# Patient Record
Sex: MALE | Race: WHITE | Employment: FULL TIME | URBAN - METROPOLITAN AREA
[De-identification: names, ages, dates, MRNs, and addresses within clinical notes are randomized per-mention and may not be internally consistent; named-entity substitution may affect disease eponyms.]

---

## 2017-01-24 ENCOUNTER — PRE VISIT (OUTPATIENT)
Dept: CARDIOLOGY | Facility: CLINIC | Age: 57
End: 2017-01-24

## 2017-01-24 DIAGNOSIS — E78.5 HYPERLIPIDEMIA, UNSPECIFIED HYPERLIPIDEMIA TYPE: Primary | ICD-10-CM

## 2017-01-26 ENCOUNTER — OFFICE VISIT (OUTPATIENT)
Dept: CARDIOLOGY | Facility: CLINIC | Age: 57
End: 2017-01-26
Attending: INTERNAL MEDICINE
Payer: COMMERCIAL

## 2017-01-26 VITALS
HEART RATE: 72 BPM | OXYGEN SATURATION: 95 % | BODY MASS INDEX: 22.59 KG/M2 | HEIGHT: 72 IN | DIASTOLIC BLOOD PRESSURE: 65 MMHG | SYSTOLIC BLOOD PRESSURE: 106 MMHG | WEIGHT: 166.8 LBS

## 2017-01-26 DIAGNOSIS — E78.5 HYPERLIPIDEMIA, UNSPECIFIED HYPERLIPIDEMIA TYPE: ICD-10-CM

## 2017-01-26 DIAGNOSIS — Z82.49 FAMILY HISTORY OF PREMATURE CORONARY ARTERY DISEASE: Primary | ICD-10-CM

## 2017-01-26 DIAGNOSIS — R93.1 AGATSTON CAC SCORE 100-199: ICD-10-CM

## 2017-01-26 LAB
ALBUMIN SERPL-MCNC: 4.2 G/DL (ref 3.4–5)
ALP SERPL-CCNC: 64 U/L (ref 40–150)
ALT SERPL W P-5'-P-CCNC: 29 U/L (ref 0–70)
ANION GAP SERPL CALCULATED.3IONS-SCNC: 7 MMOL/L (ref 3–14)
AST SERPL W P-5'-P-CCNC: 29 U/L (ref 0–45)
BILIRUB SERPL-MCNC: 1 MG/DL (ref 0.2–1.3)
BUN SERPL-MCNC: 16 MG/DL (ref 7–30)
CALCIUM SERPL-MCNC: 8.7 MG/DL (ref 8.5–10.1)
CHLORIDE SERPL-SCNC: 104 MMOL/L (ref 94–109)
CHOLEST SERPL-MCNC: 182 MG/DL
CO2 SERPL-SCNC: 28 MMOL/L (ref 20–32)
CREAT SERPL-MCNC: 1.02 MG/DL (ref 0.66–1.25)
GFR SERPL CREATININE-BSD FRML MDRD: 76 ML/MIN/1.7M2
GLUCOSE SERPL-MCNC: 83 MG/DL (ref 70–99)
HDLC SERPL-MCNC: 48 MG/DL
LDLC SERPL CALC-MCNC: 112 MG/DL
NONHDLC SERPL-MCNC: 134 MG/DL
POTASSIUM SERPL-SCNC: 4.2 MMOL/L (ref 3.4–5.3)
PROT SERPL-MCNC: 7.1 G/DL (ref 6.8–8.8)
SODIUM SERPL-SCNC: 139 MMOL/L (ref 133–144)
TRIGL SERPL-MCNC: 106 MG/DL

## 2017-01-26 PROCEDURE — 80061 LIPID PANEL: CPT | Performed by: INTERNAL MEDICINE

## 2017-01-26 PROCEDURE — 99212 OFFICE O/P EST SF 10 MIN: CPT | Mod: ZF

## 2017-01-26 PROCEDURE — 99203 OFFICE O/P NEW LOW 30 MIN: CPT | Mod: GC | Performed by: INTERNAL MEDICINE

## 2017-01-26 PROCEDURE — 36415 COLL VENOUS BLD VENIPUNCTURE: CPT | Performed by: INTERNAL MEDICINE

## 2017-01-26 PROCEDURE — 80053 COMPREHEN METABOLIC PANEL: CPT | Performed by: INTERNAL MEDICINE

## 2017-01-26 RX ORDER — OMEGA-3-ACID ETHYL ESTERS 1 G/1
2 CAPSULE, LIQUID FILLED ORAL 2 TIMES DAILY
Status: ON HOLD | COMMUNITY
End: 2018-03-20

## 2017-01-26 ASSESSMENT — PAIN SCALES - GENERAL: PAINLEVEL: NO PAIN (0)

## 2017-01-26 NOTE — PATIENT INSTRUCTIONS
You were seen today in the Cardiovascular Clinic at the [facility].      Cardiology Providers you saw during your visit:  Dr. Jane    Recommendations:  Schedule CAC score    Follow-up:  With phone call      Thank you for your visit today!       Please call if you have any questions or concerns.  Cardiology Care Coordinator  Rosalie Samayoa RN    For scheduling needs [phone] option 1 and the option 1 again.  Nursing questions: [phone] option 1 then chose option 3 for the triage nurse.  For emergencies call [phone].

## 2017-01-26 NOTE — MR AVS SNAPSHOT
After Visit Summary   1/26/2017    Derek Enriquez    MRN: [id number]           Patient Information     Date Of Birth          1960        Visit Information        Provider Department      1/26/2017 1:00 PM Anoop Jane MD [facility] Heart [facility]        Today's Diagnoses     Family history of premature coronary artery disease    -  1       Care Instructions    You were seen today in the Cardiovascular Clinic at the [facility].      Cardiology Providers you saw during your visit:  Dr. Jane    Recommendations:  Schedule CAC score    Follow-up:  With phone call      Thank you for your visit today!       Please call if you have any questions or concerns.  Cardiology Care Coordinator  Rosalie Samayoa RN    For scheduling needs [phone] option 1 and the option 1 again.  Nursing questions: [phone] option 1 then chose option 3 for the triage nurse.  For emergencies call [phone].                  Follow-ups after your visit        Follow-up notes from your care team     Return if symptoms worsen or fail to improve, for MARK jane.      Your next 10 appointments already scheduled     Jan 30, 2017 12:00 PM   [facility] [place] [place] [facility])    [address] [place] [id number] [phone]           It is best to avoid caffeine on the day of your test.  Be sure to tell your doctor:   If there s any chance you are pregnant.   If you have any special needs.  Please wear loose clothing, such as a sweat suit or jogging clothes. Avoid snaps, zippers and other metal. We may ask you to undress and put on a hospital gown.  If you have any questions, please call the Imaging Department where you will have your exam.              Future tests that were ordered for you today     Open Future Orders        Priority Expected Expires Ordered    CT Coronary Calcium Scan Routine 1/27/2017 1/26/2018 1/26/2017        "     Who to contact     If you have questions or need follow up information about today's clinic visit or your schedule please contact [facility] directly at [phone].  Normal or non-critical lab and imaging results will be communicated to you by MyChart, letter or phone within 4 business days after the clinic has received the results. If you do not hear from us within 7 days, please contact the clinic through [employer] or phone. If you have a critical or abnormal lab result, we will notify you by phone as soon as possible.  Submit refill requests through [employer] or call your pharmacy and they will forward the refill request to us. Please allow 3 business days for your refill to be completed.          Additional Information About Your Visit        [employer] Information     [employer] gives you secure access to your electronic health record. If you see a primary care provider, you can also send messages to your care team and make appointments. If you have questions, please call your primary care clinic.  If you do not have a primary care provider, please call [phone] and they will assist you.        Care EveryWhere ID     This is your Care EveryWhere ID. This could be used by other organizations to access your [facility] medical records  [id number]        Your Vitals Were     Pulse Height BMI (Body Mass Index) Pulse Oximetry          72 1.816 m (5' 11.5\") 22.94 kg/m2 95%         Blood Pressure from Last 3 Encounters:   01/26/17 106/65    Weight from Last 3 Encounters:   01/26/17 75.66 kg (166 lb 12.8 oz)               Primary Care Provider Office Phone # Fax #    Eugene Burrell [phone]       [facility] [address]  [address]  [facility] [address]        Thank you!     Thank you for choosing [facility]  for your care. Our goal is always to provide you with excellent care. Hearing back from our patients is one way we can continue to improve our services. Please " take a few minutes to complete the written survey that you may receive in the mail after your visit with us. Thank you!             Your Updated Medication List - Protect others around you: Learn how to safely use, store and throw away your medicines at www.disposemymeds.org.          This list is accurate as of: 1/26/17  2:56 PM.  Always use your most recent med list.                   Brand Name Dispense Instructions for use    MULTIVITAMIN MEN PO          omega-3 acid ethyl esters 1 G capsule    Lovaza     Take 2 g by mouth 2 times daily       vitamin B complex with vitamin C Tabs tablet      Take 1 tablet by mouth daily

## 2017-01-26 NOTE — Clinical Note
1/26/2017      RE: Derek Enriquez  [address] [facility] [address]       Dear Colleague,    Thank you for the opportunity to participate in the care of your patient, Derek Enriquez, at the [facility] HEART [facility]. Please see a copy of my visit note below.    HPI:   Dear referring physicians and associated providers,    We had the pleasure of seeing Pt Derek Enriquez in Cardiology today on 1/26/2017. As you know, Pt Derek Enriquez is a 56 year old male with a history of hyperlipidemia. He was previously seen in Family Medicine clinic through [facility] where he was found to have elevated lipid levels.     [facility]      He was self-referred here because his brother had an MI at age 42 (unclear circumstances, possibly received a stent), and he is concerned about his own cardiac risk. Since his visit at [facility] in 10/18/2016, he went through a divorce and due to his elevated lipid levels, underwent significant lifestyle modifications. He has eliminated sugar from his diet as well as heavily fried foods and has engaged in regular cardiovascular exercise.     He specifically denies any chest pain with exertion or at rest, abnormal or worsening SOB, abd distention, early satiety, or N/V/D. No presyncope, syncope, dizziness or lightheadedness. He denies any symptoms of orthopnea/PND, or abnormal lower extremity swelling.     After his divorce he had problems with potency, however he reports undergoing Urologic evaluation and has had intact nocturnal erections and so this was attributed to life stressors. He is now in a new relationship and has not had problems with erections.     PAST MEDICAL HISTORY:  No past medical history on file.    CURRENT MEDICATIONS:  No current outpatient prescriptions on file.       PAST SURGICAL HISTORY:  No past surgical history on file.    ALLERGIES   Allergies not on file    FAMILY HISTORY:  No family history on  "file.    SOCIAL HISTORY:  Social History     Social History     Marital Status: Single     Spouse Name: N/A     Number of Children: N/A     Years of Education: N/A     Social History Main Topics     Smoking status: Not on file     Smokeless tobacco: Not on file     Alcohol Use: Not on file     Drug Use: Not on file     Sexual Activity: Not on file     Other Topics Concern     Not on file     Social History Narrative     No narrative on file       ROS:   Constitutional: No fever, chills, or sweats. No weight gain/loss   ENT: No visual disturbance, ear ache, epistaxis, sore throat  Allergies/Immunologic: Negative.   Respiratory: No cough, hemoptysia  Cardiovascular: As per HPI  GI: No nausea, vomiting, hematemesis, melena, or hematochezia  : No urinary frequency, dysuria, or hematuria  Integument: Negative  Psychiatric: Negative  Neuro: Negative  Endocrinology: Negative   Musculoskeletal: Negative    EXAM:  /65 mmHg  Pulse 72  Ht 1.816 m (5' 11.5\")  Wt 75.66 kg (166 lb 12.8 oz)  BMI 22.94 kg/m2  SpO2 95%    In general, the patient is a pleasant male in no apparent distress.    HEENT: NC/AT.  PERRLA.  EOMI.  Sclerae white, not injected.  Nares clear.  Pharynx without erythema or exudate.  Dentition intact.    Neck: No adenopathy.  No thyromegaly. No jugular venous distension.   Heart: RRR. Normal S1, S2 splits physiologically. No murmur, rub, click, or gallop.  Lungs: CTA.  No ronchi, wheezes, rales.  No dullness to percussion.   Abdomen: Soft, nontender, nondistended. No organomegaly.  No bruits.   Extremities: No clubbing, cyanosis, or edema.    Neurologic: Alert and oriented to person/place/time, normal speech, gait and affect  Skin: No petechiae, purpura or rash.    Labs:  LIPID RESULTS:  Lab Results   Component Value Date    CHOL 182 01/26/2017    HDL 48 01/26/2017    * 01/26/2017    TRIG 106 01/26/2017    NHDL 134* 01/26/2017       LIVER ENZYME RESULTS:  Lab Results   Component Value Date    " AST 29 01/26/2017    ALT 29 01/26/2017       CBC RESULTS:  No results found for: WBC, RBC, HGB, HCT, MCV, MCH, MCHC, RDW, PLT    BMP RESULTS:  Lab Results   Component Value Date     01/26/2017    POTASSIUM 4.2 01/26/2017    CHLORIDE 104 01/26/2017    CO2 28 01/26/2017    ANIONGAP 7 01/26/2017    GLC 83 01/26/2017    BUN 16 01/26/2017    CR 1.02 01/26/2017    GFRESTIMATED 76 01/26/2017    GFRESTBLACK >90   GFR Calc   01/26/2017    PAT 8.7 01/26/2017     Assessment and Plan:     Pt Derek Enriquez is a 56 year old male with a history of hyperlipidemia and a family hx of early CAD (brother with MI at age 42) who presents to [facility] care. Overall he is in stable health, and since initiating lifestyle changes, his TGs have decreased from 241 (10/2016) to 106 (1/26/2017), total cholesterol 210 to 182, HDL 37 to 48,  to 112. Due to his brother having had an MI at age 42, he is concerned about his cardiac risk. ASCVD 10-year score 3.4%. After an in-depth discussion of the risks and benefits of various risk stratification strategies, he opted to proceed with coronary artery calcium scoring understanding that this would be an out-of-pocket cost. We are in agreement and will move forward with this plan.     - continue lovaza 2g BID   - continue vitamin B complex   - Coronary artery calcium score assessment    Pt seen and examined with Dr. Jane.     Thomas Quarles MD  PGY-4 Cardiology  Pager: [phone]  January 26, 2017    CC  Patient Care Team:  Eugene Burrell MD as PCP - General (Family Practice)  Anoop Jane MD as MD (Cardiology)  Vonda Samayoa, RN as Nurse Coordinator (Cardiology)  SELF, REFERRED    Answers for HPI/ROS submitted by the patient on 1/23/2017   General Symptoms: No  Skin Symptoms: No  HENT Symptoms: No  EYE SYMPTOMS: No  HEART SYMPTOMS: No  LUNG SYMPTOMS: No  INTESTINAL SYMPTOMS: No  URINARY SYMPTOMS: No  REPRODUCTIVE SYMPTOMS: No  SKELETAL SYMPTOMS: No  BLOOD  SYMPTOMS: No  NERVOUS SYSTEM SYMPTOMS: No  MENTAL HEALTH SYMPTOMS: No    Anoop Jane MD

## 2017-01-26 NOTE — NURSING NOTE
Chief Complaint   Patient presents with     Follow Up For     Initial consult for hyperlipidemia

## 2017-01-26 NOTE — PROGRESS NOTES
HPI:   Dear referring physicians and associated providers,    We had the pleasure of seeing Pt Derek Enriquez in Cardiology today on 1/26/2017. As you know, Pt Derek Enriquez is a 56 year old male with a history of hyperlipidemia. He was previously seen in Family Medicine clinic through [facility] where he was found to have elevated lipid levels.     [facility]      He was self-referred here because his brother had an MI at age 42 (unclear circumstances, possibly received a stent), and he is concerned about his own cardiac risk. Since his visit at [facility] in 10/18/2016, he went through a divorce and due to his elevated lipid levels, underwent significant lifestyle modifications. He has eliminated sugar from his diet as well as heavily fried foods and has engaged in regular cardiovascular exercise.     He specifically denies any chest pain with exertion or at rest, abnormal or worsening SOB, abd distention, early satiety, or N/V/D. No presyncope, syncope, dizziness or lightheadedness. He denies any symptoms of orthopnea/PND, or abnormal lower extremity swelling.         PAST MEDICAL HISTORY:  Shoulder surgery  Erectile dysfunction    CURRENT MEDICATIONS:  Current Outpatient Prescriptions   Medication Sig Dispense Refill     omega-3 acid ethyl esters (LOVAZA) 1 G capsule Take 2 g by mouth 2 times daily       vitamin B complex with vitamin C (VITAMIN  B COMPLEX) TABS tablet Take 1 tablet by mouth daily       Multiple Vitamins-Minerals (MULTIVITAMIN MEN PO)          PAST SURGICAL HISTORY:  Shoulder and finger surgery    ALLERGIES   Allergies not on file    FAMILY HISTORY:  Family Hx of premature CVD    SOCIAL HISTORY:  Social History     Social History     Marital Status: Single     Spouse Name: N/A     Number of Children: N/A     Years of Education: N/A     Social History Main Topics     Smoking status: Not on file     Smokeless tobacco: Not on file     Alcohol Use: Not on file     Drug Use: Not on  "file     Sexual Activity: Not on file     Other Topics Concern     Not on file     Social History Narrative     No narrative on file       ROS:   Constitutional: No fever, chills, or sweats. No weight gain/loss   ENT: No visual disturbance, ear ache, epistaxis, sore throat  Allergies/Immunologic: Negative.   Respiratory: No cough, hemoptysia  Cardiovascular: As per HPI  GI: No nausea, vomiting, hematemesis, melena, or hematochezia  : No urinary frequency, dysuria, or hematuria  Integument: Negative  Psychiatric: Negative  Neuro: Negative  Endocrinology: Negative   Musculoskeletal: Negative    EXAM:  /65 mmHg  Pulse 72  Ht 1.816 m (5' 11.5\")  Wt 75.66 kg (166 lb 12.8 oz)  BMI 22.94 kg/m2  SpO2 95%    In general, the patient is a pleasant male in no apparent distress.    HEENT: NC/AT.  PERRLA.  EOMI.  Sclerae white, not injected.  Nares clear.  Pharynx without erythema or exudate.  Dentition intact.    Neck: No adenopathy.  No thyromegaly. No jugular venous distension.   Heart: RRR. Normal S1, S2 splits physiologically. No murmur, rub, click, or gallop.  Lungs: CTA.  No ronchi, wheezes, rales.  No dullness to percussion.   Abdomen: Soft, nontender, nondistended. No organomegaly.  No bruits.   Extremities: No clubbing, cyanosis, or edema.    Neurologic: Alert and oriented to person/place/time, normal speech, gait and affect  Skin: No petechiae, purpura or rash.    Labs:      Results for LINDA MARTÍNEZ (MRN [id number]) as of 2/4/2017 16:44   Ref. Range 1/26/2017 12:44   Sodium Latest Ref Range: 133-144 mmol/L 139   Potassium Latest Ref Range: 3.4-5.3 mmol/L 4.2   Chloride Latest Ref Range:  mmol/L 104   Carbon Dioxide Latest Ref Range: 20-32 mmol/L 28   Urea Nitrogen Latest Ref Range: 7-30 mg/dL 16   Creatinine Latest Ref Range: 0.66-1.25 mg/dL 1.02   GFR Estimate Latest Ref Range: >60 mL/min/1.7m2 76   GFR Estimate If Black Latest Ref Range: >60 mL/min/1.7m2 >90...   Calcium Latest Ref Range: 8.5-10.1 " mg/dL 8.7   Anion Gap Latest Ref Range: 3-14 mmol/L 7   Albumin Latest Ref Range: 3.4-5.0 g/dL 4.2   Protein Total Latest Ref Range: 6.8-8.8 g/dL 7.1   Bilirubin Total Latest Ref Range: 0.2-1.3 mg/dL 1.0   Alkaline Phosphatase Latest Ref Range:  U/L 64   ALT Latest Ref Range: 0-70 U/L 29   AST Latest Ref Range: 0-45 U/L 29   Cholesterol Latest Ref Range: <200 mg/dL 182   HDL Cholesterol Latest Ref Range: >39 mg/dL 48   LDL Cholesterol Calculated Latest Ref Range: <100 mg/dL 112 (H)   Non HDL Cholesterol Latest Ref Range: <130 mg/dL 134 (H)   Triglycerides Latest Ref Range: <150 mg/dL 106   Glucose Latest Ref Range: 70-99 mg/dL 83     Procedures:    Procedure: CT CALCIUM SCREENING       Examination Date: 1/30/2017 12:36 PM        Clinical Information: Family history of ischemic heart disease and  other diseases of the circulatory system       Ordering Provider: Anoop Jane     PROCEDURE: High-resolution, ECG synchronized multi-slice computed  tomography was performed with a Siemens Dual Source Flash scanner  without incident. Coronary calcification was analyzed using Zattoo  calcium scoring software. Scan protocol was optimized to minimize  radiation exposure. The total radiation exposure was calculated to be  19 DLP and 0.26 mSv.     FINDINGS:     Overall quality of the study: Good.       CORONARY ARTERY CALCIUM SCORES:       Left main coronary artery: 170.58  Left anterior descending coronary artery: 19.78  Circumflex coronary artery: 0    Right coronary artery: 0     TOTAL CALCIUM SCORE: 190.36     The total Agatston calcium score is 190.36 placing the patient in the  96th percentile when compared to age and gender matched control group.     The ascending aorta is borderline dilated measuring 3.40 x 3.51 cm at  the level of the right pulmonary artery.     Please review Radiology report for incidental noncardiac findings that  will follow separately    Radiology Consult to Cardiology, 1/30/2017 3:31  PM     History: Family history of ischemic heart disease and other diseases  of the circulatory system     Comparison: None available     Technique: Cardiac CT was performed by cardiology. Interpretation of  the noncardiac portions of the study was requested.  Field of view  extending from approximately the bessy to the upper abdomen.     Findings:  No lymphadenopathy demonstrated. Heart size is normal. No pericardial  effusion. Calcification of the proximal left coronary artery. Thoracic  aorta and main pulmonary artery are normal in size. Visualized  portions of the esophagus are normal. Bibasilar subsegmental  atelectasis. 7 x 4 mm nodule along the right major fissure (series 5  image 35) . The visualized lungs are otherwise clear. Imaged portions  of the central airways are clear.       Limited evaluation of the upper abdomen.     No suspicious osseous abnormality.                                                                         Impression:  1. 7x 4 mm nodule along the right major fissure. Follow up in 12  months per Fleischner guidelines suggested.  2. Please see cardiology dictation for detailed findings related to  the heart and mediastinum.     [Consider Follow Up: Lung nodule]    Assessment and Plan:     Alan Enriquez is a 56 year old male with a history of hyperlipidemia and a family hx of early CAD (brother with MI at age 42) who presents to [facility] care. Overall he is in stable health, and since initiating lifestyle changes, his TGs have decreased from 241 (10/2016) to 106 (1/26/2017), total cholesterol 210 to 182, HDL 37 to 48,  to 112. Due to his brother having had an MI at age 42, he is concerned about his cardiac risk. ASCVD 10-year score 3.4%. After an in-depth discussion of the risks and benefits of various risk stratification strategies, he opted to proceed with coronary artery calcium scoring understanding that this would be an out-of-pocket cost. We are in agreement and will  move forward with this plan.      - continue lovaza 2g BID   - continue vitamin B complex   - Coronary artery calcium score assessment - see results CAC score 96th percentile      Thomas Quarles MD  PGY-4 Cardiology  Pager: [phone]  January 26, 2017      I interviewed and examined the patient with the CV fellow.  I agree with the assessment and plan as documented.    We discussed the results with patient:  Because of his CAC score  96th percentile and mainly calcification in the left main - we proposed a coronary CT angio to sort out if there is a stenosis of left main less or greater of 50 %.  For the follow-up of lung nodule - follow-up CT lung within 1 year.    We have also discussed with patient to start aspirin 81 mg/d and atorvastatin 10 mg and increase the dose of atorvastin till LDL-chol < 70 mg/dl.         Anoop Jane MD, PhD  Professor of Medicine  Division of Cardiology    CC  Patient Care Team:  Eugene Burrell MD as PCP - General (Family Practice)  Anoop Jane MD as MD (Cardiology)  Vonda Samayoa RN as Nurse Coordinator (Cardiology)  SELF, REFERRED    Answers for HPI/ROS submitted by the patient on 1/23/2017   General Symptoms: No  Skin Symptoms: No  HENT Symptoms: No  EYE SYMPTOMS: No  HEART SYMPTOMS: No  LUNG SYMPTOMS: No  INTESTINAL SYMPTOMS: No  URINARY SYMPTOMS: No  REPRODUCTIVE SYMPTOMS: No  SKELETAL SYMPTOMS: No  BLOOD SYMPTOMS: No  NERVOUS SYSTEM SYMPTOMS: No  MENTAL HEALTH SYMPTOMS: No

## 2017-01-30 ENCOUNTER — HOSPITAL ENCOUNTER (OUTPATIENT)
Dept: CT IMAGING | Facility: CLINIC | Age: 57
Discharge: HOME | End: 2017-01-30
Attending: INTERNAL MEDICINE | Admitting: INTERNAL MEDICINE
Payer: COMMERCIAL

## 2017-01-30 DIAGNOSIS — Z82.49 FAMILY HISTORY OF PREMATURE CORONARY ARTERY DISEASE: ICD-10-CM

## 2017-01-30 LAB — RADIOLOGIST FLAGS: NORMAL

## 2017-01-30 PROCEDURE — 75571 CT HRT W/O DYE W/CA TEST: CPT | Mod: 26 | Performed by: INTERNAL MEDICINE

## 2017-01-30 PROCEDURE — 75571 CT HRT W/O DYE W/CA TEST: CPT

## 2017-01-31 ENCOUNTER — TELEPHONE (OUTPATIENT)
Dept: CARDIOLOGY | Facility: CLINIC | Age: 57
End: 2017-01-31

## 2017-01-31 NOTE — TELEPHONE ENCOUNTER
"[facility] called to notify that pt had an incidental finding of \"lung nodule\" on his Radiologist Consult scan performed yesterday. Results are in EPIC. They wanted to make sure that Dr. Jane is aware and follows-up with patient about the finding. Will route to Rosalie Samayoa RN and Dr. Buck MD.  "

## 2017-02-01 ENCOUNTER — CARE COORDINATION (OUTPATIENT)
Dept: CARDIOLOGY | Facility: CLINIC | Age: 57
End: 2017-02-01

## 2017-02-01 NOTE — PROGRESS NOTES
Date: 2/1/2017    Time of Call: 3:33 PM     Diagnosis:  High CAC score, coronary artery calcification     [ TORB ] Ordering provider: Dr. Jane   Order: Start Aspirin 81 mg by mouth once daily.  Obtain CT coronary angiogram.  Start Atorvastatin 20 mg by mouth once daily.      Order received by: Vonda Samayoa, RN, BSN     Follow-up/additional notes: Discussed coronary CT angiogram results with patient.  He understands that he should have repeat chest CT in one year to reassess pulmonary nodule.  Discussed concerning findings of coronary CT angiogram.  He is resistant to further therapy or testing.  He prefers to discuss further with Dr. Jane.

## 2017-02-02 NOTE — PROGRESS NOTES
Type of call: Other     Notes: patient returning call from earlier today, please call back  Patient call back number: [phone]    YANG GraceA

## 2017-02-06 NOTE — PROGRESS NOTES
Patient prefers to contact his insurance company prior to scheduling CT coronary angiogram.  He is not interested in starting any medications at this time; he prefers to re-evaluate based on test results.

## 2017-02-14 NOTE — PROGRESS NOTES
Returned call to patient and provided CPT code for test.  Discussed indications for test, pros and cons of further testing, and potential treatment options for potential findings.  All questions answered.  He is awaiting a return call from [employer].

## 2017-02-17 ENCOUNTER — TELEPHONE (OUTPATIENT)
Dept: CARDIOLOGY | Facility: CLINIC | Age: 57
End: 2017-02-17

## 2017-02-17 NOTE — TELEPHONE ENCOUNTER
Pt called triage asking to speak to Rosalie about his CTA scheduled for 2/27. Pt is concerned that he is going to get billed for 2 tests, both CT WITH contrast and CT WITHOUT contrast. Pt states he already had a CT without contrast and only needs the CT with contrast, not both. Offered to send pt to billing to discuss, but pt stated he wanted to speak to Rosalie, as he was speaking to her about it earlier this week, and so he can ensure that he is only going to have the CT with contrast ordered. Will route to Rosalie Samayoa RN.

## 2017-02-22 ENCOUNTER — CARE COORDINATION (OUTPATIENT)
Dept: CARDIOLOGY | Facility: CLINIC | Age: 57
End: 2017-02-22

## 2017-02-22 DIAGNOSIS — Z82.49 FAMILY HISTORY OF PREMATURE CAD: Primary | ICD-10-CM

## 2017-02-22 DIAGNOSIS — Z53.9 ERRONEOUS ENCOUNTER--DISREGARD: ICD-10-CM

## 2017-02-22 DIAGNOSIS — R93.1 AGATSTON CAC SCORE 100-199: ICD-10-CM

## 2017-02-23 ENCOUNTER — TELEPHONE (OUTPATIENT)
Dept: CARDIOLOGY | Facility: CLINIC | Age: 57
End: 2017-02-23

## 2017-02-23 NOTE — TELEPHONE ENCOUNTER
----- Message from Jodi Skaggs RN sent at 2/21/2017  2:34 PM CST -----  Regarding: FW: CT angiogram question - UPDATE  Hi,  I spoke with Jacklyn Flaca our  again and she said he shouldn't be charged for 2 CT angiograms done in the same setting/day. It will be either or in terms of with or without contrast NOT both.   Just FYI for when you talk to him.    Jodi  ----- Message -----     From: Jodi Skaggs RN     Sent: 2/21/2017   1:04 PM       To: Vonda Samayoa, RN, Jessy Martínez RN  Subject: CT angiogram question                            Lev Wiggins,  This patient called in again with the same concern as last week re: CT angiogram. He doesn't want to get charged for both CT without contrast and CT with contrast. I told him that this test has contrast and that I don't know why he'd be charged for a CT without contrast. He said he spoke with a CT tech who said they do both and gave him the CPT codes for both with and without. (I think this is the problem - he now thinks he'll be billed for both). I gave him the CPT code on the order and checked the the  as well. He still doesn't feel like his question was answered.     I told him you are back tomorrow and can help him then.     Thanks,  Jodi

## 2017-02-23 NOTE — TELEPHONE ENCOUNTER
Left patient voice message confirming below information.  Encouraged him to call if further questions.

## 2017-03-20 ENCOUNTER — HOSPITAL ENCOUNTER (OUTPATIENT)
Dept: CT IMAGING | Facility: CLINIC | Age: 57
Discharge: HOME | End: 2017-03-20
Attending: INTERNAL MEDICINE | Admitting: INTERNAL MEDICINE
Payer: COMMERCIAL

## 2017-03-20 VITALS — RESPIRATION RATE: 16 BRPM | DIASTOLIC BLOOD PRESSURE: 60 MMHG | SYSTOLIC BLOOD PRESSURE: 94 MMHG

## 2017-03-20 DIAGNOSIS — Z82.49 FAMILY HISTORY OF PREMATURE CAD: ICD-10-CM

## 2017-03-20 DIAGNOSIS — R93.1 AGATSTON CAC SCORE 100-199: ICD-10-CM

## 2017-03-20 PROCEDURE — 25500064 ZZH RX 255 OP 636: Performed by: INTERNAL MEDICINE

## 2017-03-20 PROCEDURE — 75574 CT ANGIO HRT W/3D IMAGE: CPT

## 2017-03-20 PROCEDURE — 75574 CT ANGIO HRT W/3D IMAGE: CPT | Mod: 26 | Performed by: INTERNAL MEDICINE

## 2017-03-20 PROCEDURE — 25000132 ZZH RX MED GY IP 250 OP 250 PS 637: Performed by: INTERNAL MEDICINE

## 2017-03-20 RX ORDER — IOPAMIDOL 755 MG/ML
120 INJECTION, SOLUTION INTRAVASCULAR ONCE
Status: COMPLETED | OUTPATIENT
Start: 2017-03-20 | End: 2017-03-20

## 2017-03-20 RX ORDER — ACYCLOVIR 200 MG/1
0-1 CAPSULE ORAL
Status: DISCONTINUED | OUTPATIENT
Start: 2017-03-20 | End: 2017-03-21 | Stop reason: HOSPADM

## 2017-03-20 RX ORDER — METOPROLOL TARTRATE 50 MG
50-100 TABLET ORAL
Status: COMPLETED | OUTPATIENT
Start: 2017-03-20 | End: 2017-03-20

## 2017-03-20 RX ORDER — NITROGLYCERIN 0.4 MG/1
0.4 TABLET SUBLINGUAL
Status: DISCONTINUED | OUTPATIENT
Start: 2017-03-20 | End: 2017-03-21 | Stop reason: HOSPADM

## 2017-03-20 RX ADMIN — METOPROLOL TARTRATE 100 MG: 100 TABLET, FILM COATED ORAL at 08:45

## 2017-03-20 RX ADMIN — IOPAMIDOL 110 ML: 755 INJECTION, SOLUTION INTRAVENOUS at 09:48

## 2017-03-20 RX ADMIN — NITROGLYCERIN 0.4 MG: 0.4 TABLET SUBLINGUAL at 09:48

## 2017-03-20 NOTE — PROGRESS NOTES
Patient is educated on the procedure for this test including the medications that will be used and their possible side effects.  Patient is appropriately NPO.  100 mg of oral metoprolol is given per protocol.  Patient tolerated the IV contrast dye denying any symptoms of allergic reaction.  VSS.  Patient is monitored x 15 mn post CT scan and then is escorted on foot back to the [facility] waiting area.  Patient agrees to drink 8 glasses of water over the remainder of the day.

## 2017-03-22 ENCOUNTER — CARE COORDINATION (OUTPATIENT)
Dept: CARDIOLOGY | Facility: CLINIC | Age: 57
End: 2017-03-22

## 2017-03-22 NOTE — PROGRESS NOTES
Called patient to discuss coronary CT angiogram results.  He is not convinced coronary angiogram is needed at this time.  He would like to discuss further with Dr. Jane.  He is concerned about receiving a life-long stent at such a young age.  He would like to review other diagnostic and treatment options. He has reviewed with his brother who is a cardiologist.  He understands that I will review with Dr. Jane and return his call. Patient is out of town 3/23-3/24, so would like call early next week unless call is made yet tonight.

## 2017-04-17 ENCOUNTER — CARE COORDINATION (OUTPATIENT)
Dept: CARDIOLOGY | Facility: CLINIC | Age: 57
End: 2017-04-17

## 2017-04-17 NOTE — PROGRESS NOTES
Patient continues to decline coronary angiogram.  He is asymptomatic at this time and does not feel that further testing is urgent.  Discussed importance of aspirin and statin therapy given his documented coronary artery disease.  He is not interested in therapy and feels that he can manage his CAD with lifestyle modifications.  He reports improved diet and physical activity.  He will call to schedule an appointment with Dr. Jane if he develops new or changed symptoms.

## 2017-04-24 ENCOUNTER — TELEPHONE (OUTPATIENT)
Dept: CARDIOLOGY | Facility: CLINIC | Age: 57
End: 2017-04-24

## 2017-04-24 NOTE — TELEPHONE ENCOUNTER
Returned call to patient and left voice message informing him that I am unaware of any charges associated with telephone calls.  Reiterated that I do not have any knowledge of charges and billing and such questions will need to be asked of the billing department.

## 2017-04-24 NOTE — TELEPHONE ENCOUNTER
"----- Message from Linda Bauer RN sent at 4/17/2017  4:53 PM CDT -----  Regarding: billing question  Please call patient, as he is upset because of a billing issue.    He was charged another $238 for a phone call updating the pt regarding CT angio. Pt was not aware of this....    Billing department stated that this is related to \"codes\" and his questions were not answered.    Please call and assist him (if you can...) [phone]    Thanks much,    Linda Bauer, RN, BSN  Care Coordinator  Pulmonary Hypertension  [facility]  (P) [phone]         "

## 2017-05-02 ENCOUNTER — CARE COORDINATION (OUTPATIENT)
Dept: CARDIOLOGY | Facility: CLINIC | Age: 57
End: 2017-05-02

## 2017-05-02 NOTE — PROGRESS NOTES
Returned call to patient.  Reviewed the difference, again, between coronary angiogram and coronary CT angiogram.  Patient is unsure if he should proceed with test or if he should continue with his nontraditional regimen that includes dietary and exercise modifications, in addition to vitamin c and l-lysine/l-proline. Encouraged him to schedule an appointment multiple times with Dr. Jane. He does not feel that is needed. He would like to know if it is possible to perform repeat test to assess if his nontraditional regimen influence his coronary calcium and blockages. Informed him that this process is not reversible; the goal is to prevent further blockages.  Unable to assist patient further.  He understands that I will route call details to Dr. Jane.

## 2017-05-02 NOTE — PROGRESS NOTES
Note: Discussed statin and aspirin therapy during the telephone call.  Patient continues to believe in unconventional plan of care, despite multiple attempts to educate patient.

## 2017-05-02 NOTE — PROGRESS NOTES
Patient calls in wanting to speak with Dr. Jane's nurse about why coronary angiogram is the next step considering his results on the CTA and overall clinical picture. He is curious how and why Dr. Jane made this decision.     Routing to Dr. Jane & [facility] for follow up.

## 2017-11-14 ENCOUNTER — TRANSFERRED RECORDS (OUTPATIENT)
Dept: HEALTH INFORMATION MANAGEMENT | Facility: CLINIC | Age: 57
End: 2017-11-14

## 2017-12-18 ENCOUNTER — CARE COORDINATION (OUTPATIENT)
Dept: CARDIOLOGY | Facility: CLINIC | Age: 57
End: 2017-12-18

## 2017-12-18 NOTE — PROGRESS NOTES
"Patient called call center and requested follow up appointment with Dr. Jane. He would like to be seen sooner than the first available appointment in June.  He is requesting lab orders, CAC score order and CT coronary angiogram order now.  He would then like to be seen by Dr. Jane after results are available to discuss his progress, or lack there of, with his approach of diet and exercise over the last one year.  I encouraged him to go ahead and schedule labs, but that further testing can not be ordered until further discussion with Dr. Jane. Patient denies any concerning symptoms; he does not have any SOB or chest pain.  \"I feel great actually!\"  He feels strongly that the tests completed last year need to be repeated.  He understands that I will talk with Dr. Jane and return his call within 1 week.  He is agreeable to this plan.   "

## 2017-12-22 NOTE — TELEPHONE ENCOUNTER
Dr. Jane spoke with patient regarding his request for repeat labs, CAC score and CT coronary angiogram.  He informed patient that labs have been ordered, but further imaging is not ordered. Patient chose not to treat his CAD with standard therapy. He reports that he is taking K2, vitamin C and a vigorous exercise program.  He would like repeat imaging to show evidenced that his non-standard therapies has decreased his cholesterol and coronary artery disease.  Dr. Jane informed patient that imaging orders will not be placed; coronary angiogram was previously recommended, along with ASA and statin.  Patient encouraged to seek his desired treatment plan from other physicians of his choosing.  He is agreeable and appreciative of this plan.

## 2018-01-10 ENCOUNTER — TELEPHONE (OUTPATIENT)
Dept: PODIATRY | Facility: CLINIC | Age: 58
End: 2018-01-10

## 2018-01-10 ENCOUNTER — OFFICE VISIT (OUTPATIENT)
Dept: PODIATRY | Facility: CLINIC | Age: 58
End: 2018-01-10
Payer: COMMERCIAL

## 2018-01-10 VITALS
HEIGHT: 72 IN | SYSTOLIC BLOOD PRESSURE: 123 MMHG | WEIGHT: 175.8 LBS | DIASTOLIC BLOOD PRESSURE: 68 MMHG | HEART RATE: 70 BPM | BODY MASS INDEX: 23.81 KG/M2

## 2018-01-10 DIAGNOSIS — M20.5X2 HALLUX LIMITUS OF LEFT FOOT: Primary | ICD-10-CM

## 2018-01-10 PROCEDURE — 99203 OFFICE O/P NEW LOW 30 MIN: CPT | Performed by: PODIATRIST

## 2018-01-10 NOTE — TELEPHONE ENCOUNTER
Reason for Call:  Other call back    Detailed comments: Patient called [facility] and spoke with Milvia regarding his records he was told that someone from Dr. Stewart's office needs to call [phone] for them to send the records. Please call patient and advise Thank you .    Phone Number Patient can be reached at: Work number on file:  [phone] (work)    Best Time: any    Can we leave a detailed message on this number? YES    Call taken on 1/10/2018 at 9:20 AM by Kandis Gerardo

## 2018-01-10 NOTE — PROGRESS NOTES
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  1.5 year duration.  No injury.  1-7/10 pain, worse in tight shoes.  Wondering about surgical options.  Seen at [facility] prior.  We called to have them electronically transfer his x-rays but they have not arrived.      Review of Systems:  Patient denies fever, chills, rash, wound, stiffness, limping, numbness, weakness, heart burn, blood in stool, chest pain with activity, calf pain when walking, shortness of breath with activity, chronic cough, easy bleeding/bruising, swelling of ankles, excessive thirst, fatigue, depression, anxiety.       PAST MEDICAL HISTORY: No pertinent past medical history.     PAST SURGICAL HISTORY: No past surgical history on file.     MEDICATIONS:   Current Outpatient Prescriptions:      omega-3 acid ethyl esters (LOVAZA) 1 G capsule, Take 2 g by mouth 2 times daily, Disp: , Rfl:      vitamin B complex with vitamin C (VITAMIN  B COMPLEX) TABS tablet, Take 1 tablet by mouth daily, Disp: , Rfl:      Multiple Vitamins-Minerals (MULTIVITAMIN MEN PO), , Disp: , Rfl:      ALLERGIES:  No Known Allergies     SOCIAL HISTORY:   Social History     Social History     Marital status: Single     Spouse name: N/A     Number of children: N/A     Years of education: N/A     Occupational History     Not on file.     Social History Main Topics     Smoking status: Former Smoker     Smokeless tobacco: Never Used      Comment: Quit at 31     Alcohol use Not on file     Drug use: Not on file     Sexual activity: Not on file     Other Topics Concern     Not on file     Social History Narrative     No narrative on file        FAMILY HISTORY: No pertinent family history.     EXAM:Vitals: /68 (BP Location: Left arm, Patient Position: Chair, Cuff Size: Adult Regular)  Pulse 70  Ht 5' 11.5\" (1.816 m)  Wt 175 lb 12.8 oz (79.7 kg)  BMI 24.18 kg/m2  BMI= Body mass index is 24.18 " kg/(m^2).    General appearance: Patient is alert and fully cooperative with history & exam.  No sign of distress is noted during the visit.     Psychiatric: Affect is pleasant & appropriate.  Patient appears motivated to improve health.     Respiratory: Breathing is regular & unlabored while sitting.     HEENT: Hearing is intact to spoken word.  Speech is clear.  No gross evidence of visual impairment that would impact ambulation.     Dermatologic: Skin is intact to L foot without significant lesions, rash or abrasion.  No paronychia or evidence of soft tissue infection is noted.     Vascular: DP & PT pulses are intact & regular on the left. No significant edema or varicosities noted.  CFT and skin temperature are normal.     Neurologic: Lower extremity sensation is intact to light touch.  No evidence of weakness or contracture in the lower extremities.  No evidence of neuropathy.     Musculoskeletal: Left 1st MPJ with prominent palpable osteophytes, limitation of motion with some pain noted.  Patient is ambulatory without assistive device or brace.  No gross ankle deformity noted.  No foot or ankle joint effusion is noted.    XRs not available.  Pt will call to have them sent on disc and I will review.     ASSESSMENT: L hallux limitus     PLAN:  Reviewed patient's chart in epic.  Discussed condition and treatment options including pros and cons.    Surgical and non-surgical treatment options for hallux limitus were discussed.  This includes my philosophy about different procedures including cheilectomy, osteotomy and fusion.  I explained how fusion is for late stage treatment of advanced arthritis.  There is no certainty that the non-fusion procedures will improve joint pain that is caused by arthritis.  I explained that hallux limitus is oftentimes surgically treated in a staged manner.  This means that additional surgery may be necessary over time.  Non-operative treatments like stiff soles, orthotics, injection  and NSAIDs were discussed.  Shoes that provide extra room for the enlarged joint should be helpful.  I would anticipate somewhat short term benefit from joint injection.    I suspect pt will need a fusion if surgery is to be undertaken.  Will reserve final judgement until I see x-rays.    Will call pt once I receive images.      Abhijeet Stewart, JEN, FACFAS

## 2018-01-10 NOTE — LETTER
1/10/2018         RE: Derek Enriquez  [address]  [place] [address]        Dear Colleague,    Thank you for referring your patient, Derek Enriquez, to the [facility]. Please see a copy of my visit note below.    BMI is normal.  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  1.5 year duration.  No injury.  1-7/10 pain, worse in tight shoes.  Wondering about surgical options.  Seen at [facility] prior.  We called to have them electronically transfer his x-rays but they have not arrived.      Review of Systems:  Patient denies fever, chills, rash, wound, stiffness, limping, numbness, weakness, heart burn, blood in stool, chest pain with activity, calf pain when walking, shortness of breath with activity, chronic cough, easy bleeding/bruising, swelling of ankles, excessive thirst, fatigue, depression, anxiety.       PAST MEDICAL HISTORY: No pertinent past medical history.     PAST SURGICAL HISTORY: No past surgical history on file.     MEDICATIONS:   Current Outpatient Prescriptions:      omega-3 acid ethyl esters (LOVAZA) 1 G capsule, Take 2 g by mouth 2 times daily, Disp: , Rfl:      vitamin B complex with vitamin C (VITAMIN  B COMPLEX) TABS tablet, Take 1 tablet by mouth daily, Disp: , Rfl:      Multiple Vitamins-Minerals (MULTIVITAMIN MEN PO), , Disp: , Rfl:      ALLERGIES:  No Known Allergies     SOCIAL HISTORY:   Social History     Social History     Marital status: Single     Spouse name: N/A     Number of children: N/A     Years of education: N/A     Occupational History     Not on file.     Social History Main Topics     Smoking status: Former Smoker     Smokeless tobacco: Never Used      Comment: Quit at 31     Alcohol use Not on file     Drug use: Not on file     Sexual activity: Not on file     Other Topics Concern     Not on file     Social History Narrative     No  "narrative on file        FAMILY HISTORY: No pertinent family history.     EXAM:Vitals: /68 (BP Location: Left arm, Patient Position: Chair, Cuff Size: Adult Regular)  Pulse 70  Ht 5' 11.5\" (1.816 m)  Wt 175 lb 12.8 oz (79.7 kg)  BMI 24.18 kg/m2  BMI= Body mass index is 24.18 kg/(m^2).    General appearance: Patient is alert and fully cooperative with history & exam.  No sign of distress is noted during the visit.     Psychiatric: Affect is pleasant & appropriate.  Patient appears motivated to improve health.     Respiratory: Breathing is regular & unlabored while sitting.     HEENT: Hearing is intact to spoken word.  Speech is clear.  No gross evidence of visual impairment that would impact ambulation.     Dermatologic: Skin is intact to L foot without significant lesions, rash or abrasion.  No paronychia or evidence of soft tissue infection is noted.     Vascular: DP & PT pulses are intact & regular on the left. No significant edema or varicosities noted.  CFT and skin temperature are normal.     Neurologic: Lower extremity sensation is intact to light touch.  No evidence of weakness or contracture in the lower extremities.  No evidence of neuropathy.     Musculoskeletal: Left 1st MPJ with prominent palpable osteophytes, limitation of motion with some pain noted.  Patient is ambulatory without assistive device or brace.  No gross ankle deformity noted.  No foot or ankle joint effusion is noted.    XRs not available.  Pt will call to have them sent on disc and I will review.     ASSESSMENT: L hallux limitus     PLAN:  Reviewed patient's chart in epic.  Discussed condition and treatment options including pros and cons.    Surgical and non-surgical treatment options for hallux limitus were discussed.  This includes my philosophy about different procedures including cheilectomy, osteotomy and fusion.  I explained how fusion is for late stage treatment of advanced arthritis.  There is no certainty that the " non-fusion procedures will improve joint pain that is caused by arthritis.  I explained that hallux limitus is oftentimes surgically treated in a staged manner.  This means that additional surgery may be necessary over time.  Non-operative treatments like stiff soles, orthotics, injection and NSAIDs were discussed.  Shoes that provide extra room for the enlarged joint should be helpful.  I would anticipate somewhat short term benefit from joint injection.    I suspect pt will need a fusion if surgery is to be undertaken.  Will reserve final judgement until I see x-rays.    Will call pt once I receive images.      Abhijeet Stewart DPM, FACFAS      Again, thank you for allowing me to participate in the care of your patient.        Sincerely,        Abhijeet Stewart DPM

## 2018-01-10 NOTE — NURSING NOTE
"Chief Complaint   Patient presents with     Toe Pain     L foot hallux rigidus        Initial /68 (BP Location: Left arm, Patient Position: Chair, Cuff Size: Adult Regular)  Pulse 70  Ht 5' 11.5\" (1.816 m)  Wt 175 lb 12.8 oz (79.7 kg)  BMI 24.18 kg/m2 Estimated body mass index is 24.18 kg/(m^2) as calculated from the following:    Height as of this encounter: 5' 11.5\" (1.816 m).    Weight as of this encounter: 175 lb 12.8 oz (79.7 kg).  Medication Reconciliation: ty Ardon MA January 10, 2018 7:20 AM  "

## 2018-01-10 NOTE — TELEPHONE ENCOUNTER
Spoke with someone at [facility]. They stated they will be faxing the images..  RUBEN Ardon MA January 10, 2018 1:08 PM

## 2018-01-10 NOTE — PATIENT INSTRUCTIONS
"DEGENERATIVE ARTHRITIS OF THE BIG TOE JOINT (hallux limitus/hallux rigidus)   Arthritis of the joint at the base of the big toe (metatarsophalangeal joint) has several causes. Usually it results from repetitive trauma to the joint, secondary to abnormal foot mechanics. Often it is hereditary. However, a one-time traumatic event can lead to arthritis. The condition can worsen with time. The cartilage wears out, joint surfaces are no longer smooth, bone rubs on bone, inflammation occurs with pain, and eventually bone spurs and loose fragments might develop.   The joint is often painful with activity, worse with flimsy shoes or walking barefoot, and it slowly progresses over time. A person might notice the toe \"locking up\" with walking. There often is an obvious, and irritating, bony bump on top of the foot. Shoes might be uncomfortable. In some people the pain is so bothersome that recreational activities sometimes even normal daily activities are difficult to perform.   The pain from this arthritis is likely a combination of joint jamming, cartilage loss and inflammation, and irritation from shoes rubbing on the bump. Sometimes other parts of the foot, leg, or back hurt from altering one's walk to compensate for the painful joint.     Ways to help a person live with the discomfort include wearing a good, supportive shoe with a rigid, rocker-type bottom. An example is a hiking boot. A rigid sole minimizes bending of the joint, and therefore, joint motion and pain. Shoes with a high toe box allow for less rubbing on the bump. Avoiding barefoot walking, sandals, flip-flops and slippers usually helps.     Sometimes an insert or orthotic provides symptom relief. This might make shoe fit more difficult. Pads over the bump and occasionally injections into the joint provide relief.     Surgery for this condition is aimed towards alleviating pain. It does not cure the arthritis nor does it guarantee better joint motion. " Depending on the condition of the metatarsophalangeal joint, there are several surgical options:    1.  Cutting off the bony bump(s) and cleaning the joint    2.  Loosening the joint up by making cuts in the first metatarsal bone or the big toe bone and removing a small section of bone.    3.  Repositioning bone to minimize jamming of the joint.    4.  In severe cases, the joint is fused. By fusing the joint, it will never bend again. This resolves the pain, because it's the movement of a worn out joint that causes pain. Oftentimes the operation involves a combination of these procedures and requires the use of screws, pins, and/or a small surgical plate.     Healing after surgery requires about six weeks of protection. This allows the bone to heal. Maximum recovery takes about one year. The scar tissue and joint structures require this amount of time to finish the healing process. Expect stiffness, swelling and numbness during that time frame.   Surgery for arthritis of the metatarsophalangeal joint does involve side effects. Some side effects are predictable and others are less common but do occur. A scar will be visible and could be irritated by shoes. The shoe may rub on the screw or internal pin requiring surgical removal of these fixation devices. The screw and pin would likely be left in place for a full year. The first toe may remain stiff after surgery. The amount of stiffness is variable. Most people never regain normal motion of the first toe. This is due to scar tissue inherent to any surgery, in addition to the cumulative effects of arthritis. Sometimes the big toe drifts to one side or the other. Joint fusion is one option to correct an unstable, drifting toe.   All surgical procedures involve risk of infection, numbness, pain, delayed bone healing, osteotomy (bone cut) dislocation, blood clots, continued foot pain, etc. Arthritic joint surgery is quite complex and should not be taken lightly.    Any  skin incision can lead to infection. Deep infection might involve the bone and thus repeat surgery and six weeks of IV antibiotics. Scar tissue can cause nerve pain or numbness. This is generally temporary but can be permanent. We do not have treatments that cure nerve problems. Second toe pain could be related to altered mechanics and pressure transferred to the second toe. Delayed bone healing would lengthen the healing time. Some bones simply do not heal. This requires repeat surgery, electronic bone stimulation and/or extended protection. Smokers have an approximate 20% chance of poor bone healing. This is double that of a non-smoker. The bone cut may displace. This may need to be repaired with a second operation. Displacement can cause joint malalignment. Immobility after surgery can cause a blood clot in the legs and lungs. This could result in death.   Foot pain is complex. Most feet hurt for more than one reason. Operating on the arthritic   big toe joint will not necessarily create a pain free foot. Appropriate shoes, healthy body weight, avoidance of bare foot walking and moderation of activity will always be necessary to enjoy foot comfort. Arthritis is incurable even with surgery.     Surgery for this type of arthritis is nevertheless quite successful. Most surgical patients are pleased with their foot following surgery. Many of the issues described above can be controlled by taking proper care of your foot during the healing process.   Cosmetic bump surgery is discouraged for the reasons listed above. A bump and joint that is comfortable when wearing appropriate shoes should simply be treated with appropriate shoes.   Your surgeon would be happy to fully describe any of the above issues. You should pursue a full understanding of the operation, recovery process and any potential problems that could develop.

## 2018-01-10 NOTE — MR AVS SNAPSHOT
"              After Visit Summary   1/10/2018    Derek Enriquez    MRN: [id number]           Patient Information     Date Of Birth          1960        Visit Information        Provider Department      1/10/2018 7:30 AM Abhijeet Stewart DPM [facility]        Today's Diagnoses     Hallux limitus of left foot    -  1      Care Instructions    DEGENERATIVE ARTHRITIS OF THE BIG TOE JOINT (hallux limitus/hallux rigidus)   Arthritis of the joint at the base of the big toe (metatarsophalangeal joint) has several causes. Usually it results from repetitive trauma to the joint, secondary to abnormal foot mechanics. Often it is hereditary. However, a one-time traumatic event can lead to arthritis. The condition can worsen with time. The cartilage wears out, joint surfaces are no longer smooth, bone rubs on bone, inflammation occurs with pain, and eventually bone spurs and loose fragments might develop.   The joint is often painful with activity, worse with flimsy shoes or walking barefoot, and it slowly progresses over time. A person might notice the toe \"locking up\" with walking. There often is an obvious, and irritating, bony bump on top of the foot. Shoes might be uncomfortable. In some people the pain is so bothersome that recreational activities sometimes even normal daily activities are difficult to perform.   The pain from this arthritis is likely a combination of joint jamming, cartilage loss and inflammation, and irritation from shoes rubbing on the bump. Sometimes other parts of the foot, leg, or back hurt from altering one's walk to compensate for the painful joint.     Ways to help a person live with the discomfort include wearing a good, supportive shoe with a rigid, rocker-type bottom. An example is a hiking boot. A rigid sole minimizes bending of the joint, and therefore, joint motion and pain. Shoes with a high toe box allow for less rubbing on the bump. Avoiding barefoot walking, " sandals, flip-flops and slippers usually helps.     Sometimes an insert or orthotic provides symptom relief. This might make shoe fit more difficult. Pads over the bump and occasionally injections into the joint provide relief.     Surgery for this condition is aimed towards alleviating pain. It does not cure the arthritis nor does it guarantee better joint motion. Depending on the condition of the metatarsophalangeal joint, there are several surgical options:    1.  Cutting off the bony bump(s) and cleaning the joint    2.  Loosening the joint up by making cuts in the first metatarsal bone or the big toe bone and removing a small section of bone.    3.  Repositioning bone to minimize jamming of the joint.    4.  In severe cases, the joint is fused. By fusing the joint, it will never bend again. This resolves the pain, because it's the movement of a worn out joint that causes pain. Oftentimes the operation involves a combination of these procedures and requires the use of screws, pins, and/or a small surgical plate.     Healing after surgery requires about six weeks of protection. This allows the bone to heal. Maximum recovery takes about one year. The scar tissue and joint structures require this amount of time to finish the healing process. Expect stiffness, swelling and numbness during that time frame.   Surgery for arthritis of the metatarsophalangeal joint does involve side effects. Some side effects are predictable and others are less common but do occur. A scar will be visible and could be irritated by shoes. The shoe may rub on the screw or internal pin requiring surgical removal of these fixation devices. The screw and pin would likely be left in place for a full year. The first toe may remain stiff after surgery. The amount of stiffness is variable. Most people never regain normal motion of the first toe. This is due to scar tissue inherent to any surgery, in addition to the cumulative effects of  arthritis. Sometimes the big toe drifts to one side or the other. Joint fusion is one option to correct an unstable, drifting toe.   All surgical procedures involve risk of infection, numbness, pain, delayed bone healing, osteotomy (bone cut) dislocation, blood clots, continued foot pain, etc. Arthritic joint surgery is quite complex and should not be taken lightly.    Any skin incision can lead to infection. Deep infection might involve the bone and thus repeat surgery and six weeks of IV antibiotics. Scar tissue can cause nerve pain or numbness. This is generally temporary but can be permanent. We do not have treatments that cure nerve problems. Second toe pain could be related to altered mechanics and pressure transferred to the second toe. Delayed bone healing would lengthen the healing time. Some bones simply do not heal. This requires repeat surgery, electronic bone stimulation and/or extended protection. Smokers have an approximate 20% chance of poor bone healing. This is double that of a non-smoker. The bone cut may displace. This may need to be repaired with a second operation. Displacement can cause joint malalignment. Immobility after surgery can cause a blood clot in the legs and lungs. This could result in death.   Foot pain is complex. Most feet hurt for more than one reason. Operating on the arthritic   big toe joint will not necessarily create a pain free foot. Appropriate shoes, healthy body weight, avoidance of bare foot walking and moderation of activity will always be necessary to enjoy foot comfort. Arthritis is incurable even with surgery.     Surgery for this type of arthritis is nevertheless quite successful. Most surgical patients are pleased with their foot following surgery. Many of the issues described above can be controlled by taking proper care of your foot during the healing process.   Cosmetic bump surgery is discouraged for the reasons listed above. A bump and joint that is  "comfortable when wearing appropriate shoes should simply be treated with appropriate shoes.   Your surgeon would be happy to fully describe any of the above issues. You should pursue a full understanding of the operation, recovery process and any potential problems that could develop.                 Follow-ups after your visit        Who to contact     If you have questions or need follow up information about today's clinic visit or your schedule please contact [facility] directly at [phone].  Normal or non-critical lab and imaging results will be communicated to you by [employer], letter or phone within 4 business days after the clinic has received the results. If you do not hear from us within 7 days, please contact the clinic through [employer] or phone. If you have a critical or abnormal lab result, we will notify you by phone as soon as possible.  Submit refill requests through [employer] or call your pharmacy and they will forward the refill request to us. Please allow 3 business days for your refill to be completed.          Additional Information About Your Visit        [employer] Information     [employer] gives you secure access to your electronic health record. If you see a primary care provider, you can also send messages to your care team and make appointments. If you have questions, please call your primary care clinic.  If you do not have a primary care provider, please call [phone] and they will assist you.        Care EveryWhere ID     This is your Care EveryWhere ID. This could be used by other organizations to access your [facility] medical records  [id number]        Your Vitals Were     Pulse Height BMI (Body Mass Index)             70 5' 11.5\" (1.816 m) 24.18 kg/m2          Blood Pressure from Last 3 Encounters:   01/10/18 123/68   03/20/17 94/60   01/26/17 106/65    Weight from Last 3 Encounters:   01/10/18 175 lb 12.8 oz (79.7 kg)   01/26/17 166 lb 12.8 oz (75.7 kg)            "   Today, you had the following     No orders found for display       Primary Care Provider Office Phone # Fax #    Eugene Burrell [phone]       [facility] [address] [facility] [address]        Equal Access to Services     TONEYBENY KATIE : Hadii aad ku hadasho Soomaali, waaxda luqadaha, qaybta kaalmada adeegyada, waxay idiin hayaan adeeg khroger lasarinafarida hamlin. So [facility] [phone].    ATENCIÓN: Si habla español, tiene a humphrey disposición servicios gratuitos de asistencia lingüística. Llame al [phone].    We comply with applicable federal civil rights laws and [place] laws. We do not discriminate on the basis of race, color, national origin, age, disability, sex, sexual orientation, or gender identity.            Thank you!     Thank you for choosing [facility]  for your care. Our goal is always to provide you with excellent care. Hearing back from our patients is one way we can continue to improve our services. Please take a few minutes to complete the written survey that you may receive in the mail after your visit with us. Thank you!             Your Updated Medication List - Protect others around you: Learn how to safely use, store and throw away your medicines at www.disposemymeds.org.          This list is accurate as of: 1/10/18  7:39 AM.  Always use your most recent med list.                   Brand Name Dispense Instructions for use Diagnosis    MULTIVITAMIN MEN PO           omega-3 acid ethyl esters 1 G capsule    Lovaza     Take 2 g by mouth 2 times daily        vitamin B complex with vitamin C Tabs tablet      Take 1 tablet by mouth daily

## 2018-02-16 ENCOUNTER — CARE COORDINATION (OUTPATIENT)
Dept: CARDIOLOGY | Facility: CLINIC | Age: 58
End: 2018-02-16

## 2018-02-16 ENCOUNTER — MYC MEDICAL ADVICE (OUTPATIENT)
Dept: PODIATRY | Facility: CLINIC | Age: 58
End: 2018-02-16

## 2018-02-16 NOTE — PROGRESS NOTES
Patient calling in stating he had 2nd opinion at [facility] including a CTA 2 weeks ago. Ultimately they recommended coronary angiogram (this is tentatively scheduled at [facility] on 3/9/18), which is in line with with Dr. Jane recommended as next steps. Patient has asked [facility] to send records and CTA images to us by Monday 2/19/18. Patient wants to go forward with coronary angiogram here at the  and is wondering if he needs an appt to discuss this with Dr. Jane or if he will just order the procedure.  Told patient Dr. Jane/RNCC will call him when Dr. Jane has chance to review records.     Routing to Dr. Jane/SHEBACC for follow up.

## 2018-02-19 NOTE — TELEPHONE ENCOUNTER
Called [facility] again and was told that they were sent through pacs this morning. She said she will send it again but does not know how long it takes for us to receive.  RUBEN Ardon MA February 19, 2018 3:49 PM

## 2018-02-19 NOTE — TELEPHONE ENCOUNTER
Images received in PACS.  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  I stated I would recommend 1st MPJ fusion if he were to have surgery, as we discussed in clinic.  Pt is interested in an implant option.  I explained I don't perform implant surgery for this problem.  Will give him info for a local foot surgeon who does, Dr. Danilo Carias with [facility].  Discussed his level of arthritis may prevent this from being a viable option, and even if an implant is used, he will likely need a fusion in the future.  Pt understands.  F/u with me as needed.  All questions answered.  I apologized for the delay in getting back to him.

## 2018-02-19 NOTE — TELEPHONE ENCOUNTER
Spoke with patient and informed him that we have requested the imaging multiple times from [facility] with no result. Patient said that he spoke with someone at [facility] a month ago and they stated that we did receive the records, I apologized to that patient and informed him that I will keep trying today.  RUBEN Ardon MA February 19, 2018 10:20 AM

## 2018-02-19 NOTE — TELEPHONE ENCOUNTER
[facility] medical records is not open for today yet.  RUBEN Ardon MA February 19, 2018 8:01 AM

## 2018-02-19 NOTE — TELEPHONE ENCOUNTER
Spoke with Medical records at [facility] and was advised to fax a request from them. Request faxed.    RUBEN Ardon MA February 19, 2018 8:26 AM

## 2018-02-21 ENCOUNTER — PRE VISIT (OUTPATIENT)
Dept: CARDIOLOGY | Facility: CLINIC | Age: 58
End: 2018-02-21

## 2018-03-01 ENCOUNTER — OFFICE VISIT (OUTPATIENT)
Dept: CARDIOLOGY | Facility: CLINIC | Age: 58
End: 2018-03-01
Attending: INTERNAL MEDICINE
Payer: COMMERCIAL

## 2018-03-01 VITALS
DIASTOLIC BLOOD PRESSURE: 76 MMHG | OXYGEN SATURATION: 99 % | WEIGHT: 173.2 LBS | SYSTOLIC BLOOD PRESSURE: 117 MMHG | HEIGHT: 72 IN | HEART RATE: 75 BPM | BODY MASS INDEX: 23.46 KG/M2

## 2018-03-01 DIAGNOSIS — I20.0 UNSTABLE ANGINA (H): Primary | ICD-10-CM

## 2018-03-01 PROCEDURE — G0463 HOSPITAL OUTPT CLINIC VISIT: HCPCS

## 2018-03-01 PROCEDURE — 93010 ELECTROCARDIOGRAM REPORT: CPT | Mod: ZP | Performed by: INTERNAL MEDICINE

## 2018-03-01 PROCEDURE — 99214 OFFICE O/P EST MOD 30 MIN: CPT | Mod: ZP | Performed by: INTERNAL MEDICINE

## 2018-03-01 PROCEDURE — 93005 ELECTROCARDIOGRAM TRACING: CPT | Mod: ZF

## 2018-03-01 RX ORDER — ASPIRIN 81 MG/1
81 TABLET ORAL DAILY
Status: CANCELLED | OUTPATIENT
Start: 2018-03-01

## 2018-03-01 RX ORDER — ATORVASTATIN CALCIUM 20 MG/1
20 TABLET, FILM COATED ORAL DAILY
Qty: 90 TABLET | Refills: 3 | Status: ON HOLD | OUTPATIENT
Start: 2018-03-01 | End: 2018-03-09

## 2018-03-01 RX ORDER — SODIUM CHLORIDE 9 MG/ML
INJECTION, SOLUTION INTRAVENOUS CONTINUOUS
Status: CANCELLED | OUTPATIENT
Start: 2018-03-01

## 2018-03-01 ASSESSMENT — PAIN SCALES - GENERAL: PAINLEVEL: NO PAIN (0)

## 2018-03-01 NOTE — PATIENT INSTRUCTIONS
Patient Instructions:  It was a pleasure to see you in the cardiology clinic today.      If you have any questions, you can reach my nurse, Kenna Ocasio, at [phone].  Press Option #1 for the [facility], and then press Option #3 for nursing.    We are encouraging the use of MyChart to communicate with your HealthCare Provider    Note the new medications: Lipitor 20 mg by mouth every evening   Stop the following medications: None    Follow the American Heart Association Diet and Lifestyle recommendations:  Limit saturated fat, trans fat, sodium, red meat, sweets and sugar-sweetened beverages. If you choose to eat red meat, compare labels and select the leanest cuts available.  Aim for at least 150 minutes of moderate physical activity or 75 minutes of vigorous physical activity - or an equal combination of both - each week.    The results from today include:none    Studies ordered: Coronary Angiogram    Please follow up with Dr. Anoop Jane after the Angiogram    Sincerely,    Anoop Jane MD     If you have an urgent need after hours (8:00 am to 4:30 pm) please call [phone] and ask for the cardiology fellow on call.            Coronary Angiography     The catheter can be placed into the groin, arm, or wrist.   Angiography is a special type of X-ray that lets your doctor view your coronary arteries to see if the blood vessels to your heart are narrowed or blocked.   Before the procedure    Tell your doctor what medicines you take and any allergies you may have.    Tell your doctor if you've had a reaction to contrast dye or have had any kidney problems.    Don t eat or drink anything for at least 6 to 8 hours before the procedure. You will likely be told not to have anything after midnight, the night before the procedure.    A nurse will place an IV catheter in your vein to give fluids, and medicine to relieve pain and help you feel less anxious.    He or she will clean your  skin and, if necessary, shave the area where the catheter will be inserted.  During the procedure    Your doctor will place a long, thin tube called a catheter inside an artery in your groin or arm and guide it into your heart.    He or she will inject a contrast dye through the catheter into your blood vessels or heart chambers.    X-rays are taken to show images of the inside of your heart and coronary arteries.  After the procedure      Your doctor or nurse will tell you how long to lie down and keep the insertion site still.    If the insertion site was in your groin, you may need to lie down with your leg still for several hours. If bleeding occurs, a nurse will apply pressure to the area to control it.    A nurse will check your blood pressure and the insertion site frequently to make sure you remain stable after the procedure.    You may be asked to drink fluid to help flush the contrast liquid out of your system.    Have someone drive you home from the hospital.    If your doctor uses angioplasty to treat a blocked artery, you will stay the night in the hospital.    It s normal to find a small bruise or lump at the insertion site. The lump may be the collagen plug or stitch that you feel, or a small bruise. These common side effects should disappear within a few weeks.  When to call your healthcare provider  Contact your healthcare provider right away if you have any of these:    Chest pain    Pain, swelling, redness, bleeding, or drainage at the insertion site    Severe pain, coldness, or a bluish color in the leg or arm that held the catheter    Fever over 100.4 F (38 C)   Date Last Reviewed: 6/1/2016 2000-2017 [employer]. [address] [place] [address], [place] [address]. All rights reserved. This information is not intended as a substitute for professional medical care. Always follow your healthcare professional's instructions.

## 2018-03-01 NOTE — LETTER
3/1/2018      RE: Derek Enriquez  [address]  [facility] [address]       Dear Colleague,    Thank you for the opportunity to participate in the care of your patient, Derek Enriquez, at the [facility]. Please see a copy of my visit note below.    03/01/18    HPI:   Mr Derek Enriquez is a  57-year-old gentleman with a significant family history for early coronary artery disease who presents for follow-up evaluation for positive coronary CT.  He wanted to make sure that this was the case and eventually had a second coronary CT at [facility] that confirmed our findings of LAD calcification.      He remains quite active.  He goes to the gym and runs as well as swims.  He denies any chest pain during that time but does say that over time he has noted that he has some vague chest discomfort that occasionally goes to the arms and other parts of the upper torso.  He denies that this is pain or typical pressure.  He does not have any typical anginal features although does have these vague chest discomforts of unclear etiology.   He seems to minimize his chest and shoulder discomfort.     He has not noticed that they necessarily correspond to exercise or physical activity.    He is reluctant to to take statin therapy and he takes every day high doses vit C.    PAST MEDICAL HISTORY:  Hyperlipidemia  Coronary Artery Disease    CURRENT MEDICATIONS:  Current Outpatient Prescriptions   Medication Sig Dispense Refill     omega-3 acid ethyl esters (LOVAZA) 1 G capsule Take 2 g by mouth 2 times daily       Multiple Vitamins-Minerals (MULTIVITAMIN MEN PO) Take 1 tablet by mouth daily        vitamin B complex with vitamin C (VITAMIN  B COMPLEX) TABS tablet Take 1 tablet by mouth daily         PAST SURGICAL HISTORY:  No past surgical history on file.    ALLERGIES   No Known Allergies    FAMILY HISTORY:  Positive family history for early coronary disease in his brother    SOCIAL  "HISTORY:  Social History     Social History     Marital status: Single     Spouse name: N/A     Number of children: N/A     Years of education: N/A     Social History Main Topics     Smoking status: Former Smoker     Smokeless tobacco: Never Used      Comment: Quit at 31     Alcohol use Not on file     Drug use: Not on file     Sexual activity: Not on file     Other Topics Concern     Not on file     Social History Narrative     No narrative on file       ROS:   Constitutional: No fever, chills, or sweats. No weight gain/loss   ENT: No visual disturbance, ear ache, epistaxis, sore throat  Allergies/Immunologic: Negative.   Respiratory: No cough, hemoptysia  Cardiovascular: As per HPI  GI: No nausea, vomiting, hematemesis, melena, or hematochezia  : No urinary frequency, dysuria, or hematuria  Integument: Negative  Psychiatric: Negative  Neuro: Negative  Endocrinology: Negative   Musculoskeletal: Negative    EXAM:  /76 (BP Location: Left arm, Patient Position: Chair, Cuff Size: Adult Large)  Pulse 75  Ht 1.816 m (5' 11.5\")  Wt 78.6 kg (173 lb 3.2 oz)  SpO2 99%  BMI 23.82 kg/m2  In general, the patient is a pleasant male in no apparent distress.    HEENT: NC/AT.  PERRLA.  EOMI.  Sclerae white, not injected.  Nares clear.  Pharynx without erythema or exudate.  Dentition intact.    Neck: No adenopathy.  No thyromegaly. Carotids +4/4 bilaterally without bruits.  No jugular venous distension.   Heart: RRR. Normal S1, S2 splits physiologically. No murmur, rub, click, or gallop. The PMI is in the 5th ICS in the midclavicular line. There is no heave.    Lungs: CTA.  No ronchi, wheezes, rales.  No dullness to percussion.   Abdomen: Soft, nontender, nondistended. No organomegaly.  No bruits.   Extremities: No clubbing, cyanosis, or edema.  The pulses are +4/4 at the radial, brachial, femoral, popliteal, DP, and PT sites bilaterally.  No bruits are noted.  Neurologic: Alert and oriented to person/place/time, normal " speech, gait and affect  Skin: No petechiae, purpura or rash.    Labs:  LIPID RESULTS:  Lab Results   Component Value Date    CHOL 182 01/26/2017    HDL 48 01/26/2017     (H) 01/26/2017    TRIG 106 01/26/2017    NHDL 134 (H) 01/26/2017       LIVER ENZYME RESULTS:  Lab Results   Component Value Date    AST 29 01/26/2017    ALT 29 01/26/2017   Labs 01-17-18([facility])  Tot chol 241 (H)   Trig 156 (H)   HDL-C 47   5.13 (H)   194 (H)   LDL-C163 (H)             BMP RESULTS:  Lab Results   Component Value Date     01/26/2017    POTASSIUM 4.2 01/26/2017    CHLORIDE 104 01/26/2017    CO2 28 01/26/2017    ANIONGAP 7 01/26/2017    GLC 83 01/26/2017    BUN 16 01/26/2017    CR 1.02 01/26/2017    GFRESTIMATED 76 01/26/2017    GFRESTBLACK >90   GFR Calc   01/26/2017    PAT 8.7 01/26/2017          Procedures:    CT coronary angiogram 03-20-17    1. Total Agatston score 213, placing the patient in the 89th  percentile when compared to age and gender matched control group.     2. Severe proximal left anterior descending artery stenosis, greater  than 70%, due to partly calcified plaque.     3.  Please review Radiology report for incidental noncardiac findings  that  will follow separately.     Findings discussed with Dr. Jane today     FINDINGS:     CORONARY CALCIUM SCORE: The total Agatston calcium score is 213,  mainly in the left main and left anterior descending artery.  CORONARY CT ANGIOGRAPHY     DOMINANCE: Right dominant system.      LEFT MAIN: The left main arises normally from the left coronary cusp  and is mild to moderately calcified with mild, 24-49% stenosis  distally.     LEFT ANTERIOR DESCENDING: The proximal left anterior descending artery  has severe, greater than 70% stenosis due to partly calcified plaque.  This stenosis is and chest before the origin of the first diagonal  branch. The first diagonal branch is small caliber and widely patent.  The second diagonal branch is moderate  caliber without stenosis. The  mid and distal LAD has no significant stenosis.      CIRCUMFLEX: The circumflex and its major branches are widely patent  without any detectable stenosis . A small ramus branch is widely  patent.     RIGHT CORONARY ARTERY: The right coronary artery and its major  branches are widely patent without any detectable stenosis.     ADDITIONAL FINDINGS:      The proximal ascending aorta is normal in size.      Normal pulmonary venous anatomy with all four pulmonary veins draining  into the left atrium.       There is no left ventricular mass or thrombus.      Normal pericardial thickness. There is no pericardial effusion      Coronary CT angio ([facility] - Jan 2018)  PLAQUE TYPE:  Hard: Calcium Score = 192.    SCAN QUALITY: Good.    FINDINGS:    CORONARY ANATOMY:  Right Dominant.     LEFT MAIN: No stenoses.         LEFT ANTERIOR DESCENDING:  There is a subtotal occlusion of the proximal   LAD with spotty calcification and remodeling.      FIRST DIAGONAL:  No stenoses.     SECOND DIAGONAL:  No stenoses.     CIRCUMFLEX:  Nondominant.      FIRST OBTUSE MARGINAL:  No stenoses.     RIGHT CORONARY ARTERY:  Dominant.  No stenoses.     POSTERIOR DESCENDING ARTERY:  No stenoses.     POSTEROLATERAL BRANCH(ES):  No stenoses.     LEFT VENTRICLE:  There is no resting perfusion defect.    AORTA:  Proximal ascending aorta, mid-through distal descending thoracic   aorta is normal.    PERICARDIUM:  Normal thickness and without an effusion    Assessment and Plan:     This is a 57 year old male with known coronary artery disease based on coronary CT and early family history who presents for evaluation/recommendations,.      In setting of known coronary artery disease based upon 2 positive coronary CT with LAD lesion (proximal).  Has vague symptoms that could be anginal equivalents (patient       We discussed results - emphasized a heart healthy lifestyle and diet:    - Lipitor 20 mg daily will be started, had long  discussion regarding the benefits of statin medications in setting of known coronary artery disease  - ASA 81 mg daily  - Coronary Angiogram to be scheduled  - Continue healthy lifestyle/dietary modifications  - ECG now - shows sinus rhythm     We discussed also with patient that high vit C doses has never shown any evidence to reduce CV morbidity and mortlaity      - If has intervention, consider addition of beta-blockade    Follow up in 6 months, earlier as needed by the patient.    I interviewed and examined the patient with the CV fellow.  I agree with the assessment and plan as documented.      Anoop Jane MD, PhD  Professor of Medicine  Division of Cardiology      CC  Patient Care Team:  Eugene Burrell MD as PCP - General (Family Practice)  Anoop Jane MD as MD (Cardiology)  Vonda Samayoa, RN as Nurse Coordinator (Cardiology)  SELF, REFERRED

## 2018-03-01 NOTE — NURSING NOTE
Chief Complaint   Patient presents with     Follow Up For     1 year follow up for coronary artery disease; aniogram needed soon      Vitals were taken and medications were reconciled.  KATHY Tsai  3:04 PM

## 2018-03-01 NOTE — PROGRESS NOTES
03/01/18    HPI:   Mr Derek Enriquez is a  57-year-old gentleman with a significant family history for early coronary artery disease who presents for follow-up evaluation for positive coronary CT.  He wanted to make sure that this was the case and eventually had a second coronary CT at [facility] that confirmed our findings of LAD calcification.      He remains quite active.  He goes to the gym and runs as well as swims.  He denies any chest pain during that time but does say that over time he has noted that he has some vague chest discomfort that occasionally goes to the arms and other parts of the upper torso.  He denies that this is pain or typical pressure.  He does not have any typical anginal features although does have these vague chest discomforts of unclear etiology.   He seems to minimize his chest and shoulder discomfort.     He has not noticed that they necessarily correspond to exercise or physical activity.    He is reluctant to to take statin therapy and he takes every day high doses vit C.    PAST MEDICAL HISTORY:  Hyperlipidemia  Coronary Artery Disease    CURRENT MEDICATIONS:  Current Outpatient Prescriptions   Medication Sig Dispense Refill     omega-3 acid ethyl esters (LOVAZA) 1 G capsule Take 2 g by mouth 2 times daily       Multiple Vitamins-Minerals (MULTIVITAMIN MEN PO) Take 1 tablet by mouth daily        vitamin B complex with vitamin C (VITAMIN  B COMPLEX) TABS tablet Take 1 tablet by mouth daily         PAST SURGICAL HISTORY:  No past surgical history on file.    ALLERGIES   No Known Allergies    FAMILY HISTORY:  Positive family history for early coronary disease in his brother    SOCIAL HISTORY:  Social History     Social History     Marital status: Single     Spouse name: N/A     Number of children: N/A     Years of education: N/A     Social History Main Topics     Smoking status: Former Smoker     Smokeless tobacco: Never Used      Comment: Quit at 31     Alcohol use Not on file     Drug  "use: Not on file     Sexual activity: Not on file     Other Topics Concern     Not on file     Social History Narrative     No narrative on file       ROS:   Constitutional: No fever, chills, or sweats. No weight gain/loss   ENT: No visual disturbance, ear ache, epistaxis, sore throat  Allergies/Immunologic: Negative.   Respiratory: No cough, hemoptysia  Cardiovascular: As per HPI  GI: No nausea, vomiting, hematemesis, melena, or hematochezia  : No urinary frequency, dysuria, or hematuria  Integument: Negative  Psychiatric: Negative  Neuro: Negative  Endocrinology: Negative   Musculoskeletal: Negative    EXAM:  /76 (BP Location: Left arm, Patient Position: Chair, Cuff Size: Adult Large)  Pulse 75  Ht 1.816 m (5' 11.5\")  Wt 78.6 kg (173 lb 3.2 oz)  SpO2 99%  BMI 23.82 kg/m2  In general, the patient is a pleasant male in no apparent distress.    HEENT: NC/AT.  PERRLA.  EOMI.  Sclerae white, not injected.  Nares clear.  Pharynx without erythema or exudate.  Dentition intact.    Neck: No adenopathy.  No thyromegaly. Carotids +4/4 bilaterally without bruits.  No jugular venous distension.   Heart: RRR. Normal S1, S2 splits physiologically. No murmur, rub, click, or gallop. The PMI is in the 5th ICS in the midclavicular line. There is no heave.    Lungs: CTA.  No ronchi, wheezes, rales.  No dullness to percussion.   Abdomen: Soft, nontender, nondistended. No organomegaly.  No bruits.   Extremities: No clubbing, cyanosis, or edema.  The pulses are +4/4 at the radial, brachial, femoral, popliteal, DP, and PT sites bilaterally.  No bruits are noted.  Neurologic: Alert and oriented to person/place/time, normal speech, gait and affect  Skin: No petechiae, purpura or rash.    Labs:  LIPID RESULTS:  Lab Results   Component Value Date    CHOL 182 01/26/2017    HDL 48 01/26/2017     (H) 01/26/2017    TRIG 106 01/26/2017    NHDL 134 (H) 01/26/2017       LIVER ENZYME RESULTS:  Lab Results   Component Value Date "    AST 29 01/26/2017    ALT 29 01/26/2017   Labs 01-17-18([facility])  Tot chol 241 (H)   Trig 156 (H)   HDL-C 47   5.13 (H)   194 (H)   LDL-C163 (H)             BMP RESULTS:  Lab Results   Component Value Date     01/26/2017    POTASSIUM 4.2 01/26/2017    CHLORIDE 104 01/26/2017    CO2 28 01/26/2017    ANIONGAP 7 01/26/2017    GLC 83 01/26/2017    BUN 16 01/26/2017    CR 1.02 01/26/2017    GFRESTIMATED 76 01/26/2017    GFRESTBLACK >90   GFR Calc   01/26/2017    PAT 8.7 01/26/2017          Procedures:    CT coronary angiogram 03-20-17    1. Total Agatston score 213, placing the patient in the 89th  percentile when compared to age and gender matched control group.     2. Severe proximal left anterior descending artery stenosis, greater  than 70%, due to partly calcified plaque.     3.  Please review Radiology report for incidental noncardiac findings  that  will follow separately.     Findings discussed with Dr. Jane today     FINDINGS:     CORONARY CALCIUM SCORE: The total Agatston calcium score is 213,  mainly in the left main and left anterior descending artery.  CORONARY CT ANGIOGRAPHY     DOMINANCE: Right dominant system.      LEFT MAIN: The left main arises normally from the left coronary cusp  and is mild to moderately calcified with mild, 24-49% stenosis  distally.     LEFT ANTERIOR DESCENDING: The proximal left anterior descending artery  has severe, greater than 70% stenosis due to partly calcified plaque.  This stenosis is and chest before the origin of the first diagonal  branch. The first diagonal branch is small caliber and widely patent.  The second diagonal branch is moderate caliber without stenosis. The  mid and distal LAD has no significant stenosis.      CIRCUMFLEX: The circumflex and its major branches are widely patent  without any detectable stenosis . A small ramus branch is widely  patent.     RIGHT CORONARY ARTERY: The right coronary artery and its major  branches are  widely patent without any detectable stenosis.     ADDITIONAL FINDINGS:      The proximal ascending aorta is normal in size.      Normal pulmonary venous anatomy with all four pulmonary veins draining  into the left atrium.       There is no left ventricular mass or thrombus.      Normal pericardial thickness. There is no pericardial effusion      Coronary CT angio ([facility] - Jan 2018)  PLAQUE TYPE:  Hard: Calcium Score = 192.    SCAN QUALITY: Good.    FINDINGS:    CORONARY ANATOMY:  Right Dominant.     LEFT MAIN: No stenoses.         LEFT ANTERIOR DESCENDING:  There is a subtotal occlusion of the proximal   LAD with spotty calcification and remodeling.      FIRST DIAGONAL:  No stenoses.     SECOND DIAGONAL:  No stenoses.     CIRCUMFLEX:  Nondominant.      FIRST OBTUSE MARGINAL:  No stenoses.     RIGHT CORONARY ARTERY:  Dominant.  No stenoses.     POSTERIOR DESCENDING ARTERY:  No stenoses.     POSTEROLATERAL BRANCH(ES):  No stenoses.     LEFT VENTRICLE:  There is no resting perfusion defect.    AORTA:  Proximal ascending aorta, mid-through distal descending thoracic   aorta is normal.    PERICARDIUM:  Normal thickness and without an effusion    Assessment and Plan:     This is a 57 year old male with known coronary artery disease based on coronary CT and early family history who presents for evaluation/recommendations,.      In setting of known coronary artery disease based upon 2 positive coronary CT with LAD lesion (proximal).  Has vague symptoms that could be anginal equivalents (patient       We discussed results - emphasized a heart healthy lifestyle and diet:    - Lipitor 20 mg daily will be started, had long discussion regarding the benefits of statin medications in setting of known coronary artery disease  - ASA 81 mg daily  - Coronary Angiogram to be scheduled  - Continue healthy lifestyle/dietary modifications  - ECG now - shows sinus rhythm     We discussed also with patient that high vit C doses has never  shown any evidence to reduce CV morbidity and mortlaity      - If has intervention, consider addition of beta-blockade    Follow up in 6 months, earlier as needed by the patient.    I interviewed and examined the patient with the CV fellow.  I agree with the assessment and plan as documented.      Anoop Jane MD, PhD  Professor of Medicine  Division of Cardiology      CC  Patient Care Team:  Eugene Burrell MD as PCP - General (Family Practice)  Anoop Jane MD as MD (Cardiology)  Vonda Samayoa RN as Nurse Coordinator (Cardiology)  SELF, REFERRED

## 2018-03-01 NOTE — MR AVS SNAPSHOT
After Visit Summary   3/1/2018    Derek Enriquez    MRN: [id number]           Patient Information     Date Of Birth          1960        Visit Information        Provider Department      3/1/2018 3:00 PM Anoop Jane MD [facility]        Today's Diagnoses     Unstable angina (H)    -  1      Care Instructions    Patient Instructions:  It was a pleasure to see you in the cardiology clinic today.      If you have any questions, you can reach my nurse, Kenna Ocasio, at [phone].  Press Option #1 for the [facility], and then press Option #3 for nursing.    We are encouraging the use of aloomat to communicate with your HealthCare Provider    Note the new medications: Lipitor 20 mg by mouth every evening   Stop the following medications: None    Follow the American Heart Association Diet and Lifestyle recommendations:  Limit saturated fat, trans fat, sodium, red meat, sweets and sugar-sweetened beverages. If you choose to eat red meat, compare labels and select the leanest cuts available.  Aim for at least 150 minutes of moderate physical activity or 75 minutes of vigorous physical activity - or an equal combination of both - each week.    The results from today include:none    Studies ordered: Coronary Angiogram    Please follow up with Dr. Anoop Jane after the Angiogram    Sincerely,    Anoop Jane MD     If you have an urgent need after hours (8:00 am to 4:30 pm) please call [phone] and ask for the cardiology fellow on call.            Coronary Angiography     The catheter can be placed into the groin, arm, or wrist.   Angiography is a special type of X-ray that lets your doctor view your coronary arteries to see if the blood vessels to your heart are narrowed or blocked.   Before the procedure    Tell your doctor what medicines you take and any allergies you may have.    Tell your doctor if you've had a reaction to contrast dye or have had any  kidney problems.    Don t eat or drink anything for at least 6 to 8 hours before the procedure. You will likely be told not to have anything after midnight, the night before the procedure.    A nurse will place an IV catheter in your vein to give fluids, and medicine to relieve pain and help you feel less anxious.    He or she will clean your skin and, if necessary, shave the area where the catheter will be inserted.  During the procedure    Your doctor will place a long, thin tube called a catheter inside an artery in your groin or arm and guide it into your heart.    He or she will inject a contrast dye through the catheter into your blood vessels or heart chambers.    X-rays are taken to show images of the inside of your heart and coronary arteries.  After the procedure      Your doctor or nurse will tell you how long to lie down and keep the insertion site still.    If the insertion site was in your groin, you may need to lie down with your leg still for several hours. If bleeding occurs, a nurse will apply pressure to the area to control it.    A nurse will check your blood pressure and the insertion site frequently to make sure you remain stable after the procedure.    You may be asked to drink fluid to help flush the contrast liquid out of your system.    Have someone drive you home from the hospital.    If your doctor uses angioplasty to treat a blocked artery, you will stay the night in the hospital.    It s normal to find a small bruise or lump at the insertion site. The lump may be the collagen plug or stitch that you feel, or a small bruise. These common side effects should disappear within a few weeks.  When to call your healthcare provider  Contact your healthcare provider right away if you have any of these:    Chest pain    Pain, swelling, redness, bleeding, or drainage at the insertion site    Severe pain, coldness, or a bluish color in the leg or arm that held the catheter    Fever over 100.4 F  "(38 C)   Date Last Reviewed: 6/1/2016 2000-2017 [employer]. [address] [place] [address], [place] [address]. All rights reserved. This information is not intended as a substitute for professional medical care. Always follow your healthcare professional's instructions.                Follow-ups after your visit        Who to contact     If you have questions or need follow up information about today's clinic visit or your schedule please contact [facility] directly at [phone].  Normal or non-critical lab and imaging results will be communicated to you by Professionali[employer]ruhart, letter or phone within 4 business days after the clinic has received the results. If you do not hear from us within 7 days, please contact the clinic through [employer]over[employer] or phone. If you have a critical or abnormal lab result, we will notify you by phone as soon as possible.  Submit refill requests through [employer] or call your pharmacy and they will forward the refill request to us. Please allow 3 business days for your refill to be completed.          Additional Information About Your Visit        [email or web address] Information     [employer] gives you secure access to your electronic health record. If you see a primary care provider, you can also send messages to your care team and make appointments. If you have questions, please call your primary care clinic.  If you do not have a primary care provider, please call [phone] and they will assist you.        Care EveryWhere ID     This is your Care EveryWhere ID. This could be used by other organizations to access your [facility] medical records  [id number]        Your Vitals Were     Pulse Height Pulse Oximetry BMI (Body Mass Index)          75 1.816 m (5' 11.5\") 99% 23.82 kg/m2         Blood Pressure from Last 3 Encounters:   03/01/18 117/76   01/10/18 123/68   03/20/17 94/60    Weight from Last 3 Encounters:   03/01/18 78.6 kg (173 lb 3.2 oz)   01/10/18 79.7 kg (175 lb 12.8 oz)   01/26/17 " 75.7 kg (166 lb 12.8 oz)              We Performed the Following     EKG 12-lead, tracing only (Same Day)        Primary Care Provider Office Phone # Fax #    Eugene Burrell [phone]       [facility] [address]  [facility] [address]        Equal Access to Services     [facility]: Hadii aad ku hadasho Soomaali, waaxda luqadaha, qaybta kaalmada adeegyada, waxay idiin hayaan adeeg kharash laDakotahaan . So [facility] [phone].    ATENCIÓN: Si habla español, tiene a humphrey disposición servicios gratuitos de asistencia lingüística. [facility] [phone].    We comply with applicable federal civil rights laws and [place] laws. We do not discriminate on the basis of race, color, national origin, age, disability, sex, sexual orientation, or gender identity.            Thank you!     Thank you for choosing [facility]  for your care. Our goal is always to provide you with excellent care. Hearing back from our patients is one way we can continue to improve our services. Please take a few minutes to complete the written survey that you may receive in the mail after your visit with us. Thank you!             Your Updated Medication List - Protect others around you: Learn how to safely use, store and throw away your medicines at www.disposemymeds.org.          This list is accurate as of 3/1/18  4:49 PM.  Always use your most recent med list.                   Brand Name Dispense Instructions for use Diagnosis    MULTIVITAMIN MEN PO      Take 1 tablet by mouth daily        omega-3 acid ethyl esters 1 G capsule    Lovaza     Take 2 g by mouth 2 times daily        vitamin B complex with vitamin C Tabs tablet      Take 1 tablet by mouth daily

## 2018-03-02 ENCOUNTER — CARE COORDINATION (OUTPATIENT)
Dept: CARDIOLOGY | Facility: CLINIC | Age: 58
End: 2018-03-02

## 2018-03-02 ENCOUNTER — HOSPITAL ENCOUNTER (OUTPATIENT)
Facility: CLINIC | Age: 58
End: 2018-03-02
Admitting: INTERNAL MEDICINE
Payer: COMMERCIAL

## 2018-03-02 LAB — INTERPRETATION ECG - MUSE: NORMAL

## 2018-03-02 NOTE — PROGRESS NOTES
You have been scheduled for a coronary angiogram on Monday, March 19  [facility], [place]  [address] [place] [address]  Please arrive at the [facility] Waiting Room at 9:30 am.  The cardiology interventionalist performing the procedure is Dr. Vance.    Please do not eat or drink anything after midnight. You should take all your morning medications as prescribed with sips of water.     When you arrive you will be escorted back to the pre procedure area called 2A. Here they will insert an IV, draw labs, and obtain a short medical history. Please bring an updated list of your current medications.    A physician will come and talk with you about the procedure and obtain consent.    A nurse from the Cardiac Catheterization Lab will then escort you to the procedure area. You will be receiving sedation during the procedure so you will need someone to drive you to and from the hospital.    After the procedure you will recover for a short period (2 - 6 hours) on 6B. You will be discharged with instructions for post procedure care.  However, depending on what the angiogram shows you may have to have stents placed and this might require an overnight stay. We ask that you bring a small bag of necessities for your comfort if you would need to stay overnight. DO NOT BRING ANY VALUABLES!

## 2018-03-08 ENCOUNTER — APPOINTMENT (OUTPATIENT)
Dept: GENERAL RADIOLOGY | Facility: CLINIC | Age: 58
End: 2018-03-08
Attending: EMERGENCY MEDICINE
Payer: COMMERCIAL

## 2018-03-08 ENCOUNTER — HOSPITAL ENCOUNTER (INPATIENT)
Facility: CLINIC | Age: 58
LOS: 1 days | Discharge: ANOTHER HOSPITAL | End: 2018-03-09
Attending: EMERGENCY MEDICINE | Admitting: INTERNAL MEDICINE
Payer: COMMERCIAL

## 2018-03-08 ENCOUNTER — APPOINTMENT (OUTPATIENT)
Dept: CT IMAGING | Facility: CLINIC | Age: 58
End: 2018-03-08
Attending: EMERGENCY MEDICINE
Payer: COMMERCIAL

## 2018-03-08 ENCOUNTER — APPOINTMENT (OUTPATIENT)
Dept: CARDIOLOGY | Facility: CLINIC | Age: 58
End: 2018-03-08
Attending: EMERGENCY MEDICINE
Payer: COMMERCIAL

## 2018-03-08 DIAGNOSIS — R09.2 RESPIRATORY ARREST (H): ICD-10-CM

## 2018-03-08 DIAGNOSIS — I46.9 CARDIAC ARREST (H): ICD-10-CM

## 2018-03-08 DIAGNOSIS — I25.84 CORONARY ARTERY DISEASE DUE TO CALCIFIED CORONARY LESION: ICD-10-CM

## 2018-03-08 DIAGNOSIS — I25.10 CORONARY ARTERY DISEASE DUE TO CALCIFIED CORONARY LESION: ICD-10-CM

## 2018-03-08 DIAGNOSIS — I20.0 UNSTABLE ANGINA (H): ICD-10-CM

## 2018-03-08 LAB
ANION GAP SERPL CALCULATED.3IONS-SCNC: 14 MMOL/L (ref 3–14)
ANION GAP SERPL CALCULATED.3IONS-SCNC: 9 MMOL/L (ref 3–14)
APTT PPP: >240 SEC (ref 22–37)
BASOPHILS # BLD AUTO: 0.1 10E9/L (ref 0–0.2)
BASOPHILS NFR BLD AUTO: 0.5 %
BUN SERPL-MCNC: 22 MG/DL (ref 7–30)
BUN SERPL-MCNC: 23 MG/DL (ref 7–30)
CALCIUM SERPL-MCNC: 8.2 MG/DL (ref 8.5–10.1)
CALCIUM SERPL-MCNC: 8.3 MG/DL (ref 8.5–10.1)
CHLORIDE SERPL-SCNC: 106 MMOL/L (ref 94–109)
CHLORIDE SERPL-SCNC: 107 MMOL/L (ref 94–109)
CO2 SERPL-SCNC: 19 MMOL/L (ref 20–32)
CO2 SERPL-SCNC: 22 MMOL/L (ref 20–32)
CREAT SERPL-MCNC: 0.97 MG/DL (ref 0.66–1.25)
CREAT SERPL-MCNC: 1.03 MG/DL (ref 0.66–1.25)
DIFFERENTIAL METHOD BLD: NORMAL
EOSINOPHIL # BLD AUTO: 0.1 10E9/L (ref 0–0.7)
EOSINOPHIL NFR BLD AUTO: 1.2 %
ERYTHROCYTE [DISTWIDTH] IN BLOOD BY AUTOMATED COUNT: 13.1 % (ref 10–15)
ERYTHROCYTE [DISTWIDTH] IN BLOOD BY AUTOMATED COUNT: 13.1 % (ref 10–15)
GFR SERPL CREATININE-BSD FRML MDRD: 75 ML/MIN/1.7M2
GFR SERPL CREATININE-BSD FRML MDRD: 80 ML/MIN/1.7M2
GLUCOSE BLDC GLUCOMTR-MCNC: 94 MG/DL (ref 70–99)
GLUCOSE SERPL-MCNC: 127 MG/DL (ref 70–99)
GLUCOSE SERPL-MCNC: 93 MG/DL (ref 70–99)
HCT VFR BLD AUTO: 43.9 % (ref 40–53)
HCT VFR BLD AUTO: 47.9 % (ref 40–53)
HGB BLD-MCNC: 15.1 G/DL (ref 13.3–17.7)
HGB BLD-MCNC: 16.8 G/DL (ref 13.3–17.7)
IMM GRANULOCYTES # BLD: 0.1 10E9/L (ref 0–0.4)
IMM GRANULOCYTES NFR BLD: 1.3 %
INR PPP: 1.12 (ref 0.86–1.14)
INR PPP: 3.66 (ref 0.86–1.14)
KCT BLD-ACNC: 206 SEC (ref 75–150)
LYMPHOCYTES # BLD AUTO: 3 10E9/L (ref 0.8–5.3)
LYMPHOCYTES NFR BLD AUTO: 31.5 %
MCH RBC QN AUTO: 30.8 PG (ref 26.5–33)
MCH RBC QN AUTO: 31.1 PG (ref 26.5–33)
MCHC RBC AUTO-ENTMCNC: 34.4 G/DL (ref 31.5–36.5)
MCHC RBC AUTO-ENTMCNC: 35.1 G/DL (ref 31.5–36.5)
MCV RBC AUTO: 89 FL (ref 78–100)
MCV RBC AUTO: 90 FL (ref 78–100)
MONOCYTES # BLD AUTO: 1.1 10E9/L (ref 0–1.3)
MONOCYTES NFR BLD AUTO: 11.3 %
NEUTROPHILS # BLD AUTO: 5.2 10E9/L (ref 1.6–8.3)
NEUTROPHILS NFR BLD AUTO: 54.2 %
NRBC # BLD AUTO: 0 10*3/UL
NRBC BLD AUTO-RTO: 0 /100
PLATELET # BLD AUTO: 195 10E9/L (ref 150–450)
PLATELET # BLD AUTO: 261 10E9/L (ref 150–450)
POTASSIUM SERPL-SCNC: 3.8 MMOL/L (ref 3.4–5.3)
POTASSIUM SERPL-SCNC: 3.9 MMOL/L (ref 3.4–5.3)
RBC # BLD AUTO: 4.9 10E12/L (ref 4.4–5.9)
RBC # BLD AUTO: 5.4 10E12/L (ref 4.4–5.9)
SODIUM SERPL-SCNC: 137 MMOL/L (ref 133–144)
SODIUM SERPL-SCNC: 140 MMOL/L (ref 133–144)
TROPONIN I SERPL-MCNC: 0.04 UG/L (ref 0–0.04)
TROPONIN I SERPL-MCNC: 0.36 UG/L (ref 0–0.04)
WBC # BLD AUTO: 13.1 10E9/L (ref 4–11)
WBC # BLD AUTO: 9.5 10E9/L (ref 4–11)

## 2018-03-08 PROCEDURE — 80048 BASIC METABOLIC PNL TOTAL CA: CPT | Performed by: EMERGENCY MEDICINE

## 2018-03-08 PROCEDURE — 5A1935Z RESPIRATORY VENTILATION, LESS THAN 24 CONSECUTIVE HOURS: ICD-10-PCS | Performed by: EMERGENCY MEDICINE

## 2018-03-08 PROCEDURE — 25000128 H RX IP 250 OP 636

## 2018-03-08 PROCEDURE — B2111ZZ FLUOROSCOPY OF MULTIPLE CORONARY ARTERIES USING LOW OSMOLAR CONTRAST: ICD-10-PCS | Performed by: INTERNAL MEDICINE

## 2018-03-08 PROCEDURE — 93458 L HRT ARTERY/VENTRICLE ANGIO: CPT | Mod: 26 | Performed by: INTERNAL MEDICINE

## 2018-03-08 PROCEDURE — 96366 THER/PROPH/DIAG IV INF ADDON: CPT

## 2018-03-08 PROCEDURE — 93005 ELECTROCARDIOGRAM TRACING: CPT

## 2018-03-08 PROCEDURE — 00000146 ZZHCL STATISTIC GLUCOSE BY METER IP

## 2018-03-08 PROCEDURE — 96365 THER/PROPH/DIAG IV INF INIT: CPT

## 2018-03-08 PROCEDURE — 20000003 ZZH R&B ICU

## 2018-03-08 PROCEDURE — 40000008 ZZH STATISTIC AIRWAY CARE

## 2018-03-08 PROCEDURE — 27210795 ZZH PAD DEFIB QUICK CR4

## 2018-03-08 PROCEDURE — 25000132 ZZH RX MED GY IP 250 OP 250 PS 637: Performed by: EMERGENCY MEDICINE

## 2018-03-08 PROCEDURE — 99291 CRITICAL CARE FIRST HOUR: CPT | Mod: 25 | Performed by: INTERNAL MEDICINE

## 2018-03-08 PROCEDURE — C1757 CATH, THROMBECTOMY/EMBOLECT: HCPCS

## 2018-03-08 PROCEDURE — 70450 CT HEAD/BRAIN W/O DYE: CPT

## 2018-03-08 PROCEDURE — C1769 GUIDE WIRE: HCPCS

## 2018-03-08 PROCEDURE — 25000128 H RX IP 250 OP 636: Performed by: INTERNAL MEDICINE

## 2018-03-08 PROCEDURE — 25000132 ZZH RX MED GY IP 250 OP 250 PS 637: Performed by: INTERNAL MEDICINE

## 2018-03-08 PROCEDURE — 27210848 ZZH CATH IABP PUMP CR18

## 2018-03-08 PROCEDURE — C1874 STENT, COATED/COV W/DEL SYS: HCPCS

## 2018-03-08 PROCEDURE — 36415 COLL VENOUS BLD VENIPUNCTURE: CPT | Performed by: INTERNAL MEDICINE

## 2018-03-08 PROCEDURE — 99291 CRITICAL CARE FIRST HOUR: CPT | Performed by: INTERNAL MEDICINE

## 2018-03-08 PROCEDURE — 85730 THROMBOPLASTIN TIME PARTIAL: CPT | Performed by: INTERNAL MEDICINE

## 2018-03-08 PROCEDURE — 96375 TX/PRO/DX INJ NEW DRUG ADDON: CPT

## 2018-03-08 PROCEDURE — 85610 PROTHROMBIN TIME: CPT | Performed by: EMERGENCY MEDICINE

## 2018-03-08 PROCEDURE — 25000128 H RX IP 250 OP 636: Performed by: EMERGENCY MEDICINE

## 2018-03-08 PROCEDURE — 33967 INSERT I-AORT PERCUT DEVICE: CPT | Mod: GC | Performed by: INTERNAL MEDICINE

## 2018-03-08 PROCEDURE — 40000275 ZZH STATISTIC RCP TIME EA 10 MIN

## 2018-03-08 PROCEDURE — 27210946 ZZH KIT HC TOTES DISP CR8

## 2018-03-08 PROCEDURE — 99291 CRITICAL CARE FIRST HOUR: CPT | Mod: 25

## 2018-03-08 PROCEDURE — 93458 L HRT ARTERY/VENTRICLE ANGIO: CPT | Mod: XU

## 2018-03-08 PROCEDURE — 5A02210 ASSISTANCE WITH CARDIAC OUTPUT USING BALLOON PUMP, CONTINUOUS: ICD-10-PCS | Performed by: INTERNAL MEDICINE

## 2018-03-08 PROCEDURE — 27211089 ZZH KIT ACIST INJECTOR CR3

## 2018-03-08 PROCEDURE — C1887 CATHETER, GUIDING: HCPCS

## 2018-03-08 PROCEDURE — 72125 CT NECK SPINE W/O DYE: CPT

## 2018-03-08 PROCEDURE — C1725 CATH, TRANSLUMIN NON-LASER: HCPCS

## 2018-03-08 PROCEDURE — 93010 ELECTROCARDIOGRAM REPORT: CPT | Performed by: INTERNAL MEDICINE

## 2018-03-08 PROCEDURE — 84484 ASSAY OF TROPONIN QUANT: CPT | Performed by: INTERNAL MEDICINE

## 2018-03-08 PROCEDURE — C9600 PERC DRUG-EL COR STENT SING: HCPCS | Mod: LD

## 2018-03-08 PROCEDURE — 99292 CRITICAL CARE ADDL 30 MIN: CPT

## 2018-03-08 PROCEDURE — 27210759 ZZH DEVICE INFLATION CR6

## 2018-03-08 PROCEDURE — 80048 BASIC METABOLIC PNL TOTAL CA: CPT | Performed by: INTERNAL MEDICINE

## 2018-03-08 PROCEDURE — 33967 INSERT I-AORT PERCUT DEVICE: CPT

## 2018-03-08 PROCEDURE — 85610 PROTHROMBIN TIME: CPT | Performed by: INTERNAL MEDICINE

## 2018-03-08 PROCEDURE — 4A023N7 MEASUREMENT OF CARDIAC SAMPLING AND PRESSURE, LEFT HEART, PERCUTANEOUS APPROACH: ICD-10-PCS | Performed by: INTERNAL MEDICINE

## 2018-03-08 PROCEDURE — 92941 PRQ TRLML REVSC TOT OCCL AMI: CPT | Mod: LD | Performed by: INTERNAL MEDICINE

## 2018-03-08 PROCEDURE — 84484 ASSAY OF TROPONIN QUANT: CPT | Performed by: EMERGENCY MEDICINE

## 2018-03-08 PROCEDURE — 3E043XZ INTRODUCTION OF VASOPRESSOR INTO CENTRAL VEIN, PERCUTANEOUS APPROACH: ICD-10-PCS | Performed by: INTERNAL MEDICINE

## 2018-03-08 PROCEDURE — 71045 X-RAY EXAM CHEST 1 VIEW: CPT

## 2018-03-08 PROCEDURE — 027034Z DILATION OF CORONARY ARTERY, ONE ARTERY WITH DRUG-ELUTING INTRALUMINAL DEVICE, PERCUTANEOUS APPROACH: ICD-10-PCS | Performed by: INTERNAL MEDICINE

## 2018-03-08 PROCEDURE — 27210787 ZZH MANIFOLD CR2

## 2018-03-08 PROCEDURE — 85347 COAGULATION TIME ACTIVATED: CPT

## 2018-03-08 PROCEDURE — 25000125 ZZHC RX 250: Performed by: INTERNAL MEDICINE

## 2018-03-08 PROCEDURE — 85025 COMPLETE CBC W/AUTO DIFF WBC: CPT | Performed by: EMERGENCY MEDICINE

## 2018-03-08 PROCEDURE — 85027 COMPLETE CBC AUTOMATED: CPT | Performed by: INTERNAL MEDICINE

## 2018-03-08 PROCEDURE — 27210910 ZZH NEEDLE CR6

## 2018-03-08 PROCEDURE — 27210998 ZZH ACCESS HEART CATH CR6

## 2018-03-08 RX ORDER — PROPOFOL 10 MG/ML
10-20 INJECTION, EMULSION INTRAVENOUS EVERY 30 MIN PRN
Status: DISCONTINUED | OUTPATIENT
Start: 2018-03-08 | End: 2018-03-09

## 2018-03-08 RX ORDER — SODIUM CHLORIDE 9 MG/ML
INJECTION, SOLUTION INTRAVENOUS CONTINUOUS
Status: DISCONTINUED | OUTPATIENT
Start: 2018-03-08 | End: 2018-03-09 | Stop reason: HOSPADM

## 2018-03-08 RX ORDER — FENTANYL CITRATE 50 UG/ML
25-50 INJECTION, SOLUTION INTRAMUSCULAR; INTRAVENOUS
Status: DISCONTINUED | OUTPATIENT
Start: 2018-03-08 | End: 2018-03-08 | Stop reason: HOSPADM

## 2018-03-08 RX ORDER — CLOPIDOGREL BISULFATE 75 MG/1
75 TABLET ORAL DAILY
Status: DISCONTINUED | OUTPATIENT
Start: 2018-03-09 | End: 2018-03-09 | Stop reason: HOSPADM

## 2018-03-08 RX ORDER — NITROGLYCERIN 20 MG/100ML
.07-2 INJECTION INTRAVENOUS CONTINUOUS PRN
Status: DISCONTINUED | OUTPATIENT
Start: 2018-03-08 | End: 2018-03-08 | Stop reason: HOSPADM

## 2018-03-08 RX ORDER — ACETAMINOPHEN 325 MG/1
325-650 TABLET ORAL EVERY 4 HOURS PRN
Status: DISCONTINUED | OUTPATIENT
Start: 2018-03-08 | End: 2018-03-09 | Stop reason: HOSPADM

## 2018-03-08 RX ORDER — NALOXONE HYDROCHLORIDE 0.4 MG/ML
.2-.4 INJECTION, SOLUTION INTRAMUSCULAR; INTRAVENOUS; SUBCUTANEOUS
Status: ACTIVE | OUTPATIENT
Start: 2018-03-08 | End: 2018-03-09

## 2018-03-08 RX ORDER — HEPARIN SODIUM 1000 [USP'U]/ML
1000-10000 INJECTION, SOLUTION INTRAVENOUS; SUBCUTANEOUS EVERY 5 MIN PRN
Status: DISCONTINUED | OUTPATIENT
Start: 2018-03-08 | End: 2018-03-08 | Stop reason: HOSPADM

## 2018-03-08 RX ORDER — ATROPINE SULFATE 0.1 MG/ML
.5-1 INJECTION INTRAVENOUS
Status: DISCONTINUED | OUTPATIENT
Start: 2018-03-08 | End: 2018-03-08 | Stop reason: HOSPADM

## 2018-03-08 RX ORDER — ASPIRIN 81 MG/1
81-324 TABLET, CHEWABLE ORAL
Status: COMPLETED | OUTPATIENT
Start: 2018-03-08 | End: 2018-03-08

## 2018-03-08 RX ORDER — NALOXONE HYDROCHLORIDE 0.4 MG/ML
.1-.4 INJECTION, SOLUTION INTRAMUSCULAR; INTRAVENOUS; SUBCUTANEOUS
Status: DISCONTINUED | OUTPATIENT
Start: 2018-03-08 | End: 2018-03-09 | Stop reason: HOSPADM

## 2018-03-08 RX ORDER — HYDROCODONE BITARTRATE AND ACETAMINOPHEN 5; 325 MG/1; MG/1
1-2 TABLET ORAL EVERY 4 HOURS PRN
Status: DISCONTINUED | OUTPATIENT
Start: 2018-03-08 | End: 2018-03-09 | Stop reason: HOSPADM

## 2018-03-08 RX ORDER — OMEGA-3-ACID ETHYL ESTERS 1 G/1
2 CAPSULE, LIQUID FILLED ORAL 2 TIMES DAILY
Status: DISCONTINUED | OUTPATIENT
Start: 2018-03-08 | End: 2018-03-09 | Stop reason: HOSPADM

## 2018-03-08 RX ORDER — CHLORHEXIDINE GLUCONATE ORAL RINSE 1.2 MG/ML
15 SOLUTION DENTAL EVERY 12 HOURS
Status: DISCONTINUED | OUTPATIENT
Start: 2018-03-08 | End: 2018-03-09 | Stop reason: HOSPADM

## 2018-03-08 RX ORDER — NITROGLYCERIN 0.4 MG/1
0.4 TABLET SUBLINGUAL EVERY 5 MIN PRN
Status: DISCONTINUED | OUTPATIENT
Start: 2018-03-08 | End: 2018-03-09 | Stop reason: HOSPADM

## 2018-03-08 RX ORDER — NICARDIPINE HYDROCHLORIDE 2.5 MG/ML
100 INJECTION INTRAVENOUS
Status: DISCONTINUED | OUTPATIENT
Start: 2018-03-08 | End: 2018-03-08 | Stop reason: HOSPADM

## 2018-03-08 RX ORDER — NITROGLYCERIN 0.4 MG/1
0.4 TABLET SUBLINGUAL EVERY 5 MIN PRN
Status: DISCONTINUED | OUTPATIENT
Start: 2018-03-08 | End: 2018-03-08 | Stop reason: HOSPADM

## 2018-03-08 RX ORDER — PROPOFOL 10 MG/ML
INJECTION, EMULSION INTRAVENOUS
Status: DISCONTINUED
Start: 2018-03-08 | End: 2018-03-08 | Stop reason: HOSPADM

## 2018-03-08 RX ORDER — SODIUM CHLORIDE 9 MG/ML
INJECTION, SOLUTION INTRAVENOUS CONTINUOUS
Status: DISCONTINUED | OUTPATIENT
Start: 2018-03-08 | End: 2018-03-09

## 2018-03-08 RX ORDER — CLOPIDOGREL BISULFATE 75 MG/1
300-600 TABLET ORAL
Status: COMPLETED | OUTPATIENT
Start: 2018-03-08 | End: 2018-03-08

## 2018-03-08 RX ORDER — SODIUM NITROPRUSSIDE 25 MG/ML
100-200 INJECTION INTRAVENOUS
Status: DISCONTINUED | OUTPATIENT
Start: 2018-03-08 | End: 2018-03-08 | Stop reason: HOSPADM

## 2018-03-08 RX ORDER — ASPIRIN 81 MG/1
81 TABLET ORAL DAILY
Status: DISCONTINUED | OUTPATIENT
Start: 2018-03-09 | End: 2018-03-08

## 2018-03-08 RX ORDER — ATROPINE SULFATE 0.1 MG/ML
0.5 INJECTION INTRAVENOUS EVERY 5 MIN PRN
Status: ACTIVE | OUTPATIENT
Start: 2018-03-08 | End: 2018-03-09

## 2018-03-08 RX ORDER — ASPIRIN 81 MG/1
81 TABLET ORAL DAILY
Status: DISCONTINUED | OUTPATIENT
Start: 2018-03-09 | End: 2018-03-09 | Stop reason: HOSPADM

## 2018-03-08 RX ORDER — POTASSIUM CHLORIDE 7.45 MG/ML
10 INJECTION INTRAVENOUS
Status: DISCONTINUED | OUTPATIENT
Start: 2018-03-08 | End: 2018-03-08 | Stop reason: HOSPADM

## 2018-03-08 RX ORDER — ASPIRIN 300 MG/1
300 SUPPOSITORY RECTAL ONCE
Status: COMPLETED | OUTPATIENT
Start: 2018-03-08 | End: 2018-03-08

## 2018-03-08 RX ORDER — DIPHENHYDRAMINE HYDROCHLORIDE 50 MG/ML
25-50 INJECTION INTRAMUSCULAR; INTRAVENOUS
Status: DISCONTINUED | OUTPATIENT
Start: 2018-03-08 | End: 2018-03-08 | Stop reason: HOSPADM

## 2018-03-08 RX ORDER — PROTAMINE SULFATE 10 MG/ML
1-5 INJECTION, SOLUTION INTRAVENOUS
Status: DISCONTINUED | OUTPATIENT
Start: 2018-03-08 | End: 2018-03-08 | Stop reason: HOSPADM

## 2018-03-08 RX ORDER — ENALAPRILAT 1.25 MG/ML
1.25-2.5 INJECTION INTRAVENOUS
Status: DISCONTINUED | OUTPATIENT
Start: 2018-03-08 | End: 2018-03-08 | Stop reason: HOSPADM

## 2018-03-08 RX ORDER — NITROGLYCERIN 5 MG/ML
100-200 VIAL (ML) INTRAVENOUS
Status: DISCONTINUED | OUTPATIENT
Start: 2018-03-08 | End: 2018-03-08 | Stop reason: HOSPADM

## 2018-03-08 RX ORDER — METHYLPREDNISOLONE SODIUM SUCCINATE 125 MG/2ML
125 INJECTION, POWDER, LYOPHILIZED, FOR SOLUTION INTRAMUSCULAR; INTRAVENOUS
Status: DISCONTINUED | OUTPATIENT
Start: 2018-03-08 | End: 2018-03-08 | Stop reason: HOSPADM

## 2018-03-08 RX ORDER — CLOPIDOGREL BISULFATE 75 MG/1
75 TABLET ORAL
Status: DISCONTINUED | OUTPATIENT
Start: 2018-03-08 | End: 2018-03-08 | Stop reason: HOSPADM

## 2018-03-08 RX ORDER — ONDANSETRON 2 MG/ML
4 INJECTION INTRAMUSCULAR; INTRAVENOUS EVERY 4 HOURS PRN
Status: DISCONTINUED | OUTPATIENT
Start: 2018-03-08 | End: 2018-03-08 | Stop reason: HOSPADM

## 2018-03-08 RX ORDER — FENTANYL CITRATE 50 UG/ML
100 INJECTION, SOLUTION INTRAMUSCULAR; INTRAVENOUS ONCE
Status: COMPLETED | OUTPATIENT
Start: 2018-03-08 | End: 2018-03-08

## 2018-03-08 RX ORDER — HYDRALAZINE HYDROCHLORIDE 20 MG/ML
10-20 INJECTION INTRAMUSCULAR; INTRAVENOUS
Status: DISCONTINUED | OUTPATIENT
Start: 2018-03-08 | End: 2018-03-08 | Stop reason: HOSPADM

## 2018-03-08 RX ORDER — DOBUTAMINE HYDROCHLORIDE 200 MG/100ML
2-20 INJECTION INTRAVENOUS CONTINUOUS PRN
Status: DISCONTINUED | OUTPATIENT
Start: 2018-03-08 | End: 2018-03-08 | Stop reason: HOSPADM

## 2018-03-08 RX ORDER — PROCHLORPERAZINE 25 MG
25 SUPPOSITORY, RECTAL RECTAL EVERY 12 HOURS PRN
Status: DISCONTINUED | OUTPATIENT
Start: 2018-03-08 | End: 2018-03-09 | Stop reason: HOSPADM

## 2018-03-08 RX ORDER — NIFEDIPINE 10 MG/1
10 CAPSULE ORAL
Status: DISCONTINUED | OUTPATIENT
Start: 2018-03-08 | End: 2018-03-08 | Stop reason: HOSPADM

## 2018-03-08 RX ORDER — ASPIRIN 325 MG
325 TABLET ORAL
Status: DISCONTINUED | OUTPATIENT
Start: 2018-03-08 | End: 2018-03-08 | Stop reason: HOSPADM

## 2018-03-08 RX ORDER — DEXTROSE MONOHYDRATE 25 G/50ML
12.5-5 INJECTION, SOLUTION INTRAVENOUS EVERY 30 MIN PRN
Status: DISCONTINUED | OUTPATIENT
Start: 2018-03-08 | End: 2018-03-08 | Stop reason: HOSPADM

## 2018-03-08 RX ORDER — NALOXONE HYDROCHLORIDE 0.4 MG/ML
.1-.4 INJECTION, SOLUTION INTRAMUSCULAR; INTRAVENOUS; SUBCUTANEOUS
Status: DISCONTINUED | OUTPATIENT
Start: 2018-03-08 | End: 2018-03-08

## 2018-03-08 RX ORDER — EPINEPHRINE 1 MG/ML
0.3 INJECTION, SOLUTION, CONCENTRATE INTRAVENOUS
Status: DISCONTINUED | OUTPATIENT
Start: 2018-03-08 | End: 2018-03-08 | Stop reason: HOSPADM

## 2018-03-08 RX ORDER — ADENOSINE 3 MG/ML
12-12000 INJECTION, SOLUTION INTRAVENOUS
Status: DISCONTINUED | OUTPATIENT
Start: 2018-03-08 | End: 2018-03-08 | Stop reason: HOSPADM

## 2018-03-08 RX ORDER — NALOXONE HYDROCHLORIDE 0.4 MG/ML
0.4 INJECTION, SOLUTION INTRAMUSCULAR; INTRAVENOUS; SUBCUTANEOUS EVERY 5 MIN PRN
Status: DISCONTINUED | OUTPATIENT
Start: 2018-03-08 | End: 2018-03-08 | Stop reason: HOSPADM

## 2018-03-08 RX ORDER — PROCHLORPERAZINE MALEATE 5 MG
10 TABLET ORAL EVERY 6 HOURS PRN
Status: DISCONTINUED | OUTPATIENT
Start: 2018-03-08 | End: 2018-03-09 | Stop reason: HOSPADM

## 2018-03-08 RX ORDER — METOPROLOL TARTRATE 1 MG/ML
5 INJECTION, SOLUTION INTRAVENOUS EVERY 5 MIN PRN
Status: DISCONTINUED | OUTPATIENT
Start: 2018-03-08 | End: 2018-03-08 | Stop reason: HOSPADM

## 2018-03-08 RX ORDER — FENTANYL CITRATE 50 UG/ML
25-50 INJECTION, SOLUTION INTRAMUSCULAR; INTRAVENOUS
Status: ACTIVE | OUTPATIENT
Start: 2018-03-08 | End: 2018-03-09

## 2018-03-08 RX ORDER — PROMETHAZINE HYDROCHLORIDE 25 MG/ML
6.25-25 INJECTION, SOLUTION INTRAMUSCULAR; INTRAVENOUS EVERY 4 HOURS PRN
Status: DISCONTINUED | OUTPATIENT
Start: 2018-03-08 | End: 2018-03-08 | Stop reason: HOSPADM

## 2018-03-08 RX ORDER — VERAPAMIL HYDROCHLORIDE 2.5 MG/ML
1-2.5 INJECTION, SOLUTION INTRAVENOUS
Status: DISCONTINUED | OUTPATIENT
Start: 2018-03-08 | End: 2018-03-08 | Stop reason: HOSPADM

## 2018-03-08 RX ORDER — PROTAMINE SULFATE 10 MG/ML
25-100 INJECTION, SOLUTION INTRAVENOUS EVERY 5 MIN PRN
Status: DISCONTINUED | OUTPATIENT
Start: 2018-03-08 | End: 2018-03-08 | Stop reason: HOSPADM

## 2018-03-08 RX ORDER — PROPOFOL 10 MG/ML
5-75 INJECTION, EMULSION INTRAVENOUS CONTINUOUS
Status: DISCONTINUED | OUTPATIENT
Start: 2018-03-08 | End: 2018-03-08

## 2018-03-08 RX ORDER — LIDOCAINE 40 MG/G
CREAM TOPICAL
Status: DISCONTINUED | OUTPATIENT
Start: 2018-03-08 | End: 2018-03-09 | Stop reason: HOSPADM

## 2018-03-08 RX ORDER — HYDROMORPHONE HYDROCHLORIDE 1 MG/ML
.3-.5 INJECTION, SOLUTION INTRAMUSCULAR; INTRAVENOUS; SUBCUTANEOUS
Status: DISCONTINUED | OUTPATIENT
Start: 2018-03-08 | End: 2018-03-09 | Stop reason: HOSPADM

## 2018-03-08 RX ORDER — POTASSIUM CHLORIDE 29.8 MG/ML
20 INJECTION INTRAVENOUS
Status: DISCONTINUED | OUTPATIENT
Start: 2018-03-08 | End: 2018-03-08 | Stop reason: HOSPADM

## 2018-03-08 RX ORDER — PROPOFOL 10 MG/ML
5-75 INJECTION, EMULSION INTRAVENOUS CONTINUOUS
Status: DISCONTINUED | OUTPATIENT
Start: 2018-03-08 | End: 2018-03-09

## 2018-03-08 RX ORDER — MORPHINE SULFATE 2 MG/ML
1-2 INJECTION, SOLUTION INTRAMUSCULAR; INTRAVENOUS EVERY 5 MIN PRN
Status: DISCONTINUED | OUTPATIENT
Start: 2018-03-08 | End: 2018-03-08 | Stop reason: HOSPADM

## 2018-03-08 RX ORDER — NOREPINEPHRINE BITARTRATE/D5W 16MG/250ML
0.03-0.4 PLASTIC BAG, INJECTION (ML) INTRAVENOUS CONTINUOUS
Status: DISCONTINUED | OUTPATIENT
Start: 2018-03-08 | End: 2018-03-09

## 2018-03-08 RX ORDER — LIDOCAINE HYDROCHLORIDE 10 MG/ML
30 INJECTION, SOLUTION EPIDURAL; INFILTRATION; INTRACAUDAL; PERINEURAL
Status: DISCONTINUED | OUTPATIENT
Start: 2018-03-08 | End: 2018-03-08 | Stop reason: HOSPADM

## 2018-03-08 RX ORDER — FUROSEMIDE 10 MG/ML
20-100 INJECTION INTRAMUSCULAR; INTRAVENOUS
Status: DISCONTINUED | OUTPATIENT
Start: 2018-03-08 | End: 2018-03-08 | Stop reason: HOSPADM

## 2018-03-08 RX ORDER — SODIUM CHLORIDE 9 MG/ML
INJECTION, SOLUTION INTRAVENOUS CONTINUOUS
Status: DISCONTINUED | OUTPATIENT
Start: 2018-03-08 | End: 2018-03-08

## 2018-03-08 RX ORDER — PRASUGREL 10 MG/1
10-60 TABLET, FILM COATED ORAL
Status: DISCONTINUED | OUTPATIENT
Start: 2018-03-08 | End: 2018-03-08 | Stop reason: HOSPADM

## 2018-03-08 RX ORDER — IOPAMIDOL 755 MG/ML
110 INJECTION, SOLUTION INTRAVASCULAR ONCE
Status: COMPLETED | OUTPATIENT
Start: 2018-03-08 | End: 2018-03-08

## 2018-03-08 RX ORDER — LORAZEPAM 2 MG/ML
.5-2 INJECTION INTRAMUSCULAR EVERY 4 HOURS PRN
Status: DISCONTINUED | OUTPATIENT
Start: 2018-03-08 | End: 2018-03-08 | Stop reason: HOSPADM

## 2018-03-08 RX ORDER — FLUMAZENIL 0.1 MG/ML
0.2 INJECTION, SOLUTION INTRAVENOUS
Status: ACTIVE | OUTPATIENT
Start: 2018-03-08 | End: 2018-03-09

## 2018-03-08 RX ORDER — NITROGLYCERIN 5 MG/ML
100-500 VIAL (ML) INTRAVENOUS
Status: DISCONTINUED | OUTPATIENT
Start: 2018-03-08 | End: 2018-03-08 | Stop reason: HOSPADM

## 2018-03-08 RX ORDER — ATORVASTATIN CALCIUM 80 MG/1
80 TABLET, FILM COATED ORAL DAILY
Status: DISCONTINUED | OUTPATIENT
Start: 2018-03-09 | End: 2018-03-09 | Stop reason: HOSPADM

## 2018-03-08 RX ORDER — BUPIVACAINE HYDROCHLORIDE 2.5 MG/ML
1-10 INJECTION, SOLUTION EPIDURAL; INFILTRATION; INTRACAUDAL
Status: DISCONTINUED | OUTPATIENT
Start: 2018-03-08 | End: 2018-03-08 | Stop reason: HOSPADM

## 2018-03-08 RX ORDER — FLUMAZENIL 0.1 MG/ML
0.2 INJECTION, SOLUTION INTRAVENOUS
Status: DISCONTINUED | OUTPATIENT
Start: 2018-03-08 | End: 2018-03-08 | Stop reason: HOSPADM

## 2018-03-08 RX ORDER — DOPAMINE HYDROCHLORIDE 160 MG/100ML
2-20 INJECTION, SOLUTION INTRAVENOUS CONTINUOUS PRN
Status: DISCONTINUED | OUTPATIENT
Start: 2018-03-08 | End: 2018-03-08 | Stop reason: HOSPADM

## 2018-03-08 RX ORDER — LIDOCAINE HYDROCHLORIDE 10 MG/ML
1-10 INJECTION, SOLUTION EPIDURAL; INFILTRATION; INTRACAUDAL; PERINEURAL
Status: DISCONTINUED | OUTPATIENT
Start: 2018-03-08 | End: 2018-03-08 | Stop reason: HOSPADM

## 2018-03-08 RX ORDER — PHENYLEPHRINE HCL IN 0.9% NACL 1 MG/10 ML
20-100 SYRINGE (ML) INTRAVENOUS
Status: DISCONTINUED | OUTPATIENT
Start: 2018-03-08 | End: 2018-03-08 | Stop reason: HOSPADM

## 2018-03-08 RX ADMIN — IOPAMIDOL 110 ML: 755 INJECTION, SOLUTION INTRAVASCULAR at 20:45

## 2018-03-08 RX ADMIN — NITROGLYCERIN 200 MCG: 5 INJECTION, SOLUTION INTRAVENOUS at 20:22

## 2018-03-08 RX ADMIN — FENTANYL CITRATE 100 MCG: 50 INJECTION, SOLUTION INTRAMUSCULAR; INTRAVENOUS at 19:27

## 2018-03-08 RX ADMIN — CHLORHEXIDINE GLUCONATE 15 ML: 1.2 RINSE ORAL at 23:37

## 2018-03-08 RX ADMIN — Medication 52.5 MG: at 20:03

## 2018-03-08 RX ADMIN — HEPARIN SODIUM 5000 UNITS: 1000 INJECTION, SOLUTION INTRAVENOUS; SUBCUTANEOUS at 19:13

## 2018-03-08 RX ADMIN — HYDROMORPHONE HYDROCHLORIDE 0.5 MG: 1 INJECTION, SOLUTION INTRAMUSCULAR; INTRAVENOUS; SUBCUTANEOUS at 23:19

## 2018-03-08 RX ADMIN — BIVALIRUDIN 1.75 MG/KG/HR: 250 INJECTION, POWDER, LYOPHILIZED, FOR SOLUTION INTRAVENOUS at 20:04

## 2018-03-08 RX ADMIN — SODIUM CHLORIDE: 9 INJECTION, SOLUTION INTRAVENOUS at 23:43

## 2018-03-08 RX ADMIN — ASPIRIN 300 MG: 300 SUPPOSITORY RECTAL at 19:16

## 2018-03-08 RX ADMIN — OMEGA-3-ACID ETHYL ESTERS 2 G: 1 CAPSULE, LIQUID FILLED ORAL at 22:51

## 2018-03-08 RX ADMIN — PROPOFOL 75 MCG/KG/MIN: 10 INJECTION, EMULSION INTRAVENOUS at 23:01

## 2018-03-08 RX ADMIN — HEPARIN SODIUM 12 UNITS/KG/HR: 10000 INJECTION, SOLUTION INTRAVENOUS at 19:12

## 2018-03-08 RX ADMIN — SODIUM CHLORIDE: 9 INJECTION, SOLUTION INTRAVENOUS at 21:51

## 2018-03-08 RX ADMIN — HEPARIN SODIUM 950 UNITS/HR: 10000 INJECTION, SOLUTION INTRAVENOUS at 22:35

## 2018-03-08 RX ADMIN — PROPOFOL 75 MCG/KG/MIN: 10 INJECTION, EMULSION INTRAVENOUS at 18:28

## 2018-03-08 RX ADMIN — NOREPINEPHRINE BITARTRATE 0.03 MCG/KG/MIN: 1 INJECTION INTRAVENOUS at 22:40

## 2018-03-08 RX ADMIN — FAMOTIDINE 20 MG: 10 INJECTION, SOLUTION INTRAVENOUS at 23:38

## 2018-03-08 RX ADMIN — CLOPIDOGREL BISULFATE 600 MG: 75 TABLET ORAL at 20:41

## 2018-03-08 RX ADMIN — ASPIRIN 81 MG 81 MG: 81 TABLET ORAL at 20:41

## 2018-03-08 NOTE — IP AVS SNAPSHOT
` ` Patient Information     Patient Name Sex     Derek Enriquez ([id number]) Male 1960       Room Bed    I361 I361-01      Patient Demographics     Address Phone E-mail Address    [address]  [facility] [address] [phone] (Home)  [phone] (Work)  [phone] (Mobile) *Preferred* [email or web address]      Patient Ethnicity & Race     Ethnic Group Patient Race     White      Emergency Contact(s)     Name Relation Home Work Mobile    NAKUL INIGUEZ Significant other   [phone]    Negro Markser   [phone]    Hany Enriquez Son   [phone]    Juan Magallon   [phone]      Documents on File        Status Date Received Description       Documents for the Patient    Insurance Card  09     Consent Form  09     Privacy Notice - [place] Received 10/28/14     Insurance Card  09     Affiliate Privacy placeholder   phase3    Consent for Services - Hospital/Clinic Received () 10/28/14     Consent for EHR Access Received 10/28/14     External Medication Information Consent       Patient ID       [facility] Specified Other       Privacy Notice - [place]  10/29/14 ACKNOWLEDGMENT OF RECEIPT OF NOTICE OF PRIVACY PRACTICE    Consent for EHR Access  10/29/14 CONSENT FOR USE OF [facility] WITH NON-[place] [facility] PROVIDERS    Consent for Services - Hospital/Clinic  () 10/29/14 CONSENT FOR SERVICE    Consent for Services - [facility]       Consent for Services/Privacy Notice - Hospital/Clinic       Consent for Services/Privacy Notice - Hospital/Clinic Received () 17     [facility] Specified Other   CT Calcium Screening    Business/Insurance/Care Coordination/Health Form - Patient  17 [employer]    HIM BETO Authorization - File Only  17 [facility]    Immunization Record  17 HEALTH IMMUNIZATION RECORD    Business/Insurance/Care Coordination/Health Form - Patient  17 [employer]    Business/Insurance/Care  Coordination/Health Form - Patient  05/05/17 [employer]ICELINE    Business/Insurance/Care Coordination/Health Form - Patient  05/26/17 [employer]INE    Business/Insurance/Care Coordination/Health Form - Patient  06/27/17 CON[employer] PRICELINE    HIM BETO Authorization  12/22/17     Care Everywhere Prospective Auth Received 01/09/18     External Medication Information Consent Patient Refused 01/10/18     Consent for Services/Privacy Notice - Hospital/Clinic Received 03/01/18     Consent to Communicate  03/02/18 AUTHORIZATION TO DISCUSS PROTECTED  HEALTH INFORMATION 3/1/18    Privacy Notice - [facility]  (Deleted)         Documents for the Encounter    EMS/Ambulance Record  03/08/18 [facility]    CMS IM for Patient Signature       Cardiac Cath - HIM Scan   cath report      Admission Information     Attending Provider Admitting Provider Admission Type Admission Date/Time    Yoshi Lyons MD Browne, William Theodore, MD Emergency 03/08/18  1754    Discharge Date Hospital Service Auth/Cert Status Service Area     Emergency Medicine [facility]    Unit Room/Bed Admission Status        INTENSIVE CARE I361/I361-01 Admission (Confirmed)       Admission     Complaint    Cardiac arrest (H), Cardiac arrest (H)      Hospital Account     Name Acct ID Class Status Primary Coverage    Derek Enriquez [id number] Inpatient Open BCBS [place]            Guarantor Account (for Hospital Account #[id number])     Name Relation to Pt Service Area Active? Acct Type    Derek Enriquez  FCS Yes Personal/Family    Address Phone          [address] [facility] [place] [address] [phone](H)  [phone](O)              Coverage Information (for Hospital Account #[id number])     F/O Payor/Plan Precert #    [place]     Subscriber Subscriber #    Derek Enriquez [id number]    Address Phone    [address] [address]  [place] [address] [phone]

## 2018-03-08 NOTE — IP AVS SNAPSHOT
` [facility] INTENSIVE CARE UNIT: [phone]                                              INTERAGENCY TRANSFER FORM - NURSING   3/8/2018                    Hospital Admission Date: 3/8/2018  LINDA MARTÍNEZ   : 1960  Sex: Male        Attending Provider: Yoshi Lyons MD     Allergies:  No Known Allergies    Infection:  None   Service:  EMERGENCY ME    Ht:  1.829 m (6')   Wt:  77.8 kg (171 lb 8.3 oz)   Admission Wt:  70 kg (154 lb 5.2 oz)    BMI:  23.26 kg/m 2   BSA:  1.99 m 2            Patient PCP Information     Provider PCP Type    Eugene Burrell MD General      Current Code Status     Date Active Code Status Order ID Comments User Context       3/8/2018 11:02 PM Full Code [id number]  Yoshi Lyons MD Inpatient       Code Status History     Date Active Date Inactive Code Status Order ID Comments User Context    This patient has a current code status but no historical code status.      Advance Directives        Scanned docmt in ACP Activity?           No scanned doc        Hospital Problems as of 3/9/2018              Priority Class Noted POA    Cardiac arrest (H) Medium  3/8/2018 Yes    Coronary artery disease involving native coronary artery Medium  3/9/2018 Yes    Stented coronary artery Medium  3/9/2018 Yes      Non-Hospital Problems as of 3/9/2018     None      Immunizations     None         END      ASSESSMENT     Discharge Profile Flowsheet     EXPECTED DISCHARGE     Inspection under devices  Full 18 180    Expected Discharge Date  18 1515   Skin WDL  WDL;ex 18 1804    GASTROINTESTINAL (ADULT,PEDIATRIC,OB)     Full except areas not inspected   Scapula, left;Scapula, right;Spine;Buttock, left;Buttock, right;Sacrum;Coccyx 18 1757    GI WDL  ex;nausea and vomiting 18 1757   Skin Integrity  drain/device(s) 18 1804    Passing flatus  yes 18 1040   SAFETY      COMMUNICATION ASSESSMENT     Safety WDL  WDL  "03/09/18 1804    Patient's communication style  spoken language (English or Bilingual) 03/08/18 1758   All Alarms  alarm(s) activated and audible 03/09/18 1804    SKIN     Safety Equipment  manual resuscitator/mask/valve in room 03/09/18 1804    Inspection of bony prominences  Full except (identify areas not inspected) 03/09/18 1757                      Assessment WDL (Within Defined Limits) Definitions           Safety WDL     Effective: 09/28/15    Row Information: <b>WDL Definition:</b> Bed in low position, wheels locked; call light in reach; upper side rails up x 2; ID band on<br> <font color=\"gray\"><i>Item=AS safety wdl>>List=AS safety wdl>>Version=F14</i></font>      Skin WDL     Effective: 09/28/15    Row Information: <b>WDL Definition:</b> Warm; dry; intact; elastic; without discoloration; pressure points without redness<br> <font color=\"gray\"><i>Item=AS skin wdl>>List=AS skin wdl>>Version=F14</i></font>      Vitals     Vital Signs Flowsheet     VITAL SIGNS     Side Effects Monitoring: Respiratory Depth  N 03/09/18 1912    Temp  99.5  F (37.5  C) 03/09/18 1659   Side Effects Monitoring: Sedation Level  S 03/09/18 1912    Temp src  Bladder 03/09/18 1703   HEIGHT AND WEIGHT      Resp  11 03/09/18 1659   Height  1.829 m (6') 03/08/18 1807    Pulse  100 03/08/18 1807   Weight  77.8 kg (171 lb 8.3 oz) 03/09/18 0638    Heart Rate  75 03/09/18 1910   BSA (Calculated - sq m)  1.89 03/08/18 1807    BP  153/76 03/09/18 1910   BMI (Calculated)  20.97 03/08/18 1807    BP Location  Right arm 03/08/18 2132   POSITIONING      OXYGEN THERAPY     Body Position  supine 03/09/18 1726    SpO2  98 % 03/09/18 1910   Head of Bed (HOB)  HOB flat 03/09/18 1726    O2 Device  Nasal cannula 03/09/18 1706   ECG      FiO2 (%)  40 % 03/09/18 1322   ECG Rhythm  Normal sinus rhythm 03/09/18 1726    Oxygen Delivery  2 LPM 03/09/18 1706   Ectopy  None 03/09/18 1726    PAIN/COMFORT     Lead Monitored  MCL 1;Lead II 03/09/18 1303    Patient " Currently in Pain  denies 03/09/18 1912   RESPIRATORY MONITORING      Preferred Pain Scale  CPOT (Critical-Care Pain Observation Tool) 03/09/18 0547   Respiratory Monitoring (EtCO2)  41 mmHg 03/08/18 1919    Patient's Stated Pain Goal  Unable to Assess 03/09/18 0108   EKG MONITORING      0-10 Pain Scale  8 03/09/18 1856   Cardiac Regularity  Regular 03/08/18 1810    Pain Location  Back 03/09/18 1910   Cardiac Rhythm  NSR 03/08/18 1810    Pain Orientation  Right;Left;Lower 03/09/18 1910   KIERSTEN COMA SCALE      Pain Descriptors  Aching;Discomfort 03/09/18 1910   Best Eye Response  4-->(E4) spontaneous 03/09/18 1726    Pain Intervention(s)  Medication (See eMAR) 03/09/18 1910   Best Motor Response  6-->(M6) obeys commands 03/09/18 1726    Response to Interventions  Relief 03/09/18 1910   Best Verbal Response  5-->(V5) oriented 03/09/18 1726    Additional Documentation  CPOT (Group);Cerda Agitation-Sedation Scale (RASS) (Group) 03/09/18 0059   Kiersten Coma Scale Score  15 03/09/18 1726    CRITICAL-CARE PAIN OBSERVATION TOOL (CPOT)     DAILY CARE      Facial Expression  0 03/09/18 0920   Activity Management  bedrest 03/09/18 1726    Body Movements  0 03/09/18 0920   Activity Assistance Provided  assistance, 2 people 03/09/18 1726    Compliance w/ventilator (intubated patients)  0 03/09/18 0920   POINT OF CARE TESTING      Vocalization (extubated patients)  Intubated 03/09/18 0920   Puncture Site  fingertip 03/09/18 1421    Muscle Tension  0 03/09/18 0920   Bedside Glucose (mg/dl )   102 mg/dl 03/09/18 1421    Total  0 03/09/18 0920   COMMENTS      ANALGESIA SIDE EFFECTS MONITORING     Comments  MRI 03/09/18 1726    Side Effects Monitoring: Respiratory Quality  R 03/09/18 1912                 Patient Lines/Drains/Airways Status    Active LINES/DRAINS/AIRWAYS     Name: Placement date: Placement time: Site: Days: Last dressing change:    NG/OG Tube Nasogastric 16 fr Left nostril 03/08/18   1814   Left nostril   1      Urethral Catheter Temperature probe 03/08/18 1814   Temperature probe   1     Peripheral IV 03/08/18 Left 03/08/18   1813      1     Peripheral IV 03/08/18 Right Lower forearm 03/08/18   1813   Lower forearm   1     Peripheral IV 03/08/18 Left Hand 03/08/18   1800   Hand   1             Patient Lines/Drains/Airways Status    Active PICC/CVC     None            Intake/Output Detail Report     Date Intake     Output   Net    Shift I.V. NG/GT IV Piggyback Total Urine Emesis/NG output Total       Day 03/08/18 0700 - 03/08/18 1459 -- -- -- -- -- -- -- 0    Ling 03/08/18 1500 - 03/08/18 2259 42.47 -- -- 42.47 375 -- 375 -332.53    Noc 03/08/18 2300 - 03/09/18 0659 1009.89 -- -- 1009.89 280 -- 280 729.89    Day 03/09/18 0700 - 03/09/18 1459 710.8 100 -- 810.8 320 -- 320 490.8    Ling 03/09/18 1500 - 03/09/18 2259 84 -- -- 84 295 -- 295 -211      Case Management/Discharge Planning     Case Management/Discharge Planning Flowsheet     EXPECTED DISCHARGE     QUESTION TO PATIENT:  Has a member of your family or a partner(now or in the past) intimidated, hurt, manipulated, or controlled you in any way?  patient declined to answer or is unable to answer 03/08/18 1813    Expected Discharge Date  03/13/18 03/09/18 1515   QUESTION TO PATIENT: Do you feel safe going back to the place where you are living?  patient declined to answer or is unable to answer 03/08/18 1813    ABUSE RISK SCREEN     OBSERVATION: Is there reason to believe there has been maltreatment of a vulnerable adult (ie. Physical/Sexual/Emotional abuse, self neglect, lack of adequate food, shelter, medical care, or financial exploitation)?  no 03/08/18 1813

## 2018-03-08 NOTE — ED PROVIDER NOTES
"  History     Chief Complaint:  Syncope     HPI:   The history is provided by the EMS personnel. The history is limited by the condition of the patient.      Derek Enriquez is a 57 year old male with a history of LAD lesions, Arteriosclerotic heart disease, and hyperlipidemia who presents via EMS to the emergency department for evaluation following a syncopal episode. Of note, the patient was scheduled for a coronary angiogram for possible stenting based on evaluation with Dr. Castrejon, as the patient has known \" maker\" as resulted in a CT angiogram. Per EMS, the patient was walking on a treadmill at [employer] when witnesses saw him collapse and roll off the treadmill. First responders performed CPR for about 2 minutes before the AED was activated. One shock was performed and brought him back into sinus rhythm. En route, the patient fell back into ventricular fibrillation and was shocked again, which occurred around 1727. He was given 20 Etomidate, 150 Succinyl choline, and 2 doses of 50 Ketamine.The patient was clenched in the mouth and was intubated before arrival. He has remained unresponsive throughout his entire care from lifetime to the emergency department.     CARDIAC RISK FACTORS:  Sex:    Male   Tobacco:   Positive (former)  Hypertension:   Negative   Hyperlipidemia:  Positive   Diabetes:   Negative   Family History:  Negative     Allergies:  No known drug allergies     Medications:    Lipitor  Asprin 81 mg   Lovaza   Vitamin B with C   Multi vitamin and minerals     Past Medical History:    Arteriosclerotic heart disease  LAD Lesion   Hyperlipidemia    Past Surgical History:    Right second finger repair  Shoulder surgery   [place] teeth extraction     Family History:    History reviewed. No pertinent family history.      Social History:  Presents via EMS    Tobacco use: Former smoker   Alcohol use: 2-4 drinks a week   PCP: Eugene Burrell    Marital Status:  Single     Review of Systems "   Unable to perform ROS: Acuity of condition     Physical Exam     Patient Vitals for the past 24 hrs:   BP Pulse Heart Rate Resp SpO2 Height Weight   03/08/18 1915 101/70 - - - - - -   03/08/18 1914 - - - - 98 % - -   03/08/18 1901 - - - - 100 % - -   03/08/18 1900 102/67 - 80 17 - - -   03/08/18 1845 98/68 - 84 - 98 % - -   03/08/18 1835 99/71 - 86 20 97 % - -   03/08/18 1830 107/73 - 87 22 97 % - -   03/08/18 1825 118/80 - 91 23 98 % - -   03/08/18 1818 118/65 - - - 98 % - -   03/08/18 1813 123/85 - - - - - -   03/08/18 1806 (!) 169/115 100 - 16 99 % 1.829 m (6') 70 kg (154 lb 5.2 oz)   03/08/18 1756 (!) 142/96 - - - - - -        Physical Exam  GENERAL: intubated with C collar   HEAD: atraumatic, no hematoma or bruising  EYES: pupils reactive, eyes closed  ENT:  mucus membranes moist, ET tube in place  NECK:  trachea midline, normal range of motion  RESPIRATORY: no tachypnea, breath sounds clear to auscultation, being bagged and making spontaneous respirations  CVS: normal S1/S2, no murmurs, intact distal pulses  ABDOMEN: soft, nontender, nondistention  MUSCULOSKELETAL: no deformities  SKIN: warm and dry, no acute rashes or ulceration  NEURO: sedated, moving all extremities, seems agitated with the ET tube, biting at times on the tube, quite strong and moving around the gurney requiring several people to assist in keeping him still  PSYCH:  Unobtainable         Emergency Department Course     EKG: Normal sinus rhythm. No ST elevation. No MI, per  Frank Bey MD.     Imaging:  Radiographic findings were communicated with the family who voiced understanding of the findings.     CT Head without contrast:  IMPRESSION: No evidence of acute intracranial hemorrhage, mass, or herniation.    PETER ROME MD    CT Cervical spine without contrast:  IMPRESSION:   1. No evidence of acute fracture or subluxation in the cervical spine.  2. Degenerative changes in the cervical spine as described above.  3. Marked  groundglass opacities in the visualized left lung apex.    PETER ROME MD    XR Chest, portable, 1 view:  IMPRESSION: ET tube tip projects over the mid trachea. Pacer pads project over the left chest. No definite new focal airspace opacity. No pleural effusion or pneumothorax visualized. Cardiac silhouette  within normal limits.    Indeterminate amorphous density at the distal right clavicle. This could possibly represent previous clavicle fracture although other bony lesions are not excluded. Consider further evaluation with clavicle x-ray.     PETER ROME MD    Imaging independently reviewed and agree with radiologist interpretation.      Laboratory:  CBC: WNL (WBC 9.5, HGB 16.8, )    BMP: CO2 19 (low), Glucose 127 (high), Calcium 8.3 (low), ow WNL (Creatinine 1.03)   1750: Troponin I: 0.038    INR: 1.12    Interventions:  1851: Propofol 55 mcg/kg/min x 70 kg mg IV    1912: Heparin loading dose 5000 units IV Bolus  1913: Heparin infusion 25,000 units at 12 units/hr IV Infusion   1916: Aspirin suppository 300 mg Rectal     Emergency Department Course:  1738: Awaiting patient arrival in Stabilization room 1.  1741: EMS and patient arrived to [facility] and was transferred to medical bed.  1742: The patient was placed on continuous cardiac monitoring and pulse oximetry.   1743: Blood drawn.   1744: XR personnel arrived to stabilization room 1.   1744: I reviewed the patient's medical records.   1745: Patient is vomiting and suction is being performed.   1746: EKG obtained.   1748: Bedside chest XR obtained.   1750: Administration of Propofol bolus begun.  1754: Blood pressure at 153/110.  1804: I spoke with the cardiology interventionalist.   1805: Patient transferred to imaging.   1814: I spoke with the patient's family  1900: The patient's brother arrived and I spoke with him.   1913: I spoke with Dr. Olsen regarding this patient.   1925: I rechecked the patient. C-collar was removed.     Findings and plan  explained to the family who consents to admission.     1928: Discussed the patient with Dr. Mendoza, who will admit the patient to a medical bed for surgery.     Impression & Plan      Medical Decision Making:  Derek Enriquez is a 57 year old male who presents with a resuscitated cardiac arrest. He does have a history of known CAD as evidence on two CT coronary angiograms that I can see in care everywhere and I see a cardiology note. He is supposed to have an angiogram that was ordered on the 1st of the month. Patient has been intubated in the field and requiring sedation here in the ER. Initially I spoke with cardiology about sending him to the Cath lab but patient does not have ST changes. There was a thought of enrolling him in the access trial. Family has arrived with multiple family members and they're quite adamant that they want him to go to the cath lab given his known CAD that hasn't been addressed yet as an outpatient. They do not want to enroll him in the study with chance of him not going to the cath lab. I spoke again with the cardiologist and patient will be sent to the cath lab. Anesthesiology was contacted as well as he is requiring titration with his propofol and sedating medications as he is fighting the vent. Unlikely has had any anoxic injury as he is quite active on the drugs that we are currently giving him and had immediate CPR and AED applied at [facility].  I also notified the ICU attending.  Patient had a fall, unclear of trauma, so head CT and neck were obtained and are normal.  Patient was given rectal aspirin, heparin bolus and drip, titrated his propofol, ice to the groin, IV fluids, and adjustments to his ET tube, several phone conversations with cardiology as well as lengthy discussions with family.    Critical care time: 55 minutes.       Diagnosis:    ICD-10-CM    1. Cardiac arrest (H) I46.9    2. Respiratory arrest (H) R09.2    3. Coronary artery disease due to calcified  coronary lesion I25.10     I25.84        Disposition:  Admitted to cath lab.     Scribe Disclosure:   I, Clif Noland, am serving as a scribe at 5:38 PM on 3/8/2018 to document services personally performed by Frank Bey MD based on my observations and the provider's statements to me.       Clif Noland  3/8/2018    EMERGENCY DEPARTMENT       Frank Bey MD  03/08/18 2081

## 2018-03-08 NOTE — IP AVS SNAPSHOT
[facility] INTENSIVE CARE UNIT: [phone]                                              INTERAGENCY TRANSFER FORM - PHYSICIAN ORDERS   3/8/2018                    Hospital Admission Date: 3/8/2018  LINDA MARTÍNEZ   : 1960  Sex: Male        Attending Provider: Yoshi Lyons MD     Allergies:  No Known Allergies    Infection:  None   Service:  EMERGENCY ME    Ht:  1.829 m (6')   Wt:  77.8 kg (171 lb 8.3 oz)   Admission Wt:  70 kg (154 lb 5.2 oz)    BMI:  23.26 kg/m 2   BSA:  1.99 m 2            Patient PCP Information     Provider PCP Type    Eugene Burrell MD General      ED Clinical Impression     Diagnosis Description Comment Added By Time Added    Cardiac arrest (H) [I46.9] Cardiac arrest (H) [I46.9]  Frank Bey MD 3/8/2018  6:56 PM    Respiratory arrest (H) [R09.2] Respiratory arrest (H) [R09.2]  Frank Bey MD 3/8/2018  6:56 PM    Coronary artery disease due to calcified coronary lesion [I25.10, I25.84] Coronary artery disease due to calcified coronary lesion [I25.10, I25.84]  Frank Bey MD 3/8/2018  7:19 PM      Hospital Problems as of 3/9/2018              Priority Class Noted POA    Cardiac arrest (H) Medium  3/8/2018 Yes    Coronary artery disease involving native coronary artery Medium  3/9/2018 Yes    Stented coronary artery Medium  3/9/2018 Yes      Non-Hospital Problems as of 3/9/2018     None      Code Status History     Date Active Date Inactive Code Status Order ID Comments User Context    This patient has a current code status but no historical code status.         Medication Review      UNREVIEWED medications. Ask your doctor about these medications        Dose / Directions Comments    aspirin 81 MG tablet   Used for:  Unstable angina (H)        Dose:  81 mg   Take 1 tablet (81 mg) by mouth daily   Quantity:  90 tablet   Refills:  3        MULTIVITAMIN MEN PO        Dose:  1 tablet   Take 1 tablet by mouth daily   Refills:  0        omega-3 acid  ethyl esters 1 G capsule   Commonly known as:  Lovaza        Dose:  2 g   Take 2 g by mouth 2 times daily   Refills:  0        vitamin B complex with vitamin C Tabs tablet        Dose:  1 tablet   Take 1 tablet by mouth daily   Refills:  0                Referrals     CARDIAC REHAB REFERRAL       Patient may choose their preference of the site for Cardiac Rehab.             Your next 10 appointments already scheduled     Mar 14, 2018 10:00 AM CDT   Cardiac Evaluation with  Cardiac Rehab 3   [facility] ([facility])    [address], [address]  [facility] [address]   [phone]            Mar 19, 2018  9:30 AM CDT   LAB with [facility] GOLD WAITING   [facility]    [address] [address]              Please do not eat 10-12 hours before your appointment if you are coming in fasting for labs on lipids, cholesterol, or glucose (sugar). This does not apply to pregnant women. Water, hot tea and black coffee (with nothing added) are okay. Do not drink other fluids, diet soda or chew gum.            Mar 19, 2018 10:00 AM CDT   Procedure 1.5 hr with U2A ROOM 8   Unit [address] [facility] ([facility])    [address] [facility] [address]               Mar 19, 2018 11:30 AM CDT   Cath 90 Minute with [id number]   [facility]    [address] [address] [phone]

## 2018-03-08 NOTE — IP AVS SNAPSHOT
` `     [facility] INTENSIVE CARE UNIT: [phone]            Medication Administration Report for Derek Enriquez as of 03/09/18 1912   Legend:    Given Hold Not Given Due Canceled Entry Other Actions    Time Time (Time) Time  Time-Action       Inactive    Active    Linked        Medications 03/03/18 03/04/18 03/05/18 03/06/18 03/07/18 03/08/18 03/09/18      Dose: 300 mL  Freq: ONCE PRN Route: IV  PRN Reason: other  PRN Comment: for symptomatic hypotension with femoral sheath removal  Start: 03/08/18 2105   End: 03/09/18 0904   Admin Instructions: Post-procedurally for CAR percutaneous coronary intervention (PCI)           0904-Med Discontinued       acetaminophen (TYLENOL) tablet 325-650 mg  Dose: 325-650 mg  Freq: EVERY 4 HOURS PRN Route: PO  PRN Reasons: mild pain,headaches  Start: 03/08/18 2105   Admin Instructions: Maximum acetaminophen dose from all sources = 75 mg/kg/day not to exceed 4 grams/day.           0838 (650 mg)-Given           aspirin EC EC tablet 81 mg  Dose: 81 mg  Freq: DAILY Route: PO  Start: 03/09/18 0900   Admin Instructions: Starting today.  May omit if dose given/taken pre-procedure today.  Post -procedurally for CAR percutaneous coronary intervention (PCI).           0830 (81 mg)-Given           atorvastatin (LIPITOR) tablet 80 mg  Dose: 80 mg  Freq: DAILY Route: PO  Start: 03/09/18 0900   Admin Instructions: Post -procedurally for CAR percutaneous coronary intervention (PCI).           0830 (80 mg)-Given             Dose: 0.5 mg  Freq: EVERY 5 MIN PRN Route: IV  PRN Reason: other  PRN Comment: symptomatic bradycardia associated with FEMORAL sheath pulls  Start: 03/08/18 2105   End: 03/09/18 0904   Admin Instructions: May repeat x1.  Post-procedurally for CAR percutaneous coronary intervention (PCI)           0904-Med Discontinued       chlorhexidine (PERIDEX) 0.12 % solution 15 mL  Dose: 15 mL  Freq: EVERY 12 HOURS Route: MT  Start: 03/08/18 2315   Admin Instructions: Do not  swallow.  Discontinue when extubated.          2337 (15 mL)-Given        0831 (15 mL)-Given       [ ] 2000           clopidogrel (PLAVIX) tablet 75 mg  Dose: 75 mg  Freq: DAILY Route: PO  Start: 03/09/18 0900   Admin Instructions: Post -procedurally for CAR percutaneous coronary intervention (PCI).           0830 (75 mg)-Given             Dose: 25-50 mcg  Freq: EVERY 15 MIN PRN Route: IV  PRN Reason: other  PRN Comment: severe pain related to pulling the FEMORAL sheath out ONLY  Start: 03/08/18 2105   End: 03/09/18 0904   Admin Instructions: May repeat x1 after 15 minutes. For severe pain related to pulling the FEMORAL sheath out ONLY. Post -procedurally for CAR percutaneous coronary intervention (PCI).  For ordered doses up to 100 mcg give IV Push undiluted over a minimum of 3-5 minutes.           0904-Med Discontinued         Dose: 0.2 mg  Freq: EVERY 1 MIN PRN Route: IV  PRN Reason: benzodiazepine reversal  Start: 03/08/18 2105   End: 03/09/18 0904   Admin Instructions: May repeat x 3.  Notify provider [Notify Interventional Fellow/Cardiologist ([facility]) or Cardiologist (,)]  Irritant. For ordered doses up to 1 mg, give IV Push undiluted. Administer each 0.2mg over 15 seconds.           0904-Med Discontinued       HOLD: Metformin and metformin containing medications on day of procedure and 48 hours after IV contrast given  Freq: HOLD Route: XX  Start: 03/08/18 2105   Admin Instructions: Hold metfomin (GLUCOPHAGE, GLUMETZA, FORTAMET, RIOMET) and metformin containing medications: alogliptin/metformin (KAZANO), glipizide/metformin (METAGLIP), glyburide/metformin (GLUCOVANCE), rosiglitazone/metformin (AVANDAMET), dapagliflozin/metformin (XIGDUO XR), sitagliptin/metformin (JANUMET, JANUMET XR), linagliptin/metformin (JENTADUETO), repaglinide/metformin (PRANDIMET), saxagliption/metformin (KOMBIGLYZE XR), canagliflozin/metformin (INVOKAMET), pioglitazone/metformin (ACTOPLUS MET, ACTOPLUS MET XR).  If patient was on one  "of these medications pre-procedure, continue to HOLD for 48 hours post-procedure if patient received IV contrast.    Order specific questions:  Medication(s) to hold: Metfomin (GLUCOPHAGE, GLUMETZA, FORTAMET, RIOMET) and metformin containing medications: (KAZANO, METAGLIP, GLUCOVANCE, AVANDAMET, XIGDUO XR, JANUMET, JANUMET XR, JENTADUETO, PRANDIMET, KOMBIGLYZE XR, INVOKAMET, ACTOPLUS MET, ACTOPLUS MET XR)  Parameter for hold (doses,days,conditions) : Other (see admin instructions)  Hours or days to hold med before/after procedure/surgery OTHER (for 48 hours post procedure)  Surgery or Procedure Date 3/8/2018                HYDROcodone-acetaminophen (NORCO) 5-325 MG per tablet 1-2 tablet  Dose: 1-2 tablet  Freq: EVERY 4 HOURS PRN Route: PO  PRN Reason: moderate to severe pain  Start: 03/08/18 2105   Admin Instructions: Maximum acetaminophen dose from all sources= 75 mg/kg/day not to exceed 4 grams               HYDROmorphone (PF) (DILAUDID) injection 0.3-0.5 mg  Dose: 0.3-0.5 mg  Freq: EVERY 2 HOURS PRN Route: IV  PRN Reason: severe pain  Start: 03/08/18 2256   Admin Instructions: Hold while on PCA.  Start at the lowest dose.  May adjust dose by 0.1 mg every 2 hours as needed for pain control or improvement in physical function.  Hold dose for analgesic side effects.  Notify provider to assess for uncontrolled pain or analgesic side effects.  For ordered doses up to 4 mg give IV Push undiluted. Administer each 2mg over 2-5 minutes.          2319 (0.5 mg)-Given [C]        0148 (0.5 mg)-Given       0528 (0.5 mg)-Given       1238 (0.5 mg)-Given       1529 (0.5 mg)-Given       1835 (0.5 mg)-Given           lidocaine (LMX4) cream  Freq: EVERY 1 HOUR PRN Route: Top  PRN Reason: pain  PRN Comment: with VAD insertion or accessing implanted port.  Start: 03/08/18 2105   Admin Instructions: Do NOT give if patient has a history of allergy to any local anesthetic or any \"blanquita\" product.   Apply 30 minutes prior to VAD insertion " "or port access. MAX Dose: 2.5 g (  of 5 g tube)               lidocaine 1 % 1 mL  Dose: 1 mL  Freq: EVERY 1 HOUR PRN Route: OTHER  PRN Comment: mild pain with VAD insertion or accessing implanted port  Start: 03/08/18 2105   Admin Instructions: Do NOT give if patient has a history of allergy to any local anesthetic or any \"blanquita\" product. MAX dose 1 mL subcutaneous OR intradermal in divided doses.                 Dose: 5 mL  Freq: ONCE PRN Route: SC  PRN Comment: for sheath removal  Start: 03/08/18 2105   End: 03/09/18 0904   Admin Instructions: Use at the site of sheath insertion prior to sheath removal. Post - procedurally for CAR percutaneous coronary intervention (PCI).           0904-Med Discontinued         Dose: 1-2 mg  Freq: EVERY 5 MIN PRN Route: IV  PRN Reason: other  PRN Comment: anxiety associated with pulling the FEMORAL sheath out ONLY  Start: 03/08/18 2105   End: 03/09/18 0904   Admin Instructions: May repeat x1 after 5 minutes, if needed.   Post -procedurally for CAR percutaneous coronary intervention (PCI).  For ordered doses up to 2.5 mg give IV Push slowly titrated over a minimum of 2 minutes. Dilute each 1mg in 4mL of NS.           0904-Med Discontinued       naloxone (NARCAN) injection 0.1-0.4 mg  Dose: 0.1-0.4 mg  Freq: EVERY 2 MIN PRN Route: IV  PRN Reason: opioid reversal  Start: 03/08/18 2254   Admin Instructions: For respiratory rate LESS than or EQUAL to 8.  Partial reversal dose:  0.1 mg titrated q 2 minutes for Analgesia Side Effects Monitoring Sedation Level of 3 (frequently drowsy, arousable, drifts to sleep during conversation).Full reversal dose:  0.4 mg bolus for Analgesia Side Effects Monitoring Sedation Level of 4 (somnolent, minimal or no response to stimulation).  For ordered doses up to 2mg give IVP. Give each 0.4mg over 15 seconds in emergency situations. For non-emergent situations further dilute in 9mL of NS to facilitate titration of response.               naloxone " (NARCAN) injection 0.1-0.4 mg  Dose: 0.1-0.4 mg  Freq: EVERY 2 MIN PRN Route: IV  PRN Reason: opioid reversal  Start: 03/08/18 2105   Admin Instructions: For respiratory rate LESS than or EQUAL to 8.  Partial reversal dose:  0.1 mg titrated q 2 minutes for Analgesia Side Effects Monitoring Sedation Level of 3 (frequently drowsy, arousable, drifts to sleep during conversation).Full reversal dose:  0.4 mg bolus for Analgesia Side Effects Monitoring Sedation Level of 4 (somnolent, minimal or no response to stimulation).  For ordered doses up to 2mg give IVP. Give each 0.4mg over 15 seconds in emergency situations. For non-emergent situations further dilute in 9mL of NS to facilitate titration of response.                 Dose: 0.2-0.4 mg  Freq: EVERY 2 MIN PRN Route: IV  PRN Reasons: opioid reversal,other  PRN Comment: respiratory depression  Start: 03/08/18 2105   End: 03/09/18 0904   Admin Instructions: Notify provider [Notify Interventional Fellow/Cardiologist ([facility]) or Cardiologist (,)].  For respiratory rate less than or equal to 8.  Partial reversal dose:  0.2 mg titrated q 2 minutes for Sedation Level of 3 (frequently drowsy, arousable, drifts to sleep during conversation).Full reversal dose:  0.4 mg bolus for Sedation Level of 4 (somnolent, minimal or no response to stimulation).  For ordered doses up to 2mg give IVP. Give each 0.4mg over 15 seconds in emergency situations. For non-emergent situations further dilute in 9mL of NS to facilitate titration of response.           0904-Med Discontinued       nitroGLYcerin (NITROSTAT) sublingual tablet 0.4 mg  Dose: 0.4 mg  Freq: EVERY 5 MIN PRN Route: SL  PRN Reason: chest pain  Start: 03/08/18 2105   Admin Instructions: Maximum 3 doses in 15 minutes. Notify provider if no relief after 3 doses.  Do NOT give nitroGLYCERIN SL IF patient has received sildenafil (VIAGRA/REVATIO) within the last 8 hours, avanafil (STENDRA) within the last 8 hours, vardenafil  (LEVITRA/STAXYN) within the last 18 hours, or tadalafil (CIALIS/ADCIRCA) within the last 36 hours.  Inform provider if patient has taken one of these medications.  If patient is still having acute angina requiring treatment, an alternative treatment option may be used such as: IV beta-blocker (2.5 mg - 5 mg) metoprolol (LOPRESSOR) if ordered by a provider.               omega-3 acid ethyl esters (Lovaza) capsule 2 g  Dose: 2 g  Freq: 2 TIMES DAILY Route: PO  Start: 03/08/18 2200         2251 (2 g)-Given        (0832)-Not Given       [ ] 2100           ondansetron (ZOFRAN) injection 4 mg  Dose: 4 mg  Freq: EVERY 6 HOURS PRN Route: IV  PRN Reasons: nausea,vomiting  Start: 03/09/18 1205   Admin Instructions: Irritant. For ordered doses up to 4 mg, give IV Push undiluted over 2-5 minutes.           1230 (4 mg)-Given           Patient is already receiving anticoagulation with heparin, enoxaparin (LOVENOX), warfarin (COUMADIN)  or other anticoagulant medication  Freq: CONTINUOUS PRN Route: XX  Start: 03/08/18 2256              Percutaneous Coronary Intervention orders placed (this is information for BPA alerting)  Freq: DOES NOT GO TO MAR Route: XX  Start: 03/08/18 2105              prochlorperazine (COMPAZINE) injection 10 mg  Dose: 10 mg  Freq: EVERY 6 HOURS PRN Route: IV  PRN Reasons: nausea,vomiting  Start: 03/08/18 2256   Admin Instructions: This is Step 2 of nausea and vomiting management. Give if nausea not resolved 15 minutes after giving ondansetron (ZOFRAN).  If nausea not resolved in 15 minutes, go to Step 3 metoclopramide (REGLAN), if ordered.  For ordered doses up to 10 mg, give IV Push undiluted. Each 5mg over 1 minute.              Or  prochlorperazine (COMPAZINE) tablet 10 mg  Dose: 10 mg  Freq: EVERY 6 HOURS PRN Route: PO  PRN Reason: vomiting  Start: 03/08/18 2256   Admin Instructions: This is Step 2 of nausea and vomiting management. Give if nausea not resolved 15 minutes after giving ondansetron  (ZOFRAN).  If nausea not resolved in 15 minutes, go to Step 3 metoclopramide (REGLAN), if ordered.              Or  prochlorperazine (COMPAZINE) Suppository 25 mg  Dose: 25 mg  Freq: EVERY 12 HOURS PRN Route: RE  PRN Reasons: nausea,vomiting  Start: 03/08/18 2256   Admin Instructions: This is Step 2 of nausea and vomiting management. Give if nausea not resolved 15 minutes after giving ondansetron (ZOFRAN).  If nausea not resolved in 15 minutes, go to Step 3 metoclopramide (REGLAN), if ordered.               Reason ACE/ARB/ARNI order not selected  Freq: DOES NOT GO TO MAR Route: OTHER  PRN Reason: other  Start: 03/08/18 2105   Admin Instructions: Post -procedurally for CAR percutaneous coronary intervention (PCI).        Order specific questions:  Reason not prescribed: symptomatic hypotension                Reason beta blocker not prescribed  Freq: DOES NOT GO TO MAR Route: XX  PRN Reason: other  Start: 03/08/18 2105   Admin Instructions: Post -procedurally for CAR percutaneous coronary intervention (PCI).            Order specific questions:  Reason not prescribed: hypotension (SBP<90)                sodium chloride (PF) 0.9% PF flush 10 mL  Dose: 10 mL  Freq: EVERY 8 HOURS Route: IK  Start: 03/09/18 0945          1210 (10 mL)-Given       1600 (10 mL)-Given           sodium chloride (PF) 0.9% PF flush 3 mL  Dose: 3 mL  Freq: EVERY 8 HOURS Route: IK  Start: 03/08/18 2115   Admin Instructions: And Q1H PRN, to lock peripheral IV dormant line.          (2226)-Not Given        (0549)-Not Given       (1457)-Not Given       [ ] 2115           sodium chloride (PF) 0.9% PF flush 3 mL  Dose: 3 mL  Freq: EVERY 1 HOUR PRN Route: IK  PRN Reason: line flush  Start: 03/08/18 2105   Admin Instructions: for peripheral IV flush post IV meds               sodium chloride 0.9% infusion  Rate: 50 mL/hr   Freq: CONTINUOUS Route: IV  Last Dose: Stopped (03/09/18 1600)  Start: 03/08/18 2330   Admin Instructions: Rafael          2343 (  )-New Bag        1424 ( )-Rate/Dose Change       1600-Stopped           vitamin B complex with vitamin C (STRESS TAB) tablet 1 tablet  Dose: 1 tablet  Freq: DAILY Route: PO  Start: 03/09/18 0900          0830 (1 tablet)-Given          Completed Medications  Medications 03/03/18 03/04/18 03/05/18 03/06/18 03/07/18 03/08/18 03/09/18         Dose:  mg  Freq: ONCE PRN Route: PO  PRN Reason: other  PRN Comment: when verbally ordered by prescriber during the procedure.  Start: 03/08/18 1938   End: 03/08/18 2041         2041 (81 mg)-Given              Dose: 300 mg  Freq: ONCE Route: RE  Start: 03/08/18 1856   End: 03/08/18 1916 1916 (300 mg)-Given              Dose: 0.75 mg/kg  Weight Dosing Info: 70 kg  Freq: ONCE PRN Route: IV  PRN Comment: when verbally ordered by prescriber during the procedure.  Start: 03/08/18 1938   End: 03/08/18 2003   Admin Instructions: Nurse to administer dose from existing infusion. If no infusion bag or syringe for this order is available, contact pharmacist to re-enter medication order.          2003 (52.5 mg)-Given              Dose: 300-600 mg  Freq: ONCE PRN Route: PO  PRN Comment: when verbally ordered by prescriber during the procedure.  Start: 03/08/18 1938   End: 03/08/18 2041   Admin Instructions: Loading dose.          2041 (600 mg)-Given              Dose: 100 mcg  Freq: ONCE Route: IV  Start: 03/08/18 1925   End: 03/08/18 1927   Admin Instructions: For ordered doses up to 100 mcg give IV Push undiluted over a minimum of 3-5 minutes.          1927 (100 mcg)-Given              Dose: 8 mL  Freq: ONCE Route: IV  Start: 03/09/18 1815   End: 03/09/18 1825   Admin Instructions: Supplied by, and administered by [facility].           1825 (8 mL)-Given             Dose: 70 Units/kg  Weight Dosing Info: 70 kg  Freq: ONCE Route: IV  Start: 03/08/18 1856   End: 03/08/18 1913   Admin Instructions: Max dose 10,000 units          1913 (5,000 Units)-Given              Dose: 12  Units/kg/hr  Weight Dosing Info: 70 kg  Freq: ONCE Route: IV  Last Dose: Stopped (03/08/18 1938)  Start: 03/08/18 1857   End: 03/08/18 1938         1912 (12 Units/kg/hr)-New Bag       1938-ED Infusing on Admission/transfer              Dose: 0.5 mg  Freq: ONCE Route: IV  Start: 03/09/18 1645   End: 03/09/18 1640   Admin Instructions: For ordered doses up to 4 mg give IV Push undiluted. Administer each 2mg over 2-5 minutes.           1640 (0.5 mg)-Given [C]             Dose: 110 mL  Freq: ONCE Route: IA  Start: 03/08/18 2045   End: 03/08/18 2045         2045 (110 mL)-Given              Dose: 75 mL  Freq: ONCE Route: IV  Start: 03/09/18 1615   End: 03/09/18 1610          1610 (75 mL)-Given             Dose: 70 mL  Freq: ONCE Route: IV  Start: 03/09/18 1615   End: 03/09/18 1611   Admin Instructions: This entry is for use by Radiology to intermittently used as a flush in patients receiving a CT scan.           1611 (70 mL)-Given             Dose: 10 mL  Freq: ONCE Route: IV  Start: 03/09/18 1815   End: 03/09/18 1825          1825 (10 mL)-Given             Dose: 2 mL  Freq: ONCE Route: IV  Start: 03/09/18 0945   End: 03/09/18 0945   Admin Instructions: NDC  [id number]  5 mL solution of Lumason           0945 (2 mL)-Given          Discontinued Medications  Medications 03/03/18 03/04/18 03/05/18 03/06/18 03/07/18 03/08/18 03/09/18         Dose: 500-1,000 mL  Freq: ONCE PRN Route: IV  PRN Comment: when verbally ordered by prescriber during procedure  Start: 03/08/18 1938   End: 03/08/18 2103   Admin Instructions: IV fluid bolus with pressure bag          2103-Med Discontinued          Dose: 20 mcg  Freq: ONCE Route: INTRACORONAR  Start: 03/08/18 1939   End: 03/08/18 2103   Admin Instructions: For RIGHT Intracoronary Artery, when verbally ordered by the prescriber during the procedure.    For 20 mcg, use 1 mL of solution  Dose will be administered  as follows:  Give  20 mcg  (1 mL) ; wait 5 minutes;  Then Give 50 mcg (2.5  mL)   For a MAX of 70 mcg dose.   Dose may be adjusted/limited by patient response at the request of the prescriber.                 2103-Med Discontinued       Followed by    Dose: 50 mcg  Freq: ONCE Route: INTRACORONAR  Start: 03/08/18 1940   End: 03/08/18 2103   Admin Instructions: For RIGHT Intracoronary Artery, when verbally ordered by the prescriber during the procedure.    For 50 mcg, use 2.5 mL of solution.  Dose will be administered  as follows:  Give  20 mcg  (1 mL) ; wait 5 minutes;  Then Give 50 mcg (2.5 mL)   For a MAX of 70 mcg dose.   Dose may be adjusted/limited by patient response at the request of the prescriber.                 2103-Med Discontinued          Dose: 20 mcg  Freq: ONCE Route: INTRACORONAR  Start: 03/08/18 1939   End: 03/08/18 2103   Admin Instructions: For LEFT Intracoronary Artery, when verbally ordered by the prescriber during the procedure.  For 20 mcg, use 1 mL of solution  Dose will be administered as follows:  Give 20 mcg (1 mL) ; wait 5 minutes;  Then Give 50 mcg (2.5 mL) ; wait 5 minutes;  Then Give 50 mcg (2.5 mL) of solution  For a MAX of 120 mcg dose.   Dose may be adjusted/limited by patient response at the request of the prescriber.                 2103-Med Discontinued       Followed by    Dose: 50 mcg  Freq: ONCE Route: INTRACORONAR  Start: 03/08/18 1940   End: 03/08/18 2103   Admin Instructions: For LEFT Intracoronary Artery, when verbally ordered by the prescriber during the procedure.  For 50 mcg, use 2.5 mL of solution  Dose will be administered as follows:  Give 20 mcg (1 mL) ; wait 5 minutes;  Then Give 50 mcg (2.5 mL) ; wait 5 minutes;  Then Give 50 mcg (2.5 mL) of solution  For a MAX of 120 mcg dose.   Dose may be adjusted/limited by patient response at the request of the prescriber.                 2103-Med Discontinued       Followed by    Dose: 50 mcg  Freq: ONCE Route: INTRACORONAR  Start: 03/08/18 1940   End: 03/08/18 2103   Admin Instructions: For LEFT  Intracoronary Artery, when verbally ordered by the prescriber during the procedure.  For 50 mcg, use 2.5 mL of solution  Dose will be administered as follows:  Give 20 mcg (1 mL) ; wait 5 minutes;  Then Give 50 mcg (2.5 mL) ; wait 5 minutes;  Then Give 50 mcg (2.5 mL) of solution  For a MAX of 120 mcg dose.   Dose may be adjusted/limited by patient response at the request of the prescriber.                 2103-Med Discontinued          Dose: 12-12,000 mcg  Freq: EVERY 1 MIN PRN Route: INTRACORONAR  PRN Comment: when ordered by prescriber during procedure.  Start: 03/08/18 1938   End: 03/08/18 2103   Admin Instructions: Nursing to dilute as instructed by provider.  Prescriber will infuse each dose.  May repeat Q1 min PRN at the discretion of the provider.          2103-Med Discontinued          Rate: 588 mL/hr Dose: 140 mcg/kg/min  Weight Dosing Info: 70 kg  Freq: CONTINUOUS PRN Route: IV  PRN Comment:  when verbally ordered by the prescriber during  procedure  Start: 03/08/18 1938   End: 03/08/18 2103   Admin Instructions: Infuse for 3-5 minutes, as directed.          2103-Med Discontinued          Dose: 150-300 mg  Freq: EVERY 10 MIN PRN Route: IV  PRN Comment: when verbally ordered by prescriber during the procedure.   Start: 03/08/18 1938   End: 03/08/18 2103   Admin Instructions: For pulseless VT/VF, give undiluted, rapid IVP.    For stable atrial arrhythmias, dilute in D5W and give over 10 min.  May repeat x 1.          2103-Med Discontinued          Dose: 81 mg  Freq: DAILY Route: PO  Start: 03/09/18 0900   End: 03/08/18 2114   Admin Instructions: Once the day prior to the procedure and once the morning of the cath lab procedure.     Exceptions:   IF Patient is allergic to aspirin OR  IF Patient already received Aspirin 325 mg today          2114-Med Discontinued          Dose: 325 mg  Freq: ONCE PRN Route: PO  PRN Reason: other  PRN Comment: when verbally ordered by prescriber during the procedure.  Start:  03/08/18 1938   End: 03/08/18 2103         2103-Med Discontinued          Dose: 0.5-1 mg  Freq: EVERY 1 MIN PRN Route: IV  PRN Reason: other  PRN Comment: when verbally ordered by prescriber during the procedure.  Start: 03/08/18 1938   End: 03/08/18 2103         2103-Med Discontinued          Rate: 24.5 mL/hr Dose: 1.75 mg/kg/hr  Weight Dosing Info: 70 kg  Freq: CONTINUOUS PRN Route: IV  PRN Comment: when verbally ordered by prescriber during procedure.  Last Dose: Stopped (03/08/18 2122)  Start: 03/08/18 1938   End: 03/08/18 2103 2004 (1.75 mg/kg/hr)-New Bag       2103-Med Discontinued  2122-Stopped [C]              Dose: 1-10 mL  Freq: ONCE PRN Route: SC  PRN Comment: For local anesthesia for the groin puncture as verbally ordered by prescriber during the procedure  Start: 03/08/18 1938   End: 03/08/18 2103   Admin Instructions: The provider administers the medication. Dose may be  into smaller doses for administration.          2103-Med Discontinued          Dose: 75 mg  Freq: ONCE PRN Route: PO  PRN Comment: when verbally ordered by prescriber during the procedure.  Start: 03/08/18 1938   End: 03/08/18 2103         2103-Med Discontinued          Dose: 12.5-50 mL  Freq: EVERY 30 MIN PRN Route: IV  PRN Reason: other  PRN Comment: when verbally ordered by prescriber during the procedure.  Start: 03/08/18 1938   End: 03/08/18 2103   Admin Instructions: Vesicant. For ordered doses up to 25 mg, give IV Push undiluted. Give each 5g over 1 minute.          2103-Med Discontinued          Dose: 25-50 mg  Freq: ONCE PRN Route: IV  PRN Reason: other  PRN Comment: when verbally ordered by prescriber during the procedure.  Start: 03/08/18 1938   End: 03/08/18 2103   Admin Instructions: Can be given as 25mg x 2 doses, or, as 50mg x 1 dose.   Give no faster than 25mg/min.  For ordered doses up to 50 mg, give IV Push undiluted. Give each 25mg over a minimum of 1 minute. Extend in non-emergency          2103-Med  Discontinued          Rate: 4.2-42 mL/hr Dose: 2-20 mcg/kg/min  Weight Dosing Info: 70 kg  Freq: CONTINUOUS PRN Route: IV  PRN Comment: when verbally ordered by prescriber during the procedure.  Start: 03/08/18 1938   End: 03/08/18 2103   Admin Instructions: Start at 2 to 5 mcg/kg/min and titrate by 1 to 2 mcg/kg/min, every 5 minutes, up to a maximum of 20 mcg/kg/min.  Vesicant.          2103-Med Discontinued          Rate: 5.3-52.5 mL/hr Dose: 2-20 mcg/kg/min  Weight Dosing Info: 70 kg  Freq: CONTINUOUS PRN Route: IV  PRN Comment: when verbally ordered by prescriber during the procedure.  Start: 03/08/18 1938   End: 03/08/18 2103   Admin Instructions: Start at 2 to 5 mcg/kg/min and titrate by 1 to 2 mcg/kg/min, every 5 minutes, up to a maximum of 20 mcg/kg/min.  Vesicant.          2103-Med Discontinued          Dose: 1.25-2.5 mg  Freq: ONCE PRN Route: IV  PRN Reason: other  PRN Comment: when verbally ordered by the prescriber during the procedure.   Start: 03/08/18 1938   End: 03/08/18 2103   Admin Instructions: For ordered doses up to 1.25 mg, give IV Push undiluted over 5 minutes.          2103-Med Discontinued          Dose: 0.3 mg  Freq: EVERY 5 MIN PRN Route: IV  PRN Reason: other  PRN Comment: when verbally ordered by prescriber during the procedure  Start: 03/08/18 1938   End: 03/08/18 2103   Admin Instructions: Max of 3 doses  0.1 mg/mL = 1:10,000 concentration          2103-Med Discontinued          Dose: 0.3 mg  Freq: EVERY 3 MIN PRN Route: SC  PRN Reason: anaphylaxis  PRN Comment: ALLERGIC REACTION, when verbally ordered by prescriber during the procedure  Start: 03/08/18 1938   End: 03/08/18 2103   Admin Instructions: May also give by IM route.  NOT for IV use.  Max of 3 doses  Not for direct undiluted intravenous injection. (1 mg/mL = 1:1,000 concentration). Protect from light.          2103-Med Discontinued          Dose: 1 mg  Freq: EVERY 5 MIN PRN Route: IV  PRN Reason: other  PRN Comment: when  verbally ordered by prescriber during the procedure.  Start: 03/08/18 1938   End: 03/08/18 2103   Admin Instructions: Max of 3 doses  0.1 mg/mL = 1:10,000 concentration          2103-Med Discontinued          Dose: 20 mg  Freq: EVERY 12 HOURS Route: IV  Start: 03/08/18 2315   End: 03/09/18 1232   Admin Instructions: Pharmacy to adjust dose to renal function.  For ordered doses up to 20 mg, give IV Push diluted with 5-10ml NS over a minimum of 2 minutes.          2338 (20 mg)-Given        1229 (20 mg)-Given       1232-Med Discontinued         Dose: 25-50 mcg  Freq: EVERY 2 MIN PRN Route: IV  PRN Reasons: moderate to severe pain,other  PRN Comment: when verbally ordered by prescriber during the procedure.  Start: 03/08/18 1938   End: 03/08/18 2103   Admin Instructions: Doses can be exceeded under direct oversight of the patient by physician.  For ordered doses up to 100 mcg give IV Push undiluted over a minimum of 3-5 minutes.          2103-Med Discontinued          Dose: 0.2 mg  Freq: EVERY 1 MIN PRN Route: IV  PRN Reason: other  PRN Comment: sedation reversal, when verbally ordered by prescriber during the procedure.  Start: 03/08/18 1938   End: 03/08/18 2103   Admin Instructions: Max cumulative dose of 1 mg.  Irritant. For ordered doses up to 1 mg, give IV Push undiluted. Administer each 0.2mg over 15 seconds.          2103-Med Discontinued          Dose:  mg  Freq: ONCE PRN Route: IV  PRN Comment: when verbally ordered by prescriber during the procedure.  Start: 03/08/18 1938   End: 03/08/18 2103   Admin Instructions: For ordered doses up to 40 mg, give IV Push undiluted over 1-2 minutes.          2103-Med Discontinued          Dose: 1,000-10,000 Units  Freq: EVERY 5 MIN PRN Route: IV  PRN Reason: other  PRN Comment: bolus dose  Start: 03/08/18 1938   End: 03/08/18 2103   Admin Instructions: when verbally ordered by prescriber during procedure.   Up to a max of 25,000 units.  Physician may request an  additional bolus.          2103-Med Discontinued          Rate: 9.5 mL/hr Dose: 950 Units/hr  Freq: CONTINUOUS Route: IV  Last Dose: Stopped (03/09/18 1100)  Start: 03/08/18 2220   End: 03/09/18 1405   Admin Instructions: Heparin 10a(Xa) =   0.23    03/09/2018            Goal level of: 0.15-0.35 IU/mL  Heparin instructions: Continue rate of 950 units/hr  Recheck next heparin 10a(Xa): in 6 hours (2nd check)  Heparin dosed per [facility] Protocol. Monitor platelets every three days while on anticoagulation therapy.          2235 (950 Units/hr)-New Bag        0604 (950 Units/hr)-Rate/Dose Verify       1100-Stopped       1405-Med Discontinued         Dose: 10-20 mg  Freq: EVERY 15 MIN PRN Route: IV  PRN Reason: other  PRN Comment:  to manage blood pressure, when verbally ordered by prescriber during the procedure.  Start: 03/08/18 1938   End: 03/08/18 2103   Admin Instructions: Can be given as 10mg x 2 doses, or, as 20mg x 1 dose.  Give to keep Systolic Blood Pressure less than 160 mmHg.  For ordered doses up to 40 mg, give IV Push undiluted over 1 minute.          2103-Med Discontinued          Dose: 1-10 mL  Freq: ONCE PRN Route: SC  PRN Comment: For local anesthesia for groin puncture as verbally ordered by prescriber during the procedure.  Start: 03/08/18 1938   End: 03/08/18 2103   Admin Instructions: The provider administers the medication. Dose may be  into smaller doses for administration.              2103-Med Discontinued          Dose: 30 mL  Freq: ONCE PRN Route: SC  PRN Comment: local anesthetic  Start: 03/08/18 1938   End: 03/08/18 2103   Admin Instructions: Dose may be  into smaller doses dependent upon the procedure when ordered by prescriber during the procedure          2103-Med Discontinued          Dose: 0.5-2 mg  Freq: EVERY 4 HOURS PRN Route: IV  PRN Reason: other  PRN Comment: when verbally ordered by prescriber during the procedure.  Start: 03/08/18 1938   End: 03/08/18 2103    Admin Instructions: For IV PUSH: Dilute with equal volume of NS. For ordered doses up to 4 mg give IV Push. Administer each 2mg over 1-5 minutes.          2103-Med Discontinued          Dose: 125 mg  Freq: ONCE PRN Route: IV  PRN Comment: when verbally ordered by prescriber during the procedure  Start: 03/08/18 1938   End: 03/08/18 2103   Admin Instructions: Give Doses 125mg and less IV Push over 2-3 minutes - reconstitute with 2 mL of bacteriostatic water if no diluent is provided. Doses greater than 125 mg need to be in at least 50 mL IVPB.          2103-Med Discontinued          Dose: 5 mg  Freq: EVERY 5 MIN PRN Route: IV  PRN Reason: other  PRN Comment: when verbally ordered by prescriber during the procedure.  Start: 03/08/18 1938   End: 03/08/18 2103   Admin Instructions: Max of 3 doses  For ordered doses up to 15 mg, give IV Push undiluted over 5-10 minutes.          2103-Med Discontinued          Dose: 0.5-2 mg  Freq: EVERY 2 MIN PRN Route: IV  PRN Reason: other  PRN Comment: when verbally ordered by prescriber during the procedure.  Start: 03/08/18 1938   End: 03/08/18 2103   Admin Instructions: Doses can be exceeded under direct oversight of the patient by physician.  For ordered doses up to 2.5 mg give IV Push slowly titrated over a minimum of 2 minutes. Dilute each 1mg in 4mL of NS.          2103-Med Discontinued          Dose: 1-2 mg  Freq: EVERY 5 MIN PRN Route: IV  PRN Reason: other  PRN Comment: moderate pain/sedation, when verbally ordered by provider during procedure  Start: 03/08/18 1938   End: 03/08/18 2103   Admin Instructions: For ordered doses up to 15 mg give IV Push undiluted over 4-5 minutes.          2103-Med Discontinued          Dose: 0.1-0.4 mg  Freq: EVERY 2 MIN PRN Route: IV  PRN Reason: opioid reversal  Start: 03/08/18 2257   End: 03/08/18 2305   Admin Instructions: For respiratory rate LESS than or EQUAL to 8.  Partial reversal dose:  0.1 mg titrated q 2 minutes for Analgesia Side  Effects Monitoring Sedation Level of 3 (frequently drowsy, arousable, drifts to sleep during conversation).Full reversal dose:  0.4 mg bolus for Analgesia Side Effects Monitoring Sedation Level of 4 (somnolent, minimal or no response to stimulation).  For ordered doses up to 2mg give IVP. Give each 0.4mg over 15 seconds in emergency situations. For non-emergent situations further dilute in 9mL of NS to facilitate titration of response.          2305-Med Discontinued          Dose: 0.4 mg  Freq: EVERY 5 MIN PRN Route: IV  PRN Reason: other  PRN Comment: when verbally ordered by prescriber during the procedure.  Start: 03/08/18 1938   End: 03/08/18 2103   Admin Instructions: For respiratory rate LESS than or EQUAL to 8.  Partial reversal dose:  0.1 mg titrated q 2 minutes for Analgesia Side Effects Monitoring Sedation Level of 3 (frequently drowsy, arousable, drifts to sleep during conversation).Full reversal dose:  0.4 mg bolus for Analgesia Side Effects Monitoring Sedation Level of 4 (somnolent, minimal or no response to stimulation).  For ordered doses up to 2mg give IVP. Give each 0.4mg over 15 seconds in emergency situations. For non-emergent situations further dilute in 9mL of NS to facilitate titration of response.          2103-Med Discontinued          Dose: 100 mcg  Freq: EVERY 1 MIN PRN Route: INTRACORONAR  PRN Comment: when verbally ordered during the procedure   Start: 03/08/18 1938   End: 03/08/18 2103   Admin Instructions: To a max of 1 mg  Protect from light.          2103-Med Discontinued          Dose: 10 mg  Freq: ONCE PRN Route: PO  PRN Reason: other  PRN Comment: when verbally ordered by the prescriber during the procedure  Start: 03/08/18 1938   End: 03/08/18 2103   Admin Instructions: Puncture and swallow capsule and give in Cath Lab ONLY.          2103-Med Discontinued          Dose: 0.4 mg  Freq: EVERY 5 MIN PRN Route: SL  PRN Reason: chest pain  PRN Comment: when verbally ordered by  prescriber during the procedure.  Start: 03/08/18 1938   End: 03/08/18 2103   Admin Instructions: Max of 3 doses in 15 minutes.          2103-Med Discontinued          Rate: 1.5-42 mL/hr Dose: 0.07-2 mcg/kg/min  Weight Dosing Info: 70 kg  Freq: CONTINUOUS PRN Route: IV  PRN Comment: when verbally ordered by prescriber during the procedure.  Start: 03/08/18 1938   End: 03/08/18 2103   Admin Instructions: Start at 0.07 to 0.15 mcg/kg/min. Titrate up by 0.1 to 0.2 mcg/kg/min every 5 minutes PRN as directed by the prescriber to a max of 2 mcg/kg/min  Vesicant.          2103-Med Discontinued          Dose: 100-200 mcg  Freq: EVERY 1 MIN PRN Route: INTRACORONAR  PRN Comment: when verbally ordered by prescriber during procedure  Start: 03/08/18 1938   End: 03/08/18 2103   Admin Instructions: Prescriber will infuse each dose          2022 (200 mcg)-Given by Other       2103-Med Discontinued          Dose: 100-500 mcg  Freq: ONCE PRN Route: IA  PRN Comment: for radial artery sheath protocol, when verbally ordered by prescriber during the procedure  Start: 03/08/18 1938   End: 03/08/18 2103   Admin Instructions: Prescriber will administer the dose.          2103-Med Discontinued          Rate: 2.6-105 mL/hr Dose: 0.25-10 mcg/kg/min  Weight Dosing Info: 70 kg  Freq: CONTINUOUS PRN Route: IV  PRN Reason: other  PRN Comment: when verbally ordered by prescriber during procedure  Start: 03/08/18 1938   End: 03/08/18 2103   Admin Instructions: For pulmonary hypertension or hypertensive crisis.  Titrate up by 0.25 to 0.5 mcg/kg/min every 3 minutes PRN as directed by prescriber  Conc: 0.4 mg/mL. Protect from light. Irritant.          2103-Med Discontinued          Dose: 100-200 mcg  Freq: EVERY 2 MIN PRN Route: INTRACORONAR  PRN Comment: when ordered by prescriber during procedure.    Start: 03/08/18 1938   End: 03/08/18 2103   Admin Instructions: Provider will infuse each dose.  Protect from light.          2103-Med Discontinued           Rate: 2.2-29 mL/hr Dose: 0.03-0.4 mcg/kg/min  Weight Dosing Info: 77.2 kg  Freq: CONTINUOUS Route: IV  Last Dose: Stopped (03/09/18 1000)  Start: 03/08/18 2215   End: 03/09/18 1405   Admin Instructions: For range orders: start at lowest dose ordered. Titrate by 0.03 to 0.1 mcg/kg/min every 5 minutes to keep MAP greater than 65 mmHg.  Notify prescriber if higher doses are required to achieve blood pressure goals.  Protect from light. Vesicant.          2240 (0.03 mcg/kg/min)-New Bag       2307 (0.02 mcg/kg/min)-Rate/Dose Change        0800 (0.02 mcg/kg/min)-Rate/Dose Verify       1000-Stopped       1405-Med Discontinued         Dose: 4 mg  Freq: EVERY 4 HOURS PRN Route: IV  PRN Reason: nausea  PRN Comment: when verbally ordered by prescriber during procedure  Start: 03/08/18 1938   End: 03/08/18 2103   Admin Instructions: Irritant. For ordered doses up to 4 mg, give IV Push undiluted over 2-5 minutes.          2103-Med Discontinued          Rate: 10.5-126 mL/hr Dose: 0.5-6 mcg/kg/min  Weight Dosing Info: 70 kg  Freq: CONTINUOUS Route: IV  Start: 03/08/18 1940   End: 03/08/18 2103   Admin Instructions: Start at lowest dose ordered.  Titrate by 0.1 to 0.3 mcg/kg/min every 5 minutes PRN as directed by prescriber with a max of 6 mcg/kg/min titrated to blood pressure  Vesicant.                 2103-Med Discontinued          Dose:  mcg  Freq: EVERY 3 MIN PRN Route: INTRACORONAR  PRN Comment: when verbally ordered by prescriber during procedure   Start: 03/08/18 1938   End: 03/08/18 2103   Admin Instructions: Do not give if HR is less than 50 bpm - can cause profound bradycardia if given.  Vesicant.          2103-Med Discontinued          Dose: 10 mEq  Freq: ONCE PRN Route: IV  PRN Reason: potassium supplementation  PRN Comment: when verbally ordered by prescriber during procedure   Start: 03/08/18 1938   End: 03/08/18 2103   Admin Instructions: for K+ less than 3.5          2103-Med Discontinued          Dose:  20 mEq  Freq: ONCE PRN Route: IV  PRN Reason: potassium supplementation  PRN Comment: when verbally ordered by prescriber during procedure   Start: 03/08/18 1938   End: 03/08/18 2103   Admin Instructions: - for K+ less than 3.5  CENTRAL LINE infusions only.          2103-Med Discontinued          Dose: 10-60 mg  Freq: ONCE PRN Route: PO  PRN Comment: when verbally ordered by prescriber during procedure   Start: 03/08/18 1938   End: 03/08/18 2103   Admin Instructions: May be crushed - must be given immediately after crushing.   Administration via an enteral tube that bypasses the acidic environment of the stomach may result in reduced bioavailability of prasugrel.             2103-Med Discontinued          Dose: 6.25-25 mg  Freq: EVERY 4 HOURS PRN Route: IV  PRN Reason: other  PRN Comment: when verbally ordered by prescriber during procedure   Start: 03/08/18 1938   End: 03/08/18 2103   Admin Instructions: Maximum rate is 25 mg/minute.  WARNING FOR IV Administration: Intra-arterial injection causes tissue necrosis. **STOP administration if pain or discomfort.  Vesicant. Dilute 25 mg with 10 mL of normal saline in a syringe. Resulting concentration = 2.5 mg/ mL. Administer over 3-5 minutes. High risk of tissue necrosis with extravasation.          2103-Med Discontinued          Start: 03/08/18 1747   End: 03/08/18 1754   Admin Instructions: Dirk Singh : cabinet override                 1754-Med Discontinued          Rate: 2.3-34.7 mL/hr Dose: 5-75 mcg/kg/min  Weight Dosing Info: 77.2 kg  Freq: CONTINUOUS Route: IV  Last Dose: 10 mcg/kg/min (03/09/18 1100)  Start: 03/08/18 2315   End: 03/09/18 1405   Admin Instructions: For sedation of mechanically ventilated patients. For range orders: start at lowest dose ordered. Titrate by 5-10 mcg/kg/min every 5 minutes to achieve the defined goal sedation score. If ordered, consider using bolus injection doses of propofol for acute agitation before titrating infusion.    Order  specific questions:  Population for use? Adult Sedation  Goal [place]da Agitation Sedation Scale (RASS) Score Goal Range -1 Drowsy to -2 Light Sedation           2301 (75 mcg/kg/min)-New Bag        0125 (60 mcg/kg/min)-Rate/Dose Change       0157 (60 mcg/kg/min)-New Bag       0528 (60 mcg/kg/min)-New Bag       0700 (60 mcg/kg/min)-Rate/Dose Verify       0839 (60 mcg/kg/min)-New Bag       1100 (10 mcg/kg/min)-Rate/Dose Change       1405-Med Discontinued      And    Dose: 10-20 mg  Freq: EVERY 30 MIN PRN Route: IV  PRN Comment: Agitation while Intubated  Start: 03/08/18 2258   End: 03/09/18 1405   Admin Instructions: Patient must be intubated  Must be ordered in conjunction with a propofol infusion.           1405-Med Discontinued         Rate: 2.1-31.5 mL/hr Dose: 5-75 mcg/kg/min  Weight Dosing Info: 70 kg  Freq: CONTINUOUS Route: IV  Last Dose: 60 mcg/kg/min (03/08/18 2110)  Start: 03/08/18 1826   End: 03/08/18 2305   Admin Instructions: For sedation of mechanically ventilated patients.  For range orders: start at lowest dose ordered. Titrate by 5-10 mcg/kg/min every 5 minutes to achieve the defined goal sedation score. If ordered, consider using bolus doses of propofol for acute agitation before titrating infusion.    Order specific questions:  Population for use? Adult Sedation  Goal [place]da Agitation Sedation Scale (RASS) Score Goal Range -3 Moderate Sedation to -4 Deep Sedation           1828 (75 mcg/kg/min)-New Bag       1851 (55 mcg/kg/min)-Rate/Dose Change       1927 (65 mcg/kg/min)-Rate/Dose Change       1934 (75 mcg/kg/min)-Rate/Dose Change       1938-ED Infusing on Admission/transfer       2110 (60 mcg/kg/min)-Rate/Dose Verify       (2301)-Not Given       2305-Med Discontinued          Dose: 1-5 mg  Freq: ONCE PRN Route: IV  PRN Comment:  when verbally ordered by prescriber during procedure.  Start: 03/08/18 1938   End: 03/08/18 2103   Admin Instructions: TEST DOSE  Give slow IV push          2103-Med  Discontinued          Dose:  mg  Freq: EVERY 5 MIN PRN Route: IV  PRN Comment:  when verbally ordered by prescriber during procedure.  Start: 03/08/18 1938   End: 03/08/18 2103   Admin Instructions: Can be started 5 minutes after test dose.  Give slow IV push          2103-Med Discontinued          Dose: 50 mEq  Freq: EVERY 5 MIN PRN Route: IV  PRN Comment: up to a max of 200 mEq, when verbally ordered by prescriber during the procedure    Start: 03/08/18 1938   End: 03/08/18 2103   Admin Instructions: Vesicant.          2103-Med Discontinued          Rate: 150 mL/hr   Freq: CONTINUOUS Route: IV  Start: 03/08/18 2115   End: 03/08/18 2115   Admin Instructions: 150 mL/hr IV for 2 hours prior to cardiac cath lab procedure, then decrease to 75 mL/hr to run throughout the procedure. Start at 0700 for inpatients.    IF PATIENT IS RECEIVING RENAL DIALYSIS, RN to reduce rate of  0.9 % sodium chloride IV solution to 10 mL /hour (TKO) IV infusion to prevent fluid overload.          (2115)-Not Given [C]       2115-Med Discontinued          Rate: 75 mL/hr   Freq: CONTINUOUS Route: IV  Start: 03/08/18 2115   End: 03/09/18 1406   Admin Instructions: IF PATIENT IS RECEIVING RENAL DIALYSIS, RN to reduce rate of  0.9 % sodium chloride IV solution to  10 mL /hour (TKO) to prevent fluid overload.          2151 ( )-New Bag        1406-Med Discontinued         Dose:  mg  Freq: ONCE PRN Route: PO  PRN Comment: when verbally ordered by prescriber during the procedure  Start: 03/08/18 1938   End: 03/08/18 2103 2103-Med Discontinued          Dose: 40 Units  Freq: ONCE PRN Route: IV  PRN Reason: other  PRN Comment: when verbally ordered by prescriber during procedure   Start: 03/08/18 1938   End: 03/08/18 2103   Admin Instructions: Vesicant.          2103-Med Discontinued          Dose: 1-2.5 mg  Freq: ONCE PRN Route: IA  PRN Comment: when verbally ordered by prescriber during procedure   Start: 03/08/18 1938   End:  03/08/18 2103   Admin Instructions: For radial or brachial access.  Protect from light.          2103-Med Discontinued     Medications 03/03/18 03/04/18 03/05/18 03/06/18 03/07/18 03/08/18 03/09/18

## 2018-03-09 ENCOUNTER — ANESTHESIA (OUTPATIENT)
Dept: SURGERY | Facility: CLINIC | Age: 58
End: 2018-03-09
Payer: COMMERCIAL

## 2018-03-09 ENCOUNTER — APPOINTMENT (OUTPATIENT)
Dept: MRI IMAGING | Facility: CLINIC | Age: 58
End: 2018-03-09
Attending: NURSE PRACTITIONER
Payer: COMMERCIAL

## 2018-03-09 ENCOUNTER — APPOINTMENT (OUTPATIENT)
Dept: CT IMAGING | Facility: CLINIC | Age: 58
End: 2018-03-09
Attending: INTERNAL MEDICINE
Payer: COMMERCIAL

## 2018-03-09 ENCOUNTER — ANESTHESIA EVENT (OUTPATIENT)
Dept: SURGERY | Facility: CLINIC | Age: 58
End: 2018-03-09
Payer: COMMERCIAL

## 2018-03-09 ENCOUNTER — APPOINTMENT (OUTPATIENT)
Dept: CARDIOLOGY | Facility: CLINIC | Age: 58
End: 2018-03-09
Attending: INTERNAL MEDICINE
Payer: COMMERCIAL

## 2018-03-09 ENCOUNTER — APPOINTMENT (OUTPATIENT)
Dept: GENERAL RADIOLOGY | Facility: CLINIC | Age: 58
End: 2018-03-09
Attending: NEUROLOGICAL SURGERY
Payer: COMMERCIAL

## 2018-03-09 ENCOUNTER — APPOINTMENT (OUTPATIENT)
Dept: GENERAL RADIOLOGY | Facility: CLINIC | Age: 58
End: 2018-03-09
Attending: INTERNAL MEDICINE
Payer: COMMERCIAL

## 2018-03-09 ENCOUNTER — HOSPITAL ENCOUNTER (INPATIENT)
Facility: CLINIC | Age: 58
LOS: 5 days | Discharge: NURSING, REHAB OR LONG-TERM CARE | End: 2018-03-14
Attending: NEUROLOGICAL SURGERY | Admitting: NEUROLOGICAL SURGERY
Payer: COMMERCIAL

## 2018-03-09 VITALS
RESPIRATION RATE: 11 BRPM | TEMPERATURE: 99.5 F | DIASTOLIC BLOOD PRESSURE: 76 MMHG | BODY MASS INDEX: 23.23 KG/M2 | OXYGEN SATURATION: 98 % | SYSTOLIC BLOOD PRESSURE: 153 MMHG | HEIGHT: 72 IN | HEART RATE: 100 BPM | WEIGHT: 171.52 LBS

## 2018-03-09 DIAGNOSIS — G95.19 SPINAL CORD HEMORRHAGE (H): Primary | ICD-10-CM

## 2018-03-09 DIAGNOSIS — Z98.890 S/P LAMINECTOMY: Primary | ICD-10-CM

## 2018-03-09 PROBLEM — Z95.5 STENTED CORONARY ARTERY: Status: ACTIVE | Noted: 2018-03-09

## 2018-03-09 PROBLEM — G95.20 SPINAL CORD COMPRESSION (H): Status: ACTIVE | Noted: 2018-03-09

## 2018-03-09 LAB
ABO + RH BLD: NORMAL
ABO + RH BLD: NORMAL
ALBUMIN SERPL-MCNC: 3.5 G/DL (ref 3.4–5)
ALBUMIN UR-MCNC: 30 MG/DL
ALP SERPL-CCNC: 64 U/L (ref 40–150)
ALT SERPL W P-5'-P-CCNC: 60 U/L (ref 0–70)
ANION GAP SERPL CALCULATED.3IONS-SCNC: 6 MMOL/L (ref 3–14)
ANION GAP SERPL CALCULATED.3IONS-SCNC: 9 MMOL/L (ref 3–14)
APPEARANCE UR: CLEAR
APTT PPP: 35 SEC (ref 22–37)
AST SERPL W P-5'-P-CCNC: 81 U/L (ref 0–45)
BACTERIA #/AREA URNS HPF: ABNORMAL /HPF
BASE DEFICIT BLDA-SCNC: 2 MMOL/L
BASE DEFICIT BLDA-SCNC: 2.3 MMOL/L
BASOPHILS # BLD AUTO: 0 10E9/L (ref 0–0.2)
BASOPHILS NFR BLD AUTO: 0.1 %
BILIRUB SERPL-MCNC: 0.7 MG/DL (ref 0.2–1.3)
BILIRUB UR QL STRIP: NEGATIVE
BLD GP AB SCN SERPL QL: NORMAL
BLD PROD TYP BPU: NORMAL
BLD UNIT ID BPU: 0
BLD UNIT ID BPU: 0
BLOOD BANK CMNT PATIENT-IMP: NORMAL
BLOOD BANK CMNT PATIENT-IMP: NORMAL
BLOOD PRODUCT CODE: NORMAL
BLOOD PRODUCT CODE: NORMAL
BPU ID: NORMAL
BPU ID: NORMAL
BUN SERPL-MCNC: 16 MG/DL (ref 7–30)
BUN SERPL-MCNC: 21 MG/DL (ref 7–30)
CA-I BLD-MCNC: 4.5 MG/DL (ref 4.4–5.2)
CA-I BLD-MCNC: 4.6 MG/DL (ref 4.4–5.2)
CALCIUM SERPL-MCNC: 8 MG/DL (ref 8.5–10.1)
CALCIUM SERPL-MCNC: 8.1 MG/DL (ref 8.5–10.1)
CHLORIDE SERPL-SCNC: 107 MMOL/L (ref 94–109)
CHLORIDE SERPL-SCNC: 109 MMOL/L (ref 94–109)
CO2 SERPL-SCNC: 23 MMOL/L (ref 20–32)
CO2 SERPL-SCNC: 23 MMOL/L (ref 20–32)
COLOR UR AUTO: ABNORMAL
CREAT SERPL-MCNC: 0.76 MG/DL (ref 0.66–1.25)
CREAT SERPL-MCNC: 0.83 MG/DL (ref 0.66–1.25)
DIFFERENTIAL METHOD BLD: ABNORMAL
EOSINOPHIL # BLD AUTO: 0 10E9/L (ref 0–0.7)
EOSINOPHIL NFR BLD AUTO: 0 %
ERYTHROCYTE [DISTWIDTH] IN BLOOD BY AUTOMATED COUNT: 13.4 % (ref 10–15)
ERYTHROCYTE [DISTWIDTH] IN BLOOD BY AUTOMATED COUNT: 13.5 % (ref 10–15)
FIBRINOGEN PPP-MCNC: 407 MG/DL (ref 200–420)
GFR SERPL CREATININE-BSD FRML MDRD: >90 ML/MIN/1.7M2
GFR SERPL CREATININE-BSD FRML MDRD: >90 ML/MIN/1.7M2
GLUCOSE BLD-MCNC: 132 MG/DL (ref 70–99)
GLUCOSE BLDC GLUCOMTR-MCNC: 102 MG/DL (ref 70–99)
GLUCOSE BLDC GLUCOMTR-MCNC: 103 MG/DL (ref 70–99)
GLUCOSE BLDC GLUCOMTR-MCNC: 128 MG/DL (ref 70–99)
GLUCOSE SERPL-MCNC: 118 MG/DL (ref 70–99)
GLUCOSE SERPL-MCNC: 136 MG/DL (ref 70–99)
GLUCOSE UR STRIP-MCNC: NEGATIVE MG/DL
HCO3 BLD-SCNC: 22 MMOL/L (ref 21–28)
HCO3 BLD-SCNC: 23 MMOL/L (ref 21–28)
HCT VFR BLD AUTO: 40.4 % (ref 40–53)
HCT VFR BLD AUTO: 42.2 % (ref 40–53)
HGB BLD-MCNC: 13.4 G/DL (ref 13.3–17.7)
HGB BLD-MCNC: 13.6 G/DL (ref 13.3–17.7)
HGB BLD-MCNC: 14.5 G/DL (ref 13.3–17.7)
HGB UR QL STRIP: NEGATIVE
IMM GRANULOCYTES # BLD: 0 10E9/L (ref 0–0.4)
IMM GRANULOCYTES NFR BLD: 0.2 %
INR PPP: 1.2 (ref 0.86–1.14)
KETONES UR STRIP-MCNC: 80 MG/DL
LEUKOCYTE ESTERASE UR QL STRIP: ABNORMAL
LMWH PPP CHRO-ACNC: 0.23 IU/ML
LMWH PPP CHRO-ACNC: <0.1 IU/ML
LYMPHOCYTES # BLD AUTO: 0.4 10E9/L (ref 0.8–5.3)
LYMPHOCYTES NFR BLD AUTO: 4.2 %
MAGNESIUM SERPL-MCNC: 2.1 MG/DL (ref 1.6–2.3)
MCH RBC QN AUTO: 30.5 PG (ref 26.5–33)
MCH RBC QN AUTO: 30.7 PG (ref 26.5–33)
MCHC RBC AUTO-ENTMCNC: 33.2 G/DL (ref 31.5–36.5)
MCHC RBC AUTO-ENTMCNC: 34.4 G/DL (ref 31.5–36.5)
MCV RBC AUTO: 89 FL (ref 78–100)
MCV RBC AUTO: 92 FL (ref 78–100)
MONOCYTES # BLD AUTO: 1 10E9/L (ref 0–1.3)
MONOCYTES NFR BLD AUTO: 9.9 %
MRSA DNA SPEC QL NAA+PROBE: NEGATIVE
MUCOUS THREADS #/AREA URNS LPF: PRESENT /LPF
NEUTROPHILS # BLD AUTO: 8.5 10E9/L (ref 1.6–8.3)
NEUTROPHILS NFR BLD AUTO: 85.6 %
NITRATE UR QL: NEGATIVE
NRBC # BLD AUTO: 0 10*3/UL
NRBC BLD AUTO-RTO: 0 /100
NUM BPU REQUESTED: 2
NUM BPU REQUESTED: 3
O2/TOTAL GAS SETTING VFR VENT: 100 %
O2/TOTAL GAS SETTING VFR VENT: 50 %
OXYHGB MFR BLD: 99 % (ref 92–100)
PCO2 BLD: 35 MM HG (ref 35–45)
PCO2 BLD: 39 MM HG (ref 35–45)
PH BLD: 7.37 PH (ref 7.35–7.45)
PH BLD: 7.41 PH (ref 7.35–7.45)
PH UR STRIP: 6 PH (ref 5–7)
PHOSPHATE SERPL-MCNC: 3 MG/DL (ref 2.5–4.5)
PLATELET # BLD AUTO: 180 10E9/L (ref 150–450)
PLATELET # BLD AUTO: 198 10E9/L (ref 150–450)
PO2 BLD: 133 MM HG (ref 80–105)
PO2 BLD: 482 MM HG (ref 80–105)
POTASSIUM BLD-SCNC: 3.7 MMOL/L (ref 3.4–5.3)
POTASSIUM SERPL-SCNC: 3.8 MMOL/L (ref 3.4–5.3)
POTASSIUM SERPL-SCNC: 4.2 MMOL/L (ref 3.4–5.3)
PROT SERPL-MCNC: 6.1 G/DL (ref 6.8–8.8)
RADIOLOGIST FLAGS: ABNORMAL
RADIOLOGIST FLAGS: ABNORMAL
RBC # BLD AUTO: 4.4 10E12/L (ref 4.4–5.9)
RBC # BLD AUTO: 4.72 10E12/L (ref 4.4–5.9)
RBC #/AREA URNS AUTO: 6 /HPF (ref 0–2)
SODIUM BLD-SCNC: 136 MMOL/L (ref 133–144)
SODIUM SERPL-SCNC: 138 MMOL/L (ref 133–144)
SODIUM SERPL-SCNC: 139 MMOL/L (ref 133–144)
SOURCE: ABNORMAL
SP GR UR STRIP: 1.04 (ref 1–1.03)
SPECIMEN EXP DATE BLD: NORMAL
SPECIMEN SOURCE: NORMAL
TRANSFUSION STATUS PATIENT QL: NORMAL
TROPONIN I SERPL-MCNC: 0.38 UG/L (ref 0–0.04)
TROPONIN I SERPL-MCNC: 0.54 UG/L (ref 0–0.04)
UROBILINOGEN UR STRIP-MCNC: NORMAL MG/DL (ref 0–2)
WBC # BLD AUTO: 10 10E9/L (ref 4–11)
WBC # BLD AUTO: 12.2 10E9/L (ref 4–11)
WBC #/AREA URNS AUTO: 3 /HPF (ref 0–5)

## 2018-03-09 PROCEDURE — 20000004 ZZH R&B ICU UMMC

## 2018-03-09 PROCEDURE — 25000125 ZZHC RX 250: Performed by: NEUROLOGICAL SURGERY

## 2018-03-09 PROCEDURE — 00CU0ZZ EXTIRPATION OF MATTER FROM SPINAL CANAL, OPEN APPROACH: ICD-10-PCS | Performed by: NEUROLOGICAL SURGERY

## 2018-03-09 PROCEDURE — P9037 PLATE PHERES LEUKOREDU IRRAD: HCPCS | Performed by: STUDENT IN AN ORGANIZED HEALTH CARE EDUCATION/TRAINING PROGRAM

## 2018-03-09 PROCEDURE — 40000264 ECHO COMPLETE WITH LUMASON

## 2018-03-09 PROCEDURE — 72148 MRI LUMBAR SPINE W/O DYE: CPT

## 2018-03-09 PROCEDURE — 86900 BLOOD TYPING SEROLOGIC ABO: CPT | Performed by: NEUROLOGICAL SURGERY

## 2018-03-09 PROCEDURE — 71045 X-RAY EXAM CHEST 1 VIEW: CPT

## 2018-03-09 PROCEDURE — 00NX0ZZ RELEASE THORACIC SPINAL CORD, OPEN APPROACH: ICD-10-PCS | Performed by: NEUROLOGICAL SURGERY

## 2018-03-09 PROCEDURE — 71000016 ZZH RECOVERY PHASE 1 LEVEL 3 FIRST HR: Performed by: NEUROLOGICAL SURGERY

## 2018-03-09 PROCEDURE — 94003 VENT MGMT INPAT SUBQ DAY: CPT

## 2018-03-09 PROCEDURE — 25500064 ZZH RX 255 OP 636: Performed by: INTERNAL MEDICINE

## 2018-03-09 PROCEDURE — 81001 URINALYSIS AUTO W/SCOPE: CPT | Performed by: NURSE PRACTITIONER

## 2018-03-09 PROCEDURE — 83735 ASSAY OF MAGNESIUM: CPT | Performed by: INTERNAL MEDICINE

## 2018-03-09 PROCEDURE — 25000128 H RX IP 250 OP 636: Performed by: INTERNAL MEDICINE

## 2018-03-09 PROCEDURE — 99291 CRITICAL CARE FIRST HOUR: CPT | Performed by: INTERNAL MEDICINE

## 2018-03-09 PROCEDURE — 85730 THROMBOPLASTIN TIME PARTIAL: CPT | Performed by: NEUROLOGICAL SURGERY

## 2018-03-09 PROCEDURE — 80053 COMPREHEN METABOLIC PANEL: CPT | Performed by: INTERNAL MEDICINE

## 2018-03-09 PROCEDURE — 25000125 ZZHC RX 250: Performed by: INTERNAL MEDICINE

## 2018-03-09 PROCEDURE — 74177 CT ABD & PELVIS W/CONTRAST: CPT

## 2018-03-09 PROCEDURE — 25000128 H RX IP 250 OP 636: Performed by: STUDENT IN AN ORGANIZED HEALTH CARE EDUCATION/TRAINING PROGRAM

## 2018-03-09 PROCEDURE — 85027 COMPLETE CBC AUTOMATED: CPT | Performed by: INTERNAL MEDICINE

## 2018-03-09 PROCEDURE — 40000008 ZZH STATISTIC AIRWAY CARE

## 2018-03-09 PROCEDURE — 72157 MRI CHEST SPINE W/O & W/DYE: CPT

## 2018-03-09 PROCEDURE — 99291 CRITICAL CARE FIRST HOUR: CPT | Mod: 25 | Performed by: INTERNAL MEDICINE

## 2018-03-09 PROCEDURE — 87640 STAPH A DNA AMP PROBE: CPT | Performed by: NEUROLOGICAL SURGERY

## 2018-03-09 PROCEDURE — 84484 ASSAY OF TROPONIN QUANT: CPT | Performed by: INTERNAL MEDICINE

## 2018-03-09 PROCEDURE — 27210995 ZZH RX 272: Performed by: NEUROLOGICAL SURGERY

## 2018-03-09 PROCEDURE — 82803 BLOOD GASES ANY COMBINATION: CPT | Performed by: NEUROLOGICAL SURGERY

## 2018-03-09 PROCEDURE — B419YZZ FLUOROSCOPY OF LUMBAR ARTERIES USING OTHER CONTRAST: ICD-10-PCS | Performed by: RADIOLOGY

## 2018-03-09 PROCEDURE — 36000066 ZZH SURGERY LEVEL 4 W FLUORO 1ST 30 MIN - UMMC: Performed by: NEUROLOGICAL SURGERY

## 2018-03-09 PROCEDURE — 84100 ASSAY OF PHOSPHORUS: CPT | Performed by: INTERNAL MEDICINE

## 2018-03-09 PROCEDURE — 86901 BLOOD TYPING SEROLOGIC RH(D): CPT | Performed by: NEUROLOGICAL SURGERY

## 2018-03-09 PROCEDURE — 84132 ASSAY OF SERUM POTASSIUM: CPT | Performed by: NEUROLOGICAL SURGERY

## 2018-03-09 PROCEDURE — 25000132 ZZH RX MED GY IP 250 OP 250 PS 637: Performed by: INTERNAL MEDICINE

## 2018-03-09 PROCEDURE — 86923 COMPATIBILITY TEST ELECTRIC: CPT | Performed by: NEUROLOGICAL SURGERY

## 2018-03-09 PROCEDURE — P9059 PLASMA, FRZ BETWEEN 8-24HOUR: HCPCS | Performed by: NEUROLOGICAL SURGERY

## 2018-03-09 PROCEDURE — 82330 ASSAY OF CALCIUM: CPT | Performed by: INTERNAL MEDICINE

## 2018-03-09 PROCEDURE — 25000128 H RX IP 250 OP 636: Performed by: PSYCHIATRY & NEUROLOGY

## 2018-03-09 PROCEDURE — 36000064 ZZH SURGERY LEVEL 4 EA 15 ADDTL MIN - UMMC: Performed by: NEUROLOGICAL SURGERY

## 2018-03-09 PROCEDURE — 82330 ASSAY OF CALCIUM: CPT | Performed by: NEUROLOGICAL SURGERY

## 2018-03-09 PROCEDURE — 84295 ASSAY OF SERUM SODIUM: CPT | Performed by: NEUROLOGICAL SURGERY

## 2018-03-09 PROCEDURE — 85384 FIBRINOGEN ACTIVITY: CPT | Performed by: NEUROLOGICAL SURGERY

## 2018-03-09 PROCEDURE — 37000009 ZZH ANESTHESIA TECHNICAL FEE, EACH ADDTL 15 MIN: Performed by: NEUROLOGICAL SURGERY

## 2018-03-09 PROCEDURE — 87641 MR-STAPH DNA AMP PROBE: CPT | Performed by: NEUROLOGICAL SURGERY

## 2018-03-09 PROCEDURE — 86900 BLOOD TYPING SEROLOGIC ABO: CPT | Performed by: STUDENT IN AN ORGANIZED HEALTH CARE EDUCATION/TRAINING PROGRAM

## 2018-03-09 PROCEDURE — 25000128 H RX IP 250 OP 636

## 2018-03-09 PROCEDURE — 37000008 ZZH ANESTHESIA TECHNICAL FEE, 1ST 30 MIN: Performed by: NEUROLOGICAL SURGERY

## 2018-03-09 PROCEDURE — 25000566 ZZH SEVOFLURANE, EA 15 MIN: Performed by: NEUROLOGICAL SURGERY

## 2018-03-09 PROCEDURE — A9585 GADOBUTROL INJECTION: HCPCS | Performed by: INTERNAL MEDICINE

## 2018-03-09 PROCEDURE — 85610 PROTHROMBIN TIME: CPT | Performed by: NEUROLOGICAL SURGERY

## 2018-03-09 PROCEDURE — 85025 COMPLETE CBC W/AUTO DIFF WBC: CPT | Performed by: NEUROLOGICAL SURGERY

## 2018-03-09 PROCEDURE — 36415 COLL VENOUS BLD VENIPUNCTURE: CPT | Performed by: INTERNAL MEDICINE

## 2018-03-09 PROCEDURE — 82947 ASSAY GLUCOSE BLOOD QUANT: CPT | Performed by: NEUROLOGICAL SURGERY

## 2018-03-09 PROCEDURE — 25000128 H RX IP 250 OP 636: Performed by: NEUROLOGICAL SURGERY

## 2018-03-09 PROCEDURE — 40000275 ZZH STATISTIC RCP TIME EA 10 MIN

## 2018-03-09 PROCEDURE — P9016 RBC LEUKOCYTES REDUCED: HCPCS | Performed by: NEUROLOGICAL SURGERY

## 2018-03-09 PROCEDURE — 80048 BASIC METABOLIC PNL TOTAL CA: CPT | Performed by: NEUROLOGICAL SURGERY

## 2018-03-09 PROCEDURE — 86850 RBC ANTIBODY SCREEN: CPT | Performed by: NEUROLOGICAL SURGERY

## 2018-03-09 PROCEDURE — 36600 WITHDRAWAL OF ARTERIAL BLOOD: CPT

## 2018-03-09 PROCEDURE — B31MYZZ FLUOROSCOPY OF SPINAL ARTERIES USING OTHER CONTRAST: ICD-10-PCS | Performed by: RADIOLOGY

## 2018-03-09 PROCEDURE — 85520 HEPARIN ASSAY: CPT | Performed by: INTERNAL MEDICINE

## 2018-03-09 PROCEDURE — 88307 TISSUE EXAM BY PATHOLOGIST: CPT | Performed by: NEUROLOGICAL SURGERY

## 2018-03-09 PROCEDURE — 82805 BLOOD GASES W/O2 SATURATION: CPT | Performed by: INTERNAL MEDICINE

## 2018-03-09 PROCEDURE — 25000125 ZZHC RX 250: Performed by: STUDENT IN AN ORGANIZED HEALTH CARE EDUCATION/TRAINING PROGRAM

## 2018-03-09 PROCEDURE — 40000277 XR SURGERY CARM FLUORO LESS THAN 5 MIN W STILLS

## 2018-03-09 PROCEDURE — 85396 CLOTTING ASSAY WHOLE BLOOD: CPT | Performed by: NEUROLOGICAL SURGERY

## 2018-03-09 PROCEDURE — 00000146 ZZHCL STATISTIC GLUCOSE BY METER IP

## 2018-03-09 PROCEDURE — 27210794 ZZH OR GENERAL SUPPLY STERILE: Performed by: NEUROLOGICAL SURGERY

## 2018-03-09 PROCEDURE — 86850 RBC ANTIBODY SCREEN: CPT | Performed by: STUDENT IN AN ORGANIZED HEALTH CARE EDUCATION/TRAINING PROGRAM

## 2018-03-09 PROCEDURE — 40000344 ZZHCL STATISTIC THAWING COMPONENT: Performed by: NEUROLOGICAL SURGERY

## 2018-03-09 PROCEDURE — 86901 BLOOD TYPING SEROLOGIC RH(D): CPT | Performed by: STUDENT IN AN ORGANIZED HEALTH CARE EDUCATION/TRAINING PROGRAM

## 2018-03-09 PROCEDURE — 93306 TTE W/DOPPLER COMPLETE: CPT | Mod: 26 | Performed by: INTERNAL MEDICINE

## 2018-03-09 RX ORDER — ONDANSETRON 2 MG/ML
4 INJECTION INTRAMUSCULAR; INTRAVENOUS EVERY 6 HOURS PRN
Status: DISCONTINUED | OUTPATIENT
Start: 2018-03-09 | End: 2018-03-09 | Stop reason: HOSPADM

## 2018-03-09 RX ORDER — SODIUM CHLORIDE, SODIUM LACTATE, POTASSIUM CHLORIDE, CALCIUM CHLORIDE 600; 310; 30; 20 MG/100ML; MG/100ML; MG/100ML; MG/100ML
INJECTION, SOLUTION INTRAVENOUS CONTINUOUS PRN
Status: DISCONTINUED | OUTPATIENT
Start: 2018-03-09 | End: 2018-03-10

## 2018-03-09 RX ORDER — DEXAMETHASONE SODIUM PHOSPHATE 4 MG/ML
INJECTION, SOLUTION INTRA-ARTICULAR; INTRALESIONAL; INTRAMUSCULAR; INTRAVENOUS; SOFT TISSUE PRN
Status: DISCONTINUED | OUTPATIENT
Start: 2018-03-09 | End: 2018-03-10

## 2018-03-09 RX ORDER — POTASSIUM CHLORIDE 750 MG/1
20-40 TABLET, EXTENDED RELEASE ORAL
Status: DISCONTINUED | OUTPATIENT
Start: 2018-03-09 | End: 2018-03-14 | Stop reason: HOSPADM

## 2018-03-09 RX ORDER — CEFAZOLIN SODIUM 2 G/100ML
2 INJECTION, SOLUTION INTRAVENOUS
Status: CANCELLED | OUTPATIENT
Start: 2018-03-09

## 2018-03-09 RX ORDER — POTASSIUM CHLORIDE 7.45 MG/ML
10 INJECTION INTRAVENOUS
Status: DISCONTINUED | OUTPATIENT
Start: 2018-03-09 | End: 2018-03-14 | Stop reason: HOSPADM

## 2018-03-09 RX ORDER — MAGNESIUM SULFATE HEPTAHYDRATE 40 MG/ML
4 INJECTION, SOLUTION INTRAVENOUS EVERY 4 HOURS PRN
Status: DISCONTINUED | OUTPATIENT
Start: 2018-03-09 | End: 2018-03-14 | Stop reason: HOSPADM

## 2018-03-09 RX ORDER — POTASSIUM CHLORIDE 29.8 MG/ML
20 INJECTION INTRAVENOUS
Status: DISCONTINUED | OUTPATIENT
Start: 2018-03-09 | End: 2018-03-14 | Stop reason: HOSPADM

## 2018-03-09 RX ORDER — CEFAZOLIN SODIUM 1 G/3ML
INJECTION, POWDER, FOR SOLUTION INTRAMUSCULAR; INTRAVENOUS PRN
Status: DISCONTINUED | OUTPATIENT
Start: 2018-03-09 | End: 2018-03-10

## 2018-03-09 RX ORDER — POTASSIUM CL/LIDO/0.9 % NACL 10MEQ/0.1L
10 INTRAVENOUS SOLUTION, PIGGYBACK (ML) INTRAVENOUS
Status: DISCONTINUED | OUTPATIENT
Start: 2018-03-09 | End: 2018-03-14 | Stop reason: HOSPADM

## 2018-03-09 RX ORDER — LIDOCAINE HYDROCHLORIDE 10 MG/ML
10 INJECTION, SOLUTION EPIDURAL; INFILTRATION; INTRACAUDAL; PERINEURAL ONCE
Status: COMPLETED | OUTPATIENT
Start: 2018-03-09 | End: 2018-03-09

## 2018-03-09 RX ORDER — CEFAZOLIN SODIUM 1 G
1 VIAL (EA) INJECTION SEE ADMIN INSTRUCTIONS
Status: CANCELLED | OUTPATIENT
Start: 2018-03-09

## 2018-03-09 RX ORDER — LIDOCAINE HYDROCHLORIDE 20 MG/ML
INJECTION, SOLUTION INFILTRATION; PERINEURAL PRN
Status: DISCONTINUED | OUTPATIENT
Start: 2018-03-09 | End: 2018-03-10

## 2018-03-09 RX ORDER — PROPOFOL 10 MG/ML
INJECTION, EMULSION INTRAVENOUS PRN
Status: DISCONTINUED | OUTPATIENT
Start: 2018-03-09 | End: 2018-03-10

## 2018-03-09 RX ORDER — IOPAMIDOL 755 MG/ML
75 INJECTION, SOLUTION INTRAVASCULAR ONCE
Status: COMPLETED | OUTPATIENT
Start: 2018-03-09 | End: 2018-03-09

## 2018-03-09 RX ORDER — POTASSIUM CHLORIDE 1.5 G/1.58G
20-40 POWDER, FOR SOLUTION ORAL
Status: DISCONTINUED | OUTPATIENT
Start: 2018-03-09 | End: 2018-03-14 | Stop reason: HOSPADM

## 2018-03-09 RX ORDER — HYDROMORPHONE HYDROCHLORIDE 1 MG/ML
0.5 INJECTION, SOLUTION INTRAMUSCULAR; INTRAVENOUS; SUBCUTANEOUS ONCE
Status: COMPLETED | OUTPATIENT
Start: 2018-03-09 | End: 2018-03-09

## 2018-03-09 RX ORDER — NITROGLYCERIN 10 MG/100ML
INJECTION INTRAVENOUS PRN
Status: DISCONTINUED | OUTPATIENT
Start: 2018-03-09 | End: 2018-03-10

## 2018-03-09 RX ORDER — GADOBUTROL 604.72 MG/ML
8 INJECTION INTRAVENOUS ONCE
Status: COMPLETED | OUTPATIENT
Start: 2018-03-09 | End: 2018-03-09

## 2018-03-09 RX ORDER — ONDANSETRON 2 MG/ML
INJECTION INTRAMUSCULAR; INTRAVENOUS
Status: COMPLETED
Start: 2018-03-09 | End: 2018-03-09

## 2018-03-09 RX ORDER — FENTANYL CITRATE 50 UG/ML
INJECTION, SOLUTION INTRAMUSCULAR; INTRAVENOUS PRN
Status: DISCONTINUED | OUTPATIENT
Start: 2018-03-09 | End: 2018-03-10

## 2018-03-09 RX ADMIN — NITROGLYCERIN 50 MCG: 10 INJECTION INTRAVENOUS at 22:24

## 2018-03-09 RX ADMIN — ONDANSETRON 4 MG: 2 INJECTION INTRAMUSCULAR; INTRAVENOUS at 12:30

## 2018-03-09 RX ADMIN — ACETAMINOPHEN 650 MG: 325 TABLET, FILM COATED ORAL at 08:38

## 2018-03-09 RX ADMIN — SODIUM CHLORIDE, POTASSIUM CHLORIDE, SODIUM LACTATE AND CALCIUM CHLORIDE: 600; 310; 30; 20 INJECTION, SOLUTION INTRAVENOUS at 20:40

## 2018-03-09 RX ADMIN — B-COMPLEX W/ C & FOLIC ACID TAB 1 TABLET: TAB at 08:30

## 2018-03-09 RX ADMIN — PROPOFOL 100 MG: 10 INJECTION, EMULSION INTRAVENOUS at 22:29

## 2018-03-09 RX ADMIN — FENTANYL CITRATE 100 MCG: 50 INJECTION, SOLUTION INTRAMUSCULAR; INTRAVENOUS at 21:32

## 2018-03-09 RX ADMIN — HYDROMORPHONE HYDROCHLORIDE 0.5 MG: 1 INJECTION, SOLUTION INTRAMUSCULAR; INTRAVENOUS; SUBCUTANEOUS at 23:52

## 2018-03-09 RX ADMIN — IOPAMIDOL 75 ML: 755 INJECTION, SOLUTION INTRAVENOUS at 16:10

## 2018-03-09 RX ADMIN — PHENYLEPHRINE HYDROCHLORIDE 100 MCG: 10 INJECTION, SOLUTION INTRAMUSCULAR; INTRAVENOUS; SUBCUTANEOUS at 21:03

## 2018-03-09 RX ADMIN — PROPOFOL 60 MCG/KG/MIN: 10 INJECTION, EMULSION INTRAVENOUS at 05:28

## 2018-03-09 RX ADMIN — HYDROMORPHONE HYDROCHLORIDE 0.5 MG: 1 INJECTION, SOLUTION INTRAMUSCULAR; INTRAVENOUS; SUBCUTANEOUS at 15:29

## 2018-03-09 RX ADMIN — HYDROMORPHONE HYDROCHLORIDE 0.5 MG: 1 INJECTION, SOLUTION INTRAMUSCULAR; INTRAVENOUS; SUBCUTANEOUS at 18:35

## 2018-03-09 RX ADMIN — PROCHLORPERAZINE EDISYLATE 10 MG: 5 INJECTION INTRAMUSCULAR; INTRAVENOUS at 15:10

## 2018-03-09 RX ADMIN — ROCURONIUM BROMIDE 50 MG: 10 INJECTION INTRAVENOUS at 21:27

## 2018-03-09 RX ADMIN — CEFAZOLIN 2 G: 1 INJECTION, POWDER, FOR SOLUTION INTRAMUSCULAR; INTRAVENOUS at 21:27

## 2018-03-09 RX ADMIN — ATORVASTATIN CALCIUM 80 MG: 80 TABLET, FILM COATED ORAL at 08:30

## 2018-03-09 RX ADMIN — GADOBUTROL 8 ML: 604.72 INJECTION INTRAVENOUS at 18:25

## 2018-03-09 RX ADMIN — PROPOFOL 60 MCG/KG/MIN: 10 INJECTION, EMULSION INTRAVENOUS at 08:39

## 2018-03-09 RX ADMIN — SODIUM CHLORIDE, PRESERVATIVE FREE 70 ML: 5 INJECTION INTRAVENOUS at 16:11

## 2018-03-09 RX ADMIN — DEXAMETHASONE SODIUM PHOSPHATE 5 MG: 4 INJECTION, SOLUTION INTRA-ARTICULAR; INTRALESIONAL; INTRAMUSCULAR; INTRAVENOUS; SOFT TISSUE at 21:27

## 2018-03-09 RX ADMIN — CHLORHEXIDINE GLUCONATE 15 ML: 1.2 RINSE ORAL at 08:31

## 2018-03-09 RX ADMIN — ROCURONIUM BROMIDE 50 MG: 10 INJECTION INTRAVENOUS at 20:58

## 2018-03-09 RX ADMIN — HYDROMORPHONE HYDROCHLORIDE 0.5 MG: 1 INJECTION, SOLUTION INTRAMUSCULAR; INTRAVENOUS; SUBCUTANEOUS at 12:38

## 2018-03-09 RX ADMIN — HYDROMORPHONE HYDROCHLORIDE 0.5 MG: 1 INJECTION, SOLUTION INTRAMUSCULAR; INTRAVENOUS; SUBCUTANEOUS at 05:28

## 2018-03-09 RX ADMIN — NITROGLYCERIN 50 MCG: 10 INJECTION INTRAVENOUS at 22:22

## 2018-03-09 RX ADMIN — PROPOFOL 180 MG: 10 INJECTION, EMULSION INTRAVENOUS at 20:58

## 2018-03-09 RX ADMIN — ROCURONIUM BROMIDE 20 MG: 10 INJECTION INTRAVENOUS at 22:51

## 2018-03-09 RX ADMIN — PROPOFOL 60 MCG/KG/MIN: 10 INJECTION, EMULSION INTRAVENOUS at 01:57

## 2018-03-09 RX ADMIN — FENTANYL CITRATE 50 MCG: 50 INJECTION, SOLUTION INTRAMUSCULAR; INTRAVENOUS at 21:46

## 2018-03-09 RX ADMIN — FAMOTIDINE 20 MG: 10 INJECTION, SOLUTION INTRAVENOUS at 12:29

## 2018-03-09 RX ADMIN — LIDOCAINE HYDROCHLORIDE 100 MG: 20 INJECTION, SOLUTION INFILTRATION; PERINEURAL at 20:58

## 2018-03-09 RX ADMIN — MIDAZOLAM 2 MG: 1 INJECTION INTRAMUSCULAR; INTRAVENOUS at 20:58

## 2018-03-09 RX ADMIN — CLOPIDOGREL 75 MG: 75 TABLET, FILM COATED ORAL at 08:30

## 2018-03-09 RX ADMIN — ASPIRIN 81 MG: 81 TABLET, COATED ORAL at 08:30

## 2018-03-09 RX ADMIN — HYDROMORPHONE HYDROCHLORIDE 0.5 MG: 1 INJECTION, SOLUTION INTRAMUSCULAR; INTRAVENOUS; SUBCUTANEOUS at 16:40

## 2018-03-09 RX ADMIN — ROCURONIUM BROMIDE 20 MG: 10 INJECTION INTRAVENOUS at 23:32

## 2018-03-09 RX ADMIN — HYDROMORPHONE HYDROCHLORIDE 0.5 MG: 1 INJECTION, SOLUTION INTRAMUSCULAR; INTRAVENOUS; SUBCUTANEOUS at 01:48

## 2018-03-09 RX ADMIN — LIDOCAINE HYDROCHLORIDE 10 ML: 10 INJECTION, SOLUTION EPIDURAL; INFILTRATION; INTRACAUDAL; PERINEURAL at 15:30

## 2018-03-09 RX ADMIN — SULFUR HEXAFLUORIDE 2 ML: KIT at 09:45

## 2018-03-09 RX ADMIN — NITROGLYCERIN 100 MCG: 10 INJECTION INTRAVENOUS at 22:27

## 2018-03-09 RX ADMIN — FENTANYL CITRATE 100 MCG: 50 INJECTION, SOLUTION INTRAMUSCULAR; INTRAVENOUS at 20:58

## 2018-03-09 ASSESSMENT — PAIN DESCRIPTION - DESCRIPTORS
DESCRIPTORS: ACHING;DISCOMFORT
DESCRIPTORS: ACHING;DISCOMFORT;NAGGING
DESCRIPTORS: SHARP;DISCOMFORT

## 2018-03-09 NOTE — PROVIDER NOTIFICATION
"Pt reports to writer that he \"cannot feel his legs\" also with sudden onset nausea and sharp pain to bilateral lower back. During bedside report, pt reported no numbness or tingling but had been forgetful with pain medications this afternoon per day shift RN. Upon assessment, pt was unable to move LE and could not feel deep pain stimulation. Nor did he have a withdraw to pain. Pedal pulses intact and warm extremities. ICU MD and NP were immediately notified for a bedside assessment. IABP was pulled at 1550 and femstop applied and pt emergently taken to CT scanner. VSS. Mentation remains intact. Continue to monitor closely.   "

## 2018-03-09 NOTE — PROGRESS NOTES
Critical Care Progress Note      03/09/2018    Name: Derek Enriquez MRN#: [id number]   Age: 57 year old YOB: 1960     [facility]  [facility]    [facility] DAY #             Problem List:   Active Problems:    Cardiac arrest (H)  cardiogenic shock         Summary/Hospital Course:   57M h/o CAD, HLD, prior tobacco use had a syncopal episode on treadmill evening of 3/8.  Found to be pulseless; bystander cpr and aed shocks were administered with return of pulses.  He then had another arrest (v fib) enroute which again resolved with shock.  On arrival he was taken to the cath lab and found to have 95% occlusion of prox LAD to which AMLI was placed.  Balloon pump was also placed.  He remained stable overnight, and demonstrated good mental status when sedation weaned.      Assessment and plan :     Derek Enriquez IS a 57 year old male admitted on 3/8/2018 for cardiac arrest.   I have personally reviewed the daily labs, imaging studies, cultures and discussed the case with referring physician and consulting physicians.   My assessment and plan by system for this patient is as follows:    Neurology/Psychiatry:   1. S/p cardiac arrest:  Good mental status following arrest.  Therapeutic hypothermia deferred.  2. Analgesia: prn dilaudid  3. Sedation:  Propofol.  Prn midazolam.    Cardiovascular:   1.  Cardiac arrest:  Likely due to ischemia/unstable angina vs nstemi.  Now stable.  2.  Unstable angina vs NSTEMI:  Now s/p pci.  Continue ASA, plavix, atorva, heparin drip.  Echo when balloon pump out.  3.  Cardiogenic shock:  On low dose levophed as well as balloon pump.  Attempt balloon pump removal today.  Wean levo as tolerated.    Pulmonary/Ventilator Management:   1. Loss of airway protective reflexes:  In setting of encephalopathy following cardiac arrest.  Seems largely resolved now.  Will attempt to extubate today.    GI and Nutrition :   1. NPO for now.  Diet vs tf pending intubation status.  2. Pud prophy:   pepcid      Renal/Fluids/Electrolytes:   1. Adequate UOP and creatinine ok.  Monitor.    Endocrine:   1. Euglycemic.  Prn dextrose vs insulin if hypo or hyperglycemic.    Hematology/Oncology:   1. Leukocytosis:  Likely reactive. Monitor     ICU Prophylaxis:   1. DVT: Hep drip  2. VAP: HOB 30 degrees, chlorhexidine rinse  3. Stress Ulcer: H2 blocker  4. Restraints: Nonviolent soft two point restraints required and necessary for patient safety and continued cares and good effect as patient continues to pull at necessary lines, tubes despite education and distraction. Will readdress daily.   5. Wound care - per unit routine   6. Feeding - see above  7. Family Update:  Fiance at bedside  8. Disposition - critically ill        Key goals for next 24 hours:   1.  Wean vent as tolerated  2.  Wean pressors as tolerated  3.  Remove balloon pump if stable               Interim History:     Stable overnight.  Awakens and follows.  Hemodynamic and vent support as above.         Key Medications:       sodium chloride (PF)  10 mL Intracatheter Q8H     omega-3 acid ethyl esters  2 g Oral BID     vitamin B complex with vitamin C  1 tablet Oral Daily     sodium chloride (PF)  3 mL Intracatheter Q8H     aspirin EC  81 mg Oral Daily     clopidogrel  75 mg Oral Daily     atorvastatin  80 mg Oral Daily     chlorhexidine  15 mL Mouth/Throat Q12H     famotidine  20 mg Intravenous Q12H       Percutaneous Coronary Intervention orders placed (this is information for BPA alerting)       Reason beta blocker order not selected       Reason ACE/ARB/ARNI order not selected       HEParin 950 Units/hr (03/09/18 0604)     norepinephrine 0.02 mcg/kg/min (03/09/18 0800)     - MEDICATION INSTRUCTIONS -       propofol (DIPRIVAN) infusion 60 mcg/kg/min (03/09/18 0839)     sodium chloride 10 mL/hr at 03/08/18 8685               Physical Examination:   Temp:  [96.8  F (36  C)-100.2  F (37.9  C)] 99.3  F (37.4  C)  Pulse:  [100] 100  Heart Rate:  [52-91]  59  Resp:  [12-23] 18  BP: ()/() 110/56  FiO2 (%):  [40 %-50 %] 40 %  SpO2:  [97 %-100 %] 100 %      Intake/Output Summary (Last 24 hours) at 03/09/18 1143  Last data filed at 03/09/18 1100   Gross per 24 hour   Intake          1333.16 ml   Output              755 ml   Net           578.16 ml         Wt Readings from Last 4 Encounters:   03/09/18 77.8 kg (171 lb 8.3 oz)   03/01/18 78.6 kg (173 lb 3.2 oz)   01/10/18 79.7 kg (175 lb 12.8 oz)   01/26/17 75.7 kg (166 lb 12.8 oz)     BP - Mean:  [] 72  Ventilation Mode: CMV/AC  (Continuous Mandatory Ventilation/ Assist Control)  FiO2 (%): 40 %  Rate Set (breaths/minute): 14 breaths/min  Tidal Volume Set (mL): 450 mL  PEEP (cm H2O): 5 cmH2O  Oxygen Concentration (%): 40 %  Peak Inspiratory Pressure (cm H2O) (Drager Shellie): 35  Resp: 18    Recent Labs  Lab 03/09/18  0550   PH 7.37   PCO2 39   PO2 133*   HCO3 23   O2PER 50       GEN: no acute distress, sedated   HEENT: head ncat, sclera anicteric, OP patent, trachea midline   PULM: unlabored synchronous with vent, clear anteriorly  CV/COR: RRR S1S2 no gallop,  No rub, no murmur  ABD: soft nontender, hypoactive bowel sounds, no mass  EXT:  No Edema,   Warm x4  NEURO: sedated but follows/mouths words over tube.  SKIN: no obvious rash  LINES: clean, dry intact         Data:   All data and imaging reviewed     ROUTINE ICU LABS (Last four results)  CMP  Recent Labs  Lab 03/09/18  0435 03/08/18  2125 03/08/18  1750    137 140   POTASSIUM 4.2 3.9 3.8   CHLORIDE 109 106 107   CO2 23 22 19*   ANIONGAP 6 9 14   * 93 127*   BUN 21 23 22   CR 0.83 0.97 1.03   GFRESTIMATED >90 80 75   GFRESTBLACK >90 >90 >90   PAT 8.0* 8.2* 8.3*   MAG 2.1  --   --    PHOS 3.0  --   --    PROTTOTAL 6.1*  --   --    ALBUMIN 3.5  --   --    BILITOTAL 0.7  --   --    ALKPHOS 64  --   --    AST 81*  --   --    ALT 60  --   --      CBC  Recent Labs  Lab 03/09/18  0435 03/08/18  2125 03/08/18  1750   WBC 12.2* 13.1* 9.5   RBC  4.72 4.90 5.40   HGB 14.5 15.1 16.8   HCT 42.2 43.9 47.9   MCV 89 90 89   MCH 30.7 30.8 31.1   MCHC 34.4 34.4 35.1   RDW 13.4 13.1 13.1    195 261     INR  Recent Labs  Lab 03/08/18  2125 03/08/18  1750   INR 3.66* 1.12     Arterial Blood Gas  Recent Labs  Lab 03/09/18  0550   PH 7.37   PCO2 39   PO2 133*   HCO3 23   O2PER 50       All cultures:  No results for input(s): CULT in the last 168 hours.  Recent Results (from the past 24 hour(s))   XR Chest Port 1 View    Narrative    CHEST PORTABLE ONE VIEW    3/8/2018 5:56 PM     HISTORY: Chest pain.    COMPARISON: None.      Impression    IMPRESSION: ET tube tip projects over the mid trachea. Pacer pads  project over the left chest. No definite new focal airspace opacity.  No pleural effusion or pneumothorax visualized. Cardiac silhouette  within normal limits.    Indeterminate amorphous density at the distal right clavicle. This  could possibly represent previous clavicle fracture although other  bony lesions are not excluded. Consider further evaluation with  clavicle x-ray.     PETER ROME MD   CT Head w/o Contrast    Narrative    CT SCAN OF THE HEAD WITHOUT CONTRAST   3/8/2018 6:22 PM     HISTORY: Cardiac arrest, fall, evaluate intracranial issue.    TECHNIQUE: Axial images of the head and coronal reformations without  IV contrast material. Radiation dose for this scan was reduced using  automated exposure control, adjustment of the mA and/or kV according  to patient size, or iterative reconstruction technique.    COMPARISON: None.    FINDINGS: There is no evidence of intracranial hemorrhage, mass, acute  infarct or anomaly. The ventricles are normal in size, shape and  configuration. The brain parenchyma and subarachnoid spaces are  normal.     Enteric tube is in the left nasal cavity. Scattered mucosal thickening  throughout the paranasal sinuses. There is deformity of the right  lamina papyracea likely due to previous trauma.      Impression     IMPRESSION: No evidence of acute intracranial hemorrhage, mass, or  herniation.    PETER ROME MD   CT Cervical Spine w/o Contrast    Narrative    CT CERVICAL SPINE WITHOUT CONTRAST   3/8/2018  6:28 PM     HISTORY: Trauma.     TECHNIQUE: Axial images of the cervical spine were obtained without  intravenous contrast. Multiplanar reformations were performed.  Radiation dose for this scan was reduced using automated exposure  control, adjustment of the mA and/or kV according to patient size, or  iterative reconstruction technique.    COMPARISON: None.    FINDINGS: No evidence of fracture. No prevertebral soft tissue  swelling. Alignment is normal. Vertebral body height is maintained. No  destructive osseous lesions. Marked loss of intervertebral disc space  with degenerative endplate changes at C5-C6 and C6-C7.     There is a posterior disc bulge asymmetric on the left at the C3-C4  level that results in moderate spinal canal narrowing. There is also  moderate left neural foraminal narrowing at this level due to uncinate  spurring and facet hypertrophy. No other significant spinal canal  narrowing. Additional levels of neural foraminal narrowing at C4-C5,  C5-C6, and C6-C7 due to uncinate spurring and facet or atrophy.    Visualized paraspinous tissues: ET tube and enteric tube partially  visualized. Marked groundglass airspace opacities in the visualized  left upper lung.      Impression    IMPRESSION:   1. No evidence of acute fracture or subluxation in the cervical spine.  2. Degenerative changes in the cervical spine as described above.  3. Marked groundglass opacities in the visualized left lung apex.      PETER ROME MD   XR Chest Port 1 View    Narrative    XR CHEST PORT 1 VW  3/9/2018 5:13 AM     HISTORY:  Endotracheal tube positioning;     COMPARISON: Film dated 3/8/2018    FINDINGS:  ET tube and NG tube are in place. ET tube tip is in good  position. It lies about 5 cm above the bessy. Aortic balloon  pump is  in place. The tip is at the level of the arch of the aorta. The lungs  remain clear.      Impression    IMPRESSION:  1. ET tube is in good position.  2. Aortic balloon pump catheter is in place.  3. Lungs remain clear.    MALDONADO BOWERS MD     Labs (personally reviewed/interpreted):  Lytes ok, creat normal 0.83, trop mildly elevate 0.54, abg ok 7.37/39/133/23, cbc ok except as noted in a/p, ua wnl    ekg (personally reviewed/interpreted):   Sinus 71, nl axis, nl int, no acute ischemic changes    cxr (personally reviewed/interpreted):  Lungs clear, ett and feeding tube in acceptable position.    Ct head (personally reviewed/interpreted):  No injury  CT neck (personally reviewed/interpreted):  No injury    Billing: This patient is critically ill: Yes. Total critical care time today 40 min.

## 2018-03-09 NOTE — ED NOTES
Pt given bolus of propofol 70mg prior to CT @1800, 60mg bolus while at CT @1815 and 50mg bolus upon return for agitation and biting ETT @ 1825

## 2018-03-09 NOTE — PROGRESS NOTES
[facility] Cardiology Progress Note            Interval History:   Witnessed cardiac arrest. Shocked back to normal rhythm in 2 minutes. VF arrest again in rig and shocked back to normal rhythm. Intubated . Severe proximal LAD lesion. Stented with MALI (3.5 mm). On ventilator but already has purposeful movements. Confused and on propofol. Hemodynamically stable. Now placed on 1:2 on IABP and maintaining BP. EF is normal with no wall motion abnormalities. No lesions in circumflex or RCA.              Medications:       sodium chloride (PF)  10 mL Intracatheter Q8H     omega-3 acid ethyl esters  2 g Oral BID     vitamin B complex with vitamin C  1 tablet Oral Daily     sodium chloride (PF)  3 mL Intracatheter Q8H     aspirin EC  81 mg Oral Daily     clopidogrel  75 mg Oral Daily     atorvastatin  80 mg Oral Daily     chlorhexidine  15 mL Mouth/Throat Q12H     famotidine  20 mg Intravenous Q12H                 Physical Exam:   Blood pressure 110/56, pulse 100, temperature 99.3  F (37.4  C), resp. rate 18, height 1.829 m (6'), weight 77.8 kg (171 lb 8.3 oz), SpO2 100 %.  Rhythm:  Sinus   Constitutional:   unarousable and sedated on ventilator     Neck:   no jugular venous distension and no carotid bruits     Lungs:   clear to auscultation and on ventilator. Orally intubated     Cardiovascular:   regular rate and rhythm, normal S1 and S2, S4 present, no murmur noted and no edema            Data:          Lab Results   Component Value Date     03/09/2018     03/08/2018     03/08/2018    Lab Results   Component Value Date    CHLORIDE 109 03/09/2018    CHLORIDE 106 03/08/2018    CHLORIDE 107 03/08/2018    Lab Results   Component Value Date    BUN 21 03/09/2018    BUN 23 03/08/2018    BUN 22 03/08/2018      Lab Results   Component Value Date    POTASSIUM 4.2 03/09/2018    POTASSIUM 3.9 03/08/2018    POTASSIUM 3.8 03/08/2018    Lab Results   Component Value Date    CO2 23 03/09/2018    CO2 22  03/08/2018    CO2 19 03/08/2018    Lab Results   Component Value Date    CR 0.83 03/09/2018    CR 0.97 03/08/2018    CR 1.03 03/08/2018        Lab Results   Component Value Date    HGB 14.5 03/09/2018    HGB 15.1 03/08/2018    HGB 16.8 03/08/2018     Lab Results   Component Value Date     03/09/2018     03/08/2018     03/08/2018     Lab Results   Component Value Date    TROPI 0.540 (HH) 03/09/2018    TROPI 0.360 (HH) 03/08/2018    TROPI 0.038 03/08/2018     Lab Results   Component Value Date    WBC 12.2 (H) 03/09/2018    WBC 13.1 (H) 03/08/2018    WBC 9.5 03/08/2018          EKG and Imaging results:    I have reviewed recent EKGs and imaging studies.         Assessment and Plan:   Assessment:   Problem List    Cardiac arrest (H)    Coronary artery disease involving native coronary artery    Stented coronary artery    * No resolved hospital problems. *       Plan:   1. IABP to go to 1:3 and if maintains BP stop heparin at 11 am and pull IABP at around 3 pm.  2. As BP allows will add in beta blockers and ACEI. Continue with aspirin and plavix and statin Rx.       Attestation:  Amount of time spent providing critical care service today: 45 minutes.  Care coordination / counseling time: 30 minutes  Total time: 45 minutes         Aren Goodson MD, MD 3/9/2018  10:21 AM

## 2018-03-09 NOTE — CONSULTS
BRIEF NUTRITION NOTE:    Received routine Nutrition Consult - Heart Healthy Diet education s/p angiogram --> stent procedure.  Pt currently intubated on pressor.  Will check diet education needs prior to discharge, when appropriate.    Irish Morales RD, LD, CNSC

## 2018-03-09 NOTE — PROGRESS NOTES
[facility] ICU RESPIRATORY NOTE  Date of Admission: 3/8/2018  Date of Intubation (most recent): 3/8/2018  Reason for Mechanical Ventilation: Cardiac arrest 2/2 airway protection   Number of Days on Mechanical Ventilation: 2  Met Criteria for Pressure Support Trial: No  Length of Pressure Support Trial: No  Reason for No Pressure Support Trial: Per MD   Significant Events Today: None     Ventilation Mode: CMV/AC  (Continuous Mandatory Ventilation/ Assist Control)  FiO2 (%): 50 %  Rate Set (breaths/minute): 14 breaths/min  Tidal Volume Set (mL): 450 mL  PEEP (cm H2O): 5 cmH2O  Oxygen Concentration (%): 50 %  Peak Inspiratory Pressure (cm H2O) (Drager Shellie): 35  Resp: 16    ABG Results:  No lab results found in last 7 days.    ETT appearance on chest x-ray: ETT in good position     Plan:  Patient remains on full vent support and will continue to monitor.    Marcelino Anaya RT  3/9/2018

## 2018-03-09 NOTE — IP AVS SNAPSHOT
` ` Patient Information     Patient Name Sex     Derek Enriquez ([id number]) Male 1960       Room Bed    62 6215-01      Patient Demographics     Address Phone E-mail Address    [address]  [facility] [address] [phone] (Home)  [phone] (Work)  [phone] (Mobile) *Preferred* [email or web address]      Patient Ethnicity & Race     Ethnic Group Patient Race     White      Emergency Contact(s)     Name Relation Home Work Mobile    NAKUL INIGUEZ Significant other   [phone]    Negro Markser   [phone]    Hany Enriquez Son   [phone]    Juan Magallon   [phone]      Documents on File        Status Date Received Description       Documents for the Patient    Insurance Card  09     Consent Form  09     Privacy Notice - [facility] Received 10/28/14     Insurance Card  09     Affiliate Privacy placeholder   phase3    Consent for Services - Hospital/Clinic Received () 10/28/14     Consent for EHR Access Received 10/28/14     External Medication Information Consent       Patient ID       [facility] Specified Other       Privacy Notice - [facility]  10/29/14 ACKNOWLEDGMENT OF RECEIPT OF NOTICE OF PRIVACY PRACTICE    Consent for EHR Access  10/29/14 CONSENT FOR USE OF [facility] WITH NON[facility] PROVIDERS    Consent for Services - Hospital/Clinic  () 10/29/14 CONSENT FOR SERVICE    Consent for Services - [facility] [[email or web address]]       Consent for Services/Privacy Notice - Hospital/Clinic       Consent for Services/Privacy Notice - Hospital/Clinic Received () 17     [facility] Specified Other   CT Calcium Screening    Business/Insurance/Care Coordination/Health Form - Patient  17 [employer]    HIM BETO Authorization - File Only  17 [facility] [employer]    Immunization Record  17 HEALTH IMMUNIZATION RECORD    Business/Insurance/Care Coordination/Health Form - Patient  17 [employer]ICELINE    Business/Insurance/Care  Coordination/Health Form - Patient  05/05/17 [employer] PRICELINE    Business/Insurance/Care Coordination/Health Form - Patient  05/26/17 [employer]IC[employer]INE    Business/Insurance/Care Coordination/Health Form - Patient  06/27/17 CON[employer]ICELINE    HIM BETO Authorization  12/22/17     Care Everywhere Prospective Auth Received 01/09/18     External Medication Information Consent Patient Refused 01/10/18     Consent for Services/Privacy Notice - Hospital/Clinic Received 03/01/18     Consent to Communicate  03/02/18 AUTHORIZATION TO DISCUSS PROTECTED  HEALTH INFORMATION 3/1/18    Privacy Notice - [facility]  (Deleted)         Documents for the Encounter    CMS IM for Patient Signature       Study Attachment for Report   External Report      Admission Information     Attending Provider Admitting Provider Admission Type Admission Date/Time    Abhijeet Joe MD Hunt, Matthew Allan, MD Urgent 03/09/18 2008    Discharge Date Hospital Service Auth/Cert Status Service Area     Neurosurgery [facility]    Unit Room/Bed Admission Status       UU U6A 6215/6215-01 Admission (Confirmed)       Admission     Complaint    Spinal Cord hematoma, Spinal cord compression (H), Spinal cord compression (H), Spinal cord compression (H), spinal arteriovenous malformation      Hospital Account     Name Acct ID Class Status Primary Coverage    Derek Enriquez [id number] Inpatient Open BCBS [place]            Guarantor Account (for Hospital Account #[id number])     Name Relation to Pt Service Area Active? Acct Type    Derek nEriquez  FCS Yes Personal/Family    Address Phone          [address] [place] [address] [phone](H)  [phone](O)              Coverage Information (for Hospital Account #[id number])     F/O Payor/Plan Precert #    BCBS[place]     Subscriber Subscriber #    Derek Enriquez [id number]    Address Phone    [address] [address]  [place] [address] [phone]

## 2018-03-09 NOTE — PROGRESS NOTES
Right ankle, Right wrist and Left wrist soft restraints initiated on patient on 3/8/2018 at 10:00 PM    Clinical Justification: Pulling lines, pulling tubes, and pulling equipment  Less Restrictive Alternative: 1:1 patient care, Repositioning, Re-evaluate equipment, Disguise equipment, Pain management, Alarm, Reorientation  Attending Physician Notified: MD ordered restraint,     Order received: Yes     Family Notification: Spouse/significant other   Criteria explained to Patient and significant other  Patient's Response: No evidence of learning  Restraint care Plan initiated: Yes    Bismark Lind

## 2018-03-09 NOTE — CONSULTS
Vascular Surgery Consultation Note     Patient:  Derek Enriquez   Date of birth 1960, Medical record number [id number]  Date of Visit:  03/09/2018  Date of Admission: 3/8/2018  Consult Requester:Yoshi Lyons*            Assessment and Recommendations:   ASSESSMENT:  57-year old man presented with sudden onset of paraplegia and loss of sensation below the umbilicus. CTA of the aorta did not reveal an aortic dissection as a cause of spinal ischemia. No obvious vascular issue can be identified.    RECOMMENDATION:  The patient is going to get an MRI of the spine to rule out epidural hematoma has a possible cause of his symptoms.  Vascular surgery will be available if new issues arise.    ________________________________________________________________    Consult Question: Paraplegia, rule out aortic dissection as a cause of spinal ischemia  Admission Diagnosis: Cardiac arrest (H) [I46.9]  Respiratory arrest (H) [R09.2]  Coronary artery disease due to calcified coronary lesion [I25.10, I25.84]         History of Present Illness:   Mr. Derek Enriquez is a 57-year old man with history of coronary artery disease and hyperlipidemia, who presented with acute coronary syndrome after failing on treadmill at [employer]. The patient got CPR and was transferred to [facility]. He got a LAD drug eluting stent placed in cath lab and had an intra-aortic balloon pump placed via the right groin. This after, the patient complains of sudden onset of severe back pain and complete paraplegia with loss of sensation below the umbilicus. The intra-aortic balloon pump was removed. CTA of the chest, abdomen and pelvis did not reveal aortic dissection or any obvious reason for spinal ischemia.            Review of Systems:   As per HPI           Past Medical History:   No past medical history on file.         Past Surgical History:   No past surgical history on file.         Family History:   No family history on file.          Social History:     Social History   Substance Use Topics     Smoking status: Former Smoker     Smokeless tobacco: Never Used      Comment: Quit at 31     Alcohol use Not on file     History   Sexual Activity     Sexual activity: Not on file            Current Medications (antimicrobials listed in bold):       sodium chloride (PF)  10 mL Intracatheter Q8H     omega-3 acid ethyl esters  2 g Oral BID     vitamin B complex with vitamin C  1 tablet Oral Daily     sodium chloride (PF)  3 mL Intracatheter Q8H     aspirin EC  81 mg Oral Daily     clopidogrel  75 mg Oral Daily     atorvastatin  80 mg Oral Daily     chlorhexidine  15 mL Mouth/Throat Q12H            Allergies:   No Known Allergies         Physical Exam:   Vitals were reviewed  Patient Vitals for the past 24 hrs:   BP Temp Temp src Pulse Heart Rate Resp SpO2 Height Weight   03/09/18 1445 - - - - 67 10 100 % - -   03/09/18 1430 126/69 - - - 64 10 100 % - -   03/09/18 1415 - - - - 66 18 99 % - -   03/09/18 1400 122/73 99.7  F (37.6  C) Bladder - 75 28 99 % - -   03/09/18 1345 - 99.5  F (37.5  C) - - 64 11 100 % - -   03/09/18 1330 120/59 99.3  F (37.4  C) - - 60 10 99 % - -   03/09/18 1315 - 99.5  F (37.5  C) - - 72 10 98 % - -   03/09/18 1300 127/63 99.5  F (37.5  C) - - 67 12 98 % - -   03/09/18 1245 - 99.1  F (37.3  C) - - 70 12 100 % - -   03/09/18 1230 125/66 99.3  F (37.4  C) - - 71 10 99 % - -   03/09/18 1215 - 99.1  F (37.3  C) - - 71 11 99 % - -   03/09/18 1200 134/71 99.1  F (37.3  C) Bladder - 73 14 99 % - -   03/09/18 1145 - 99  F (37.2  C) - - 71 8 99 % - -   03/09/18 1140 - - - - - - 99 % - -   03/09/18 1130 122/69 99.1  F (37.3  C) - - 64 11 100 % - -   03/09/18 1115 - 99.1  F (37.3  C) - - 60 12 100 % - -   03/09/18 1101 - - - - - - 100 % - -   03/09/18 1100 111/62 99.1  F (37.3  C) - - 57 12 100 % - -   03/09/18 1045 - 99.1  F (37.3  C) - - 57 12 100 % - -   03/09/18 1030 118/74 99  F (37.2  C) - - 57 28 100 % - -   03/09/18 1015 104/61 99.1  F  (37.3  C) - - 57 17 100 % - -   03/09/18 1000 111/66 99.1  F (37.3  C) Bladder - 55 17 100 % - -   03/09/18 0945 106/59 99.3  F (37.4  C) - - 57 17 100 % - -   03/09/18 0930 110/56 99.3  F (37.4  C) - - 59 18 100 % - -   03/09/18 0915 106/58 99.5  F (37.5  C) - - 61 15 100 % - -   03/09/18 0900 110/62 99.7  F (37.6  C) - - 63 17 100 % - -   03/09/18 0845 - 99.7  F (37.6  C) - - 60 15 100 % - -   03/09/18 0830 106/54 99.7  F (37.6  C) - - 58 15 100 % - -   03/09/18 0815 - 99.9  F (37.7  C) - - 58 15 100 % - -   03/09/18 0800 105/59 99.9  F (37.7  C) Bladder - 56 15 100 % - -   03/09/18 0745 - 99.9  F (37.7  C) - - 60 15 100 % - -   03/09/18 0730 - 99.9  F (37.7  C) - - 60 15 100 % - -   03/09/18 0721 - - - - - - 98 % - -   03/09/18 0715 - 100  F (37.8  C) - - 56 13 98 % - -   03/09/18 0700 107/59 100  F (37.8  C) - - 59 12 98 % - -   03/09/18 0645 - - - - - - - - 171 lb 8.3 oz (77.8 kg)   03/09/18 0615 - - - - - 16 - - -   03/09/18 0600 100/54 100.2  F (37.9  C) - - 58 16 99 % - -   03/09/18 0545 - - - - - 16 - - -   03/09/18 0530 - - - - - 15 - - -   03/09/18 0500 106/61 100  F (37.8  C) - - 59 15 100 % - -   03/09/18 0445 - - - - - 17 - - -   03/09/18 0430 - - - - - 16 - - -   03/09/18 0415 - - - - - 16 - - -   03/09/18 0400 108/59 - - - 59 12 100 % - -   03/09/18 0330 - - - - - 15 - - -   03/09/18 0315 - - - - - 17 100 % - -   03/09/18 0300 101/56 99.7  F (37.6  C) - - 56 19 100 % - -   03/09/18 0245 - - - - - 18 - - -   03/09/18 0230 - - - - - 18 - - -   03/09/18 0215 - - - - - 16 - - -   03/09/18 0200 96/50 99.1  F (37.3  C) - - - 18 100 % - -   03/09/18 0115 - - - - - 18 - - -   03/09/18 0100 100/56 98.4  F (36.9  C) - - - 18 - - -   03/09/18 0045 - - - - 53 - 99 % - -   03/09/18 0030 - - - - 55 18 100 % - -   03/09/18 0015 - - - - 58 14 99 % - -   03/09/18 0000 102/55 97.5  F (36.4  C) - - 58 15 - - -   03/08/18 2346 - - - - - - 100 % - -   03/08/18 2345 107/59 - - - 55 17 99 % - -   03/08/18 2330 114/71 - - -  58 18 99 % - -   03/08/18 2315 114/65 - - - 52 19 100 % - -   03/08/18 2300 136/77 - Bladder - 52 17 99 % - -   03/08/18 2245 100/64 - - - 56 17 99 % - -   03/08/18 2230 101/53 - - - - 17 - - -   03/08/18 2215 90/59 - - - 60 14 98 % - -   03/08/18 2200 96/56 97  F (36.1  C) Bladder - 62 14 99 % - -   03/08/18 2145 99/57 - - - - 14 - - -   03/08/18 2130 99/53 96.8  F (36  C) - - 67 14 100 % - -   03/08/18 2126 - - - - - - 100 % - -   03/08/18 2115 - - - - - 14 - - 170 lb 3.1 oz (77.2 kg)   03/08/18 2020 - - - - - - 99 % - -   03/08/18 1915 101/70 - - - - - - - -   03/08/18 1914 - - - - - - 98 % - -   03/08/18 1901 - - - - - - 100 % - -   03/08/18 1900 102/67 - - - 80 17 - - -   03/08/18 1845 98/68 - - - 84 - 98 % - -   03/08/18 1835 99/71 - - - 86 20 97 % - -   03/08/18 1830 107/73 - - - 87 22 97 % - -   03/08/18 1825 118/80 - - - 91 23 98 % - -   03/08/18 1818 118/65 - - - - - 98 % - -   03/08/18 1813 123/85 - - - - - - - -   03/08/18 1806 (!) 169/115 - - 100 - 16 99 % 6' (1.829 m) 154 lb 5.2 oz (70 kg)   03/08/18 1756 (!) 142/96 - - - - - - - -     Ranges for his vital signs:  Temp:  [96.8  F (36  C)-100.2  F (37.9  C)] 99.7  F (37.6  C)  Pulse:  [100] 100  Heart Rate:  [52-91] 67  Resp:  [8-28] 10  BP: ()/() 126/69  FiO2 (%):  [40 %-50 %] 40 %  SpO2:  [97 %-100 %] 100 %    Physical Examination:  GENERAL:  Awake, alert and answers questions appropriately  HEENT:  Head is normocephalic, atraumatic   RESPIRATORY: not in distress  CARDIOVASCULAR:  Regular rate and rhythm  ABDOMEN:  Soft and non-tender, no sensation below the umbilicus  EXT: Both feet are warm and perfused.  PULSES: Strong palpable femoral, popliteal, DP and PT pulses  NEURO: Complete loss of sensation below the umbilicus. Full sensation above the umbilicus. Complete paraplegia in the lower extremities.         Laboratory Data:     Hematology Studies    Recent Labs   Lab Test  03/09/18   0435  03/08/18   2125  03/08/18   1750   WBC  12.2*   13.1*  9.5   ANEU   --    --   5.2   AEOS   --    --   0.1   HGB  14.5  15.1  16.8   MCV  89  90  89   PLT  198  195  261       Metabolic Studies   Recent Labs   Lab Test  03/09/18   0435  03/08/18   2125  03/08/18   1750  01/26/17   1244   NA  138  137  140  139   POTASSIUM  4.2  3.9  3.8  4.2   CHLORIDE  109  106  107  104   CO2  23  22  19*  28   BUN  21  23  22  16   CR  0.83  0.97  1.03  1.02   GFRESTIMATED  >90  80  75  76         Gabby (Hany) MD [place]  Vascular Surgery Fellow  [phone] (p)

## 2018-03-09 NOTE — PLAN OF CARE
Problem: Cardiac Output Decreased (Adult)  Goal: Identify Related Risk Factors and Signs and Symptoms  Related risk factors and signs and symptoms are identified upon initiation of Human Response Clinical Practice Guideline (CPG).   Outcome: Improving  Pt alert and oriented x3, some intermittent confusion to events surrounding cardiac arrest. Pt extubated at 1140 and tolerated well.  Pt with loose cough, thick blood tinged secretions.  Pt with clear lungs sounds, o2 sats % on 2 liters nasal cannula  Pt balloon pump switched to 1:2 at 1000 and 1:3 at 1030 and tolerating well.  BPs 100s-110s/50s-60 unassisted currently.  Pt with strong palpable pulses radial and bilat lower extremities.  Pt with 1+ edema to bilat hands.   Pt norepi stopped at 1000 and heparin off at 1100.  Pt to have balloon pump dc'd around 1500.  Pt resting in between cares.  Pt urine output adequate, slowing down a bit to 30-40 ml/hour.  Pt c/o nausea after extubation and pt with emesis x1.  Pt NG tube irrigated and set up to low intermittent suction.  Dark brown NG output.

## 2018-03-09 NOTE — H&P
[facility]    History and Physical  [facility]     Date of Admission:  3/8/2018    Assessment & Plan   Derek Enriquez is a 57 year old male who presents with witnessed out of hospital cardiac arrest. He had known LAD lesion on CT angio and was scheduled for a cardiac catheterization on March 19. While exercising on a treadmill at [employer], he had a cardiac arrest. Was resuscitated within two minutes with an AED. Was intubated in field by EMS crew, and had another brief v-fib episode in the ambulance. In ED did not have STEMI. Was taken to cath lab and a high grade LAD lesion was confirmed, and stented. Came to ICU on balloon pump as he had hypotension during the procedure. Is showing purposeful movements and was mouthing words around ET tube.     Neurology:  S/p cardiac arrest: moving purposefully and mouthing words around ET tube  -no need for TTM or hypothermia    Cardiovascular:  S/p cardiac arrest: witnessed v-fib. Normal ECG now after procedure  -management per cardiology. Balloon pump overnight, plavix and heparin ordered    Pulmonary:        No issues: intubated overnight  -extubate in morning as able    Renal  No issues: follow BUN/creatinine.   -avoid nephrotoxins, maintain good urine output following arrest and contrast administration    ID:         No issues  -    GI  No issues  -    Nutrition  NPO except meds  -    Endocrine:  No issues: glucose control per protocol  -    Heme/Onc:  No issues  -    DVT Prophylaxis: Pneumatic Compression Devices. Heparin ordered  GI Prophylaxis: H2 Blocker    Restraints: Restraints for medical healing needed: YES    Family update by me today: Yes     Yoshi Lyons    Time Spent on this Encounter   Billing:  I spent 45 minutes bedside and on the inpatient unit today managing the critical care of Derek Enriquez in relation to the issues listed in this note.    Code Status      Full Code    Primary Care Physician   Eugene  Jacquelin Burrell    Chief Complaint   Witnessed out of hospital ventricular fibrillation cardiac arrest    History is obtained from the electronic health record and emergency department physician    History of Present Illness   Derek Enriquez is a 57 year old male who presents with out of hospital cardiac arrest.    Past Medical History    I have reviewed this patient's medical history and updated it with pertinent information if needed.   Hyperlipidemia    Past Surgical History   I have reviewed this patient's surgical history and updated it with pertinent information if needed.  No past surgical history on file.    Prior to Admission Medications   Prior to Admission Medications   Prescriptions Last Dose Informant Patient Reported? Taking?   Multiple Vitamins-Minerals (MULTIVITAMIN MEN PO)   Yes No   Sig: Take 1 tablet by mouth daily    aspirin 81 MG tablet   No No   Sig: Take 1 tablet (81 mg) by mouth daily   atorvastatin (LIPITOR) 20 MG tablet   No No   Sig: Take 1 tablet (20 mg) by mouth daily   omega-3 acid ethyl esters (LOVAZA) 1 G capsule   Yes No   Sig: Take 2 g by mouth 2 times daily   vitamin B complex with vitamin C (VITAMIN  B COMPLEX) TABS tablet   Yes No   Sig: Take 1 tablet by mouth daily      Facility-Administered Medications: None     Allergies   No Known Allergies    Social History   I have reviewed this patient's social history and updated it with pertinent information if needed. Derek Enriquez  reports that he has quit smoking. He has never used smokeless tobacco.    Family History   I have reviewed this patient's family history and updated it with pertinent information if needed.   No family history on file.    Review of Systems   Review of systems not obtained due to patient factors - critical condition, mental status, intubation and sedation    Physical Exam       No Data Recorded BP: 98/68 Pulse: 100 Heart Rate: 84 Resp: 20 SpO2: 98 % O2 Device: Mechanical Ventilator    Vital Signs with  Ranges  Pulse:  [100] 100  Heart Rate:  [84-91] 84  Resp:  [16-23] 20  BP: ()/() 98/68  FiO2 (%):  [40 %] 40 %  SpO2:  [97 %-99 %] 98 %  154 lbs 5.15 oz    Constitutional: Sedated on ventilator  Eyes: PERRL  ENT: trachea midline, mucosa moist  Respiratory: clear to ausculation  Cardiovascular: RRR   GI: soft, nondistended  Skin: no mottling  Musculoskeletal: warm feet and hands with good pulses  Neurologic: moving purposefully, needs restraints    Data   Results for orders placed or performed during the hospital encounter of 03/08/18 (from the past 24 hour(s))   CBC with platelets differential   Result Value Ref Range    WBC 9.5 4.0 - 11.0 10e9/L    RBC Count 5.40 4.4 - 5.9 10e12/L    Hemoglobin 16.8 13.3 - 17.7 g/dL    Hematocrit 47.9 40.0 - 53.0 %    MCV 89 78 - 100 fl    MCH 31.1 26.5 - 33.0 pg    MCHC 35.1 31.5 - 36.5 g/dL    RDW 13.1 10.0 - 15.0 %    Platelet Count 261 150 - 450 10e9/L    Diff Method Automated Method     % Neutrophils 54.2 %    % Lymphocytes 31.5 %    % Monocytes 11.3 %    % Eosinophils 1.2 %    % Basophils 0.5 %    % Immature Granulocytes 1.3 %    Nucleated RBCs 0 0 /100    Absolute Neutrophil 5.2 1.6 - 8.3 10e9/L    Absolute Lymphocytes 3.0 0.8 - 5.3 10e9/L    Absolute Monocytes 1.1 0.0 - 1.3 10e9/L    Absolute Eosinophils 0.1 0.0 - 0.7 10e9/L    Absolute Basophils 0.1 0.0 - 0.2 10e9/L    Abs Immature Granulocytes 0.1 0 - 0.4 10e9/L    Absolute Nucleated RBC 0.0    INR   Result Value Ref Range    INR 1.12 0.86 - 1.14   Basic metabolic panel   Result Value Ref Range    Sodium 140 133 - 144 mmol/L    Potassium 3.8 3.4 - 5.3 mmol/L    Chloride 107 94 - 109 mmol/L    Carbon Dioxide 19 (L) 20 - 32 mmol/L    Anion Gap 14 3 - 14 mmol/L    Glucose 127 (H) 70 - 99 mg/dL    Urea Nitrogen 22 7 - 30 mg/dL    Creatinine 1.03 0.66 - 1.25 mg/dL    GFR Estimate 75 >60 mL/min/1.7m2    GFR Estimate If Black >90 >60 mL/min/1.7m2    Calcium 8.3 (L) 8.5 - 10.1 mg/dL   Troponin I   Result Value Ref  Range    Troponin I ES 0.038 0.000 - 0.045 ug/L   XR Chest Port 1 View    Narrative    CHEST PORTABLE ONE VIEW    3/8/2018 5:56 PM     HISTORY: Chest pain.    COMPARISON: None.      Impression    IMPRESSION: ET tube tip projects over the mid trachea. Pacer pads  project over the left chest. No definite new focal airspace opacity.  No pleural effusion or pneumothorax visualized. Cardiac silhouette  within normal limits.    Indeterminate amorphous density at the distal right clavicle. This  could possibly represent previous clavicle fracture although other  bony lesions are not excluded. Consider further evaluation with  clavicle x-ray.     PETER ROME MD   CT Head w/o Contrast    Narrative    CT SCAN OF THE HEAD WITHOUT CONTRAST   3/8/2018 6:22 PM     HISTORY: Cardiac arrest, fall, evaluate intracranial issue.    TECHNIQUE: Axial images of the head and coronal reformations without  IV contrast material. Radiation dose for this scan was reduced using  automated exposure control, adjustment of the mA and/or kV according  to patient size, or iterative reconstruction technique.    COMPARISON: None.    FINDINGS: There is no evidence of intracranial hemorrhage, mass, acute  infarct or anomaly. The ventricles are normal in size, shape and  configuration. The brain parenchyma and subarachnoid spaces are  normal.     Enteric tube is in the left nasal cavity. Scattered mucosal thickening  throughout the paranasal sinuses. There is deformity of the right  lamina papyracea likely due to previous trauma.      Impression    IMPRESSION: No evidence of acute intracranial hemorrhage, mass, or  herniation.    PETER ROME MD   CT Cervical Spine w/o Contrast    Narrative    CT CERVICAL SPINE WITHOUT CONTRAST   3/8/2018  6:28 PM     HISTORY: Trauma.     TECHNIQUE: Axial images of the cervical spine were obtained without  intravenous contrast. Multiplanar reformations were performed.  Radiation dose for this scan was reduced using  automated exposure  control, adjustment of the mA and/or kV according to patient size, or  iterative reconstruction technique.    COMPARISON: None.    FINDINGS: No evidence of fracture. No prevertebral soft tissue  swelling. Alignment is normal. Vertebral body height is maintained. No  destructive osseous lesions. Marked loss of intervertebral disc space  with degenerative endplate changes at C5-C6 and C6-C7.     There is a posterior disc bulge asymmetric on the left at the C3-C4  level that results in moderate spinal canal narrowing. There is also  moderate left neural foraminal narrowing at this level due to uncinate  spurring and facet hypertrophy. No other significant spinal canal  narrowing. Additional levels of neural foraminal narrowing at C4-C5,  C5-C6, and C6-C7 due to uncinate spurring and facet or atrophy.    Visualized paraspinous tissues: ET tube and enteric tube partially  visualized. Marked groundglass airspace opacities in the visualized  left upper lung.      Impression    IMPRESSION:   1. No evidence of acute fracture or subluxation in the cervical spine.  2. Degenerative changes in the cervical spine as described above.  3. Marked groundglass opacities in the visualized left lung apex.      PETER ROME MD

## 2018-03-09 NOTE — PROGRESS NOTES
Patient extubated to 40% aerosol mask without any complications. Suctioned pre and post extubation. No stridor, sat 99%. Will continue to follow.     3/9/2018  Glory Nascimento, RT

## 2018-03-09 NOTE — PHARMACY-ADMISSION MEDICATION HISTORY
Admission medication history interview status for the 3/8/2018  admission is complete. See EPIC admission navigator for prior to admission medications     Medication history source reliability:Good    Actions taken by pharmacist (provider contacted, etc):None     Additional medication history information not noted on PTA med list :    -removed lipitor     Medication reconciliation/reorder completed by provider prior to medication history? No    Time spent in this activity: 15 mins    Prior to Admission medications    Medication Sig Last Dose Taking? Auth Provider   aspirin 81 MG tablet Take 1 tablet (81 mg) by mouth daily  Yes Anoop Jane MD   omega-3 acid ethyl esters (LOVAZA) 1 G capsule Take 2 g by mouth 2 times daily  Yes Reported, Patient   vitamin B complex with vitamin C (VITAMIN  B COMPLEX) TABS tablet Take 1 tablet by mouth daily  Yes Reported, Patient   Multiple Vitamins-Minerals (MULTIVITAMIN MEN PO) Take 1 tablet by mouth daily   Yes Reported, Patient

## 2018-03-09 NOTE — LETTER
Transition Communication Hand-off for Care Transitions to Next Level of Care Provider    Name: Derek Enriquez  : 1960  MRN #: [id number]  Primary Care Provider: Eugene Burrell     Primary Clinic: [facility] [address] [address]     Reason for Hospitalization:  Spinal cord compression (H) [G95.20]  Admit Date/Time: 3/9/2018  8:08 PM  Discharge Date: 3/14/18  Payor Source: Payor: BCBS / Plan: [place] / Product Type: Indemnity /              Reason for Communication Hand-off Referral:     Discharge Plan:    Social Work Services Discharge Note     Patient Name:  Derek Enriquez     Anticipated Discharge Date:  3/14/18     Discharge Disposition:   [facility] ([phone])     Following MD:  Facility  Assignment     Pre-Admission Screening (PAS) online form has been completed.  The Level of Care (LOC) is:  Determined  Confirmation Code is:  Not required as pt is being admitted to ARU.        Additional Services/Equipment Arranged:  Confirmed readiness for discharge with Milvia Stevenson NP Neuro Surgery.  [facility]  Lupe confirms receipt of insurance authorization.   Arranged for [facility] ([facility] [phone]) to provide w/c transport at 12 noon.      Patient / Family response to discharge plan:  Pt voices understanding of the discharge plan and agreement with the discharge plan.  SW offered to contact family to updated regarding the discharge plan.   Pt declined.    Pt states that he will independently inform his significant other of the discharge plan.     Persons notified of above discharge plan:  Pt, 6A nursing and Milvia Stevenson NP Neurosurgery.     Staff Discharge Instructions:  Please fax discharge orders and signed hard scripts for any controlled substances.  Please print a packet and send with patient.      CTS Handoff completed:  NO     Medicare Notice of Rights provided to the patient/family:  NO, per  Admissions Facesheet, pt is not on Medicare.       DIANE Powell  Social Work, 6A  Phone:  [phone]  Pager:  [phone]  3/13/2018

## 2018-03-09 NOTE — CONSULTS
Cardiology Consultation  Derek Enriquez    Age: 57 year old    YOB: 1960  MRN# [id number]    Primary care provider: Eugene Burrell  Cardiology Consultation: Dr. Deb Mendoza MD  Date of Admission:  3/8/2018    CHIEF COMPLAINT:  Code arrest    HISTORY OF PRESENT ILLNESS  Derek Enriquez is a 57 year old male with a history of hyperlipidemia and recent diagnosed of CAD based on Coronary CT angiogram who presents via EMS to the emergency department as code arrest.   Patient was scheduled for a coronary angiogram for possible stenting based on CT angiogram. Per EMS, the patient was walking on a treadmill at [employer] when witnesses saw him collapse and roll off the treadmill. First responders performed CPR for about 2 minutes before the AED was activated. One shock was performed and brought him back into sinus rhythm. En route, the patient fell back into ventricular fibrillation and was shocked again, which occurred around 1727. He was intubated before arrival to ER. He was having some purposeful movements. He was transferred on emergency basis to cardiac catheterisation lab.  As per family, he was trying herbal medications and exercise for his coronary artery disease and was on aspirin and statin.    ======================================================  ALLERGIES   No Known Allergies    MEDICATIONS  Prescriptions Prior to Admission   Medication Sig Dispense Refill Last Dose     atorvastatin (LIPITOR) 20 MG tablet Take 1 tablet (20 mg) by mouth daily 90 tablet 3      aspirin 81 MG tablet Take 1 tablet (81 mg) by mouth daily 90 tablet 3      omega-3 acid ethyl esters (LOVAZA) 1 G capsule Take 2 g by mouth 2 times daily   Taking     vitamin B complex with vitamin C (VITAMIN  B COMPLEX) TABS tablet Take 1 tablet by mouth daily   Unknown     Multiple Vitamins-Minerals (MULTIVITAMIN MEN PO) Take 1 tablet by mouth daily    Taking        PAST MEDICAL HISTORY  Dyslipidemia   CAD    PAST SURGICAL  HISTORY  No significant surgical history    SOCIAL HISTORY  Social History     Social History     Marital status: Single     Spouse name: N/A     Number of children: N/A     Years of education: N/A     Occupational History     Not on file.     Social History Main Topics     Smoking status: Former Smoker     Smokeless tobacco: Never Used      Comment: Quit at 31     Alcohol use Not on file     Drug use: Not on file     Sexual activity: Not on file     Other Topics Concern     Not on file     Social History Narrative     No narrative on file        FAMILY HISTORY  No known family history of CAD  ===================================================  REVIEW OF SYSTEMS  Was not performed as patient was intubated and sedated  ===================================================  PHYSICAL EXAM  /70  Pulse 100  Resp 17  Ht 1.829 m (6')  Wt 77.2 kg (170 lb 3.1 oz)  SpO2 100%  BMI 23.08 kg/m2  170 lbs 3.12 oz  Body mass index is 23.08 kg/(m^2).  No intake or output data in the 24 hours ending 03/08/18 2129  General: Intubated and sedated  HEENT: ET Tube in place  Neck: supple, no LAD  Chest: bilateral breath sounds  Heart: RRR, no m/r/g  Vascular: femoral and DP/PT pulses are 1-2+ on palpation  Abdomen: soft, NT/ND  Extremities: warm and well-perfused w/o cyanosis, clubbing or edema  Neuro: A&Ox3, non-focal  =====================================================  LABORATORY DATA: reviewed  CMP  Recent Labs  Lab 03/08/18  1750      POTASSIUM 3.8   CHLORIDE 107   CO2 19*   ANIONGAP 14   *   BUN 22   CR 1.03   GFRESTIMATED 75   GFRESTBLACK >90   PAT 8.3*     CBC  Recent Labs  Lab 03/08/18  1750   WBC 9.5   RBC 5.40   HGB 16.8   HCT 47.9   MCV 89   MCH 31.1   MCHC 35.1   RDW 13.1        INR  Recent Labs  Lab 03/08/18  1750   INR 1.12      CTA: 3/2017     1. Total Agatston score 213, placing the patient in the 89th percentile when compared to age and gender matched control group.     2. Severe proximal  left anterior descending artery stenosis, greater than 70%, due to partly calcified plaque.     3.  Please review Radiology report for incidental noncardiac findings that will follow separately.    Angiogram and PCI:  95% occlusion of proximal LAD with clot  -Successful deployment of a drug eluting stent    3.0 x 20 mm Promus Premier drug eluting stent across the proximal LAD   ==================================================  ASSESSMENT AND PLAN  57 year old male with a history of hyperlipidemia and recent diagnosed of CAD based on Coronary CT angiogram who presents via EMS to the emergency department as code arrest  -s/p PCI to LAD with 3.0 x 20 mm Promus MALI stenting  -successful placement of intraaortic balloon pump for LV support  - recommend aspirin and plavix  -to get echo in am  -restart heparin without bolus post angiomax infusion and keep balloon pump at 1:1 ratio  -we did not do hypothermia protocol as he was waking up and moving his extremities during the procedure  -ICU for vent management  -to increase lipitor to 80 mg po daily    Patient seen and discussed with Dr. Mendoza.    Richie Ramirez MD  Cardiology Fellow    March 8, 2018

## 2018-03-09 NOTE — PLAN OF CARE
Problem: Patient Care Overview  Goal: Plan of Care/Patient Progress Review  OT/CR: Orders received. Per discussion with RN, pt not appropriate for session at this time.

## 2018-03-09 NOTE — PROVIDER NOTIFICATION
Discussed latest INR/PTT/Troponin levels post procedure.  No new orders at this timeper Dr. Lyons.

## 2018-03-09 NOTE — PROGRESS NOTES
Called stat to ICU. Sudden loss of power to both legs with intense back pain. Patient complaining that he cannot feel or move his legs. Vascular surgery called as well as neurology. No evidence of hematoma or external bleeding around the femoral arteriotomy site. No abdominal discomfort. Heparin was held at around 11 am and cath lab called and derick informed that balloon pump would be ready to be pulled at 3 pm. Dr Esteban came up and pulled the IABP at approximately 3:30 PM. No clot on the IABP. CT of thorax and abdomen performed. No evidence of dissection or even plaque in the aorta. Aorta normal size. Getting an MRI to look at soft tissue pathology (hematoma etc). Vascular surgery looked at spine. No obvious fractures or displacement.

## 2018-03-10 ENCOUNTER — APPOINTMENT (OUTPATIENT)
Dept: PHYSICAL THERAPY | Facility: CLINIC | Age: 58
End: 2018-03-10
Attending: NEUROLOGICAL SURGERY
Payer: COMMERCIAL

## 2018-03-10 LAB
ABO + RH BLD: NORMAL
ABO + RH BLD: NORMAL
ANGLE RATE OF CLOT GROWTH: 75.4 DEG (ref 59–74)
ANION GAP SERPL CALCULATED.3IONS-SCNC: 8 MMOL/L (ref 3–14)
BLD PROD TYP BPU: NORMAL
BLD UNIT ID BPU: 0
BLOOD PRODUCT CODE: NORMAL
BPU ID: NORMAL
BUN SERPL-MCNC: 18 MG/DL (ref 7–30)
CA-I BLD-MCNC: 4.6 MG/DL (ref 4.4–5.2)
CALCIUM SERPL-MCNC: 7.6 MG/DL (ref 8.5–10.1)
CHLORIDE SERPL-SCNC: 108 MMOL/L (ref 94–109)
CI HYPERCOAGULATION INDEX: 4.3 RATIO (ref 0–3)
CLOT LYSIS 30M P MA LENFR BLD TEG: 0 % (ref 0–8)
CLOT STRENGTH BLD TEG: 18.7 KD/SC (ref 5.3–13.2)
CO2 SERPL-SCNC: 24 MMOL/L (ref 20–32)
CREAT SERPL-MCNC: 0.78 MG/DL (ref 0.66–1.25)
ERYTHROCYTE [DISTWIDTH] IN BLOOD BY AUTOMATED COUNT: 13.4 % (ref 10–15)
GFR SERPL CREATININE-BSD FRML MDRD: >90 ML/MIN/1.7M2
GLUCOSE BLDC GLUCOMTR-MCNC: 141 MG/DL (ref 70–99)
GLUCOSE SERPL-MCNC: 150 MG/DL (ref 70–99)
HCT VFR BLD AUTO: 33 % (ref 40–53)
HGB BLD-MCNC: 11 G/DL (ref 13.3–17.7)
INTERPRETATION ECG - MUSE: NORMAL
K TIME TO SPEC CLOT STRENGTH: 1 MIN (ref 1–3)
LY60 LYSIS AT 60 MINUTES: 1.2 % (ref 0–15)
MA MAXIMUM CLOT STRENGTH: 78.9 MM (ref 55–74)
MCH RBC QN AUTO: 30.3 PG (ref 26.5–33)
MCHC RBC AUTO-ENTMCNC: 33.3 G/DL (ref 31.5–36.5)
MCV RBC AUTO: 91 FL (ref 78–100)
PLATELET # BLD AUTO: 221 10E9/L (ref 150–450)
POTASSIUM SERPL-SCNC: 3.8 MMOL/L (ref 3.4–5.3)
R TIME UNTIL CLOT FORMS: 4.4 MIN (ref 4–9)
RBC # BLD AUTO: 3.63 10E12/L (ref 4.4–5.9)
SODIUM SERPL-SCNC: 140 MMOL/L (ref 133–144)
SPECIMEN EXP DATE BLD: NORMAL
TRANSFUSION STATUS PATIENT QL: NORMAL
WBC # BLD AUTO: 9.8 10E9/L (ref 4–11)

## 2018-03-10 PROCEDURE — 40000193 ZZH STATISTIC PT WARD VISIT

## 2018-03-10 PROCEDURE — 97530 THERAPEUTIC ACTIVITIES: CPT | Mod: GP

## 2018-03-10 PROCEDURE — 80048 BASIC METABOLIC PNL TOTAL CA: CPT | Performed by: NEUROLOGICAL SURGERY

## 2018-03-10 PROCEDURE — 25000128 H RX IP 250 OP 636: Performed by: STUDENT IN AN ORGANIZED HEALTH CARE EDUCATION/TRAINING PROGRAM

## 2018-03-10 PROCEDURE — C9399 UNCLASSIFIED DRUGS OR BIOLOG: HCPCS | Performed by: STUDENT IN AN ORGANIZED HEALTH CARE EDUCATION/TRAINING PROGRAM

## 2018-03-10 PROCEDURE — 97162 PT EVAL MOD COMPLEX 30 MIN: CPT | Mod: GP

## 2018-03-10 PROCEDURE — 82330 ASSAY OF CALCIUM: CPT | Performed by: STUDENT IN AN ORGANIZED HEALTH CARE EDUCATION/TRAINING PROGRAM

## 2018-03-10 PROCEDURE — 00000146 ZZHCL STATISTIC GLUCOSE BY METER IP

## 2018-03-10 PROCEDURE — 25000125 ZZHC RX 250: Performed by: STUDENT IN AN ORGANIZED HEALTH CARE EDUCATION/TRAINING PROGRAM

## 2018-03-10 PROCEDURE — 25000132 ZZH RX MED GY IP 250 OP 250 PS 637: Performed by: STUDENT IN AN ORGANIZED HEALTH CARE EDUCATION/TRAINING PROGRAM

## 2018-03-10 PROCEDURE — 85027 COMPLETE CBC AUTOMATED: CPT | Performed by: NEUROLOGICAL SURGERY

## 2018-03-10 PROCEDURE — 99221 1ST HOSP IP/OBS SF/LOW 40: CPT | Mod: GC | Performed by: INTERNAL MEDICINE

## 2018-03-10 PROCEDURE — 20000004 ZZH R&B ICU UMMC

## 2018-03-10 RX ORDER — NALOXONE HYDROCHLORIDE 0.4 MG/ML
.1-.4 INJECTION, SOLUTION INTRAMUSCULAR; INTRAVENOUS; SUBCUTANEOUS
Status: DISCONTINUED | OUTPATIENT
Start: 2018-03-10 | End: 2018-03-14 | Stop reason: HOSPADM

## 2018-03-10 RX ORDER — ESMOLOL HYDROCHLORIDE 10 MG/ML
INJECTION INTRAVENOUS PRN
Status: DISCONTINUED | OUTPATIENT
Start: 2018-03-10 | End: 2018-03-10

## 2018-03-10 RX ORDER — SODIUM CHLORIDE, SODIUM LACTATE, POTASSIUM CHLORIDE, CALCIUM CHLORIDE 600; 310; 30; 20 MG/100ML; MG/100ML; MG/100ML; MG/100ML
INJECTION, SOLUTION INTRAVENOUS CONTINUOUS
Status: DISCONTINUED | OUTPATIENT
Start: 2018-03-10 | End: 2018-03-10 | Stop reason: HOSPADM

## 2018-03-10 RX ORDER — SODIUM CHLORIDE 9 MG/ML
INJECTION, SOLUTION INTRAVENOUS CONTINUOUS
Status: DISCONTINUED | OUTPATIENT
Start: 2018-03-10 | End: 2018-03-11

## 2018-03-10 RX ORDER — BISACODYL 10 MG
10 SUPPOSITORY, RECTAL RECTAL DAILY
Status: DISCONTINUED | OUTPATIENT
Start: 2018-03-10 | End: 2018-03-14 | Stop reason: HOSPADM

## 2018-03-10 RX ORDER — CYCLOBENZAPRINE HCL 10 MG
10 TABLET ORAL 3 TIMES DAILY
Status: DISCONTINUED | OUTPATIENT
Start: 2018-03-10 | End: 2018-03-14 | Stop reason: HOSPADM

## 2018-03-10 RX ORDER — HYDROXYZINE HYDROCHLORIDE 25 MG/1
25 TABLET, FILM COATED ORAL EVERY 6 HOURS PRN
Status: DISCONTINUED | OUTPATIENT
Start: 2018-03-10 | End: 2018-03-14 | Stop reason: HOSPADM

## 2018-03-10 RX ORDER — ONDANSETRON 2 MG/ML
INJECTION INTRAMUSCULAR; INTRAVENOUS PRN
Status: DISCONTINUED | OUTPATIENT
Start: 2018-03-10 | End: 2018-03-10

## 2018-03-10 RX ORDER — LABETALOL HYDROCHLORIDE 5 MG/ML
10 INJECTION, SOLUTION INTRAVENOUS
Status: DISCONTINUED | OUTPATIENT
Start: 2018-03-10 | End: 2018-03-10 | Stop reason: HOSPADM

## 2018-03-10 RX ORDER — LABETALOL HYDROCHLORIDE 5 MG/ML
INJECTION, SOLUTION INTRAVENOUS PRN
Status: DISCONTINUED | OUTPATIENT
Start: 2018-03-10 | End: 2018-03-10

## 2018-03-10 RX ORDER — ONDANSETRON 4 MG/1
4-8 TABLET, ORALLY DISINTEGRATING ORAL EVERY 6 HOURS PRN
Status: DISCONTINUED | OUTPATIENT
Start: 2018-03-10 | End: 2018-03-14 | Stop reason: HOSPADM

## 2018-03-10 RX ORDER — ACETAMINOPHEN 325 MG/1
975 TABLET ORAL EVERY 8 HOURS
Status: DISPENSED | OUTPATIENT
Start: 2018-03-10 | End: 2018-03-13

## 2018-03-10 RX ORDER — CALCIUM CARBONATE 500 MG/1
1000 TABLET, CHEWABLE ORAL 4 TIMES DAILY PRN
Status: DISCONTINUED | OUTPATIENT
Start: 2018-03-10 | End: 2018-03-14 | Stop reason: HOSPADM

## 2018-03-10 RX ORDER — AMOXICILLIN 250 MG
1 CAPSULE ORAL 2 TIMES DAILY
Status: DISCONTINUED | OUTPATIENT
Start: 2018-03-10 | End: 2018-03-14 | Stop reason: HOSPADM

## 2018-03-10 RX ORDER — LIDOCAINE 4 G/G
3 PATCH TOPICAL
Status: DISCONTINUED | OUTPATIENT
Start: 2018-03-10 | End: 2018-03-14 | Stop reason: HOSPADM

## 2018-03-10 RX ORDER — ACETAMINOPHEN 325 MG/1
650 TABLET ORAL EVERY 4 HOURS PRN
Status: DISCONTINUED | OUTPATIENT
Start: 2018-03-13 | End: 2018-03-14 | Stop reason: HOSPADM

## 2018-03-10 RX ORDER — OXYCODONE HYDROCHLORIDE 5 MG/1
5-10 TABLET ORAL
Status: DISCONTINUED | OUTPATIENT
Start: 2018-03-10 | End: 2018-03-14 | Stop reason: HOSPADM

## 2018-03-10 RX ORDER — AMOXICILLIN 250 MG
2 CAPSULE ORAL 2 TIMES DAILY
Status: DISCONTINUED | OUTPATIENT
Start: 2018-03-10 | End: 2018-03-14 | Stop reason: HOSPADM

## 2018-03-10 RX ORDER — SODIUM CHLORIDE 9 MG/ML
INJECTION, SOLUTION INTRAVENOUS CONTINUOUS
Status: DISCONTINUED | OUTPATIENT
Start: 2018-03-10 | End: 2018-03-10

## 2018-03-10 RX ORDER — ONDANSETRON 2 MG/ML
4 INJECTION INTRAMUSCULAR; INTRAVENOUS EVERY 30 MIN PRN
Status: DISCONTINUED | OUTPATIENT
Start: 2018-03-10 | End: 2018-03-10 | Stop reason: HOSPADM

## 2018-03-10 RX ORDER — ONDANSETRON 4 MG/1
4 TABLET, ORALLY DISINTEGRATING ORAL EVERY 30 MIN PRN
Status: DISCONTINUED | OUTPATIENT
Start: 2018-03-10 | End: 2018-03-10 | Stop reason: HOSPADM

## 2018-03-10 RX ORDER — ONDANSETRON 2 MG/ML
4-8 INJECTION INTRAMUSCULAR; INTRAVENOUS EVERY 6 HOURS PRN
Status: DISCONTINUED | OUTPATIENT
Start: 2018-03-10 | End: 2018-03-14 | Stop reason: HOSPADM

## 2018-03-10 RX ORDER — NALOXONE HYDROCHLORIDE 0.4 MG/ML
.1-.4 INJECTION, SOLUTION INTRAMUSCULAR; INTRAVENOUS; SUBCUTANEOUS
Status: DISCONTINUED | OUTPATIENT
Start: 2018-03-10 | End: 2018-03-10

## 2018-03-10 RX ORDER — LIDOCAINE 40 MG/G
CREAM TOPICAL
Status: DISCONTINUED | OUTPATIENT
Start: 2018-03-10 | End: 2018-03-14 | Stop reason: HOSPADM

## 2018-03-10 RX ORDER — FENTANYL CITRATE 50 UG/ML
25-50 INJECTION, SOLUTION INTRAMUSCULAR; INTRAVENOUS
Status: DISCONTINUED | OUTPATIENT
Start: 2018-03-10 | End: 2018-03-10 | Stop reason: HOSPADM

## 2018-03-10 RX ADMIN — CEFAZOLIN 1 G: 1 INJECTION, POWDER, FOR SOLUTION INTRAMUSCULAR; INTRAVENOUS at 00:18

## 2018-03-10 RX ADMIN — HYDROMORPHONE HYDROCHLORIDE 0.5 MG: 1 INJECTION, SOLUTION INTRAMUSCULAR; INTRAVENOUS; SUBCUTANEOUS at 06:05

## 2018-03-10 RX ADMIN — ESMOLOL HYDROCHLORIDE 10 MG: 10 INJECTION, SOLUTION INTRAVENOUS at 01:14

## 2018-03-10 RX ADMIN — LABETALOL HYDROCHLORIDE 10 MG: 5 INJECTION INTRAVENOUS at 01:10

## 2018-03-10 RX ADMIN — FENTANYL CITRATE 25 MCG: 50 INJECTION, SOLUTION INTRAMUSCULAR; INTRAVENOUS at 01:44

## 2018-03-10 RX ADMIN — NITROGLYCERIN 100 MCG: 10 INJECTION INTRAVENOUS at 00:12

## 2018-03-10 RX ADMIN — ESMOLOL HYDROCHLORIDE 10 MG: 10 INJECTION, SOLUTION INTRAVENOUS at 01:05

## 2018-03-10 RX ADMIN — LABETALOL HYDROCHLORIDE 10 MG: 5 INJECTION INTRAVENOUS at 01:14

## 2018-03-10 RX ADMIN — ACETAMINOPHEN 975 MG: 325 TABLET, FILM COATED ORAL at 04:14

## 2018-03-10 RX ADMIN — NITROGLYCERIN 100 MCG: 10 INJECTION INTRAVENOUS at 00:04

## 2018-03-10 RX ADMIN — SODIUM CHLORIDE: 9 INJECTION, SOLUTION INTRAVENOUS at 01:48

## 2018-03-10 RX ADMIN — OXYCODONE HYDROCHLORIDE 10 MG: 5 TABLET ORAL at 18:05

## 2018-03-10 RX ADMIN — HYDROMORPHONE HYDROCHLORIDE 0.5 MG: 1 INJECTION, SOLUTION INTRAMUSCULAR; INTRAVENOUS; SUBCUTANEOUS at 02:51

## 2018-03-10 RX ADMIN — HYDROMORPHONE HYDROCHLORIDE 0.5 MG: 1 INJECTION, SOLUTION INTRAMUSCULAR; INTRAVENOUS; SUBCUTANEOUS at 14:43

## 2018-03-10 RX ADMIN — ONDANSETRON 4 MG: 2 INJECTION INTRAMUSCULAR; INTRAVENOUS at 00:00

## 2018-03-10 RX ADMIN — CYCLOBENZAPRINE HYDROCHLORIDE 10 MG: 10 TABLET, FILM COATED ORAL at 20:15

## 2018-03-10 RX ADMIN — NITROGLYCERIN 100 MCG: 10 INJECTION INTRAVENOUS at 01:05

## 2018-03-10 RX ADMIN — LABETALOL HYDROCHLORIDE 10 MG: 5 INJECTION INTRAVENOUS at 00:17

## 2018-03-10 RX ADMIN — SENNOSIDES AND DOCUSATE SODIUM 2 TABLET: 8.6; 5 TABLET ORAL at 20:15

## 2018-03-10 RX ADMIN — CYCLOBENZAPRINE HYDROCHLORIDE 10 MG: 10 TABLET, FILM COATED ORAL at 07:44

## 2018-03-10 RX ADMIN — SODIUM CHLORIDE: 9 INJECTION, SOLUTION INTRAVENOUS at 21:27

## 2018-03-10 RX ADMIN — OXYCODONE HYDROCHLORIDE 10 MG: 5 TABLET ORAL at 04:14

## 2018-03-10 RX ADMIN — ESMOLOL HYDROCHLORIDE 10 MG: 10 INJECTION, SOLUTION INTRAVENOUS at 01:01

## 2018-03-10 RX ADMIN — HYDROMORPHONE HYDROCHLORIDE 0.5 MG: 1 INJECTION, SOLUTION INTRAMUSCULAR; INTRAVENOUS; SUBCUTANEOUS at 11:32

## 2018-03-10 RX ADMIN — CYCLOBENZAPRINE HYDROCHLORIDE 10 MG: 10 TABLET, FILM COATED ORAL at 13:49

## 2018-03-10 RX ADMIN — ACETAMINOPHEN 975 MG: 325 TABLET, FILM COATED ORAL at 20:14

## 2018-03-10 RX ADMIN — SODIUM CHLORIDE, POTASSIUM CHLORIDE, SODIUM LACTATE AND CALCIUM CHLORIDE: 600; 310; 30; 20 INJECTION, SOLUTION INTRAVENOUS at 00:07

## 2018-03-10 RX ADMIN — OXYCODONE HYDROCHLORIDE 5 MG: 5 TABLET ORAL at 13:49

## 2018-03-10 RX ADMIN — OXYCODONE HYDROCHLORIDE 10 MG: 5 TABLET ORAL at 23:43

## 2018-03-10 RX ADMIN — LIDOCAINE 3 PATCH: 560 PATCH PERCUTANEOUS; TOPICAL; TRANSDERMAL at 07:42

## 2018-03-10 RX ADMIN — SENNOSIDES AND DOCUSATE SODIUM 2 TABLET: 8.6; 5 TABLET ORAL at 07:44

## 2018-03-10 RX ADMIN — HYDROMORPHONE HYDROCHLORIDE 0.5 MG: 1 INJECTION, SOLUTION INTRAMUSCULAR; INTRAVENOUS; SUBCUTANEOUS at 18:24

## 2018-03-10 RX ADMIN — ESMOLOL HYDROCHLORIDE 10 MG: 10 INJECTION, SOLUTION INTRAVENOUS at 01:09

## 2018-03-10 RX ADMIN — BISACODYL 10 MG: 10 SUPPOSITORY RECTAL at 07:43

## 2018-03-10 RX ADMIN — LABETALOL HYDROCHLORIDE 10 MG: 5 INJECTION INTRAVENOUS at 01:01

## 2018-03-10 RX ADMIN — SUGAMMADEX 150 MG: 100 INJECTION, SOLUTION INTRAVENOUS at 00:53

## 2018-03-10 RX ADMIN — HYDROMORPHONE HYDROCHLORIDE 0.5 MG: 1 INJECTION, SOLUTION INTRAMUSCULAR; INTRAVENOUS; SUBCUTANEOUS at 16:00

## 2018-03-10 ASSESSMENT — PAIN DESCRIPTION - DESCRIPTORS
DESCRIPTORS: ACHING
DESCRIPTORS: BURNING

## 2018-03-10 NOTE — ANESTHESIA POSTPROCEDURE EVALUATION
Patient: Derek Enriquez    Procedure(s):  Thoracic 9-12 Laminectomy Evacuation of Subdural Hematoma, C-Arm, Prone, Gel Rolls. - Wound Class: I-Clean    Diagnosis:SPINAL SUBDERAL HEMATOMA  Diagnosis Additional Information: No value filed.    Anesthesia Type:  General, ETT    Note:  Anesthesia Post Evaluation    Patient location during evaluation: PACU  Patient participation: Able to fully participate in evaluation  Level of consciousness: awake  Pain management: adequate  Airway patency: patent  Cardiovascular status: acceptable  Respiratory status: acceptable  Hydration status: acceptable  PONV: none     Anesthetic complications: None          Last vitals:  Vitals:    03/10/18 0308 03/10/18 0400 03/10/18 0500   BP:      Resp:  10    Temp:  36.8  C (98.2  F)    SpO2: 100% 100% 100%         Electronically Signed By: Taras Azul MD  March 10, 2018  6:41 AM

## 2018-03-10 NOTE — PROGRESS NOTES
Discussed case with Dr Christiansen and neurologist. T11 hematoma found on MRI. Most likely cause is bleed from an AVM in that area according to the neurologist. . Being transferred to [facility] for neurosurgery to decompress the spinal cord. Very unusual and unfortunate case. Hopefully decompression will result in resolution of symptoms.

## 2018-03-10 NOTE — PROGRESS NOTES
Neuroscience Intensive Care Progress Note  03/10/2018    Summary: Derek Enriquez (pronouced Kel) is a 57 year old year old male with pmhx significant for cervicalgia (resolved) admitted on 3/9/2018 with development of T10-12 spinal SDH s/p emergent surgical evacuation 2/2 cardiac arrest 2/2 ACS s/p PCI and dual antiplatelet tx.    Problem List:  1. Spinal cord compression  2. Cardiac arrest  3. Normocytic normochromic anemia    24 hour events:  -NAEO  -Still no sensation or movement in legs.  -No chest pain, abdominal pain, no concerns    24 Hour Vital Signs Summary:  Temperatures:  Current - Temp: 98.2  F (36.8  C); Max - Temp  Av.3  F (37.4  C)  Min: 98.2  F (36.8  C)  Max: 99.9  F (37.7  C)  Respiration range: Resp  Av.3  Min: 8  Max: 28  Pulse range: No Data Recorded  Blood pressure range: Systolic (24hrs), Av , Min:104 , Max:153; Diastolic (24hrs), Av, Min:54, Max:79    Pulse oximetry range: SpO2  Av.3 %  Min: 95 %  Max: 100 %    Ventilator Settings  N/A      Intake/Output Summary (Last 24 hours) at 03/10/18 0721  Last data filed at 03/10/18 0600   Gross per 24 hour   Intake             2213 ml   Output              835 ml   Net             1378 ml       Arterial Line BP: ()/(52-78) 125/60  MAP:  [75 mmHg-104 mmHg] 80 mmHg  BP - Mean:  [72-98] 79    Current Medications:    sodium chloride (PF)  3 mL Intracatheter Q8H     acetaminophen  975 mg Oral Q8H     cyclobenzaprine  10 mg Oral TID     menthol   Transdermal Q8H     lidocaine  3 patch Transdermal Q24H     lidocaine   Transdermal Q24h     lidocaine   Transdermal Q8H     senna-docusate  1 tablet Oral BID    Or     senna-docusate  2 tablet Oral BID     bisacodyl  10 mg Rectal Daily       PRN Medications:  lidocaine (buffered or not buffered), lidocaine 4%, sodium chloride (PF), naloxone, calcium carbonate, sore throat lozenge, HYDROmorphone, [START ON 3/13/2018] acetaminophen, oxyCODONE IR, menthol **AND** menthol **AND** [START ON  3/11/2018] menthol, hydrOXYzine, ondansetron **OR** ondansetron, potassium chloride, potassium chloride, potassium chloride, potassium chloride with lidocaine, potassium chloride, magnesium sulfate, potassium phosphate (KPHOS) in D5W IV, potassium phosphate (KPHOS) in D5W IV, potassium phosphate (KPHOS) in D5W IV, potassium phosphate (KPHOS) in D5W IV    Infusions:    sodium chloride 100 mL/hr at 03/10/18 0148       No Known Allergies    Physical Examination:  /70  Temp 98.2  F (36.8  C) (Oral)  Resp 10  SpO2 100%  Neurologic:  Mental Status: Eyes closed, open to voice, fully alert, attentive. Speech clear and fluent.   Cranial Nerves: Visual fields intact. PERRL, brisk, 2-3mm. EOMI with normal smooth pursuit. Facial movements symmetric. Facial sensation symmetric. Hearing intact to conversation. Palate elevation symmetric, uvula midline. No dysarthria. Shoulder shrug strong bilaterally. Tongue protrusion midline.  Motor: No abnormal movements. Normal tone b/l UE; hypotonic b/l LE. Strength 0/5 b/l LE; strength 5/5 b/l UE  Reflexes: 0/4 patellar, 0/4 left achilles, 0/4 right achilles. No clonus at ankles. Babinski negative. 2+ biceps, brachioradialis b/l  Sensory: Absent to light touch, pain, and vibration to T9-10 b/l. Intact to light touch above T9-10.  Coordination: Deferred  Station/Gait: Deferred due to LE weakness    General: NAD, lying on bed, affect: sad, laconic/non-communicative  HEENT: NC/AT. Mucous membranes moist. NG tube in place, brown, bilious liquid.  Cardiac: RRR. No murmur/rubs/gallops appreciated.  Respiratory: NLB on RA. CTAB. No w/r/c appreciated.  Abdominal: soft, non-tender, nondistended  Ext: No edema.     Labs/Studies:  Recent Labs   Lab Test  03/10/18   0601  03/09/18   2100  03/09/18   0435   NA  140  139  136  138   POTASSIUM  3.8  3.8  3.7  4.2   CHLORIDE  108  107  109   CO2  24  23  23   ANIONGAP  8  9  6   GLC  150*  136*  132*  118*   BUN  18  16  21   CR  0.78  0.76   0.83   PAT  7.6*  8.1*  8.0*   WBC  9.8  10.0  12.2*   RBC  3.63*  4.40  4.72   HGB  11.0*  13.6  13.4  14.5   PLT  221  180  198       Recent Labs   Lab Test  03/09/18   2100  03/08/18   2125  03/08/18   1750   INR  1.20*  3.66*  1.12   PTT  35  >240*   --          Recent Labs  Lab 03/09/18  2100 03/09/18  0550   PH 7.41 7.37   PCO2 35 39   PO2 482* 133*   HCO3 22 23   O2PER 100 50       Imaging:  MRI 3/9 AM lumbar and thoracic, shows subdural hematoma T10-T12 isointense on T1, hyperintense on T2 suggesting hyperacute bleed (<24 hours).    Assessment/Plan  Derek Enriquez is a 57 year old year old male with pmhx significant for cervicalgia (resolved) admitted on 3/9/2018 with development of T10-12 spinal SDH s/p emergent surgical evacuation and preceding cardiac arrest 2/2 ACS s/p PCI and dual antiplatelet tx.    Neuro:  # Subdural Hematoma with Spinal Compression s/p Laminectomy and Evacuation:  Etiology is unknown, can not determine from history if 2/2 fall (e.g. traumatic), or occurred following resuscitation or PCI. Per literature, spontaneous SDH of the spine is very rare. Possibly due to AVM, however this is less likely and will defer spinal angiogram for now. No evidence of autonomic dysreflexia at this time.  -q1h neuro exam  -MAP >70  -Nicardipine PRN for BP control  -Continue to hold antiplatelets     Resp:  -Encourage incentive spirometry 10x qh    CV:  # ACS s/p LAD PCI:   Patient had known CAD in LAD and was scheduled for CABG, but developed ACS and cardiac arrest. No evidence of cortical function loss. Patient was started on dual antiplatelet therapy 2/2 stent placement. This has been held.   -MAP >70  -Cardiology consulted, appreciate recs    Renal:  No active issues    Endo:   No active issues. Glucose checks.     Heme:  # Normocytic, Normochromic Anemia:   Mildly decreased Hgb.   -CBC tomorrow    # Elevated INR, resolved:  One entry of INR at 3.66 from 3/8. Trend was 1.12->3.66->1.20. We believe the  middle value is inaccurate or falsely reported as his INR went up and then back down without any apparent interventions (e.g no warfarin given, no vitamin K/PCC given to correct).     GI/:   Patient is without rectal tone per neurosurg note. Ng tube in place with normal bilious output. Patient has no abdominal pain or distention.  -Bowel regimen  -Sandhu  -ADAT    ID:   No active issues. Afebrile, no leukocytosis.     FEN: D/c IVF  PPX:    DVT: SCDs    GI: As above  LDA: BUE peripheral IV, A-line, NG tube, Sandhu in place, wound dressing in place until 3/15.  Code Status: Full code, prior  Dispo: NSG primary    Patient discussed with attending physician Dr. Brooks.    Derian Gupta  Neurology PGY-2

## 2018-03-10 NOTE — PROGRESS NOTES
03/10/18 1159   Quick Adds   Type of Visit Initial PT Evaluation   Living Environment   Lives With alone   Living Arrangements house   Home Accessibility bed and bath on same level;tub/shower is not walk in   Number of Stairs to Enter Home 1   Transportation Available family or friend will provide;car   Living Environment Comment Pt can stay with his girlfriend if needed.  Her shower is upstairs, bedroom on main level.   Self-Care   Usual Activity Tolerance excellent   Current Activity Tolerance fair   Regular Exercise yes   Activity/Exercise Type swimming  (running)   Exercise Amount/Frequency 3-5 times/wk   Equipment Currently Used at Home none   Activity/Exercise/Self-Care Comment Pt works full time as an .   Functional Level Prior   Ambulation 0-->independent   Transferring 0-->independent   Toileting 0-->independent   Bathing 0-->independent   Dressing 0-->independent   Eating 0-->independent   Communication 0-->understands/communicates without difficulty   Swallowing 0-->swallows foods/liquids without difficulty   Cognition 0 - no cognition issues reported   Fall history within last six months no   General Information   Onset of Illness/Injury or Date of Surgery - Date 03/09/18   Referring Physician Qamar Maya MD   Patient/Family Goals Statement none stated   Pertinent History of Current Problem (include personal factors and/or comorbidities that impact the POC) 56 yo male s/p T9-T12 laminectomy for subdural hematoma evacuation.  Presented to OSH 3/8 after witnessed cardiac arrest on a treadmill while working out.  He was resuscitated and brought to the OSH where stenting was performed for a high grade LAD lesion.  He was transferred to the ICU post-procedure with a aortic balloon pump in place due to hypotension during the procedure.  He was extubated mid-day of 3/9.  At approximately 1500, he noted that he could not feel his legs.    Precautions/Limitations spinal precautions    Cognitive Status Examination   Orientation person;place;time   Level of Consciousness alert   Follows Commands and Answers Questions 100% of the time  (acutely follows less after IV pain meds given)   Personal Safety and Judgment impaired   Pain Assessment   Patient Currently in Pain Yes, see Vital Sign flowsheet   Integumentary/Edema   Integumentary/Edema Comments wound vac in place thoracic spine   Posture    Posture Not impaired   Range of Motion (ROM)   ROM Comment WFL all extremities, mild limitation into end-range knee extension 2/2 hamstring tightness R>L   Strength   Strength Comments 0/5 distal to T12, difficult to assess lower thoracic levels.   5/5 BUE   Bed Mobility   Bed Mobility Comments Mod A roll   Transfer Skills   Transfer Comments not assessd 2/2 bedrest order   Gait   Gait Comments dependent   Balance   Balance Comments not assessd 2/2 bedrest order   Sensory Examination   Sensory Perception Comments not intact to light touch or deep pressure distal to T9.  per neurosurgery note, rectal tone is absent   Coordination   Coordination Comments intact BUE   Muscle Tone   Muscle Tone Comments flaccidity BLE   General Therapy Interventions   Planned Therapy Interventions balance training;bed mobility training;gait training;neuromuscular re-education;ROM;strengthening;stretching;transfer training;home program guidelines;progressive activity/exercise   Clinical Impression   Criteria for Skilled Therapeutic Intervention yes, treatment indicated   PT Diagnosis impaired functional mobility   Influenced by the following impairments weakness, pain, impaired sensation, impaired balance   Functional limitations due to impairments decreased (I) bed mobility, transfers, gait, ADL   Clinical Presentation Evolving/Changing   Clinical Presentation Rationale Surgery team wants pt on bedrest to maintain hemodynamic stability and promote cord perfusion.     Clinical Decision Making (Complexity) Moderate complexity  "  Therapy Frequency` 3 times/week  (daily once activity orders upgraded)   Predicted Duration of Therapy Intervention (days/wks) 3/24/2018   Anticipated Discharge Disposition Acute Rehabilitation Facility   Risk & Benefits of therapy have been explained Yes   Patient, Family & other staff in agreement with plan of care Yes   [facility] AM-PAC  \"6 Clicks\" V.2 Basic Mobility Inpatient Short Form   1. Turning from your back to your side while in a flat bed without using bedrails? 2 - A Lot   2. Moving from lying on your back to sitting on the side of a flat bed without using bedrails? 2 - A Lot   3. Moving to and from a bed to a chair (including a wheelchair)? 1 - Total   4. Standing up from a chair using your arms (e.g., wheelchair, or bedside chair)? 1 - Total   5. To walk in hospital room? 1 - Total   6. Climbing 3-5 steps with a railing? 1 - Total   Basic Mobility Raw Score (Score out of 24.Lower scores equate to lower levels of function) 8   Total Evaluation Time   Total Evaluation Time (Minutes) 5     "

## 2018-03-10 NOTE — PROGRESS NOTES
Pt arrived via EMS from [facility] to [facility] 4A at 2000. MD notified of pt arrival. Transferred pt to room monitors, neuro exam completed. Pt taken to OR at 2036.

## 2018-03-10 NOTE — PROGRESS NOTES
Brief follow up intensivist note    I was called urgently to bedside about 3:15pm this afternoon. Patient suddenly had no sensation from about his belly button down and could not move legs. I emergently called cardiology, vascular surgery, and neuro critical care. The IABP was immediately removed. He was sent down for a stat CTA of chest, abdomen, and pelvis. On review of images, there were no obvious vascular injuries noted. His bony spine appeared normal.     He was send for an emergent MRI. I spoke with Dr. Malagon after review of MRI. Patient appeared to have an epidural hematoma about T11; contrast images were pending. Per Dr. Malagon, he felt that the issues were quite unusual, and he was in process of facilitating transfer to the [facility] for further speciality evaluation.     rain landa  March 9, 2018

## 2018-03-10 NOTE — PROGRESS NOTES
Name: Derek Enriquez  Age/Sex: 57M  Rm: [id number]  MRN:  [id number]  Staff: Heath  Date of Admission: 3/9  Primary Service: NSG  Consulting Services:  Cardiology      HPI: Mr. Correa is a 58 yo male with history of CAD who presented to OSH 3/8 after witnessed cardiac arrest on a treadmill while working out.  He was resuscitated and brought to the OSH where stenting was performed for a high grade LAD lesion.  He was transferred to the ICU post-procedure with a aortic balloon pump in place due to hypotension during the procedure.  He was extubated mid-day of 3/9.  At approximately 1500, he noted that he could not feel his legs.  Per report, upon assessment, he had no motor function and no sensation in bilateral lower extremities.  He underwent emergent removal of the IABP and was taken for imaging which demonstrated a subdural hematoma with compression of the thoracic spinal cord.  He was transferred to the [facility] for further cares.  Of note, he was on dual antiplatelet therapy with ASA and Plavix post cardiac stenting.        Procedures:   3/9: T9-T12 laminectomy for subdural hematoma evacuation    Daily events:   3/8: Cardiac angio with stenting of LAD  3/9: transferred from OSH for emergent evacuation of subdural hematoma with spinal cord compression      Imaging:  3/9: MRI of T-spine: subdural hematoma, possible AVM at ~T10-12    Examination:  A0x3, CN II-XII intact, strength 5/5 in BUE, 0/5 in lower extremities, T9 sensory level, no rectal tone    Assessment/Plan of care: 58 yo male with subdural hematoma and thoracic spinal cord compression, possible secondary to ruptured spinal AVM following PCI for LAD stenosis.  Doing well post-operatively.      NEURO:   - Post-Operative Pain Control  - Neuro Examination Q 1 HR  - Prevena dressing in place until 3/15    CV:  - Map > 70  -Cardiology consultation given recent MI with stent placement     PULM:   - Incentive Spirometry Q 1 HR     GI/FEN:  - Advance Diet As Tolerated  - GI  Prophylaxis Not Indicated  - Bowel Regimen with Senna-Docusate and Dulcolax suppository   - Electrolyte Replacement Protocol    RENAL/:   - D/C Sandhu POD #1  - Continue 0.9 NaCl 100 mL/hour    HEME/ID:  - EBL: 300 mL  - DVT Prophylaxis: SCDs to BLE      ENDO:   - No Issues      DISPO:  Anticipate D/C to ARU  3/13  Barriers: evaluation by therapies, ambulating, BM/flatus, voiding without a Sandhu, tolerating PO diet, pain controlled on PO medications  Activity: Ambulate with Assist; Up in Chair TID  PT: Evaluation Pending  OT: Evaluation Pending  PMR: Evaluation Pending     Qamar Maya MD, PhD  Neurosurgery PGY-2    Please contact neurosurgery resident on call with questions.    Dial * * [phone], enter [phone] when prompted.

## 2018-03-10 NOTE — OP NOTE
Procedure Date: 03/09/2018      DATE OF OPERATION:  03/09/2018      PREOPERATIVE DIAGNOSIS:  Subdural hematoma in thoracic spine.   Recent MI status post cardiac stent and antiplatelet therapy.      POSTOPERATIVE DIAGNOSIS:  Subdural hematoma in thoracic spine.   Recent MI status post cardiac stent and antiplatelet therapy.     PROCEDURES PERFORMED:     1.  Emergent T9 to T12 laminectomy and evacuation of subdural hematoma.   2.  Intraoperative use of microscope, intraoperative C-arm, intraoperative use of ultrasound.      STAFF SURGEON:  Abhijeet Joe MD      RESIDENT SURGEON:  Qamar Maya MD, PhD; Rubén Landaverde MD      ESTIMATED BLOOD LOSS:  300 mL.      ANESTHESIA:  General.      INDICATIONS FOR PROCEDURE:  Mr. Enriquez is a 57-year-old gentleman who presented to [facility] with MI. He underwent cardiac stenting. After the procedure, he noted to have lower extremity deficits. His spine MRI showed subdural lesion in his thoracic spine, with spinal cord compression.  He was then transferred to the [facility] for further evaluation and management. Given these findings, he and his family consented for above-mentioned procedure after risks, benefits and alternative treatments were explained to them.      DESCRIPTION OF PROCEDURE:  The patient was brought into the operating room by our Anesthesia colleagues.  He underwent induction of general anesthesia.  He was positioned prone on the operating table.  All the pressure points were appropriately padded and well supported.  C-arm was used to localize from T9 to T12 and then the surgical site was then prepped and draped in standard sterile fashion. Appropriate timeout was conducted.  A #10 blade was used for skin incision.  Monopolar cautery was used to expose T9 all the way to T12.  Another x-ray was taken to confirm that we were at the correct locations. Using an AM-8 drill, we drilled down the lamina on both sides. We  then used [place] to complete the laminectomy from T9 to T12.  At this point, we brought the microscope sterilely for the remaining part of procedure. Dura was noted to be full, tense and firm with underlying purplish-bluish color.  Using a #15 blade, we opened the dura and using a nerve hook, we extended the dural incision from T9 all the way to T12. Immediately, blood clots herniated out. We saw dark blood clots tangled and adhered to the cord and nerve roots.  We then started to remove the clots in a piecemeal fashion.  Clots were sent out for pathology study. We also noticed the tangled vessels that were actively bleeding, and the bleeding was controlled with coagulation. . This was sent for pathology as well. We also irrigated the clots out. After removing the hematoma, we noticed the spinal cord and roots in the spinal canal. We saw some CSF came out as well.  However, we noticed the spinal cord was somewhat bulging. At this point, we brought the ultrasound sterilely.  We checked the spinal cord and looked for any hematoma ventrally, on the other side of the spinal cord, to check to see if there was any residual hematoma compressing the cord ventrally.  No definite hematoma was seen on ultrasound.  Once adequate clot had been removed, we closed the dura with 6-0 Prolene in simple running fashion. Evicel was applied. Watertight dural closure was achieved.  Further hemostasis was achieved using bipolar cautery. The muscle layer was brought together using 0 Vicryl stitches. The fascia was closed with 0 Vicryl stitch and the subcutaneous layer was brought together using 2-0 Vicryl stitch. Skin was closed using 3-0 nylon in simple running fashion.  The incision was then cleaned with a ChloraPrep dry dressing applied.  The patient was successfully extubated in the OR and taken to PACU in stable condition.  No immediate complications.  Counts were correct x2 at the end of procedure.    Dr. Joe was scrubbed in for  the critical portion of the procedure and was immediately available for the remaining.       ALICE GILLILAND MD       As dictated by RAMON PARKER MD     I, Dr. Alice Gilliland, was present for the key portions of the procedure and immediately available for the entire operation.             D: 03/10/2018   T: 03/10/2018   MT:       Name:     LINDA MARTÍNEZ   MRN:      [id number]        Account:        [id number]   :      1960           Procedure Date: 2018      Document: [id number]

## 2018-03-10 NOTE — PLAN OF CARE
Problem: Patient Care Overview  Goal: Plan of Care/Patient Progress Review  Outcome: No Change  Pt to transfer to [facility] for emergent surgery. Report given to RN at receiving hospital. VSS. Neuro status unchanged. Very concerned family at bedside and updated frequently by this writer and NCC MD. All belongings collected by family.

## 2018-03-10 NOTE — PROGRESS NOTES
Social Work Note:    D: SW contacted as pt is transferring to the [facility].    I/A: SW spoke to [facility] unit staff who stated that they are setting up transportation for pt and needed to know what paperwork to send with pt. SW stated that they should send a copy of the facesheet. SW indicated that the [facility] has access to [facility] and would be able to see chart notes.    P: No further SW needs indicated.    On-Call SW: Doe Cerda, RADHA, LICSW

## 2018-03-10 NOTE — PLAN OF CARE
Problem: Patient Care Overview  Goal: Plan of Care/Patient Progress Review  Outcome: No Change  D/I: Pt on unit 4A Surgical/Neuro ICU s/p laminectomy with subdural hematoma evacuation  Neuro- alert and oriented x4, pupils equal and reactive, conjugate gaze. Follows commands, 5/5 strength in BUE. No movement or sensation in BLE  CV- SR, SBP stable. Map goal>75. Afebrile.   Pulm- LS clear, dim at bases, 3L O2 via NC.   GI- hypoactive BS, NGT to LIS with small amount brown drainage. Small amount of water taken with pain meds.   - toussaint in place draining sufficient quantity of jovanni urine  Gtts- NS  Skin- mid back incision with incisional vac in place. Mild swelling at base of incision, MD Maya assessed. R groin site remains unchanged, no signs of hematoma.   Pain- moderate c/o pain, managed well with pain meds.  See flow sheets for further interventions and assessments.  A: Stable  P: Continue to monitor pt closely. Notify MD of significant changes.

## 2018-03-10 NOTE — PLAN OF CARE
Problem: Patient Care Overview  Goal: Plan of Care/Patient Progress Review  OT: Pt discharged to another hospital for emergent surgery before initial eval could be completed.   Will DC from IP OT.

## 2018-03-10 NOTE — PROGRESS NOTES
Returned from CT scanner and neuro s/s unchanged. No feeling to LE and unable to move them. Warm and pulses remain strong. Neuro Critical Care MD at bedside for assessment. Pt reports no feeling from belly button down. No withdraw to to bilat LE to painful stimuli. Fem stop removed from right groin IABP pull so pt can go to MRI. Pressure dressing applied. Pt reports return of pain to bilateral low back, Dr. Malagon ordered IV dilaudid for MRI scan. This writer and flying squad RN with patient to monitor closely.

## 2018-03-10 NOTE — ANESTHESIA CARE TRANSFER NOTE
Patient: Derek Enriquez    Procedure(s):  Thoracic 9-12 Laminectomy Evacuation of Subdural Hematoma, C-Arm, Prone, Gel Rolls. - Wound Class: I-Clean    Diagnosis: SPINAL SUBDERAL HEMATOMA  Diagnosis Additional Information: No value filed.    Anesthesia Type:   General, ETT     Note:  Airway :Face Mask  Patient transferred to:PACU  Comments: Extubated in the operating room uneventfully. Transferred to pacu in stable condition. Patient complaining of back pain for which we will treat with analgesics. Report given to MARISSA Gonzalez MD  [facility]  [id number] Report: Identifed the Patient, Identified the Reponsible Provider, Reviewed the pertinent medical history, Discussed the surgical course, Reviewed Intra-OP anesthesia mangement and issues during anesthesia, Set expectations for post-procedure period and Allowed opportunity for questions and acknowledgement of understanding      Vitals: (Last set prior to Anesthesia Care Transfer)    CRNA VITALS  3/10/2018 0035 - 3/10/2018 0125      3/10/2018             Pulse: 72    ART BP: 104/59    ART Mean: 76    Ht Rate: 79    SpO2: 100 %    Resp Rate (observed): (!)  7                Electronically Signed By: Chris Gonzalez MD  March 10, 2018  1:25 AM

## 2018-03-10 NOTE — H&P
Admitted:     03/09/2018      HISTORY OF PRESENT ILLNESS:  Mr. Enriquez is a 57-year-old male with history of CAD who presented to outside hospital on 03/08/2018 after a witnessed cardiac arrest while on a treadmill working out.  He was resuscitated and brought to the outside hospital where stenting was performed for high-grade LAD lesion.  He was transferred to the ICU post-procedure with aortic balloon pump in place due to hypotension exhibited during the procedure itself.  He was extubated the following day (03/09/2018).  At approximately 1500, he noted to the nurse that he could not feel his legs.  Per report, upon assessment he had no motor function and no sensation in bilateral lower extremities.  He underwent emergent removal of the intraaortic balloon pump and was taken for imaging which demonstrated subdural hematoma with compression of the thoracic spinal cord.  He was then transferred to the [facility] for further care.  Of note, he was on dual antiplatelet therapy with aspirin and Plavix post-cardiac stenting.  Upon arrival at the [facility], he was transfused with platelets and taken emergently to the operating room for evacuation of hematoma.      PAST MEDICAL HISTORY:   1.  Hyperlipidemia.   2.  Hypercholesterolemia.      PAST SURGICAL HISTORY:     1.  [place] teeth extraction.   2.  Right second finger repair.     3.  Shoulder surgery.      OUTPATIENT MEDICATIONS:   1.  Multivitamin.   2.  Vitamin B12.   3.  Cialis.   4.  Aspirin 81 mg daily.      SOCIAL HISTORY:  Former smoker, quit at age 31.  Denies significant alcohol or drug use. He works as an .  He lives in [place].      PHYSICAL EXAMINATION:   GENERAL:  This is a middle-aged male lying in the hospital bed in no acute distress.   MENTAL STATUS:  Alert and oriented x 3, is a somewhat poor historian for recent events.   STRENGTH: 5/5 in the upper extremities, completely out in the lower  extremities with flaccid paralysis.   SENSATION:  Intact to approximately the level of the umbilicus and is absent below this level.   REFLEXES: 2/4 in the upper extremities and absent in the lower extremities.  No clonus in the bilateral ankles.     RECTAL TONE: Absent.   GENITOURINARY: Sandhu is in place and compression dressing over the right groin.      LABORATORY DATA:  BMP was within normal limits.  CBC demonstrated a hemoglobin of 13.4, platelet count 180,000.  INR 1.2, PTT 35, fibrinogen 407.      IMAGING STUDIES:  MRI of the thoracic spine demonstrated an intradural hematoma with spinal cord compression of approximately T10 to T12 level.  There is suspicion for AVM at this level.      ASSESSMENT:  Mr. Martínez is a 57-year-old male who developed an acute subdural hematoma with thoracic spinal cord compression after percutaneous coronary intervention for an MI.  It is possible there is underlying AVM at this level of the spinal cord which led to this hemorrhage.      PLAN:    1.  Taken emergently to the operating room for hematoma evacuation.   2.  Hold aspirin and Plavix.    3.  Cardiology consult to discuss next steps in terms of anticoagulation.   4.  MAP greater than 70.   5.  Head of bed at 20 degrees.   6.  Advance diet as tolerated.   7.  PT, OT, PM and R evaluations.      Please contact neurosurgery resident on call with questions.    Dial * * [phone], enter [phone] when prompted.          ALICE GILLILAND MD       As dictated by GLENNA BENEDICT MD, PHD            D: 03/10/2018   T: 03/10/2018   MT:       Name:     LINDA MARTÍNEZ   MRN:      [id number]        Account:      [id number]   :      1960        Admitted:     2018                   Document: [id number]    Agree with resident's note.  Seen and examined today with team.  Peter Iqbal

## 2018-03-10 NOTE — PLAN OF CARE
"Problem: Patient Care Overview  Goal: Plan of Care/Patient Progress Review  OT/4AB:  HOLDING - OT orders received and acknowledged.  Pt's current activity orders read \"bedrest.\"  Will hold OT initiation until OOB activity is indicated.        "

## 2018-03-10 NOTE — PLAN OF CARE
Problem: Patient Care Overview  Goal: Plan of Care/Patient Progress Review  PT 4A:  Eval completed and treatment initiated.    Discharge Planner PT   Patient plan for discharge: Unknown  Current status: Distracted by pain and difficulty finding comfortable positioning in hospital bed.  Instructed him in spine precautions, able to roll with mod A.  Did not get OOB as Neurosurg confirmed bedrest orders just prior to PT session.  Barriers to return to prior living situation: Significant difficulty with mobility and ADL  Recommendations for discharge: ARU, preferably SCI unit  Rationale for recommendations: Pt was a very active and (I) person prior to onset of all injuries.  Will needs extensive rehab to return to PLOF.       Entered by: Jacinta Dobbs 03/10/2018 12:34 PM

## 2018-03-10 NOTE — ANESTHESIA PREPROCEDURE EVALUATION
Anesthesia Evaluation     . Pt has had prior anesthetic.            ROS/MED HX    ENT/Pulmonary:       Neurologic:       Cardiovascular:     (+) --CAD, -past MI,-stent,1 Drug Eluting Stent .. : . . . :. . Previous cardiac testing       METS/Exercise Tolerance:     Hematologic:         Musculoskeletal:         GI/Hepatic:         Renal/Genitourinary:         Endo:         Psychiatric:         Infectious Disease:         Malignancy:         Other:                                    Anesthesia Plan      History & Physical Review  History and physical reviewed and following examination; no interval change.    ASA Status:  4 emergent.        Plan for General and ETT with Intravenous induction. Maintenance will be Balanced.    PONV prophylaxis:  Ondansetron (or other 5HT-3) and Dexamethasone or Solumedrol  Additional equipment: Arterial Line and 2nd IV      Postoperative Care  Postoperative pain management:  IV analgesics.      Consents                          .

## 2018-03-10 NOTE — ANESTHESIA PROCEDURE NOTES
Arterial Line Procedure Note  Staff:     Anesthesiologist:  SEMAJ PANTOJA    Resident/CRNA:  XIOMARA CRUZ    Arterial line performed by resident/CRNA in presence of a teaching physician    Location: In OR Before Induction  Procedure Start/Stop Times:     patient identified, IV checked, site marked, risks and benefits discussed, informed consent, monitors and equipment checked, pre-op evaluation and at physician/surgeon's request      Correct Patient: Yes      Correct Position: Yes      Correct Site: Yes      Correct Procedure: Yes      Correct Laterality:  Yes    Site Marked:  Yes  Line Placement:     Procedure:  Arterial Line    Insertion Site:  Radial    Insertion laterality:  Right    Skin Prep: Chloraprep      Patient Prep: patient draped, mask, sterile gloves, hat and hand hygiene      Local skin infiltration:  1% lidocaine    amount (mL):  2    Ultrasound Guided?: Yes      Artery evaluated via ultrasound confirming patency.   Using realtime imaging, the artery was punctured and the needle was observed entering the artery.      A permanent image is entered into patient's chart.      Catheter size:  20 gauge, 12 cm    Cath secured with: suture      Dressing:  Tegaderm    Complications:  None obvious    Arterial waveform: Yes      IBP within 10% of NIBP: Yes

## 2018-03-10 NOTE — BRIEF OP NOTE
[facility], [place]    Brief Operative Note    Pre-operative diagnosis: SPINAL SUBDERAL HEMATOMA  Post-operative diagnosis SPINAL SUBDERAL HEMATOMA  Procedure: Procedure(s):  Thoracic 9-12 Laminectomy Evacuation of Subdural Hematoma, C-Arm, Prone, Gel Rolls. - Wound Class: I-Clean  Surgeon: Surgeon(s) and Role:     * Abhijeet Joe MD - Primary     * Rubén Landaverde MD - Resident - Assisting     * Qamar Maya MD - Resident - Assisting  Anesthesia: General   Estimated blood loss: 300 ml  Drains: None  Specimens:   ID Type Source Tests Collected by Time Destination   A : Spinal Subdural Hematoma (Permanent).  Tissue Spine, Thoracic SURGICAL PATHOLOGY EXAM Abhijeet Joe MD 3/9/2018 11:07 PM      Findings:   subdural hematoma.  Complications: None.  Implants: None.

## 2018-03-10 NOTE — CONSULTS
Cardiology Consult Note    Assessment and Plan:   58 y/o M with recent v-fib arrest (3/8) now s/p LAD MALI on 3/8 who presented as an emergent transfer from [facility] with acute spinal compression related to thoracic subdural hematoma 2/2 AVM in setting of anticoagulatioon / antiplatelet therapy. Cardiology asked to follow along given recent arrest and MALI placement.    Fortunately his cardiac function is completely normal based on his TTE done two days ago. He unfortunately cannot be on antiplatelet therapy at this time given his recent thoracic subdural hemorrhage. After discussing with neurosurgery, there are no further procedures planned pending clinical changes; also, they feel the patient does have the potential for a full recovery as the bleed was caught fairly early.    When safe from bleeding perspective, we would prefer to initiate Plavix 75 mg daily (no load) given drug-eluting stent. If thought to be too high of bleeding risk, then aspirin 81 mg daily would be reasonable alternative. Should be decompensate and the clinical picture suggest acute stent thrombosis, we would highly consider VA-ECMO without heparin.    Recommendations:  - when safe from bleeding perspective, prefer to start Plavix 75 mg daily (no load needed)  - if Plavix felt to be too high of bleeding risk, then would start aspirin 81 mg daily as alternative to Plavix  - start metoprolol 12.5 mg BID and Lipitor 40 mg daily when possible as well    We will follow along with you.    Gerald Reynolds  Cardiology Fellow    Patient was seen by and the above plan discussed with Dr. Shetty.    CARDIOLOGY STAFF NOTE  I have seen and examined the patient with the Cards consult team. I agree with the note above that reflects my assessment and plan of care.  Mohit Shetty MD [facility]  Cardiology - Electrophysiology      History of Present Illness:   58 y/o M with PMHx significant for presumed CAD based on screening CTA done recently. He  unfortunately had a v-fib arrest on 3/8 while exercising at [employer]. He was shocked twice (both with return to NSR) and emergently brought to cath lab at [facility] where high grade proximal LAD lesion was stented with MALI. IABP was placed and he was sent to the ICU. He was given aspirin, Plavix, Bivalirudin and was heparinized for his IABP. He had neurological recovery and thus was not cooled. He was extubated around noon 3/9.    Unfortunately he experienced acute onset below umbilicus numbness and weakness on 3/9 afternoon. MRI confirmed an AVM and intrathecal hemorrhage T10-T12 likely responsible for new neurological symptoms. He was emergently transferred here overnight for decompression. Platelet count on arrival was 180 and he received one unit platelets, and he underwent T9-T12 laminectomy for subdural hematoma evacuation.    Patient denies current chest pain, dyspnea on exertion, orthopnea, PND, lightheadedness, or palpitations. He continues to have numbness and inability to move lower extremities. He has a large family present at bedside this afternoon. All questions answered.     Review of Systems:   A comprehensive review of systems was performed and found to be negative except as described in this note     Medications:     Current Facility-Administered Medications   Medication     lidocaine 1 % 1 mL     lidocaine (LMX4) kit     sodium chloride (PF) 0.9% PF flush 3 mL     sodium chloride (PF) 0.9% PF flush 3 mL     naloxone (NARCAN) injection 0.1-0.4 mg     sodium chloride 0.9% infusion     calcium carbonate (TUMS) chewable tablet 1,000 mg     benzocaine-menthol (CEPACOL) 15-3.6 MG lozenge 1 lozenge     HYDROmorphone (DILAUDID) injection 0.3-0.5 mg     acetaminophen (TYLENOL) tablet 975 mg     [START ON 3/13/2018] acetaminophen (TYLENOL) tablet 650 mg     oxyCODONE IR (ROXICODONE) tablet 5-10 mg     cyclobenzaprine (FLEXERIL) tablet 10 mg     menthol (ICY HOT) 5 % patch 1 patch    And      menthol (ICY HOT) Patch in Place    And     [START ON 3/11/2018] menthol (ICY HOT) patch REMOVAL     Lidocaine (LIDOCARE) 4 % Patch 3 patch     lidocaine patch REMOVAL     lidocaine patch in PLACE     hydrOXYzine (ATARAX) tablet 25 mg     ondansetron (ZOFRAN-ODT) ODT tab 4-8 mg    Or     ondansetron (ZOFRAN) injection 4-8 mg     senna-docusate (SENOKOT-S;PERICOLACE) 8.6-50 MG per tablet 1 tablet    Or     senna-docusate (SENOKOT-S;PERICOLACE) 8.6-50 MG per tablet 2 tablet     bisacodyl (DULCOLAX) Suppository 10 mg     potassium chloride SA (K-DUR/KLOR-CON M) CR tablet 20-40 mEq     potassium chloride (KLOR-CON) Packet 20-40 mEq     potassium chloride 10 mEq in 100 mL sterile water intermittent infusion (premix)     potassium chloride 10 mEq in 100 mL intermittent infusion with 10 mg lidocaine     potassium chloride 20 mEq in 50 mL intermittent infusion     magnesium sulfate 4 g in 100 mL sterile water (premade)     potassium phosphate 15 mmol in D5W 250 mL intermittent infusion     potassium phosphate 20 mmol in D5W 500 mL intermittent infusion     potassium phosphate 20 mmol in D5W 250 mL intermittent infusion     potassium phosphate 25 mmol in D5W 500 mL intermittent infusion       Other History:   No past medical history on file.  No Known Allergies  No family history on file.  Social History     Social History     Marital status: Single     Spouse name: N/A     Number of children: N/A     Years of education: N/A     Occupational History     Not on file.     Social History Main Topics     Smoking status: Former Smoker     Smokeless tobacco: Never Used      Comment: Quit at 31     Alcohol use Not on file     Drug use: Not on file     Sexual activity: Not on file     Other Topics Concern     Not on file     Social History Narrative     No narrative on file     No past surgical history on file.    Objective:   Blood pressure 121/70, temperature 97.6  F (36.4  C), temperature source Oral, resp. rate 10, SpO2 100  %.  General Appearance: NAD, alert and oriented x 3  Respiratory: clear bilaterally, good air movement, no distress or accessory muscle use  Cardiovascular: RRR, no murmurs, no rub, no JVD  GI: non-tender, non-distended, (+) BS  Skin: no rash  Extr: no edema, warm and well perfused  Neuro: absent sensation and movement to LE bilaterally    Data:   - reviewed all labs and imaging    Telemetry (overnight):  - NSR, occasional PAC's and PVC's, rates normal    TTE (3/8/2018):  Left ventricular systolic function is normal.  The visual ejection fraction is estimated at 60-65%.  No regional wall motion abnormalities noted.  The study was technically difficult. There is no comparison study available.

## 2018-03-10 NOTE — OR NURSING
Report called to 4A.  Nurse updated by writer that pt does not have a capnography machine as it is not functioning.  Pt safely transported with zoll monitor and two PACU RNs.

## 2018-03-11 ENCOUNTER — ANESTHESIA (OUTPATIENT)
Dept: SURGERY | Facility: CLINIC | Age: 58
End: 2018-03-11
Payer: COMMERCIAL

## 2018-03-11 ENCOUNTER — APPOINTMENT (OUTPATIENT)
Dept: INTERVENTIONAL RADIOLOGY/VASCULAR | Facility: CLINIC | Age: 58
End: 2018-03-11
Attending: NEUROLOGICAL SURGERY
Payer: COMMERCIAL

## 2018-03-11 ENCOUNTER — ANESTHESIA EVENT (OUTPATIENT)
Dept: SURGERY | Facility: CLINIC | Age: 58
End: 2018-03-11
Payer: COMMERCIAL

## 2018-03-11 LAB
ANION GAP SERPL CALCULATED.3IONS-SCNC: 8 MMOL/L (ref 3–14)
BUN SERPL-MCNC: 15 MG/DL (ref 7–30)
CALCIUM SERPL-MCNC: 7.7 MG/DL (ref 8.5–10.1)
CHLORIDE SERPL-SCNC: 108 MMOL/L (ref 94–109)
CO2 SERPL-SCNC: 26 MMOL/L (ref 20–32)
CREAT SERPL-MCNC: 0.71 MG/DL (ref 0.66–1.25)
ERYTHROCYTE [DISTWIDTH] IN BLOOD BY AUTOMATED COUNT: 13.4 % (ref 10–15)
GFR SERPL CREATININE-BSD FRML MDRD: >90 ML/MIN/1.7M2
GLUCOSE SERPL-MCNC: 117 MG/DL (ref 70–99)
HCT VFR BLD AUTO: 29.5 % (ref 40–53)
HGB BLD-MCNC: 9.9 G/DL (ref 13.3–17.7)
MCH RBC QN AUTO: 30.4 PG (ref 26.5–33)
MCHC RBC AUTO-ENTMCNC: 33.6 G/DL (ref 31.5–36.5)
MCV RBC AUTO: 91 FL (ref 78–100)
PLATELET # BLD AUTO: 184 10E9/L (ref 150–450)
POTASSIUM SERPL-SCNC: 3.6 MMOL/L (ref 3.4–5.3)
RBC # BLD AUTO: 3.26 10E12/L (ref 4.4–5.9)
SODIUM SERPL-SCNC: 141 MMOL/L (ref 133–144)
WBC # BLD AUTO: 8.3 10E9/L (ref 4–11)

## 2018-03-11 PROCEDURE — 25000565 ZZH ISOFLURANE, EA 15 MIN

## 2018-03-11 PROCEDURE — 25000128 H RX IP 250 OP 636: Performed by: STUDENT IN AN ORGANIZED HEALTH CARE EDUCATION/TRAINING PROGRAM

## 2018-03-11 PROCEDURE — 27210738 ZZH ACCESSORY CR2

## 2018-03-11 PROCEDURE — 75705 ARTERY X-RAYS SPINE: CPT

## 2018-03-11 PROCEDURE — 27210802 ZZH SHEATH CR1

## 2018-03-11 PROCEDURE — 27210907 ZZH KIT CR9

## 2018-03-11 PROCEDURE — 25000132 ZZH RX MED GY IP 250 OP 250 PS 637: Performed by: STUDENT IN AN ORGANIZED HEALTH CARE EDUCATION/TRAINING PROGRAM

## 2018-03-11 PROCEDURE — 25000128 H RX IP 250 OP 636: Performed by: PSYCHIATRY & NEUROLOGY

## 2018-03-11 PROCEDURE — 93005 ELECTROCARDIOGRAM TRACING: CPT

## 2018-03-11 PROCEDURE — 36215 PLACE CATHETER IN ARTERY: CPT

## 2018-03-11 PROCEDURE — C1760 CLOSURE DEV, VASC: HCPCS

## 2018-03-11 PROCEDURE — 27210908 ZZH NEEDLE CR4

## 2018-03-11 PROCEDURE — 3E033XZ INTRODUCTION OF VASOPRESSOR INTO PERIPHERAL VEIN, PERCUTANEOUS APPROACH: ICD-10-PCS | Performed by: RADIOLOGY

## 2018-03-11 PROCEDURE — 25000128 H RX IP 250 OP 636: Performed by: NURSE ANESTHETIST, CERTIFIED REGISTERED

## 2018-03-11 PROCEDURE — 85027 COMPLETE CBC AUTOMATED: CPT | Performed by: NEUROLOGICAL SURGERY

## 2018-03-11 PROCEDURE — C1887 CATHETER, GUIDING: HCPCS

## 2018-03-11 PROCEDURE — 40000275 ZZH STATISTIC RCP TIME EA 10 MIN

## 2018-03-11 PROCEDURE — 27210732 ZZH ACCESSORY CR1

## 2018-03-11 PROCEDURE — 36245 INS CATH ABD/L-EXT ART 1ST: CPT

## 2018-03-11 PROCEDURE — 25000125 ZZHC RX 250: Performed by: NURSE ANESTHETIST, CERTIFIED REGISTERED

## 2018-03-11 PROCEDURE — 37000009 ZZH ANESTHESIA TECHNICAL FEE, EACH ADDTL 15 MIN

## 2018-03-11 PROCEDURE — 80048 BASIC METABOLIC PNL TOTAL CA: CPT | Performed by: NEUROLOGICAL SURGERY

## 2018-03-11 PROCEDURE — 40000014 ZZH STATISTIC ARTERIAL MONITORING DAILY

## 2018-03-11 PROCEDURE — 93010 ELECTROCARDIOGRAM REPORT: CPT | Mod: ZP | Performed by: INTERNAL MEDICINE

## 2018-03-11 PROCEDURE — C1769 GUIDE WIRE: HCPCS

## 2018-03-11 PROCEDURE — 27210889 ZZH ACCESSORY CR8

## 2018-03-11 PROCEDURE — 37000008 ZZH ANESTHESIA TECHNICAL FEE, 1ST 30 MIN

## 2018-03-11 PROCEDURE — C9399 UNCLASSIFIED DRUGS OR BIOLOG: HCPCS | Performed by: NURSE ANESTHETIST, CERTIFIED REGISTERED

## 2018-03-11 PROCEDURE — 40000170 ZZH STATISTIC PRE-PROCEDURE ASSESSMENT II

## 2018-03-11 PROCEDURE — 20000004 ZZH R&B ICU UMMC

## 2018-03-11 PROCEDURE — 71000014 ZZH RECOVERY PHASE 1 LEVEL 2 FIRST HR

## 2018-03-11 RX ORDER — SODIUM CHLORIDE, SODIUM LACTATE, POTASSIUM CHLORIDE, CALCIUM CHLORIDE 600; 310; 30; 20 MG/100ML; MG/100ML; MG/100ML; MG/100ML
INJECTION, SOLUTION INTRAVENOUS CONTINUOUS PRN
Status: DISCONTINUED | OUTPATIENT
Start: 2018-03-11 | End: 2018-03-12

## 2018-03-11 RX ORDER — IODIXANOL 320 MG/ML
150 INJECTION, SOLUTION INTRAVASCULAR ONCE
Status: DISCONTINUED | OUTPATIENT
Start: 2018-03-11 | End: 2018-03-11

## 2018-03-11 RX ORDER — LIDOCAINE 40 MG/G
CREAM TOPICAL
Status: DISCONTINUED | OUTPATIENT
Start: 2018-03-11 | End: 2018-03-11 | Stop reason: HOSPADM

## 2018-03-11 RX ORDER — FENTANYL CITRATE 50 UG/ML
INJECTION, SOLUTION INTRAMUSCULAR; INTRAVENOUS PRN
Status: DISCONTINUED | OUTPATIENT
Start: 2018-03-11 | End: 2018-03-12

## 2018-03-11 RX ORDER — PROPOFOL 10 MG/ML
INJECTION, EMULSION INTRAVENOUS PRN
Status: DISCONTINUED | OUTPATIENT
Start: 2018-03-11 | End: 2018-03-12

## 2018-03-11 RX ORDER — LIDOCAINE HYDROCHLORIDE 20 MG/ML
INJECTION, SOLUTION INFILTRATION; PERINEURAL PRN
Status: DISCONTINUED | OUTPATIENT
Start: 2018-03-11 | End: 2018-03-12

## 2018-03-11 RX ORDER — FENTANYL CITRATE 50 UG/ML
25-50 INJECTION, SOLUTION INTRAMUSCULAR; INTRAVENOUS
Status: DISCONTINUED | OUTPATIENT
Start: 2018-03-11 | End: 2018-03-11 | Stop reason: HOSPADM

## 2018-03-11 RX ORDER — SODIUM CHLORIDE, SODIUM LACTATE, POTASSIUM CHLORIDE, CALCIUM CHLORIDE 600; 310; 30; 20 MG/100ML; MG/100ML; MG/100ML; MG/100ML
INJECTION, SOLUTION INTRAVENOUS CONTINUOUS
Status: DISCONTINUED | OUTPATIENT
Start: 2018-03-11 | End: 2018-03-11 | Stop reason: HOSPADM

## 2018-03-11 RX ORDER — EPHEDRINE SULFATE 50 MG/ML
INJECTION, SOLUTION INTRAMUSCULAR; INTRAVENOUS; SUBCUTANEOUS PRN
Status: DISCONTINUED | OUTPATIENT
Start: 2018-03-11 | End: 2018-03-12

## 2018-03-11 RX ORDER — HYDROMORPHONE HYDROCHLORIDE 1 MG/ML
.3-.5 INJECTION, SOLUTION INTRAMUSCULAR; INTRAVENOUS; SUBCUTANEOUS EVERY 5 MIN PRN
Status: DISCONTINUED | OUTPATIENT
Start: 2018-03-11 | End: 2018-03-11 | Stop reason: HOSPADM

## 2018-03-11 RX ORDER — NALOXONE HYDROCHLORIDE 0.4 MG/ML
.1-.4 INJECTION, SOLUTION INTRAMUSCULAR; INTRAVENOUS; SUBCUTANEOUS
Status: DISCONTINUED | OUTPATIENT
Start: 2018-03-11 | End: 2018-03-11

## 2018-03-11 RX ORDER — ONDANSETRON 2 MG/ML
INJECTION INTRAMUSCULAR; INTRAVENOUS PRN
Status: DISCONTINUED | OUTPATIENT
Start: 2018-03-11 | End: 2018-03-12

## 2018-03-11 RX ORDER — SODIUM CHLORIDE 9 MG/ML
INJECTION, SOLUTION INTRAVENOUS CONTINUOUS
Status: DISCONTINUED | OUTPATIENT
Start: 2018-03-11 | End: 2018-03-11

## 2018-03-11 RX ORDER — ONDANSETRON 4 MG/1
4 TABLET, ORALLY DISINTEGRATING ORAL EVERY 30 MIN PRN
Status: DISCONTINUED | OUTPATIENT
Start: 2018-03-11 | End: 2018-03-11 | Stop reason: HOSPADM

## 2018-03-11 RX ORDER — ATORVASTATIN CALCIUM 40 MG/1
40 TABLET, FILM COATED ORAL DAILY
Status: DISCONTINUED | OUTPATIENT
Start: 2018-03-11 | End: 2018-03-14 | Stop reason: HOSPADM

## 2018-03-11 RX ORDER — SODIUM CHLORIDE 9 MG/ML
INJECTION, SOLUTION INTRAVENOUS CONTINUOUS
Status: DISCONTINUED | OUTPATIENT
Start: 2018-03-11 | End: 2018-03-11 | Stop reason: HOSPADM

## 2018-03-11 RX ORDER — ESMOLOL HYDROCHLORIDE 10 MG/ML
INJECTION INTRAVENOUS PRN
Status: DISCONTINUED | OUTPATIENT
Start: 2018-03-11 | End: 2018-03-12

## 2018-03-11 RX ORDER — ONDANSETRON 2 MG/ML
4 INJECTION INTRAMUSCULAR; INTRAVENOUS EVERY 30 MIN PRN
Status: DISCONTINUED | OUTPATIENT
Start: 2018-03-11 | End: 2018-03-11 | Stop reason: HOSPADM

## 2018-03-11 RX ADMIN — OXYCODONE HYDROCHLORIDE 5 MG: 5 TABLET ORAL at 20:18

## 2018-03-11 RX ADMIN — SODIUM CHLORIDE: 9 INJECTION, SOLUTION INTRAVENOUS at 11:51

## 2018-03-11 RX ADMIN — PROPOFOL 50 MG: 10 INJECTION, EMULSION INTRAVENOUS at 09:20

## 2018-03-11 RX ADMIN — SENNOSIDES AND DOCUSATE SODIUM 2 TABLET: 8.6; 5 TABLET ORAL at 14:03

## 2018-03-11 RX ADMIN — HYDROMORPHONE HYDROCHLORIDE 0.5 MG: 1 INJECTION, SOLUTION INTRAMUSCULAR; INTRAVENOUS; SUBCUTANEOUS at 16:34

## 2018-03-11 RX ADMIN — FENTANYL CITRATE 50 MCG: 50 INJECTION, SOLUTION INTRAMUSCULAR; INTRAVENOUS at 09:15

## 2018-03-11 RX ADMIN — ACETAMINOPHEN 975 MG: 325 TABLET, FILM COATED ORAL at 20:18

## 2018-03-11 RX ADMIN — SUGAMMADEX 160 MG: 100 INJECTION, SOLUTION INTRAVENOUS at 11:11

## 2018-03-11 RX ADMIN — PHENYLEPHRINE HYDROCHLORIDE 100 MCG: 10 INJECTION, SOLUTION INTRAMUSCULAR; INTRAVENOUS; SUBCUTANEOUS at 10:38

## 2018-03-11 RX ADMIN — CYCLOBENZAPRINE HYDROCHLORIDE 10 MG: 10 TABLET, FILM COATED ORAL at 20:18

## 2018-03-11 RX ADMIN — ATORVASTATIN CALCIUM 40 MG: 40 TABLET, FILM COATED ORAL at 14:02

## 2018-03-11 RX ADMIN — METOPROLOL TARTRATE 12.5 MG: 25 TABLET, FILM COATED ORAL at 20:18

## 2018-03-11 RX ADMIN — OXYCODONE HYDROCHLORIDE 10 MG: 5 TABLET ORAL at 16:34

## 2018-03-11 RX ADMIN — SODIUM CHLORIDE, POTASSIUM CHLORIDE, SODIUM LACTATE AND CALCIUM CHLORIDE: 600; 310; 30; 20 INJECTION, SOLUTION INTRAVENOUS at 09:33

## 2018-03-11 RX ADMIN — PHENYLEPHRINE HYDROCHLORIDE 200 MCG: 10 INJECTION, SOLUTION INTRAMUSCULAR; INTRAVENOUS; SUBCUTANEOUS at 09:48

## 2018-03-11 RX ADMIN — FENTANYL CITRATE 150 MCG: 50 INJECTION, SOLUTION INTRAMUSCULAR; INTRAVENOUS at 09:19

## 2018-03-11 RX ADMIN — ROCURONIUM BROMIDE 20 MG: 10 INJECTION INTRAVENOUS at 10:07

## 2018-03-11 RX ADMIN — ACETAMINOPHEN 975 MG: 325 TABLET, FILM COATED ORAL at 03:22

## 2018-03-11 RX ADMIN — Medication 5 MG: at 09:51

## 2018-03-11 RX ADMIN — HYDROMORPHONE HYDROCHLORIDE 0.5 MG: 1 INJECTION, SOLUTION INTRAMUSCULAR; INTRAVENOUS; SUBCUTANEOUS at 14:02

## 2018-03-11 RX ADMIN — PHENYLEPHRINE HYDROCHLORIDE 200 MCG: 10 INJECTION, SOLUTION INTRAMUSCULAR; INTRAVENOUS; SUBCUTANEOUS at 09:32

## 2018-03-11 RX ADMIN — SODIUM CHLORIDE: 9 INJECTION, SOLUTION INTRAVENOUS at 04:19

## 2018-03-11 RX ADMIN — PHENYLEPHRINE HYDROCHLORIDE 0.5 MCG/KG/MIN: 10 INJECTION, SOLUTION INTRAMUSCULAR; INTRAVENOUS; SUBCUTANEOUS at 09:32

## 2018-03-11 RX ADMIN — PHENYLEPHRINE HYDROCHLORIDE 200 MCG: 10 INJECTION, SOLUTION INTRAMUSCULAR; INTRAVENOUS; SUBCUTANEOUS at 09:29

## 2018-03-11 RX ADMIN — ROCURONIUM BROMIDE 10 MG: 10 INJECTION INTRAVENOUS at 10:28

## 2018-03-11 RX ADMIN — SODIUM CHLORIDE 500 ML: 9 INJECTION, SOLUTION INTRAVENOUS at 03:19

## 2018-03-11 RX ADMIN — LIDOCAINE HYDROCHLORIDE 80 MG: 20 INJECTION, SOLUTION INFILTRATION; PERINEURAL at 09:19

## 2018-03-11 RX ADMIN — ONDANSETRON 4 MG: 2 INJECTION INTRAMUSCULAR; INTRAVENOUS at 11:05

## 2018-03-11 RX ADMIN — OXYCODONE HYDROCHLORIDE 5 MG: 5 TABLET ORAL at 06:23

## 2018-03-11 RX ADMIN — PHENYLEPHRINE HYDROCHLORIDE 100 MCG: 10 INJECTION, SOLUTION INTRAMUSCULAR; INTRAVENOUS; SUBCUTANEOUS at 09:41

## 2018-03-11 RX ADMIN — OXYCODONE HYDROCHLORIDE 5 MG: 5 TABLET ORAL at 14:02

## 2018-03-11 RX ADMIN — PROPOFOL 20 MG: 10 INJECTION, EMULSION INTRAVENOUS at 09:24

## 2018-03-11 RX ADMIN — ESMOLOL HYDROCHLORIDE 20 MG: 10 INJECTION, SOLUTION INTRAVENOUS at 09:24

## 2018-03-11 RX ADMIN — SENNOSIDES AND DOCUSATE SODIUM 1 TABLET: 8.6; 5 TABLET ORAL at 20:18

## 2018-03-11 RX ADMIN — SODIUM CHLORIDE, POTASSIUM CHLORIDE, SODIUM LACTATE AND CALCIUM CHLORIDE: 600; 310; 30; 20 INJECTION, SOLUTION INTRAVENOUS at 09:00

## 2018-03-11 RX ADMIN — PHENYLEPHRINE HYDROCHLORIDE 100 MCG: 10 INJECTION, SOLUTION INTRAMUSCULAR; INTRAVENOUS; SUBCUTANEOUS at 09:45

## 2018-03-11 RX ADMIN — ROCURONIUM BROMIDE 50 MG: 10 INJECTION INTRAVENOUS at 09:21

## 2018-03-11 RX ADMIN — METOPROLOL TARTRATE 12.5 MG: 25 TABLET, FILM COATED ORAL at 14:03

## 2018-03-11 ASSESSMENT — PAIN DESCRIPTION - DESCRIPTORS
DESCRIPTORS: ACHING;CONSTANT
DESCRIPTORS: ACHING;BURNING;CONSTANT
DESCRIPTORS: ACHING;DISCOMFORT
DESCRIPTORS: ACHING
DESCRIPTORS: ACHING;DISCOMFORT
DESCRIPTORS: ACHING

## 2018-03-11 NOTE — ANESTHESIA POSTPROCEDURE EVALUATION
Patient: Derek Enriquez    Procedure(s):  spinal anigogram with embolization    Diagnosis:spinal arteriovenous malformation  Diagnosis Additional Information: No value filed.    Anesthesia Type:  General, ETT    Note:  Anesthesia Post Evaluation    Patient location during evaluation: PACU  Patient participation: Able to fully participate in evaluation  Level of consciousness: awake  Pain management: adequate  Airway patency: patent  Cardiovascular status: acceptable  Respiratory status: acceptable  Hydration status: acceptable  PONV: none     Anesthetic complications: None    Comments: Neurostatus unchanged (complete plegia below about T11, no sensation).   Going to MRI        Last vitals:  Vitals:    03/11/18 1130 03/11/18 1145 03/11/18 1200   BP:      Resp: 14 18 18   Temp:   36.9  C (98.4  F)   SpO2:            Electronically Signed By: Pete Romero MD  March 11, 2018  12:13 PM

## 2018-03-11 NOTE — PROCEDURES
[facility], [place]     Endovascular Surgical Neuroradiology Post-Procedure Note    Pre-Procedure Diagnosis:  Spinal subdural hematoma  Post-Procedure Diagnosis:  Spinal subdural hematoma    Procedure(s):   Diagnostic spinal angiography    Findings:  No evidence of residual spinal vascular malformation on bilateral T5 to L4 injections.    Plan:    - Resume inpatient care.   - 2 hours bedrest with no RLE movement.   - Recommend MRI T-spine without contrast  (with DWI sequence).   - Ok to start on ASA & Plavix from neuroIR standpoint.   - Follow up spinal angiogram in 1 month.    Primary Surgeon:  Dr. Jaciel Clark  Secondary Surgeon:  Not applicable  Secondary Surgeon Review:  None  Fellow:  Male, Folkertsma  Additional Assistants:  None    Prior to the start of the procedure and with procedural staff participation, I verbally confirmed: the patient s identity using two indicators, relevant allergies, that the procedure was appropriate and matched the consent or emergent situation, and that the correct equipment/implants were available. Immediately prior to starting the procedure I conducted the Time Out with the procedural staff and re-confirmed the patient s name, procedure, and site/side. (The Joint Commission universal protocol was followed.)  Yes    PRU value: Not applicable    Anesthesia:  Performed by Anesthesia  Medications: Per anesthesia  Puncture site:  Right Femoral Artery    Fluoroscopy time (minutes):  33.9  Radiation dose (mGy):  2190    Contrast amount (mL):  100     Estimated blood loss (mL):  Minimal    Closure:  Device - Angioseal    Disposition:  Will be followed in hospital by the Neurosurgery team.        Sedation Post-Procedure Summary    Sedatives: Per anesthesia    Vital signs and pulse oximetry were monitored and remained stable throughout the procedure, and sedation was maintained until the procedure was complete.  The patient was monitored by staff until  sedation discharge criteria were met.    Patient tolerance:  Patient tolerated the procedure well with no immediate complications.    Time of sedation in minutes: Per anesthesia minutes from beginning to end of physician one to one monitoring.    Elmer MCCANN Male  Pager:  [phone]

## 2018-03-11 NOTE — OR NURSING
Dr. Gross at bedside. Spoke with patient. Patient to have MRI STAT prior to transport to ICU 4A. Jacklyn Brar RN and myself transported patient to MRI.   Once arrived to [facility], it was concluded that wound vac was not MRI safe. Neurosurgery was contacted and MRI cancelled at this time. Neuro IR MD at bedside, aware that MRI was cancelled.   Bedside report to 4A was completed in patient room. Patient remained stable and neurologically at baseline throughout PACU and transport phases. Strict bedrest until 1330, then HOB can be elevated to 20 degrees per orders.

## 2018-03-11 NOTE — PLAN OF CARE
Problem: Pain, Acute (Adult)  Goal: Identify Related Risk Factors and Signs and Symptoms  Related risk factors and signs and symptoms are identified upon initiation of Human Response Clinical Practice Guideline (CPG).   Neuro: Pt continues to have no sensation below umbilicus. Unable to move BLE. Upper body fully intact neurologically. PERRLA. A&Ox4.  CV: Pt received 500cc fluid bolus overnight dt MAPs<75. MAPs currently holding in the 80's.  Resp: WDL  GI: No BM overnight. Bowel regimen continues. NGT removed per provider.  : Sandhu in place with good UOP.  Skin: Spinal incision WDL, no drainage from wound vac.  Other: Right forearm PIV removed dt leakage. Pt continues to have pain, effectively controlled with oxycodone.    See flow sheet for full assessment. Will continue to monitor.

## 2018-03-11 NOTE — OR NURSING
Patient alert and oriented. No neuro changes from previous assessments. Repositioned patient q1h. Wound vac in place. Skin without redness and/or breakdown. Patient son had signed consent form last evening, patient also chose to sign consent form. Patient quite appropriate and oriented. Dr. Romero spoke with patient at moderate length about procedure and probable recovery path. Patient girlfriend and her daughter at bedside.

## 2018-03-11 NOTE — CONSULTS
[facility], [place]     Endovascular Surgical Neuroradiology Pre-Procedure Note      HPI:  Derek Enriquez is a 57 year old male with recent Vfib arrest on 3/8 s/p LAD MALI. Next day, on extubation, patient paraplegic and loss of sensation below umbilicus. Stat MRI T/L - spine revealed T10-T12 intra-thecal hemorrhage. IABP removed and patient transferred to [facility]. S/p emergent surgical evacuation. MRI and intra-op neurosurgery assessment is concerning for spinal AVM that caused bleeding. He is undergoing spinal angiogram with possible embolization today under general anesthesia.    Medical History:  Cardiac arrest; V-fib arrest  CAD s/p LAD MALI  Hyperlipidemia    Surgical History:  Spinal subdural evacuation  Right second finger repair  Shoulder surgery  Bison tooth extraction    Family History:  Unremarkable for any spinal or cranial malformations    Social History:  Social History     Social History     Marital status: Single     Spouse name: N/A     Number of children: N/A     Years of education: N/A     Occupational History     Not on file.     Social History Main Topics     Smoking status: Former Smoker     Smokeless tobacco: Never Used      Comment: Quit at 31     Alcohol use Not on file     Drug use: Not on file     Sexual activity: Not on file     Other Topics Concern     Not on file     Social History Narrative     No narrative on file     Allergies:  No Known Allergies    Is there a contrast allergy?  No    Medications:  Prescriptions Prior to Admission   Medication Sig Dispense Refill Last Dose     aspirin 81 MG tablet Take 1 tablet (81 mg) by mouth daily 90 tablet 3      omega-3 acid ethyl esters (LOVAZA) 1 G capsule Take 2 g by mouth 2 times daily   Taking     vitamin B complex with vitamin C (VITAMIN  B COMPLEX) TABS tablet Take 1 tablet by mouth daily   Unknown     Multiple Vitamins-Minerals (MULTIVITAMIN MEN PO) Take 1 tablet by mouth daily    Taking   .    ROS:  The 10  point Review of Systems is negative other than noted in the HPI or here.    PHYSICAL EXAMINATION  Vital Signs:  B/P: 121/70,  T: 98.8,  P: Data Unavailable,  R: 16  Gen: Awake, alert, no acute distress  Cardio:  RRR  Pulmonary:  no respiratory distress  Neurologic  Mentation: Awake, alert and oriented x3  Speech: Normal. No dysarthria or aphasia.   Cranial nerves: PERRLA, VFF, EOMI, Facial sensations intact, Face symmetric, Hearing normal to finger rub B/L, Palate elevates to midline, shoulder shrug strong B/L, tongue movements intact  Motor: Strength 5/5 in BUE. 0/5 BLE.  Sensations: Absent below T8 level.  Cerebellar signs: FTN test reveals no dysmetria    LABS  (most recent Cr, BUN, GFR, PLT, INR, PTT within the past 7 days):    Recent Labs  Lab 03/11/18  0402  03/09/18  2100   CR 0.71  < > 0.76   BUN 15  < > 16   GFRESTIMATED >90  < > >90   GFRESTBLACK >90  < > >90     < > 180   INR  --   --  1.20*   PTT  --   --  35   < > = values in this interval not displayed.     Platelet Function P2Y12 (PRU):  Not applicable    ASSESSMENT:  Spinal subdural hematoma s/p surgical evacuation and now with concern of spinal AVM    PLAN:  Diagnostic spinal angiogram under general anesthesia with possible embolization     PRE-PROCEDURE SEDATION ASSESSMENT     Pre-Procedure Sedation Assessment done at 0700.    Expected Level:  Deep Sedation    Indication:  Sedation is required to allow for neurointerventional procedure.    Consent obtained from relative son after discussing the risks, benefits and alternatives.     PO Intake:  Appropriately NPO for procedure    ASA Class:  Class 4 - SEVERE SYSTEMIC DISEASE, ACUTE UNSTABLE PROBLEMS.    Mallampati:  Grade 2:  Soft palate, base of uvula, tonsillar pillars, and portion of posterior pharyngeal wall visible    History and physical reviewed and no updates needed. I have reviewed the lab findings, diagnostic data, medications, and the plan for sedation. I have determined this  patient to be an appropriate candidate for the planned sedation and procedure and have reassessed the patient IMMEDIATELY PRIOR to sedation and procedure.    Patient was discussed with the Attending, Dr. Clark, who agrees with the plan.    Elmer MCCANN Male   Pager: [phone]

## 2018-03-11 NOTE — PROGRESS NOTES
Name: Derek Enriquez  Age/Sex: 57M  Rm: [id number]  MRN:  [id number]  Staff: Heath  Date of Admission: 3/9  Primary Service: NSG  Consulting Services:  Cardiology      HPI: Mr. Correa is a 58 yo male with history of CAD who presented to OSH 3/8 after witnessed cardiac arrest on a treadmill while working out.  He was resuscitated and brought to the OSH where stenting was performed for a high grade LAD lesion.  He was transferred to the ICU post-procedure with a aortic balloon pump in place due to hypotension during the procedure.  He was extubated mid-day of 3/9.  At approximately 1500, he noted that he could not feel his legs.  Per report, upon assessment, he had no motor function and no sensation in bilateral lower extremities.  He underwent emergent removal of the IABP and was taken for imaging which demonstrated a subdural hematoma with compression of the thoracic spinal cord.  He was transferred to the [facility] for further cares.  Of note, he was on dual antiplatelet therapy with ASA and Plavix post cardiac stenting.        Procedures:   3/9: T9-T12 laminectomy for subdural hematoma evacuation    Daily events:   3/8: Cardiac angio with stenting of LAD  3/9: transferred from OSH for emergent evacuation of subdural hematoma with spinal cord compression      Imaging:  3/9: MRI of T-spine: subdural hematoma, possible AVM at ~T10-12    Examination:  A0x3, CN II-XII intact, strength 5/5 in BUE, 0/5 in lower extremities, T9 sensory level, no rectal tone    Assessment/Plan of care: 58 yo male with subdural hematoma and thoracic spinal cord compression, possible secondary to ruptured spinal AVM following PCI for LAD stenosis.  POD#2 s/p decompression of spinal hematoma. Doing well post-operatively.      NEURO:   - Post-Operative Pain Control  - Neuro Examination Q 1 HR  - Prevena dressing in place until 3/15    CV:  - Map > 75-80  -Cardiology consultation given recent MI with stent placement; appreciate recommendations    PULM:   -  Incentive Spirometry Q 1 HR     GI/FEN:  - Advance Diet As Tolerated  - GI Prophylaxis Not Indicated  - Bowel Regimen with Senna-Docusate and Dulcolax suppository   - Electrolyte Replacement Protocol    RENAL/:   - mIVF to maintain MAP goal    HEME/ID:  - EBL: 300 mL  - DVT Prophylaxis: SCDs to BLE      ENDO:   - No Issues      DISPO:  Anticipate D/C to ARU  3/13  Barriers: evaluation by therapies, ambulating, BM/flatus, voiding without a Sandhu, tolerating PO diet, pain controlled on PO medications  Activity: Ambulate with Assist; Up in Chair TID  PT: ARU, SCI unit  OT: Evaluation Pending  PMR: Evaluation Pending       Contact the neurosurgery resident on call with questions.    Dial * * * [phone]: Enter job code [id number] when prompted.    Misha Erickson MD  Neurosurgery PGY2  Agree with resident's note.  Seen and examined today with team.  Peter Iqbal

## 2018-03-11 NOTE — PLAN OF CARE
Problem: Patient Care Overview  Goal: Plan of Care/Patient Progress Review  OT/4AB:  Cancel - Pt at OR, will reschedule.

## 2018-03-11 NOTE — PROGRESS NOTES
Neuroscience Intensive Care Progress Note  03/11/2018    Summary: Derek Enriquez (pronouced Kel) is a 57 year old year old male with pmhx significant for cervicalgia (resolved) admitted on 3/9/2018 with development of T10-12 spinal SDH s/p emergent surgical evacuation 2/2 cardiac arrest 2/2 ACS s/p PCI and dual antiplatelet tx.    Problem List:  1. Spinal cord compression  2. Cardiac arrest  3. Normocytic normochromic anemia    24 hour events:  -getting spinal Angio today.    24 Hour Vital Signs Summary:  Temp: 98.4  F (36.9  C) Temp  Min: 98.3  F (36.8  C)  Max: 100.2  F (37.9  C)  Resp: 18 Resp  Min: 14  Max: 18  SpO2: 98 % SpO2  Min: 93 %  Max: 100 %    No Data Recorded  Heart Rate: 77 Heart Rate  Min: 68  Max: 90    No data recorded.  No data recorded.      Ventilator Settings  N/A      Arterial Line BP: (107-149)/(36-76) 140/62  MAP:  [62 mmHg-98 mmHg] 85 mmHg    Current Medications:    [Auto Hold] metoprolol tartrate  12.5 mg Oral BID     [Auto Hold] atorvastatin  40 mg Oral Daily     [Auto Hold] sodium chloride (PF)  3 mL Intracatheter Q8H     [Auto Hold] acetaminophen  975 mg Oral Q8H     [Auto Hold] cyclobenzaprine  10 mg Oral TID     [Auto Hold] menthol   Transdermal Q8H     [Auto Hold] lidocaine  3 patch Transdermal Q24H     [Auto Hold] lidocaine   Transdermal Q24h     [Auto Hold] lidocaine   Transdermal Q8H     [Auto Hold] senna-docusate  1 tablet Oral BID    Or     [Auto Hold] senna-docusate  2 tablet Oral BID     [Auto Hold] bisacodyl  10 mg Rectal Daily       PRN Medications:  fentaNYL, naloxone, ondansetron **OR** ondansetron, prochlorperazine, HYDROmorphone, [Auto Hold] lidocaine (buffered or not buffered), [Auto Hold] lidocaine 4%, [Auto Hold] sodium chloride (PF), [Auto Hold] naloxone, [Auto Hold] calcium carbonate, [Auto Hold] sore throat lozenge, [Auto Hold] HYDROmorphone, [Auto Hold] acetaminophen, [Auto Hold] oxyCODONE IR, [Auto Hold] menthol **AND** [Auto Hold] menthol **AND** [Auto Hold]  menthol, [Auto Hold] hydrOXYzine, [Auto Hold] ondansetron **OR** [Auto Hold] ondansetron, [Auto Hold] potassium chloride, [Auto Hold] potassium chloride, [Auto Hold] potassium chloride, [Auto Hold] potassium chloride with lidocaine, [Auto Hold] potassium chloride, [Auto Hold] magnesium sulfate, [Auto Hold] potassium phosphate (KPHOS) in D5W IV, [Auto Hold] potassium phosphate (KPHOS) in D5W IV, [Auto Hold] potassium phosphate (KPHOS) in D5W IV, [Auto Hold] potassium phosphate (KPHOS) in D5W IV    Infusions:    norepinephrine       phenylephrine IV infusion ADULT Stopped (03/11/18 1114)     lactated ringers       sodium chloride 100 mL/hr at 03/11/18 1151     sodium chloride 100 mL/hr at 03/11/18 0600       No Known Allergies    Physical Examination:  /70  Temp 98.4  F (36.9  C) (Skin)  Resp 18  SpO2 98%  Neurologic:  Mental Status: Eyes closed, open to voice, fully alert, attentive. Speech clear and fluent.   Cranial Nerves: Visual fields intact. PERRL, brisk, 2-3mm. EOMI with normal smooth pursuit. Facial movements symmetric. Facial sensation symmetric. Hearing intact to conversation. Palate elevation symmetric, uvula midline. No dysarthria. Shoulder shrug strong bilaterally. Tongue protrusion midline.  Motor: No abnormal movements. Normal tone b/l UE; hypotonic b/l LE. Strength 0/5 b/l LE; strength 5/5 b/l UE  Reflexes: 0/4 patellar, 0/4 left achilles, 0/4 right achilles. No clonus at ankles. Babinski negative. 2+ biceps, brachioradialis b/l  Sensory: Absent to light touch, pain, and vibration to T9-10 b/l. Intact to light touch above T9-10.  Coordination: Deferred  Station/Gait: Deferred due to LE weakness     General: NAD, lying on bed.  HEENT: NC/AT. Mucous membranes moist. NG tube in place, brown, bilious liquid.  Cardiac: RRR. No murmur/rubs/gallops appreciated.  Respiratory: NLB on RA. CTAB. No w/r/c appreciated.  Abdominal: soft, non-tender, nondistended  Ext: No edema.     Labs/Studies:  Recent  Labs   Lab Test  03/10/18   0601  03/09/18   2100  03/09/18   0435   NA  140  139  136  138   POTASSIUM  3.8  3.8  3.7  4.2   CHLORIDE  108  107  109   CO2  24  23  23   ANIONGAP  8  9  6   GLC  150*  136*  132*  118*   BUN  18  16  21   CR  0.78  0.76  0.83   PAT  7.6*  8.1*  8.0*   WBC  9.8  10.0  12.2*   RBC  3.63*  4.40  4.72   HGB  11.0*  13.6  13.4  14.5   PLT  221  180  198       Recent Labs   Lab Test  03/09/18 2100 03/08/18   2125  03/08/18   1750   INR  1.20*  3.66*  1.12   PTT  35  >240*   --          Recent Labs  Lab 03/09/18 2100 03/09/18  0550   PH 7.41 7.37   PCO2 35 39   PO2 482* 133*   HCO3 22 23   O2PER 100 50       Imaging:  MRI 3/9 AM lumbar and thoracic, shows subdural hematoma T10-T12 isointense on T1, hyperintense on T2 suggesting hyperacute bleed (<24 hours).    Assessment/Plan  Derek Enriquez is a 57 year old year old male with pmhx significant for cervicalgia (resolved) admitted on 3/9/2018 with development of T10-12 spinal SDH s/p emergent surgical evacuation and preceding cardiac arrest 2/2 ACS s/p PCI and dual antiplatelet tx.    Neuro:  # Subdural Hematoma with Spinal Compression s/p Laminectomy and Evacuation:  Etiology is unknown, possibly 2/2 fall vs fistual.  -q1h neuro exam  -MAP >70  -Nicardipine PRN for BP control  -Continue to hold antiplatelets     Resp:  -Encourage incentive spirometry 10x qh    CV:  # ACS s/p LAD PCI:   Patient had known CAD in LAD and was scheduled for CABG, but developed ACS and cardiac arrest. No evidence of cortical function loss. Patient was started on dual antiplatelet therapy 2/2 stent placement. This has been held.   -MAP >70  -Cardiology consulted, appreciate recs    Renal:  No active issues    Endo:   No active issues. Glucose checks.     Heme:  # Normocytic, Normochromic Anemia:   Mildly decreased Hgb. Follow CBC daily.      # Elevated INR, inaccurate lab.    GI/:   -Bowel regimen  -Sandhu  -ADAT    ID:   No active issues. Afebrile, no  leukocytosis.     FEN: D/c IVF  PPX:    DVT: SCDs    GI: As above  LDA: BUE peripheral IV, A-line, NG tube, Sandhu in place, wound dressing in place until 3/15.  Code Status: Full code, prior  Dispo: NSG primary    Patient discussed with attending physician Dr. Brooks.    Guillermo Love MD  1:11 PM March 11, 2018  Vascular Neurology Fellow.

## 2018-03-11 NOTE — ANESTHESIA PREPROCEDURE EVALUATION
Anesthesia Evaluation     . Pt has had prior anesthetic. Type: General    No history of anesthetic complications          ROS/MED HX    ENT/Pulmonary:  - neg pulmonary ROS     Neurologic: Comment: See HPI    (+)delerium     Cardiovascular:     (+) --CAD, -past MI,-stent,LAD 3/8/18  1 Drug Eluting Stent .. : . . . :. . Previous cardiac testing Echodate:3/9/18results:Left ventricular systolic function is normal.  The visual ejection fraction is estimated at 60-65%.  No regional wall motion abnormalities noted.  The study was technically difficult. There is no comparison study available.date: results: date: results: date: results:         (-) taking anticoagulants/antiplatelets   METS/Exercise Tolerance:     Hematologic:  - neg hematologic  ROS       Musculoskeletal:  - neg musculoskeletal ROS       GI/Hepatic:         Renal/Genitourinary:  - ROS Renal section negative       Endo:  - neg endo ROS       Psychiatric:  - neg psychiatric ROS       Infectious Disease:         Malignancy:         Other:                                    Anesthesia Plan      History & Physical Review  History and physical reviewed and following examination; no interval change.    ASA Status:  4 .    NPO Status:  > 6 hours    Plan for General and ETT with Intravenous induction. Maintenance will be Other.    PONV prophylaxis:  Ondansetron (or other 5HT-3)  Original plan MAC.  After discussion with IR, Xray field is anticipated to be just under the diaphragm - and hence multiple breath holds will be needed.  Will proceed with GA/ETT       Postoperative Care  Postoperative pain management:  IV analgesics.      Consents  Anesthetic plan, risks, benefits and alternatives discussed with:  Patient..

## 2018-03-11 NOTE — OR NURSING
Patient arrived from IR intubated. Restaints applied for safety. AVSS. Groin site CDI. Extubated at 1128 per Betsy Marie. Dr Romero at bedside at 1132. No immediate concerns.

## 2018-03-11 NOTE — ANESTHESIA CARE TRANSFER NOTE
Patient: Derek Enriquez    Procedure(s):  spinal anigogram with embolization    Diagnosis: spinal arteriovenous malformation  Diagnosis Additional Information: No value filed.    Anesthesia Type:   MAC     Note:  Airway :ETT  Patient transferred to:PACU  Comments: Pt transferred to PACU intubated and sedated by CRNA x 2.  Ventilated via ambu bag.  VSS.  Report to RN.  All questions answered. Handoff Report: Identifed the Patient, Identified the Reponsible Provider, Reviewed the pertinent medical history, Discussed the surgical course, Reviewed Intra-OP anesthesia mangement and issues during anesthesia, Set expectations for post-procedure period and Allowed opportunity for questions and acknowledgement of understanding      Vitals: (Last set prior to Anesthesia Care Transfer)    CRNA VITALS  3/11/2018 1037 - 3/11/2018 1137      3/11/2018             ART BP: 135/76    Ht Rate: 61    SpO2: 100 %    EKG: Sinus rhythm                Electronically Signed By: GREGORIO Daily CRNA  March 11, 2018  11:41 AM

## 2018-03-11 NOTE — PROGRESS NOTES
SPIRITUAL HEALTH SERVICES  SPIRITUAL ASSESSMENT Progress Note  [facility] 4  ON-CALL VISIT    REFERRAL SOURCE: Hospital  Request     Patient was in surgery when I attempted to visit the first time. The second time he was with unit staff.    PLAN: Unit  will be notified and attempt visit tomorrow.    Stephany WeiAbilene  Oncology   Pager [phone]

## 2018-03-12 ENCOUNTER — APPOINTMENT (OUTPATIENT)
Dept: OCCUPATIONAL THERAPY | Facility: CLINIC | Age: 58
End: 2018-03-12
Attending: NEUROLOGICAL SURGERY
Payer: COMMERCIAL

## 2018-03-12 LAB
ANION GAP SERPL CALCULATED.3IONS-SCNC: 9 MMOL/L (ref 3–14)
BUN SERPL-MCNC: 15 MG/DL (ref 7–30)
CALCIUM SERPL-MCNC: 8.2 MG/DL (ref 8.5–10.1)
CHLORIDE SERPL-SCNC: 105 MMOL/L (ref 94–109)
CO2 SERPL-SCNC: 25 MMOL/L (ref 20–32)
CREAT SERPL-MCNC: 0.7 MG/DL (ref 0.66–1.25)
ERYTHROCYTE [DISTWIDTH] IN BLOOD BY AUTOMATED COUNT: 13 % (ref 10–15)
GFR SERPL CREATININE-BSD FRML MDRD: >90 ML/MIN/1.7M2
GLUCOSE BLDC GLUCOMTR-MCNC: 116 MG/DL (ref 70–99)
GLUCOSE SERPL-MCNC: 107 MG/DL (ref 70–99)
HCT VFR BLD AUTO: 29.7 % (ref 40–53)
HGB BLD-MCNC: 10 G/DL (ref 13.3–17.7)
INTERPRETATION ECG - MUSE: NORMAL
MAGNESIUM SERPL-MCNC: 1.9 MG/DL (ref 1.6–2.3)
MCH RBC QN AUTO: 30.3 PG (ref 26.5–33)
MCHC RBC AUTO-ENTMCNC: 33.7 G/DL (ref 31.5–36.5)
MCV RBC AUTO: 90 FL (ref 78–100)
PHOSPHATE SERPL-MCNC: 2.7 MG/DL (ref 2.5–4.5)
PLATELET # BLD AUTO: 177 10E9/L (ref 150–450)
POTASSIUM SERPL-SCNC: 3.6 MMOL/L (ref 3.4–5.3)
RBC # BLD AUTO: 3.3 10E12/L (ref 4.4–5.9)
SODIUM SERPL-SCNC: 139 MMOL/L (ref 133–144)
WBC # BLD AUTO: 7.8 10E9/L (ref 4–11)

## 2018-03-12 PROCEDURE — 25000132 ZZH RX MED GY IP 250 OP 250 PS 637: Performed by: STUDENT IN AN ORGANIZED HEALTH CARE EDUCATION/TRAINING PROGRAM

## 2018-03-12 PROCEDURE — 80048 BASIC METABOLIC PNL TOTAL CA: CPT | Performed by: STUDENT IN AN ORGANIZED HEALTH CARE EDUCATION/TRAINING PROGRAM

## 2018-03-12 PROCEDURE — 40000275 ZZH STATISTIC RCP TIME EA 10 MIN

## 2018-03-12 PROCEDURE — 84100 ASSAY OF PHOSPHORUS: CPT | Performed by: NEUROLOGICAL SURGERY

## 2018-03-12 PROCEDURE — 97530 THERAPEUTIC ACTIVITIES: CPT | Mod: GO

## 2018-03-12 PROCEDURE — 00000146 ZZHCL STATISTIC GLUCOSE BY METER IP

## 2018-03-12 PROCEDURE — 97535 SELF CARE MNGMENT TRAINING: CPT | Mod: GO

## 2018-03-12 PROCEDURE — 83735 ASSAY OF MAGNESIUM: CPT | Performed by: STUDENT IN AN ORGANIZED HEALTH CARE EDUCATION/TRAINING PROGRAM

## 2018-03-12 PROCEDURE — 25000128 H RX IP 250 OP 636: Performed by: STUDENT IN AN ORGANIZED HEALTH CARE EDUCATION/TRAINING PROGRAM

## 2018-03-12 PROCEDURE — 40000014 ZZH STATISTIC ARTERIAL MONITORING DAILY

## 2018-03-12 PROCEDURE — 85027 COMPLETE CBC AUTOMATED: CPT | Performed by: STUDENT IN AN ORGANIZED HEALTH CARE EDUCATION/TRAINING PROGRAM

## 2018-03-12 PROCEDURE — 40000133 ZZH STATISTIC OT WARD VISIT

## 2018-03-12 PROCEDURE — 20000004 ZZH R&B ICU UMMC

## 2018-03-12 PROCEDURE — 97166 OT EVAL MOD COMPLEX 45 MIN: CPT | Mod: GO

## 2018-03-12 PROCEDURE — 84100 ASSAY OF PHOSPHORUS: CPT | Performed by: STUDENT IN AN ORGANIZED HEALTH CARE EDUCATION/TRAINING PROGRAM

## 2018-03-12 RX ORDER — DIAZEPAM 5 MG
5 TABLET ORAL EVERY 6 HOURS PRN
Status: DISCONTINUED | OUTPATIENT
Start: 2018-03-12 | End: 2018-03-14 | Stop reason: HOSPADM

## 2018-03-12 RX ADMIN — CYCLOBENZAPRINE HYDROCHLORIDE 10 MG: 10 TABLET, FILM COATED ORAL at 20:37

## 2018-03-12 RX ADMIN — CYCLOBENZAPRINE HYDROCHLORIDE 10 MG: 10 TABLET, FILM COATED ORAL at 08:05

## 2018-03-12 RX ADMIN — SENNOSIDES AND DOCUSATE SODIUM 1 TABLET: 8.6; 5 TABLET ORAL at 08:05

## 2018-03-12 RX ADMIN — ACETAMINOPHEN 975 MG: 325 TABLET, FILM COATED ORAL at 04:09

## 2018-03-12 RX ADMIN — BISACODYL 10 MG: 10 SUPPOSITORY RECTAL at 08:06

## 2018-03-12 RX ADMIN — HYDROMORPHONE HYDROCHLORIDE 0.3 MG: 1 INJECTION, SOLUTION INTRAMUSCULAR; INTRAVENOUS; SUBCUTANEOUS at 10:00

## 2018-03-12 RX ADMIN — CYCLOBENZAPRINE HYDROCHLORIDE 10 MG: 10 TABLET, FILM COATED ORAL at 14:08

## 2018-03-12 RX ADMIN — LIDOCAINE 3 PATCH: 560 PATCH PERCUTANEOUS; TOPICAL; TRANSDERMAL at 08:04

## 2018-03-12 RX ADMIN — HYDROMORPHONE HYDROCHLORIDE 0.4 MG: 1 INJECTION, SOLUTION INTRAMUSCULAR; INTRAVENOUS; SUBCUTANEOUS at 18:10

## 2018-03-12 RX ADMIN — HYDROMORPHONE HYDROCHLORIDE 0.5 MG: 1 INJECTION, SOLUTION INTRAMUSCULAR; INTRAVENOUS; SUBCUTANEOUS at 20:19

## 2018-03-12 RX ADMIN — ONDANSETRON 8 MG: 2 INJECTION INTRAMUSCULAR; INTRAVENOUS at 18:08

## 2018-03-12 RX ADMIN — ACETAMINOPHEN 975 MG: 325 TABLET, FILM COATED ORAL at 12:07

## 2018-03-12 RX ADMIN — METOPROLOL TARTRATE 12.5 MG: 25 TABLET, FILM COATED ORAL at 20:37

## 2018-03-12 RX ADMIN — SENNOSIDES AND DOCUSATE SODIUM 1 TABLET: 8.6; 5 TABLET ORAL at 20:37

## 2018-03-12 RX ADMIN — ACETAMINOPHEN 975 MG: 325 TABLET, FILM COATED ORAL at 20:36

## 2018-03-12 RX ADMIN — ATORVASTATIN CALCIUM 40 MG: 40 TABLET, FILM COATED ORAL at 08:05

## 2018-03-12 ASSESSMENT — PAIN DESCRIPTION - DESCRIPTORS
DESCRIPTORS: HEAVINESS
DESCRIPTORS: CRUSHING;HEAVINESS
DESCRIPTORS: ACHING;CONSTANT;SHOOTING

## 2018-03-12 ASSESSMENT — ACTIVITIES OF DAILY LIVING (ADL): PREVIOUS_RESPONSIBILITIES: MEAL PREP;HOUSEKEEPING;LAUNDRY;SHOPPING;MEDICATION MANAGEMENT;FINANCES;YARDWORK;DRIVING;WORK

## 2018-03-12 NOTE — PLAN OF CARE
Problem: Patient Care Overview  Goal: Plan of Care/Patient Progress Review  4AB: OT:  Discharge Planner OT   Patient plan for discharge: none stated  Current status: Pt Mod A with rolling side to side for sling placement, dependent lift from bed to chair.  While in chair pt completed g/h tasks with set up.  Barriers to return to prior living situation: medical status, decreased mobility  Recommendations for discharge: ARU (SCI focused)  Rationale for recommendations: Pt will need intense therapy to increase I with ADLs, functional mobility and transfers in light of current medical status.       Entered by: Gill Samayoa 03/12/2018 4:25 PM

## 2018-03-12 NOTE — PROGRESS NOTES
03/12/18 0938   Quick Adds   Type of Visit Initial Occupational Therapy Evaluation   Living Environment   Lives With alone   Living Arrangements house   Home Accessibility bed and bath on same level;tub/shower is not walk in   Number of Stairs to Enter Home 1   Transportation Available car;family or friend will provide   Self-Care   Dominant Hand right   Usual Activity Tolerance excellent   Current Activity Tolerance fair   Regular Exercise yes   Activity/Exercise Type swimming  (running)   Exercise Amount/Frequency 3-5 times/wk   Equipment Currently Used at Home none   Activity/Exercise/Self-Care Comment Pt works full time as an .   Functional Level Prior   Ambulation 0-->independent   Transferring 0-->independent   Toileting 0-->independent   Bathing 0-->independent   Dressing 0-->independent   Eating 0-->independent   Communication 0-->understands/communicates without difficulty   Swallowing 0-->swallows foods/liquids without difficulty   Cognition 0 - no cognition issues reported   Fall history within last six months no   General Information   Onset of Illness/Injury or Date of Surgery - Date 03/09/18   Referring Physician Sanford Cueto MD   Patient/Family Goals Statement to return to prior level of function   Additional Occupational Profile Info/Pertinent History of Current Problem Derek Enriquez (pronouced Kel) is a 57 year old year old male with pmhx significant for cervicalgia (resolved) admitted on 3/9/2018 with development of T10-12 spinal SDH s/p emergent surgical evacuation 2/2 cardiac arrest 2/2 ACS s/p PCI and dual antiplatelet tx.   Precautions/Limitations fall precautions;spinal precautions   General Observations Pt presents with decreased I with ADLs, functional mobility and transfers.   Cognitive Status Examination   Orientation orientation to person, place and time   Level of Consciousness alert   Able to Follow Commands WNL/WFL   Personal Safety (Cognitive) WNL/WFL   Memory intact    Attention No deficits were identified   Organization/Problem Solving No deficits were identified   Executive Function No deficits were identified   Cognitive Comment Pt appears at baseline with cognition, continue to monitor.   Visual Perception   Visual Perception No deficits were identified;Wears glasses  (cheaters)   Sensory Examination   Sensory Comments no feeling in legs at this point   Pain Assessment   Patient Currently in Pain No   Range of Motion (ROM)   ROM Quick Adds No deficits were identified   Mobility   Bed Mobility Comments Max A x2   Transfer Skills   Transfer Comments dependent   Instrumental Activities of Daily Living (IADL)   Previous Responsibilities meal prep;housekeeping;laundry;shopping;medication management;finances;yardwork;driving;work   Activities of Daily Living Analysis   Impairments Contributing to Impaired Activities of Daily Living pain;sensation decreased;coordination impaired;strength decreased   General Therapy Interventions   Planned Therapy Interventions ADL retraining;IADL retraining;bed mobility training;cognition;ROM;strengthening;transfer training;home program guidelines;progressive activity/exercise;risk factor education   Clinical Impression   Criteria for Skilled Therapeutic Interventions Met yes, treatment indicated   OT Diagnosis Pt presents with decreased I with ADLs functional mobility and transfers.    Influenced by the following impairments deconditioning, paralysis   Assessment of Occupational Performance 3-5 Performance Deficits   Identified Performance Deficits dressing, transfers, ambulation, g/h   Clinical Decision Making (Complexity) Moderate complexity   Therapy Frequency daily   Predicted Duration of Therapy Intervention (days/wks) 3 weeks   Anticipated Discharge Disposition Acute Rehabilitation Facility   Risks and Benefits of Treatment have been explained. Yes   Patient, Family & other staff in agreement with plan of care Yes   [facility] AM-PAC   "\"6 Clicks\" Daily Activity Inpatient Short Form   1. Putting on and taking off regular lower body clothing? 1 - Total   2. Bathing (including washing, rinsing, drying)? 2 - A Lot   3. Toileting, which includes using toilet, bedpan or urinal? 1 - Total   4. Putting on and taking off regular upper body clothing? 2 - A Lot   5. Taking care of personal grooming such as brushing teeth? 2 - A Lot   6. Eating meals? 3 - A Little   Daily Activity Raw Score (Score out of 24.Lower scores equate to lower levels of function) 11   Total Evaluation Time   Total Evaluation Time (Minutes) 8     "

## 2018-03-12 NOTE — CONSULTS
[facility]   PM&R CONSULT    Consulting Provider:   Reason for Consult: Assessment of rehabilitation   Location of Patient: [unfilled]  Date of Encounter: 3/12/2018   Date of Admission: 3/9/2018        ASSESSMENT/PLAN:    Mr. Derek Enriquez is a Right handed 57 year old yo male POD#3 T9-T12 laminectomy and subdural hematoma evacuation who presents with acute spinal cord injury with a reported level of T9-T10 for motor and sensory and resultant impairments in BLE strength, functional of cognition of BLE, BLE sensation, neurogenic bowel, neurogenic bladder, gait, spinal precautions s/p spinal surgery. and impairments in ADL/IADLs.    We discussed multiple topics related to acute spinal cord injury including neurogenic bowel, neurogenic bladder, risk of skin breakdown, adjustment to disability, risk of autonomic dysreflexia, spinal shock period, CHONG exam.    Would like to perform a baseline CHONG exam around post-injury day 5-7 according to the consortium for spinal cord medicine clinical practice guidelines. This would be helpful in not only determining the level of injury but also likelihood of having a reflexic/areflexic neurogenic bowel or bladder. Suspect that he would have both reflexic bowel and bladder programs. Also, AD has been reported in T10 compete spinal cord injuries and completeness of injury affects this risk.     Neurogenic bowel:   Continue with bisacodyl suppository. Senna typically works 6-8 hours after  administration. Would recommend adjusting administration of senna to scheduled  QHS to better  time with morning bisacodyl suppository.    Bladder management:   Continue with toussaint catheter. Briefly discussed self-intermittent catheterization and  training likely will be offered during ARU admission.    Disposition - Acute rehabilitation facility.    Appreciate PT/OT recommendations. He will likely need 90 minutes daily of PT/OT for 7 days/week to work on lower body  dressing (donning/doffing), bathing, ADL/IADL training, mobility, bed mobility, transfers. With the goal of wheelchair based modified independence in ADL/IADLs and mobility.     Will plan to follow and perform CHONG exam if patient is still in ICU or on the ortiz post injury day 5-7. Plan to follow along.      HPI:    Derek Enriquez 57 year old male with PMH CAD presented to OSH 3/8 after witnessed cardiac arrest on a treadmill while working out. He was resuscitated and brought to the OSH where stenting was performed for a high grade LAD lesion. He was transferred tot he ICU post-procedure with an aortic balloon pump in place due to hypotension during the proedure. He was extubated mid-day of 3/9. At approximately 1500, he noted that he could not feel his legs. Per report, he had no motor function and no sensation in bilateraly lower extremities. He   Underwent emergent removal of the IABP and was taken for imaging which demosntrated a subdural hematoma with compression of the thoracic spinal cord. He was transferred to the [facility] for further cares. He underwent a T9-T12 laminectomy for subdural hematoma evacuation. He received a spinal angiography on 3/11 that demonstrated bilateral from T5-L4 no AVM visualized and it was thought that the AVM was removed during surgery.     PREVIOUS LEVEL OF FUNCTION   He was previously independent for all ADL/IADLs, mobility, and cognition/speech.      CURRENT FUNCTION   He is rolling with moderate assistance. He requires a lift for transfers, he is dependent for mobility, he is also dependent for ADL/IADLs.    LIVING SITUATION/SUPPORT  He lives in a [facility] in [place]. There a 2-3 steps to enter and all the living areas are on the first level. There is a basement but he does not need to go down to the basement.  His girlfriend and her son are supportive and may be able to assist with cares. His mother lives in the [facility] but may not be able to be as supportive with cares.    SOCIAL  HISTORY  Social History     Social History     Marital status: Single     Spouse name: N/A     Number of children: N/A     Years of education: N/A     Social History Main Topics     Smoking status: Former Smoker     Smokeless tobacco: Never Used      Comment: Quit at 31     Alcohol use None     Drug use: None     Sexual activity: Not Asked     Other Topics Concern     None     Social History Narrative     Past Medical History:  No past medical history on file.    Current Medications:  Current Facility-Administered Medications   Medication     diazepam (VALIUM) tablet 5 mg     metoprolol tartrate (LOPRESSOR) half-tab 12.5 mg     atorvastatin (LIPITOR) tablet 40 mg     lidocaine 1 % 1 mL     lidocaine (LMX4) kit     sodium chloride (PF) 0.9% PF flush 3 mL     sodium chloride (PF) 0.9% PF flush 3 mL     naloxone (NARCAN) injection 0.1-0.4 mg     calcium carbonate (TUMS) chewable tablet 1,000 mg     benzocaine-menthol (CEPACOL) 15-3.6 MG lozenge 1 lozenge     HYDROmorphone (DILAUDID) injection 0.3-0.5 mg     acetaminophen (TYLENOL) tablet 975 mg     [START ON 3/13/2018] acetaminophen (TYLENOL) tablet 650 mg     oxyCODONE IR (ROXICODONE) tablet 5-10 mg     cyclobenzaprine (FLEXERIL) tablet 10 mg     menthol (ICY HOT) 5 % patch 1 patch    And     menthol (ICY HOT) Patch in Place    And     menthol (ICY HOT) patch REMOVAL     Lidocaine (LIDOCARE) 4 % Patch 3 patch     lidocaine patch REMOVAL     lidocaine patch in PLACE     hydrOXYzine (ATARAX) tablet 25 mg     ondansetron (ZOFRAN-ODT) ODT tab 4-8 mg    Or     ondansetron (ZOFRAN) injection 4-8 mg     senna-docusate (SENOKOT-S;PERICOLACE) 8.6-50 MG per tablet 1 tablet    Or     senna-docusate (SENOKOT-S;PERICOLACE) 8.6-50 MG per tablet 2 tablet     bisacodyl (DULCOLAX) Suppository 10 mg     potassium chloride SA (K-DUR/KLOR-CON M) CR tablet 20-40 mEq     potassium chloride (KLOR-CON) Packet 20-40 mEq     potassium chloride 10 mEq in 100 mL sterile water intermittent  infusion (premix)     potassium chloride 10 mEq in 100 mL intermittent infusion with 10 mg lidocaine     potassium chloride 20 mEq in 50 mL intermittent infusion     magnesium sulfate 4 g in 100 mL sterile water (premade)     potassium phosphate 15 mmol in D5W 250 mL intermittent infusion     potassium phosphate 20 mmol in D5W 500 mL intermittent infusion     potassium phosphate 20 mmol in D5W 250 mL intermittent infusion     potassium phosphate 25 mmol in D5W 500 mL intermittent infusion     Facility-Administered Medications Ordered in Other Encounters   Medication     fentaNYL (PF) (SUBLIMAZE) injection     lactated ringers infusion     propofol (DIPRIVAN) injection 10 mg/mL vial     lidocaine injection 2% (MDV)     rocuronium (ZEMURON) injection     esmolol (BREVIBLOC) injection     phenylephrine (DYLAN-SYNEPHRINE) injection 1 mg     lactated ringers infusion     ePHEDrine injection     No abx ordered pre-op     sugammadex (BRIDION) injection     ondansetron (ZOFRAN) injection         Review of Systems:  Total of ten systems reviewed, pertinent positives and negatives as follows  No motor movement in BLE  Feels generally fatigued  Reports weakness in BLE  Numbness below T9  Neurogenic bladder present  Neurogenic bowel present  No chest pain. No cough or SOB.  No visual changes.   No headache or photophobia.   No nausea, or abdominal pain.  Slept poorly last night  No joint pain, muscle pain or swelling.  Adjusting to disability, Mood is good, denies any symptoms of depression.   Remainder of the review of the systems was negative.          Labs   Lab Results   Component Value Date    WBC 7.8 03/12/2018    HGB 10.0 (L) 03/12/2018    HCT 29.7 (L) 03/12/2018    MCV 90 03/12/2018     03/12/2018     Lab Results   Component Value Date     03/12/2018    POTASSIUM 3.6 03/12/2018    CHLORIDE 105 03/12/2018    CO2 25 03/12/2018     (H) 03/12/2018     Lab Results   Component Value Date    GFRESTIMATED >90  03/12/2018    GFRESTBLACK >90 03/12/2018     Lab Results   Component Value Date    AST 81 (H) 03/09/2018    ALT 60 03/09/2018    ALKPHOS 64 03/09/2018    BILITOTAL 0.7 03/09/2018     Lab Results   Component Value Date    INR 1.20 (H) 03/09/2018     Lab Results   Component Value Date    BUN 15 03/12/2018    CR 0.70 03/12/2018         ON EXAMINATION:  Vitals:    03/12/18 0300 03/12/18 0400 03/12/18 0500 03/12/18 0600   BP: 125/68 126/69 125/66 128/74   BP Location:       Cuff Size:       Resp: 14 14 13 14   Temp:  98.8  F (37.1  C)     TempSrc:  Oral     SpO2: 95% 95% 95% 97%   Weight:  80.2 kg (176 lb 12.9 oz)         Physical Exam:  Blood pressure 128/74, temperature 98.8  F (37.1  C), temperature source Oral, resp. rate 14, weight 80.2 kg (176 lb 12.9 oz), SpO2 97 %.    GEN: NAD, seated/lying comfortably in bed  She/He is alert, appropriate, cooperative  Speech is not dysarthric  Comprehension is intact  HEENT: NCAT  RESPIRATORY: nonlabored breathing on room air  CARDIAC: perfusing all extremities  MSK: full passive ROM at all major joints of the bilaterally upper extremities. No muscle atrophy noted  ABD: soft, mild distention  NEURO:   CRANIAL NERVES: Discs flat. Pupils equal, round and reactive to light.  Extraocular movements full. Visual fields full. Face moves symmetrically.  Tongue midline. Hearing intact to finger rubbing.    Sensation: sensation to light touch intact above T10 b/l and absent below   Strength: 0/5 BLE, 5/5 BUE   Gait: not tested   Cognition: fund of knowledge and train of thought appropriate  SKIN: back incision, drain removed.  EXT: minimal edema bilaterally, chronic stasis changes, no ulcers.   PSYCH: normal affect.  Mood is baseline without signs of depression      Thank you for the consult. Please call for any questions. Pager number [phone]     Mehdi Santamaria      Total of 70 min spent in this encounter, more than 50% in counseling and coordination.     Patient was Discussed with  Dr. Joyner, Attending PM&R Physician.

## 2018-03-12 NOTE — PROGRESS NOTES
Name: Derek Enriquez  Age/Sex: 57M  Rm: [id number]  MRN:  [id number]  Staff: Heath  Date of Admission: 3/9  Primary Service: NSG  Consulting Services:  Cardiology    HPI: Mr. Correa is a 58 yo male with history of CAD who presented to OSH 3/8 after witnessed cardiac arrest on a treadmill while working out.  He was resuscitated and brought to the OSH where stenting was performed for a high grade LAD lesion.  He was transferred to the ICU post-procedure with a aortic balloon pump in place due to hypotension during the procedure.  He was extubated mid-day of 3/9.  At approximately 1500, he noted that he could not feel his legs.  Per report, upon assessment, he had no motor function and no sensation in bilateral lower extremities.  He underwent emergent removal of the IABP and was taken for imaging which demonstrated a subdural hematoma with compression of the thoracic spinal cord.  He was transferred to the [facility] for further cares.  Of note, he was on dual antiplatelet therapy with ASA and Plavix post cardiac stenting.        Procedures:   3/9: T9-T12 laminectomy for subdural hematoma evacuation  3/11: spinal angio: b/l from T5-L4 no AVM visualized, thought to be removed during surgery    Daily events:   3/8: Cardiac angio with stenting of LAD  3/9: transferred from OSH for emergent evacuation of subdural hematoma with spinal cord compression    3/11: angiogram as above. Levophed used intra-op to keep MAPs > 70    Imaging:  3/9: MRI of T-spine: subdural hematoma, possible AVM at ~T10-12    Examination:  A0x3, CN II-XII intact, strength 5/5 in BUE, 0/5 in lower extremities, T9 sensory level, no rectal tone    Assessment/Plan of care: 58 yo male with subdural hematoma and thoracic spinal cord compression, possible secondary to ruptured spinal AVM following PCI for LAD stenosis.  POD#3 s/p decompression of spinal hematoma. He underwent spinal angiogram on 3/11 with no findings. Doing well post-operatively.    NEURO:   -  Post-Operative Pain Control  - Neuro Examination Q 2 HR  - Prevena removed 3/12/2015.   - Liberalized HOB restrictions 3/12.       CV:  - Map > 75-80  -Cardiology consultation given recent MI with stent placement; appreciate recommendations  -Cards recommends restarting ASA/Plavix as soon as possible. OK to restart ASA 3/13.     PULM:   - Incentive Spirometry Q 1 HR     GI/FEN:  - Regular diet  - GI Prophylaxis Not Indicated  - Bowel Regimen with Senna-Docusate and Dulcolax suppository   - Electrolyte Replacement Protocol    RENAL/:   - mIVF to off    HEME/ID:  - EBL: 300 mL  - DVT Prophylaxis: SCDs to BLE    ENDO:   - No Issues    DISPO:  Anticipate D/C to ARU  3/13  Barriers: evaluation by therapies, BM/flatus, voiding without a Sandhu, tolerating PO diet, pain controlled on PO medications  Activity: Ambulate with Assist; Up in Chair TID  PT: ARU, SCI unit  OT: Evaluation Pending  PMR: Evaluation Pending       Contact the neurosurgery resident on call with questions.    Dial * * * [phone]: Enter job code [phone] when prompted.    Misha Erickson MD  Neurosurgery PGY2

## 2018-03-12 NOTE — PROGRESS NOTES
SPIRITUAL HEALTH SERVICES  SPIRITUAL ASSESSMENT Progress Note  [facility] ([place]) 4A     REFERRAL SOURCE: Hospital  Request    Met with Derek to offer spiritual and emotional support but he was with PT.      PLAN: will attempt later today for follow up.     Chichi Egan  Chaplain Resident  Pager [phone]

## 2018-03-12 NOTE — PROGRESS NOTES
SPIRITUAL HEALTH SERVICES  SPIRITUAL ASSESSMENT Progress Note  [facility] ([place]) 7A    REFERRAL SOURCE:  request    I met Derek and significant other and offered chaplaincy care as per request. Derek declined at this time.     PLAN: no follow up necessary. Pt is aware how to contact [facility] if request arises.     Nolvia Gregorio   Intern  Pager [phone]

## 2018-03-12 NOTE — PLAN OF CARE
Problem: Pain, Acute (Adult)  Goal: Identify Related Risk Factors and Signs and Symptoms  Related risk factors and signs and symptoms are identified upon initiation of Human Response Clinical Practice Guideline (CPG).   Outcome: No Change  Neuro: No significant changes. Still no sensation/movement below umbilicus.  CV: NSR. MAPs in 70's-80's. Arterial line waveform has whip, have been using cuff pressures to ensure accuracy.  Resp: Some intermittent desaturation (87-88%) after giving PRN oxycodone 5mg. MD aware. Put pt on 2L NC for about an hour. Currently back on RA satting 95%. Incentive spirometer encouraged.  GI: No significant changes.  : Sandhu in place with good UOP.  Other: At 1900 a soft non-tender mass was noted on pt left flank. MD came to bedside to assess. Continued to monitor area overnight per MD instruction. No significant changes as of 0500. Mass appears to be smaller & more dissipated at this time. MD assessed area this AM as well. Surgical incision WDL with wound vac in place. No drainage. Pt continues to struggle with pain, especially with repositioning. MD ordered PRN valium this AM.    See flow sheet for full assessment. Will continue to monitor.

## 2018-03-12 NOTE — PROGRESS NOTES
Neuroscience Intensive Care Progress Note  2018    Summary: Derek Enriquez (pronouced Kel) is a 57 year old year old male with pmhx significant for cervicalgia (resolved) admitted on 3/9/2018 with development of T10-12 spinal SDH s/p emergent surgical evacuation 2/2 cardiac arrest 2/2 ACS s/p PCI and dual antiplatelet tx.    Problem List:  1. Spinal cord compression  2. Cardiac arrest  3. Normocytic normochromic anemia    24 hour events:  No acute events overnight.  Stable exam.    24 Hour Vital Signs Summary:  Temp: 98.8  F (37.1  C) Temp  Min: 97.8  F (36.6  C)  Max: 100.1  F (37.8  C)  Resp: 14 Resp  Min: 13  Max: 20  SpO2: 97 % SpO2  Min: 93 %  Max: 100 %    No Data Recorded  Heart Rate: 73 Heart Rate  Min: 69  Max: 88  BP: 128/74 Systolic (24hrs), Av , Min:113 , Max:141   Diastolic (24hrs), Av, Min:58, Max:75      Arterial Line BP: (115-163)/(51-72) 154/68  MAP:  [72 mmHg-96 mmHg] 94 mmHg  BP - Mean:  [75-97] 87    Current Medications:    metoprolol tartrate  12.5 mg Oral BID     atorvastatin  40 mg Oral Daily     sodium chloride (PF)  3 mL Intracatheter Q8H     acetaminophen  975 mg Oral Q8H     cyclobenzaprine  10 mg Oral TID     menthol   Transdermal Q8H     lidocaine  3 patch Transdermal Q24H     lidocaine   Transdermal Q24h     lidocaine   Transdermal Q8H     senna-docusate  1 tablet Oral BID    Or     senna-docusate  2 tablet Oral BID     bisacodyl  10 mg Rectal Daily       PRN Medications:  diazepam, lidocaine (buffered or not buffered), lidocaine 4%, sodium chloride (PF), naloxone, calcium carbonate, sore throat lozenge, HYDROmorphone, [START ON 3/13/2018] acetaminophen, oxyCODONE IR, menthol **AND** menthol **AND** menthol, hydrOXYzine, ondansetron **OR** ondansetron, potassium chloride, potassium chloride, potassium chloride, potassium chloride with lidocaine, potassium chloride, magnesium sulfate, potassium phosphate (KPHOS) in D5W IV, potassium phosphate (KPHOS) in D5W IV, potassium  phosphate (KPHOS) in D5W IV, potassium phosphate (KPHOS) in D5W IV    Infusions:      No Known Allergies    Physical Examination:  /74  Temp 98.8  F (37.1  C) (Oral)  Resp 14  Wt 80.2 kg (176 lb 12.9 oz)  SpO2 97%  BMI 23.98 kg/m2  Neurologic:  Mental Status: Alert and oriented x3. Speech clear and fluent.   Cranial Nerves: PERRL. EOMI; no nystagmus. Full visual fields. Trapezius and sternocloidomastoid are full strength. Tongue was midline and protrudes midline. Uvula was midline and raises midline. Facial sensation was intact to light touch at all distributions. No speech disturbance. Hearing intact to conversation and whisper. Facial movements symmetrical.   Motor: No abnormal movements. Normal tone b/l UE; hypotonic b/l LE. Strength 0/5 b/l LE; strength 5/5 b/l UE  Reflexes: 0/4 patellar, 0/4 left achilles, 0/4 right achilles. No clonus at ankles. Babinski negative. 2+ biceps, brachioradialis b/l  Sensory: Absent to light touch, pain, and vibration to T9-10 b/l. Intact to light touch above T9-10.  Coordination: Deferred  Station/Gait: Deferred due to LE weakness     General: NAD, lying on bed.  HEENT: NC/AT. Mucous membranes moist. NG tube in place, brown, bilious liquid.  Cardiac: RRR. No murmur/rubs/gallops appreciated.  Respiratory: CTAB. No w/r/c appreciated.  Abdominal: soft, non-tender, nondistended  Ext: No edema.     Labs/Studies:  Last Basic Metabolic Panel:  Lab Results   Component Value Date     03/12/2018      Lab Results   Component Value Date    POTASSIUM 3.6 03/12/2018     Lab Results   Component Value Date    CHLORIDE 105 03/12/2018     Lab Results   Component Value Date    PAT 8.2 03/12/2018     Lab Results   Component Value Date    CO2 25 03/12/2018     Lab Results   Component Value Date    BUN 15 03/12/2018     Lab Results   Component Value Date    CR 0.70 03/12/2018     Lab Results   Component Value Date     03/12/2018     CBC RESULTS:   Recent Labs   Lab Test   03/12/18   0427   WBC  7.8   RBC  3.30*   HGB  10.0*   HCT  29.7*   MCV  90   MCH  30.3   MCHC  33.7   RDW  13.0   PLT  177     Imaging:  MRI 3/9 AM lumbar and thoracic, shows subdural hematoma T10-T12 isointense on T1, hyperintense on T2 suggesting hyperacute bleed (<24 hours).    Assessment/Plan  Derek Enriquez is a 57 year old year old male with pmhx significant for cervicalgia (resolved) admitted on 3/9/2018 with development of T10-12 spinal SDH s/p emergent surgical evacuation and preceding cardiac arrest 2/2 ACS s/p PCI and dual antiplatelet tx.    Neuro:  # Subdural Hematoma with Spinal Compression at level of T10-T12, POD#3 T9 - T12 Laminectomy and Evacuation:  Etiology is unknown, possibly 2/2 fall vs fistua.  - q1h neuro exam  - MAP >70  - Continue to hold antiplatelets   - PT/OT evaluations  - d/c to ARU Wednesday.    Resp:  No acute issues.  - Encourage incentive spirometry 10x qh while awake.    CV:  # ACS s/p LAD PCI:   Patient had known CAD in LAD and was scheduled for CABG, but developed ACS and cardiac arrest. No evidence of cortical function loss. Patient was started on dual antiplatelet therapy 2/2 stent placement. This has been held.   - MAP >70  - Levophed to maintain MAP goal  - Cardiology consulted, appreciate recs  - Resume Plavix 75mg daily once safe from bleeding risk standpoint.  - metoprolol 12.5mg BID  - lipitor 40mg daily    Renal:  No active issues  - Sandhu catheter in place, ok to discontinue.  - bladder scan and monitor for high post-void residual.  - mIVF tko, adequate PO intake.    Endo:   No active issues.     Heme:  # Normocytic, Normochromic Anemia:   Mildly decreased Hgb 10.0  - Follow CBC daily.    GI/:   No acute issues.  - Bowel regimen, daily suppository and BID senna-doc  - Sandhu as above, ok to d/c once HOB restriction relaxed.  - Regular diet with thin liquids.    ID:   No acute issues.    Afebrile, no leukocytosis.     PPX:    DVT: SCDs    GI: Not indicated, taking  adequate PO.  LDA: BUE peripheral IV, A-line, Sandhu in place, wound dressing in place until 3/15.  Code Status: Full code  Dispo: NSG primary    Patient discussed with attending physician Dr. Tomi Manzanares, RN, ACNP  Neurocritical Care  03/12/18  7:25 AM

## 2018-03-12 NOTE — PROVIDER NOTIFICATION
1915 - paged MD Maya about new mass noted to left lower quad of pt's back.    1920 - Text paged MD Maya about new mass noted to left lower quad of pt's back.    1922 - Zulma AMBROSIO returned paged, headed to bedside to ashtyn.

## 2018-03-12 NOTE — CONSULTS
Physical medicine and rehabilitation consultation completed on 3/12 with information as noted in consult note.      Geoffrey Joyner DO, ABDIAS    Department of Rehabilitation Medicine

## 2018-03-12 NOTE — PROGRESS NOTES
Social Work: Assessment with Discharge Plan    Patient Name:  Derek Enriquez  :  1960  Age:  57 year old  MRN:  [id number]  Risk/Complexity Score:  Filed Complexity Screen Score: 5  Completed assessment with:  Pt    Presenting Information   Reason for Referral:  Discharge plan  Date of Intake:  2018  Referral Source:  Physician  Decision Maker:  Pt  Alternate Decision Maker:  Pt states he believes he signed a HCD at clinic, naming his S.O, Mary Ann, as his agent. If not found, NO policy would name Pt's sons as surrogate decision-makers. Writer explained this to Pt and he verbalized his understanding. He states that Mary Ann and his family get along well and discuss everything.  Health Care Directive:  Provided education; Pt believes he completed HCD.  Living Situation:  House; Pt lives alone in a single level house with one step to enter.  Previous Functional Status:  Independent; Pt has been working full-time as an . He is active with work and he exercises daily. Pt drives.  Patient and family understanding of hospitalization:  Pt is aware of his cardiac arrest and subsequent spinal cord injury.   Cultural/Language/Spiritual Considerations:  Pt's primary language is English.  Adjustment to Illness:  Pt appears to be coping well with his situation. He states that he has good support. Pt is forward-thinking and hopes to return to his baseline function.    Physical Health  Reason for Admission:  No diagnosis found.  Services Needed/Recommended:  ARU    Support System  Significant relationship at present time:  Significant Other, sons  Family of origin is available for support:  Yes, Mary Ann and Pt's sons are available for support.  Other support available:  Extended family, friends  Gaps in support system:  None noted  Patient is caregiver to:  None     Provider Information   Primary Care Physician:  Eugene Burrell   [phone]   Clinic:  [facility] [address] / [place] [address]       :  None    Financial   Income Source:  Full-time work  Financial Concerns:  None  Insurance:    Payor/Plan Subscriber Name Rel Member # Group #   [employer] - [employer]  [id number]       [id number]       Discharge Plan   Patient and family discharge goal:  Pt is hoping to return to baseline function.  Provided education on discharge plan:  YES  Patient agreeable to discharge plan:  YES  A list of Medicare Certified Facilities was provided to the patient and/or family to encourage patient choice. Patient's choices for facility are:  Pt prefers to go to [facility].  Will NH provide Skilled rehabilitation or complex medical:  Pt not going to NH.  General information regarding anticipated insurance coverage and possible out of pocket cost was discussed. Patient and patient's family are aware patient may incur the cost of transportation to the facility, pending insurance payment: YES  Barriers to discharge:  Medical clearance, bed availability and insurance authorization for ARU    Discharge Recommendations   Anticipated Disposition:  Facility:  [facility]  Transportation Needs:  Medical:  Stretcher  Name of Transportation Company and Phone:  [facility], [phone]    Additional comments   Writer met with Pt at bedside to discuss ARU recommendations. Pt is agreeable to the plan for ARU and prefers to stay in the [facility] system. Referral was made to [facility] with anticipation for discharge on Wednesday, 3/14, pending bed availability and insurance authorization. Pt requested Writer to return later in the day when his S.O., Mary Ann, is present and review the information discussed about ARU. Writer agreed to do so and provided RN with pager number for contact when Loretto arrives.      Ewelina Goel [facility]  ICU   Pager: [phone]      Addendum, 3/13/18 9:11am:      Writer was paged by RN yesterday afternoon to inform Writer that Pt's S.O., Mary Ann, had arrived and Pt requested SW. Writer  met with Pt and [place] to review the earlier discussion about ARU. [place] suggested sending a referral to [facility] for [facility] spinal cord injury program, and Pt agreed. Writer spoke to Rea at [facility] this morning and faxed the referral. Pt is expected to be ready for d/c on Wednesday.

## 2018-03-12 NOTE — PROGRESS NOTES
Brief Neurosurgery Note:       Amaris woundvac dressing removed due to lack of output over the past 72 hours.     Incision found to be clean, dry and intact underneath.     Patient tolerated the procedure well.     Contact the neurosurgery resident on call with questions.    Dial * * *[phone]: Enter [phone] when prompted.   Tereso Marquis MD, PhD  Neurosurgery PGY-3

## 2018-03-12 NOTE — PROGRESS NOTES
SPIRITUAL HEALTH SERVICES  SPIRITUAL ASSESSMENT Progress Note  [facility] 4A     REFERRAL SOURCE: Hospital  Request    Attempted a second visit.  Pt was not available.     PLAN: Will attempt a third time if time allows and make unit  aware of this request for a visit tomorrow 3/13.     Chichi Egan  Chaplain Resident  Pager [phone]

## 2018-03-12 NOTE — PLAN OF CARE
Problem: Patient Care Overview  Goal: Plan of Care/Patient Progress Review    D: Admitted after cardiac arrest, stent placement. Then found to have spinal hematoma, went to OR for emergent evac. Since pt has experienced paraplegia and no sensation in bilat LEs. Has been up in chair today via sling x 2, tolerated well.    I/A: A&O x 4. PERRL 2-3mm brisk equal. All UE neuros intact. Bilat LE paraplegia. Unable to move or feel LEs. When in chair pt reports brief, generalized tingling/sensation in upper thighs, sometimes extending down to his lower legs. Still unable to move spont, no reflexes intact. Likely still in spinal shock, per MDs. SR 60-70s, no ectopy. BP stable 100-120/70s. MAP goal > 75. No issue maintaining MAP. Good pulses. A feb. RA sating 98%. Lungs clear, equal bilat. Regular diet, minimal appetite. Nausea x 1 after getting up in chair, 8mg IV zofran given, effective. Sandhu removed at 1030, has not voided on own since. Straight cathed x 1 for 400cc clear jovanni. Posterior back incision sututred, mild erythema surrounding. Incisional pain, sometimes extending to front abdomen. 0.3-0.4 mg dilaudid given x 2, effective. Flexeril scheduled, also effective for back pain.       P: Continue current POC. Transfer to floor when appropriate. Plan now is to transfer to acute rehab on Wednesday. Alert MD of any acute changes.

## 2018-03-13 ENCOUNTER — APPOINTMENT (OUTPATIENT)
Dept: MRI IMAGING | Facility: CLINIC | Age: 58
End: 2018-03-13
Attending: NEUROLOGICAL SURGERY
Payer: COMMERCIAL

## 2018-03-13 ENCOUNTER — APPOINTMENT (OUTPATIENT)
Dept: PHYSICAL THERAPY | Facility: CLINIC | Age: 58
End: 2018-03-13
Attending: NEUROLOGICAL SURGERY
Payer: COMMERCIAL

## 2018-03-13 LAB
ABO + RH BLD: NORMAL
ABO + RH BLD: NORMAL
BLD GP AB SCN SERPL QL: NORMAL
BLOOD BANK CMNT PATIENT-IMP: NORMAL
SPECIMEN EXP DATE BLD: NORMAL

## 2018-03-13 PROCEDURE — 86900 BLOOD TYPING SEROLOGIC ABO: CPT | Performed by: STUDENT IN AN ORGANIZED HEALTH CARE EDUCATION/TRAINING PROGRAM

## 2018-03-13 PROCEDURE — A9585 GADOBUTROL INJECTION: HCPCS | Performed by: NEUROLOGICAL SURGERY

## 2018-03-13 PROCEDURE — 25000132 ZZH RX MED GY IP 250 OP 250 PS 637: Performed by: STUDENT IN AN ORGANIZED HEALTH CARE EDUCATION/TRAINING PROGRAM

## 2018-03-13 PROCEDURE — 86850 RBC ANTIBODY SCREEN: CPT | Performed by: STUDENT IN AN ORGANIZED HEALTH CARE EDUCATION/TRAINING PROGRAM

## 2018-03-13 PROCEDURE — 40000275 ZZH STATISTIC RCP TIME EA 10 MIN

## 2018-03-13 PROCEDURE — 97530 THERAPEUTIC ACTIVITIES: CPT | Mod: GP | Performed by: PHYSICAL THERAPIST

## 2018-03-13 PROCEDURE — 40000048 ZZH STATISTIC DAILY SWAN MONITORING

## 2018-03-13 PROCEDURE — 72157 MRI CHEST SPINE W/O & W/DYE: CPT

## 2018-03-13 PROCEDURE — 12000008 ZZH R&B INTERMEDIATE UMMC

## 2018-03-13 PROCEDURE — 25000128 H RX IP 250 OP 636: Performed by: NEUROLOGICAL SURGERY

## 2018-03-13 PROCEDURE — 25000128 H RX IP 250 OP 636: Performed by: STUDENT IN AN ORGANIZED HEALTH CARE EDUCATION/TRAINING PROGRAM

## 2018-03-13 PROCEDURE — 86900 BLOOD TYPING SEROLOGIC ABO: CPT | Performed by: NEUROLOGICAL SURGERY

## 2018-03-13 PROCEDURE — 40000193 ZZH STATISTIC PT WARD VISIT: Performed by: PHYSICAL THERAPIST

## 2018-03-13 PROCEDURE — 25000132 ZZH RX MED GY IP 250 OP 250 PS 637: Performed by: NURSE PRACTITIONER

## 2018-03-13 PROCEDURE — 86901 BLOOD TYPING SEROLOGIC RH(D): CPT | Performed by: STUDENT IN AN ORGANIZED HEALTH CARE EDUCATION/TRAINING PROGRAM

## 2018-03-13 PROCEDURE — 86850 RBC ANTIBODY SCREEN: CPT | Performed by: NEUROLOGICAL SURGERY

## 2018-03-13 PROCEDURE — 40000141 ZZH STATISTIC PERIPHERAL IV START W/O US GUIDANCE

## 2018-03-13 PROCEDURE — 40000196 ZZH STATISTIC RAPCV CVP MONITORING

## 2018-03-13 PROCEDURE — 72158 MRI LUMBAR SPINE W/O & W/DYE: CPT

## 2018-03-13 PROCEDURE — 86901 BLOOD TYPING SEROLOGIC RH(D): CPT | Performed by: NEUROLOGICAL SURGERY

## 2018-03-13 RX ORDER — METOPROLOL TARTRATE 25 MG/1
12.5 TABLET, FILM COATED ORAL 2 TIMES DAILY
Qty: 60 TABLET | Status: ON HOLD | COMMUNITY
Start: 2018-03-13 | End: 2018-03-20

## 2018-03-13 RX ORDER — POLYETHYLENE GLYCOL 3350 17 G/17G
17 POWDER, FOR SOLUTION ORAL DAILY
Status: DISCONTINUED | OUTPATIENT
Start: 2018-03-13 | End: 2018-03-14 | Stop reason: HOSPADM

## 2018-03-13 RX ORDER — ASPIRIN 81 MG/1
81 TABLET ORAL DAILY
Status: DISCONTINUED | OUTPATIENT
Start: 2018-03-13 | End: 2018-03-14 | Stop reason: HOSPADM

## 2018-03-13 RX ORDER — GADOBUTROL 604.72 MG/ML
7.5 INJECTION INTRAVENOUS ONCE
Status: COMPLETED | OUTPATIENT
Start: 2018-03-13 | End: 2018-03-13

## 2018-03-13 RX ORDER — LIDOCAINE 4 G/G
3 PATCH TOPICAL
Status: ON HOLD | COMMUNITY
Start: 2018-03-14 | End: 2018-03-20

## 2018-03-13 RX ORDER — OXYCODONE HYDROCHLORIDE 5 MG/1
5-10 TABLET ORAL
Qty: 50 TABLET | Refills: 0 | Status: ON HOLD | OUTPATIENT
Start: 2018-03-13 | End: 2018-03-20

## 2018-03-13 RX ORDER — ATORVASTATIN CALCIUM 40 MG/1
40 TABLET, FILM COATED ORAL DAILY
Qty: 30 TABLET | Refills: 3 | Status: ON HOLD | COMMUNITY
Start: 2018-03-14 | End: 2018-03-20

## 2018-03-13 RX ORDER — DIAZEPAM 5 MG
5 TABLET ORAL EVERY 6 HOURS PRN
Qty: 30 TABLET | Refills: 0 | Status: ON HOLD | OUTPATIENT
Start: 2018-03-13 | End: 2018-03-20

## 2018-03-13 RX ORDER — AMOXICILLIN 250 MG
1 CAPSULE ORAL 2 TIMES DAILY
Qty: 100 TABLET | Status: ON HOLD | COMMUNITY
Start: 2018-03-13 | End: 2018-03-20

## 2018-03-13 RX ORDER — POLYETHYLENE GLYCOL 3350 17 G/17G
17 POWDER, FOR SOLUTION ORAL DAILY
Qty: 7 PACKET | COMMUNITY
Start: 2018-03-14 | End: 2018-04-27

## 2018-03-13 RX ORDER — CYCLOBENZAPRINE HCL 10 MG
10 TABLET ORAL 3 TIMES DAILY
Qty: 42 TABLET | Status: ON HOLD | COMMUNITY
Start: 2018-03-13 | End: 2018-03-20

## 2018-03-13 RX ADMIN — CYCLOBENZAPRINE HYDROCHLORIDE 10 MG: 10 TABLET, FILM COATED ORAL at 07:56

## 2018-03-13 RX ADMIN — ACETAMINOPHEN 650 MG: 325 TABLET, FILM COATED ORAL at 07:56

## 2018-03-13 RX ADMIN — OXYCODONE HYDROCHLORIDE 5 MG: 5 TABLET ORAL at 11:28

## 2018-03-13 RX ADMIN — HYDROMORPHONE HYDROCHLORIDE 0.5 MG: 1 INJECTION, SOLUTION INTRAMUSCULAR; INTRAVENOUS; SUBCUTANEOUS at 04:18

## 2018-03-13 RX ADMIN — SENNOSIDES AND DOCUSATE SODIUM 2 TABLET: 8.6; 5 TABLET ORAL at 20:44

## 2018-03-13 RX ADMIN — ATORVASTATIN CALCIUM 40 MG: 40 TABLET, FILM COATED ORAL at 07:55

## 2018-03-13 RX ADMIN — METOPROLOL TARTRATE 12.5 MG: 25 TABLET, FILM COATED ORAL at 20:44

## 2018-03-13 RX ADMIN — BISACODYL 10 MG: 10 SUPPOSITORY RECTAL at 08:54

## 2018-03-13 RX ADMIN — HYDROMORPHONE HYDROCHLORIDE 0.5 MG: 1 INJECTION, SOLUTION INTRAMUSCULAR; INTRAVENOUS; SUBCUTANEOUS at 06:12

## 2018-03-13 RX ADMIN — SENNOSIDES AND DOCUSATE SODIUM 2 TABLET: 8.6; 5 TABLET ORAL at 07:56

## 2018-03-13 RX ADMIN — GADOBUTROL 7.5 ML: 604.72 INJECTION INTRAVENOUS at 21:00

## 2018-03-13 RX ADMIN — POLYETHYLENE GLYCOL 3350 17 G: 17 POWDER, FOR SOLUTION ORAL at 14:58

## 2018-03-13 RX ADMIN — LIDOCAINE 3 PATCH: 560 PATCH PERCUTANEOUS; TOPICAL; TRANSDERMAL at 07:58

## 2018-03-13 RX ADMIN — CYCLOBENZAPRINE HYDROCHLORIDE 10 MG: 10 TABLET, FILM COATED ORAL at 20:44

## 2018-03-13 RX ADMIN — ACETAMINOPHEN 650 MG: 325 TABLET, FILM COATED ORAL at 21:30

## 2018-03-13 RX ADMIN — METOPROLOL TARTRATE 12.5 MG: 25 TABLET, FILM COATED ORAL at 07:55

## 2018-03-13 RX ADMIN — OXYCODONE HYDROCHLORIDE 5 MG: 5 TABLET ORAL at 02:21

## 2018-03-13 RX ADMIN — HYDROMORPHONE HYDROCHLORIDE 0.5 MG: 1 INJECTION, SOLUTION INTRAMUSCULAR; INTRAVENOUS; SUBCUTANEOUS at 02:15

## 2018-03-13 RX ADMIN — ASPIRIN 81 MG: 81 TABLET, COATED ORAL at 07:55

## 2018-03-13 RX ADMIN — OXYCODONE HYDROCHLORIDE 5 MG: 5 TABLET ORAL at 07:55

## 2018-03-13 RX ADMIN — CYCLOBENZAPRINE HYDROCHLORIDE 10 MG: 10 TABLET, FILM COATED ORAL at 14:58

## 2018-03-13 RX ADMIN — OXYCODONE HYDROCHLORIDE 10 MG: 5 TABLET ORAL at 14:58

## 2018-03-13 RX ADMIN — OXYCODONE HYDROCHLORIDE 10 MG: 5 TABLET ORAL at 21:30

## 2018-03-13 ASSESSMENT — PAIN DESCRIPTION - DESCRIPTORS
DESCRIPTORS: HEAVINESS
DESCRIPTORS: HEAVINESS

## 2018-03-13 NOTE — PLAN OF CARE
Problem: Patient Care Overview  Goal: Plan of Care/Patient Progress Review  Discharge Planner PT   Patient plan for discharge: ARU  Current status: PT 6A: Pt progressing in PT. Resting supine BP 95/54. While supine PT/nursing applied B JOSELITO hose and abdominal binder to improve BPs with mobilization. Pt required MODA x 2 (total assist for LEs) for logroll for abdominal binder and sling placement with reinforcement of logroll technique and spine precautions. Slowly raised HOB while BP monitored. /67. Later with high back sling and overhead lift assisted pt from lying to sitting at edge of bed with pt requiring MAXA-AIXA for trunk support. Pt sat at edge of bed ~1.5-2 min. Pt reported further incresaed spine pain and abdominal discomfort and requested to transfer to chair, requiring dependent sling/lift use. During this transfer pt did report sudden clammy sensation and increased pain at surgery site. Once lowered into chair reclined pt maximally. BP was stable at 120/68, continued to be stable in 120s/60-70s and pt able to tolerate later progressive raise of backrest. At session end pt reported comfort, RN planning to regularly monitor pt while out of bed. Informed RN re: plan remove JOSELITO hose and abdominal binder once supine back in bed, assess pt.   Barriers to return to prior living situation: unable to IND mobilize given LE weakness, sensory loss  Recommendations for discharge: ARU-for focused SCI rehab  Rationale for recommendations: progress in PT, intensive rehab needs following cardiac event, SCI, spine surgery       Entered by: Stephany Beatty 03/13/2018 4:58 PM

## 2018-03-13 NOTE — PROGRESS NOTES
Name: Derek Enriquez  Age/Sex: 57M  Rm: [id number]  MRN:  [id number]  Staff: Heath  Date of Admission: 3/9  Primary Service: NSG  Consulting Services:  Cardiology      HPI: Mr. Correa is a 58 yo male with history of CAD who presented to OSH 3/8 after witnessed cardiac arrest on a treadmill while working out.  He was resuscitated and brought to the OSH where stenting was performed for a high grade LAD lesion.  He was transferred to the ICU post-procedure with a aortic balloon pump in place due to hypotension during the procedure.  He was extubated mid-day of 3/9.  At approximately 1500, he noted that he could not feel his legs.  Per report, upon assessment, he had no motor function and no sensation in bilateral lower extremities.  He underwent emergent removal of the IABP and was taken for imaging which demonstrated a subdural hematoma with compression of the thoracic spinal cord.  He was transferred to the [facility] for further cares.  Of note, he was on dual antiplatelet therapy with ASA and Plavix post cardiac stenting.        Procedures:   3/9: T9-T12 laminectomy for subdural hematoma evacuation  3/11: spinal angio: b/l from T5-L4 no AVM visualized, thought to be removed during surgery    Daily events:   3/8: Cardiac angio with stenting of LAD  3/9: transferred from OSH for emergent evacuation of subdural hematoma with spinal cord compression    3/11: angiogram as above. Levophed used intra-op to keep MAPs > 70  3/12: Amaris Removed    Imaging:  3/9: MRI of T-spine: subdural hematoma, possible AVM at ~T10-12    Examination:  A0x3, CN II-XII intact, strength 5/5 in BUE, 0/5 in lower extremities, L2 sensory level (slightly improved), no rectal tone    Assessment/Plan of care: 58 yo male with subdural hematoma and thoracic spinal cord compression, possible secondary to ruptured spinal AVM following PCI for LAD stenosis.  POD#4 s/p decompression of spinal hematoma. He underwent spinal angiogram on 3/11 with no findings. Doing  well post-operatively.    NEURO:   - Post-Operative Pain Control  - Neuro Examination Q 2 HR  - Prevena dressing in place until 3/15  - Safe from Neurosurgical Standpoint to resume Aspirin on 3/13/2018; Continue to HOLD Plavix until 3/23/18  - Brain MRI prior to d/c    CV:  - Map > 75-80  -Cardiology consultation given recent MI with stent placement; appreciate recommendations  - ASA  - Plavix held until 3/23    PULM:   - Incentive Spirometry Q 1 HR     GI/FEN:  - Regular diet  - GI Prophylaxis Not Indicated  - Bowel Regimen with Senna-Docusate and Dulcolax suppository   - Electrolyte Replacement Protocol    RENAL/:   - mIVF to off    HEME/ID:  - EBL: 300 mL  - DVT Prophylaxis: SCDs to BLE    ENDO:   - No Issues    DISPO:  Anticipate D/C to ARU  3/14; TTF  Barriers: evaluation by therapies, BM/flatus, voiding without a Sandhu, tolerating PO diet, pain controlled on PO medications  Activity: Ambulate with Assist; Up in Chair TID  PT: ARU, SCI unit  OT: ARU, SCI unit  PMR: ARU, SCI unit      Contact the neurosurgery resident on call with questions.    Dial * * * [phone]: Enter job code [phone] when prompted.    Misha Erickson MD  Neurosurgery PGY2

## 2018-03-13 NOTE — DISCHARGE SUMMARY
[facility] Discharge Summary and Instructions    Derek Enriquez MRN# [id number]   Age: 57 year old YOB: 1960     Date of Admission:  3/9/2018  Date of Discharge::  3/14/2018  Admitting Physician:  Abhijeet Joe MD  Discharge Physician:  Abhijeet Joe MD          Admission Diagnoses:   Spinal cord compression (H) [G95.20]          Discharge Diagnosis:   Spinal cord compression (H) [G95.20]          Procedures:   3/9/2018  1.  Emergent T9 to T12 laminectomy and evacuation of subdural hematoma.   2.  Intraoperative use of microscope, intraoperative C-arm, intraoperative use of ultrasound           Brief History of Illness:   Mr. Enriquez is a 57-year-old gentleman who presented to [facility] with MI. He underwent cardiac stenting. After the procedure, he noted to have lower extremity deficits. His spine MRI showed subdural lesion in his thoracic spine, with spinal cord compression.  He was then transferred to the [facility] for further evaluation and management. Given these findings, he and his family consented for above-mentioned procedure after risks, benefits and alternative treatments were explained to them.            Hospital Course:   Daily events:   3/8: Cardiac angio with stenting of LAD  3/9: transferred from OSH for emergent evacuation of subdural hematoma with spinal cord compression    3/11: angiogram as above. Levophed used intra-op to keep MAPs > 70  3/12: Amaris Removed  3/13:  Transferred to general neurosurgical floor, awaiting placement   3/14:  Low grade temperature overnight, UA checked and negative.  WBC normal.  Temperature had normalized prior to discharge to ARU.       Imaging:  3/9: MRI of T-spine: subdural hematoma, possible AVM at ~T10-12  3/13:  Lumbar MRI: stable   3/13:  Thoracic MRI:  Post surgical changes in the form of subdural hematoma evacuation and decompression.  Edema noted within the cord with signal  changes suggesting infarction.      Examination:  A0x3, CN II-XII intact, strength 5/5 in BUE, 0/5 in lower extremities, L2 sensory level (slightly improved), no rectal tone     Assessment/Plan of care: 58 yo male with subdural hematoma and thoracic spinal cord compression, possible secondary to ruptured spinal AVM following PCI for LAD stenosis.  POD#4 s/p decompression of spinal hematoma. He underwent spinal angiogram on 3/11 with no findings. Doing well post-operatively.     NEURO:   - Post-Operative Pain Control  - Neuro Examination Q 2 HR  - Prevena dressing in place until 3/15  - Safe from Neurosurgical Standpoint to resume Aspirin on 3/13/2018; Continue to HOLD Plavix until 3/23/18  - Thoracic, Lumbar MRIs prior to d/c     CV:  - Map > 75-80  -Cardiology consultation given recent MI with stent placement; appreciate recommendations  - ASA  - Plavix held until 3/23     PULM:   - Incentive Spirometry Q 1 HR      GI/FEN:  - Regular diet  - GI Prophylaxis Not Indicated  - Bowel Regimen with Senna-Docusate, Miralax and Dulcolax suppository   - Electrolyte Replacement Protocol     RENAL/:   - Neurogenic bladder- requiring catheterization every 4 hours        HEME/ID:  - EBL: 300 mL  - DVT Prophylaxis: SCDs to BLE, Subcutaneous Heparin every 12 hours for 14 days (end 3/28)  -Keflex (3/14-3/24) for concern of thrombophlebitis        ENDO:   - No Issues     DISPO:  Anticipate D/C to ARU  3/14  Barriers: evaluation by therapies, BM/flatus, voiding without a Sandhu, tolerating PO diet, pain controlled on PO medications  Activity: Ambulate with Assist; Up in Chair TID  PT: ARU, SCI unit  OT: ARU, SCI unit  PMR: ARU, SCI unit    Ok to restart Plavix 3/23/2018.  Patient will follow up with Neuro-Interventional team in one month for spinal angiogram to assess for spinal AVM.            Discharge Medications:     Current Discharge Medication List      START taking these medications    Details   oxyCODONE IR (ROXICODONE) 5 MG  tablet Take 1-2 tablets (5-10 mg) by mouth every 3 hours as needed for other (pain control or improvement in physical function. Hold dose for analgesic side effects.)  Qty: 50 tablet, Refills: 0    Associated Diagnoses: S/P laminectomy      diazepam (VALIUM) 5 MG tablet Take 1 tablet (5 mg) by mouth every 6 hours as needed for anxiety or muscle spasms  Qty: 30 tablet, Refills: 0    Associated Diagnoses: S/P laminectomy      atorvastatin (LIPITOR) 40 MG tablet Take 1 tablet (40 mg) by mouth daily  Qty: 30 tablet, Refills: 3      metoprolol tartrate (LOPRESSOR) 25 MG tablet Take 0.5 tablets (12.5 mg) by mouth 2 times daily  Qty: 60 tablet      menthol (ICY HOT) 5 % PTCH Apply 1 patch topically every 8 hours as needed for muscle soreness  Refills: 0      Lidocaine (LIDOCARE) 4 % Patch Place 3 patches onto the skin      senna-docusate (SENOKOT-S;PERICOLACE) 8.6-50 MG per tablet Take 1 tablet by mouth 2 times daily  Qty: 100 tablet      cyclobenzaprine (FLEXERIL) 10 MG tablet Take 1 tablet (10 mg) by mouth 3 times daily  Qty: 42 tablet         CONTINUE these medications which have NOT CHANGED    Details   aspirin 81 MG tablet Take 1 tablet (81 mg) by mouth daily  Qty: 90 tablet, Refills: 3    Associated Diagnoses: Unstable angina (H)      omega-3 acid ethyl esters (LOVAZA) 1 G capsule Take 2 g by mouth 2 times daily      vitamin B complex with vitamin C (VITAMIN  B COMPLEX) TABS tablet Take 1 tablet by mouth daily      Multiple Vitamins-Minerals (MULTIVITAMIN MEN PO) Take 1 tablet by mouth daily                      Discharge Instructions and Follow-Up:   Discharge diet: Regular   Discharge activity: You may advance activity as tolerated. No strenuous exercise or heay lifting greater than 10 lbs for 4 weeks or until seen and cleared in clinic.   Discharge follow-up: Follow-up with Lilli Contreras PA-C in 6 weeks     Suture removal 3/23/18    Follow-up with Neuro-Interventional team in one month (4/13/18) for spinal  angiogram    Follow-up with Cardiology in 3-4 weeks in regards to recent cardiac arrest and stent placement      Wound care: Ok to shower,however no scrubbing of the wound and no soaking of the wound, meaning no bathtubs or swimming pools. Pat dry only. Leave wound open to air.  Sutures are not absorbable and need to be removed in 2 weeks. If patient still at rehab by this time, the sutures may be removed by the rehab physician if he or she considers that the wound has healed completely.     Please call if you have:  1. increased pain, redness, drainage, swelling at your incision  2. fevers > 101.5 F degrees  3. with any questions or concerns.  You may reach the Neurosurgery clinic at [phone] during regular work hours. ER at [phone].    and ask for the Neurosurgery Resident on call at [phone], during off hours or weekends.         Discharge Disposition:   Discharged to rehabilitation facility

## 2018-03-13 NOTE — PLAN OF CARE
Problem: Patient Care Overview  Goal: Plan of Care/Patient Progress Review  POD 4 s/p T9-T12 laminectomy for subdural hematoma evacuation and 3/11: spinal angio. A/O x 4.VSS,sats above 97% on RA.PERRLA. BUE 5/5; BLE 0/5. No sensation from umbilicus down to the legs. Back incision RAJAT, liquid dressing intact. Right groin at angio site CDI, no hematoma noted.Tolerated regular diet with thin liquid. Up with Lift. Appropriate with call light. Unable to straight cath d/t family ar bedside. PIV SL.No BM since last thursday, need rectum stimulation prior to bedtime. Pillow used for repositions. Discharge tomorrow to ARU at noon via [facility] transportation. Continue to monitor and follow POC.

## 2018-03-13 NOTE — PLAN OF CARE
Problem: Patient Care Overview  Goal: Plan of Care/Patient Progress Review  Outcome: No Change  No overt change in patient's condition during my shift. Patient remains paraplegic from, approximate, umbilicus area and down. Patient is reporting new onset waxing and waning numbness to bilateral upper thighs. Patient denies any new onset increase in sensation, other than this new numbness. Patient appears flat and does not appear to cope well with changes to his plan of care. Patient reassured that increases in time of neurological exams were a good thing and he appears to be receptive to this communication. Patient straight cathed twice overnight with both instances yielding approximately 800-850ml of clear yellow urine. Will continue to monitor for safety and comfort.    Andrea Lan RN

## 2018-03-13 NOTE — PROGRESS NOTES
Patient A&OX4. Patient remains paraplegic umbilicus area down.  Receiving oxycodone and tylenol for pain control. HR NSR. BPs stable. Afebrile. LS clear throughout. Using IS. Requiring straight cath for urine elimination. BS+, will start miralax for bowel regimen this afternoon. Tolerating regular diet. Surgical incision open to air with a small amount of erythema noted. Lidocaine patches in place. Report called to 6A.

## 2018-03-13 NOTE — PROGRESS NOTES
Social Work Services Discharge Note      Patient Name:  Derek Enriquez     Anticipated Discharge Date:  3/14/18    Discharge Disposition:   [facility] ([phone])    Following MD:  Facility  Assignment     Pre-Admission Screening (PAS) online form has been completed.  The Level of Care (LOC) is:  Determined  Confirmation Code is:  Not required as pt is being admitted to ARU.       Additional Services/Equipment Arranged:  Confirmed readiness for discharge with Milvia Stevenson NP Neuro Surgery.  [facility]  Lupe confirms receipt of insurance authorization.   [facility] [phone][facility] to provide w/c transport at 12 noon.      Patient / Family response to discharge plan:  Pt voices understanding of the discharge plan and agreement with the discharge plan.  SW offered to contact family to updated regarding the discharge plan.   Pt declined.    Pt states that he will independently inform his significant other of the discharge plan.     Persons notified of above discharge plan:  Pt, 6A nursing and Milvia Stevenson NP Neurosurgery.    Staff Discharge Instructions:  Please fax discharge orders and signed hard scripts for any controlled substances.  Please print a packet and send with patient.     CTS Handoff completed:  NO    Medicare Notice of Rights provided to the patient/family:  NO, per Admissions Facesheet, pt is not on Medicare.      DIANE Powell  Social Work, 6A  Phone:  [phone]  Pager:  [phone]  3/13/2018

## 2018-03-13 NOTE — PLAN OF CARE
Problem: Patient Care Overview  Goal: Plan of Care/Patient Progress Review  Outcome: No Change  Pt transferred to 6A with 4A RN at 1300 today. POD #2 T9-12 laminectomy. Bedside neuro completed together, no changes since report given. Neuros include waist down 0/5 movement with numbness present. All other neuros intact, oriented x4. Flat affect, but very pleasant. PIV x1 in place. Ate 50% late lunch, straight cath Q4 hours- last at 1430 for 575cc clear yellow urine. Pt concerned with inability to have BM on own, Miralax given per his request. He hoped to have results before this roel's MRI. Per 4A report, he had smear incontinence this am. On bowel program of daily am suppository. Back incision CDI with sutures and RAJAT. Slightly reddened around incision. Back pain controlled with Oxycodone Q4 and scheduled Flexaril. Both given last at 1500.    Plan: Continue with POC.

## 2018-03-14 ENCOUNTER — APPOINTMENT (OUTPATIENT)
Dept: OCCUPATIONAL THERAPY | Facility: CLINIC | Age: 58
End: 2018-03-14
Attending: NEUROLOGICAL SURGERY
Payer: COMMERCIAL

## 2018-03-14 ENCOUNTER — HOSPITAL ENCOUNTER (INPATIENT)
Facility: CLINIC | Age: 58
LOS: 7 days | Discharge: NURSING, REHAB OR LONG-TERM CARE | End: 2018-03-21
Attending: PHYSICAL MEDICINE & REHABILITATION | Admitting: PHYSICAL MEDICINE & REHABILITATION
Payer: COMMERCIAL

## 2018-03-14 VITALS
WEIGHT: 177.25 LBS | SYSTOLIC BLOOD PRESSURE: 124 MMHG | HEART RATE: 71 BPM | RESPIRATION RATE: 16 BRPM | DIASTOLIC BLOOD PRESSURE: 69 MMHG | OXYGEN SATURATION: 99 % | BODY MASS INDEX: 24.04 KG/M2 | TEMPERATURE: 99 F

## 2018-03-14 DIAGNOSIS — N31.9 NEUROGENIC BLADDER: ICD-10-CM

## 2018-03-14 DIAGNOSIS — M79.2 NEUROPATHIC PAIN: ICD-10-CM

## 2018-03-14 DIAGNOSIS — K59.2 NEUROGENIC BOWEL: Primary | ICD-10-CM

## 2018-03-14 DIAGNOSIS — G90.3 NEUROGENIC ORTHOSTATIC HYPOTENSION (H): ICD-10-CM

## 2018-03-14 DIAGNOSIS — Z98.890 S/P LAMINECTOMY: ICD-10-CM

## 2018-03-14 DIAGNOSIS — Z95.5 H/O HEART ARTERY STENT: ICD-10-CM

## 2018-03-14 PROBLEM — S06.5XAA SUBDURAL HEMATOMA (H): Status: ACTIVE | Noted: 2018-03-14

## 2018-03-14 LAB
ALBUMIN UR-MCNC: NEGATIVE MG/DL
ANION GAP SERPL CALCULATED.3IONS-SCNC: 7 MMOL/L (ref 3–14)
APPEARANCE UR: CLEAR
BILIRUB UR QL STRIP: NEGATIVE
BUN SERPL-MCNC: 19 MG/DL (ref 7–30)
CALCIUM SERPL-MCNC: 8.4 MG/DL (ref 8.5–10.1)
CHLORIDE SERPL-SCNC: 99 MMOL/L (ref 94–109)
CO2 SERPL-SCNC: 29 MMOL/L (ref 20–32)
COLOR UR AUTO: YELLOW
COPATH REPORT: NORMAL
CREAT SERPL-MCNC: 0.87 MG/DL (ref 0.66–1.25)
ERYTHROCYTE [DISTWIDTH] IN BLOOD BY AUTOMATED COUNT: 12.6 % (ref 10–15)
GFR SERPL CREATININE-BSD FRML MDRD: >90 ML/MIN/1.7M2
GLUCOSE SERPL-MCNC: 105 MG/DL (ref 70–99)
GLUCOSE UR STRIP-MCNC: NEGATIVE MG/DL
HCT VFR BLD AUTO: 33.4 % (ref 40–53)
HGB BLD-MCNC: 11.4 G/DL (ref 13.3–17.7)
HGB UR QL STRIP: NEGATIVE
KETONES UR STRIP-MCNC: NEGATIVE MG/DL
LEUKOCYTE ESTERASE UR QL STRIP: NEGATIVE
MCH RBC QN AUTO: 30.5 PG (ref 26.5–33)
MCHC RBC AUTO-ENTMCNC: 34.1 G/DL (ref 31.5–36.5)
MCV RBC AUTO: 89 FL (ref 78–100)
MUCOUS THREADS #/AREA URNS LPF: PRESENT /LPF
NITRATE UR QL: NEGATIVE
PH UR STRIP: 6.5 PH (ref 5–7)
PLATELET # BLD AUTO: 200 10E9/L (ref 150–450)
POTASSIUM SERPL-SCNC: 4.4 MMOL/L (ref 3.4–5.3)
RBC # BLD AUTO: 3.74 10E12/L (ref 4.4–5.9)
RBC #/AREA URNS AUTO: <1 /HPF (ref 0–2)
SODIUM SERPL-SCNC: 135 MMOL/L (ref 133–144)
SOURCE: ABNORMAL
SP GR UR STRIP: 1.01 (ref 1–1.03)
UROBILINOGEN UR STRIP-MCNC: 2 MG/DL (ref 0–2)
WBC # BLD AUTO: 8.7 10E9/L (ref 4–11)
WBC #/AREA URNS AUTO: 1 /HPF (ref 0–5)

## 2018-03-14 PROCEDURE — 25000132 ZZH RX MED GY IP 250 OP 250 PS 637: Performed by: NURSE PRACTITIONER

## 2018-03-14 PROCEDURE — 97535 SELF CARE MNGMENT TRAINING: CPT | Mod: GO | Performed by: OCCUPATIONAL THERAPIST

## 2018-03-14 PROCEDURE — 25000132 ZZH RX MED GY IP 250 OP 250 PS 637: Performed by: STUDENT IN AN ORGANIZED HEALTH CARE EDUCATION/TRAINING PROGRAM

## 2018-03-14 PROCEDURE — 36415 COLL VENOUS BLD VENIPUNCTURE: CPT | Performed by: NURSE PRACTITIONER

## 2018-03-14 PROCEDURE — 36415 COLL VENOUS BLD VENIPUNCTURE: CPT | Performed by: NEUROLOGICAL SURGERY

## 2018-03-14 PROCEDURE — 12800006 ZZH R&B REHAB

## 2018-03-14 PROCEDURE — 40000133 ZZH STATISTIC OT WARD VISIT: Performed by: OCCUPATIONAL THERAPIST

## 2018-03-14 PROCEDURE — 80048 BASIC METABOLIC PNL TOTAL CA: CPT | Performed by: NEUROLOGICAL SURGERY

## 2018-03-14 PROCEDURE — 25000132 ZZH RX MED GY IP 250 OP 250 PS 637: Performed by: PHYSICAL MEDICINE & REHABILITATION

## 2018-03-14 PROCEDURE — 85027 COMPLETE CBC AUTOMATED: CPT | Performed by: NURSE PRACTITIONER

## 2018-03-14 PROCEDURE — 81001 URINALYSIS AUTO W/SCOPE: CPT | Performed by: NEUROLOGICAL SURGERY

## 2018-03-14 PROCEDURE — 25000132 ZZH RX MED GY IP 250 OP 250 PS 637: Performed by: PHYSICIAN ASSISTANT

## 2018-03-14 PROCEDURE — 25000132 ZZH RX MED GY IP 250 OP 250 PS 637: Performed by: NEUROLOGICAL SURGERY

## 2018-03-14 PROCEDURE — 97530 THERAPEUTIC ACTIVITIES: CPT | Mod: GO | Performed by: OCCUPATIONAL THERAPIST

## 2018-03-14 RX ORDER — POLYETHYLENE GLYCOL 3350 17 G/17G
17 POWDER, FOR SOLUTION ORAL DAILY
Status: DISCONTINUED | OUTPATIENT
Start: 2018-03-15 | End: 2018-03-17

## 2018-03-14 RX ORDER — CYCLOBENZAPRINE HCL 5 MG
10 TABLET ORAL 3 TIMES DAILY PRN
Status: DISCONTINUED | OUTPATIENT
Start: 2018-03-14 | End: 2018-03-19

## 2018-03-14 RX ORDER — CYCLOBENZAPRINE HCL 5 MG
10 TABLET ORAL 3 TIMES DAILY
Status: DISCONTINUED | OUTPATIENT
Start: 2018-03-14 | End: 2018-03-14

## 2018-03-14 RX ORDER — ACETAMINOPHEN 325 MG/1
325-975 TABLET ORAL EVERY 6 HOURS PRN
Status: DISCONTINUED | OUTPATIENT
Start: 2018-03-14 | End: 2018-03-16

## 2018-03-14 RX ORDER — DIAZEPAM 5 MG
5 TABLET ORAL EVERY 6 HOURS PRN
Status: DISCONTINUED | OUTPATIENT
Start: 2018-03-14 | End: 2018-03-14

## 2018-03-14 RX ORDER — LIDOCAINE 4 G/G
3 PATCH TOPICAL
Status: DISCONTINUED | OUTPATIENT
Start: 2018-03-15 | End: 2018-03-15

## 2018-03-14 RX ORDER — AMOXICILLIN 250 MG
1 CAPSULE ORAL 2 TIMES DAILY
Status: DISCONTINUED | OUTPATIENT
Start: 2018-03-14 | End: 2018-03-15

## 2018-03-14 RX ORDER — OXYCODONE HYDROCHLORIDE 5 MG/1
5-10 TABLET ORAL
Status: DISCONTINUED | OUTPATIENT
Start: 2018-03-14 | End: 2018-03-21 | Stop reason: HOSPADM

## 2018-03-14 RX ORDER — BISACODYL 10 MG
10 SUPPOSITORY, RECTAL RECTAL DAILY
Status: DISCONTINUED | OUTPATIENT
Start: 2018-03-14 | End: 2018-03-21 | Stop reason: HOSPADM

## 2018-03-14 RX ORDER — HEPARIN SODIUM 5000 [USP'U]/.5ML
5000 INJECTION, SOLUTION INTRAVENOUS; SUBCUTANEOUS EVERY 12 HOURS
Qty: 14 ML | Refills: 0 | Status: ON HOLD | COMMUNITY
Start: 2018-03-14 | End: 2018-03-20

## 2018-03-14 RX ORDER — ATORVASTATIN CALCIUM 40 MG/1
40 TABLET, FILM COATED ORAL DAILY
Status: DISCONTINUED | OUTPATIENT
Start: 2018-03-15 | End: 2018-03-15

## 2018-03-14 RX ORDER — ASPIRIN 81 MG/1
81 TABLET ORAL DAILY
Status: DISCONTINUED | OUTPATIENT
Start: 2018-03-15 | End: 2018-03-21 | Stop reason: HOSPADM

## 2018-03-14 RX ORDER — CEPHALEXIN 500 MG/1
500 CAPSULE ORAL 3 TIMES DAILY
Status: DISCONTINUED | OUTPATIENT
Start: 2018-03-14 | End: 2018-03-18

## 2018-03-14 RX ORDER — MULTIPLE VITAMINS W/ MINERALS TAB 9MG-400MCG
1 TAB ORAL DAILY
Status: DISCONTINUED | OUTPATIENT
Start: 2018-03-15 | End: 2018-03-21 | Stop reason: HOSPADM

## 2018-03-14 RX ORDER — HEPARIN SODIUM 5000 [USP'U]/.5ML
5000 INJECTION, SOLUTION INTRAVENOUS; SUBCUTANEOUS EVERY 12 HOURS
Status: DISCONTINUED | OUTPATIENT
Start: 2018-03-14 | End: 2018-03-15

## 2018-03-14 RX ORDER — NALOXONE HYDROCHLORIDE 0.4 MG/ML
.1-.4 INJECTION, SOLUTION INTRAMUSCULAR; INTRAVENOUS; SUBCUTANEOUS
Status: DISCONTINUED | OUTPATIENT
Start: 2018-03-14 | End: 2018-03-21 | Stop reason: HOSPADM

## 2018-03-14 RX ORDER — CEPHALEXIN 500 MG/1
500 CAPSULE ORAL 3 TIMES DAILY
Qty: 30 CAPSULE | Refills: 0 | Status: ON HOLD | COMMUNITY
Start: 2018-03-14 | End: 2018-03-20

## 2018-03-14 RX ADMIN — ACETAMINOPHEN 650 MG: 325 TABLET, FILM COATED ORAL at 16:47

## 2018-03-14 RX ADMIN — ACETAMINOPHEN 650 MG: 325 TABLET, FILM COATED ORAL at 01:24

## 2018-03-14 RX ADMIN — METOPROLOL TARTRATE 12.5 MG: 25 TABLET, FILM COATED ORAL at 08:01

## 2018-03-14 RX ADMIN — CYCLOBENZAPRINE HYDROCHLORIDE 10 MG: 5 TABLET, FILM COATED ORAL at 16:47

## 2018-03-14 RX ADMIN — ATORVASTATIN CALCIUM 40 MG: 40 TABLET, FILM COATED ORAL at 07:58

## 2018-03-14 RX ADMIN — ASPIRIN 81 MG: 81 TABLET, COATED ORAL at 07:58

## 2018-03-14 RX ADMIN — OXYCODONE HYDROCHLORIDE 10 MG: 5 TABLET ORAL at 08:00

## 2018-03-14 RX ADMIN — OXYCODONE HYDROCHLORIDE 10 MG: 5 TABLET ORAL at 04:29

## 2018-03-14 RX ADMIN — ACETAMINOPHEN 650 MG: 325 TABLET, FILM COATED ORAL at 07:59

## 2018-03-14 RX ADMIN — Medication 12.5 MG: at 22:11

## 2018-03-14 RX ADMIN — OXYCODONE HYDROCHLORIDE 10 MG: 5 TABLET ORAL at 11:28

## 2018-03-14 RX ADMIN — DOCUSATE SODIUM 286 ML: 50 LIQUID ORAL at 09:48

## 2018-03-14 RX ADMIN — POLYETHYLENE GLYCOL 3350 17 G: 17 POWDER, FOR SOLUTION ORAL at 08:01

## 2018-03-14 RX ADMIN — CYCLOBENZAPRINE HYDROCHLORIDE 10 MG: 10 TABLET, FILM COATED ORAL at 07:59

## 2018-03-14 RX ADMIN — BISACODYL 10 MG: 10 SUPPOSITORY RECTAL at 18:49

## 2018-03-14 RX ADMIN — OXYCODONE HYDROCHLORIDE 10 MG: 5 TABLET ORAL at 01:24

## 2018-03-14 RX ADMIN — CEPHALEXIN 500 MG: 500 CAPSULE ORAL at 15:57

## 2018-03-14 RX ADMIN — SENNOSIDES AND DOCUSATE SODIUM 2 TABLET: 8.6; 5 TABLET ORAL at 08:00

## 2018-03-14 RX ADMIN — LIDOCAINE 3 PATCH: 560 PATCH PERCUTANEOUS; TOPICAL; TRANSDERMAL at 08:02

## 2018-03-14 RX ADMIN — BISACODYL 10 MG: 10 SUPPOSITORY RECTAL at 04:55

## 2018-03-14 RX ADMIN — SENNOSIDES AND DOCUSATE SODIUM 1 TABLET: 8.6; 5 TABLET ORAL at 22:09

## 2018-03-14 RX ADMIN — OXYCODONE HYDROCHLORIDE 10 MG: 5 TABLET ORAL at 15:57

## 2018-03-14 RX ADMIN — CEPHALEXIN 500 MG: 500 CAPSULE ORAL at 22:11

## 2018-03-14 NOTE — IP AVS SNAPSHOT
` `     [facility]: [phone]            Medication Administration Report for Derek Enriquez as of 03/21/18 1038   Legend:    Given Hold Not Given Due Canceled Entry Other Actions    Time Time (Time) Time  Time-Action       Inactive    Active    Linked        Medications 03/15/18 03/16/18 03/17/18 03/18/18 03/19/18 03/20/18 03/21/18    acetaminophen (TYLENOL) tablet 975 mg  Dose: 975 mg  Freq: 3 TIMES DAILY Route: PO  Start: 03/16/18 2000   Admin Instructions: Maximum acetaminophen dose from all sources = 75 mg/kg/day not to exceed 4 grams/day.      1911 (975 mg)-Given        0626 (975 mg)-Given       1421 (975 mg)-Given       (2222)-Not Given        0017 (975 mg)-Given [C]       0649 (975 mg)-Given       1241 (975 mg)-Given       2157 (975 mg)-Given        0637 (975 mg)-Given       1258 (975 mg)-Given       2051 (975 mg)-Given        0529 (975 mg)-Given              1354 (975 mg)-Given       2014 (975 mg)-Given        0700 (975 mg)-Given       [ ] 1300       [ ] 2000           aspirin EC EC tablet 81 mg  Dose: 81 mg  Freq: DAILY Route: PO  Start: 03/15/18 0800    0906 (81 mg)-Given        0752 (81 mg)-Given        0754 (81 mg)-Given        0810 (81 mg)-Given        0829 (81 mg)-Given        0843 (81 mg)-Given        0800 (81 mg)-Given             Freq: 2 TIMES DAILY Route: Top  Start: 03/15/18 2100   End: 03/20/18 2059   Admin Instructions: Apply to left hand/wrist.     2120 ( )-Given        1221 ( )-Given       (2102)-Not Given        1257 ( )-Given       2110 ( )-Given [C]        0816 ( )-Given       (2256)-Not Given        1259 ( )-Given       2051 ( )-Given        0843 ( )-Given       2059-Med Discontinued        bisacodyl (DULCOLAX) Suppository 10 mg  Dose: 10 mg  Freq: DAILY Route: RE  Start: 03/14/18 1800   Admin Instructions: With digital stim as needed.     2119 (10 mg)-Given        2150 (10 mg)-Given [C]        (1755)-Not Given [C]        1945 (10 mg)-Given        1949 (10 mg)-Given        1903  (10 mg)-Given        [ ] 1800           clopidogrel (PLAVIX) tablet 75 mg  Dose: 75 mg  Freq: DAILY Route: PO  Start: 03/19/18 1700   Admin Instructions: Starting date clarified w/. eg         1634 (75 mg)-Given        1646 (75 mg)-Given        [ ] 1700           gabapentin (NEURONTIN) capsule 400 mg  Dose: 400 mg  Freq: 3 TIMES DAILY Route: PO  Start: 03/16/18 2000     1958 (400 mg)-Given        0754 (400 mg)-Given       1422 (400 mg)-Given       (2224)-Not Given        0017 (400 mg)-Given [C]       0809 (400 mg)-Given       1401 (400 mg)-Given       2158 (400 mg)-Given        0828 (400 mg)-Given       1300 (400 mg)-Given       2051 (400 mg)-Given        0842 (400 mg)-Given       1354 (400 mg)-Given       2014 (400 mg)-Given        0700 (400 mg)-Given       [ ] 1400       [ ] 2000           metoprolol tartrate (LOPRESSOR) half-tab 12.5 mg  Dose: 12.5 mg  Freq: HOLD Route: PO  PRN Comment: hold (bid) until MD resumes  Start: 03/16/18 1030   Admin Instructions: Hold for sbp <110 or pulse <60               midodrine (PROAMATINE) tablet 2.5 mg  Dose: 2.5 mg  Freq: 2 TIMES DAILY Route: PO  Start: 03/18/18 0700   Admin Instructions: Be sure to give before morning and before afternoon therapies start.        0649 (2.5 mg)-Given       1237 (2.5 mg)-Given        0637 (2.5 mg)-Given       1259 (2.5 mg)-Given        0653 (2.5 mg)-Given       1209 (2.5 mg)-Given        0700 (2.5 mg)-Given       [ ] 1200           multivitamin, therapeutic with minerals (THERA-VIT-M) tablet 1 tablet  Dose: 1 tablet  Freq: DAILY Route: PO  Start: 03/15/18 0800    0903 (1 tablet)-Given        0752 (1 tablet)-Given        0754 (1 tablet)-Given        0809 (1 tablet)-Given        0829 (1 tablet)-Given        0843 (1 tablet)-Given        0800 (1 tablet)-Given           naloxone (NARCAN) injection 0.1-0.4 mg  Dose: 0.1-0.4 mg  Freq: EVERY 2 MIN PRN Route: IV  PRN Reason: opioid reversal  Start: 03/14/18 1427   Admin Instructions: For  respiratory rate LESS than or EQUAL to 8.  Partial reversal dose:  0.1 mg titrated q 2 minutes for Analgesia Side Effects Monitoring Sedation Level of 3 (frequently drowsy, arousable, drifts to sleep during conversation).Full reversal dose:  0.4 mg bolus for Analgesia Side Effects Monitoring Sedation Level of 4 (somnolent, minimal or no response to stimulation).  For ordered doses up to 2mg give IVP. Give each 0.4mg over 15 seconds in emergency situations. For non-emergent situations further dilute in 9mL of NS to facilitate titration of response.               ondansetron (ZOFRAN-ODT) ODT tab 4 mg  Dose: 4 mg  Freq: EVERY 6 HOURS PRN Route: PO  PRN Reasons: nausea,vomiting  Start: 03/17/18 1000   Admin Instructions: With dry hands, peel back foil backing and gently remove tablet; do not push oral disintegrating tablet through foil backing; administer immediately on tongue and oral disintegrating tablet dissolves in seconds; then swallow with saliva; liquid not required.               oxyCODONE IR (ROXICODONE) tablet 5-10 mg  Dose: 5-10 mg  Freq: EVERY 3 HOURS PRN Route: PO  PRN Reason: other  PRN Comment: pain control or improvement in physical function. Hold dose for analgesic side effects.  Start: 03/14/18 1430    0301 (10 mg)-Given       1326 (10 mg)-Given       1908 (10 mg)-Given       2241 (10 mg)-Given        0142 (10 mg)-Given       0755 (5 mg)-Given       1221 (10 mg)-Given       1958 (10 mg)-Given        0254 (10 mg)-Given       0626 (10 mg)-Given       1004 (10 mg)-Given               Patient is already receiving anticoagulation with heparin, enoxaparin (LOVENOX), warfarin (COUMADIN)  or other anticoagulant medication  Freq: CONTINUOUS PRN Route: XX  Start: 03/14/18 1318              polyethylene glycol (MIRALAX/GLYCOLAX) Packet 17 g  Dose: 17 g  Freq: DAILY Route: PO  Start: 03/21/18 0800   Admin Instructions: Hold if copious looser stools.  1 Packet = 17 grams. Mixed prescribed dose in 8 ounces of  water. Follow with 8 oz. of water.           0759 (17 g)-Given           senna-docusate (SENOKOT-S;PERICOLACE) 8.6-50 MG per tablet 1-2 tablet  Dose: 1-2 tablet  Freq: 2 TIMES DAILY Route: PO  Start: 03/20/18 2100 2014 (1 tablet)-Given        0800 (1 tablet)-Given              [ ] 2100           sodium phosphate (FLEET ENEMA) 1 enema  Dose: 1 enema  Freq: DAILY PRN Route: RE  PRN Reason: constipation  PRN Comment: second line if supp and dig stim inneffective  Start: 03/16/18 1632      1017 (1 enema)-Given              Discontinued Medications  Medications 03/15/18 03/16/18 03/17/18 03/18/18 03/19/18 03/20/18 03/21/18         Dose: 10 mg  Freq: 3 TIMES DAILY PRN Route: PO  PRN Reason: muscle spasms  Start: 03/14/18 1430   End: 03/19/18 1526    0121 (10 mg)-Given       2241 (10 mg)-Given        1753 (10 mg)-Given          1526-Med Discontinued           Dose: 17 g  Freq: 2 TIMES DAILY Route: PO  Start: 03/17/18 2100   End: 03/20/18 1607   Admin Instructions: Hold if copious looser stools.  1 Packet = 17 grams. Mixed prescribed dose in 8 ounces of water. Follow with 8 oz. of water.       (2148)-Not Given        0810 (17 g)-Given       (2048)-Not Given        0829 (17 g)-Given       2050 (17 g)-Given        0843 (17 g)-Given       1607-Med Discontinued          Dose: 3 tablet  Freq: 2 TIMES DAILY Route: PO  Start: 03/17/18 2100   End: 03/20/18 1607      (2148)-Not Given        0809 (3 tablet)-Given       (2048)-Not Given        0829 (3 tablet)-Given       2050 (3 tablet)-Given        0842 (3 tablet)-Given       1607-Med Discontinued

## 2018-03-14 NOTE — IP AVS SNAPSHOT
"` `           [facility]: [phone]                 INTERAGENCY TRANSFER FORM - NOTES (H&P, Discharge Summary, Consults, Procedures, Therapies)   3/14/2018                    Hospital Admission Date: 3/14/2018  DEREK MARTÍNEZ   : 1960  Sex: Male        Patient PCP Information     Provider PCP Type    Eugene Burrell MD General         History & Physicals      H&P by Sun Shankar MD at 3/14/2018  3:41 PM     Author:  Sun Shankar MD Service:  Acute IP Rehab Author Type:  Physician    Filed:  3/14/2018 10:44 PM Date of Service:  3/14/2018  3:41 PM Creation Time:  3/14/2018  3:23 PM    Status:  Addendum :  Sun Shankar MD (Physician)             [facility]   Acute Rehabilitation Unit  Admission History and Physical    chief complaint   \"hoping to rehab and gain some movement back in my lower extremities\".     History of Present illness  Derek Martínez is a 57 year old man with a past medical history of CAD initially presented with out of hospital[LB1.1] cardiac[PS1.1] arrest.  He had known LAD lesion with planned cardiac cath scheduled.  While exercising on treadmill had cardiac arrest 3/8/2018.  Was resuscitated  with an AED intubated in the field by EMS, had brief V-Fib episode.  Was taken to cath lab for stenting 3/8.  On 3/9 Mr. Martínez had sudden onset of paraplegia and loss of sensation below umbilicus.  Underwent MRI which demonstrated[LB1.1] \"extensive subarachnoid blood/hemorrhage within the thecal sac from L1 through the S1 level\"[PS1.1]. Was transferred to [facility] for further medical care.  Was on asa and[LB1.1] Plavix[LB1.2] post stenting 3/8.[LB1.1] On admission to [facility] underwent emergent  T9 to T12 laminectomy and evacuation of  hematoma.  No noted postoperative complications.  Was seen and evaluated by PM&R with recommendation for acute rehab. See note by Dr. Santamaria 3/12/2018 for details.      On arrival to ARU reported feeling better, " pain is more tolerable.  Denies n/v, sob, fevers, and dizziness. He reports use of suppositories for bowel movements with lack of sensation related to movement.  Also describes urinary retention, with lack of sensation to void with straight cathing then replacement of toussaint.  Able to relay recent events leading up to hospitalization and events leading to paraplegia. Mr. Enriquez remains hopeful for some motor recovery, though states he anticipates he is prepared to learn bowel and bladder cares as part of his rehab stay.[LB1.2]      Functionally, the patient[LB1.1] currently requiring set up assist for seated grooming, max assist for bathing, total assist with lower body dressing.  He has a toussaint catheter in place and neurogenic bowel with current suppository program in place.  Current transfers have been with a lift, needing mod assist for rolling, and min-max assist for trunk support during sitting.[LB1.2]        PAST Medical History   Reviewed and updated in Epic.[LB1.1]  CAD s/p stent placement as above[PS1.1]     Surgical History  Reviewed and updated in Epic.[LB1.1]  Past Surgical History:   Procedure Laterality Date     LAMINECTOMY THORACIC THREE LEVELS N/A 3/9/2018    Procedure: LAMINECTOMY THORACIC THREE LEVELS;  Thoracic 9-12 Laminectomy Evacuation of Subdural Hematoma, C-Arm, Prone, Gel Rolls.;  Surgeon: Abhijeet Joe MD;  Location: [facility] OR[PS1.2]       SOCIAL HISTORY  Reviewed and updated in Epic.  Marital Status:[LB1.1] , has girlfriend[LB1.2]  Living situation:[LB1.1] lives alone[LB1.2] in a house with 3-4 steps to enter and none inside;[PS1.1] adult son planning to move in[LB1.2]  Family support:[LB1.1] support of 2 sons, girlfriend[LB1.2]  Vocational History:[LB1.1] works as an [LB1.2]  Tobacco use:[LB1.1] denies[LB1.2]  Alcohol use:[LB1.1] denies[LB1.2]  Illicit drug use:[LB1.1] denies[LB1.2]    FAMILY HISTORY  Reviewed and updated in Epic.[LB1.1]  Brother with history of MI age  42.[LB1.2]     Prior Functional history   Pt was independent with all ADLs/IADLs, transfers, mobility and gait.[LB1.1] Drives; right hand-dominant.[PS1.1]    Medications  Scheduled meds[LB1.1]  Prescriptions Prior to Admission   Medication Sig Dispense Refill Last Dose     aspirin 81 MG tablet Take 1 tablet (81 mg) by mouth daily 90 tablet 3 3/14/2018 at 0758     cephALEXin (KEFLEX) 500 MG capsule Take 1 capsule (500 mg) by mouth 3 times daily 30 capsule 0 Unknown at Unknown time     Heparin Sodium, Porcine, (HEPARIN SODIUM PF) 5000 UNIT/0.5ML injection Inject 0.5 mLs (5,000 Units) Subcutaneous every 12 hours 14 mL 0 Unknown at Unknown time     oxyCODONE IR (ROXICODONE) 5 MG tablet Take 1-2 tablets (5-10 mg) by mouth every 3 hours as needed for other (pain control or improvement in physical function. Hold dose for analgesic side effects.) 50 tablet 0 3/14 at 1128     diazepam (VALIUM) 5 MG tablet Take 1 tablet (5 mg) by mouth every 6 hours as needed for anxiety or muscle spasms 30 tablet 0 Unknown at Unknown time     atorvastatin (LIPITOR) 40 MG tablet Take 1 tablet (40 mg) by mouth daily 30 tablet 3 3/14 at 0758     metoprolol tartrate (LOPRESSOR) 25 MG tablet Take 0.5 tablets (12.5 mg) by mouth 2 times daily 60 tablet  3/14 at 0801     menthol (ICY HOT) 5 % PTCH Apply 1 patch topically every 8 hours as needed for muscle soreness  0 3/13 at 1852     Lidocaine (LIDOCARE) 4 % Patch Place 3 patches onto the skin   3/14 at 0759     senna-docusate (SENOKOT-S;PERICOLACE) 8.6-50 MG per tablet Take 1 tablet by mouth 2 times daily 100 tablet  3/14 at 0800     cyclobenzaprine (FLEXERIL) 10 MG tablet Take 1 tablet (10 mg) by mouth 3 times daily 42 tablet  3/14 at 0759     polyethylene glycol (MIRALAX/GLYCOLAX) Packet Take 17 g by mouth daily 7 packet  3/14 at 0801     omega-3 acid ethyl esters (LOVAZA) 1 G capsule Take 2 g by mouth 2 times daily   Unknown at Unknown time     vitamin B complex with vitamin C (VITAMIN  B  "COMPLEX) TABS tablet Take 1 tablet by mouth daily   Unknown at Unknown time     Multiple Vitamins-Minerals (MULTIVITAMIN MEN PO) Take 1 tablet by mouth daily    Unknown at Unknown time[PS1.2]       ALLERGIES[LB1.1]   No Known Allergies[PS1.2]      Review of Systems  A 10 point ROS was performed and negative unless otherwise noted in HPI.       Physical Exam  VITAL SIGNS:[LB1.1]  /62 (BP Location: Right arm)  Pulse 79  Temp 97.7  F (36.5  C) (Oral)  Resp 16  Ht 1.803 m (5' 11\")  Wt 78 kg (172 lb)  SpO2 97%  BMI 23.99 kg/m2[PS1.2]  BMI:[LB1.1]  Estimated body mass index is 23.99 kg/(m^2) as calculated from the following:    Height as of this encounter: 1.803 m (5' 11\").    Weight as of this encounter: 78 kg (172 lb).[PS1.2]     General:[LB1.1] alert healthy appearing male sitting up in bed in NAD[LB1.2]  HEENT:[LB1.1] mmm[LB1.2]   Pulmonary:[LB1.1] non labored on room air[LB1.2]  Cardiovascular:[LB1.1] RRR[LB1.2]  Abdominal:[LB1.1] soft distended +BS[LB1.2]  Extremities: warm, well perfused, no edema in bilateral lower extremities  MSK/neuro:   Mental Status:  alert and oriented x3    Cranial Nerves: grossly normal      Sensory: Yuni[LB1.1]l sensation to ~umbilicus see CHONG exam for details.[LB1.2]    Strength:[LB1.1] 5/5 in bilateral upper extremities and 0/5 in bilateral lower extremities;[PS1.1] see chong exam for details.[LB1.2]    Tone per modified Marli Scale:[LB1.1] none[LB1.2]   Abnormal movements: None    Speech:[LB1.1] clear coherent[LB1.2]   Cognition:[LB1.1] linear logical[LB1.2]   Skin:[LB1.1] left forearm/wrist with area of redness and central purulence, incision CDI with mild swelling slight redness proximally[LB1.2] and sutures in place[PS1.1].[LB1.2] Rectal exam showed a big thrombosed external hemorrhoid with intact skin     CHONG Exam: (Please see paper chart for attached ISNCSCI form)      Right  Left  Motor:  T1  T1  Sensory:    Light: T9  T9   Sharp: T9  T9    Single Neurologic " Level: T9     Incomplete vs Complete: complete    Voluntary anal contraction: No   Deep anal pressure: No   Zones of partial preservation: yes; L2 and L3     CHONG Impairment Scale (AIS): T9 AIS A[PS1.1]        Labs  Pertinent labs[LB1.1] reviewed.[LB1.2]      IMPRESSION/PLAN:  Derek Enriquez is a 57 year old[LB1.1] man with a history of CAD who initially presented to [facility] after out of hospital arrest, underwent MALI to known LAD lesion on 3/8.  On 3/9 developed sudden paraplegia, MRI revealed thoracic[LB1.2] intrathecal (subarachnoid) hemorrhage[PS1.3], transferred to [facility] and underwent emergent laminectomy and subdural evacuation.  Admitted to ARU 3/14 for ongoing rehabilitation and medical management.[LB1.2]       Admission to acute inpatient rehab[LB1.1] paraplegia complete secondary to subdural hematoma S/p T9-T12 laminectomy and hematoma evacuation[LB1.2].    Impairment group code:[LB1.1] 04.112[LB1.2]      1. PT, OT[LB1.1] 90[LB1.2] minutes of each on a daily basis, in addition to rehab nursing and close management of physiatrist.      2. Impairment of ADL's:[LB1.1] Impaired activity tolerance, spinal precautions in new paraplegic.  Able to complete grooming tasks seated in chair after set up.  Min-mod assist with rolling for sling placement.  Goals for mod I[LB1.2] WC based[PS1.1] with ADLs and transfers.[LB1.2]     3. Impairment of mobility:[LB1.1]  new paraplegia and impaired balance.  Currently mod assist for logroll.  Max assist for seated trunk support tolerated sitting eob 1-2 minutes.  Goals for mod I with WC mobility and transfers.[LB1.2]     4. Rehab RN to administer medication, patient education on medication taking, VS monitoring, bowel regimen, education for SIC, skin care, wound care/surgical wound dressing changes and monitoring.[PS1.1]     5. Medical Conditions  Spinal Cord Compression[LB1.1]/ T9 AIS A[PS1.1]- secondary to[LB1.1] subarachnoid/intrathecal hemorrhage[PS1.3] 3/9[LB1.1]  "resulting in spinal cord compression[PS1.3] with sudden loss of ble movement and sensation.[LB1.1] See CHONG exam above.[LB1.2]   Underwent emergent T9-T12 laminectomy and evacuation of hematoma.[LB1.1] MRI also showed \"oval-shaped heterogeneous area of signal abnormality extending from mid T10 through mid T12 dorsal to the spinal cord, likely representing an AVM with adjacent  Hemorrhage\"[PS1.3]    -monitor incision  -PT/OT  -follow up with neuro interventional team in one month for spinal angiogram  -suture removal 3/23  -keep incision clean and dry  -may shower no scrubbing or soaking  -follow up with Lilli BAUMAN in 6 weeks  -[LB1.1]DVT[LB1.2] prophylaxis with sub Q heparin x 14 days ([LB1.1]through[LB1.2] 3/28)[LB1.1]  -pain control with prn tylenol, prn oxycodone, scheduled lidoderm and prn menthol patches   -reports some spasms in bilateral lower extremities; stopped prn valium (was not requiring) and changed scheduled flexeril to prn upon admission to ARU[PS1.1]    Neurogenic bowel- continue nightly bowel program[LB1.1] with supp after supper and dig stim as needed; given the location of hemorrhage and involvement of conus medullaris might have lower motor neuron type injury which will be more difficult to manage and require manual evacuation of stool[PS1.1]    Neurogenic bladder- continue toussaint with anticipated toussaint removal[LB1.1] in next 5-7 days with self cath training to follow[LB1.2]. Total UOP should be < 2000-2500ml/ day    Low risk for AD as his level is below T6 but still will monitor closely given some case reports of AD in complete SCIs[PS1.1]       CAD- known LAD lesion with out of hospital cardiac arrest[LB1.1] S[LB1.2]/p MALI 3/8.  Was seen and evaluated by Cardiology 3/10.  When safe from bleed perspective restart[LB1.1] Plavix[LB1.2] 75 mg daily given MALI. Should be decompensate and the clinical picture suggest acute stent thrombosis, we would highly consider VA-ECMO without heparin.  See " consult note by    -continue asa 81 mg daily (resumed 3/13)  - continue metoprolol 12.5 mg BID and Lipitor 40 mg daily when possible as well  -follow up with cardiology 3-4 weeks f/u stent placement  -ok to restart[LB1.1] Plavix[LB1.2] 3/23 per neurosurgery    Left Wrist cellulitis- redness and swelling with slight purulence at site of former IV[LB1.1].  T max 100.7 3/14, WBC WNL.[LB1.2]   -monitor keep clean and covered  -continue keflex 500 mg tid x 7 days    6. Adjustment to disability:  Clinical psychology to eval and treat[LB1.1]- anticipate in near future[LB1.2]  7. FEN:[LB1.1] regular[LB1.2]  8. Bowel:[LB1.1] neurogenic bowel on nightly suppository-[LB1.2] see above[PS1.1]   9. Bladder:[LB1.1] neurogenic bladder currently with toussaint[LB1.2]- see above[PS1.1]  10. DVT Prophylaxis:[LB1.1] heparin sub q[LB1.2]  11. GI Prophylaxis:[LB1.1] na[LB1.2]  12. Code:[LB1.1] full[LB1.2]  13. Disposition:[LB1.1] goal for home with support from family, 3 maryam and none necessary inside[LB1.2]  14. ELOS:[LB1.1]  ~4 weeks[LB1.2].  15. Rehab prognosis:[LB1.1]  Expected improved transfers, seated balance,  activity tolerance, and independence/min assist with self cares prior to discharge[LB1.2]  16. Follow up Appointments on Discharge:[LB1.1] neurosurgery and cardiology[LB1.2]      Seen and discussed with[LB1.1] Dr. Sun Shankar[LB1.2] , PM&R staff physician     Marilin Jung PA-C  Rehab Service  Pager [phone][LB1.1]      Physician Attestation   I, Sun Shankar, saw and evaluated Derek Enriquez as part of a shared visit.  I have reviewed and discussed with the advanced practice provider their history, physical and plan.    I personally reviewed the vital signs, medications, labs and imaging.    My key history or physical exam findings:   Key management decisions made by me:   58 yo male with T9 AIS A secondary to spinal SAH/intrathecal hemorrhage s/p laminectomy for hematoma evacuation. Functional deficits include  neurogenic bowel/bladder, complete paraplegia, absolute sensory loss below the level of injury, post-op and neuropathic pain, CAD s/p stent placement.     He was I will all aspects of his life; high functional prior to admission. Now requiring max to total assist for most ADLs and mobility; lift for transfers; can feed self with set-up; intact cognition     He can potentially improve to mod I level WC based with mobility and ADLs. Needs extensive education for skin protection / pressure relief techniques, bowel/bladder care and most likely emotional support for adjustment to his disability. Rehab prognosis for improvement to mod I level is good. Prognosis for recovery of his SCI and functional gain in his bilateral lower extremities is guarded given the exam and complete level of injury. At risk of more cardiac events from medical perspective.       ELOS is 4+ weeks with plan to dc home with family support. Needs ramp for stairs at entrance, custom WC and other equipment[PS1.3]s[PS1.4] to maximize his independence. Sexual function, return to driving (hand control) and work can be discussed as IP and also later as outpatient. He will also need modified cardiac rehab as outpatient.        Sun Shankar  Date of Service (when I saw the patient): 03/14/18    I spent a total of 110 minutes face-to-face and managing the care of Derek Enriquez. Over 50% of my time on the unit was spent counseling the patient and coordinating care. See note for details.[PS1.3]              Revision History        User Key Date/Time User Provider Type Action    > PS1.4 3/14/2018 10:44 PM Sun Shankar MD Physician Addend     [N/A] 3/14/2018 10:42 PM Sun Shankar MD Physician Addend     [N/A] 3/14/2018 10:42 PM Sun Shankar MD Physician Addend     PS1.3 3/14/2018 10:41 PM Sun Shankar MD Physician Addend     PS1.2 3/14/2018 10:21 PM Sun Shankar MD Physician Incomplete Revision     PS1.1 3/14/2018  9:58 PM Sun Shankar MD  Physician      LB1.2 3/14/2018  6:37 PM Marilin Jung PA Physician Assistant Sign     LB1.1 3/14/2018  3:41 PM Marilin Jung PA Physician Assistant Share            H&P by Sun Shankar MD at 3/14/2018  5:00 PM     Author:  Sun Shankar MD Service:  Physical Medicine and Rehabilitation Author Type:  Physician    Filed:  3/14/2018 10:43 PM Date of Service:  3/14/2018  5:00 PM Creation Time:  3/14/2018 10:21 PM    Status:  Addendum :  Sun Shankar MD (Physician)         Post Admission Physician Evaluation:    I have compared Derek Enriquez's condition on admission to acute rehabilitation to that outlined in the preadmission screen. History and physical exam performed by me. No significant differences are identified and the patient remains appropriate for an inpatient rehabilitation facility level of care to manage medical issues and address functional impairments due to (rehabilitation diagnosis) complete SCI secondary to intrathecal (subarachnoid) hemorrhage at T9 level.    Comorbid medical conditions being managed: neurogenic bowel/bladder, complete paraplegia, absolute sensory loss below the level of injury, post-op and neuropathic pain, CAD s/p stent placement     Prior functional level: I will all aspects of his life; high functional prior to admission      Present function: max to total assist for most ADLs and mobility; lift for transfers; can feed self with set-up; intact cognition     Anticipated rehabilitation course: can potentially improve to mod I level WC based with mobility and ADLs. Needs extensive education for skin protection / pressure relief techniques, bowel/bladder care and most likely emotional support for adjustment to his disability.     Will benefit from intensive rehabilitation includin minutes each of PT and OT  Rehabilitation nursing  Close management by physiatry    Prognosis: rehab prognosis for improvement to mod I level is good. Prognosis for  recovery of his SCI and functional gain in his bilateral lower extremities is guarded given the exam and complete level of injury. At risk of more cardiac events from medical perspective.       Estimated length of stay: 4+ weeks[PS1.1] with plan to dc home with family support. Needs ramp for stairs at entrance, custom WC and other equipments to maximize his independence. Sexual function, return to driving (hand control) and return to work can be discussed as IP and also later as outpatient. He will also need modified cardiac rehab as outpatient.[PS1.2]    Sun Shankar MD  Physical Medicine & Rehabilitation[PS1.1]         Revision History        User Key Date/Time User Provider Type Action    > PS1.2 3/14/2018 10:43 PM Sun Shankar MD Physician Addend     PS1.1 3/14/2018 10:32 PM Sun Shankar MD Physician Sign                     Discharge Summaries      Discharge Summaries by Keaton Lee MD at 3/21/2018  8:27 AM     Author:  Keaton Lee MD Service:  Physical Medicine and Rehabilitation Author Type:  Physician    Filed:  3/21/2018  8:27 AM Date of Service:  3/21/2018  8:27 AM Creation Time:  3/20/2018  9:40 PM    Status:  Signed :  Keaton Lee MD (Physician)         [facility]   Discharge Summary     Date Admitted: 3/14/2018  Date Discharge: 3/21/2018  Discharge Disposition: [facility]    Discharge Diagnosis:   1. Paraplegia following spinal subdural hematoma, T10 CHONG A  2. Neurogenic bladder and bowel  3. Neurogenic orthostatic hypotension  4. Recent cardiac arrest with LAD stent  5. Pain likely mixed postoperative, neuropathic and abdominal/visceral.  6. Adjustment disorder NOS  7. Left hand/wrist localized thrombophlebitis, course of Keflex and topical bacitracin - near resolved.    Brief History of Presenting Illness and Hospital Course:  This is a 57 year old male with known coronary artery disease who had  cardiac arrest admitted to [facility] 3/9/18 where he underwent cardiac stent along with intra-aortic balloon pump management.  Postop day 1 developed back pain then loss of sensation and movement in his lower extremities.  Identified spinal subdural hematoma and transferred emergently to [facility].  He underwent T9-T12 decompressive laminectomy and evacuation of subdural hematoma.  He unfortunately persists with complete paraplegia.  After medical stabilization he transferred to [facility] rehabilitation [place] 3/14 for comprehensive cares.  While on inpatient acute rehab he had obstipation with only minimal results on scheduled bowel program with suppository, dig stim and oral softeners.  Sequentially increased oral meds, fleet enema then finally results with magnesium citrate and pink lady enema.  Have gradually back down on oral meds to allow for more thorough clearing of obstipation.  Further optimization in process.  Neurogenic bladder likely, still with indwelling Sandhu catheter.  Will need trial of void and likely education on intermittent catheterization.  Orthostatic hypotension particularly limiting initially.  With his cardiac arrest ideally was on beta-blocker though with hypotension, metoprolol placed on hold.  Persisting low pressures thus added Midrin low-dose 2.5 mg in a.m. and midday before therapies.  Thus far tolerating without bradycardia arrhythmia.  Potentially this alpha agonist may impact bladder emptying with eventual Sandhu removal and trial of void.  Consider restarting low-dose beta-blocker for cardiac remodeling purposes if pressures tolerate.  Moderate though variable pain through abdomen / torso.  Etiology likely multifactorial.  Component seems linked with state of bowels and likely reflects visceral/not well localized pain.  Components of neuropathic and postsurgical also in the mix.  He is avoiding opioids more recently with bowel  obstipation.  Have started gabapentin titrated to 400 mg 3 times daily.  Has scheduled acetaminophen.  Other medical domains: He has been on aspirin through acute rehab stay, started clopidogrel 75 mg daily on 3/19 given recent cardiac stent, ideally continues dual platelet therapy but latter on hold briefly with spinal hematoma.  Ideally will be back on statin medication, patient wanted to hold until nausea and GI tract symptomatology normalizes.  Minor left hand wrist thrombophlebitis from earlier IV, stopped course of Keflex after 6 days when nausea occurred.  Functionally at discharge, thus far there is been no motor or sensory return in lower extremities.  No elevated tone. When pressures are not too low, he has done well up in manual wheelchair.  Working on transfers but not yet independent.   Disposition: Derek is choosing to pursue ongoing acute rehab through [facility] site.  Was approved and transferring 3/20 for ongoing cares.    Pertinent Latest Lab Results:     Discharge Medications:[SC1.1]   ZoeLowellarminda CHAVES   Home Medication Instructions LULY:[id number]    Printed on:03/20/18 5881   Medication Information                      acetaminophen (TYLENOL) 325 MG tablet  Take 3 tablets (975 mg) by mouth 3 times daily             aspirin 81 MG tablet  Take 1 tablet (81 mg) by mouth daily             bisacodyl (DULCOLAX) 10 MG Suppository  Place 1 suppository (10 mg) rectally daily As part of scheduled bowel program.             clopidogrel (PLAVIX) 75 MG tablet  Take 1 tablet (75 mg) by mouth daily             gabapentin (NEURONTIN) 400 MG capsule  Take 1 capsule (400 mg) by mouth 3 times daily             midodrine (PROAMATINE) 2.5 MG tablet  Take 1 tablet (2.5 mg) by mouth 2 times daily Give before morning and before afternoon therapies start for orthostatic hypotension.             Multiple Vitamins-Minerals (MULTIVITAMIN MEN PO)  Take 1 tablet by mouth daily              oxyCODONE IR (ROXICODONE) 5  "MG tablet  Take 1 tablet (5 mg) by mouth every 6 hours as needed for breakthrough pain or other (pain control or improvement in physical function. Hold dose for analgesic side effects.)             polyethylene glycol (MIRALAX/GLYCOLAX) Packet  Take 17 g by mouth daily             senna-docusate (SENOKOT-S;PERICOLACE) 8.6-50 MG per tablet  Take 1-2 tablets by mouth 2 times daily Hold for loose stools             sodium phosphate (FLEET ENEMA) 7-19 GM/118ML rectal enema  Place 1 Bottle (1 enema) rectally daily as needed for constipation (second line if supp and dig stim inneffective)             vitamin B complex with vitamin C (VITAMIN  B COMPLEX) TABS tablet  Take 1 tablet by mouth daily[SC1.2]                   Physical Examination:[SC1.1]   /80 (BP Location: Left arm)  Pulse 78  Temp 97.4  F (36.3  C) (Oral)  Resp 17  Ht 1.803 m (5' 11\")  Wt 78 kg (172 lb)  SpO2 100%  BMI 23.99 kg/m2[SC1.3]   Alert pleasant, no diaphoresis, fluent speech-language, intact cognition.  Heart regular, lungs clear  Abdomen hyperactive bowel sounds, nontender  No lower extremity motor movements  Sensory level intact T10, partial sensation T11, no sensation more distal dermatomes.  Low tone lower extremities.    Discharge Instructions:[SC1.1]    Active Diet Order      Combination Diet Regular Diet Adult      Advance Diet as Tolerated[SC1.4]   Activity: Avoid more significant bending twisting through lumbar spine but no brace is needed.  Okay to use arms for transferring, pushing manual chair etc.  Continue bowel program scheduled evening suppository followed by dig stim.  Fleet enema second line if little to no results.  Indwelling Sandhu catheter for now though trial removal per PM&R team.  Back incision may be covered with dry gauze, sutures to be removed approximately 3/23 if incision still looks good.  Report any concerns for infection to [facility] neurosurgery.    Follow up Appointments:  Follow-up with " Neuro-Interventional team Dr. Branden Joe et. al. in about one month (4/13/18) for spinal angiogram and assessment for arterio-venous malformation.     Follow-up with Cardiology Dr. Anoop Jane in about 4 weeks in regards to recent cardiac arrest and stent placement     Total Discharge time spent is > 30 minutes.[SC1.1]     Revision History        User Key Date/Time User Provider Type Action    > SC1.3 3/21/2018  8:27 AM Keaton Lee MD Physician Sign     SC1.2 3/20/2018 10:09 PM Keaton Lee MD Physician      SC1.4 3/20/2018  9:41 PM Keaton Lee MD Physician      SC1.1 3/20/2018  9:40 PM Keaton Lee MD Physician                   Consult Notes     No notes of this type exist for this encounter.         Progress Notes - Physician (Notes from 03/18/18 through 03/21/18)      Progress Notes by Keaton Lee MD at 3/20/2018 10:11 PM     Author:  Keaton Lee MD Service:  Physical Medicine and Rehabilitation Author Type:  Physician    Filed:  3/20/2018 10:16 PM Date of Service:  3/20/2018 10:11 PM Creation Time:  3/20/2018 10:11 PM    Status:  Signed :  Keaton Lee MD (Physician)         PM&R Daily Note:    Formal team rounds held today, please see separate document in Plan of Care tab for full details. Briefly  Derek is started to have improvements with orthostatic hypotension - still some challenges.  Initial obstipation has improved though still optimization of bowel program in process.  Unfortunately still no motor or sensory return in lower extremities.  He is choosing to continue his inpatient rehab through [facility] and transportation will be set up for tomorrow.    For bowels, had another large soft BM last night with suppository.  Additional though slight early afternoon today.  We will continue with scheduled bisacodyl suppository and dig stim.  Start backing off MiraLAX now once daily.  Also senna-docusate  from 3 down to 1-2 BID. Pause on psyllium with these changes but likely  that will be helpful.    Orthostatic hypotension a bit worse today, continue with Midrin 2.5 twice daily and if still lower, would titrate that up.  Continue abdominal binder, knee-high compression stockings and Ace wraps on thighs for all up and out of bed activity.    This blood pressure impacts safety with being upright for wheelchair transport outlined for tomorrow.  While he might be feeling well, given the variability he has had so far, would not want him to be orthostatic mid wheelchair transport -that would not be safe.  He is wanting to avoid stretcher transport understandably so.  Will work with our therapy and his son tomorrow for a car transfer which should be safe with assistance.  At [facility] and at [facility] will need wheelchair and slide board.     Pain slightly up in abdomen, still likely multifactorial.  He is wanting to avoid opioid which is weakly good.  Continue gabapentin 400 mg 3 times daily, scheduled acetaminophen.    Anticoagulation continues clopidogrel and aspirin.  Other cardiac: statin held with nausea recently and metoprolol held with hypotension.  Consider restarting these in future when his other symptomatology settles down  Left hand thrombophlebitis almost resolved      Vitals:    03/20/18 0825 03/20/18 0936 03/20/18 0940 03/20/18 1709   BP: 122/71 104/64 98/53 117/68   BP Location: Right arm Right arm Right arm Right arm   Pulse:    78   Resp: 16   16   Temp: 97.9  F (36.6  C)   97.1  F (36.2  C)   TempSrc: Oral   Oral   SpO2: 97%   100%   Weight:       Height:         70 minutes spent today, 35 in direct patient interaction, remainder in team rounds and coordination of care.[SC1.1]     Revision History        User Key Date/Time User Provider Type Action    > SC1.1 3/20/2018 10:16 PM Keaton Lee MD Physician Sign            Progress Notes by Keaton Lee MD at 3/19/2018  3:08 PM     Author:  Keaton Lee MD Service:  Physical Medicine and Rehabilitation Author  Type:  Physician    Filed:  3/19/2018  3:45 PM Date of Service:  3/19/2018  3:08 PM Creation Time:  3/19/2018  3:08 PM    Status:  Addendum :  Keaton Lee MD (Physician)         PM&R Daily Note:[SC1.1]    Some discrepency in earlier neurosurgery documentation as to the start date of clopidogrel. Neurosurgeon staff Dr. Joe did clarify should be 10 days post surgery which is today. Given cardiac stent ideally we were to be on dual antiplatelet aspirin / clopidogrel though latter held with spinal bleed.    Functionally having a great day. Had shower and tolerating up in manual wheelchair[SC1.2] without orthostatic hypotension.  For now will continue with Midrin 2.5 mg twice daily.  Metoprolol is on hold.  If he continues to do very well with blood pressures, first step would be cut out Midrin, if tolerated may add back very low-dose metoprolol because of it secondary benefit on cardiac remodeling post cardiac arrest and LAD stenting ultimately with his spinal cord injury, the beta-blocker may not be tolerated.    Pain: He has not needed oxycodone in a few days, will leave that on as needed in case that he is aware it might slow down bowels.  Has not touched Flexeril, will discontinue that.  He continues with gabapentin 400 mg 3 times daily and he believes that is helpful.  Ice has also been helpful.  Continue scheduled acetaminophen.    Derek has expressed interest in doing his inpatient acute rehab through [facility], we have made that referral and they will will explore insurance authorization.[SC1.1]    Completed [facility] paperwork.[SC1.3]      Vitals:    03/19/18 0857 03/19/18 1000 03/19/18 1300 03/19/18 1535   BP: 129/70 115/71 126/83 119/72   BP Location: Right arm Left arm Right arm Right arm   Pulse: 83   77   Resp: 18   16   Temp: 99  F (37.2  C)  96.5  F (35.8  C) 95.6  F (35.3  C)   TempSrc: Oral   Oral   SpO2: 100%   100%   Weight:       Height:         Fully alert, no  diaphoresis  Saw her earlier up in manual wheelchair pushing very well  Later examined at bedside lying comfortably abdominal binder still on but belly nontender  He has lower extremity PRAFO boots with kickstand    45 minutes spent today, 25 in direct patient interaction.[SC1.1]     Revision History        User Key Date/Time User Provider Type Action    > [N/A] 3/19/2018  3:45 PM Keaton Lee MD Physician Addend     SC1.3 3/19/2018  3:44 PM Keaton Lee MD Physician Sign     SC1.1 3/19/2018  3:40 PM Keaton Lee MD Physician      SC1.2 3/19/2018  3:08 PM Keaton Lee MD Physician             Progress Notes by David Simmons at 3/19/2018  3:21 PM     Author:  David Simmons Service:  (none) Author Type:  Orthotist    Filed:  3/19/2018  3:22 PM Date of Service:  3/19/2018  3:21 PM Creation Time:  3/19/2018  3:21 PM    Status:  Signed :  David Simmons (Orthotist)         S: [facility]  with Bilateral Pressure Relief Ankle Foot Orthosis (PRAFO) for the lower extremity as ordered by Dr. Jung.    O/g: braces have been recommended to help reduce foot drop and off-weight heel from pressure.      A: The patient's knee was flexed to relax the gastroc muscle.  Once the foot was positioned, the soft liner was closed over the top of foot and checked that surface is smooth and consistent.  The middle Velcro strap was secured next.  The positioning of posterior heel was re-evaluated then all dorsal straps and calf strap was secured.  The foot position was re-evaluated so that the sole of foot met the plantar surface of the orthosis, including calcaneus and that the heel clearance was visible through the posterior opening.  The dorsi/plantar flexion bar was adjusted by hand to achieve desired degree.  The PRAFO toe extension/protection was adjusted by positioning extension plate under toes to desired length.     P: patient/nursing staff instructed to contact our office with any  "problems or questions.[BJ1.1]       Revision History        User Key Date/Time User Provider Type Action    > BJ1.1 3/19/2018  3:22 PM David Simmons Orthotist Sign            Progress Notes by Keaton Lee MD at 3/18/2018  8:34 AM     Author:  Keaton Lee MD Service:  Physical Medicine and Rehabilitation Author Type:  Physician    Filed:  3/18/2018  2:23 PM Date of Service:  3/18/2018  8:34 AM Creation Time:  3/18/2018  8:34 AM    Status:  Addendum :  Keaton Lee MD (Physician)         PM&R Daily Note:    Neurogenic bowel: Yesterday after magnesium citrate and pink lady enema had some results followed later with moderate amount stool.  He did vomit an additional time yesterday evening though I am optimistic we are getting cleaned out.  I will do an abdominal x-ray to follow-up.  If x-ray looks better and no longer vomiting then will slowly ease back on bowel meds and ease back into eating more as he's feeling up to this.  Possibly keflex adds to nausea, left hand looks much better so will stop PO abx. Continue topical bacitracin.    Orthostatic hypotension but normal blood pressures when supine.  Does not look hypovolemic and labs confirm.  Already stopped metoprolol, using compression stockings, abdominal binder and Ace wraps to thighs though persisting symptomatic lower pressures.    Trying low-dose Midrin 2.5 mg twice daily, before a.m. and afternoon therapies.  Caution with bradycardia arrhythmia especially with recent cardiac stenting.  Midrin may also exacerbate urine retention currently with indwelling Sandhu catheter.  Will prioritize blood pressure regulation for now but may need to balance this with trial catheter removal.  Would be my speculation he will have neurogenic bladder to the extent that would require intermittent catheterization and thus if alpha 1 antagonist mechanism of action with Midrin \"worsens\" urine retention likely does not change the need for management of " intermittent catheterization.     Pain: Likely multifactorial.  With loss of sensation related to SCI, localizing pain is impacted.  Likely a components from bowels and constipation, incisional/spine and neuropathic.  Bowels hopefully addressed with above, gabapentin we have increased dose to 400 mg 3 times daily (do not suspect the higher dose was because of nausea but monitor as potential side effect).  Also has ongoing acetaminophen scheduled and oxycodone 5-10 mg up to every 3 hours as needed. Minimize opioid with impact on bowels.[SC1.1]    ADDENDUM: Returned in afternoon with check in with Derek.  Wife and 2 sons also present.  Preliminary starting to tolerate some eating without nausea.  Also first therapy session with better blood pressures.  Still slight drop but not nearly as symptomatic.  Continue to build on this.    Answered many questions with Derek's family.  Reviewed the above plan.  Reviewed imaging studies showing him MRI results on the screen and explaining what we knew before and after surgery.  He is interested in specific data and outcomes and with his preference for this, I did discussed prognosis with CHONG A SCI. Most people with his extent of deficit will not go on to walk.  That is not to say impossible but also important to be prepared with likely outcome.  We reviewed what goals we might achieve while on inpatient rehab.  If he continues to not have any motor recovery in lower extremities, then the goals will be to master transfers, wheelchair based functioning, neurogenic bladder, neurogenic bowel.  Should he start to have some motor recovery, that would open up many goals and likely justify longer course of intense acute rehab setting.[SC1.2]        Vitals:    03/17/18 1045 03/17/18 1055 03/17/18 1500 03/18/18 0732   BP: (!) 85/54 128/74 127/73 121/71   BP Location: Right arm Right arm Right arm Right arm   Pulse:   80 70   Resp:   16 16   Temp:   97.4  F (36.3  C) 96.9  F (36.1  C)    TempSrc:   Oral Oral   SpO2:   98% 98%   Weight:       Height:         alert No diaphoresis  MMM  Heart RRR  Lungs clear  Abdomen non-tender, non-distended, active BS's.  No peripheral edema[SC1.1]    80[SC1.2] minutes today,[SC1.1] 60[SC1.2] min direct patient interaction, remainder coordination of care.[SC1.1]     Revision History        User Key Date/Time User Provider Type Action    > SC1.2 3/18/2018  2:23 PM Keaton Lee MD Physician Addend     SC1.1 3/18/2018  8:48 AM Keaton Lee MD Physician Sign                  Procedure Notes     No notes of this type exist for this encounter.      Progress Notes - Therapies (Notes from 03/18/18 through 03/21/18)     No notes of this type exist for this encounter.

## 2018-03-14 NOTE — PLAN OF CARE
"Problem: Patient Care Overview  Goal: Plan of Care/Patient Progress Review  Outcome: No Change  Max T: 100.7; tylenol given and IS encouraged. PRN oxycodone q3h helpful for back pain. BLE 0/5 and numb with intermittent \"odd sensation\".  Back inc intact with sutures and mild erythema noted. PIV SL. Pt with no sensation to bladder and bowel; no BM since Thursday 3/8; digital stimulation produced small amt loose stool; BS hypoactive; +gas; morning scheduled supp given early. Blanchable redness to perianal area; barrier cream applied. Pt unable to void; needing straight cath q3-4 hours; Dr. Maya notified and ordered toussaint; placed at 0430. MRI done last roel. Up A2+lift. Turn and reposition q2h.  R groin site firm to touch; Dr. Maya notified; +pulses. Reg diet. Plan to d/c to [facility] at noon via [facility] transport. Continue with POC.      "

## 2018-03-14 NOTE — PLAN OF CARE
Problem: Goal/Outcome  Goal: Goal Outcome Summary  FOCUS/GOAL  Bowel management, Bladder management, Nutrition/Feeding/Swallowing precautions, Medication management, Pain management, Wound care management and Medical management    Pt admitted to ARU around 1300 from  on [place]. Pt had gone into cardiac arrest on 3/8 while exercising and due to complications in the hospital had a T9-T12 laminectomy. Pts diagnosis is a SCI at T9-T10 after a subdural hematoma evac. Ao2 golvo lift to bed from . Pt denied pain upon admission. Back incision RAJAT-approximated with no drainage. Pt states when he has pain its in this area. States he is taking Flexaril, Tylenol, & Oxycodone PRN for pain. Arrived with 3 lidocaine patches on back (per report due to be removed at 2000).  Reports loss of sensation/function at umbilicus line and below. Pt has no sensation of b&b, toussaint intact and draining jovanni colored urine. Pt reports small bms but needing a bowel program placed. Pt received stool softners, Miralax, and a suppository this morning with small results and 1 sm incontinent bm this shift-requesting more tonight after dinner. Pt has a reported infected scab/abrasion on L hand by thumb. Pt able to use call light appropriately and make needs known. Alarms on for safety.

## 2018-03-14 NOTE — H&P
"    [facility]   Acute Rehabilitation Unit  Admission History and Physical    chief complaint   \"hoping to rehab and gain some movement back in my lower extremities\".     History of Present illness  Derek Enriquez is a 57 year old man with a past medical history of CAD initially presented with out of hospital cardiac arrest.  He had known LAD lesion with planned cardiac cath scheduled.  While exercising on treadmill had cardiac arrest 3/8/2018.  Was resuscitated  with an AED intubated in the field by EMS, had brief V-Fib episode.  Was taken to cath lab for stenting 3/8.  On 3/9 Mr. Enriquez had sudden onset of paraplegia and loss of sensation below umbilicus.  Underwent MRI which demonstrated \"extensive subarachnoid blood/hemorrhage within the thecal sac from L1 through the S1 level\". Was transferred to [facility] for further medical care.  Was on asa and Plavix post stenting 3/8. On admission to [facility] underwent emergent  T9 to T12 laminectomy and evacuation of  hematoma.  No noted postoperative complications.  Was seen and evaluated by PM&R with recommendation for acute rehab. See note by Dr. Santamaria 3/12/2018 for details.      On arrival to ARU reported feeling better, pain is more tolerable.  Denies n/v, sob, fevers, and dizziness. He reports use of suppositories for bowel movements with lack of sensation related to movement.  Also describes urinary retention, with lack of sensation to void with straight cathing then replacement of toussaint.  Able to relay recent events leading up to hospitalization and events leading to paraplegia. Mr. Enirquez remains hopeful for some motor recovery, though states he anticipates he is prepared to learn bowel and bladder cares as part of his rehab stay.      Functionally, the patient currently requiring set up assist for seated grooming, max assist for bathing, total assist with lower body dressing.  He has a toussaint catheter in place and neurogenic bowel with " current suppository program in place.  Current transfers have been with a lift, needing mod assist for rolling, and min-max assist for trunk support during sitting.        PAST Medical History   Reviewed and updated in Epic.  CAD s/p stent placement as above     Surgical History  Reviewed and updated in Epic.  Past Surgical History:   Procedure Laterality Date     LAMINECTOMY THORACIC THREE LEVELS N/A 3/9/2018    Procedure: LAMINECTOMY THORACIC THREE LEVELS;  Thoracic 9-12 Laminectomy Evacuation of Subdural Hematoma, C-Arm, Prone, Gel Rolls.;  Surgeon: Abhijeet oJe MD;  Location:  OR       SOCIAL HISTORY  Reviewed and updated in Epic.  Marital Status: , has girlfriend  Living situation: lives alone in a house with 3-4 steps to enter and none inside; adult son planning to move in  Family support: support of 2 sons, girlfriend  Vocational History: works as an   Tobacco use: denies  Alcohol use: denies  Illicit drug use: denies    FAMILY HISTORY  Reviewed and updated in Epic.  Brother with history of MI age 42.     Prior Functional history   Pt was independent with all ADLs/IADLs, transfers, mobility and gait. Drives; right hand-dominant.    Medications  Scheduled meds  Prescriptions Prior to Admission   Medication Sig Dispense Refill Last Dose     aspirin 81 MG tablet Take 1 tablet (81 mg) by mouth daily 90 tablet 3 3/14/2018 at 0758     cephALEXin (KEFLEX) 500 MG capsule Take 1 capsule (500 mg) by mouth 3 times daily 30 capsule 0 Unknown at Unknown time     Heparin Sodium, Porcine, (HEPARIN SODIUM PF) 5000 UNIT/0.5ML injection Inject 0.5 mLs (5,000 Units) Subcutaneous every 12 hours 14 mL 0 Unknown at Unknown time     oxyCODONE IR (ROXICODONE) 5 MG tablet Take 1-2 tablets (5-10 mg) by mouth every 3 hours as needed for other (pain control or improvement in physical function. Hold dose for analgesic side effects.) 50 tablet 0 3/14 at 1128     diazepam (VALIUM) 5 MG tablet Take 1 tablet (5 mg)  "by mouth every 6 hours as needed for anxiety or muscle spasms 30 tablet 0 Unknown at Unknown time     atorvastatin (LIPITOR) 40 MG tablet Take 1 tablet (40 mg) by mouth daily 30 tablet 3 3/14 at 0758     metoprolol tartrate (LOPRESSOR) 25 MG tablet Take 0.5 tablets (12.5 mg) by mouth 2 times daily 60 tablet  3/14 at 0801     menthol (ICY HOT) 5 % PTCH Apply 1 patch topically every 8 hours as needed for muscle soreness  0 3/13 at 1852     Lidocaine (LIDOCARE) 4 % Patch Place 3 patches onto the skin   3/14 at 0759     senna-docusate (SENOKOT-S;PERICOLACE) 8.6-50 MG per tablet Take 1 tablet by mouth 2 times daily 100 tablet  3/14 at 0800     cyclobenzaprine (FLEXERIL) 10 MG tablet Take 1 tablet (10 mg) by mouth 3 times daily 42 tablet  3/14 at 0759     polyethylene glycol (MIRALAX/GLYCOLAX) Packet Take 17 g by mouth daily 7 packet  3/14 at 0801     omega-3 acid ethyl esters (LOVAZA) 1 G capsule Take 2 g by mouth 2 times daily   Unknown at Unknown time     vitamin B complex with vitamin C (VITAMIN  B COMPLEX) TABS tablet Take 1 tablet by mouth daily   Unknown at Unknown time     Multiple Vitamins-Minerals (MULTIVITAMIN MEN PO) Take 1 tablet by mouth daily    Unknown at Unknown time       ALLERGIES   No Known Allergies      Review of Systems  A 10 point ROS was performed and negative unless otherwise noted in HPI.       Physical Exam  VITAL SIGNS:  /62 (BP Location: Right arm)  Pulse 79  Temp 97.7  F (36.5  C) (Oral)  Resp 16  Ht 1.803 m (5' 11\")  Wt 78 kg (172 lb)  SpO2 97%  BMI 23.99 kg/m2  BMI:  Estimated body mass index is 23.99 kg/(m^2) as calculated from the following:    Height as of this encounter: 1.803 m (5' 11\").    Weight as of this encounter: 78 kg (172 lb).     General: alert healthy appearing male sitting up in bed in NAD  HEENT: mmm   Pulmonary: non labored on room air  Cardiovascular: RRR  Abdominal: soft distended +BS  Extremities: warm, well perfused, no edema in bilateral lower " extremities  MSK/neuro:   Mental Status:  alert and oriented x3    Cranial Nerves: grossly normal      Sensory: Normal sensation to ~umbilicus see KEREN exam for details.    Strength: 5/5 in bilateral upper extremities and 0/5 in bilateral lower extremities; see keren exam for details.    Tone per modified Marli Scale: none   Abnormal movements: None    Speech: clear coherent   Cognition: linear logical   Skin: left forearm/wrist with area of redness and central purulence, incision CDI with mild swelling slight redness proximally and sutures in place. Rectal exam showed a big thrombosed external hemorrhoid with intact skin     KEREN Exam: (Please see paper chart for attached ISNCSCI form)      Right  Left  Motor:  T1  T1  Sensory:    Light: T9  T9   Sharp: T9  T9    Single Neurologic Level: T9     Incomplete vs Complete: complete    Voluntary anal contraction: No   Deep anal pressure: No   Zones of partial preservation: yes; L2 and L3     KEREN Impairment Scale (AIS): T9 AIS A        Labs  Pertinent labs reviewed.      IMPRESSION/PLAN:  Derek Enriquez is a 57 year old man with a history of CAD who initially presented to [facility] after out of hospital arrest, underwent MALI to known LAD lesion on 3/8.  On 3/9 developed sudden paraplegia, MRI revealed thoracic intrathecal (subarachnoid) hemorrhage, transferred to [facility] and underwent emergent laminectomy and subdural evacuation.  Admitted to ARU 3/14 for ongoing rehabilitation and medical management.       Admission to acute inpatient rehab paraplegia complete secondary to subdural hematoma S/p T9-T12 laminectomy and hematoma evacuation.    Impairment group code: 04.112      1. PT, OT 90 minutes of each on a daily basis, in addition to rehab nursing and close management of physiatrist.      2. Impairment of ADL's: Impaired activity tolerance, spinal precautions in new paraplegic.  Able to complete grooming tasks seated in chair after set up.  Min-mod assist with rolling  "for sling placement.  Goals for mod I WC based with ADLs and transfers.     3. Impairment of mobility:  new paraplegia and impaired balance.  Currently mod assist for logroll.  Max assist for seated trunk support tolerated sitting eob 1-2 minutes.  Goals for mod I with WC mobility and transfers.     4. Rehab RN to administer medication, patient education on medication taking, VS monitoring, bowel regimen, education for SIC, skin care, wound care/surgical wound dressing changes and monitoring.     5. Medical Conditions  Spinal Cord Compression/ T9 AIS A- secondary to subarachnoid/intrathecal hemorrhage 3/9 resulting in spinal cord compression with sudden loss of ble movement and sensation. See CHONG exam above.   Underwent emergent T9-T12 laminectomy and evacuation of hematoma. MRI also showed \"oval-shaped heterogeneous area of signal abnormality extending from mid T10 through mid T12 dorsal to the spinal cord, likely representing an AVM with adjacent  Hemorrhage\"    -monitor incision  -PT/OT  -follow up with neuro interventional team in one month for spinal angiogram  -suture removal 3/23  -keep incision clean and dry  -may shower no scrubbing or soaking  -follow up with Lilli BAUMAN in 6 weeks  -DVT prophylaxis with sub Q heparin x 14 days (through 3/28)  -pain control with prn tylenol, prn oxycodone, scheduled lidoderm and prn menthol patches   -reports some spasms in bilateral lower extremities; stopped prn valium (was not requiring) and changed scheduled flexeril to prn upon admission to ARU    Neurogenic bowel- continue nightly bowel program with supp after supper and dig stim as needed; given the location of hemorrhage and involvement of conus medullaris might have lower motor neuron type injury which will be more difficult to manage and require manual evacuation of stool    Neurogenic bladder- continue toussaint with anticipated toussaint removal in next 5-7 days with self cath training to follow. Total UOP should " be < 2000-2500ml/ day    Low risk for AD as his level is below T6 but still will monitor closely given some case reports of AD in complete SCIs       CAD- known LAD lesion with out of hospital cardiac arrest S/p MALI 3/8.  Was seen and evaluated by Cardiology 3/10.  When safe from bleed perspective restart Plavix 75 mg daily given MALI. Should be decompensate and the clinical picture suggest acute stent thrombosis, we would highly consider VA-ECMO without heparin.  See consult note by Dr.   -continue asa 81 mg daily (resumed 3/13)  - continue metoprolol 12.5 mg BID and Lipitor 40 mg daily when possible as well  -follow up with cardiology 3-4 weeks f/u stent placement  -ok to restart Plavix 3/23 per neurosurgery    Left Wrist cellulitis- redness and swelling with slight purulence at site of former IV.  T max 100.7 3/14, WBC WNL.   -monitor keep clean and covered  -continue keflex 500 mg tid x 7 days    6. Adjustment to disability:  Clinical psychology to eval and treat- anticipate in near future  7. FEN: regular  8. Bowel: neurogenic bowel on nightly suppository- see above   9. Bladder: neurogenic bladder currently with toussaint- see above  10. DVT Prophylaxis: heparin sub q  11. GI Prophylaxis: na  12. Code: full  13. Disposition: goal for home with support from family, 3 maryam and none necessary inside  14. ELOS:  ~4 weeks.  15. Rehab prognosis:  Expected improved transfers, seated balance,  activity tolerance, and independence/min assist with self cares prior to discharge  16. Follow up Appointments on Discharge: neurosurgery and cardiology      Seen and discussed with Dr. Sun Shankar , PM&R staff physician     Marilin Jung PA-C  Rehab Service  Pager [phone]      Physician Attestation   Sun EGAN, saw and evaluated Derek Enriquez as part of a shared visit.  I have reviewed and discussed with the advanced practice provider their history, physical and plan.    I personally reviewed the vital signs,  medications, labs and imaging.    My key history or physical exam findings:   Key management decisions made by me:   58 yo male with T9 AIS A secondary to spinal SAH/intrathecal hemorrhage s/p laminectomy for hematoma evacuation. Functional deficits include neurogenic bowel/bladder, complete paraplegia, absolute sensory loss below the level of injury, post-op and neuropathic pain, CAD s/p stent placement.     He was I will all aspects of his life; high functional prior to admission. Now requiring max to total assist for most ADLs and mobility; lift for transfers; can feed self with set-up; intact cognition     He can potentially improve to mod I level WC based with mobility and ADLs. Needs extensive education for skin protection / pressure relief techniques, bowel/bladder care and most likely emotional support for adjustment to his disability. Rehab prognosis for improvement to mod I level is good. Prognosis for recovery of his SCI and functional gain in his bilateral lower extremities is guarded given the exam and complete level of injury. At risk of more cardiac events from medical perspective.       ELOS is 4+ weeks with plan to dc home with family support. Needs ramp for stairs at entrance, custom WC and other equipments to maximize his independence. Sexual function, return to driving (hand control) and work can be discussed as IP and also later as outpatient. He will also need modified cardiac rehab as outpatient.        Sun Shankar  Date of Service (when I saw the patient): 03/14/18    I spent a total of 110 minutes face-to-face and managing the care of Derek Enriquez. Over 50% of my time on the unit was spent counseling the patient and coordinating care. See note for details.

## 2018-03-14 NOTE — PLAN OF CARE
Problem: Patient Care Overview  Goal: Plan of Care/Patient Progress Review  Physical Therapy Discharge Summary    Reason for therapy discharge:    Discharged to acute rehabilitation facility.    Progress towards therapy goal(s). See goals on Care Plan in [facility] electronic health record for goal details.  Goals not met.  Barriers to achieving goals:   discharge from facility.    Therapy recommendation(s):    Continued therapy is recommended.  Rationale/Recommendations:  ARU to maximize functional progress following SCI, spine surgery.

## 2018-03-14 NOTE — IP AVS SNAPSHOT
MRN:[id number]                      After Visit Summary   3/14/2018    Derek Enriquez    MRN: [id number]           Thank you!     Thank you for choosing [facility] for your care. Our goal is always to provide you with excellent care. Hearing back from our patients is one way we can continue to improve our services. Please take a few minutes to complete the written survey that you may receive in the mail after you visit with us. Thank you!        Patient Information     Date Of Birth          1960        About your hospital stay     You were admitted on:  March 14, 2018 You last received care in the:   [facility]    You were discharged on:  March 21, 2018        Reason for your hospital stay       Initially hospitalized with cardiac arrest and LAD stent, postoperative spinal subdural hematoma with subsequent cord injury and paraplegia.  After surgical evacuation and initial stabilization, transferred to inpatient acute rehab for comprehensive cares.                  Who to Call     For medical emergencies, please call 911.  For non-urgent questions about your medical care, please call your primary care provider or clinic, [phone]          Attending Provider     Provider Specialty    Keaton Lee MD Physical Medicine and Rehab       Primary Care Provider Office Phone # Fax #    Eugene Burrell [phone]      After Care Instructions     Activity - Up with nursing assistance           Additional Discharge Instructions       Bowel program: Scheduled daily bisacodyl suppository and digital stimulation.  If little or no results, may follow with enema.  Titrating oral medications up/down for optimum bowel consistency.            Advance Diet as Tolerated       Follow this diet upon discharge: Regular diet, normal consistencies.            Sandhu catheter       To straight gravity drainage. Change catheter every 2 weeks and PRN for leaking or decreased uring output  with signs of bladder distention.  Anticipate catheter removal and trial of void per Patricia Constantino providers, suspect neurogenic bladder and likely need for intermittent catheterization.            Wound care (specify)       Site: back incision cover with dry gauze, monitor for signs or symptoms of infection.  Sutures outlined to be removed 2 weeks from 3/10 surgery = 3/24.  Okay to be removed by rehab physician team if incision looks good and they deem appropriate.  Report any signs or symptoms of infection to neurosurgery [facility]                  Follow-up Appointments     Follow Up ([facility])       Follow-up with Neuro-Interventional team in about one month (4/13/18) for spinal angiogram and assessment for arterio-venous malformation.      Follow-up with Cardiology Dr. Anoop Jane in about 4 weeks in regards to recent cardiac arrest and stent placement           Appointments on [place] and/or [facility] (with [facility] or [facility] provider or service). Call [phone] if you haven't heard regarding these appointments within 7 days of discharge.                  Your next 10 appointments already scheduled     Apr 03, 2018 12:00 PM CDT   (Arrive by 11:45 AM)   Return Visit with GREGORIO Rhodes [facility] ([facility])    [address] [place] [address]  [address] [place] [id number] [phone]              Additional Services     Occupational Therapy Adult Consult       Evaluate and treat as clinically indicated.            Physical Therapy Adult Consult       Evaluate and treat as clinically indicated.                  Future tests that were ordered for you     AntiEmbolism Stockings       Bilateral below knee length, also using Ace wraps on thighs above-knee and abdominal binder. On in the morning, off at night (or if in bed for longer periods) to help with orthostatic hypotension.                  Further instructions from your care team       Follow up appointments:  "    -- Follow-up with Neuro-Interventional team in about one month (4/13/18) for spinal angiogram and assessment for arterio-venous malformation.  They will follow up with you to schedule your appointment.    -- Follow-up with Cardiology Dr. Anoop Jane in about 4 weeks in regards to recent cardiac arrest and stent placement.  Please call to schedule your appointment with Dr. Jane.  Address  [facility], Cardiology                          [address] [place] [address]; [address], [address]                          [place], [place]   Phone   [phone]    -- You may reach the Neurosurgery clinic at [phone] during regular work hours. ER at [phone].    and ask for the Neurosurgery Resident on call at [phone], during off hours or weekends.    Pending Results     No orders found from 3/12/2018 to 3/15/2018.            Statement of Approval     Ordered          03/20/18 0158  I have reviewed and agree with all the recommendations and orders detailed in this document.  EFFECTIVE NOW     Approved and electronically signed by:  Keaton Lee MD             Admission Information     Date & Time Provider Department Dept. Phone    3/14/2018 Keaton Lee MD  ACUTE REHAB [phone]      Your Vitals Were     Blood Pressure Pulse Temperature Respirations Height Weight    119/80 (BP Location: Left arm) 78 97.4  F (36.3  C) (Oral) 17 1.803 m (5' 11\") 78 kg (172 lb)    Pulse Oximetry BMI (Body Mass Index)                100% 23.99 kg/m2          [facility] Information     [employer] gives you secure access to your electronic health record. If you see a primary care provider, you can also send messages to your care team and make appointments. If you have questions, please call your primary care clinic.  If you do not have a primary care provider, please call [phone] and they will assist you.        Care EveryWhere ID     This is your Care EveryWhere ID. This could be used by other organizations to access your " [facility] medical records  [id number]        Equal Access to Services     AUSTIN WILSON : Hadii aad ku hadmarinwojciech Francisco, waheatherda lisbeth, qarosariokriss sagemakeena flood, minal mayjose emyron hamlin. So [facility] [phone].    ATENCIÓN: Si habla español, tiene a humphrey disposición servicios gratuitos de asistencia lingüística. Llame al [phone].    We comply with applicable federal civil rights laws and [place] laws. We do not discriminate on the basis of race, color, national origin, age, disability, sex, sexual orientation, or gender identity.               Review of your medicines      START taking        Dose / Directions    acetaminophen 325 MG tablet   Commonly known as:  TYLENOL   Used for:  S/P laminectomy        Dose:  975 mg   Take 3 tablets (975 mg) by mouth 3 times daily   Refills:  0       bisacodyl 10 MG Suppository   Commonly known as:  DULCOLAX   Used for:  Neurogenic bowel        Dose:  10 mg   Place 1 suppository (10 mg) rectally daily As part of scheduled bowel program.   Quantity:  30 suppository   Refills:  0       clopidogrel 75 MG tablet   Commonly known as:  PLAVIX   Used for:  H/O heart artery stent        Dose:  75 mg   Take 1 tablet (75 mg) by mouth daily   Quantity:  30 tablet   Refills:  0       gabapentin 400 MG capsule   Commonly known as:  NEURONTIN   Used for:  Neuropathic pain        Dose:  400 mg   Take 1 capsule (400 mg) by mouth 3 times daily   Quantity:  90 capsule   Refills:  0       midodrine 2.5 MG tablet   Commonly known as:  PROAMATINE   Used for:  Neurogenic orthostatic hypotension (H)        Dose:  2.5 mg   Take 1 tablet (2.5 mg) by mouth 2 times daily Give before morning and before afternoon therapies start for orthostatic hypotension.   Refills:  0       sodium phosphate 7-19 GM/118ML rectal enema   Used for:  Neurogenic bowel        Dose:  1 enema   Place 1 Bottle (1 enema) rectally daily as needed for constipation (second line if supp and dig stim  inneffective)   Quantity:  1 Bottle   Refills:  0         CONTINUE these medicines which may have CHANGED, or have new prescriptions. If we are uncertain of the size of tablets/capsules you have at home, strength may be listed as something that might have changed.        Dose / Directions    oxyCODONE IR 5 MG tablet   Commonly known as:  ROXICODONE   This may have changed:    - how much to take  - when to take this  - reasons to take this   Used for:  S/P laminectomy        Dose:  5 mg   Take 1 tablet (5 mg) by mouth every 6 hours as needed for breakthrough pain or other (pain control or improvement in physical function. Hold dose for analgesic side effects.)   Quantity:  50 tablet   Refills:  0       senna-docusate 8.6-50 MG per tablet   Commonly known as:  SENOKOT-S;PERICOLACE   This may have changed:    - how much to take  - additional instructions   Used for:  Neurogenic bowel        Dose:  1-2 tablet   Take 1-2 tablets by mouth 2 times daily Hold for loose stools   Quantity:  100 tablet   Refills:  0         CONTINUE these medicines which have NOT CHANGED        Dose / Directions    aspirin 81 MG tablet   Used for:  Unstable angina (H)        Dose:  81 mg   Take 1 tablet (81 mg) by mouth daily   Quantity:  90 tablet   Refills:  3       MULTIVITAMIN MEN PO        Dose:  1 tablet   Take 1 tablet by mouth daily   Refills:  0       polyethylene glycol Packet   Commonly known as:  MIRALAX/GLYCOLAX        Dose:  17 g   Take 17 g by mouth daily   Quantity:  7 packet   Refills:  0       vitamin B complex with vitamin C Tabs tablet        Dose:  1 tablet   Take 1 tablet by mouth daily   Refills:  0         STOP taking     atorvastatin 40 MG tablet   Commonly known as:  LIPITOR           cephALEXin 500 MG capsule   Commonly known as:  KEFLEX           cyclobenzaprine 10 MG tablet   Commonly known as:  FLEXERIL           diazepam 5 MG tablet   Commonly known as:  VALIUM           heparin sodium PF 5000 UNIT/0.5ML  injection           Lidocaine 4 % Patch   Commonly known as:  LIDOCARE           menthol 5 % Ptch   Commonly known as:  ICY HOT           metoprolol tartrate 25 MG tablet   Commonly known as:  LOPRESSOR           omega-3 acid ethyl esters 1 G capsule   Commonly known as:  Lovaza                Where to get your medicines      Some of these will need a paper prescription and others can be bought over the counter. Ask your nurse if you have questions.     You don't need a prescription for these medications     acetaminophen 325 MG tablet    bisacodyl 10 MG Suppository    clopidogrel 75 MG tablet    gabapentin 400 MG capsule    midodrine 2.5 MG tablet    oxyCODONE IR 5 MG tablet    senna-docusate 8.6-50 MG per tablet    sodium phosphate 7-19 GM/118ML rectal enema                Protect others around you: Learn how to safely use, store and throw away your medicines at www.disposemymeds.org.        Information about OPIOIDS     PRESCRIPTION OPIOIDS: WHAT YOU NEED TO KNOW    Prescription opioids can be used to help relieve moderate to severe pain and are often prescribed following a surgery or injury, or for certain health conditions. These medications can be an important part of treatment but also come with serious risks. It is important to work with your health care provider to make sure you are getting the safest, most effective care.    WHAT ARE THE RISKS AND SIDE EFFECTS OF OPIOID USE?  Prescription opioids carry serious risks of addiction and overdose, especially with prolonged use. An opioid overdose, often marked by slowed breathing can cause sudden death. The use of prescription opioids can have a number of side effects as well, even when taken as directed:      Tolerance - meaning you might need to take more of a medication for the same pain relief    Physical dependence - meaning you have symptoms of withdrawal when a medication is stopped    Increased sensitivity to pain    Constipation    Nausea, vomiting,  and dry mouth    Sleepiness and dizziness    Confusion    Depression    Low levels of testosterone that can result in lower sex drive, energy, and strength    Itching and sweating    RISKS ARE GREATER WITH:    History of drug misuse, substance use disorder, or overdose    Mental health conditions (such as depression or anxiety)    Sleep apnea    Older age (65 years or older)    Pregnancy    Avoid alcohol while taking prescription opioids.   Also, unless specifically advised by your health care provider, medications to avoid include:    Benzodiazepines (such as Xanax or Valium)    Muscle relaxants (such as Soma or Flexeril)    Hypnotics (such as Ambien or Lunesta)    Other prescription opioids    KNOW YOUR OPTIONS:  Talk to your health care provider about ways to manage your pain that do not involve prescription opioids. Some of these options may actually work better and have fewer risks and side effects:    Pain relievers such as acetaminophen, ibuprofen, and naproxen    Some medications that are also used for depression or seizures    Physical therapy and exercise    Cognitive behavioral therapy, a psychological, goal-directed approach, in which patients learn how to modify physical, behavioral, and emotional triggers of pain and stress    IF YOU ARE PRESCRIBED OPIOIDS FOR PAIN:    Never take opioids in greater amounts or more often than prescribed    Follow up with your primary health care provider and work together to create a plan on how to manage your pain.    Talk about ways to help manage your pain that do not involve prescription opioids    Talk about all concerns and side effects    Help prevent misuse and abuse    Never sell or share prescription opioids    Never use another person's prescription opioids    Store prescription opioids in a secure place and out of reach of others (this may include visitors, children, friends, and family)    Visit www.cdc.gov/drugoverdose to learn about risks of opioid abuse  and overdose    If you believe you may be struggling with addiction, tell your health care provider and ask for guidance or call [facility]'s National Helpline at 4-808-544-HELP    LEARN MORE / www.cdc.gov/drugoverdose/prescribing/guideline.html    Safely dispose of unused prescription opioids: Find your local drug take-back programs and more information about the importance of safe disposal at www.doseofreality.[place].gov             Medication List: This is a list of all your medications and when to take them. Check marks below indicate your daily home schedule. Keep this list as a reference.      Medications           Morning Afternoon Evening Bedtime As Needed    acetaminophen 325 MG tablet   Commonly known as:  TYLENOL   Take 3 tablets (975 mg) by mouth 3 times daily   Last time this was given:  975 mg on 3/21/2018  7:00 AM                                aspirin 81 MG tablet   Take 1 tablet (81 mg) by mouth daily                                bisacodyl 10 MG Suppository   Commonly known as:  DULCOLAX   Place 1 suppository (10 mg) rectally daily As part of scheduled bowel program.   Last time this was given:  10 mg on 3/20/2018  7:03 PM                                clopidogrel 75 MG tablet   Commonly known as:  PLAVIX   Take 1 tablet (75 mg) by mouth daily   Last time this was given:  75 mg on 3/20/2018  4:46 PM                                gabapentin 400 MG capsule   Commonly known as:  NEURONTIN   Take 1 capsule (400 mg) by mouth 3 times daily   Last time this was given:  400 mg on 3/21/2018  7:00 AM                                midodrine 2.5 MG tablet   Commonly known as:  PROAMATINE   Take 1 tablet (2.5 mg) by mouth 2 times daily Give before morning and before afternoon therapies start for orthostatic hypotension.   Last time this was given:  2.5 mg on 3/21/2018  7:00 AM                                MULTIVITAMIN MEN PO   Take 1 tablet by mouth daily   Last time this was given:  1 tablet on 3/21/2018  8:00  AM                                oxyCODONE IR 5 MG tablet   Commonly known as:  ROXICODONE   Take 1 tablet (5 mg) by mouth every 6 hours as needed for breakthrough pain or other (pain control or improvement in physical function. Hold dose for analgesic side effects.)   Last time this was given:  10 mg on 3/17/2018 10:04 AM                                polyethylene glycol Packet   Commonly known as:  MIRALAX/GLYCOLAX   Take 17 g by mouth daily   Last time this was given:  17 g on 3/21/2018  7:59 AM                                senna-docusate 8.6-50 MG per tablet   Commonly known as:  SENOKOT-S;PERICOLACE   Take 1-2 tablets by mouth 2 times daily Hold for loose stools   Last time this was given:  1 tablet on 3/21/2018  8:00 AM                                sodium phosphate 7-19 GM/118ML rectal enema   Place 1 Bottle (1 enema) rectally daily as needed for constipation (second line if supp and dig stim inneffective)   Last time this was given:  1 enema on 3/17/2018 10:17 AM                                vitamin B complex with vitamin C Tabs tablet   Take 1 tablet by mouth daily

## 2018-03-14 NOTE — IP AVS SNAPSHOT
"    [facility]: [id number]                                              INTERAGENCY TRANSFER FORM - LAB / IMAGING / EKG / EMG RESULTS   3/14/2018                    Hospital Admission Date: 3/14/2018  LINDA MARTÍNEZ   : 1960  Sex: Male        Attending Provider: Keaton Lee MD     Allergies:  No Known Allergies    Infection:  None   Service:  ACUTE IP ENRIQUE    Ht:  1.803 m (5' 11\")   Wt:  78 kg (172 lb)   Admission Wt:  78 kg (172 lb)    BMI:  23.99 kg/m 2   BSA:  1.98 m 2            Patient PCP Information     Provider PCP Type    Eugene Burrell MD General      Unresulted Labs     None      Encounter-Level Documents:     There are no encounter-level documents.      Order-Level Documents:     There are no order-level documents.      "

## 2018-03-14 NOTE — IP AVS SNAPSHOT
"    [facility]: [phone]                                              INTERAGENCY TRANSFER FORM - PHYSICIAN ORDERS   3/14/2018                    Hospital Admission Date: 3/14/2018  LINDA MARTÍNEZ   : 1960  Sex: Male        Attending Provider: Keaton Lee MD     Allergies:  No Known Allergies    Infection:  None   Service:  ACUTE IP ENRIQUE    Ht:  1.803 m (5' 11\")   Wt:  78 kg (172 lb)   Admission Wt:  78 kg (172 lb)    BMI:  23.99 kg/m 2   BSA:  1.98 m 2            Patient PCP Information     Provider PCP Type    Eugene Burrell MD General      ED Clinical Impression     Diagnosis Description Comment Added By Time Added    S/P laminectomy [Z98.890] S/P laminectomy [Z98.890]  Keaton Lee MD 3/20/2018  9:20 PM    Neurogenic bowel [K59.2] Neurogenic bowel [K59.2]  Keaton Lee MD 3/20/2018  9:35 PM    Neurogenic bladder [N31.9] Neurogenic bladder [N31.9]  Keaton Lee MD 3/20/2018  9:35 PM    Neurogenic orthostatic hypotension (H) [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.3] Neurogenic orthostatic hypotension (H) [G90.3]  Keaton Lee MD 3/20/2018  9:35 PM    H/O heart artery stent [Z95.5] H/O heart artery stent [Z95.5]  Keaton Lee MD 3/20/2018  9:35 PM    Neuropathic pain [M79.2] Neuropathic pain [M79.2]  Keaton Lee MD 3/20/2018  9:35 PM      Hospital Problems as of 3/21/2018              Priority Class Noted POA    Spinal cord compression (H) Medium  3/9/2018 Yes      Non-Hospital Problems as of 3/21/2018              Priority Class Noted    Cardiac arrest (H) Medium  3/8/2018    Coronary artery disease involving native coronary artery Medium  3/9/2018    Stented coronary artery Medium  3/9/2018    Subdural hematoma (H) Medium  3/14/2018      Code Status History     Date Active Date Inactive Code Status Order ID Comments User Context    3/20/2018  9:36 PM  Full Code [id number]  Keaton Lee MD Outpatient    3/14/2018  1:18 PM 3/20/2018  9:36 PM Full Code [id number]  Marilin Jung PA " Inpatient    3/13/2018 10:26 AM 3/14/2018  1:18 PM Full Code [id number]  GrahamMilvia GREGORIO Delgado CNP Outpatient    3/8/2018 11:02 PM 3/9/2018 10:02 PM Full Code [id number]  Yoshi Lynos MD Inpatient         Medication Review      START taking        Dose / Directions Comments    acetaminophen 325 MG tablet   Commonly known as:  TYLENOL   Used for:  S/P laminectomy        Dose:  975 mg   Take 3 tablets (975 mg) by mouth 3 times daily   Refills:  0    May work towards prn when pain consistently controlled.       bisacodyl 10 MG Suppository   Commonly known as:  DULCOLAX   Used for:  Neurogenic bowel        Dose:  10 mg   Place 1 suppository (10 mg) rectally daily As part of scheduled bowel program.   Quantity:  30 suppository   Refills:  0        clopidogrel 75 MG tablet   Commonly known as:  PLAVIX   Used for:  H/O heart artery stent        Dose:  75 mg   Take 1 tablet (75 mg) by mouth daily   Quantity:  30 tablet   Refills:  0        gabapentin 400 MG capsule   Commonly known as:  NEURONTIN   Used for:  Neuropathic pain        Dose:  400 mg   Take 1 capsule (400 mg) by mouth 3 times daily   Quantity:  90 capsule   Refills:  0        midodrine 2.5 MG tablet   Commonly known as:  PROAMATINE   Used for:  Neurogenic orthostatic hypotension (H)        Dose:  2.5 mg   Take 1 tablet (2.5 mg) by mouth 2 times daily Give before morning and before afternoon therapies start for orthostatic hypotension.   Refills:  0        sodium phosphate 7-19 GM/118ML rectal enema   Used for:  Neurogenic bowel        Dose:  1 enema   Place 1 Bottle (1 enema) rectally daily as needed for constipation (second line if supp and dig stim inneffective)   Quantity:  1 Bottle   Refills:  0          CONTINUE these medications which may have CHANGED, or have new prescriptions. If we are uncertain of the size of tablets/capsules you have at home, strength may be listed as something that might have changed.        Dose / Directions  Comments    oxyCODONE IR 5 MG tablet   Commonly known as:  ROXICODONE   This may have changed:    - how much to take  - when to take this  - reasons to take this   Used for:  S/P laminectomy        Dose:  5 mg   Take 1 tablet (5 mg) by mouth every 6 hours as needed for breakthrough pain or other (pain control or improvement in physical function. Hold dose for analgesic side effects.)   Quantity:  50 tablet   Refills:  0        senna-docusate 8.6-50 MG per tablet   Commonly known as:  SENOKOT-S;PERICOLACE   This may have changed:    - how much to take  - additional instructions   Used for:  Neurogenic bowel        Dose:  1-2 tablet   Take 1-2 tablets by mouth 2 times daily Hold for loose stools   Quantity:  100 tablet   Refills:  0          CONTINUE these medications which have NOT CHANGED        Dose / Directions Comments    aspirin 81 MG tablet   Used for:  Unstable angina (H)        Dose:  81 mg   Take 1 tablet (81 mg) by mouth daily   Quantity:  90 tablet   Refills:  3        MULTIVITAMIN MEN PO        Dose:  1 tablet   Take 1 tablet by mouth daily   Refills:  0        polyethylene glycol Packet   Commonly known as:  MIRALAX/GLYCOLAX        Dose:  17 g   Take 17 g by mouth daily   Quantity:  7 packet   Refills:  0        vitamin B complex with vitamin C Tabs tablet        Dose:  1 tablet   Take 1 tablet by mouth daily   Refills:  0          STOP taking     atorvastatin 40 MG tablet   Commonly known as:  LIPITOR           cephALEXin 500 MG capsule   Commonly known as:  KEFLEX           cyclobenzaprine 10 MG tablet   Commonly known as:  FLEXERIL           diazepam 5 MG tablet   Commonly known as:  VALIUM           heparin sodium PF 5000 UNIT/0.5ML injection           Lidocaine 4 % Patch   Commonly known as:  LIDOCARE           menthol 5 % Ptch   Commonly known as:  ICY HOT           metoprolol tartrate 25 MG tablet   Commonly known as:  LOPRESSOR           omega-3 acid ethyl esters 1 G capsule   Commonly known as:   Joseph                     Further instructions from your care team       Follow up appointments:     -- Follow-up with Neuro-Interventional team in about one month (4/13/18) for spinal angiogram and assessment for arterio-venous malformation.  They will follow up with you to schedule your appointment.    -- Follow-up with Cardiology Dr. Anoop Jane in about 4 weeks in regards to recent cardiac arrest and stent placement.  Please call to schedule your appointment with Dr. Jane.  Address  [facility], Cardiology                          [address] [place] [address]; [address], [address]                          [place], [place]   Phone   [phone]    -- You may reach the Neurosurgery clinic at [phone] during regular work hours. ER at [phone].    and ask for the Neurosurgery Resident on call at [phone], during off hours or weekends.    Summary of Visit     Reason for your hospital stay       Initially hospitalized with cardiac arrest and LAD stent, postoperative spinal subdural hematoma with subsequent cord injury and paraplegia.  After surgical evacuation and initial stabilization, transferred to inpatient acute rehab for comprehensive cares.             After Care     Activity - Up with nursing assistance           Additional Discharge Instructions       Bowel program: Scheduled daily bisacodyl suppository and digital stimulation.  If little or no results, may follow with enema.  Titrating oral medications up/down for optimum bowel consistency.       Advance Diet as Tolerated       Follow this diet upon discharge: Regular diet, normal consistencies.       Sandhu catheter       To straight gravity drainage. Change catheter every 2 weeks and PRN for leaking or decreased uring output with signs of bladder distention.  Anticipate catheter removal and trial of void per Patricia Constantino providers, suspect neurogenic bladder and likely need for intermittent catheterization.       Wound care (specify)       Site:  back incision cover with dry gauze, monitor for signs or symptoms of infection.  Sutures outlined to be removed 2 weeks from 3/10 surgery = 3/24.  Okay to be removed by rehab physician team if incision looks good and they deem appropriate.  Report any signs or symptoms of infection to neurosurgery [facility]             Referrals     Occupational Therapy Adult Consult       Evaluate and treat as clinically indicated.       Physical Therapy Adult Consult       Evaluate and treat as clinically indicated.             Supplies     AntiEmbolism Stockings       Bilateral below knee length, also using Ace wraps on thighs above-knee and abdominal binder. On in the morning, off at night (or if in bed for longer periods) to help with orthostatic hypotension.             Your next 10 appointments already scheduled     Apr 03, 2018 12:00 PM CDT   (Arrive by 11:45 AM)   Return Visit with GREGORIO [facility])    [address] [place] [address]  [address] [place] [id number] [phone]              Follow-Up Appointment Instructions     Future Labs/Procedures    Follow Up ([facility])     Comments:    Follow-up with Neuro-Interventional team in about one month (4/13/18) for spinal angiogram and assessment for arterio-venous malformation.      Follow-up with Cardiology Dr. Anoop Jane in about 4 weeks in regards to recent cardiac arrest and stent placement           Appointments on [place] and/or [facility] (with [facility] or [facility] provider or service). Call [phone] if you haven't heard regarding these appointments within 7 days of discharge.      Follow-Up Appointment Instructions     Follow Up ([facility])       Follow-up with Neuro-Interventional team in about one month (4/13/18) for spinal angiogram and assessment for arterio-venous malformation.      Follow-up with Cardiology Dr. Anoop Jane in about 4 weeks in regards to recent cardiac arrest and stent placement            Appointments on [place] and/or [facility] (with [facility] or [facility] provider or service). Call [phone] if you haven't heard regarding these appointments within 7 days of discharge.             Statement of Approval     Ordered          03/20/18 4439  I have reviewed and agree with all the recommendations and orders detailed in this document.  EFFECTIVE NOW     Approved and electronically signed by:  Keaton Lee MD

## 2018-03-14 NOTE — PROGRESS NOTES
Social Work Services Progress Note    Hospital Day: 6  Date of Initial Social Work Evaluation:  3/12/18  Collaborated with:  Milvia Stevenson Neuro Surgery NP    Data:  SW received a text message from Admissions (Lupe) at [facility] indicating that pt had a fever this am.  Per MD Lupe at [facility] would like CBC drawn this am.    Intervention:  GARRY updated Milvia Stevenson NP regarding the above.  Milvia will order CBC now.  Per Milvia, pt is medically ready for discharge.    Assessment:  Pt had a fever of 100.7 (oral) this am.    Plan:    Anticipated Disposition:  Acute rehab placement at [facility]     Barriers to d/c plan:  Pt had a temp this am, await CBC result    Follow Up:  GARRY will continue to follow.    DIANE Powell  Social Work, 6A  Phone:  [phone]  Pager:  [phone]  3/14/2018

## 2018-03-14 NOTE — IP AVS SNAPSHOT
"` `     [facility]: [phone]                                              INTERAGENCY TRANSFER FORM - NURSING   3/14/2018                    Hospital Admission Date: 3/14/2018  LINDA MARTÍNEZ   : 1960  Sex: Male        Attending Provider: Keaton Lee MD     Allergies:  No Known Allergies    Infection:  None   Service:  ACUTE IP ENRIQUE    Ht:  1.803 m (5' 11\")   Wt:  78 kg (172 lb)   Admission Wt:  78 kg (172 lb)    BMI:  23.99 kg/m 2   BSA:  1.98 m 2            Patient PCP Information     Provider PCP Type    Eugene Burrell MD General      Current Code Status     Date Active Code Status Order ID Comments User Context       Prior      Code Status History     Date Active Date Inactive Code Status Order ID Comments User Context    3/20/2018  9:36 PM  Full Code [id number]  Keaton Lee MD Outpatient    3/14/2018  1:18 PM 3/20/2018  9:36 PM Full Code [id number]  Marilin Jung PA Inpatient    3/13/2018 10:26 AM 3/14/2018  1:18 PM Full Code [id number]  Milvia Stevenson APRN CNP Outpatient    3/8/2018 11:02 PM 3/9/2018 10:02 PM Full Code [id number]  Yoshi Lyons MD Inpatient      Advance Directives        Scanned docmt in ACP Activity?           No scanned doc        Hospital Problems as of 3/21/2018              Priority Class Noted POA    Spinal cord compression (H) Medium  3/9/2018 Yes      Non-Hospital Problems as of 3/21/2018              Priority Class Noted    Cardiac arrest (H) Medium  3/8/2018    Coronary artery disease involving native coronary artery Medium  3/9/2018    Stented coronary artery Medium  3/9/2018    Subdural hematoma (H) Medium  3/14/2018      Immunizations     None         END      ASSESSMENT     Discharge Profile Flowsheet     EXPECTED DISCHARGE     SKIN      Expected Discharge Date  18 1332   Inspection of bony prominences  Full 18 2220    DISCHARGE NEEDS ASSESSMENT     Full except areas not inspected   -- " "03/20/18 1239    Patient/family verbalizes understanding of discharge plan recommendations?  Yes 03/15/18 1244   Inspection under devices  -- 03/20/18 1239    Medical Team notified of plan?  yes 03/15/18 1244   Not Inspected under devices  Compression wrap 03/20/18 1239    Readmission Within The Last 30 Days  no previous admission in last 30 days 03/15/18 1244   Skin WDL  ex 03/20/18 2220    Equipment Currently Used at Home  none 03/15/18 1244   Skin Color/Characteristics  redness blanchable 03/20/18 1239    Transportation Available  family or friend will provide 03/15/18 1244   Skin Temperature  warm 03/20/18 1239    GASTROINTESTINAL (ADULT,PEDIATRIC,OB)     Skin Moisture  dry 03/20/18 1239    GI WDL  WDL (No BM) 03/21/18 0125   Skin Elasticity  quick return to original state 03/18/18 2022    All Quadrants Bowel Sounds  hyperactive 03/20/18 1239   Skin Integrity  incision(s);drain/device(s) 03/20/18 2220    Last Bowel Movement  03/20/18 03/20/18 2216   SAFETY      GI Signs/Symptoms  -- (incontinent of BM during the night) 03/18/18 1004   Safety WDL  WDL 03/21/18 0125    COMMUNICATION ASSESSMENT     All Alarms  alarm(s) activated and audible 03/20/18 2232    Patient's communication style  spoken language (English or Bilingual) 03/14/18 1400                      Assessment WDL (Within Defined Limits) Definitions           Safety WDL     Effective: 09/28/15    Row Information: <b>WDL Definition:</b> Bed in low position, wheels locked; call light in reach; upper side rails up x 2; ID band on<br> <font color=\"gray\"><i>Item=AS safety wdl>>List=AS safety wdl>>Version=F14</i></font>      Skin WDL     Effective: 09/28/15    Row Information: <b>WDL Definition:</b> Warm; dry; intact; elastic; without discoloration; pressure points without redness<br> <font color=\"gray\"><i>Item=AS skin wdl>>List=AS skin wdl>>Version=F14</i></font>      Vitals     Vital Signs Flowsheet     VITAL SIGNS     CLINICALLY ALIGNED PAIN ASSESSMENT " "(CAPA) ([facility], [facility] AND [facility] ADULTS ONLY)      Temp  97.4  F (36.3  C) 03/21/18 0809   Comfort  comfortably manageable 03/17/18 1808    Temp src  Oral 03/21/18 0809   Change in Pain  getting better 03/17/18 1426    Resp  17 03/21/18 0809   Pain Control  partially effective 03/17/18 1808    Pulse  78 03/20/18 1712   Functioning  can do most things, but pain gets in the way of some 03/17/18 1808    Heart Rate  84 03/21/18 0809   Sleep  awake with occasional pain 03/16/18 1223    Pulse/Heart Rate Source  Monitor 03/21/18 0809   ANALGESIA SIDE EFFECTS MONITORING      BP  119/80 03/21/18 0809   Side Effects Monitoring: Respiratory Quality  R 03/17/18 1808    BP Location  Left arm 03/21/18 0809   Side Effects Monitoring: Respiratory Depth  N 03/17/18 1808    LYING ORTHOSTATIC BP     Side Effects Monitoring: Sedation Level  1 03/17/18 1808    Lying Orthostatic BP  125/76 03/20/18 1454   HEIGHT AND WEIGHT      Lying Orthostatic Pulse  70 bpm 03/16/18 1506   Height  1.803 m (5' 11\") 03/14/18 1314    SITTING ORTHOSTATIC BP     Height Method  Stated 03/14/18 1314    Sitting Orthostatic BP  104/53 03/20/18 1454   Weight  78 kg (172 lb) 03/14/18 1314    Sitting Orthostatic Pulse  72 bpm 03/16/18 1503   Weight Method  Bed scale 03/14/18 1314    STANDING ORTHOSTATIC BP     Bed Scale  Standard (fitted sheet, draw sheet/ pad, cover/flat sheet, blanket, two pillows) 03/14/18 1314    Standing Orthostatic BP  100/62 (20* tilt table) 03/16/18 1506   BSA (Calculated - sq m)  1.98 03/14/18 1314    Standing Orthostatic Pulse  74 bpm 03/16/18 1506   BMI (Calculated)  24.04 03/14/18 1314    OXYGEN THERAPY     COMMENTS      SpO2  100 % 03/21/18 0809   Comments  Sitting in chair after slide board transfer 03/20/18 1102    O2 Device  None (Room air) 03/21/18 0809   POSITIONING      PAIN/COMFORT     Body Position  supine, head elevated (He asked for assistanc eto stretch his back.) 03/21/18 0617    Patient Currently in Pain  yes " 03/20/18 0544   Head of Bed (HOB)  HOB at 30-45 degrees 03/20/18 2232    Preferred Pain Scale  CAPA (Clinically Aligned Pain Assessment) ([facility], [facility] and [facility] Adults Only) 03/20/18 0544   Positioning/Transfer Devices  pillows;in use 03/21/18 0448    Pain Location  Back (And posterior neck) 03/20/18 0544   DAILY CARE      Pain Orientation  Posterior 03/18/18 0228   Activity Management  -- (sleeping) 03/21/18 0154    Pain Descriptors  Sore 03/20/18 0544   Activity Assistance Provided  assistance, 1 person 03/21/18 0119    Pain Management Interventions  analgesia administered 03/18/18 0228   Assistive Device Utilized  mechanical lift 03/20/18 2232    Pain Intervention(s)  Medication (See eMAR);Cold applied 03/20/18 0544   Symptoms Noted During/After Activity  none 03/20/18 2232    Response to Interventions  Decrease in pain 03/17/18 1426   Elimination Assistance  up to bedside commode 03/20/18 2232            Patient Lines/Drains/Airways Status    Active LINES/DRAINS/AIRWAYS     Name: Placement date: Placement time: Site: Days: Last dressing change:    Urethral Catheter 16 fr 03/14/18   0455      7     Peripheral IV 03/13/18 Right;Anterior Upper forearm 03/13/18   1726   Upper forearm   7     Wound 03/14/18 Left Hand Other (comment) 03/14/18   1647   Hand   6     Incision/Surgical Site 03/10/18 Back 03/10/18   0023    11             Patient Lines/Drains/Airways Status    Active PICC/CVC     None            Intake/Output Detail Report     Date Intake Output   Net    Shift P.O. Total Urine Blood Total       Day 03/20/18 0000 - 03/20/18 0659 -- -- -- -- -- 0    Ling 03/20/18 0700 - 03/20/18 1459 -- -- 520 -- 520 -520    Noc 03/20/18 1500 - 03/20/18 2359 -- -- 500 -- 500 -500    Day 03/21/18 0000 - 03/21/18 0659 -- -- 1000 -- 1000 -1000    Ling 03/21/18 0700 - 03/21/18 1459 -- -- -- -- -- 0      Last Void/BM       Most Recent Value    Urine Occurrence     Stool Occurrence 1 at 03/18/2018 2125      Case  Management/Discharge Planning     Case Management/Discharge Planning Flowsheet     LIVING ENVIRONMENT     Medical Team notified of plan?  yes 03/15/18 1244    Lives With  alone;child(pascual), adult 03/15/18 1244   Readmission Within The Last 30 Days  no previous admission in last 30 days 03/15/18 1244    Living Arrangements  house 03/15/18 1244   Transportation Available  family or friend will provide 03/15/18 1244    Able to Return to Prior Living Arrangements  yes 03/15/18 1244   FINAL RESOURCES      HOME SAFETY     Equipment Currently Used at Home  none 03/15/18 1244    Patient Feels Safe Living in Home?  yes 03/15/18 1244   ABUSE RISK SCREEN      COPING/STRESS     QUESTION TO PATIENT:  Has a member of your family or a partner(now or in the past) intimidated, hurt, manipulated, or controlled you in any way?  no 03/14/18 1400    Major Change/Loss/Stressor  none 03/14/18 1400   QUESTION TO PATIENT: Do you feel safe going back to the place where you are living?  yes 03/14/18 1400    EXPECTED DISCHARGE     OBSERVATION: Is there reason to believe there has been maltreatment of a vulnerable adult (ie. Physical/Sexual/Emotional abuse, self neglect, lack of adequate food, shelter, medical care, or financial exploitation)?  no 03/14/18 1400    Expected Discharge Date  03/21/18 03/20/18 1332   OTHER      DISCHARGE PLANNING     Are you depressed or being treated for depression?  No 03/14/18 1402    Patient/family verbalizes understanding of discharge plan recommendations?  Yes 03/15/18 1244

## 2018-03-14 NOTE — PLAN OF CARE
Problem: Patient Care Overview  Goal: Plan of Care/Patient Progress Review    Occupational Therapy Discharge Summary    Reason for therapy discharge:    Discharged to acute rehabilitation facility.    Progress towards therapy goal(s). See goals on Care Plan in [facility] electronic health record for goal details.  Goals partially met.  Barriers to achieving goals:   discharge from facility.    Therapy recommendation(s):    Continued therapy is recommended.  Rationale/Recommendations:  Recommend discharge to ARU to address activity tolerance and maximize independence with self cares after acute SCI.

## 2018-03-14 NOTE — PROGRESS NOTES
POD#5 s/p T9-T12 laminectomy for subdural hematoma evacuation. VSS. Neuros include: A&Ox4, absent sensation from waist down. Pt c/o back pain relieved w/ Oxycodone, Flexeril, Tylenol, and Valium. Sandhu catheter in place for retention. UA ordered and collected. Pt received enema and digital stimulation, w/ two small loose BM s. BS hypoactive in all quads. Regular diet, w/ good PO intake. Up w/ lift. Up to recliner 1x. PIV removed. Red bump on L hand w/ yellow center from previous PIV. (MD cuenca notified) JOSELITO stockings on w/ Prevolon boots. Patient transported to ARU at 1215. Phone report given to SHEBA Steel.

## 2018-03-14 NOTE — PLAN OF CARE
Problem: Patient Care Overview  Goal: Plan of Care/Patient Progress Review  OT 6A: Discharge Planner OT   Patient plan for discharge: ARU  Current status: Pt fully alert, oriented and appropriate in conversation.  Pt receptive to education on strategies for increased independence with rolling and dressing.  Pt motivated to progress activity, limited by incisional pain and hypotension with upright positioning.  Min-mod A to roll bilaterally, supine /67, gradually increased HOB and donned abdominal binder/dinorah stockings for compression then dependently transferred pt to EOB - tolerates x 2.5 minutes sitting EOB initially with Max A progressing to CGA with BUE support; however, becomes hypotensive with BP 76/41, returned pt to more supine position with BLE elevated and BP recovered to 116/58 - extra time for symptoms to resolve.  Barriers to return to prior living situation: acute medical needs, limited activity tolerance, pain, new SCI  Recommendations for discharge: ARU  Rationale for recommendations: pt is motivated to progress independence and pt is currently below baseline with regards to mobility and independence with self cares and will benefit from continued skilled therapy intervention to address deficits.         Entered by: Silke Ling 03/14/2018 9:43 AM

## 2018-03-15 PROCEDURE — 25000132 ZZH RX MED GY IP 250 OP 250 PS 637: Performed by: PHYSICAL MEDICINE & REHABILITATION

## 2018-03-15 PROCEDURE — 40000193 ZZH STATISTIC PT WARD VISIT

## 2018-03-15 PROCEDURE — 97162 PT EVAL MOD COMPLEX 30 MIN: CPT | Mod: GP

## 2018-03-15 PROCEDURE — 40000133 ZZH STATISTIC OT WARD VISIT: Performed by: OCCUPATIONAL THERAPIST

## 2018-03-15 PROCEDURE — 97165 OT EVAL LOW COMPLEX 30 MIN: CPT | Mod: GO | Performed by: OCCUPATIONAL THERAPIST

## 2018-03-15 PROCEDURE — 97110 THERAPEUTIC EXERCISES: CPT | Mod: GP

## 2018-03-15 PROCEDURE — 97535 SELF CARE MNGMENT TRAINING: CPT | Mod: GO | Performed by: OCCUPATIONAL THERAPIST

## 2018-03-15 PROCEDURE — 97530 THERAPEUTIC ACTIVITIES: CPT | Mod: GP

## 2018-03-15 PROCEDURE — 25000132 ZZH RX MED GY IP 250 OP 250 PS 637: Performed by: PHYSICIAN ASSISTANT

## 2018-03-15 PROCEDURE — 25000125 ZZHC RX 250: Performed by: PHYSICIAN ASSISTANT

## 2018-03-15 PROCEDURE — 12800006 ZZH R&B REHAB

## 2018-03-15 RX ORDER — GINSENG 100 MG
CAPSULE ORAL 2 TIMES DAILY
Status: ACTIVE | OUTPATIENT
Start: 2018-03-15 | End: 2018-03-20

## 2018-03-15 RX ORDER — GINSENG 100 MG
CAPSULE ORAL 2 TIMES DAILY
Status: DISCONTINUED | OUTPATIENT
Start: 2018-03-15 | End: 2018-03-15

## 2018-03-15 RX ORDER — AMOXICILLIN 250 MG
2 CAPSULE ORAL 2 TIMES DAILY
Status: DISCONTINUED | OUTPATIENT
Start: 2018-03-15 | End: 2018-03-17

## 2018-03-15 RX ORDER — GABAPENTIN 100 MG/1
200 CAPSULE ORAL 3 TIMES DAILY
Status: DISCONTINUED | OUTPATIENT
Start: 2018-03-15 | End: 2018-03-16

## 2018-03-15 RX ADMIN — BISACODYL 10 MG: 10 SUPPOSITORY RECTAL at 21:19

## 2018-03-15 RX ADMIN — ACETAMINOPHEN 975 MG: 325 TABLET, FILM COATED ORAL at 01:21

## 2018-03-15 RX ADMIN — ASPIRIN 81 MG: 81 TABLET, COATED ORAL at 09:06

## 2018-03-15 RX ADMIN — CYCLOBENZAPRINE HYDROCHLORIDE 10 MG: 5 TABLET, FILM COATED ORAL at 22:41

## 2018-03-15 RX ADMIN — OXYCODONE HYDROCHLORIDE 10 MG: 5 TABLET ORAL at 22:41

## 2018-03-15 RX ADMIN — POLYETHYLENE GLYCOL 3350 17 G: 17 POWDER, FOR SOLUTION ORAL at 09:06

## 2018-03-15 RX ADMIN — LIDOCAINE 3 PATCH: 560 PATCH PERCUTANEOUS; TOPICAL; TRANSDERMAL at 09:00

## 2018-03-15 RX ADMIN — Medication 12.5 MG: at 21:19

## 2018-03-15 RX ADMIN — Medication: at 21:20

## 2018-03-15 RX ADMIN — SENNOSIDES AND DOCUSATE SODIUM 2 TABLET: 8.6; 5 TABLET ORAL at 21:19

## 2018-03-15 RX ADMIN — ATORVASTATIN CALCIUM 40 MG: 40 TABLET, FILM COATED ORAL at 09:06

## 2018-03-15 RX ADMIN — GABAPENTIN 200 MG: 100 CAPSULE ORAL at 19:08

## 2018-03-15 RX ADMIN — OXYCODONE HYDROCHLORIDE 10 MG: 5 TABLET ORAL at 03:01

## 2018-03-15 RX ADMIN — BACITRACIN: 500 OINTMENT TOPICAL at 09:36

## 2018-03-15 RX ADMIN — OXYCODONE HYDROCHLORIDE 10 MG: 5 TABLET ORAL at 13:26

## 2018-03-15 RX ADMIN — CEPHALEXIN 500 MG: 500 CAPSULE ORAL at 09:03

## 2018-03-15 RX ADMIN — CYCLOBENZAPRINE HYDROCHLORIDE 10 MG: 5 TABLET, FILM COATED ORAL at 01:21

## 2018-03-15 RX ADMIN — CEPHALEXIN 500 MG: 500 CAPSULE ORAL at 19:08

## 2018-03-15 RX ADMIN — CEPHALEXIN 500 MG: 500 CAPSULE ORAL at 13:26

## 2018-03-15 RX ADMIN — SENNOSIDES AND DOCUSATE SODIUM 1 TABLET: 8.6; 5 TABLET ORAL at 09:06

## 2018-03-15 RX ADMIN — OXYCODONE HYDROCHLORIDE 10 MG: 5 TABLET ORAL at 19:08

## 2018-03-15 RX ADMIN — Medication 12.5 MG: at 09:03

## 2018-03-15 RX ADMIN — ACETAMINOPHEN 650 MG: 325 TABLET, FILM COATED ORAL at 21:19

## 2018-03-15 RX ADMIN — MULTIPLE VITAMINS W/ MINERALS TAB 1 TABLET: TAB at 09:03

## 2018-03-15 NOTE — PROGRESS NOTES
03/15/18 0843   Quick Adds   Type of Visit Initial PT Evaluation   Living Environment   Lives With alone;child(pascual), adult   Living Arrangements house   Home Accessibility bed and bath on same level;tub/shower is not walk in   Number of Stairs to Enter Home 3   Transportation Available car;family or friend will provide   Living Environment Comment Pt lives in single story [address], 1 bathroom w/ tub shower states it's too small for WC, house is mostly carpeted   Self-Care   Dominant Hand right   Usual Activity Tolerance excellent   Current Activity Tolerance fair   Regular Exercise yes   Activity/Exercise Type swimming   Exercise Amount/Frequency 3-5 times/wk   Activity/Exercise/Self-Care Comment Pt works full time as     Functional Level Prior   Ambulation 0-->independent   Transferring 0-->independent   Toileting 0-->independent   Bathing 0-->independent   Dressing 0-->independent   Eating 0-->independent   Communication 0-->understands/communicates without difficulty   Swallowing 0-->swallows foods/liquids without difficulty   Which of the above functional risks had a recent onset or change? none   Prior Functional Level Comment IND   General Information   Onset of Illness/Injury or Date of Surgery - Date 03/14/18   Referring Physician Marilin Jung PA   Patient/Family Goals Statement Pt states he is optimistic that he may have some sensory/motor return in BLE, goal of being as IND as possible and take rehab day by day    Pertinent History of Current Problem (include personal factors and/or comorbidities that impact the POC) Derek Enriquez is a 57 year old man with a history of CAD who initially presented to [facility] after out of hospital arrest, underwent MALI to known LAD lesion on 3/8.  On 3/9 developed sudden paraplegia, MRI revealed thoracic intrathecal (subarachnoid) hemorrhage, transferred to [facility] and underwent emergent laminectomy and subdural evacuation.  Admitted to ARU 3/14 for  ongoing rehabilitation and medical management; CHONG A T9   Precautions/Limitations fall precautions;spinal precautions  (CHONG A T9)   General Observations CHONG A T9; Neurogenic Bowel/bladder per chart review   Cognitive Status Examination   Orientation orientation to person, place and time   Level of Consciousness alert   Follows Commands and Answers Questions 100% of the time   Personal Safety and Judgment intact   Memory intact   Pain Assessment   Patient Currently in Pain Yes, see Vital Sign flowsheet   Posture    Posture Forward head position;Protracted shoulders   Range of Motion (ROM)   ROM Comment BLE PROM DF to neutral    Strength   Strength Comments 0/5 BLE see OT eval for UE; Grossly WFL. Motor intact to T1    ARC Assessment Only   Acute Rehab FIM See FIM scores for Mobility/ADL Assessment   Balance   Balance Comments Pt requires BUE support on bed to maintain static sitting balance   Sensory Examination   Sensory Perception Comments Not intact to light touch below T9/umbilicus    General Therapy Interventions   Planned Therapy Interventions balance training;bed mobility training;gait training;neuromuscular re-education;groups;manual therapy;ROM;strengthening;stretching;transfer training;wheelchair management/propulsion training;progressive activity/exercise   Clinical Impression   Criteria for Skilled Therapeutic Intervention yes, treatment indicated   PT Diagnosis Impaired functional mobility 2/2 SCI injury    Influenced by the following impairments SCI, loss of BLE sensation/motor funciton    Functional limitations due to impairments Impaired functional mobility    Clinical Presentation Evolving/Changing   Clinical Presentation Rationale PMH, recent SCI, recent spine surgery, no LE sensation/strength    Clinical Decision Making (Complexity) Moderate complexity   Therapy Frequency` daily   Predicted Duration of Therapy Intervention (days/wks) 4 weeks   Anticipated Discharge Disposition Home with Home  Therapy   Risk & Benefits of therapy have been explained Yes   Patient, Family & other staff in agreement with plan of care Yes   Total Evaluation Time   Total Evaluation Time (Minutes) 17

## 2018-03-15 NOTE — PLAN OF CARE
Problem: Goal/Outcome  Goal: Goal Outcome Summary  Outcome: No Change  FOCUS/GOAL  Bowel management, Bladder management and Pain management    ASSESSMENT, INTERVENTIONS AND CONTINUING PLAN FOR GOAL:  Pt had BM yesterday but not happy with the amout of stool he had, Miralax given this Am, no luck yet and pt is on a bowel program every evening. Sandhu cares done per policy and had jovanni out puts, encouraged to drink more, and pt has poor appetite, had some toss of 1 piece only, re-educated about oral intakes for energy and wound healing, but no change has done yet. Pt was in bed, danggled at EOB but pt untolerated, shooting pain inecreased and felt some dizziness, BP at EOB didn't record but with lying down was 133/68. Pt didn't ask for pain med until he started sitting at EOB, 10mg Oxy given with some relief. Pt calls to make his needs known.  Nursing will cnt POC

## 2018-03-15 NOTE — PLAN OF CARE
Problem: Goal/Outcome  Goal: Goal Outcome Summary  FOCUS/GOAL  Bowel management, Bladder management, Pain management and Wound care management    ASSESSMENT, INTERVENTIONS AND CONTINUING PLAN FOR GOAL:  Pt A&Ox4, able to make needs known. C/o pain to back incision and HA, tolerated with prn Tylenol, Oxycodone and Flexeril. Sandhu patent and draining, cares done. Pt c/o constipation, per report last good BM was on 3/8 and pt only had a small result with an enema this AM. Small liquid result from rectal check. Suppository given, pt transferred with assist of two and golvo lift to shower chair. Small result with dig stim x1, but pt claimed he felt clammy and BP low (see flowsheet). Pt golvo lifted back to bed and improved shortly after. Dig stim x1 in bed with another small liquid result. Out pouching of rectal skin noted. Red blanchable skin to right thigh and hip. Bed mobility performed with assist of one. Gauze applied to incision to protect from golvo sling. Gauze changed to left hand, small amount of serosanguinous drainage noted. Bed alarm armed for safety. Lidocaine patches removed early due to pt expressing discomfort. Abdominal binder and Gaetano hose applied before getting pt OOB. Pt declined dinner tray tonight due to abdominal discomfort, provided with snack and able to take pills whole.

## 2018-03-15 NOTE — PROVIDER NOTIFICATION
Social Work: Initial Assessment with Discharge Plan    Patient Name: Derek Enriquez  : 1960  Age: 57 year old  MRN: [id number]  Completed assessment with: patient  Admitted to ARU: 3/14/8    Presenting Information   Date of SW assessment: March 15, 2018  Health Care Directive: Patient considering completing  Primary Health Care Agent: default to next of kin  Secondary Health Care Agent: NA  Living Situation: resides alone  Previous Functional Status: previously independent of cares  DME available: see therapy notes  Patient and family understanding of hospitalization: Yes  Cultural/Language/Spiritual Considerations: English      Physical Health  Reason for admission: SCI - Subdural Hematoma    Provider Information   Primary Care Physician:Eugene Burrell MD  : none    Mental Health/Chemical Dependency:   Diagnosis: denied  Alcohol/Tobacco/Narcotis: denied  Support/Services in Place: none  Services Needed/Recommended: none  Sexuality/Intimacy: denied    Support System  Marital Status: ; SO Mary Ann  Family support: Pt reported his brother, Negro also mentioning his two adult sons, Eduardo - Hany and Juan  Other support available: none    Community Resources  Current in home services: none  Previous services: none    Financial/Employment/Education  Employment Status: employed;   Income Source: employment  Education: college  Financial Concerns:  denied  Insurance: BCBS      Discharge Plan   Patient and family discharge goal: Goal to return home with continued family support  Provided Education on discharge plan: Yes  Patient agreeable to discharge plan:  Yes  Provided education and attained signature for Medicare IM and IRF Patient Rights and Privacy Information provided to patient : No  Provided patient with [place] [facility] Resources: No  Barriers to discharge: Medically stable and progress with therapies    Discharge Recommendations   Disposition: Goal to return  home with support from family as needed.   Transportation Needs: family will provide  Name of Transportation Company and Phone: NA    Additional comments   Pt is a 57 yr old male admitted for an SCI following a subdural hematoma. Pt resides alone. Pt reported that one of his sons is planning to move in to provide additional support.Pt also reported additional support from Mary Ann FLORES. Team working towards a safe d/c plan.     Please invite to Care Conference:  Mary Ann ([place]) - [phone]      RADHA Rodriguez, [facility]  [place]   Phone: [phone]  Pager: [phone]         03/15/18 1249   Living Arrangements   Lives With alone;child(pascual), adult   Living Arrangements house   Able to Return to Prior Living Arrangements yes   Home Safety   Patient Feels Safe Living in Home? yes   Discharge Planning   Anticipated Discharge Disposition home with assist   Discharge Needs Assessment   Patient/family verbalizes understanding of discharge plan recommendations? Yes   Medical Team notified of plan? yes   Readmission Within The Last 30 Days no previous admission in last 30 days   Equipment Currently Used at Home none   Transportation Available family or friend will provide

## 2018-03-15 NOTE — PLAN OF CARE
Problem: Individualization  Goal: Patient Individualization  Outcome: No Change  FOCUS/GOAL  Bowel management, Pain management and Mobility    ASSESSMENT, INTERVENTIONS AND CONTINUING PLAN FOR GOAL:  Pt has slept on and off tonight. He has been repositioned every 2 hours with assist of 2 staff. Pt requests to move very slowly to lesson the pain with repositioning. Pt's prafos placed on bilateral feet. Pt does not want to take Oxycodone due to constipating factors, so pt was given PRN Tylenol and Flexeril with poor pain relief, so pt did ask for Oxycodone, which was more effective. Pt states his last good BM was last Thursday in  [facility], and that he is feeling very constipated. Pt was started on daily bowel program, and bowel medications ordered, but pt may need more immediate bowel intervention. He states he has stopped eating anything due to feeling very uncomfortable.

## 2018-03-15 NOTE — H&P
Post Admission Physician Evaluation:    I have compared Derek Enriquez's condition on admission to acute rehabilitation to that outlined in the preadmission screen. History and physical exam performed by me. No significant differences are identified and the patient remains appropriate for an inpatient rehabilitation facility level of care to manage medical issues and address functional impairments due to (rehabilitation diagnosis) complete SCI secondary to intrathecal (subarachnoid) hemorrhage at T9 level.    Comorbid medical conditions being managed: neurogenic bowel/bladder, complete paraplegia, absolute sensory loss below the level of injury, post-op and neuropathic pain, CAD s/p stent placement     Prior functional level: I will all aspects of his life; high functional prior to admission      Present function: max to total assist for most ADLs and mobility; lift for transfers; can feed self with set-up; intact cognition     Anticipated rehabilitation course: can potentially improve to mod I level WC based with mobility and ADLs. Needs extensive education for skin protection / pressure relief techniques, bowel/bladder care and most likely emotional support for adjustment to his disability.     Will benefit from intensive rehabilitation includin minutes each of PT and OT  Rehabilitation nursing  Close management by physiatry    Prognosis: rehab prognosis for improvement to mod I level is good. Prognosis for recovery of his SCI and functional gain in his bilateral lower extremities is guarded given the exam and complete level of injury. At risk of more cardiac events from medical perspective.       Estimated length of stay: 4+ weeks with plan to dc home with family support. Needs ramp for stairs at entrance, custom WC and other equipments to maximize his independence. Sexual function, return to driving (hand control) and return to work can be discussed as IP and also later as outpatient. He will also need  modified cardiac rehab as outpatient.    Sun Shankar MD  Physical Medicine & Rehabilitation

## 2018-03-15 NOTE — PROGRESS NOTES
[facility]   Acute Rehabilitation Unit  Daily progress note    interval history  Derek Enriquez was seen and examined at bedside. He reports he is feeling full, states he typically has regular daily bowel movement and does note feel that has happened since admission to hospital.  Discussed risk factors for delayed/change in bowel patterns, 2 surgeries, opiates, impaired mobility, spinal cord injury, change in eating, etc.  Also reviewed charting noting he has had stool output though likely smaller than previous, Derek was unaware of this, discussed lack of sensation related to bowel movements.  Derek also requests to stop antibiotic for left wrist skin infection, agreed that while that likely would have been ok for this infection prior, given recent surgeries and ongoing rehabilitation stay will plan to complete course of antibiotics.  Derek also worried that he accidentally may have pulled catheter, examined urine and toussaint insertion and appears cdi without swelling, no hematuria or signs of injury.  Derek reports he slept ok, appetite low, no n/v/, no fevers, no dizziness.      Functionally, undergoing therapy evals today.  Clarifying activity restrictions/recommendations and anticoagulation with neurosurgery     ELOS 4 weeks. Pt's home will need to be modified. Mostly carpeted. Small BR, WC will not fit, tub shower. Pt will need ramp installed. Son planning to move home this week, 27 y/o, works full time. Pt has 2nd son living in [facility] 27 y/o. Supportive GF who also has 3 grown children      Goal for mod I WC based mobility       medications    bacitracin   Topical BID     cephALEXin  500 mg Oral TID     Lidocaine  3 patch Transdermal Q24H     metoprolol tartrate  12.5 mg Oral BID     multivitamin, therapeutic with minerals  1 tablet Oral Daily     polyethylene glycol  17 g Oral Daily     senna-docusate  1 tablet Oral BID     aspirin EC  81 mg Oral Daily     atorvastatin  40 mg Oral  "Daily     bisacodyl  10 mg Rectal Daily     lidocaine   Transdermal Q24h     lidocaine   Transdermal Q8H     menthol   Transdermal Q8H        menthol, oxyCODONE IR, - MEDICATION INSTRUCTIONS -, acetaminophen, cyclobenzaprine, menthol, naloxone     physical exam  /68 (BP Location: Right arm)  Pulse 87  Temp 97.3  F (36.3  C) (Oral)  Resp 18  Ht 1.803 m (5' 11\")  Wt 78 kg (172 lb)  SpO2 100%  BMI 23.99 kg/m2  Gen: alert sitting up in bed in nad  Cardio: rrr  Pulm: non labored clear on room air  Abd: soft mild distention reported fullness  Ext: warm dry without edema  Neuro/MSK: paraplegic.  Alert, speech clear,   : toussaint catheter in place, urine clear, no redness swelling around catheter insertion.   Skin: left wrist with mild redness decreased swelling, no purulence     labs  No new labs    Rehabilitation - continue comprehensive acute inpatient rehabilitation program with multidisciplinary approach including therapies, rehab nursing, and physiatry following. See interval history for updates.      assessment and plan    Derek Enriquez is a 57 year old man with a history of CAD who initially presented to [facility] after out of hospital arrest, underwent MALI to known LAD lesion on 3/8.  On 3/9 developed sudden paraplegia, MRI revealed thoracic intrathecal (subarachnoid) hemorrhage, transferred to [facility] and underwent emergent laminectomy and subdural evacuation.  Admitted to ARU 3/14 for ongoing rehabilitation and medical management.    Spinal Cord Compression/ T9 AIS A- secondary to subarachnoid/intrathecal hemorrhage 3/9 resulting in spinal cord compression with sudden loss of ble movement and sensation. See CHONG exam above.   Underwent emergent T9-T12 laminectomy and evacuation of hematoma. MRI also showed \"oval-shaped heterogeneous area of signal abnormality extending from mid T10 through mid T12 dorsal to the spinal cord, likely representing an AVM with adjacent  Hemorrhage\"     -monitor " incision  -PT/OT  -follow up with neuro interventional team in one month for spinal angiogram  -suture removal 3/23  -keep incision clean and dry  -may shower no scrubbing or soaking  -follow up with Lilli BAUMAN in 6 weeks  -DVT prophylaxis with sub Q heparin x 14 days (through 3/28)  -pain control with prn tylenol, prn oxycodone, scheduled lidoderm and prn menthol patches   -reports some spasms in bilateral lower extremities- continue flexeril to prn upon admission to ARU  -continue dinorah hose and abd binder as tolerated     Neurogenic bowel- continue nightly bowel program with supp after supper and dig stim as needed; given the location of hemorrhage and involvement of conus medullaris might have lower motor neuron type injury which will be more difficult to manage and require manual evacuation of stool     Neurogenic bladder- continue toussaint with anticipated toussaint removal in next 5-7 days with self cath training to follow. Total UOP should be < 2000-2500ml/ day     Low risk for AD as his level is below T6 but still will monitor closely given some case reports of AD in complete SCIs         CAD- known LAD lesion with out of hospital cardiac arrest S/p MALI 3/8.  Was seen and evaluated by Cardiology 3/10.  When safe from bleed perspective restart Plavix 75 mg daily given MALI. Should be decompensate and the clinical picture suggest acute stent thrombosis, we would highly consider VA-ECMO without heparin.  See consult note by Dr. Shetty 3/10 for details  -continue asa 81 mg daily (resumed 3/13)  - continue metoprolol 12.5 mg BID and Lipitor 40 mg daily when possible as well  -follow up with cardiology 3-4 weeks f/u stent placement  -ok to restart Plavix 3/23 per neurosurgery     Left Wrist cellulitis- redness and swelling with slight purulence at site of former IV.  T max 100.7 3/14, WBC WNL.   -monitor keep clean and covered  -continue keflex 500 mg tid x 7 days  -add topical bacitracin bid x 5 days per patient  request.        Patient seen and discussed with Dr. Lee  PM&R Staff Physician    Marilin Jung PA-C  Rehab Service  Pager: [phone]

## 2018-03-15 NOTE — PROGRESS NOTES
03/15/18 0841   Quick Adds   Type of Visit Initial Occupational Therapy Evaluation   Living Environment   Lives With alone;child(pascual), adult  (son moving home for a few months)   Living Arrangements house   Home Accessibility bed and bath on same level;tub/shower is not walk in   Number of Stairs to Enter Home 1   Number of Stairs Within Home (flight to basement-laundry)   Transportation Available car;family or friend will provide   Self-Care   Dominant Hand right   Usual Activity Tolerance excellent   Current Activity Tolerance fair   Regular Exercise yes   Activity/Exercise Type swimming  (running)   Exercise Amount/Frequency 3-5 times/wk   Equipment Currently Used at Home none   Functional Level Prior   Ambulation 0-->independent   Transferring 0-->independent   Toileting 0-->independent   Bathing 0-->independent   Dressing 0-->independent   Eating 0-->independent   Communication 0-->understands/communicates without difficulty   Swallowing 0-->swallows foods/liquids without difficulty   Cognition 0 - no cognition issues reported   Fall history within last six months no   Which of the above functional risks had a recent onset or change? ambulation;transferring;toileting;bathing;dressing   General Information   Onset of Illness/Injury or Date of Surgery - Date 03/14/18   Referring Physician Marilin Jung PA   Additional Occupational Profile Info/Pertinent History of Current Problem 57 year old man with a history of CAD who initially presented to [facility] after out of hospital arrest, underwent MALI to known LAD lesion on 3/8.  On 3/9 developed sudden paraplegia, MRI revealed thoracic intrathecal (subarachnoid) hemorrhage, transferred to [facility] and underwent emergent laminectomy and subdural evacuation.   Precautions/Limitations fall precautions  (fall precautions;spinal precautions, s/p stent placement)   General Info Comments activity orders; up with A   Visual Perception   Visual Perception No  deficits were identified   Sensory Examination   Sensory Quick Adds (UE WFL, LE deficits)   Pain Assessment   Patient Currently in Pain Yes, see Vital Sign flowsheet  (back incision, abd. pain)   Range of Motion (ROM)   ROM Comment BUE AROM WFL   Strength   Strength Comments BUE strength WFL   Hand Strength   Hand Strength Comments bilat grossly 5/5   ARC Assessment Only   Acute Rehab FIM See FIM scores for Mobility/ADL Assessment   Activities of Daily Living Analysis   Impairments Contributing to Impaired Activities of Daily Living balance impaired;motor control impaired;pain;post surgical precautions;sensation decreased   General Therapy Interventions   Planned Therapy Interventions ADL retraining;IADL retraining;balance training;strengthening;transfer training;home program guidelines;progressive activity/exercise   Clinical Impression   Criteria for Skilled Therapeutic Interventions Met yes, treatment indicated   OT Diagnosis impaired ADLs/functional mobility   Influenced by the following impairments s/p cardiac surgery, spinal prec.   Assessment of Occupational Performance 3-5 Performance Deficits   Identified Performance Deficits dressing, toileting, bathing, home mgmt.   Clinical Decision Making (Complexity) Low complexity   Therapy Frequency daily   Predicted Duration of Therapy Intervention (days/wks) ~3 weeks   Anticipated Equipment Needs at Discharge (TBD)   Anticipated Discharge Disposition Home with Home Therapy   Risks and Benefits of Treatment have been explained. Yes   Patient, Family & other staff in agreement with plan of care Yes   Total Evaluation Time   Total Evaluation Time (Minutes) 15

## 2018-03-16 ENCOUNTER — APPOINTMENT (OUTPATIENT)
Dept: GENERAL RADIOLOGY | Facility: CLINIC | Age: 58
End: 2018-03-16
Attending: PHYSICIAN ASSISTANT
Payer: COMMERCIAL

## 2018-03-16 LAB
ANION GAP SERPL CALCULATED.3IONS-SCNC: 8 MMOL/L (ref 3–14)
BUN SERPL-MCNC: 21 MG/DL (ref 7–30)
CALCIUM SERPL-MCNC: 8.1 MG/DL (ref 8.5–10.1)
CHLORIDE SERPL-SCNC: 100 MMOL/L (ref 94–109)
CO2 SERPL-SCNC: 26 MMOL/L (ref 20–32)
CREAT SERPL-MCNC: 0.74 MG/DL (ref 0.66–1.25)
ERYTHROCYTE [DISTWIDTH] IN BLOOD BY AUTOMATED COUNT: 12.3 % (ref 10–15)
GFR SERPL CREATININE-BSD FRML MDRD: >90 ML/MIN/1.7M2
GLUCOSE SERPL-MCNC: 103 MG/DL (ref 70–99)
HCT VFR BLD AUTO: 33.6 % (ref 40–53)
HGB BLD-MCNC: 11.5 G/DL (ref 13.3–17.7)
MCH RBC QN AUTO: 30.6 PG (ref 26.5–33)
MCHC RBC AUTO-ENTMCNC: 34.2 G/DL (ref 31.5–36.5)
MCV RBC AUTO: 89 FL (ref 78–100)
PLATELET # BLD AUTO: 276 10E9/L (ref 150–450)
POTASSIUM SERPL-SCNC: 4 MMOL/L (ref 3.4–5.3)
RBC # BLD AUTO: 3.76 10E12/L (ref 4.4–5.9)
SODIUM SERPL-SCNC: 134 MMOL/L (ref 133–144)
WBC # BLD AUTO: 7.5 10E9/L (ref 4–11)

## 2018-03-16 PROCEDURE — 25000132 ZZH RX MED GY IP 250 OP 250 PS 637: Performed by: PHYSICAL MEDICINE & REHABILITATION

## 2018-03-16 PROCEDURE — 36415 COLL VENOUS BLD VENIPUNCTURE: CPT | Performed by: PHYSICIAN ASSISTANT

## 2018-03-16 PROCEDURE — 97110 THERAPEUTIC EXERCISES: CPT | Mod: GP | Performed by: PHYSICAL THERAPIST

## 2018-03-16 PROCEDURE — 12800006 ZZH R&B REHAB

## 2018-03-16 PROCEDURE — 85027 COMPLETE CBC AUTOMATED: CPT | Performed by: PHYSICIAN ASSISTANT

## 2018-03-16 PROCEDURE — 80048 BASIC METABOLIC PNL TOTAL CA: CPT | Performed by: PHYSICIAN ASSISTANT

## 2018-03-16 PROCEDURE — 97530 THERAPEUTIC ACTIVITIES: CPT | Mod: GP | Performed by: PHYSICAL THERAPIST

## 2018-03-16 PROCEDURE — 97535 SELF CARE MNGMENT TRAINING: CPT | Mod: GO | Performed by: OCCUPATIONAL THERAPIST

## 2018-03-16 PROCEDURE — 74018 RADEX ABDOMEN 1 VIEW: CPT

## 2018-03-16 PROCEDURE — 25000132 ZZH RX MED GY IP 250 OP 250 PS 637: Performed by: PHYSICIAN ASSISTANT

## 2018-03-16 PROCEDURE — 40000133 ZZH STATISTIC OT WARD VISIT: Performed by: OCCUPATIONAL THERAPIST

## 2018-03-16 PROCEDURE — 97530 THERAPEUTIC ACTIVITIES: CPT | Mod: GO | Performed by: OCCUPATIONAL THERAPIST

## 2018-03-16 PROCEDURE — 40000193 ZZH STATISTIC PT WARD VISIT: Performed by: PHYSICAL THERAPIST

## 2018-03-16 RX ORDER — ACETAMINOPHEN 325 MG/1
975 TABLET ORAL 3 TIMES DAILY
Status: DISCONTINUED | OUTPATIENT
Start: 2018-03-16 | End: 2018-03-21 | Stop reason: HOSPADM

## 2018-03-16 RX ADMIN — OXYCODONE HYDROCHLORIDE 5 MG: 5 TABLET ORAL at 07:55

## 2018-03-16 RX ADMIN — GABAPENTIN 400 MG: 300 CAPSULE ORAL at 19:58

## 2018-03-16 RX ADMIN — OXYCODONE HYDROCHLORIDE 10 MG: 5 TABLET ORAL at 12:21

## 2018-03-16 RX ADMIN — ASPIRIN 81 MG: 81 TABLET, COATED ORAL at 07:52

## 2018-03-16 RX ADMIN — GABAPENTIN 200 MG: 100 CAPSULE ORAL at 07:52

## 2018-03-16 RX ADMIN — CYCLOBENZAPRINE HYDROCHLORIDE 10 MG: 5 TABLET, FILM COATED ORAL at 17:53

## 2018-03-16 RX ADMIN — OXYCODONE HYDROCHLORIDE 10 MG: 5 TABLET ORAL at 01:42

## 2018-03-16 RX ADMIN — SENNOSIDES AND DOCUSATE SODIUM 2 TABLET: 8.6; 5 TABLET ORAL at 07:51

## 2018-03-16 RX ADMIN — Medication: at 12:21

## 2018-03-16 RX ADMIN — OXYCODONE HYDROCHLORIDE 10 MG: 5 TABLET ORAL at 19:58

## 2018-03-16 RX ADMIN — CEPHALEXIN 500 MG: 500 CAPSULE ORAL at 14:09

## 2018-03-16 RX ADMIN — CEPHALEXIN 500 MG: 500 CAPSULE ORAL at 19:59

## 2018-03-16 RX ADMIN — GABAPENTIN 200 MG: 100 CAPSULE ORAL at 14:09

## 2018-03-16 RX ADMIN — Medication 12.5 MG: at 07:52

## 2018-03-16 RX ADMIN — MULTIPLE VITAMINS W/ MINERALS TAB 1 TABLET: TAB at 07:52

## 2018-03-16 RX ADMIN — ACETAMINOPHEN 975 MG: 325 TABLET ORAL at 19:11

## 2018-03-16 RX ADMIN — ACETAMINOPHEN 975 MG: 325 TABLET, FILM COATED ORAL at 04:24

## 2018-03-16 RX ADMIN — BISACODYL 10 MG: 10 SUPPOSITORY RECTAL at 21:50

## 2018-03-16 RX ADMIN — SENNOSIDES AND DOCUSATE SODIUM 2 TABLET: 8.6; 5 TABLET ORAL at 19:59

## 2018-03-16 RX ADMIN — POLYETHYLENE GLYCOL 3350 17 G: 17 POWDER, FOR SOLUTION ORAL at 07:51

## 2018-03-16 RX ADMIN — CEPHALEXIN 500 MG: 500 CAPSULE ORAL at 07:52

## 2018-03-16 NOTE — PLAN OF CARE
Problem: Goal/Outcome  Goal: Goal Outcome Summary  FOCUS/GOAL  Bowel management, Bladder management, Pain management and Wound care management    ASSESSMENT, INTERVENTIONS AND CONTINUING PLAN FOR GOAL:  AOX4, uses call light appropriately and able to make needs known. Mod A of 2 with [facility] lift for transfers. On bowel program with no results this shift; scheduled senokot administered. Sandhu catheter patent and draining good amounts. CO pain on incision site on back relieved with PRN oxycodone, flexeril and tylenol. Denies nausea, denies SOB, pleasant and cooperative with care. Continue to monitor pain and offer bowel meds for constipation.

## 2018-03-16 NOTE — PLAN OF CARE
Problem: Patient Care Overview  Goal: Plan of Care/Patient Progress Review  Pt continues to have difficulty with sitting due to drop in BP resulting in dizziness and nausea.  Pt did much better during PM sitting EOB with ACE wraps on tight from top of JOSELITO socks to proximal thigh.  Pt only had 15/5 point drop in blood pressure, which is significantly better then demonstrated without ACE wraps.  Pt does not pain in abdominal and back incision and Headache with bed mobility during both sessions.  Pt able to get some trunk engagement for balance in sitting EOB.

## 2018-03-16 NOTE — PLAN OF CARE
Problem: Goal/Outcome  Goal: Goal Outcome Summary  Outcome: Improving  FOCUS/GOAL  Bowel management, Bladder management and Medical management    ASSESSMENT, INTERVENTIONS AND CONTINUING PLAN FOR GOAL:  Pt has poor appetite, re-educated with good diet and regular meals for energy and wound healing, ordered some for BF and lunch today. Pt had water small BM this Am when he was up for OT session, shower but his SBP down to 70s, felt some dizziness and sweaty, put back in bed, recheck SBP was 120s.. Pt c/o back pain, Oxy 5mg and 10mg given so far with some relief. Pt went for ABD X-ray ,

## 2018-03-16 NOTE — PLAN OF CARE
Problem: Goal/Outcome  Goal: Goal Outcome Summary  OT: Unable to tolerate sitting up in tilt shower chair, BP dropping 70/35, pt feeling clammy and light headed. Completed full sponge bath, dressing, and g/h sitting upright in bed with back supported, asymptomatic /75. Working on activity modifications within precautions, pt very motivated throughout session.  Tilt table in afternoon session to work on BP regulation with positioning, dinorah hose and abdominal binder on for additional support. Supine /70, tolerating 3-4 min a 20 degrees and 3-4 min 30 degrees, BP 95/53. Pt experiencing headache with return to supine.

## 2018-03-16 NOTE — PLAN OF CARE
Problem: Individualization  Goal: Patient Individualization  Outcome: No Change  FOCUS/GOAL  Pain management and Mobility    ASSESSMENT, INTERVENTIONS AND CONTINUING PLAN FOR GOAL:  Pt has been sleeping fairly well tonight. He calls for pain medication for back pain as needed. Pt also given Tylenol for c/o headache. Pt has been repositioned every 2-3 hours per his request, as he does not want to be awakened if he is sleeping. Pt needs 2 assist to carefully assist him with repositioning. Pt's prafos also placed on pt at start of shift. Pt's toussaint patent. Pt needs to be encouraged to drink more fluids.

## 2018-03-16 NOTE — PROGRESS NOTES
"  [facility]   Acute Rehabilitation Unit  Daily progress note    interval history  Derek Enriquez was seen sitting up in bed after return from xr, he reports he had a bowel movement when lifted in air during transfer to go down to , will evaluated ongoing stool burden and determine next steps for bowel program.  Notes he was very dizzy with therapy this am when seated with very low BP, improved with return to lying flat, discussed possible contributing factors and recommendations, Derek verbalized understanding.  Reports better night sleep, no dizziness, no  Fevers, no n/v/ working to eat and drink better.       medications    bacitracin   Topical BID     gabapentin  200 mg Oral TID     senna-docusate  2 tablet Oral BID     cephALEXin  500 mg Oral TID     multivitamin, therapeutic with minerals  1 tablet Oral Daily     polyethylene glycol  17 g Oral Daily     aspirin EC  81 mg Oral Daily     bisacodyl  10 mg Rectal Daily        metoprolol tartrate, oxyCODONE IR, - MEDICATION INSTRUCTIONS -, acetaminophen, cyclobenzaprine, naloxone     physical exam  /78 (BP Location: Right arm)  Pulse 76  Temp 98.5  F (36.9  C) (Oral)  Resp 16  Ht 1.803 m (5' 11\")  Wt 78 kg (172 lb)  SpO2 98%  BMI 23.99 kg/m2  Gen: alert sitting up in bed in nad  Pulm: non labored on room air  Abd: soft mild distention reported fullness  Ext: warm dry without edema dinorah hose in place  Neuro/MSK: paraplegic.  Alert, speech clear,   : toussaint catheter in place, urine clear  Skin: left wrist with mild redness decreased swelling, minimal purulence no significant fluctuance    labs  No new labs    Rehabilitation - continue comprehensive acute inpatient rehabilitation program with multidisciplinary approach including therapies, rehab nursing, and physiatry following. See interval history for updates.      assessment and plan    Derek Enriquez is a 57 year old man with a history of CAD who initially presented " "to [facility] after out of hospital arrest, underwent MALI to known LAD lesion on 3/8.  On 3/9 developed sudden paraplegia, MRI revealed thoracic intrathecal (subarachnoid) hemorrhage, transferred to [facility] and underwent emergent laminectomy and subdural evacuation.  Admitted to ARU 3/14 for ongoing rehabilitation and medical management.    Spinal Cord Compression/ T9 AIS A- secondary to subarachnoid/intrathecal hemorrhage 3/9 resulting in spinal cord compression with sudden loss of ble movement and sensation. See CHONG exam above.   Underwent emergent T9-T12 laminectomy and evacuation of hematoma. MRI also showed \"oval-shaped heterogeneous area of signal abnormality extending from mid T10 through mid T12 dorsal to the spinal cord, likely representing an AVM with adjacent  Hemorrhage\"   -monitor incision  -PT/OT  -follow up with neuro interventional team in one month for spinal angiogram  -suture removal 3/23  -keep incision clean and dry  -may shower no scrubbing or soaking  -follow up with Lilli BAUMAN in 6 weeks  -DVT prophylaxis with sub Q heparin x 14 days (through 3/28)  -pain control with prn tylenol, prn oxycodone, scheduled lidoderm and prn menthol patches- started gabapentin 200 mg tid 3/15 for suspected neuropathic pain.    -reports some spasms in bilateral lower extremities- continue flexeril to prn upon admission to ARU  -continue dinorah hose and abd binder as tolerated    Orthostatic Hypotension- has been getting flushed and dizzy when seated upright.  Of note recently started metoprolol, has not been wearing ab binder, and drinking/eating less  -encourage use of teds/ab binder  -hold metoprolol  -encourage fluids  -slow position changes and tilt table with therapy.      Neurogenic bowel- continue nightly bowel program with supp after supper and dig stim as needed; given the location of hemorrhage and involvement of conus medullaris might have lower motor neuron type injury which will be more difficult " to manage and require manual evacuation of stool  -ab xr to evaluate stool burden  -continue senokot-s 2 tab bid, miralax daily, suppository q evening- titrate as indicated.      Neurogenic bladder- continue toussaint with anticipated toussaint removal in next 5-7 days with self cath training to follow. Total UOP should be < 2000-2500ml/ day     Low risk for AD as his level is below T6 but still will monitor closely given some case reports of AD in complete SCIs         CAD- known LAD lesion with out of hospital cardiac arrest S/p MALI 3/8.  Was seen and evaluated by Cardiology 3/10.  When safe from bleed perspective restart Plavix 75 mg daily given MALI. Should be decompensate and the clinical picture suggest acute stent thrombosis, we would highly consider VA-ECMO without heparin.  See consult note by Dr. Shetty 3/10 for details  -continue asa 81 mg daily (resumed 3/13)  -discontinue metoprolol 12.5 mg BID and Lipitor 40 mg daily (metoprolol dcd due to orthostatic hypotension, and lipitor per patient request/ reported conversation with cardiology)  -follow up with cardiology 3-4 weeks f/u stent placement  -ok to restart Plavix 3/23 per neurosurgery     Left Wrist cellulitis- redness and swelling with slight purulence at site of former IV.  T max 100.7 3/14, WBC WNL.   -monitor keep clean and covered  -continue keflex 500 mg tid x 7 days  -continue topical bacitracin bid x 5 days per patient request.        Patient seen and discussed with Dr. Lee  PM&R Staff Physician    Marilin Jung PA-C  Rehab Service  Pager: [phone]

## 2018-03-17 LAB
ANION GAP SERPL CALCULATED.3IONS-SCNC: 6 MMOL/L (ref 3–14)
BUN SERPL-MCNC: 19 MG/DL (ref 7–30)
CALCIUM SERPL-MCNC: 8.8 MG/DL (ref 8.5–10.1)
CHLORIDE SERPL-SCNC: 98 MMOL/L (ref 94–109)
CO2 SERPL-SCNC: 28 MMOL/L (ref 20–32)
CREAT SERPL-MCNC: 0.69 MG/DL (ref 0.66–1.25)
ERYTHROCYTE [DISTWIDTH] IN BLOOD BY AUTOMATED COUNT: 12.3 % (ref 10–15)
GFR SERPL CREATININE-BSD FRML MDRD: >90 ML/MIN/1.7M2
GLUCOSE SERPL-MCNC: 157 MG/DL (ref 70–99)
HCT VFR BLD AUTO: 36.6 % (ref 40–53)
HGB BLD-MCNC: 12.4 G/DL (ref 13.3–17.7)
MCH RBC QN AUTO: 30.3 PG (ref 26.5–33)
MCHC RBC AUTO-ENTMCNC: 33.9 G/DL (ref 31.5–36.5)
MCV RBC AUTO: 90 FL (ref 78–100)
PLATELET # BLD AUTO: 360 10E9/L (ref 150–450)
POTASSIUM SERPL-SCNC: 4.7 MMOL/L (ref 3.4–5.3)
RBC # BLD AUTO: 4.09 10E12/L (ref 4.4–5.9)
SODIUM SERPL-SCNC: 132 MMOL/L (ref 133–144)
WBC # BLD AUTO: 8.1 10E9/L (ref 4–11)

## 2018-03-17 PROCEDURE — 36415 COLL VENOUS BLD VENIPUNCTURE: CPT | Performed by: PHYSICAL MEDICINE & REHABILITATION

## 2018-03-17 PROCEDURE — 97110 THERAPEUTIC EXERCISES: CPT | Mod: GP | Performed by: PHYSICAL THERAPIST

## 2018-03-17 PROCEDURE — 25000132 ZZH RX MED GY IP 250 OP 250 PS 637: Performed by: PHYSICIAN ASSISTANT

## 2018-03-17 PROCEDURE — 97535 SELF CARE MNGMENT TRAINING: CPT | Mod: GO

## 2018-03-17 PROCEDURE — 25000132 ZZH RX MED GY IP 250 OP 250 PS 637: Performed by: PHYSICAL MEDICINE & REHABILITATION

## 2018-03-17 PROCEDURE — 85027 COMPLETE CBC AUTOMATED: CPT | Performed by: PHYSICAL MEDICINE & REHABILITATION

## 2018-03-17 PROCEDURE — 97530 THERAPEUTIC ACTIVITIES: CPT | Mod: GP | Performed by: PHYSICAL THERAPIST

## 2018-03-17 PROCEDURE — 12800006 ZZH R&B REHAB

## 2018-03-17 PROCEDURE — 40000193 ZZH STATISTIC PT WARD VISIT: Performed by: PHYSICAL THERAPIST

## 2018-03-17 PROCEDURE — 80048 BASIC METABOLIC PNL TOTAL CA: CPT | Performed by: PHYSICAL MEDICINE & REHABILITATION

## 2018-03-17 PROCEDURE — 97530 THERAPEUTIC ACTIVITIES: CPT | Mod: GO

## 2018-03-17 PROCEDURE — 40000133 ZZH STATISTIC OT WARD VISIT

## 2018-03-17 RX ORDER — MAGNESIUM CARB/ALUMINUM HYDROX 105-160MG
296 TABLET,CHEWABLE ORAL ONCE
Status: COMPLETED | OUTPATIENT
Start: 2018-03-17 | End: 2018-03-17

## 2018-03-17 RX ORDER — AMOXICILLIN 250 MG
3 CAPSULE ORAL 2 TIMES DAILY
Status: DISCONTINUED | OUTPATIENT
Start: 2018-03-17 | End: 2018-03-20

## 2018-03-17 RX ORDER — ONDANSETRON 4 MG/1
4 TABLET, ORALLY DISINTEGRATING ORAL EVERY 6 HOURS PRN
Status: DISCONTINUED | OUTPATIENT
Start: 2018-03-17 | End: 2018-03-21 | Stop reason: HOSPADM

## 2018-03-17 RX ORDER — MIDODRINE HYDROCHLORIDE 2.5 MG/1
2.5 TABLET ORAL 2 TIMES DAILY
Status: DISCONTINUED | OUTPATIENT
Start: 2018-03-18 | End: 2018-03-21 | Stop reason: HOSPADM

## 2018-03-17 RX ORDER — POLYETHYLENE GLYCOL 3350 17 G/17G
17 POWDER, FOR SOLUTION ORAL 2 TIMES DAILY
Status: DISCONTINUED | OUTPATIENT
Start: 2018-03-17 | End: 2018-03-20

## 2018-03-17 RX ADMIN — MAGNESIUM CITRATE 296 ML: 1.75 LIQUID ORAL at 13:28

## 2018-03-17 RX ADMIN — GABAPENTIN 400 MG: 300 CAPSULE ORAL at 07:54

## 2018-03-17 RX ADMIN — ACETAMINOPHEN 975 MG: 325 TABLET ORAL at 14:21

## 2018-03-17 RX ADMIN — CEPHALEXIN 500 MG: 500 CAPSULE ORAL at 14:22

## 2018-03-17 RX ADMIN — SODIUM PHOSPHATE, DIBASIC AND SODIUM PHOSPHATE, MONOBASIC 1 ENEMA: 7; 19 ENEMA RECTAL at 10:17

## 2018-03-17 RX ADMIN — Medication: at 12:57

## 2018-03-17 RX ADMIN — ASPIRIN 81 MG: 81 TABLET, COATED ORAL at 07:54

## 2018-03-17 RX ADMIN — DOCUSATE SODIUM 286 ML: 50 LIQUID ORAL at 16:19

## 2018-03-17 RX ADMIN — MULTIPLE VITAMINS W/ MINERALS TAB 1 TABLET: TAB at 07:54

## 2018-03-17 RX ADMIN — CEPHALEXIN 500 MG: 500 CAPSULE ORAL at 07:54

## 2018-03-17 RX ADMIN — Medication: at 21:10

## 2018-03-17 RX ADMIN — ACETAMINOPHEN 975 MG: 325 TABLET ORAL at 06:26

## 2018-03-17 RX ADMIN — GABAPENTIN 400 MG: 300 CAPSULE ORAL at 14:22

## 2018-03-17 RX ADMIN — OXYCODONE HYDROCHLORIDE 10 MG: 5 TABLET ORAL at 10:04

## 2018-03-17 RX ADMIN — OXYCODONE HYDROCHLORIDE 10 MG: 5 TABLET ORAL at 02:54

## 2018-03-17 RX ADMIN — OXYCODONE HYDROCHLORIDE 10 MG: 5 TABLET ORAL at 06:26

## 2018-03-17 NOTE — PLAN OF CARE
Problem: Patient Care Overview  Goal: Plan of Care/Patient Progress Review  FOCUS/GOAL  Bowel management, Bladder management, Nutrition/Feeding/Swallowing precautions, Pain management and Wound care management    ASSESSMENT, INTERVENTIONS AND CONTINUING PLAN FOR GOAL:  AOX4, uses call light appropriately and makes needs known. Mod A of 2 with mechanical lift to transfers. Patient needs to have compression stockings, wrapping on thighs, and abdominal binder when getting up to the BSC due to drop in BP that makes patient dizzy and nauseous. Patient is not to be left alone on the BSC because of dizziness. Had large emesis at 0830, did not experience nausea after emesis. PRN fleet enema administered at 0930 with small liquid stool; or magnesium citrate administered at 1330, patient is to receive pink lady enema at 1530. CO of abdominal and lower back pain relieved with PRN oxycodone and scheduled gabapentin. Dressing on left wrist changed as per orders. Denies SOB, pleasant and cooperative with care.

## 2018-03-17 NOTE — PLAN OF CARE
Problem: Goal/Outcome  Goal: Goal Outcome Summary  FOCUS/GOAL  Bowel management, Medication management, Pain management and Wound care management    ASSESSMENT, INTERVENTIONS AND CONTINUING PLAN FOR GOAL:  Pt A&Ox4, able to make needs known. C/o pain to back incision and HA, tolerated with scheduled Tylenol and prn Oxycodone and Flexeril. Sandhu patent and draining, cares done. Bowel program started late due to pt request to perform it once family left for the night. Small liquid result from rectal check and smear in pad. Suppository given, pt declined shower chair due to complications with low BPs when getting up for therapy. Smear result after dig stim x1 with flatus. Second dig stim in bed with another small liquid result. Bed mobility performed with assist of one-two. Back incision dressing changed, scant amount of drainage. Gauze to left hand changed this AM. Bed alarm armed for safety. Pt tolerating regular/thin diet and able to take pills whole. Family expressed frustration with management of orthostatic BPs. Note left for MD to contact them and consider adding Midodrine.

## 2018-03-17 NOTE — PLAN OF CARE
Problem: Patient Care Overview  Goal: Plan of Care/Patient Progress Review  PT: cancel AM session d/t bowel management regimen. Pts PM session increased to 1 hour to make up for lost time.

## 2018-03-17 NOTE — PLAN OF CARE
Problem: Goal/Outcome  Goal: Goal Outcome Summary  Pt agreeable to PM therapy session.  PT applied ACE thigh wraps for increased compression and to increase BP compliance with upright position.  Pt raised to 60 degree HOB position slowly with asymptomatic drop in diastolic pressure from 80 to 70.  Pt maintained systolic pressure at 130.  Pt returned to 30 degree position at end of session and agreeable to wearing thigh wraps for afternoon.  Pt to attempt tilt table or sitting EOB tomorrow with thigh wraps.

## 2018-03-17 NOTE — DISCHARGE SUMMARY
Admit Date:     03/08/2018   Discharge Date:     03/09/2018      PRIMARY CARE PHYSICIAN:  Dr. Eugene Burrell.      The patient was admitted on 3/8/2018 and he was transferred to the [facility] on 3/9/2018.      DIAGNOSES:     1.  Cardiac arrest due to ventricular fibrillation with resuscitation, at the site of cardiac arrest within 2 minutes of onset.  Further cardiac arrest in the rig on transportation to the [facility].   2.  Severe proximal left anterior descending artery stenosis.   3.  Successful stenting of the proximal left anterior descending artery with a 3.0 x 20 mm Promus drug-eluting stent increased to 3.5 mm with a 3.5 x 15 mm noncompliant balloon.  The circumflex and right coronary artery and its branches were normal.   4.  Successful intra-aortic balloon pump placement.   5.  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      HOSPITAL COURSE:  Derek Enriquez is a 57-year-old man.  The patient was exercising on a treadmill at [place] when he collapsed and rolled off the treadmill.  First responders performed CPR and about 2 minutes later an AED was activated and one shock, brought him back into sinus rhythm.  En route to the hospital, he went into ventricular fibrillation again and was shocked again.  He was intubated before arrival to the Emergency Room, but was having some purposeful movements.  The patient was brought to the cardiac catheterization laboratory, which was supervised by Dr. Deb Mendoza.  The patient had stenting of the proximal left anterior descending artery with a drug-eluting stent ..  Because the patient was hypotensive, an intraaortic balloon pump was placed.  The patient had an uneventful night.  The following morning the patient was hemodynamically stable and was on a small dose of norepinephrine,  which was stopped.  The intraaortic balloon pump was placed initially at 1-2 and then 1-3.  The heparin was stopped at 11:00 a.m. with a plan to remove the balloon pump after 3:00 p.m.   The patient, was placed on aspirin and Plavix when he had the stent placed.  At around 3:15 p.m. he stated to his nurse that he developed severe upper back discomfort and then he lost sensation and power to his lower extremities.  Dr. Christiansen, the intensivist, called Cardiology, Vascular Surgery and Neurology.  The working diagnosis at that stage was possible dissection from the intraaortic balloon pump.  The intraaortic balloon pump was removed promptly by Dr. Isai Esteban and the patient was sent for emergent CT angiogram of the thoracic and abdominal aorta.  This showed no evidence of dissection in the aorta.  The patient was brought back up to the Intensive Care Unit. The neurologist then decided that the patient needed an immediate MRI scan of his thoracic and abdominal spine.  The FemoStop device was removed and a pressure bandage was placed over the femoral arteriotomy site and the patient was then brought down to the MRI suite.  At that stage, it was found in the abdominal spine that there was extensive subarachnoid blood within the thecal sac.  The thoracic spine revealed serpiginous linear enhancement along the right lateral margin, likely representing an AVM with adjacent hemorrhage.  There was displacement of the spinal cord ventrally from the lower T10 through mid T12.  The thoracic spinal cord appeared intrinsically normal.  The neurologist, Dr. Malagon, was contacted  by Dr. Jarrell Moses, who was the reading radiologist.  The patient was brought back up to the Intensive Care Unit and Dr. Malagon then contacted Neurosurgery at the [facility] for emergent decompression of the hematoma.  Dr. Malagon, the neurologist, felt that the patient needed to be transferred to the [facility] as this  was a quite unusual and complex case.  From the history, the patient was hemodynamically stable on transfer to the [facility], but still had no sensation or power in his lower extremities.  .         FREDERICK FIGUEROA MD, [facility]             D: 2018   T: 2018   MT: MERCEDES      Name:     LINDA MARTÍNEZ   MRN:      [id number]        Account:        [id number]   :      1960           Admit Date:     2018                                  Discharge Date: 2018      Document: [id number]       cc: Anoop Burrell MD

## 2018-03-17 NOTE — PLAN OF CARE
FOCUS/GOAL  Bowel management, Bladder management, Pain management, Skin integrity and Cognition/Memory/Judgment/Problem solving    ASSESSMENT, INTERVENTIONS AND CONTINUING PLAN FOR GOAL:  Pt is alert and oriented, uses call light and directs cares, reported pain and was given prn Tylenol and Oxycodone with temporary relief, changed positions often and passive range of motion was administered to lower extremities per pt request, toussaint positional and patent, it was reported that pt had been having low orthostatic BP so BP was taken often during the night and always WDL, no further care concerns at this time continue with POC.

## 2018-03-17 NOTE — PROGRESS NOTES
Writer contacted by hospital  who received call from jessica Reese with questions about care.  Writer met with patient initially in room, then he called Mary Ann for joint conversation by speaker phone.  They both relayed that no enema was given to patient while hospitalized and their understanding was that enema would be given at [facility], low BPs when sitting up, less therapy due to weakness/blood pressure problems.   Dr. Lee spoke with patient and significant other (by phone) prior to this conversation.  See his notes.  Mary Ann said she spoke with Dr. Marquis (neurosurgery) today and asked about patient returning to [facility] for medical care related to low blood pressures.  PT approached room, so writer curtailed conversation with patient/significant other.     In talking with charge nurse, the concerns r/t enema, BP's and limited therapy were brought forward to her earlier today by Mary Ann as well.  Writer returned call to [place] ; provided supportive listening, offered that Dr. Lee is managing patient's care and encouraged trust in provider's knowledge of medical condition.  Explained that return to [facility] is up to PM&R or Hospitalist physicians that see patients on our unit, not the .  Significant other said referral was also sent to [facility] when patient was hospitalized.  She wants to pursue that option; writer explained that insurance representatives wouldn't be available until Monday which significant other understood.  Writer told her that insurance would have to approve authorization for coverage at another inpatient rehabilitation program.  She commented that she will call Miriam in patient relations related to negative interaction with provider.  She asked about recent labs taken and results; writer notified charge nurse to follow up with jessica

## 2018-03-17 NOTE — PROGRESS NOTES
"PM&R Daily Note:    Neurogenic bowel: Has not had much results yet.  Vomited this morning.  Bisacodyl suppository and digital stim with some but not significant results.  Have been increasing oral senna and docusate with MiraLAX last several days.  Fleet enema this morning with little results, dig stim did not detect stool in rectal vault.  Getting magnesium citrate and will follow with pink lady enema. Increase senna-docusate from 2 to 3 tabs BID, increase miralax to BID. (back off these once bowels moving better. May try Naloxegol for opioid induced constipation. Minimizing oxycodone as able though pain as below still can be moderate.    ADDENDUM: large BM this evening at 5 PM following mag citrate earlier then [place] Lady enema.    Still with orthostatic hypotension but normal blood pressures when supine.  Had stopped metoprolol already, using compression stockings, abdominal binder and Ace wraps to thighs though persisting symptomatic lower pressures.  BMP today with normal BUN and Cr thus not seeming dehydration.  We will add low-dose Midrin 2.5 mg twice daily, before a.m. and afternoon therapies.  Caution with bradycardia arrhythmia especially with recent cardiac stenting.  Midrin may also exacerbate urine retention currently with indwelling Sandhu catheter.  Will prioritize blood pressure regulation for now but may need to balance this with trial catheter removal.  Would be my speculation he will have neurogenic bladder to the extent that would require intermittent catheterization and thus if alpha 1 antagonist mechanism of action with Midrin \"worsens\" urine retention likely does not change the need for management of intermittent catheterization.     Pain: Likely multifactorial.  With loss of sensation related to STI, localizing pain is impacted.  Likely a components from bowels and constipation, incisional/spine and neuropathic.  Bowels hopefully addressed with above, gabapentin we have increased dose to 400 mg " 3 times daily (do not suspect the higher dose was because of nausea but monitor as potential side effect).  Also has ongoing acetaminophen scheduled and oxycodone 5-10 mg up to every 3 hours as needed.  Added zofran prn for any more nausea but suspect will be better once bowels moving better.    Longer conversations again today with Derek, also had fiona Reese on speaker phone.  Talked through the management with bowels and blood pressure we have been working on and last several days.  She has particular frustrations with the lack of progress.  Working through education and information about the steps we have been and are taking.         Vitals:    03/17/18 0844 03/17/18 1045 03/17/18 1055 03/17/18 1500   BP: 128/75 (!) 85/54 128/74 127/73   BP Location: Right arm Right arm Right arm Right arm   Pulse:    80   Resp:    16   Temp:    97.4  F (36.3  C)   TempSrc:    Oral   SpO2:    98%   Weight:       Height:         Mostly alert with my few visits today, slight somnolence may be reflective of medication side effect.  No diaphoresis  Heart regular, lungs clear  Abdomen with some bowel sounds, not particularly distended or exacerbating discomfort with deeper palpation  Did not do more quantified lower extremity neuro exam today, has been flaccid paralysis and no sensation below about T11-12.  Left hand wound not seen today, under gauze, yesterday erythema was shrinking down within perimeter marked.  Little to no tenderness with palpation over this area today, this is an improvement.    70 minutes spent today, 40 in direct patient interaction, remainder in coordination of care.

## 2018-03-18 PROCEDURE — 97535 SELF CARE MNGMENT TRAINING: CPT | Mod: GO

## 2018-03-18 PROCEDURE — 12800006 ZZH R&B REHAB

## 2018-03-18 PROCEDURE — 25000132 ZZH RX MED GY IP 250 OP 250 PS 637: Performed by: PHYSICIAN ASSISTANT

## 2018-03-18 PROCEDURE — 25000132 ZZH RX MED GY IP 250 OP 250 PS 637: Performed by: PHYSICAL MEDICINE & REHABILITATION

## 2018-03-18 PROCEDURE — 97530 THERAPEUTIC ACTIVITIES: CPT | Mod: GP

## 2018-03-18 PROCEDURE — 97110 THERAPEUTIC EXERCISES: CPT | Mod: GO

## 2018-03-18 PROCEDURE — 40000193 ZZH STATISTIC PT WARD VISIT

## 2018-03-18 PROCEDURE — 40000133 ZZH STATISTIC OT WARD VISIT

## 2018-03-18 RX ADMIN — ACETAMINOPHEN 975 MG: 325 TABLET ORAL at 06:49

## 2018-03-18 RX ADMIN — ACETAMINOPHEN 975 MG: 325 TABLET ORAL at 21:57

## 2018-03-18 RX ADMIN — GABAPENTIN 400 MG: 300 CAPSULE ORAL at 14:01

## 2018-03-18 RX ADMIN — POLYETHYLENE GLYCOL 3350 17 G: 17 POWDER, FOR SOLUTION ORAL at 08:10

## 2018-03-18 RX ADMIN — SENNOSIDES AND DOCUSATE SODIUM 3 TABLET: 8.6; 5 TABLET ORAL at 08:09

## 2018-03-18 RX ADMIN — GABAPENTIN 400 MG: 300 CAPSULE ORAL at 21:58

## 2018-03-18 RX ADMIN — MIDODRINE HYDROCHLORIDE 2.5 MG: 2.5 TABLET ORAL at 12:37

## 2018-03-18 RX ADMIN — ACETAMINOPHEN 975 MG: 325 TABLET ORAL at 12:41

## 2018-03-18 RX ADMIN — MIDODRINE HYDROCHLORIDE 2.5 MG: 2.5 TABLET ORAL at 06:49

## 2018-03-18 RX ADMIN — GABAPENTIN 400 MG: 300 CAPSULE ORAL at 00:17

## 2018-03-18 RX ADMIN — BISACODYL 10 MG: 10 SUPPOSITORY RECTAL at 19:45

## 2018-03-18 RX ADMIN — ACETAMINOPHEN 975 MG: 325 TABLET ORAL at 00:17

## 2018-03-18 RX ADMIN — Medication: at 08:16

## 2018-03-18 RX ADMIN — MULTIPLE VITAMINS W/ MINERALS TAB 1 TABLET: TAB at 08:09

## 2018-03-18 RX ADMIN — CEPHALEXIN 500 MG: 500 CAPSULE ORAL at 00:17

## 2018-03-18 RX ADMIN — GABAPENTIN 400 MG: 300 CAPSULE ORAL at 08:09

## 2018-03-18 RX ADMIN — ASPIRIN 81 MG: 81 TABLET, COATED ORAL at 08:10

## 2018-03-18 NOTE — PLAN OF CARE
Problem: Goal/Outcome  Goal: Goal Outcome Summary  FOCUS/GOAL  Bowel management, Medication management and Wound care management    ASSESSMENT, INTERVENTIONS AND CONTINUING PLAN FOR GOAL:  Pt A&Ox4, able to make needs known. Pink lady enema given at start of shift, pt able to retain for about 30 minutes by lying on left side. Large result of liquid stool produced and small amount of formed stool manually removed from rectum. Pt drank a shake from home but had a medium amount of emesis immediately after finishing it. Pt cleaned up with assist of two. Pt then had a large incontinent liquid BM contained in depends and on incontinence pad, cleaned with assist of two. C/o HA, snack provided but pt denied needing additional intervention. Sandhu patent and draining, cares done. Bed mobility performed with assist of one-two. Back incision dressing changed, scant amount of drainage. Gauze to left hand changed due to soiling. Pt declined night medications tonight, due to fear of additional emesis.

## 2018-03-18 NOTE — PROGRESS NOTES
"PM&R Daily Note:    Neurogenic bowel: Yesterday after magnesium citrate and pink lady enema had some results followed later with moderate amount stool.  He did vomit an additional time yesterday evening though I am optimistic we are getting cleaned out.  I will do an abdominal x-ray to follow-up.  If x-ray looks better and no longer vomiting then will slowly ease back on bowel meds and ease back into eating more as he's feeling up to this.  Possibly keflex adds to nausea, left hand looks much better so will stop PO abx. Continue topical bacitracin.    Orthostatic hypotension but normal blood pressures when supine.  Does not look hypovolemic and labs confirm.  Already stopped metoprolol, using compression stockings, abdominal binder and Ace wraps to thighs though persisting symptomatic lower pressures.    Trying low-dose Midrin 2.5 mg twice daily, before a.m. and afternoon therapies.  Caution with bradycardia arrhythmia especially with recent cardiac stenting.  Midrin may also exacerbate urine retention currently with indwelling Sandhu catheter.  Will prioritize blood pressure regulation for now but may need to balance this with trial catheter removal.  Would be my speculation he will have neurogenic bladder to the extent that would require intermittent catheterization and thus if alpha 1 antagonist mechanism of action with Midrin \"worsens\" urine retention likely does not change the need for management of intermittent catheterization.     Pain: Likely multifactorial.  With loss of sensation related to SCI, localizing pain is impacted.  Likely a components from bowels and constipation, incisional/spine and neuropathic.  Bowels hopefully addressed with above, gabapentin we have increased dose to 400 mg 3 times daily (do not suspect the higher dose was because of nausea but monitor as potential side effect).  Also has ongoing acetaminophen scheduled and oxycodone 5-10 mg up to every 3 hours as needed. Minimize opioid " with impact on bowels.    ADDENDUM: Returned in afternoon with check in with Derek.  Wife and 2 sons also present.  Preliminary starting to tolerate some eating without nausea.  Also first therapy session with better blood pressures.  Still slight drop but not nearly as symptomatic.  Continue to build on this.    Answered many questions with Derek's family.  Reviewed the above plan.  Reviewed imaging studies showing him MRI results on the screen and explaining what we knew before and after surgery.  He is interested in specific data and outcomes and with his preference for this, I did discussed prognosis with CHONG A SCI. Most people with his extent of deficit will not go on to walk.  That is not to say impossible but also important to be prepared with likely outcome.  We reviewed what goals we might achieve while on inpatient rehab.  If he continues to not have any motor recovery in lower extremities, then the goals will be to master transfers, wheelchair based functioning, neurogenic bladder, neurogenic bowel.  Should he start to have some motor recovery, that would open up many goals and likely justify longer course of intense acute rehab setting.        Vitals:    03/17/18 1045 03/17/18 1055 03/17/18 1500 03/18/18 0732   BP: (!) 85/54 128/74 127/73 121/71   BP Location: Right arm Right arm Right arm Right arm   Pulse:   80 70   Resp:   16 16   Temp:   97.4  F (36.3  C) 96.9  F (36.1  C)   TempSrc:   Oral Oral   SpO2:   98% 98%   Weight:       Height:         alert No diaphoresis  MMM  Heart RRR  Lungs clear  Abdomen non-tender, non-distended, active BS's.  No peripheral edema    80 minutes today, 60 min direct patient interaction, remainder coordination of care.

## 2018-03-18 NOTE — PLAN OF CARE
"FOCUS/GOAL  Bowel management, Bladder management, Pain management, Wound care management, Skin integrity and Cognition/Memory/Judgment/Problem solving    ASSESSMENT, INTERVENTIONS AND CONTINUING PLAN FOR GOAL:  Pt alert and oriented but very frustrated at his condition and is addiment that he needs to go on a \"drip\", the pt is unsure of what that is but thinks that he is low on nutrient, took evening medications Tylenol, Keflex, and gabapentin, denied SOB, CP, or new changes in sensation, repositioned often by pt request, x 1 incontinent episode occurred during the night, toussaint cares performed and it is  positional and patent, Liko lift for transfers, slept partially during the night, no further care concerns at this time.       "

## 2018-03-18 NOTE — PLAN OF CARE
Problem: Goal/Outcome  Goal: Goal Outcome Summary  Outcome: Improving  Patient has tolerated small amounts of food slowly throughout the day. No emesis or abdominal discomfort. No further BM's this shift.  Abdominal xray was ordered but will cancel it for now per Dr. Lee.  If he has more emesis, it may be reordered.    BP's have improved today after taking midodrine.  He was able to sit on the edge of the bed with therapy, BP's remained stable and he was asymptomatic.    Sandhu draining jovanni urine. Encouraged po fluids.  Catheter care done. Will eventually start teaching straight cathing.

## 2018-03-18 NOTE — PLAN OF CARE
Problem: Patient Care Overview  Goal: Plan of Care/Patient Progress Review  PT: Pt with improved tolerance to upright sitting today, completed 2 sessions of sitting higher up in bed moving to sitting on EOB with feet on floor in increments, BP around 120/80 this afternoon through out with no symptoms noted through out.  Continue per POC.

## 2018-03-19 PROCEDURE — 25000132 ZZH RX MED GY IP 250 OP 250 PS 637: Performed by: PHYSICIAN ASSISTANT

## 2018-03-19 PROCEDURE — 97542 WHEELCHAIR MNGMENT TRAINING: CPT | Mod: GP

## 2018-03-19 PROCEDURE — 97110 THERAPEUTIC EXERCISES: CPT | Mod: GP

## 2018-03-19 PROCEDURE — 25000132 ZZH RX MED GY IP 250 OP 250 PS 637: Performed by: PHYSICAL MEDICINE & REHABILITATION

## 2018-03-19 PROCEDURE — 97530 THERAPEUTIC ACTIVITIES: CPT | Mod: GP

## 2018-03-19 PROCEDURE — 40000133 ZZH STATISTIC OT WARD VISIT

## 2018-03-19 PROCEDURE — 97530 THERAPEUTIC ACTIVITIES: CPT | Mod: GO

## 2018-03-19 PROCEDURE — 40000193 ZZH STATISTIC PT WARD VISIT

## 2018-03-19 PROCEDURE — 97535 SELF CARE MNGMENT TRAINING: CPT | Mod: GO

## 2018-03-19 PROCEDURE — 12800006 ZZH R&B REHAB

## 2018-03-19 RX ORDER — CLOPIDOGREL BISULFATE 75 MG/1
75 TABLET ORAL DAILY
Status: DISCONTINUED | OUTPATIENT
Start: 2018-03-19 | End: 2018-03-21 | Stop reason: HOSPADM

## 2018-03-19 RX ADMIN — CLOPIDOGREL BISULFATE 75 MG: 75 TABLET, FILM COATED ORAL at 16:34

## 2018-03-19 RX ADMIN — Medication: at 20:51

## 2018-03-19 RX ADMIN — SENNOSIDES AND DOCUSATE SODIUM 3 TABLET: 8.6; 5 TABLET ORAL at 20:50

## 2018-03-19 RX ADMIN — ACETAMINOPHEN 975 MG: 325 TABLET ORAL at 20:51

## 2018-03-19 RX ADMIN — MIDODRINE HYDROCHLORIDE 2.5 MG: 2.5 TABLET ORAL at 06:37

## 2018-03-19 RX ADMIN — MIDODRINE HYDROCHLORIDE 2.5 MG: 2.5 TABLET ORAL at 12:59

## 2018-03-19 RX ADMIN — POLYETHYLENE GLYCOL 3350 17 G: 17 POWDER, FOR SOLUTION ORAL at 20:50

## 2018-03-19 RX ADMIN — ACETAMINOPHEN 975 MG: 325 TABLET ORAL at 06:37

## 2018-03-19 RX ADMIN — GABAPENTIN 400 MG: 300 CAPSULE ORAL at 13:00

## 2018-03-19 RX ADMIN — ASPIRIN 81 MG: 81 TABLET, COATED ORAL at 08:29

## 2018-03-19 RX ADMIN — BISACODYL 10 MG: 10 SUPPOSITORY RECTAL at 19:49

## 2018-03-19 RX ADMIN — ACETAMINOPHEN 975 MG: 325 TABLET ORAL at 12:58

## 2018-03-19 RX ADMIN — GABAPENTIN 400 MG: 300 CAPSULE ORAL at 20:51

## 2018-03-19 RX ADMIN — MULTIPLE VITAMINS W/ MINERALS TAB 1 TABLET: TAB at 08:29

## 2018-03-19 RX ADMIN — POLYETHYLENE GLYCOL 3350 17 G: 17 POWDER, FOR SOLUTION ORAL at 08:29

## 2018-03-19 RX ADMIN — SENNOSIDES AND DOCUSATE SODIUM 3 TABLET: 8.6; 5 TABLET ORAL at 08:29

## 2018-03-19 RX ADMIN — GABAPENTIN 400 MG: 300 CAPSULE ORAL at 08:28

## 2018-03-19 RX ADMIN — Medication: at 12:59

## 2018-03-19 NOTE — PROGRESS NOTES
PM&R Daily Note:    Some discrepency in earlier neurosurgery documentation as to the start date of clopidogrel. Neurosurgeon staff Dr. Joe did clarify should be 10 days post surgery which is today. Given cardiac stent ideally we were to be on dual antiplatelet aspirin / clopidogrel though latter held with spinal bleed.    Functionally having a great day. Had shower and tolerating up in manual wheelchair without orthostatic hypotension.  For now will continue with Midrin 2.5 mg twice daily.  Metoprolol is on hold.  If he continues to do very well with blood pressures, first step would be cut out Midrin, if tolerated may add back very low-dose metoprolol because of it secondary benefit on cardiac remodeling post cardiac arrest and LAD stenting ultimately with his spinal cord injury, the beta-blocker may not be tolerated.    Pain: He has not needed oxycodone in a few days, will leave that on as needed in case that he is aware it might slow down bowels.  Has not touched Flexeril, will discontinue that.  He continues with gabapentin 400 mg 3 times daily and he believes that is helpful.  Ice has also been helpful.  Continue scheduled acetaminophen.    Derek has expressed interest in doing his inpatient acute rehab through [facility], we have made that referral and they will will explore insurance authorization.    Completed [facility] paperwork.      Vitals:    03/19/18 0857 03/19/18 1000 03/19/18 1300 03/19/18 1535   BP: 129/70 115/71 126/83 119/72   BP Location: Right arm Left arm Right arm Right arm   Pulse: 83   77   Resp: 18   16   Temp: 99  F (37.2  C)  96.5  F (35.8  C) 95.6  F (35.3  C)   TempSrc: Oral   Oral   SpO2: 100%   100%   Weight:       Height:         Fully alert, no diaphoresis  Saw her earlier up in manual wheelchair pushing very well  Later examined at bedside lying comfortably abdominal binder still on but belly nontender  He has lower extremity PRAFO boots with kickstand    45 minutes  spent today, 25 in direct patient interaction.

## 2018-03-19 NOTE — PLAN OF CARE
FOCUS/GOAL  Bowel management, Bladder management, Pain management, Skin integrity and Cognition/Memory/Judgment/Problem solving    ASSESSMENT, INTERVENTIONS AND CONTINUING PLAN FOR GOAL:  Pt is alert and oriented x 4, denied CP, SOB, or new changes in sensation, toussaint in place and patent, no BM this shift, no emesis or nausea reported,slept through most of the night and used call light for needs, pt stated that yesterday had been much better and that he felt positive about the direction that he was moving, no further care concerns at this time continue to monitor.

## 2018-03-19 NOTE — PLAN OF CARE
Problem: Goal/Outcome  Goal: Goal Outcome Summary  FOCUS/GOAL  Bowel management, Pain management and Wound care management    ASSESSMENT, INTERVENTIONS AND CONTINUING PLAN FOR GOAL:  Pt A&Ox4, able to make needs known. Diet improving, ate about 50% of dinner and a shake from home (regular/thin diet). No emesis or stomach discomfort. Pt takes pills whole. Bowel program done in bed due to BP concerns. Note left for MD to assess whether pt should have bowel program in the AM with Midodrine, have a prn Midodrine for the evening so pt can get up to BSC or continue program in bed. Bowel smear from rectal check and first dig stim. Small mucous/stool result after second dig stim. Pt denied pain. Sandhu patent and draining, cares done. Bed mobility performed with assist of one-two. Back incision dressing changed, scant amount of drainage. Gauze to left hand changed this AM, c/d/i. Pt declined bowel medications tonight. Light bulb in pt room went out, pt would like maintenance to fix this tomorrow (will pass on to next shift).

## 2018-03-19 NOTE — PLAN OF CARE
Problem: Goal/Outcome  Goal: Goal Outcome Summary  OT: Patient able to demonstrate great improvement with sitting upright tolerance; able to complete full shower with use of dependent shower chair and use of Liko lift for transfers.

## 2018-03-19 NOTE — PLAN OF CARE
"Problem: Patient Care Overview  Goal: Plan of Care/Patient Progress Review  FOCUS/GOAL  Medical management and Reinforcement of self-care/ADL    ASSESSMENT, INTERVENTIONS AND CONTINUING PLAN FOR GOAL:  Patient is alert/oriented x4, has been directing cares appropriately on this shift and reports some discomfort to the back when sitting up in bed, offered cool packs though and patient reports those are helpful.  Patient's BLE have no sensation and boots worn in bed to keep legs aligned.  Patient has toussaint that is draining adequately, toussaint cares done on this shift and exit site is intact.  Patient reported a good appetite this am but did not eat all of breakfast and then at lunch patient did not order a meal, when encouraged to order at close to 1pm patient stated \"I still have some things left (muffin and pineapple) and I have back to back therapies now anyway\" offered toast and oatmeal and patient did agree to have some toast with butter and peanut butter at this time. Patient had shower today with OT, dressing change done to back incision following shower and there is some errythema directly to incision but surrounding area intact and no open areas or drainage.  No bm on this shift, PRN tap water enema ordered as an option of patient has ongoing bowel difficulty, denies nausea or abdominal discomfort at all on this shift.    ADDENDUM: per report from PT toward end of shift, patient is now able to use slide board for transfers in room but still needing Ao2 and gait belt during transfer.        "

## 2018-03-19 NOTE — PLAN OF CARE
Problem: Goal/Outcome  Goal: Goal Outcome Summary  Received a phone call from pts SO, Mary Ann asking for a referral and possible transfer to [facility]. Informed [place] of process and need to speak with Derek regarding this request. Connected with Derek and he stated that he too would like to see of possible transfer. Sent referral to [facility] and currently awaiting a reply. Charis will continue to assist as needed.

## 2018-03-19 NOTE — PLAN OF CARE
Problem: PT General Care Plan  Goal: PT target date for goal attainment  PT: making significant gains today. Able to tolerate most of day up in chair, and performing slide board transfers with mod A x 2.

## 2018-03-19 NOTE — PROGRESS NOTES
S: [facility] at [facility]  with Bilateral Pressure Relief Ankle Foot Orthosis (PRAFO) for the lower extremity as ordered by Dr. Jung.    O/g: braces have been recommended to help reduce foot drop and off-weight heel from pressure.      A: The patient's knee was flexed to relax the gastroc muscle.  Once the foot was positioned, the soft liner was closed over the top of foot and checked that surface is smooth and consistent.  The middle Velcro strap was secured next.  The positioning of posterior heel was re-evaluated then all dorsal straps and calf strap was secured.  The foot position was re-evaluated so that the sole of foot met the plantar surface of the orthosis, including calcaneus and that the heel clearance was visible through the posterior opening.  The dorsi/plantar flexion bar was adjusted by hand to achieve desired degree.  The PRAFO toe extension/protection was adjusted by positioning extension plate under toes to desired length.     P: patient/nursing staff instructed to contact our office with any problems or questions.

## 2018-03-20 PROCEDURE — 97530 THERAPEUTIC ACTIVITIES: CPT | Mod: GO

## 2018-03-20 PROCEDURE — 97110 THERAPEUTIC EXERCISES: CPT | Mod: GP

## 2018-03-20 PROCEDURE — 40000133 ZZH STATISTIC OT WARD VISIT

## 2018-03-20 PROCEDURE — 25000132 ZZH RX MED GY IP 250 OP 250 PS 637: Performed by: PHYSICAL MEDICINE & REHABILITATION

## 2018-03-20 PROCEDURE — 12800006 ZZH R&B REHAB

## 2018-03-20 PROCEDURE — 40000193 ZZH STATISTIC PT WARD VISIT

## 2018-03-20 PROCEDURE — 97530 THERAPEUTIC ACTIVITIES: CPT | Mod: GP

## 2018-03-20 PROCEDURE — 25000132 ZZH RX MED GY IP 250 OP 250 PS 637: Performed by: PHYSICIAN ASSISTANT

## 2018-03-20 PROCEDURE — 97535 SELF CARE MNGMENT TRAINING: CPT | Mod: GO

## 2018-03-20 RX ORDER — GABAPENTIN 400 MG/1
400 CAPSULE ORAL 3 TIMES DAILY
Qty: 90 CAPSULE | DISCHARGE
Start: 2018-03-21 | End: 2018-04-27

## 2018-03-20 RX ORDER — ACETAMINOPHEN 325 MG/1
975 TABLET ORAL 3 TIMES DAILY
DISCHARGE
Start: 2018-03-21 | End: 2018-04-27

## 2018-03-20 RX ORDER — AMOXICILLIN 250 MG
1-2 CAPSULE ORAL 2 TIMES DAILY
Qty: 100 TABLET | DISCHARGE
Start: 2018-03-20 | End: 2018-06-22

## 2018-03-20 RX ORDER — POLYETHYLENE GLYCOL 3350 17 G/17G
17 POWDER, FOR SOLUTION ORAL DAILY
Status: DISCONTINUED | OUTPATIENT
Start: 2018-03-21 | End: 2018-03-21 | Stop reason: HOSPADM

## 2018-03-20 RX ORDER — MIDODRINE HYDROCHLORIDE 2.5 MG/1
2.5 TABLET ORAL 2 TIMES DAILY
DISCHARGE
Start: 2018-03-20 | End: 2018-09-05

## 2018-03-20 RX ORDER — BISACODYL 10 MG
10 SUPPOSITORY, RECTAL RECTAL DAILY
Qty: 30 SUPPOSITORY | DISCHARGE
Start: 2018-03-21 | End: 2018-04-27

## 2018-03-20 RX ORDER — OXYCODONE HYDROCHLORIDE 5 MG/1
5 TABLET ORAL EVERY 6 HOURS PRN
Qty: 50 TABLET | Refills: 0
Start: 2018-03-20 | End: 2018-04-27

## 2018-03-20 RX ORDER — AMOXICILLIN 250 MG
1-2 CAPSULE ORAL 2 TIMES DAILY
Status: DISCONTINUED | OUTPATIENT
Start: 2018-03-20 | End: 2018-03-21 | Stop reason: HOSPADM

## 2018-03-20 RX ORDER — CLOPIDOGREL BISULFATE 75 MG/1
75 TABLET ORAL DAILY
Qty: 30 TABLET | DISCHARGE
Start: 2018-03-21 | End: 2018-05-16

## 2018-03-20 RX ADMIN — GABAPENTIN 400 MG: 300 CAPSULE ORAL at 13:54

## 2018-03-20 RX ADMIN — ASPIRIN 81 MG: 81 TABLET, COATED ORAL at 08:43

## 2018-03-20 RX ADMIN — SENNOSIDES AND DOCUSATE SODIUM 1 TABLET: 8.6; 5 TABLET ORAL at 20:14

## 2018-03-20 RX ADMIN — Medication: at 08:43

## 2018-03-20 RX ADMIN — GABAPENTIN 400 MG: 300 CAPSULE ORAL at 20:14

## 2018-03-20 RX ADMIN — MULTIPLE VITAMINS W/ MINERALS TAB 1 TABLET: TAB at 08:43

## 2018-03-20 RX ADMIN — SENNOSIDES AND DOCUSATE SODIUM 3 TABLET: 8.6; 5 TABLET ORAL at 08:42

## 2018-03-20 RX ADMIN — ACETAMINOPHEN 975 MG: 325 TABLET ORAL at 13:54

## 2018-03-20 RX ADMIN — POLYETHYLENE GLYCOL 3350 17 G: 17 POWDER, FOR SOLUTION ORAL at 08:43

## 2018-03-20 RX ADMIN — CLOPIDOGREL BISULFATE 75 MG: 75 TABLET, FILM COATED ORAL at 16:46

## 2018-03-20 RX ADMIN — BISACODYL 10 MG: 10 SUPPOSITORY RECTAL at 19:03

## 2018-03-20 RX ADMIN — GABAPENTIN 400 MG: 300 CAPSULE ORAL at 08:42

## 2018-03-20 RX ADMIN — ACETAMINOPHEN 975 MG: 325 TABLET ORAL at 20:14

## 2018-03-20 RX ADMIN — ACETAMINOPHEN 975 MG: 325 TABLET ORAL at 05:29

## 2018-03-20 RX ADMIN — MIDODRINE HYDROCHLORIDE 2.5 MG: 2.5 TABLET ORAL at 06:53

## 2018-03-20 RX ADMIN — MIDODRINE HYDROCHLORIDE 2.5 MG: 2.5 TABLET ORAL at 12:09

## 2018-03-20 NOTE — PLAN OF CARE
Acute Rehab Care Conference/Team Rounds      Type: Team Rounds    Present: Dr. 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    Safety: Patient calls for assistance as needed, he directs bed mobility activities. He transfers using mechanical lift and assistance of 2. He is anxious at times.    Pain: Back pain, relief with Tylenol, Oxycodone and  ice pack.     Medications, Skin, Tubes/Lines: Back incision has sutures, wound on left hand, both covered with gauze, daily dressing change. No lines.      Swallowing/Nutrition:    Bowel/Bladder: He has a toussaint. He had an incontinent stool after bowel program yesterday, but had more continent BM in a commode    Psychosocial: Pt resides alone. Pt reported good support from his SO, Chilton and his two adult sons. Goal to return home. Team working towards a safe d/c plan.     ADLs/IADLs: Patient progressing towards goals, but early in rehab stay. Patient has been initially limited by orthostatic BPs with up right sitting and tolerance for OOB activity. Initiated slide board transfers and progressing with sitting balance for assist with transfers and ADLs. Patient still requiring max A for LB dressing, min A for UB dressing in full supported position and mod A for bed mobility and slide board transfers. Anticipate mod I with most basic self care basics and some initial assistance from family upon return home; will need equipment for home; likely hospital bed, tub bench and commode overlay with drop arm rest.     Mobility: Pt is progressing well with functional  mobility. Initial days of therapies were limited by significant symptomatic orthostasis. Yesterday he was able to tolerate OOB activity including slide board transfers, sitting EOM, and w/c propulsion. He requires A x 2for slide board transfers with assist to advance hips and cues for hand placement. BP limiting again today. Will continued to progress safety with slide board transfers and w/c management. W/c evaluation through [facility] setup for Tuesday 3/27. Will need custom K5 w/c, slide board and HCPT for safe d/c to home. Estimating 10-14 days to achieve these goals.    Cognition/Language: No barriers    Community Re-Entry: Not a . Car transfer not attempted at this time d/t safety concerns.    Transportation: will not be safe to drive at this time; requires w/c transport     Decision maker: self    Plan of Care and goals reviewed and updated.    Discharge Plan/Recommendations    Fall Precautions: continue    Overall plan for the patient: Derek is started to have improvements with orthostatic hypotension - still some challenges.  Initial obstipation has improved though still optimization of bowel program in process.  Unfortunately still no motor or sensory return in lower extremities.  He is choosing to continue his inpatient rehab through [facility] and transportation will be set up for tomorrow.      Utilization Review and Continued Stay Justification    Medical Necessity Criteria:    For any criteria that is not met, please document reason and plan for discharge, transfer, or modification of plan of care to address.    Requires intensive rehabilitation program to treat functional deficits?: Yes  Requires 3x per week or greater involvement of rehabilitation physician to oversee rehabilitation program?: Yes  Requires rehabilitation nursing interventions?: Yes  Patient is making functional progress?: Yes  There is a potential for additional functional progress? Yes  Patient is participating in therapy 3  hours per day a minimum of 5 days per week or 15 hours per week in 7 day period?:Yes  Has discharge needs that require coordinated discharge planning approach?:Yes      Final Physician Sign off    Statement of Approval: I agree with all the recommendations detailed in this document.     Patient Goals     1. Pt will d/c home/community setting upon completion of therapy/RN goals  2. Pt and family will be involved in d/c planning and will be made aware of services available to meet pts needs.         OT target date for goal attainment: 04/05/18  OT Frequency: daily 60-90 min.  OT goal: hygiene/grooming: independent, modified independent  OT goal: upper body dressing: Independent, Modified independent, including set-up/clothing retrieval, within precautions  OT goal: lower body dressing: Independent, Modified independent, including set-up/clothing retrieval, using adaptive equipment, within precautions  OT goal: upper body bathing: Independent, Modified independent  OT goal: lower body bathing: Independent, Modified independent, using adaptive equipment, with precautions  OT goal: toilet transfer/toileting: Independent, Modified independent, toilet transfer, cleaning and garment management, using adaptive equipment, within precautions  OT goal: meal preparation: Independent, Modified independent, with simple meal preparation  OT goal: home management: Independent, Modified independent, within precautions, using adaptive equipment  OT goal 1: SBA with shower transf. utilizing A.E./DME prn.  OT/RAJAT face-to-face collaboration occurred, patient progress and plan of care reviewed.      PT target date for goal attainment: 04/12/18  PT Frequency: daily 90 minutes  PT goal: bed mobility: Supervision/stand-by assist, Within precautions, Rolling (W/ use of leg straps)  PT goal: transfers: Modified independent, Bed to/from chair, Within precautions, Assistive device (sliding board initially)  PT goal 1: Pt will successfully  verbalize spinal precautions and demonstrate adherence during functional mobility   PT Goal 2:  (with SBA)  PT Goal 3: Pt will be able to self propel and manage manual wheelchair on unit including turns and shallow ramps   Patient/Family Goal: Bowel: pt is on a bowel proram everyevening   Patient/Family Goal: Bladder: pt has toussaint, patent and intact   Patient/Family Goal: Medication Management: pt knows his meds, no MAP needed   Individualized Goal 1: Pt will complete PLC class for anticoagulation medication Plavix by discharge.

## 2018-03-20 NOTE — PLAN OF CARE
Problem: Goal/Outcome  Goal: Goal Outcome Summary  FOCUS/GOAL  Bowel management, Bladder management and Medical management    ASSESSMENT, INTERVENTIONS AND CONTINUING PLAN FOR GOAL:  Pt alert and oriented, VSS. Complaints of mild back pain, given scheduled pain meds and gabapentin and cool packs. Pt had large BM following suppository for bowel program. Pt incont in brief and assisted to BSC using lift. Stool very soft, Bowel meds given because of pt anxiety with bowel management. Informed pt that meds may be held in future with soft-loose stools. Sticky note left for doctor as red sediment noted in toussaint collection container, pt asymptomatic, vitals stable.

## 2018-03-20 NOTE — PLAN OF CARE
Problem: Goal/Outcome  Goal: Goal Outcome Summary  Pt accepted to [facility] for Wednesday, 3/21. Pt is aware and agreeable to plan. Arranged transport via [place] @ 11 am. Sw will continue to assist as needed.

## 2018-03-20 NOTE — PLAN OF CARE
Problem: Goal/Outcome  Goal: Goal Outcome Summary  PT: pt planning to d/c to [facility] tomorrow. Son will provide ride. PT session set up for car transfer using slide board. Spoke with son about logistics, planning to leave at 11 am tomorrow.

## 2018-03-20 NOTE — PLAN OF CARE
Problem: Goal/Outcome  Goal: Goal Outcome Summary  Outcome: No Change  FOCUS/GOAL  Bowel management, Bladder management and Pain management    ASSESSMENT, INTERVENTIONS AND CONTINUING PLAN FOR GOAL:  Pt had some smear BM on his brief, total cares done with Ax2, new brief applied. Pt c/o back pain offered pain med but declined and wanted to stay with Gabapentin and Tylenol only but ice packs applied x2 with some relief. Pt was orthostatic Bp when he sat for 5mints with therapy. Pt knows his meds, and family brought some snacks, didn't eat regular meals today. Pt might transfer to other facility tomorrow per his request.  Nursing will cont POC

## 2018-03-20 NOTE — PLAN OF CARE
Problem: Goal/Outcome  Goal: Goal Outcome Summary  Placed referral to [facility] for lateral transfer to their [facility]. Currently awaiting insurance authorization. Informed both pt and his SO, Mary Ann of this process. Sw will continue to assist as needed.

## 2018-03-20 NOTE — PLAN OF CARE
Problem: Patient Care Overview  Goal: Plan of Care/Patient Progress Review  Patient continues again with decreased tolerance for upright sitting position, low BP, dizziness and faint feelings upon two attempts in both AM and PM. At end of both sessions, repositioned on his side and again educated on importance of alternating positions.

## 2018-03-21 ENCOUNTER — TRANSFERRED RECORDS (OUTPATIENT)
Dept: HEALTH INFORMATION MANAGEMENT | Facility: CLINIC | Age: 58
End: 2018-03-21

## 2018-03-21 VITALS
RESPIRATION RATE: 17 BRPM | TEMPERATURE: 97.4 F | WEIGHT: 172 LBS | SYSTOLIC BLOOD PRESSURE: 119 MMHG | DIASTOLIC BLOOD PRESSURE: 80 MMHG | OXYGEN SATURATION: 100 % | HEART RATE: 78 BPM | HEIGHT: 71 IN | BODY MASS INDEX: 24.08 KG/M2

## 2018-03-21 PROCEDURE — 97530 THERAPEUTIC ACTIVITIES: CPT | Mod: GP

## 2018-03-21 PROCEDURE — 97535 SELF CARE MNGMENT TRAINING: CPT | Mod: GO

## 2018-03-21 PROCEDURE — 25000132 ZZH RX MED GY IP 250 OP 250 PS 637: Performed by: PHYSICAL MEDICINE & REHABILITATION

## 2018-03-21 PROCEDURE — 40000193 ZZH STATISTIC PT WARD VISIT

## 2018-03-21 PROCEDURE — 40000133 ZZH STATISTIC OT WARD VISIT

## 2018-03-21 PROCEDURE — 25000132 ZZH RX MED GY IP 250 OP 250 PS 637: Performed by: PHYSICIAN ASSISTANT

## 2018-03-21 RX ADMIN — ASPIRIN 81 MG: 81 TABLET, COATED ORAL at 08:00

## 2018-03-21 RX ADMIN — ACETAMINOPHEN 975 MG: 325 TABLET ORAL at 07:00

## 2018-03-21 RX ADMIN — MULTIPLE VITAMINS W/ MINERALS TAB 1 TABLET: TAB at 08:00

## 2018-03-21 RX ADMIN — POLYETHYLENE GLYCOL 3350 17 G: 17 POWDER, FOR SOLUTION ORAL at 07:59

## 2018-03-21 RX ADMIN — SENNOSIDES AND DOCUSATE SODIUM 1 TABLET: 8.6; 5 TABLET ORAL at 08:00

## 2018-03-21 RX ADMIN — MIDODRINE HYDROCHLORIDE 2.5 MG: 2.5 TABLET ORAL at 07:00

## 2018-03-21 RX ADMIN — GABAPENTIN 400 MG: 300 CAPSULE ORAL at 07:00

## 2018-03-21 NOTE — PLAN OF CARE
Problem: Goal/Outcome  Goal: Goal Outcome Summary  FOCUS/GOAL  Bowel management, Wound care management and Medical management    ASSESSMENT, INTERVENTIONS AND CONTINUING PLAN FOR GOAL:  Pt alert and oriented, VSS, slight drop in BP noted with position changes. No complaints of pain. Pt tollerating sitting upright on BSC after sitting upright in bed for extended period of time. BP checked when sitting upright and down to low 100's systolically, pt asymptomatic. PT had med size soft BM on BSC. Pt incont in brief before transfer using liko lift. Dressing on back incision changed. Incision is well approximated with scant drainage. Pt requesting to be repositioned at night if he doesn't call, will pass on to night shift.

## 2018-03-21 NOTE — PROGRESS NOTES
PM&R Daily Note:    Formal team rounds held today, please see separate document in Plan of Care tab for full details. Briefly  Derek is started to have improvements with orthostatic hypotension - still some challenges.  Initial obstipation has improved though still optimization of bowel program in process.  Unfortunately still no motor or sensory return in lower extremities.  He is choosing to continue his inpatient rehab through [facility] and transportation will be set up for tomorrow.    For bowels, had another large soft BM last night with suppository.  Additional though slight early afternoon today.  We will continue with scheduled bisacodyl suppository and dig stim.  Start backing off MiraLAX now once daily.  Also senna-docusate  from 3 down to 1-2 BID. Pause on psyllium with these changes but likely that will be helpful.    Orthostatic hypotension a bit worse today, continue with Midrin 2.5 twice daily and if still lower, would titrate that up.  Continue abdominal binder, knee-high compression stockings and Ace wraps on thighs for all up and out of bed activity.    This blood pressure impacts safety with being upright for wheelchair transport outlined for tomorrow.  While he might be feeling well, given the variability he has had so far, would not want him to be orthostatic mid wheelchair transport -that would not be safe.  He is wanting to avoid stretcher transport understandably so.  Will work with our therapy and his son tomorrow for a car transfer which should be safe with assistance.  At [facility] and at [facility] will need wheelchair and slide board.     Pain slightly up in abdomen, still likely multifactorial.  He is wanting to avoid opioid which is weakly good.  Continue gabapentin 400 mg 3 times daily, scheduled acetaminophen.    Anticoagulation continues clopidogrel and aspirin.  Other cardiac: statin held with nausea recently and metoprolol held with hypotension.  Consider restarting these  in future when his other symptomatology settles down  Left hand thrombophlebitis almost resolved      Vitals:    03/20/18 0825 03/20/18 0936 03/20/18 0940 03/20/18 1709   BP: 122/71 104/64 98/53 117/68   BP Location: Right arm Right arm Right arm Right arm   Pulse:    78   Resp: 16   16   Temp: 97.9  F (36.6  C)   97.1  F (36.2  C)   TempSrc: Oral   Oral   SpO2: 97%   100%   Weight:       Height:         70 minutes spent today, 35 in direct patient interaction, remainder in team rounds and coordination of care.

## 2018-03-21 NOTE — DISCHARGE INSTRUCTIONS
Follow up appointments:     -- Follow-up with Neuro-Interventional team in about one month (4/13/18) for spinal angiogram and assessment for arterio-venous malformation.  They will follow up with you to schedule your appointment.    -- Follow-up with Cardiology Dr. Anoop Jane in about 4 weeks in regards to recent cardiac arrest and stent placement.  Please call to schedule your appointment with Dr. Jane.  Address  [facility], Cardiology                          [address] [place] [address]; [address], [address]                          [place], [place]   Phone   [phone]    -- You may reach the Neurosurgery clinic at [phone] during regular work hours. ER at [phone].    and ask for the Neurosurgery Resident on call at [phone], during off hours or weekends.

## 2018-03-21 NOTE — PLAN OF CARE
Problem: Goal/Outcome  Goal: Goal Outcome Summary  Outcome: No Change  FOCUS/GOAL  Bladder management, Wound care management, Discharge planning and Skin integrity    ASSESSMENT, INTERVENTIONS AND CONTINUING PLAN FOR GOAL:  A&O x4. Denies pain. Pt Sandhu cath patent and draining. 400 mL dark jovanni urine emptied this shift. Back incision dressing changed. Scant drainage from incision which is well approximated. New premapore dressing applied to wound on left hand. Pt discharged.

## 2018-03-21 NOTE — DISCHARGE SUMMARY
[facility] [place]   Discharge Summary     Date Admitted: 3/14/2018  Date Discharge: 3/21/2018  Discharge Disposition: [facility]    Discharge Diagnosis:   1. Paraplegia following spinal subdural hematoma, T10 CHONG A  2. Neurogenic bladder and bowel  3. Neurogenic orthostatic hypotension  4. Recent cardiac arrest with LAD stent  5. Pain likely mixed postoperative, neuropathic and abdominal/visceral.  6. Adjustment disorder NOS  7. Left hand/wrist localized thrombophlebitis, course of Keflex and topical bacitracin - near resolved.    Brief History of Presenting Illness and Hospital Course:  This is a 57 year old male with known coronary artery disease who had cardiac arrest admitted to [facility] 3/9/18 where he underwent cardiac stent along with intra-aortic balloon pump management.  Postop day 1 developed back pain then loss of sensation and movement in his lower extremities.  Identified spinal subdural hematoma and transferred emergently to [facility].  He underwent T9-T12 decompressive laminectomy and evacuation of subdural hematoma.  He unfortunately persists with complete paraplegia.  After medical stabilization he transferred to [facility] rehabilitation [place] 3/14 for comprehensive cares.  While on inpatient acute rehab he had obstipation with only minimal results on scheduled bowel program with suppository, dig stim and oral softeners.  Sequentially increased oral meds, fleet enema then finally results with magnesium citrate and pink lady enema.  Have gradually back down on oral meds to allow for more thorough clearing of obstipation.  Further optimization in process.  Neurogenic bladder likely, still with indwelling Sandhu catheter.  Will need trial of void and likely education on intermittent catheterization.  Orthostatic hypotension particularly limiting initially.  With his cardiac arrest  ideally was on beta-blocker though with hypotension, metoprolol placed on hold.  Persisting low pressures thus added Midrin low-dose 2.5 mg in a.m. and midday before therapies.  Thus far tolerating without bradycardia arrhythmia.  Potentially this alpha agonist may impact bladder emptying with eventual Sandhu removal and trial of void.  Consider restarting low-dose beta-blocker for cardiac remodeling purposes if pressures tolerate.  Moderate though variable pain through abdomen / torso.  Etiology likely multifactorial.  Component seems linked with state of bowels and likely reflects visceral/not well localized pain.  Components of neuropathic and postsurgical also in the mix.  He is avoiding opioids more recently with bowel obstipation.  Have started gabapentin titrated to 400 mg 3 times daily.  Has scheduled acetaminophen.  Other medical domains: He has been on aspirin through acute rehab stay, started clopidogrel 75 mg daily on 3/19 given recent cardiac stent, ideally continues dual platelet therapy but latter on hold briefly with spinal hematoma.  Ideally will be back on statin medication, patient wanted to hold until nausea and GI tract symptomatology normalizes.  Minor left hand wrist thrombophlebitis from earlier IV, stopped course of Keflex after 6 days when nausea occurred.  Functionally at discharge, thus far there is been no motor or sensory return in lower extremities.  No elevated tone. When pressures are not too low, he has done well up in manual wheelchair.  Working on transfers but not yet independent.   Disposition: Derek is choosing to pursue ongoing acute rehab through [facility] site.  Was approved and transferring 3/20 for ongoing cares.    Pertinent Latest Lab Results:     Discharge Medications:   Zoe Derek A   Home Medication Instructions LULY:[id number]    Printed on:03/20/18 5053   Medication Information                      acetaminophen (TYLENOL) 325 MG tablet  Take 3 tablets (853  "mg) by mouth 3 times daily             aspirin 81 MG tablet  Take 1 tablet (81 mg) by mouth daily             bisacodyl (DULCOLAX) 10 MG Suppository  Place 1 suppository (10 mg) rectally daily As part of scheduled bowel program.             clopidogrel (PLAVIX) 75 MG tablet  Take 1 tablet (75 mg) by mouth daily             gabapentin (NEURONTIN) 400 MG capsule  Take 1 capsule (400 mg) by mouth 3 times daily             midodrine (PROAMATINE) 2.5 MG tablet  Take 1 tablet (2.5 mg) by mouth 2 times daily Give before morning and before afternoon therapies start for orthostatic hypotension.             Multiple Vitamins-Minerals (MULTIVITAMIN MEN PO)  Take 1 tablet by mouth daily              oxyCODONE IR (ROXICODONE) 5 MG tablet  Take 1 tablet (5 mg) by mouth every 6 hours as needed for breakthrough pain or other (pain control or improvement in physical function. Hold dose for analgesic side effects.)             polyethylene glycol (MIRALAX/GLYCOLAX) Packet  Take 17 g by mouth daily             senna-docusate (SENOKOT-S;PERICOLACE) 8.6-50 MG per tablet  Take 1-2 tablets by mouth 2 times daily Hold for loose stools             sodium phosphate (FLEET ENEMA) 7-19 GM/118ML rectal enema  Place 1 Bottle (1 enema) rectally daily as needed for constipation (second line if supp and dig stim inneffective)             vitamin B complex with vitamin C (VITAMIN  B COMPLEX) TABS tablet  Take 1 tablet by mouth daily                   Physical Examination:   /80 (BP Location: Left arm)  Pulse 78  Temp 97.4  F (36.3  C) (Oral)  Resp 17  Ht 1.803 m (5' 11\")  Wt 78 kg (172 lb)  SpO2 100%  BMI 23.99 kg/m2   Alert pleasant, no diaphoresis, fluent speech-language, intact cognition.  Heart regular, lungs clear  Abdomen hyperactive bowel sounds, nontender  No lower extremity motor movements  Sensory level intact T10, partial sensation T11, no sensation more distal dermatomes.  Low tone lower extremities.    Discharge " Instructions:    Active Diet Order      Combination Diet Regular Diet Adult      Advance Diet as Tolerated   Activity: Avoid more significant bending twisting through lumbar spine but no brace is needed.  Okay to use arms for transferring, pushing manual chair etc.  Continue bowel program scheduled evening suppository followed by dig stim.  Fleet enema second line if little to no results.  Indwelling Sandhu catheter for now though trial removal per PM&R team.  Back incision may be covered with dry gauze, sutures to be removed approximately 3/23 if incision still looks good.  Report any concerns for infection to [facility] neurosurgery.    Follow up Appointments:  Follow-up with Neuro-Interventional team Dr. Branden [email or web address]. in about one month (4/13/18) for spinal angiogram and assessment for arterio-venous malformation.     Follow-up with Cardiology Dr. Anoop Jane in about 4 weeks in regards to recent cardiac arrest and stent placement     Total Discharge time spent is > 30 minutes.

## 2018-03-21 NOTE — PLAN OF CARE
Problem: Goal/Outcome  Goal: Goal Outcome Summary  Physical Therapy Discharge Summary    Reason for therapy discharge:    Discharged to acute rehabilitation facility.    Progress towards therapy goal(s). See goals on Care Plan in [facility] electronic health record for goal details.  Goals partially met.  Barriers to achieving goals:   discharge from facility.    Therapy recommendation(s):    Continued therapy is recommended.  Rationale/Recommendations:  [facility]. While on ARU he has been progressing toward IND with slide board transfers. Using K5 ultralight w/c..

## 2018-03-21 NOTE — PLAN OF CARE
Problem: Goal/Outcome  Goal: Goal Outcome Summary  Outcome: Improving  FOCUS/GOAL  Medical management    ASSESSMENT, INTERVENTIONS AND CONTINUING PLAN FOR GOAL:  Patient slept poorly due to restlessness, sore neck and back. He does not have any PRN medication for the pain, his next pain medication are due at 0700. Offered Oxycodone, he declined. He called to be  repositioned  5 times so far, with assistance of one. He had difficulties finding a comfortable position. At some point, he asked to be repositioned in prone, he was there for 15 seconds, could  not tolerate, he was repositioned back to supine. He directs his bed mobility, pull him slowly, tells staff where and how to place pillows. He can boost himself up with SBA. He has a toussaint. Plan to d/c to [facility] today,  at 11:20.

## 2018-03-21 NOTE — PLAN OF CARE
Problem: Patient Care Overview  Goal: Plan of Care/Patient Progress Review  Occupational Therapy Discharge Summary    Reason for therapy discharge:    Discharged to acute rehabilitation facility.    Progress towards therapy goal(s). See goals on Care Plan in [facility] electronic health record for goal details.  Goals partially met.  Barriers to achieving goals:   discharge from facility.    Therapy recommendation(s):    Continued therapy is recommended.  Rationale/Recommendations:  Patient d/c'ed to [facility], pt to continue to progress with w/c based ADL goals, increasing independence with modified ADLs/ IADLs and functional transfers with use of slide board. .

## 2018-03-22 ENCOUNTER — TELEPHONE (OUTPATIENT)
Dept: NEUROSURGERY | Facility: CLINIC | Age: 58
End: 2018-03-22

## 2018-03-22 ENCOUNTER — TELEPHONE (OUTPATIENT)
Dept: NEUROLOGY | Facility: CLINIC | Age: 58
End: 2018-03-22

## 2018-03-22 DIAGNOSIS — G95.19 SPINAL CORD HEMORRHAGE (H): Primary | ICD-10-CM

## 2018-03-22 NOTE — TELEPHONE ENCOUNTER
JUANA Moe, [facility], wanted to know if they can start pt on subcutaneous lovenox or heparin. Pt is s/p  Emergent T9 to T12 laminectomy and evacuation of subdural hematoma on 3/9/18. Per Dr. Joe, pt should have started Plavix 10 days post op and JUANA Moe confirmed that he did. Dr. Joe also indicates pt may start lovenox or heparin 2 weeks post op. No further questions.

## 2018-03-22 NOTE — TELEPHONE ENCOUNTER
----- Message from Jarrell Ortega sent at 3/21/2018 10:19 AM CDT -----  Regarding: angiogram, hospital fu  Hi Marshall, pt has DC orders to have an angiogram w neuro IR. Can you help arrange this? Pt is being DCd from  acute rehab to [facility] inpatient acute rehab. Can you please follow up with Rea from [place] at [phone].    Thanks!  Jarrell

## 2018-03-22 NOTE — TELEPHONE ENCOUNTER
Patient due for spinal angio ~4/11/18 with Dr. Clark. [facility] for Rea to return call.

## 2018-03-22 NOTE — TELEPHONE ENCOUNTER
Donita Moe NP calling from Rehab asking if patient should be started on anticoagulation for prevention of DVT.      3/9/2018  HUNT  1.  Emergent T9 to T12 laminectomy and evacuation of subdural hematoma  Hx:   3/8: Cardiac angio with stenting of LAD  3/9: transferred from OSH for emergent evacuation of subdural hematoma with spinal cord compression      Discharge Instruction  Ok to restart Plavix 3/23/2018.  Patient will follow up with Neuro-Interventional team in one month for spinal angiogram to assess for spinal AVM.      Please advise Thanks  Dalila Tom

## 2018-03-23 NOTE — TELEPHONE ENCOUNTER
Spoke with SHEBA Davis, at [facility] acute rehab. Patients estimated LOS is 4-6 weeks, however he has not had initial assessment yet. Susan states they typically do not send patients out for procedures that may be done at their facility. Susan will consult with patients attending, Dr. Olson, who is currently out and get back to me on 4/2.

## 2018-03-27 ENCOUNTER — TELEPHONE (OUTPATIENT)
Dept: CARDIOLOGY | Facility: CLINIC | Age: 58
End: 2018-03-27

## 2018-03-27 NOTE — TELEPHONE ENCOUNTER
----- Message from Leesa Sandhu sent at 3/26/2018  4:11 PM CDT -----  Regarding: FW: Dr. Jane, Return General, Request to be seen after 4/18       ----- Message -----     From: Yolanda Jose     Sent: 3/26/2018   3:41 PM       To: [facility]  Subject: Dr. Jane, Return General, Request to be se#    Hi Team,    Type of call: Other     Other: Push appt. Back   Notes: Susan from [facility], called to see if patient absolutely needs to have his appt. For 4/3. Patient is currenlty in the care of [facility] and receiving around the clock care. Susan says they have Cardiologist on call if they need one right away. She wonders if the 4/3 can be pushed back to after 4/18 when patient will be discharged.   Patient call back number: Susan at Phone: [phone]     Kind Regards    Yolanda     Please DO NOT send this message and/or reply back to sender. Call Center Representatives DO NOT respond to messages.

## 2018-03-27 NOTE — TELEPHONE ENCOUNTER
Called Susan and left message for her. Cancelling 4/3 appt with Pamela Vargas NP, will reschedule when patient is discharged from [facility].

## 2018-03-29 ENCOUNTER — TRANSFERRED RECORDS (OUTPATIENT)
Dept: HEALTH INFORMATION MANAGEMENT | Facility: CLINIC | Age: 58
End: 2018-03-29

## 2018-03-30 ENCOUNTER — TRANSFERRED RECORDS (OUTPATIENT)
Dept: HEALTH INFORMATION MANAGEMENT | Facility: CLINIC | Age: 58
End: 2018-03-30

## 2018-03-30 ENCOUNTER — OFFICE VISIT (OUTPATIENT)
Dept: NEUROSURGERY | Facility: CLINIC | Age: 58
End: 2018-03-30
Payer: COMMERCIAL

## 2018-03-30 VITALS
WEIGHT: 172 LBS | DIASTOLIC BLOOD PRESSURE: 76 MMHG | HEART RATE: 76 BPM | SYSTOLIC BLOOD PRESSURE: 114 MMHG | BODY MASS INDEX: 24.08 KG/M2 | HEIGHT: 71 IN

## 2018-03-30 DIAGNOSIS — Z98.890 POST-OPERATIVE STATE: Primary | ICD-10-CM

## 2018-03-30 DIAGNOSIS — S06.5XAA SUBDURAL HEMATOMA (H): ICD-10-CM

## 2018-03-30 DIAGNOSIS — G95.20 SPINAL CORD COMPRESSION (H): ICD-10-CM

## 2018-03-30 NOTE — LETTER
3/30/2018       RE: Derek Enriquez  [address]  [facility] [address]     Dear Colleague,    Thank you for referring your patient, Derek Enriquez, to the [facility] NEUROSURGERY at [facility]. Please see a copy of my visit note below.      Neurosurgery clinic post op wound check  Date of visit: 3/30/2018      Assessment/Plan:  1. Post-operative state    2. Spinal cord compression (H)    3. Subdural hematoma (H)      Derek Enriquez is doing pretty well 3 weeks after thoracic decompression for spinal hematoma. His surgical recovery has gone without a hitch with the exception of this little wound problem he has.  Neurologically he is unchanged, there is not the slightest difference in his exam.  However I assured him we are still very early on in the recovery process.    We discussed the spinal cord problem he had, and that how much recovery will be achieved cannot be predicted.  It will be 12-18 months before we will know how much.   in the meantime continuing his therapies is very important and he is fully engaged in all of that.     He still needs a FU angio to assess for spinal AVM. There was some question that since he is an [facility] patient that they wanted his imaging to be done there. The patient and I discussed this today. I believe we are in agreement that the [facility] team will do all NS interventions and our neuro intervention team will do all imaging required, even though he is in [facility] currently.   The Angio needs to be done around a month postop, that should be, we hope, sometime next week.  I sent an email to our neuro-interventional Marshall Ferreira to let her know we can move forward with scheduling.  She'll be back in the office on 4/2.    He developed pain and bleeding at the surgical site after starting heparin for DVT prophylaxis this week, and presents for that.  The wound is quite benign but for the small hematoma distally which I drained. He also has a little bit of  a pressure sore at the top of the incision and I recommend a Mepilex dressing to deal with both of those issues. Clearly he needs DVT prophylaxis but I wonder if a dose adjustment would be reasonable, or perhaps a choosing mechanical prophylaxis for just now. I will send this question back over to his current care team so they can discuss it.    He may have further bleeding superficially, but I don't anticipate any deep bleeding. If however there is deep and sustained pain in the back that I would recommend he be imaged, I believe a CT scan would suffice, but I would leave it to the evaluating team to make the final call and what imaging to perform should he start having significant pain.    We will see him again in about 3 weeks with no other imaging needed at that time. He can be scheduled in my clinic for that routine follow-up.    All his questions have been answered and they demonstrate good understanding of the above.  He has our contact information and is aware that he should call if he has questions comments or concerns.     We appreciate the opportunity to be of service in the care of this pleasant patient.  Please do call if there is anything more we can do    Lilli Contreras PA-C  [facility]  Department of Neurosurgery  Phone: [phone]  Fax: [phone]          Procedure:  03/09/2018 Akhil Reese   DIAGNOSIS:  Subdural hematoma in thoracic spine.   Recent MI status post cardiac stent and antiplatelet therapy.      PROCEDURES PERFORMED:     1.  Emergent T9 to T12 laminectomy and evacuation of subdural hematoma.   2.  Intraoperative use of microscope, intraoperative C-arm, intraoperative use of ultrasound.       INDICATIONS FOR PROCEDURE:  Mr. Enriquez is a 57-year-old gentleman who presented to [facility] with MI. He underwent cardiac stenting. After the procedure, he noted to have lower extremity deficits. His spine MRI showed subdural lesion in his thoracic spine, with  spinal cord compression.  He was then transferred to the [facility] for further evaluation and management. Given these findings, he and his family consented for above-mentioned procedure after risks, benefits and alternative treatments were explained to them.    HPI:  Inpatient:   Daily events:   3/8: Cardiac angio with stenting of LAD  3/9: transferred from OSH for emergent evacuation of subdural hematoma with spinal cord compression    3/11: angiogram as above. Levophed used intra-op to keep MAPs > 70  3/12: Amaris Removed  3/13:  Transferred to general neurosurgical floor, awaiting placement   3/14:  Low grade temperature overnight, UA checked and negative.  WBC normal.  Temperature had normalized prior to discharge to ARU.        Imaging:  3/9: MRI of T-spine: subdural hematoma, possible AVM at ~T10-12  3/13:  Lumbar MRI: stable   3/13:  Thoracic MRI:  Post surgical changes in the form of subdural hematoma evacuation and decompression.  Edema noted within the cord with signal changes suggesting infarction.       Examination:  A0x3, CN II-XII intact, strength 5/5 in BUE, 0/5 in lower extremities, L2 sensory level (slightly improved), no rectal tone      Assessment/Plan of care: 56 yo male with subdural hematoma and thoracic spinal cord compression, possible secondary to ruptured spinal AVM following PCI for LAD stenosis.  POD#4 s/p decompression of spinal hematoma. He underwent spinal angiogram on 3/11 with no findings. Doing well post-op.  Ok to restart Plavix 3/23/2018.    Patient will follow up with Neuro-Interventional team in one month for spinal angiogram to assess for spinal AVM. (4/13/18)   Follow-up with Lilli Contreras PA-C in 6 weeks.   Suture removal 3/23/18 in rehabilitation   Follow-up with Cardiology in 3-4 weeks in regards to recent cardiac arrest and stent placement     Outpatient:   He is now 3 weeks post op.      His surgical recovery has gone without a hitch  with the exception of a little wound problem he has.  Neurologically he is unchanged He still needs a FU angio to assess for spinal AVM. There was some question that since he is an [facility] patient that they wanted his imaging to be done there.  He developed pain and bleeding at the surgical site after starting heparin for DVT prophylaxis this week, and presents for that.           Current Outpatient Prescriptions:      oxyCODONE IR (ROXICODONE) 5 MG tablet, Take 1 tablet (5 mg) by mouth every 6 hours as needed for breakthrough pain or other (pain control or improvement in physical function. Hold dose for analgesic side effects.), Disp: 50 tablet, Rfl: 0     acetaminophen (TYLENOL) 325 MG tablet, Take 3 tablets (975 mg) by mouth 3 times daily, Disp: , Rfl:      gabapentin (NEURONTIN) 400 MG capsule, Take 1 capsule (400 mg) by mouth 3 times daily, Disp: 90 capsule, Rfl:      bisacodyl (DULCOLAX) 10 MG Suppository, Place 1 suppository (10 mg) rectally daily As part of scheduled bowel program., Disp: 30 suppository, Rfl:      senna-docusate (SENOKOT-S;PERICOLACE) 8.6-50 MG per tablet, Take 1-2 tablets by mouth 2 times daily Hold for loose stools, Disp: 100 tablet, Rfl:      sodium phosphate (FLEET ENEMA) 7-19 GM/118ML rectal enema, Place 1 Bottle (1 enema) rectally daily as needed for constipation (second line if supp and dig stim inneffective), Disp: 1 Bottle, Rfl: 0     clopidogrel (PLAVIX) 75 MG tablet, Take 1 tablet (75 mg) by mouth daily, Disp: 30 tablet, Rfl:      midodrine (PROAMATINE) 2.5 MG tablet, Take 1 tablet (2.5 mg) by mouth 2 times daily Give before morning and before afternoon therapies start for orthostatic hypotension., Disp: , Rfl:      polyethylene glycol (MIRALAX/GLYCOLAX) Packet, Take 17 g by mouth daily, Disp: 7 packet, Rfl:      aspirin 81 MG tablet, Take 1 tablet (81 mg) by mouth daily, Disp: 90 tablet, Rfl: 3     vitamin B complex with vitamin C (VITAMIN  B COMPLEX) TABS tablet, Take 1 tablet by  "mouth daily, Disp: , Rfl:      Multiple Vitamins-Minerals (MULTIVITAMIN MEN PO), Take 1 tablet by mouth daily , Disp: , Rfl:   No Known Allergies  PMH, SOC HIST, FAM HIST, PROBLEM LIST:  All reviewed in EPIC.    OBJECTIVE:  /76  Pulse 76  Ht 1.803 m (5' 11\")  Wt 78 kg (172 lb)  BMI 23.99 kg/m2    Imaging:  None new.    EXAM:  Well developed well nourished very thin male found lying comfortably on exam room table.  No apparent distress. He is accompanied by girlfriend and male friend.  A&O X3.  Mood and affect WNL. Language and fund of knowledge intact.    He has a nicely healing incision for the most part, sutures have been removed.  The upper third of the incision has a small area of what appears to be denuded skin, probably an early pressure sore. Approximately 2 cm long. No redness or evidence of infection. Distally there is an area slightly prominent, fluctuant under the skin, and leaking dark brown bloody partially organized clot through a very small opening, approximately 3-4 mm in size. There is no gaping dehiscence.  I cleansed the wound with ChloraPrep and gently explored the area. I was able to express the remainder of the superficial clot until the wound was perfectly flat and no more clot extruded. I covered the incision with iodine impregnated Vaseline gauze, and a border-gauze dressing to protect the wound from pressure in this very thin patient.    Exam at discharge: No lower extremity motor movements Sensory level intact T10, partial sensation T11, no sensation more distal dermatomes.  Low tone lower extremities.  Exam today:  No change.      Assessment/Plan:  See beginning of note:     This note was generated using voice recognition software. While edited for content some inaccurate phrasing may be found.    Again, thank you for allowing me to participate in the care of your patient.      Sincerely,    Lilli Contreras PA-C      "

## 2018-03-30 NOTE — LETTER
Neurosurgery clinic post op wound check  Date of visit: 3/30/2018      Assessment/Plan:  1. Post-operative state    2. Spinal cord compression (H)    3. Subdural hematoma (H)      Derek Enriquez is doing well 3 weeks after thoracic decompression for spinal hematoma. Still needs a FU angio to assess for spinal AVM.   His neuro status is unchanged but he developed pain and bleeding at the surgical site after starting heparin for DVT proph this week, and presents for that.    Wound looked pretty good  But a small hematoma distally.   I drained it.   Clearly he needs DVT proph. but consider dose adjustment or other, perhaps mechanical proph?    Small area of pressure ulcer at top of incision.  Pt very slender.  rec mepilex dressing.  We didn't have any here so applied alternative    Rec. Change dressing daily and prn for bleeding    May bleed a bit again but don't anticipate deep bleeding.  If deep and sustained pain in the back then I'd rec imaging, CT scan would suffice for early detection of issues    Regarding his neurosurgical issues the patient and I are in agreement that this team here will do all NS interventions and our neuro intervention team will do all imaging required, even though pt is in [facility] currently.        Lilli Contreras PA-C  [facility]  Department of Neurosurgery  Phone: [phone]  Fax: [phone]

## 2018-03-30 NOTE — MR AVS SNAPSHOT
After Visit Summary   3/30/2018    Derek Enriquez    MRN: [id number]           Patient Information     Date Of Birth          1960        Visit Information        Provider Department      3/30/2018 12:00 PM Lilli Contreras PA-C [facility] Neurosurgery        Today's Diagnoses     Post-operative state    -  1    Spinal cord compression (H)        Subdural hematoma (H)           Follow-ups after your visit        Your next 10 appointments already scheduled     Apr 27, 2018  1:00 PM CDT   (Arrive by 12:45 PM)   Return Visit with NBA Quintero [facility] Neurosurgery ([facility])    [address]  3rd Floor  [place] [id number] [phone]            May 02, 2018  1:00 PM CDT   (Arrive by 12:45 PM)   RETURN LIPID VISIT with Anoop Jane MD   [facility] ([facility])    [address]  [address]  [place] [address] [phone]              Who to contact     Please call your clinic at [phone] to:    Ask questions about your health    Make or cancel appointments    Discuss your medicines    Learn about your test results    Speak to your doctor            Additional Information About Your Visit        [employer] Information     [employer] gives you secure access to your electronic health record. If you see a primary care provider, you can also send messages to your care team and make appointments. If you have questions, please call your primary care clinic.  If you do not have a primary care provider, please call [phone] and they will assist you.      [employer] is an electronic gateway that provides easy, online access to your medical records. With [employer], you can request a clinic appointment, read your test results, renew a prescription or communicate with your care team.     To access your existing account, please contact your [facility] or call [phone] for  "assistance.        Care EveryWhere ID     This is your Care EveryWhere ID. This could be used by other organizations to access your [place] medical records  [id number]        Your Vitals Were     Pulse Height BMI (Body Mass Index)             76 1.803 m (5' 11\") 23.99 kg/m2          Blood Pressure from Last 3 Encounters:   03/30/18 114/76   03/21/18 119/80   03/14/18 124/69    Weight from Last 3 Encounters:   03/30/18 78 kg (172 lb)   03/14/18 78 kg (172 lb)   03/13/18 80.4 kg (177 lb 4 oz)              Today, you had the following     No orders found for display       Primary Care Provider Office Phone # Fax #    Eugene Burrell [phone]       [facility] [address] [address]        Equal Access to Services     [facility]: Hadii aad ku hadasho Soomaali, waaxda luqadaha, qaybta kaalmada adeegyada, minal mcfadden hayjuliano sales . So [facility] [phone].    ATENCIÓN: Si habla español, tiene a humphrey disposición servicios gratuitos de asistencia lingüística. George al [phone].    We comply with applicable federal civil rights laws and [place] laws. We do not discriminate on the basis of race, color, national origin, age, disability, sex, sexual orientation, or gender identity.            Thank you!     Thank you for choosing [facility]  for your care. Our goal is always to provide you with excellent care. Hearing back from our patients is one way we can continue to improve our services. Please take a few minutes to complete the written survey that you may receive in the mail after your visit with us. Thank you!             Your Updated Medication List - Protect others around you: Learn how to safely use, store and throw away your medicines at www.disposemymeds.org.          This list is accurate as of 3/30/18  1:27 PM.  Always use your most recent med list.                   Brand Name Dispense Instructions for use Diagnosis    " acetaminophen 325 MG tablet    TYLENOL     Take 3 tablets (975 mg) by mouth 3 times daily    S/P laminectomy       aspirin 81 MG tablet     90 tablet    Take 1 tablet (81 mg) by mouth daily    Unstable angina (H)       bisacodyl 10 MG Suppository    DULCOLAX    30 suppository    Place 1 suppository (10 mg) rectally daily As part of scheduled bowel program.    Neurogenic bowel       clopidogrel 75 MG tablet    PLAVIX    30 tablet    Take 1 tablet (75 mg) by mouth daily    H/O heart artery stent       gabapentin 400 MG capsule    NEURONTIN    90 capsule    Take 1 capsule (400 mg) by mouth 3 times daily    Neuropathic pain       midodrine 2.5 MG tablet    PROAMATINE     Take 1 tablet (2.5 mg) by mouth 2 times daily Give before morning and before afternoon therapies start for orthostatic hypotension.    Neurogenic orthostatic hypotension (H)       MULTIVITAMIN MEN PO      Take 1 tablet by mouth daily        oxyCODONE IR 5 MG tablet    ROXICODONE    50 tablet    Take 1 tablet (5 mg) by mouth every 6 hours as needed for breakthrough pain or other (pain control or improvement in physical function. Hold dose for analgesic side effects.)    S/P laminectomy       polyethylene glycol Packet    MIRALAX/GLYCOLAX    7 packet    Take 17 g by mouth daily        senna-docusate 8.6-50 MG per tablet    SENOKOT-S;PERICOLACE    100 tablet    Take 1-2 tablets by mouth 2 times daily Hold for loose stools    Neurogenic bowel       sodium phosphate 7-19 GM/118ML rectal enema     1 Bottle    Place 1 Bottle (1 enema) rectally daily as needed for constipation (second line if supp and dig stim inneffective)    Neurogenic bowel       vitamin B complex with vitamin C Tabs tablet      Take 1 tablet by mouth daily

## 2018-03-30 NOTE — PROGRESS NOTES
Neurosurgery clinic post op wound check  Date of visit: 3/30/2018      Assessment/Plan:  1. Post-operative state    2. Spinal cord compression (H)    3. Subdural hematoma (H)      Derek Enriquez is doing pretty well 3 weeks after thoracic decompression for spinal hematoma. His surgical recovery has gone without a hitch with the exception of this little wound problem he has.  Neurologically he is unchanged, there is not the slightest difference in his exam.  However I assured him we are still very early on in the recovery process.    We discussed the spinal cord problem he had, and that how much recovery will be achieved cannot be predicted.  It will be 12-18 months before we will know how much.  in the meantime continuing his therapies is very important and he is fully engaged in all of that.     He still needs a FU angio to assess for spinal AVM. There was some question that since he is an [facility] patient that they wanted his imaging to be done there. The patient and I discussed this today. I believe we are in agreement that the University team will do all NS interventions and our neuro intervention team will do all imaging required, even though he is in [facility] currently.   The Angio needs to be done around a month postop, that should be, we hope, sometime next week.  I sent an email to our neuro-interventional Marshall Ferreira to let her know we can move forward with scheduling.  She'll be back in the office on 4/2.    He developed pain and bleeding at the surgical site after starting heparin for DVT prophylaxis this week, and presents for that.  The wound is quite benign but for the small hematoma distally which I drained. He also has a little bit of a pressure sore at the top of the incision and I recommend a Mepilex dressing to deal with both of those issues. Clearly he needs DVT prophylaxis but I wonder if a dose adjustment would be reasonable, or perhaps a choosing mechanical prophylaxis for just now. I will send  this question back over to his current care team so they can discuss it.    He may have further bleeding superficially, but I don't anticipate any deep bleeding. If however there is deep and sustained pain in the back that I would recommend he be imaged, I believe a CT scan would suffice, but I would leave it to the evaluating team to make the final call and what imaging to perform should he start having significant pain.    We will see him again in about 3 weeks with no other imaging needed at that time. He can be scheduled in my clinic for that routine follow-up.    All his questions have been answered and they demonstrate good understanding of the above.  He has our contact information and is aware that he should call if he has questions comments or concerns.     We appreciate the opportunity to be of service in the care of this pleasant patient.  Please do call if there is anything more we can do    Lilli Contreras PA-C  [facility]  Department of Neurosurgery  Phone: [phone]  Fax: [phone]          Procedure:  03/09/2018 Akhil Reese   DIAGNOSIS:  Subdural hematoma in thoracic spine.   Recent MI status post cardiac stent and antiplatelet therapy.      PROCEDURES PERFORMED:     1.  Emergent T9 to T12 laminectomy and evacuation of subdural hematoma.   2.  Intraoperative use of microscope, intraoperative C-arm, intraoperative use of ultrasound.       INDICATIONS FOR PROCEDURE:  Mr. Enriquez is a 57-year-old gentleman who presented to [facility] with MI. He underwent cardiac stenting. After the procedure, he noted to have lower extremity deficits. His spine MRI showed subdural lesion in his thoracic spine, with spinal cord compression.  He was then transferred to the [facility] for further evaluation and management. Given these findings, he and his family consented for above-mentioned procedure after risks, benefits and alternative treatments were  explained to them.    HPI:  Inpatient:   Daily events:   3/8: Cardiac angio with stenting of LAD  3/9: transferred from OSH for emergent evacuation of subdural hematoma with spinal cord compression    3/11: angiogram as above. Levophed used intra-op to keep MAPs > 70  3/12: Amaris Removed  3/13:  Transferred to general neurosurgical floor, awaiting placement   3/14:  Low grade temperature overnight, UA checked and negative.  WBC normal.  Temperature had normalized prior to discharge to ARU.        Imaging:  3/9: MRI of T-spine: subdural hematoma, possible AVM at ~T10-12  3/13:  Lumbar MRI: stable   3/13:  Thoracic MRI:  Post surgical changes in the form of subdural hematoma evacuation and decompression.  Edema noted within the cord with signal changes suggesting infarction.       Examination:  A0x3, CN II-XII intact, strength 5/5 in BUE, 0/5 in lower extremities, L2 sensory level (slightly improved), no rectal tone      Assessment/Plan of care: 56 yo male with subdural hematoma and thoracic spinal cord compression, possible secondary to ruptured spinal AVM following PCI for LAD stenosis.  POD#4 s/p decompression of spinal hematoma. He underwent spinal angiogram on 3/11 with no findings. Doing well post-op.  Ok to restart Plavix 3/23/2018.    Patient will follow up with Neuro-Interventional team in one month for spinal angiogram to assess for spinal AVM. (4/13/18)   Follow-up with Lilli Contreras PA-C in 6 weeks.   Suture removal 3/23/18 in rehabilitation   Follow-up with Cardiology in 3-4 weeks in regards to recent cardiac arrest and stent placement     Outpatient:   He is now 3 weeks post op.      His surgical recovery has gone without a hitch with the exception of a little wound problem he has.  Neurologically he is unchanged He still needs a FU angio to assess for spinal AVM. There was some question that since he is an [facility] patient that they wanted his imaging to be done there.  He developed pain and bleeding  "at the surgical site after starting heparin for DVT prophylaxis this week, and presents for that.           Current Outpatient Prescriptions:      oxyCODONE IR (ROXICODONE) 5 MG tablet, Take 1 tablet (5 mg) by mouth every 6 hours as needed for breakthrough pain or other (pain control or improvement in physical function. Hold dose for analgesic side effects.), Disp: 50 tablet, Rfl: 0     acetaminophen (TYLENOL) 325 MG tablet, Take 3 tablets (975 mg) by mouth 3 times daily, Disp: , Rfl:      gabapentin (NEURONTIN) 400 MG capsule, Take 1 capsule (400 mg) by mouth 3 times daily, Disp: 90 capsule, Rfl:      bisacodyl (DULCOLAX) 10 MG Suppository, Place 1 suppository (10 mg) rectally daily As part of scheduled bowel program., Disp: 30 suppository, Rfl:      senna-docusate (SENOKOT-S;PERICOLACE) 8.6-50 MG per tablet, Take 1-2 tablets by mouth 2 times daily Hold for loose stools, Disp: 100 tablet, Rfl:      sodium phosphate (FLEET ENEMA) 7-19 GM/118ML rectal enema, Place 1 Bottle (1 enema) rectally daily as needed for constipation (second line if supp and dig stim inneffective), Disp: 1 Bottle, Rfl: 0     clopidogrel (PLAVIX) 75 MG tablet, Take 1 tablet (75 mg) by mouth daily, Disp: 30 tablet, Rfl:      midodrine (PROAMATINE) 2.5 MG tablet, Take 1 tablet (2.5 mg) by mouth 2 times daily Give before morning and before afternoon therapies start for orthostatic hypotension., Disp: , Rfl:      polyethylene glycol (MIRALAX/GLYCOLAX) Packet, Take 17 g by mouth daily, Disp: 7 packet, Rfl:      aspirin 81 MG tablet, Take 1 tablet (81 mg) by mouth daily, Disp: 90 tablet, Rfl: 3     vitamin B complex with vitamin C (VITAMIN  B COMPLEX) TABS tablet, Take 1 tablet by mouth daily, Disp: , Rfl:      Multiple Vitamins-Minerals (MULTIVITAMIN MEN PO), Take 1 tablet by mouth daily , Disp: , Rfl:   No Known Allergies  PMH, SOC HIST, FAM HIST, PROBLEM LIST:  All reviewed in EPIC.    OBJECTIVE:  /76  Pulse 76  Ht 1.803 m (5' 11\")  Wt 78 " kg (172 lb)  BMI 23.99 kg/m2    Imaging:  None new.    EXAM:  Well developed well nourished very thin male found lying comfortably on exam room table.  No apparent distress. He is accompanied by girlfriend and male friend.  A&O X3.  Mood and affect WNL. Language and fund of knowledge intact.    He has a nicely healing incision for the most part, sutures have been removed.  The upper third of the incision has a small area of what appears to be denuded skin, probably an early pressure sore. Approximately 2 cm long. No redness or evidence of infection. Distally there is an area slightly prominent, fluctuant under the skin, and leaking dark brown bloody partially organized clot through a very small opening, approximately 3-4 mm in size. There is no gaping dehiscence.  I cleansed the wound with ChloraPrep and gently explored the area. I was able to express the remainder of the superficial clot until the wound was perfectly flat and no more clot extruded. I covered the incision with iodine impregnated Vaseline gauze, and a border-gauze dressing to protect the wound from pressure in this very thin patient.    Exam at discharge: No lower extremity motor movements Sensory level intact T10, partial sensation T11, no sensation more distal dermatomes.  Low tone lower extremities.  Exam today:  No change.      Assessment/Plan:  See beginning of note:     Lilli Contreras PA-C  [facility]  Department of Neurosurgery  Phone: [phone]  Fax: [phone]      This note was generated using voice recognition software. While edited for content some inaccurate phrasing may be found.

## 2018-04-05 NOTE — TELEPHONE ENCOUNTER
Per Susan at ARU patient may be discharged early May. Per Dr. Clark ok to wait and do spinal angio following discharge. Susan informed. She will contact me in the next few weeks with a more definitive discharge date and we will get him scheduled at that time.

## 2018-04-16 ENCOUNTER — TRANSFERRED RECORDS (OUTPATIENT)
Dept: HEALTH INFORMATION MANAGEMENT | Facility: CLINIC | Age: 58
End: 2018-04-16

## 2018-04-17 ENCOUNTER — TRANSFERRED RECORDS (OUTPATIENT)
Dept: HEALTH INFORMATION MANAGEMENT | Facility: CLINIC | Age: 58
End: 2018-04-17

## 2018-04-18 ENCOUNTER — TRANSFERRED RECORDS (OUTPATIENT)
Dept: HEALTH INFORMATION MANAGEMENT | Facility: CLINIC | Age: 58
End: 2018-04-18

## 2018-04-18 DIAGNOSIS — N31.9 NEUROGENIC BLADDER: Primary | ICD-10-CM

## 2018-04-19 ENCOUNTER — TRANSFERRED RECORDS (OUTPATIENT)
Dept: HEALTH INFORMATION MANAGEMENT | Facility: CLINIC | Age: 58
End: 2018-04-19

## 2018-04-19 ENCOUNTER — PRE VISIT (OUTPATIENT)
Dept: UROLOGY | Facility: CLINIC | Age: 58
End: 2018-04-19

## 2018-04-19 NOTE — TELEPHONE ENCOUNTER
Received message from Susan that patient will be discharged on 4/20. Would like to schedule spinal angio before discharge. Orders placed. Message sent to scheduling to contact Susan to schedule. LVMM informing Susan.

## 2018-04-19 NOTE — TELEPHONE ENCOUNTER
MEDICAL RECORDS REQUEST   [facility]  Urology Clinic  [address] [place] [address]  PHONE: [phone]  Fax: [phone]        FUTURE VISIT INFORMATION                                                   Derek Enriquez, : 1960 scheduled for future visit at [facility]    APPOINTMENT INFORMATION:    Date: 2018    Provider:  LUIS EDUARDO HUNT    Reason for Visit/Diagnosis: NEUROGENIC BLADDER    REFERRAL INFORMATION:    Referring provider:  SELF    Specialty: SELF    Referring providers clinic:  SELF    Clinic contact number:  SELF    RECORDS REQUESTED FOR VISIT                                                     NOTES  STATUS/DETAILS   OFFICE NOTE from referring provider  no   OFFICE NOTE from other specialist  no   DISCHARGE SUMMARY from hospital  no   DISCHARGE REPORT from the ER  no   OPERATIVE REPORT  no   MEDICATION LIST  yes   NEUROGENIC BLADDER      CT ABDOMEN/PELVIS (IMAGES & REPORT)  in process   CYSTOGRAM (IMAGES & REPORT)  in process   IVP (IMAGES & REPORT)  in process   KUB (IMAGES & REPORT)  in process   LABS  in process   RENAL/BLADDER ULTRASOUND (IMAGES & REPORT)  in process       PRE-VISIT CHECKLIST      Record collection complete If no, please explain IN PROCESS   Appointment appropriately scheduled           (right time/right provider) Yes   Joint diagnostic appointment coordinated correctly          (ensure right order & amount of time) Yes   MyChart activation If no, please explain IN PROCESS   Questionnaire complete If no, please explain IN PROCESS     Completed by: Lennie Michelle

## 2018-04-20 ENCOUNTER — TRANSFERRED RECORDS (OUTPATIENT)
Dept: HEALTH INFORMATION MANAGEMENT | Facility: CLINIC | Age: 58
End: 2018-04-20

## 2018-04-20 NOTE — TELEPHONE ENCOUNTER
Patient is scheduled for 5/18 at 11. Per Susan patient is aware. Patient will have pre-op with primary care provider on 4/24/18. Will contact patient closer to procedure date with instructions as well.

## 2018-04-24 ENCOUNTER — TRANSFERRED RECORDS (OUTPATIENT)
Dept: HEALTH INFORMATION MANAGEMENT | Facility: CLINIC | Age: 58
End: 2018-04-24

## 2018-04-25 ENCOUNTER — TELEPHONE (OUTPATIENT)
Dept: NEUROLOGY | Facility: CLINIC | Age: 58
End: 2018-04-25

## 2018-04-25 NOTE — TELEPHONE ENCOUNTER
Patient scheduled for a spinal angiogram (under general anesthesia) on 5/23/18 at 9:00 AM. Informed patient: You will need a pre-op physical within 30 days prior to your procedure. Someone will need to drive you home from the hospital. Be sure to have someone that can stay with you through the night. Do not eat after Midnight; you may drink clear liquids (includes water, Jell-O, clear broth, apple juice or any non-carbonated beverage that you can see through) until 7:00 AM. You may take your medications with a sip of water the morning of the procedure. Please go to 3C, Surgery Check-in, on the third floor of [facility] to check in at 7:00 AM. Patient verbalized understanding. Map and instructions sent to patient.     Patient had pre-op 4/24/18.

## 2018-04-25 NOTE — PATIENT INSTRUCTIONS
You are scheduled for a spinal angiogram (under general anesthesia) on 5/23/18 at 9:00 AM. You will need a pre-op physical within 30 days prior to your procedure. Someone will need to drive you home from the hospital. Be sure to have someone that can stay with you through the night. Do not eat after Midnight; you may drink clear liquids (includes water, Jell-O, clear broth, apple juice or any non-carbonated beverage that you can see through) until 7:00 AM. You may take your medications with a sip of water the morning of the procedure. Please go to 3C, Surgery Check-in, on the third floor of [facility] to check in at 7:00 AM.    If you have any questions please contact me at [phone], option 3.    Marshall Ferreira RN, CNRN  Stroke & Endovascular Care Coordinator

## 2018-04-26 NOTE — PROGRESS NOTES
Neurosurgery clinic post op   Date of visit: 4/27/2018    Assessment/Plan:  No diagnosis found.  Derek Enriquez is doing pretty well 3 weeks after thoracic decompression for spinal hematoma. His surgical recovery has gone without a hitch with the exception of this little wound problem he has.  Neurologically he is unchanged, there is not the slightest difference in his exam.  However I assured him we are still very early on in the recovery process.  We discussed the spinal cord problem he had, and that how much recovery will be achieved cannot be predicted.  It will be 12-18 months before we will know how much.  in the meantime continuing his therapies is very important and he is fully engaged in all of that.     He still needs a FU angio to assess for spinal AVM. That is scheduled for 5/23. He should follow up with Dr. Joe thereafter and I have requested that appointment today.     He has some dependent edema so I did a light lymphedema wrap until he can acquire properly fitting compression stockings.       All his questions have been answered and they demonstrate good understanding of the above.  He has our contact information and is aware that he should call if he has questions comments or concerns.     We appreciate the opportunity to be of service in the care of this pleasant patient.  Please do call if there is anything more we can do    Lilli Contreras PA-C  [facility]  Department of Neurosurgery  Phone: [phone]  Fax: [phone]          Procedure:  03/09/2018 Akhil Reese   DIAGNOSIS:  Subdural hematoma in thoracic spine.   Recent MI status post cardiac stent and antiplatelet therapy.      PROCEDURES PERFORMED:     1.  Emergent T9 to T12 laminectomy and evacuation of subdural hematoma.   2.  Intraoperative use of microscope, intraoperative C-arm, intraoperative use of ultrasound.       INDICATIONS FOR PROCEDURE:  Mr. Enriquez is a 57-year-old gentleman who presented to [facility] with MI. He underwent cardiac stenting. After the procedure, he noted to have lower extremity deficits. His spine MRI showed subdural lesion in his thoracic spine, with spinal cord compression.  He was then transferred to the [facility] for further evaluation and management. Given these findings, he and his family consented for above-mentioned procedure after risks, benefits and alternative treatments were explained to them.  HPI:  Inpatient:   Assessment/Plan of care: 58 yo male with subdural hematoma and thoracic spinal cord compression, possible secondary to ruptured spinal AVM following PCI for LAD stenosis.  POD#4 s/p decompression of spinal hematoma. He underwent spinal angiogram on 3/11 with no findings. Doing well post-op.  Ok to restart Plavix 3/23/2018.    Patient will follow up with Neuro-Interventional team in one month for spinal angiogram to assess for spinal AVM. (4/13/18)   Follow-up with Lilli Contreras PA-C in 6 weeks.   Suture removal 3/23/18 in rehabilitation   Follow-up with Cardiology in 3-4 weeks in regards to recent cardiac arrest and stent placement   Outpatient:   He is now 6 weeks post op.      His surgical recovery has gone without a hitch with the exception of a small wound hematoma he had, that was easily drained, and has healed beautifully. Neurologically he is unchanged. He still needs a FU angio to assess for spinal AVM. That is scheduled for 5/23.  He has some dependent edema, but doesn't have properly fitting compression stockings. Apparently they are on some kind of back order so he's wearing an Ace wrap.  p  Current Outpatient Prescriptions:      ascorbic acid (VITAMIN C) 500 MG tablet, Take 500 mg by mouth 2 times daily, Disp: , Rfl:      atorvastatin (LIPITOR) 20 MG tablet, Take 20 mg by mouth At Bedtime, Disp: , Rfl:      clopidogrel (PLAVIX) 75 MG tablet, Take 1 tablet (75 mg) by mouth daily, Disp: 30 tablet, Rfl:      docusate sodium  "(COLACE) 100 MG capsule, Take 100 mg by mouth daily as needed, Disp: , Rfl:      etidronate (DIDRONEL) 200 MG tablet, 2 times daily, Disp: , Rfl:      midodrine (PROAMATINE) 2.5 MG tablet, Take 1 tablet (2.5 mg) by mouth 2 times daily Give before morning and before afternoon therapies start for orthostatic hypotension., Disp: , Rfl:      Multiple Vitamins-Minerals (MULTIVITAMIN MEN PO), Take 1 tablet by mouth daily , Disp: , Rfl:      phenylephrine-shark liver oil-mineral oil-petrolatum (PREPARATION H) 0.25-14-74.9 % rectal ointment, Place rectally daily as needed, Disp: , Rfl:      senna-docusate (SENOKOT-S;PERICOLACE) 8.6-50 MG per tablet, Take 1-2 tablets by mouth 2 times daily Hold for loose stools, Disp: 100 tablet, Rfl:      sulfamethoxazole-trimethoprim (BACTRIM DS/SEPTRA DS) 800-160 MG per tablet, Take 1 tablet by mouth 2 times daily, Disp: , Rfl:      vitamin B complex with vitamin C (VITAMIN  B COMPLEX) TABS tablet, Take 1 tablet by mouth daily, Disp: , Rfl:   No Known Allergies  PMH, SOC HIST, FAM HIST, PROBLEM LIST:  All reviewed in EPIC.    OBJECTIVE:  /63 (BP Location: Right arm, Patient Position: Chair, Cuff Size: Adult Small)  Pulse 108  Ht 1.803 m (5' 11\")    Imaging:  None new.    EXAM:  Well developed well nourished thin male found seated comfortably in wheelchair.  No apparent distress. He is accompanied by adult son.  A&O X3.  Mood and affect WNL. Language and fund of knowledge intact.    He has a nicely healed incision.  He has dependent edema in the bilateral lower extremities. He has partial Ace wraps. Areas of the feet and calves that are not covered by the Ace wraps have at least a 2+ edema. I removed the Ace wraps and applied light lymphedema wraps. I demonstrated the technique to both son and patient.  Exam at discharge: No lower extremity motor movements Sensory level intact T10, partial sensation T11, no sensation more distal dermatomes.  Low tone lower extremities.  Exam " today:  No change.      Assessment/Plan:  See beginning of note:     Lilli Contreras PA-C  [facility]  Department of Neurosurgery  Phone: [phone]  Fax: [phone]      This note was generated using voice recognition software. While edited for content some inaccurate phrasing may be found.

## 2018-04-27 ENCOUNTER — OFFICE VISIT (OUTPATIENT)
Dept: NEUROSURGERY | Facility: CLINIC | Age: 58
End: 2018-04-27
Payer: COMMERCIAL

## 2018-04-27 VITALS — HEIGHT: 71 IN | SYSTOLIC BLOOD PRESSURE: 105 MMHG | HEART RATE: 108 BPM | DIASTOLIC BLOOD PRESSURE: 63 MMHG

## 2018-04-27 DIAGNOSIS — G95.20 SPINAL CORD COMPRESSION (H): ICD-10-CM

## 2018-04-27 DIAGNOSIS — Z98.890 POST-OPERATIVE STATE: Primary | ICD-10-CM

## 2018-04-27 DIAGNOSIS — S06.5XAA SUBDURAL HEMATOMA (H): ICD-10-CM

## 2018-04-27 RX ORDER — SULFAMETHOXAZOLE/TRIMETHOPRIM 800-160 MG
1 TABLET ORAL 2 TIMES DAILY
COMMUNITY
Start: 2018-04-20 | End: 2018-05-21

## 2018-04-27 RX ORDER — DOCUSATE SODIUM 100 MG/1
100 CAPSULE, LIQUID FILLED ORAL DAILY PRN
COMMUNITY
Start: 2018-04-20 | End: 2018-09-05

## 2018-04-27 RX ORDER — ASCORBIC ACID 500 MG
500 TABLET ORAL EVERY MORNING
COMMUNITY
Start: 2018-04-20

## 2018-04-27 RX ORDER — ATORVASTATIN CALCIUM 20 MG/1
20 TABLET, FILM COATED ORAL AT BEDTIME
COMMUNITY
Start: 2018-04-19 | End: 2018-05-16

## 2018-04-27 ASSESSMENT — PAIN SCALES - GENERAL: PAINLEVEL: NO PAIN (0)

## 2018-04-27 NOTE — LETTER
4/27/2018       RE: Derek Enriquez  [address] [facility] [address]     Dear Colleague,    Thank you for referring your patient, Derek Enriquez, to the [facility] NEUROSURGERY at [facility]. Please see a copy of my visit note below.      Neurosurgery clinic post op   Date of visit: 4/27/2018    Assessment/Plan:  No diagnosis found.  Derek Enriquez is doing pretty well 3 weeks after thoracic decompression for spinal hematoma. His surgical recovery has gone without a hitch with the exception of this little wound problem he has.  Neurologically he is unchanged, there is not the slightest difference in his exam.  However I assured him we are still very early on in the recovery process.  We discussed the spinal cord problem he had, and that how much recovery will be achieved cannot be predicted.  It will be 12-18 months before we will know how much.  in the meantime continuing his therapies is very important and he is fully engaged in all of that.     He still needs a FU angio to assess for spinal AVM. That is scheduled for 5/23. He should follow up with Dr. Joe thereafter and I have requested that appointment today.     He has some dependent edema so I did a light lymphedema wrap until he can acquire properly fitting compression stockings.       All his questions have been answered and they demonstrate good understanding of the above.  He has our contact information and is aware that he should call if he has questions comments or concerns.     We appreciate the opportunity to be of service in the care of this pleasant patient.  Please do call if there is anything more we can do    Lilli Contreras PA-C  [facility]  Department of Neurosurgery  Phone: [phone]  Fax: [phone]          Procedure:  03/09/2018 Akhil Reese   DIAGNOSIS:  Subdural hematoma in thoracic spine.   Recent MI status post cardiac stent and antiplatelet therapy.      PROCEDURES PERFORMED:      1.  Emergent T9 to T12 laminectomy and evacuation of subdural hematoma.   2.  Intraoperative use of microscope, intraoperative C-arm, intraoperative use of ultrasound.       INDICATIONS FOR PROCEDURE:  Mr. Enriquez is a 57-year-old gentleman who presented to [facility] with MI. He underwent cardiac stenting. After the procedure, he noted to have lower extremity deficits. His spine MRI showed subdural lesion in his thoracic spine, with spinal cord compression.  He was then transferred to the [facility] for further evaluation and management. Given these findings, he and his family consented for above-mentioned procedure after risks, benefits and alternative treatments were explained to them.  HPI:  Inpatient:   Assessment/Plan of care: 56 yo male with subdural hematoma and thoracic spinal cord compression, possible secondary to ruptured spinal AVM following PCI for LAD stenosis.  POD#4 s/p decompression of spinal hematoma. He underwent spinal angiogram on 3/11 with no findings. Doing well post-op.  Ok to restart Plavix 3/23/2018.    Patient will follow up with Neuro-Interventional team in one month for spinal angiogram to assess for spinal AVM. (4/13/18)   Follow-up with Lilli Contreras PA-C in 6 weeks.   Suture removal 3/23/18 in rehabilitation   Follow-up with Cardiology in 3-4 weeks in regards to recent cardiac arrest and stent placement   Outpatient:   He is now 6 weeks post op.      His surgical recovery has gone without a hitch with the exception of a small wound hematoma he had, that was easily drained, and has healed beautifully. Neurologically he is unchanged. He still needs a FU angio to assess for spinal AVM. That is scheduled for 5/23.  He has some dependent edema, but doesn't have properly fitting compression stockings. Apparently they are on some kind of back order so he's wearing an Ace wrap.  p  Current Outpatient Prescriptions:      ascorbic acid (VITAMIN  "C) 500 MG tablet, Take 500 mg by mouth 2 times daily, Disp: , Rfl:      atorvastatin (LIPITOR) 20 MG tablet, Take 20 mg by mouth At Bedtime, Disp: , Rfl:      clopidogrel (PLAVIX) 75 MG tablet, Take 1 tablet (75 mg) by mouth daily, Disp: 30 tablet, Rfl:      docusate sodium (COLACE) 100 MG capsule, Take 100 mg by mouth daily as needed, Disp: , Rfl:      etidronate (DIDRONEL) 200 MG tablet, 2 times daily, Disp: , Rfl:      midodrine (PROAMATINE) 2.5 MG tablet, Take 1 tablet (2.5 mg) by mouth 2 times daily Give before morning and before afternoon therapies start for orthostatic hypotension., Disp: , Rfl:      Multiple Vitamins-Minerals (MULTIVITAMIN MEN PO), Take 1 tablet by mouth daily , Disp: , Rfl:      phenylephrine-shark liver oil-mineral oil-petrolatum (PREPARATION H) 0.25-14-74.9 % rectal ointment, Place rectally daily as needed, Disp: , Rfl:      senna-docusate (SENOKOT-S;PERICOLACE) 8.6-50 MG per tablet, Take 1-2 tablets by mouth 2 times daily Hold for loose stools, Disp: 100 tablet, Rfl:      sulfamethoxazole-trimethoprim (BACTRIM DS/SEPTRA DS) 800-160 MG per tablet, Take 1 tablet by mouth 2 times daily, Disp: , Rfl:      vitamin B complex with vitamin C (VITAMIN  B COMPLEX) TABS tablet, Take 1 tablet by mouth daily, Disp: , Rfl:   No Known Allergies  PMH, SOC HIST, FAM HIST, PROBLEM LIST:  All reviewed in EPIC.    OBJECTIVE:  /63 (BP Location: Right arm, Patient Position: Chair, Cuff Size: Adult Small)  Pulse 108  Ht 1.803 m (5' 11\")    Imaging:  None new.    EXAM:  Well developed well nourished thin male found seated comfortably in wheelchair.  No apparent distress. He is accompanied by adult son.  A&O X3.  Mood and affect WNL. Language and fund of knowledge intact.    He has a nicely healed incision.  He has dependent edema in the bilateral lower extremities. He has partial Ace wraps. Areas of the feet and calves that are not covered by the Ace wraps have at least a 2+ edema. I removed the Ace " wraps and applied light lymphedema wraps. I demonstrated the technique to both son and patient.  Exam at discharge: No lower extremity motor movements Sensory level intact T10, partial sensation T11, no sensation more distal dermatomes.  Low tone lower extremities.  Exam today:  No change.      Assessment/Plan:  See beginning of note:         This note was generated using voice recognition software. While edited for content some inaccurate phrasing may be found.    Again, thank you for allowing me to participate in the care of your patient.      Sincerely,    Lilli Contreras PA-C

## 2018-04-27 NOTE — MR AVS SNAPSHOT
After Visit Summary   4/27/2018    Derek Enriquez    MRN: [id number]           Patient Information     Date Of Birth          1960        Visit Information        Provider Department      4/27/2018 1:00 PM Lilli Contreras PA-C [facility] Neurosurgery         Follow-ups after your visit        Your next 10 appointments already scheduled     May 02, 2018  1:00 PM CDT   (Arrive by 12:45 PM)   RETURN LIPID VISIT with MD IGNACIA Mckeon [facility] Heart Care ([facility] [place])    [address]  [address]  [facility] [id number] [phone]            May 23, 2018  9:00 AM CDT   (Arrive by 8:45 AM)   IR SPINAL ANGIOGRAM with UUIR3   [facility] [place], Interventional Radiology ([facility])    [address] [id number] [phone]           1. Your doctor will need to do a history and physical within 30 days before this procedure. 2. Your doctor will determine whether you need a 12 lead EKG, as well as which medications should not be taken the morning of the exam. 3. Laboratory tests are to be obtained by your doctor prior to the exam (creatinine, Hgb/Hct, platelet count, and INR) 4. If you have allergies to x-ray contrast or iodine, contact your doctor or a Radiology nurse prior to the exam day for instructions. 5. Someone will need to drive you to and from the hospital. 6. Bring a list of all drugs you are taking; include supplements and over-the-counter medications. 7. Wear comfortable clothes and leave your valuables at home. 8. If you are or may be pregnant, contact your doctor or a Radiology nurse prior to the day of the exam. 9.  If you have diabetes, check with your doctor or a Radiology nurse to see if your insulin needs to be adjusted for the exam. 10. If you are taking Coumadin (to thin you blood) please contact your doctor or a Radiology nurse at least 5 days before the exam for special  instructions. 11. You should not have received barium (x-ray contrast) within 48 hours of this exam. 12. The day before your exam you may eat your regular diet and are encouraged to drink at least 2 quarts of clear liquids. Drink no alcoholic beverages for 24 hours prior to the exam. 13. If you have a colostomy you will need to irrigate it with tap water at 8PM the evening before and again at 6AM the morning of the exam. 14. Do not smoke for 24 hours prior to the procedure. 15. Do not eat any solid food or milk products for 6 hours prior to the exam. You may drink clear liquids until 2 hours prior to the exam. Clear liquids include the following: water, Jell-O, clear broth, apple juice or any non-carbonated drink that you can see through (no pop!) 16. The morning of the exam you may brush your teeth and take medications as directed with a sip of water. 17. Tell the Radiology nurse if you have any allergies. 18. You will be asked to empty your bladder before the exam begins. 19. Following the exam you will need to remain on complete bedrest for 4-6 hours. The nurse will monitor your vital signs, puncture site, and the pulses and temperature of the arm or leg that was punctured. 20. When discharged, you cannot drive until morning, and an adult must be with you until then. You should stay in the [facility] area overnight.            May 23, 2018   Procedure with GENERIC ANESTHESIA PROVIDER   [facility], Same Day Surgery (--)    [address] [facility] [address]   [phone]            Jun 18, 2018  2:45 PM CDT   LAB with [facility] ([facility] [place])    [address]  [facility] [address] [phone]           Please do not eat 10-12 hours before your appointment if you are coming in fasting for labs on lipids, cholesterol, or glucose (sugar). This does not apply to pregnant women. Water, hot tea and black coffee (with nothing added) are okay. Do not drink  other fluids, diet soda or chew gum.            Jun 18, 2018  3:00 PM CDT   US RENAL COMPLETE with UCUS3   [facility] US ([facility] [place])    [address]  1st Floor  [facility] [address] [phone]           Please bring a list of your medicines (including vitamins, minerals and over-the-counter drugs). Also, tell your doctor about any allergies you may have. Wear comfortable clothes and leave your valuables at home.  You do not need to do anything special to prepare for your exam.  Please call the Imaging Department at your exam site with any questions.            Jun 18, 2018  4:00 PM CDT   (Arrive by 3:45 PM)   NEW NEUROGENIC BLADDER with Andrei Pelaez MD   [facility] and [facility] ([facility])    [address] [place] [address]  [address]th [facility] [address] [phone]              Who to contact     Please call your clinic at [phone] to:    Ask questions about your health    Make or cancel appointments    Discuss your medicines    Learn about your test results    Speak to your doctor            Additional Information About Your Visit        [employer] Information     [employer] gives you secure access to your electronic health record. If you see a primary care provider, you can also send messages to your care team and make appointments. If you have questions, please call your primary care clinic.  If you do not have a primary care provider, please call [phone] and they will assist you.      [employer] is an electronic gateway that provides easy, online access to your medical records. With [employer], you can request a clinic appointment, read your test results, renew a prescription or communicate with your care team.     To access your existing account, please contact your [facility] or call [phone] for assistance.        Care EveryWhere ID     This is your  "Care EveryWhere ID. This could be used by other organizations to access your [facility] medical records  [id number]        Your Vitals Were     Pulse Height                108 1.803 m (5' 11\")           Blood Pressure from Last 3 Encounters:   04/27/18 105/63   03/30/18 114/76   03/21/18 119/80    Weight from Last 3 Encounters:   03/30/18 78 kg (172 lb)   03/14/18 78 kg (172 lb)   03/13/18 80.4 kg (177 lb 4 oz)              Today, you had the following     No orders found for display       Primary Care Provider Office Phone # Fax #    Eugene Burrell [phone]       [facility] [address] [facility] [address]        Equal Access to Services     AUSTIN WILSON : Hadii jace hahn hadasho Soomaali, waaxda luqadaha, qaybta kaalmada adeegyada, minal sales . So [facility] [phone].    ATENCIÓN: Si habla español, tiene a humphrey disposición servicios gratuitos de asistencia lingüística. [facility] [phone].    We comply with applicable federal civil rights laws and [place] laws. We do not discriminate on the basis of race, color, national origin, age, disability, sex, sexual orientation, or gender identity.            Thank you!     Thank you for choosing [facility]  for your care. Our goal is always to provide you with excellent care. Hearing back from our patients is one way we can continue to improve our services. Please take a few minutes to complete the written survey that you may receive in the mail after your visit with us. Thank you!             Your Updated Medication List - Protect others around you: Learn how to safely use, store and throw away your medicines at www.disposemymeds.org.          This list is accurate as of 4/27/18  2:14 PM.  Always use your most recent med list.                   Brand Name Dispense Instructions for use Diagnosis    ascorbic acid 500 MG tablet    VITAMIN C     Take 500 mg by mouth 2 times daily        " atorvastatin 20 MG tablet    LIPITOR     Take 20 mg by mouth At Bedtime        clopidogrel 75 MG tablet    PLAVIX    30 tablet    Take 1 tablet (75 mg) by mouth daily    H/O heart artery stent       docusate sodium 100 MG capsule    COLACE     Take 100 mg by mouth daily as needed        etidronate 200 MG tablet    DIDRONEL     2 times daily        midodrine 2.5 MG tablet    PROAMATINE     Take 1 tablet (2.5 mg) by mouth 2 times daily Give before morning and before afternoon therapies start for orthostatic hypotension.    Neurogenic orthostatic hypotension (H)       MULTIVITAMIN MEN PO      Take 1 tablet by mouth daily        phenylephrine-shark liver oil-mineral oil-petrolatum 0.25-14-74.9 % rectal ointment    PREPARATION H     Place rectally daily as needed        senna-docusate 8.6-50 MG per tablet    SENOKOT-S;PERICOLACE    100 tablet    Take 1-2 tablets by mouth 2 times daily Hold for loose stools    Neurogenic bowel       sulfamethoxazole-trimethoprim 800-160 MG per tablet    BACTRIM DS/SEPTRA DS     Take 1 tablet by mouth 2 times daily        vitamin B complex with vitamin C Tabs tablet      Take 1 tablet by mouth daily

## 2018-05-02 ENCOUNTER — OFFICE VISIT (OUTPATIENT)
Dept: CARDIOLOGY | Facility: CLINIC | Age: 58
End: 2018-05-02
Attending: INTERNAL MEDICINE
Payer: COMMERCIAL

## 2018-05-02 VITALS
SYSTOLIC BLOOD PRESSURE: 112 MMHG | HEART RATE: 91 BPM | DIASTOLIC BLOOD PRESSURE: 72 MMHG | HEIGHT: 71 IN | OXYGEN SATURATION: 100 %

## 2018-05-02 DIAGNOSIS — E78.00 HYPERCHOLESTEREMIA: ICD-10-CM

## 2018-05-02 DIAGNOSIS — R00.2 PALPITATIONS: ICD-10-CM

## 2018-05-02 DIAGNOSIS — M79.10 MUSCLE PAIN: ICD-10-CM

## 2018-05-02 DIAGNOSIS — I25.10 CORONARY ARTERY DISEASE INVOLVING NATIVE CORONARY ARTERY OF NATIVE HEART WITHOUT ANGINA PECTORIS: ICD-10-CM

## 2018-05-02 DIAGNOSIS — I25.10 CORONARY ARTERY DISEASE INVOLVING NATIVE CORONARY ARTERY OF NATIVE HEART WITHOUT ANGINA PECTORIS: Primary | ICD-10-CM

## 2018-05-02 LAB
ALBUMIN SERPL-MCNC: 2.5 G/DL (ref 3.4–5)
ALP SERPL-CCNC: 416 U/L (ref 40–150)
ALT SERPL W P-5'-P-CCNC: 88 U/L (ref 0–70)
ANION GAP SERPL CALCULATED.3IONS-SCNC: 8 MMOL/L (ref 3–14)
AST SERPL W P-5'-P-CCNC: 72 U/L (ref 0–45)
BILIRUB SERPL-MCNC: 0.6 MG/DL (ref 0.2–1.3)
BUN SERPL-MCNC: 18 MG/DL (ref 7–30)
CALCIUM SERPL-MCNC: 8.6 MG/DL (ref 8.5–10.1)
CHLORIDE SERPL-SCNC: 100 MMOL/L (ref 94–109)
CHOLEST SERPL-MCNC: 128 MG/DL
CK SERPL-CCNC: 388 U/L (ref 30–300)
CO2 SERPL-SCNC: 27 MMOL/L (ref 20–32)
CREAT SERPL-MCNC: 0.63 MG/DL (ref 0.66–1.25)
ERYTHROCYTE [DISTWIDTH] IN BLOOD BY AUTOMATED COUNT: 13.7 % (ref 10–15)
GFR SERPL CREATININE-BSD FRML MDRD: >90 ML/MIN/1.7M2
GLUCOSE SERPL-MCNC: 90 MG/DL (ref 70–99)
HCT VFR BLD AUTO: 35 % (ref 40–53)
HDLC SERPL-MCNC: 35 MG/DL
HGB BLD-MCNC: 11.3 G/DL (ref 13.3–17.7)
LDLC SERPL CALC-MCNC: 66 MG/DL
MCH RBC QN AUTO: 27.9 PG (ref 26.5–33)
MCHC RBC AUTO-ENTMCNC: 32.3 G/DL (ref 31.5–36.5)
MCV RBC AUTO: 86 FL (ref 78–100)
NONHDLC SERPL-MCNC: 93 MG/DL
PLATELET # BLD AUTO: 484 10E9/L (ref 150–450)
POTASSIUM SERPL-SCNC: 4.3 MMOL/L (ref 3.4–5.3)
PROT SERPL-MCNC: 6.6 G/DL (ref 6.8–8.8)
RBC # BLD AUTO: 4.05 10E12/L (ref 4.4–5.9)
SODIUM SERPL-SCNC: 135 MMOL/L (ref 133–144)
TRIGL SERPL-MCNC: 136 MG/DL
WBC # BLD AUTO: 7.5 10E9/L (ref 4–11)

## 2018-05-02 PROCEDURE — 82550 ASSAY OF CK (CPK): CPT | Performed by: INTERNAL MEDICINE

## 2018-05-02 PROCEDURE — 80053 COMPREHEN METABOLIC PANEL: CPT | Performed by: INTERNAL MEDICINE

## 2018-05-02 PROCEDURE — 80061 LIPID PANEL: CPT | Performed by: INTERNAL MEDICINE

## 2018-05-02 PROCEDURE — 85027 COMPLETE CBC AUTOMATED: CPT | Performed by: INTERNAL MEDICINE

## 2018-05-02 PROCEDURE — 36415 COLL VENOUS BLD VENIPUNCTURE: CPT | Performed by: INTERNAL MEDICINE

## 2018-05-02 PROCEDURE — 93010 ELECTROCARDIOGRAM REPORT: CPT | Mod: ZP | Performed by: INTERNAL MEDICINE

## 2018-05-02 PROCEDURE — 93005 ELECTROCARDIOGRAM TRACING: CPT | Mod: ZF

## 2018-05-02 PROCEDURE — G0463 HOSPITAL OUTPT CLINIC VISIT: HCPCS

## 2018-05-02 PROCEDURE — 99214 OFFICE O/P EST MOD 30 MIN: CPT | Mod: ZP | Performed by: INTERNAL MEDICINE

## 2018-05-02 ASSESSMENT — PAIN SCALES - GENERAL: PAINLEVEL: NO PAIN (0)

## 2018-05-02 NOTE — LETTER
5/2/2018      RE: Derek Enriquez  [address] [facility] [address]       Dear Colleague,    Thank you for the opportunity to participate in the care of your patient, Derek Enriquez, at the [facility] HEART [facility]. Please see a copy of my visit note below.    HPI:      Mr Derek Enriquez is a  57-year-old gentleman, who had a cardiac arrest in [facility] during exercise (03-)  Patient was resuscitated and underwent a coronary angiogram , which showed   Single vessel CAD  LAD   - 95% stenotic ruptured plaque in the proximal LAD  Successful deployment of a drug eluting stent    3.0 x 20 mm Promus Premier drug eluting stent across the proximal LAD  and post-dilated with a 3.5 x15 mm non-compliant balloon  -Successful placement of balloon pump for LV support.  He also was put at [facility].                         Postop day 1 developed back pain then loss of sensation and movement in his lower extremities.  Identified spinal subdural hematoma and transferred emergently to [facility].  He underwent T9-T12 decompressive laminectomy and evacuation of subdural hematoma.  He unfortunately persists with complete paraplegia.  After medical stabilization he transferred to [facility] acute rehabilitation [place] 3/14 for comprehensive cares.    Patient comes for follow-up - he is now convinced that he needs to take the classically CV preventive therapy.    He is in good spirit.  He denies chest pain, shortness of breath, palpitations  Patient is happy with some improvement and is very active during his rehab process.    PAST MEDICAL HISTORY:    Hyperlipidemia  Coronary Artery Disease - PCI and stent        CURRENT MEDICATIONS:  Current Outpatient Prescriptions   Medication Sig Dispense Refill     ascorbic acid (VITAMIN C) 500 MG tablet Take 500 mg by mouth 2 times daily       atorvastatin (LIPITOR) 20 MG tablet Take 20 mg by mouth At Bedtime        clopidogrel (PLAVIX) 75 MG tablet Take 1 tablet (75 mg) by mouth daily 30 tablet      docusate sodium (COLACE) 100 MG capsule Take 100 mg by mouth daily as needed       midodrine (PROAMATINE) 2.5 MG tablet Take 1 tablet (2.5 mg) by mouth 2 times daily Give before morning and before afternoon therapies start for orthostatic hypotension.       Multiple Vitamins-Minerals (MULTIVITAMIN MEN PO) Take 1 tablet by mouth daily        phenylephrine-shark liver oil-mineral oil-petrolatum (PREPARATION H) 0.25-14-74.9 % rectal ointment Place rectally daily as needed       senna-docusate (SENOKOT-S;PERICOLACE) 8.6-50 MG per tablet Take 1-2 tablets by mouth 2 times daily Hold for loose stools 100 tablet      vitamin B complex with vitamin C (VITAMIN  B COMPLEX) TABS tablet Take 1 tablet by mouth daily       etidronate (DIDRONEL) 200 MG tablet 2 times daily       sulfamethoxazole-trimethoprim (BACTRIM DS/SEPTRA DS) 800-160 MG per tablet Take 1 tablet by mouth 2 times daily         PAST SURGICAL HISTORY:  Past Surgical History:   Procedure Laterality Date     LAMINECTOMY THORACIC THREE LEVELS N/A 3/9/2018    Procedure: LAMINECTOMY THORACIC THREE LEVELS;  Thoracic 9-12 Laminectomy Evacuation of Subdural Hematoma, C-Arm, Prone, Gel Rolls.;  Surgeon: Abhijeet Joe MD;  Location:  OR       ALLERGIES   No Known Allergies    FAMILY HISTORY:  Positive family history for early coronary disease in his brother    SOCIAL HISTORY:  Social History     Social History     Marital status: Single     Spouse name: N/A     Number of children: N/A     Years of education: N/A     Social History Main Topics     Smoking status: Former Smoker     Smokeless tobacco: Never Used      Comment: Quit at 31     Alcohol use None     Drug use: None     Sexual activity: Not Asked     Other Topics Concern     None     Social History Narrative       ROS:   Constitutional: No fever, chills, or sweats. No weight gain/loss   ENT: No visual disturbance, ear ache,  "epistaxis, sore throat  Allergies/Immunologic: Negative.   Respiratory: No cough, hemoptysia  Cardiovascular: As per HPI  GI: No nausea, vomiting, hematemesis, melena, or hematochezia  : No urinary frequency, dysuria, or hematuria  Integument: Negative  Psychiatric: Negative  Neuro: Negative  Endocrinology: Negative   Musculoskeletal: Negative    EXAM:  /72 (BP Location: Left arm, Patient Position: Chair, Cuff Size: Adult Regular)  Pulse 91  Ht 1.803 m (5' 11\")  SpO2 100%  In general, the patient is a pleasant male in no apparent distress.    HEENT: NC/AT.  PERRLA.  EOMI.  Sclerae white, not injected.  Nares clear.  Pharynx without erythema or exudate.  Dentition intact.    Neck: No adenopathy.  No thyromegaly. Carotids +4/4 bilaterally without bruits.  No jugular venous distension.   Heart: RRR. Normal S1, S2 splits physiologically. No murmur, rub, click, or gallop. The PMI is in the 5th ICS in the midclavicular line. There is no heave.    Lungs: CTA.  No ronchi, wheezes, rales.  No dullness to percussion.   Abdomen: Soft, nontender, nondistended. No organomegaly.  No bruits.   Extremities: No clubbing, cyanosis, or edema.  The pulses are +4/4 at the radial, brachial, femoral, popliteal, DP, and PT sites bilaterally.  No bruits are noted.  Neurologic: paraplegic lower extremities  Skin: No petechiae, purpura or rash.    Labs:  LIPID RESULTS:  Lab Results   Component Value Date    CHOL 128 05/02/2018    HDL 35 (L) 05/02/2018    LDL 66 05/02/2018    TRIG 136 05/02/2018    NHDL 93 05/02/2018       LIVER ENZYME RESULTS:  Lab Results   Component Value Date    AST 72 (H) 05/02/2018    ALT 88 (H) 05/02/2018       CBC RESULTS:  Lab Results   Component Value Date    WBC 7.5 05/02/2018    RBC 4.05 (L) 05/02/2018    HGB 11.3 (L) 05/02/2018    HCT 35.0 (L) 05/02/2018    MCV 86 05/02/2018    MCH 27.9 05/02/2018    MCHC 32.3 05/02/2018    RDW 13.7 05/02/2018     (H) 05/02/2018       BMP RESULTS:  Lab Results "   Component Value Date     (L) 03/17/2018    POTASSIUM 4.7 03/17/2018    CHLORIDE 98 03/17/2018    CO2 28 03/17/2018    ANIONGAP 6 03/17/2018     (H) 03/17/2018    BUN 19 03/17/2018    CR 0.69 03/17/2018    GFRESTIMATED >90 03/17/2018    GFRESTBLACK >90 03/17/2018    PAT 8.8 03/17/2018          INR RESULTS:  Lab Results   Component Value Date    INR 1.20 (H) 03/09/2018    INR 3.66 (H) 03/08/2018       Procedures:    EKG 04-  Sin rhythm       Assessment and Plan:    We discussed the results with patient - we re-emphasized the importance of a heart healthy diet and cardiac rehab.  We continue with same medical regimen.  Follow-up within 6 months.    Anoop Jane MD, PhD  Professor of Medicine  Division of Cardiology        CC  Patient Care Team:  Eugene Burrell MD as PCP - General (Family Practice)  Anoop Jane MD as MD (Cardiology)  Nemo Ocasio RN as Registered Nurse (Cardiology)  Andrei Pelaez MD as MD (Urology)  Breanne Ferreira RN as Nurse Coordinator (Neurology)  Candice Bridges RN as Registered Nurse (Urology)  SELF, REFERRED

## 2018-05-02 NOTE — NURSING NOTE
Labs: Patient was instructed to return for the next laboratory testing today, and again in 6 months . Patient demonstrated understanding of this information and agreed to call with further questions or concerns.   Med Reconcile: Reviewed and verified all current medications with the patient. The updated medication list was printed and given to the patient.  Pt was instructed to take Plavix, ASA 81 and Lipitor in the am.  Return Appointment: Patient given instructions regarding scheduling next clinic visit.  Pt will return in 6 months to see Dr Buck escalante/ fasting labs prior. Patient demonstrated understanding of this information and agreed to call with further questions or concerns.  Other: Order for Cardiac Rehab placed.  Pt informed that he will be contacted with scheduling.  Patient stated he understood all health information given and agreed to call with further questions or concerns.    Marilin Ramirez LPN

## 2018-05-02 NOTE — PROGRESS NOTES
HPI:      Mr Derek Enriquez is a  57-year-old gentleman, who had a cardiac arrest in [facility] during exercise (03-)  Patient was resuscitated and underwent a coronary angiogram , which showed   Single vessel CAD  LAD   - 95% stenotic ruptured plaque in the proximal LAD  Successful deployment of a drug eluting stent    3.0 x 20 mm Promus Premier drug eluting stent across the proximal LAD  and post-dilated with a 3.5 x15 mm non-compliant balloon  -Successful placement of balloon pump for LV support.  He also was put at [facility].                         Postop day 1 developed back pain then loss of sensation and movement in his lower extremities.  Identified spinal subdural hematoma and transferred emergently to [facility].  He underwent T9-T12 decompressive laminectomy and evacuation of subdural hematoma.  He unfortunately persists with complete paraplegia.  After medical stabilization he transferred to [facility] acute rehabilitation [place] 3/14 for comprehensive cares.    Patient comes for follow-up - he is now convinced that he needs to take the classically CV preventive therapy.    He is in good spirit.  He denies chest pain, shortness of breath, palpitations  Patient is happy with some improvement and is very active during his rehab process.    PAST MEDICAL HISTORY:    Hyperlipidemia  Coronary Artery Disease - PCI and stent        CURRENT MEDICATIONS:  Current Outpatient Prescriptions   Medication Sig Dispense Refill     ascorbic acid (VITAMIN C) 500 MG tablet Take 500 mg by mouth 2 times daily       atorvastatin (LIPITOR) 20 MG tablet Take 20 mg by mouth At Bedtime       clopidogrel (PLAVIX) 75 MG tablet Take 1 tablet (75 mg) by mouth daily 30 tablet      docusate sodium (COLACE) 100 MG capsule Take 100 mg by mouth daily as needed       midodrine (PROAMATINE) 2.5 MG tablet Take 1 tablet (2.5 mg) by mouth 2 times daily Give before morning and before afternoon therapies start  for orthostatic hypotension.       Multiple Vitamins-Minerals (MULTIVITAMIN MEN PO) Take 1 tablet by mouth daily        phenylephrine-shark liver oil-mineral oil-petrolatum (PREPARATION H) 0.25-14-74.9 % rectal ointment Place rectally daily as needed       senna-docusate (SENOKOT-S;PERICOLACE) 8.6-50 MG per tablet Take 1-2 tablets by mouth 2 times daily Hold for loose stools 100 tablet      vitamin B complex with vitamin C (VITAMIN  B COMPLEX) TABS tablet Take 1 tablet by mouth daily       etidronate (DIDRONEL) 200 MG tablet 2 times daily       sulfamethoxazole-trimethoprim (BACTRIM DS/SEPTRA DS) 800-160 MG per tablet Take 1 tablet by mouth 2 times daily         PAST SURGICAL HISTORY:  Past Surgical History:   Procedure Laterality Date     LAMINECTOMY THORACIC THREE LEVELS N/A 3/9/2018    Procedure: LAMINECTOMY THORACIC THREE LEVELS;  Thoracic 9-12 Laminectomy Evacuation of Subdural Hematoma, C-Arm, Prone, Gel Rolls.;  Surgeon: Abhijeet Joe MD;  Location:  OR       ALLERGIES   No Known Allergies    FAMILY HISTORY:  Positive family history for early coronary disease in his brother    SOCIAL HISTORY:  Social History     Social History     Marital status: Single     Spouse name: N/A     Number of children: N/A     Years of education: N/A     Social History Main Topics     Smoking status: Former Smoker     Smokeless tobacco: Never Used      Comment: Quit at 31     Alcohol use None     Drug use: None     Sexual activity: Not Asked     Other Topics Concern     None     Social History Narrative       ROS:   Constitutional: No fever, chills, or sweats. No weight gain/loss   ENT: No visual disturbance, ear ache, epistaxis, sore throat  Allergies/Immunologic: Negative.   Respiratory: No cough, hemoptysia  Cardiovascular: As per HPI  GI: No nausea, vomiting, hematemesis, melena, or hematochezia  : No urinary frequency, dysuria, or hematuria  Integument: Negative  Psychiatric: Negative  Neuro:  "Negative  Endocrinology: Negative   Musculoskeletal: Negative    EXAM:  /72 (BP Location: Left arm, Patient Position: Chair, Cuff Size: Adult Regular)  Pulse 91  Ht 1.803 m (5' 11\")  SpO2 100%  In general, the patient is a pleasant male in no apparent distress.    HEENT: NC/AT.  PERRLA.  EOMI.  Sclerae white, not injected.  Nares clear.  Pharynx without erythema or exudate.  Dentition intact.    Neck: No adenopathy.  No thyromegaly. Carotids +4/4 bilaterally without bruits.  No jugular venous distension.   Heart: RRR. Normal S1, S2 splits physiologically. No murmur, rub, click, or gallop. The PMI is in the 5th ICS in the midclavicular line. There is no heave.    Lungs: CTA.  No ronchi, wheezes, rales.  No dullness to percussion.   Abdomen: Soft, nontender, nondistended. No organomegaly.  No bruits.   Extremities: No clubbing, cyanosis, or edema.  The pulses are +4/4 at the radial, brachial, femoral, popliteal, DP, and PT sites bilaterally.  No bruits are noted.  Neurologic: paraplegic lower extremities  Skin: No petechiae, purpura or rash.    Labs:  LIPID RESULTS:  Lab Results   Component Value Date    CHOL 128 05/02/2018    HDL 35 (L) 05/02/2018    LDL 66 05/02/2018    TRIG 136 05/02/2018    NHDL 93 05/02/2018       LIVER ENZYME RESULTS:  Lab Results   Component Value Date    AST 72 (H) 05/02/2018    ALT 88 (H) 05/02/2018       CBC RESULTS:  Lab Results   Component Value Date    WBC 7.5 05/02/2018    RBC 4.05 (L) 05/02/2018    HGB 11.3 (L) 05/02/2018    HCT 35.0 (L) 05/02/2018    MCV 86 05/02/2018    MCH 27.9 05/02/2018    MCHC 32.3 05/02/2018    RDW 13.7 05/02/2018     (H) 05/02/2018       BMP RESULTS:  Lab Results   Component Value Date     (L) 03/17/2018    POTASSIUM 4.7 03/17/2018    CHLORIDE 98 03/17/2018    CO2 28 03/17/2018    ANIONGAP 6 03/17/2018     (H) 03/17/2018    BUN 19 03/17/2018    CR 0.69 03/17/2018    GFRESTIMATED >90 03/17/2018    GFRESTBLACK >90 03/17/2018    PAT 8.8 " 03/17/2018          INR RESULTS:  Lab Results   Component Value Date    INR 1.20 (H) 03/09/2018    INR 3.66 (H) 03/08/2018       Procedures:    EKG 04-  Sin rhythm       Assessment and Plan:    We discussed the results with patient - we re-emphasized the importance of a heart healthy diet and cardiac rehab.  We continue with same medical regimen.  Follow-up within 6 months.    Anoop Jane MD, PhD  Professor of Medicine  Division of Cardiology              CC  Patient Care Team:  Eugene Burrell MD as PCP - General (Family Practice)  Anoop Jane MD as MD (Cardiology)  Nemo Ocasio RN as Registered Nurse (Cardiology)  Andrei Pelaez MD as MD (Urology)  Breanne Ferreira, RN as Nurse Coordinator (Neurology)  Candice Bridges RN as Registered Nurse (Urology)  SELF, REFERRED

## 2018-05-02 NOTE — NURSING NOTE
Patient Instructions:  It was a pleasure to see you in the cardiology clinic today.      If you have any questions, you can reach my nurse, Marilin Ramirez, at [phone].  Press Option #1 for the [facility], and then press Option #3 for nursing.    We are encouraging the use of [employer] to communicate with your HealthCare Provider    Medication Changes:    Studies Ordered:    The results from today include:    Please follow up: With Dr. Anoop Jane    Sincerely,    Anoop Jane MD     If you have an urgent need after hours (8:00 am to 4:30 pm) please call [phone] and ask for the cardiology fellow on call.

## 2018-05-02 NOTE — MR AVS SNAPSHOT
After Visit Summary   5/2/2018    Derek Enriquez    MRN: [id number]           Patient Information     Date Of Birth          1960        Visit Information        Provider Department      5/2/2018 1:00 PM Anoop Jane MD [facility]        Today's Diagnoses     Coronary artery disease involving native coronary artery of native heart without angina pectoris    -  1    Hypercholesteremia        Muscle pain        Palpitations          Care Instructions    Patient Instructions:  It was a pleasure to see you in the cardiology clinic today.      If you have any questions, you can reach my nurse, Marilin Ramirez LPN, at [phone].  Press Option #1 for the [facility], and then press Option #3 for nursing.    We are encouraging the use of [employer] to communicate with your HealthCare Provider    Medication Changes: None. Continue with medication regimen. Plavix, ASA and Statin can all be taken in the morning.    Studies Ordered: None.    The results from today include: Pending.    Please follow up: With Dr. Anoop Jane in 6 months with fasting labs.  Cardiac rehab referral placed.  They will contact you to set up.    Sincerely,    Anoop Jane MD     If you have an urgent need after hours (8:00 am to 4:30 pm) please call [phone] and ask for the cardiology fellow on call.                    Follow-ups after your visit        Additional Services     CARDIAC REHAB REFERRAL       *This therapy referral will be filtered to a centralized scheduling office at [facility] and the patient will receive a call to schedule an appointment at a [facility] location most convenient for them.*     If you have not heard from the scheduling office within 2 business days, please call [phone] for all locations.    Please be aware that coverage of these services is subject to the terms and limitations of your health insurance plan.  Call member services  "at your health plan with any benefit or coverage questions.      **Note to Provider:  If you are referring outside of [place] for the therapy appointment, please list the name of the location in the \"special instructions\" above, print the referral and give to the patient to schedule the appointment.                   Follow-Up with Cardiologist                 Your next 10 appointments already scheduled     May 23, 2018  9:00 AM CDT   IR SPINAL ANGIOGRAM with UUIR3   [facility], [place], Interventional Radiology ([facility], [facility])    [address] [address] [phone]           1. Your doctor will need to do a history and physical within 30 days before this procedure. 2. Your doctor will determine whether you need a 12 lead EKG, as well as which medications should not be taken the morning of the exam. 3. Laboratory tests are to be obtained by your doctor prior to the exam (creatinine, Hgb/Hct, platelet count, and INR) 4. If you have allergies to x-ray contrast or iodine, contact your doctor or a Radiology nurse prior to the exam day for instructions. 5. Someone will need to drive you to and from the hospital. 6. Bring a list of all drugs you are taking; include supplements and over-the-counter medications. 7. Wear comfortable clothes and leave your valuables at home. 8. If you are or may be pregnant, contact your doctor or a Radiology nurse prior to the day of the exam. 9.  If you have diabetes, check with your doctor or a Radiology nurse to see if your insulin needs to be adjusted for the exam. 10. If you are taking Coumadin (to thin you blood) please contact your doctor or a Radiology nurse at least 5 days before the exam for special instructions. 11. You should not have received barium (x-ray contrast) within 48 hours of this exam. 12. The day before your exam you may eat your regular diet and are encouraged to drink at least 2 quarts of clear " liquids. Drink no alcoholic beverages for 24 hours prior to the exam. 13. If you have a colostomy you will need to irrigate it with tap water at 8PM the evening before and again at 6AM the morning of the exam. 14. Do not smoke for 24 hours prior to the procedure. 15. Do not eat any solid food or milk products for 6 hours prior to the exam. You may drink clear liquids until 2 hours prior to the exam. Clear liquids include the following: water, Jell-O, clear broth, apple juice or any non-carbonated drink that you can see through (no pop!) 16. The morning of the exam you may brush your teeth and take medications as directed with a sip of water. 17. Tell the Radiology nurse if you have any allergies. 18. You will be asked to empty your bladder before the exam begins. 19. Following the exam you will need to remain on complete bedrest for 4-6 hours. The nurse will monitor your vital signs, puncture site, and the pulses and temperature of the arm or leg that was punctured. 20. When discharged, you cannot drive until morning, and an adult must be with you until then. You should stay in the [place] area overnight.            May 23, 2018   Procedure with GENERIC ANESTHESIA PROVIDER   [facility], Same Day Surgery (--)    [address] [id number] [phone]            Jun 18, 2018  2:45 PM CDT   LAB with  LAB   [facility] ([facility])    [address] [place] [id number] [phone]           Please do not eat 10-12 hours before your appointment if you are coming in fasting for labs on lipids, cholesterol, or glucose (sugar). This does not apply to pregnant women. Water, hot tea and black coffee (with nothing added) are okay. Do not drink other fluids, diet soda or chew gum.            Jun 18, 2018  3:00 PM CDT   US RENAL COMPLETE with [facility] ([facility])    [address] [place] [address]  Floor  [facility] [address]   [phone]           Please bring a list of your medicines (including vitamins, minerals and over-the-counter drugs). Also, tell your doctor about any allergies you may have. Wear comfortable clothes and leave your valuables at home.  You do not need to do anything special to prepare for your exam.  Please call the Imaging Department at your exam site with any questions.            Jun 18, 2018  4:00 PM CDT   (Arrive by 3:45 PM)   NEW NEUROGENIC BLADDER with Andrei Pelaez MD   [facility]state [facility] Urologic Cancers ([facility])    [address]  4th Floor  [facility] [address] [phone]            Nov 07, 2018  3:00 PM CST   Lab with  LAB   [facility] ([facility])    [address]  1st Floor  [facility] [address]   [phone]            Nov 07, 2018  3:30 PM CST   (Arrive by 3:15 PM)   RETURN LIPID VISIT with Anoop Jane MD   [facility] ([facility])    [address]  [address]  [facility] [address]   [phone]              Future tests that were ordered for you today     Open Future Orders        Priority Expected Expires Ordered    Follow-Up with Cardiologist Routine 10/29/2018 1/27/2019 5/2/2018    Comprehensive metabolic panel Routine 10/29/2018 1/27/2019 5/2/2018    CBC with platelets Routine 10/29/2018 1/27/2019 5/2/2018    Lipid panel reflex to direct LDL Fasting Routine 10/29/2018 1/27/2019 5/2/2018    N terminal pro BNP outpatient Routine 10/29/2018 1/27/2019 5/2/2018    Echo Complete Routine 10/29/2018 1/27/2019 5/2/2018    CK total Routine 11/2/2018 5/2/2019 5/2/2018    CK total Routine 5/2/2018 5/2/2018 5/2/2018    Comprehensive metabolic panel Routine 5/2/2018 7/31/2018 5/2/2018    Lipid panel reflex to direct LDL Fasting Routine 5/2/2018 7/31/2018 5/2/2018    CBC with platelets Routine 5/2/2018  "7/31/2018 5/2/2018            Who to contact     If you have questions or need follow up information about today's clinic visit or your schedule please contact [facility] directly at [phone].  Normal or non-critical lab and imaging results will be communicated to you by MyChart, letter or phone within 4 business days after the clinic has received the results. If you do not hear from us within 7 days, please contact the clinic through MyChart or phone. If you have a critical or abnormal lab result, we will notify you by phone as soon as possible.  Submit refill requests through [employer] or call your pharmacy and they will forward the refill request to us. Please allow 3 business days for your refill to be completed.          Additional Information About Your Visit        PatientKeeper Information     [employer] gives you secure access to your electronic health record. If you see a primary care provider, you can also send messages to your care team and make appointments. If you have questions, please call your primary care clinic.  If you do not have a primary care provider, please call [phone] and they will assist you.        Care EveryWhere ID     This is your Care EveryWhere ID. This could be used by other organizations to access your [place] medical records  [id number]        Your Vitals Were     Pulse Height Pulse Oximetry             91 1.803 m (5' 11\") 100%          Blood Pressure from Last 3 Encounters:   05/02/18 112/72   04/27/18 105/63   03/30/18 114/76    Weight from Last 3 Encounters:   03/30/18 78 kg (172 lb)   03/14/18 78 kg (172 lb)   03/13/18 80.4 kg (177 lb 4 oz)              We Performed the Following     CARDIAC REHAB REFERRAL     EKG 12-lead, tracing only (Same Day)        Primary Care Provider Office Phone # Fax #    Eugene Burrell [phone]       [facility] [address] [address]        Equal Access to Services     AUSTIN WILSON AH: " Hadii aad ku hadmarino Sopatali, waaxda luqadaha, qaybta kaalchilango flood, minal yelitzamichelle hamlin. So [facility] [phone].    ATENCIÓN: Si estella bishop, tiene a humphrey disposición servicios gratuitos de asistencia lingüística. Tristaname al [phone].    We comply with applicable federal civil rights laws and [place] laws. We do not discriminate on the basis of race, color, national origin, age, disability, sex, sexual orientation, or gender identity.            Thank you!     Thank you for choosing [facility]  for your care. Our goal is always to provide you with excellent care. Hearing back from our patients is one way we can continue to improve our services. Please take a few minutes to complete the written survey that you may receive in the mail after your visit with us. Thank you!             Your Updated Medication List - Protect others around you: Learn how to safely use, store and throw away your medicines at www.disposemymeds.org.          This list is accurate as of 5/2/18  2:06 PM.  Always use your most recent med list.                   Brand Name Dispense Instructions for use Diagnosis    ascorbic acid 500 MG tablet    VITAMIN C     Take 500 mg by mouth 2 times daily        atorvastatin 20 MG tablet    LIPITOR     Take 20 mg by mouth At Bedtime        clopidogrel 75 MG tablet    PLAVIX    30 tablet    Take 1 tablet (75 mg) by mouth daily    H/O heart artery stent       docusate sodium 100 MG capsule    COLACE     Take 100 mg by mouth daily as needed        etidronate 200 MG tablet    DIDRONEL     2 times daily        midodrine 2.5 MG tablet    PROAMATINE     Take 1 tablet (2.5 mg) by mouth 2 times daily Give before morning and before afternoon therapies start for orthostatic hypotension.    Neurogenic orthostatic hypotension (H)       MULTIVITAMIN MEN PO      Take 1 tablet by mouth daily        phenylephrine-shark liver oil-mineral oil-petrolatum 0.25-14-74.9 % rectal ointment     PREPARATION H     Place rectally daily as needed        senna-docusate 8.6-50 MG per tablet    SENOKOT-S;PERICOLACE    100 tablet    Take 1-2 tablets by mouth 2 times daily Hold for loose stools    Neurogenic bowel       sulfamethoxazole-trimethoprim 800-160 MG per tablet    BACTRIM DS/SEPTRA DS     Take 1 tablet by mouth 2 times daily        vitamin B complex with vitamin C Tabs tablet      Take 1 tablet by mouth daily

## 2018-05-02 NOTE — PATIENT INSTRUCTIONS
Patient Instructions:  It was a pleasure to see you in the cardiology clinic today.      If you have any questions, you can reach my nurse, Marilin Ramirez LPN, at [phone].  Press Option #1 for the [facility], and then press Option #3 for nursing.    We are encouraging the use of [employer] to communicate with your HealthCare Provider    Medication Changes: None. Continue with medication regimen. Plavix, ASA and Statin can all be taken in the morning.    Studies Ordered: None.    The results from today include: Pending.    Please follow up: With Dr. Anoop Jane in 6 months with fasting labs.  Cardiac rehab referral placed.  They will contact you to set up.    Sincerely,    Anoop Jane MD     If you have an urgent need after hours (8:00 am to 4:30 pm) please call [phone] and ask for the cardiology fellow on call.

## 2018-05-02 NOTE — NURSING NOTE
Chief Complaint   Patient presents with     Follow Up For     manage CAD, S/P stenting prox LAD     Vitals were taken and medications were reconciled. EKG was performed.  KATHY Tsai  1:14 PM

## 2018-05-03 DIAGNOSIS — I25.10 CORONARY ARTERY DISEASE INVOLVING NATIVE CORONARY ARTERY OF NATIVE HEART WITHOUT ANGINA PECTORIS: Primary | ICD-10-CM

## 2018-05-03 LAB — INTERPRETATION ECG - MUSE: NORMAL

## 2018-05-08 ENCOUNTER — MYC MEDICAL ADVICE (OUTPATIENT)
Dept: CARDIOLOGY | Facility: CLINIC | Age: 58
End: 2018-05-08

## 2018-05-10 ENCOUNTER — TELEPHONE (OUTPATIENT)
Dept: CARDIOLOGY | Facility: CLINIC | Age: 58
End: 2018-05-10

## 2018-05-10 NOTE — TELEPHONE ENCOUNTER
Patient reporting he had an ultrasound of his right leg because of swelling.  It was noted that he has a upper mass abnormality and the doctor requests he take Lasix for 5 days.  Patient would like Dr. Jane's opinion if this medication would be safe for him to take.  Please call to discuss.  Patient also wondering if there is an answer from Dr. Jane to the question he talked with Marilin about.  Patient did not know the dosage of the Lasix because he has not picked up the prescription yet.  [phone].

## 2018-05-11 NOTE — TELEPHONE ENCOUNTER
Pt contacted via telephone by Dr Jane and myself on 05/10/2018.  Pt informed he is ok with Lasix, but believes it is a temporary fix.  Because Pt is paraplegic, he will have issues with swelling in his legs as he does not have the muscle tone to move fluid.  Pt was referred to Sister Ángel, who does his rehab, to provide further assistance and options to decrease swelling.  Pt verbalized understanding, agreed to current plan and denied any further questions.     Marilin Ramirez LPN

## 2018-05-11 NOTE — TELEPHONE ENCOUNTER
Pt contacted via telephone by Dr Jane and myself on 05/10/2018.  Pt informed Dr Jane feels indomethacin is not a safe choice as it will interfere with his Plavix, and he should contact his provider if he has concerns or questions on etidonate disodium .  Pt verbalized understanding, agreed to current plan and denied any further questions.     Marilin Ramirez LPN

## 2018-05-15 ENCOUNTER — HOSPITAL ENCOUNTER (OUTPATIENT)
Dept: CARDIAC REHAB | Facility: CLINIC | Age: 58
End: 2018-05-15
Attending: INTERNAL MEDICINE
Payer: COMMERCIAL

## 2018-05-15 PROCEDURE — 93797 PHYS/QHP OP CAR RHAB WO ECG: CPT

## 2018-05-15 PROCEDURE — 40000575 ZZH STATISTIC OP CARDIAC VISIT #2

## 2018-05-15 PROCEDURE — 93798 PHYS/QHP OP CAR RHAB W/ECG: CPT

## 2018-05-15 PROCEDURE — 40000116 ZZH STATISTIC OP CR VISIT

## 2018-05-16 ENCOUNTER — HOSPITAL ENCOUNTER (OUTPATIENT)
Dept: CARDIAC REHAB | Facility: CLINIC | Age: 58
End: 2018-05-16
Attending: INTERNAL MEDICINE
Payer: COMMERCIAL

## 2018-05-16 DIAGNOSIS — Z95.5 H/O HEART ARTERY STENT: ICD-10-CM

## 2018-05-16 PROCEDURE — 93798 PHYS/QHP OP CAR RHAB W/ECG: CPT | Performed by: CLINICAL EXERCISE PHYSIOLOGIST

## 2018-05-16 PROCEDURE — 40000116 ZZH STATISTIC OP CR VISIT: Performed by: CLINICAL EXERCISE PHYSIOLOGIST

## 2018-05-16 RX ORDER — CLOPIDOGREL BISULFATE 75 MG/1
75 TABLET ORAL DAILY
Qty: 90 TABLET | Refills: 1 | Status: ON HOLD | OUTPATIENT
Start: 2018-05-16 | End: 2018-11-04

## 2018-05-16 RX ORDER — ATORVASTATIN CALCIUM 20 MG/1
20 TABLET, FILM COATED ORAL AT BEDTIME
Qty: 90 TABLET | Refills: 1 | Status: SHIPPED | OUTPATIENT
Start: 2018-05-16 | End: 2018-11-05

## 2018-05-18 ENCOUNTER — HOSPITAL ENCOUNTER (OUTPATIENT)
Dept: CARDIAC REHAB | Facility: CLINIC | Age: 58
End: 2018-05-18
Attending: INTERNAL MEDICINE
Payer: COMMERCIAL

## 2018-05-18 PROCEDURE — 93798 PHYS/QHP OP CAR RHAB W/ECG: CPT | Performed by: REHABILITATION PRACTITIONER

## 2018-05-18 PROCEDURE — 40000116 ZZH STATISTIC OP CR VISIT: Performed by: REHABILITATION PRACTITIONER

## 2018-05-21 ENCOUNTER — HOSPITAL ENCOUNTER (OUTPATIENT)
Dept: CARDIAC REHAB | Facility: CLINIC | Age: 58
End: 2018-05-21
Attending: INTERNAL MEDICINE
Payer: COMMERCIAL

## 2018-05-21 PROCEDURE — 93798 PHYS/QHP OP CAR RHAB W/ECG: CPT | Performed by: OCCUPATIONAL THERAPIST

## 2018-05-21 PROCEDURE — 40000116 ZZH STATISTIC OP CR VISIT: Performed by: OCCUPATIONAL THERAPIST

## 2018-05-23 ENCOUNTER — HOSPITAL ENCOUNTER (OUTPATIENT)
Facility: CLINIC | Age: 58
Discharge: HOME | End: 2018-05-23
Attending: RADIOLOGY | Admitting: RADIOLOGY
Payer: COMMERCIAL

## 2018-05-23 ENCOUNTER — ANESTHESIA (OUTPATIENT)
Dept: SURGERY | Facility: CLINIC | Age: 58
End: 2018-05-23
Payer: COMMERCIAL

## 2018-05-23 ENCOUNTER — ANESTHESIA EVENT (OUTPATIENT)
Dept: SURGERY | Facility: CLINIC | Age: 58
End: 2018-05-23
Payer: COMMERCIAL

## 2018-05-23 ENCOUNTER — APPOINTMENT (OUTPATIENT)
Dept: INTERVENTIONAL RADIOLOGY/VASCULAR | Facility: CLINIC | Age: 58
End: 2018-05-23
Attending: RADIOLOGY
Payer: COMMERCIAL

## 2018-05-23 VITALS
SYSTOLIC BLOOD PRESSURE: 106 MMHG | OXYGEN SATURATION: 96 % | HEART RATE: 89 BPM | DIASTOLIC BLOOD PRESSURE: 65 MMHG | HEIGHT: 71 IN | TEMPERATURE: 97.9 F | BODY MASS INDEX: 22.64 KG/M2 | WEIGHT: 161.7 LBS | RESPIRATION RATE: 18 BRPM

## 2018-05-23 DIAGNOSIS — G95.19 SPINAL CORD HEMORRHAGE (H): ICD-10-CM

## 2018-05-23 LAB
ABO + RH BLD: NORMAL
ABO + RH BLD: NORMAL
APTT PPP: 33 SEC (ref 22–37)
BLD GP AB SCN SERPL QL: NORMAL
BLOOD BANK CMNT PATIENT-IMP: NORMAL
CREAT SERPL-MCNC: 0.56 MG/DL (ref 0.66–1.25)
ERYTHROCYTE [DISTWIDTH] IN BLOOD BY AUTOMATED COUNT: 15.3 % (ref 10–15)
GFR SERPL CREATININE-BSD FRML MDRD: >90 ML/MIN/1.7M2
GLUCOSE SERPL-MCNC: 91 MG/DL (ref 70–99)
HCT VFR BLD AUTO: 34.4 % (ref 40–53)
HGB BLD-MCNC: 10.6 G/DL (ref 13.3–17.7)
INR PPP: 1.2 (ref 0.86–1.14)
MCH RBC QN AUTO: 26 PG (ref 26.5–33)
MCHC RBC AUTO-ENTMCNC: 30.8 G/DL (ref 31.5–36.5)
MCV RBC AUTO: 84 FL (ref 78–100)
PLATELET # BLD AUTO: 362 10E9/L (ref 150–450)
POTASSIUM SERPL-SCNC: 3.7 MMOL/L (ref 3.4–5.3)
RBC # BLD AUTO: 4.08 10E12/L (ref 4.4–5.9)
SPECIMEN EXP DATE BLD: NORMAL
WBC # BLD AUTO: 6 10E9/L (ref 4–11)

## 2018-05-23 PROCEDURE — 36245 INS CATH ABD/L-EXT ART 1ST: CPT

## 2018-05-23 PROCEDURE — C1769 GUIDE WIRE: HCPCS

## 2018-05-23 PROCEDURE — 36415 COLL VENOUS BLD VENIPUNCTURE: CPT | Performed by: ANESTHESIOLOGY

## 2018-05-23 PROCEDURE — 25000125 ZZHC RX 250: Performed by: NURSE ANESTHETIST, CERTIFIED REGISTERED

## 2018-05-23 PROCEDURE — C9399 UNCLASSIFIED DRUGS OR BIOLOG: HCPCS | Performed by: NURSE ANESTHETIST, CERTIFIED REGISTERED

## 2018-05-23 PROCEDURE — 25000128 H RX IP 250 OP 636: Performed by: RADIOLOGY

## 2018-05-23 PROCEDURE — 27210995 ZZH RX 272: Performed by: PSYCHIATRY & NEUROLOGY

## 2018-05-23 PROCEDURE — 25000128 H RX IP 250 OP 636: Performed by: NURSE ANESTHETIST, CERTIFIED REGISTERED

## 2018-05-23 PROCEDURE — 75705 ARTERY X-RAYS SPINE: CPT | Mod: XS

## 2018-05-23 PROCEDURE — 27210908 ZZH NEEDLE CR4

## 2018-05-23 PROCEDURE — 75705 ARTERY X-RAYS SPINE: CPT

## 2018-05-23 PROCEDURE — 25000125 ZZHC RX 250: Performed by: PSYCHIATRY & NEUROLOGY

## 2018-05-23 PROCEDURE — 37000009 ZZH ANESTHESIA TECHNICAL FEE, EACH ADDTL 15 MIN

## 2018-05-23 PROCEDURE — 82565 ASSAY OF CREATININE: CPT | Performed by: ANESTHESIOLOGY

## 2018-05-23 PROCEDURE — 86850 RBC ANTIBODY SCREEN: CPT | Performed by: NURSE ANESTHETIST, CERTIFIED REGISTERED

## 2018-05-23 PROCEDURE — 84132 ASSAY OF SERUM POTASSIUM: CPT | Performed by: ANESTHESIOLOGY

## 2018-05-23 PROCEDURE — C1760 CLOSURE DEV, VASC: HCPCS

## 2018-05-23 PROCEDURE — 86901 BLOOD TYPING SEROLOGIC RH(D): CPT | Performed by: NURSE ANESTHETIST, CERTIFIED REGISTERED

## 2018-05-23 PROCEDURE — C1887 CATHETER, GUIDING: HCPCS

## 2018-05-23 PROCEDURE — 86900 BLOOD TYPING SEROLOGIC ABO: CPT | Performed by: NURSE ANESTHETIST, CERTIFIED REGISTERED

## 2018-05-23 PROCEDURE — 37000008 ZZH ANESTHESIA TECHNICAL FEE, 1ST 30 MIN

## 2018-05-23 PROCEDURE — 71000014 ZZH RECOVERY PHASE 1 LEVEL 2 FIRST HR

## 2018-05-23 PROCEDURE — 82947 ASSAY GLUCOSE BLOOD QUANT: CPT | Performed by: ANESTHESIOLOGY

## 2018-05-23 PROCEDURE — 36215 PLACE CATHETER IN ARTERY: CPT | Mod: XS

## 2018-05-23 PROCEDURE — 27210889 ZZH ACCESSORY CR8

## 2018-05-23 PROCEDURE — 27210905 ZZH KIT CR7

## 2018-05-23 PROCEDURE — 25000565 ZZH ISOFLURANE, EA 15 MIN

## 2018-05-23 PROCEDURE — 40000170 ZZH STATISTIC PRE-PROCEDURE ASSESSMENT II

## 2018-05-23 PROCEDURE — 27210802 ZZH SHEATH CR1

## 2018-05-23 PROCEDURE — 27210732 ZZH ACCESSORY CR1

## 2018-05-23 PROCEDURE — 71000027 ZZH RECOVERY PHASE 2 EACH 15 MINS

## 2018-05-23 RX ORDER — LIDOCAINE HYDROCHLORIDE 20 MG/ML
INJECTION, SOLUTION INFILTRATION; PERINEURAL PRN
Status: DISCONTINUED | OUTPATIENT
Start: 2018-05-23 | End: 2018-05-23

## 2018-05-23 RX ORDER — LIDOCAINE 40 MG/G
CREAM TOPICAL
Status: DISCONTINUED | OUTPATIENT
Start: 2018-05-23 | End: 2018-05-23 | Stop reason: HOSPADM

## 2018-05-23 RX ORDER — LIDOCAINE HYDROCHLORIDE 10 MG/ML
1-30 INJECTION, SOLUTION EPIDURAL; INFILTRATION; INTRACAUDAL; PERINEURAL
Status: COMPLETED | OUTPATIENT
Start: 2018-05-23 | End: 2018-05-23

## 2018-05-23 RX ORDER — PROCHLORPERAZINE MALEATE 5 MG
10 TABLET ORAL EVERY 6 HOURS PRN
Status: DISCONTINUED | OUTPATIENT
Start: 2018-05-23 | End: 2018-05-23 | Stop reason: HOSPADM

## 2018-05-23 RX ORDER — ONDANSETRON 4 MG/1
4 TABLET, ORALLY DISINTEGRATING ORAL EVERY 6 HOURS PRN
Status: DISCONTINUED | OUTPATIENT
Start: 2018-05-23 | End: 2018-05-23 | Stop reason: HOSPADM

## 2018-05-23 RX ORDER — ONDANSETRON 4 MG/1
4 TABLET, ORALLY DISINTEGRATING ORAL EVERY 30 MIN PRN
Status: DISCONTINUED | OUTPATIENT
Start: 2018-05-23 | End: 2018-05-23 | Stop reason: HOSPADM

## 2018-05-23 RX ORDER — SODIUM CHLORIDE, SODIUM LACTATE, POTASSIUM CHLORIDE, CALCIUM CHLORIDE 600; 310; 30; 20 MG/100ML; MG/100ML; MG/100ML; MG/100ML
INJECTION, SOLUTION INTRAVENOUS CONTINUOUS
Status: DISCONTINUED | OUTPATIENT
Start: 2018-05-23 | End: 2018-05-23 | Stop reason: HOSPADM

## 2018-05-23 RX ORDER — FENTANYL CITRATE 50 UG/ML
INJECTION, SOLUTION INTRAMUSCULAR; INTRAVENOUS PRN
Status: DISCONTINUED | OUTPATIENT
Start: 2018-05-23 | End: 2018-05-23

## 2018-05-23 RX ORDER — SODIUM CHLORIDE, SODIUM LACTATE, POTASSIUM CHLORIDE, CALCIUM CHLORIDE 600; 310; 30; 20 MG/100ML; MG/100ML; MG/100ML; MG/100ML
INJECTION, SOLUTION INTRAVENOUS CONTINUOUS PRN
Status: DISCONTINUED | OUTPATIENT
Start: 2018-05-23 | End: 2018-05-23

## 2018-05-23 RX ORDER — ONDANSETRON 2 MG/ML
4 INJECTION INTRAMUSCULAR; INTRAVENOUS EVERY 30 MIN PRN
Status: DISCONTINUED | OUTPATIENT
Start: 2018-05-23 | End: 2018-05-23 | Stop reason: HOSPADM

## 2018-05-23 RX ORDER — SODIUM CHLORIDE 9 MG/ML
INJECTION, SOLUTION INTRAVENOUS CONTINUOUS
Status: DISCONTINUED | OUTPATIENT
Start: 2018-05-23 | End: 2018-05-23 | Stop reason: HOSPADM

## 2018-05-23 RX ORDER — IODIXANOL 320 MG/ML
150 INJECTION, SOLUTION INTRAVASCULAR ONCE
Status: COMPLETED | OUTPATIENT
Start: 2018-05-23 | End: 2018-05-23

## 2018-05-23 RX ORDER — PROCHLORPERAZINE 25 MG
25 SUPPOSITORY, RECTAL RECTAL EVERY 12 HOURS PRN
Status: DISCONTINUED | OUTPATIENT
Start: 2018-05-23 | End: 2018-05-23 | Stop reason: HOSPADM

## 2018-05-23 RX ORDER — ONDANSETRON 2 MG/ML
4 INJECTION INTRAMUSCULAR; INTRAVENOUS EVERY 6 HOURS PRN
Status: DISCONTINUED | OUTPATIENT
Start: 2018-05-23 | End: 2018-05-23 | Stop reason: HOSPADM

## 2018-05-23 RX ORDER — PROPOFOL 10 MG/ML
INJECTION, EMULSION INTRAVENOUS PRN
Status: DISCONTINUED | OUTPATIENT
Start: 2018-05-23 | End: 2018-05-23

## 2018-05-23 RX ORDER — MEPERIDINE HYDROCHLORIDE 25 MG/ML
12.5 INJECTION INTRAMUSCULAR; INTRAVENOUS; SUBCUTANEOUS
Status: DISCONTINUED | OUTPATIENT
Start: 2018-05-23 | End: 2018-05-23 | Stop reason: HOSPADM

## 2018-05-23 RX ORDER — NALOXONE HYDROCHLORIDE 0.4 MG/ML
.1-.4 INJECTION, SOLUTION INTRAMUSCULAR; INTRAVENOUS; SUBCUTANEOUS
Status: DISCONTINUED | OUTPATIENT
Start: 2018-05-23 | End: 2018-05-23 | Stop reason: HOSPADM

## 2018-05-23 RX ORDER — GLYCOPYRROLATE 0.2 MG/ML
INJECTION, SOLUTION INTRAMUSCULAR; INTRAVENOUS PRN
Status: DISCONTINUED | OUTPATIENT
Start: 2018-05-23 | End: 2018-05-23

## 2018-05-23 RX ORDER — LIDOCAINE HYDROCHLORIDE 10 MG/ML
1-30 INJECTION, SOLUTION EPIDURAL; INFILTRATION; INTRACAUDAL; PERINEURAL
Status: DISCONTINUED | OUTPATIENT
Start: 2018-05-23 | End: 2018-05-23 | Stop reason: HOSPADM

## 2018-05-23 RX ADMIN — ROCURONIUM BROMIDE 20 MG: 10 INJECTION INTRAVENOUS at 09:21

## 2018-05-23 RX ADMIN — FENTANYL CITRATE 100 MCG: 50 INJECTION, SOLUTION INTRAMUSCULAR; INTRAVENOUS at 08:53

## 2018-05-23 RX ADMIN — PHENYLEPHRINE HYDROCHLORIDE 0.2 MCG/KG/MIN: 10 INJECTION, SOLUTION INTRAMUSCULAR; INTRAVENOUS; SUBCUTANEOUS at 09:05

## 2018-05-23 RX ADMIN — IODIXANOL 100 ML: 320 INJECTION, SOLUTION INTRAVASCULAR at 09:00

## 2018-05-23 RX ADMIN — PHENYLEPHRINE HYDROCHLORIDE 100 MCG: 10 INJECTION, SOLUTION INTRAMUSCULAR; INTRAVENOUS; SUBCUTANEOUS at 09:02

## 2018-05-23 RX ADMIN — GLYCOPYRROLATE 0.1 MG: 0.2 INJECTION, SOLUTION INTRAMUSCULAR; INTRAVENOUS at 09:48

## 2018-05-23 RX ADMIN — SUGAMMADEX 150 MG: 100 INJECTION, SOLUTION INTRAVENOUS at 10:45

## 2018-05-23 RX ADMIN — ROCURONIUM BROMIDE 50 MG: 10 INJECTION INTRAVENOUS at 08:53

## 2018-05-23 RX ADMIN — PHENYLEPHRINE HYDROCHLORIDE 100 MCG: 10 INJECTION, SOLUTION INTRAMUSCULAR; INTRAVENOUS; SUBCUTANEOUS at 08:53

## 2018-05-23 RX ADMIN — LIDOCAINE HYDROCHLORIDE 100 MG: 20 INJECTION, SOLUTION INFILTRATION; PERINEURAL at 08:53

## 2018-05-23 RX ADMIN — HEPARIN SODIUM 5000 UNITS: 1000 INJECTION, SOLUTION INTRAVENOUS; SUBCUTANEOUS at 11:01

## 2018-05-23 RX ADMIN — SODIUM CHLORIDE, POTASSIUM CHLORIDE, SODIUM LACTATE AND CALCIUM CHLORIDE: 600; 310; 30; 20 INJECTION, SOLUTION INTRAVENOUS at 09:09

## 2018-05-23 RX ADMIN — LIDOCAINE HYDROCHLORIDE 10 ML: 10 INJECTION, SOLUTION EPIDURAL; INFILTRATION; INTRACAUDAL; PERINEURAL at 11:00

## 2018-05-23 RX ADMIN — ROCURONIUM BROMIDE 30 MG: 10 INJECTION INTRAVENOUS at 09:40

## 2018-05-23 RX ADMIN — SODIUM CHLORIDE, POTASSIUM CHLORIDE, SODIUM LACTATE AND CALCIUM CHLORIDE: 600; 310; 30; 20 INJECTION, SOLUTION INTRAVENOUS at 08:44

## 2018-05-23 RX ADMIN — PROPOFOL 180 MG: 10 INJECTION, EMULSION INTRAVENOUS at 08:53

## 2018-05-23 RX ADMIN — GLYCOPYRROLATE 0.2 MG: 0.2 INJECTION, SOLUTION INTRAMUSCULAR; INTRAVENOUS at 10:30

## 2018-05-23 NOTE — DISCHARGE INSTRUCTIONS
-No lifting more then 10lbs for 7 days    -May Shower tomorrow     -No Soaking and Aqua therapy for 1 week    -May resume home medications    -May resume home diet    ***Please see attached Angiography Carotid Instructions as well***    Dr. Gross Discharge Instructions from bedside...     [facility], [place]  Same-Day Surgery   Adult Discharge Orders & Instructions     For 24 hours after surgery    1. Get plenty of rest.  A responsible adult must stay with you for at least 24 hours after you leave the hospital.   2. Do not drive or use heavy equipment.  If you have weakness or tingling, don't drive or use heavy equipment until this feeling goes away.  3. Do not drink alcohol.  4. Avoid strenuous or risky activities.  Ask for help when climbing stairs.   5. You may feel lightheaded.  IF so, sit for a few minutes before standing.  Have someone help you get up.   6. If you have nausea (feel sick to your stomach): Drink only clear liquids such as apple juice, ginger ale, broth or 7-Up.  Rest may also help.  Be sure to drink enough fluids.  Move to a regular diet as you feel able.  7. You may have a slight fever. Call the doctor if your fever is over 100 F (37.7 C) (taken under the tongue) or lasts longer than 24 hours.  8. You may have a dry mouth, a sore throat, muscle aches or trouble sleeping.  These should go away after 24 hours.  9. Do not make important or legal decisions.   Call your doctor for any of the followin.  Signs of infection (fever, growing tenderness at the surgery site, a large amount of drainage or bleeding, severe pain, foul-smelling drainage, redness, swelling).    2. It has been over 8 to 10 hours since surgery and you are still not able to urinate (pass water).    3.  Headache for over 24 hours.    4.  Numbness, tingling or weakness the day after surgery (if you had spinal anesthesia).  To contact a doctor, call RADIOLOGY OFFICE @ [phone]  or:       [phone] and ask for the resident on call for   Interventional Radiology (answered 24 hours a day)      Emergency Department:    [facility]: [phone]       (TTY for hearing impaired: [phone])

## 2018-05-23 NOTE — OR NURSING
Pt stable,alert and oriented x 4. Right groin puncture site free of bleeding and hematoma.,CMS is intact with strong palpable pulses to all extremities. Pt's brother Jared here,discharge teaching completed,and pt was discharged in his care via pt's W/C.

## 2018-05-23 NOTE — H&P
[facility], [place]     Endovascular Surgical Neuroradiology Pre-Procedure Note      HPI:  Derek Enriquez is a 57 year old male with hx of cardiac arrest during exercise. He was successfully resuscitated and underwent stent placement for LAD stenosis. Post-op day 1 he had acute onset back pain along with loss of sensation and strength in bilateral lower extremities. 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 During surgery, there was a suspected spinal malformation and cauterization of vessel contributing to this suspected malformation was performed. He underwent a spinal angiogram that did not reveal any spinal vascular malformation. 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 He presents today for follow up spinal angiogram. Currently he lives at home and is getting outpatient rehab. Unfortunately, he has persistent sensory loss and weakness in bilateral lower extremities.    Medical History:  Past Medical History:   Diagnosis Date     CAD (coronary artery disease)      Neurogenic bladder      Neurogenic bowel      Orthostatic hypotension      Paraplegia (H)      Thoracic spinal cord injury (H)        Surgical History:  Past Surgical History:   Procedure Laterality Date     LAMINECTOMY THORACIC THREE LEVELS N/A 3/9/2018    Procedure: LAMINECTOMY THORACIC THREE LEVELS;  Thoracic 9-12 Laminectomy Evacuation of Subdural Hematoma, C-Arm, Prone, Gel Rolls.;  Surgeon: Abhijeet Joe MD;  Location: [facility] OR     wisdom teeth extraction         Family History:  History reviewed. No pertinent family history.    Social History:  Social History     Social History     Marital status: Single     Spouse name: N/A     Number of children: N/A     Years of education: N/A     Occupational History     Not on file.     Social History Main Topics     Smoking status: Former Smoker     Smokeless tobacco: Never Used      Comment:  Quit at 31     Alcohol use Not on file     Drug use: Not on file     Sexual activity: Not on file     Other Topics Concern     Not on file     Social History Narrative       Allergies:  No Known Allergies    Is there a contrast allergy?  No    Medications:  Prescriptions Prior to Admission   Medication Sig Dispense Refill Last Dose     ascorbic acid (VITAMIN C) 500 MG tablet Take 500 mg by mouth 2 times daily   5/22/2018 at 0800     atorvastatin (LIPITOR) 20 MG tablet Take 1 tablet (20 mg) by mouth At Bedtime 90 tablet 1 5/23/2018 at 0600     clopidogrel (PLAVIX) 75 MG tablet Take 1 tablet (75 mg) by mouth daily 90 tablet 1 5/23/2018 at 0600     docusate sodium (COLACE) 100 MG capsule Take 100 mg by mouth daily as needed   5/23/2018 at 0600     etidronate (DIDRONEL) 200 MG tablet 2 times daily   5/23/2018 at 0600     Multiple Vitamins-Minerals (MULTIVITAMIN MEN PO) Take 1 tablet by mouth daily    5/22/2018 at 0800     phenylephrine-shark liver oil-mineral oil-petrolatum (PREPARATION H) 0.25-14-74.9 % rectal ointment Place rectally daily as needed   Past Week at Unknown time     vitamin B complex with vitamin C (VITAMIN  B COMPLEX) TABS tablet Take 1 tablet by mouth daily   5/22/2018 at 0800     midodrine (PROAMATINE) 2.5 MG tablet Take 1 tablet (2.5 mg) by mouth 2 times daily Give before morning and before afternoon therapies start for orthostatic hypotension. (Patient taking differently: Take 2.5 mg by mouth as needed Give before morning and before afternoon therapies start for orthostatic hypotension.)   More than a month at Unknown time     senna-docusate (SENOKOT-S;PERICOLACE) 8.6-50 MG per tablet Take 1-2 tablets by mouth 2 times daily Hold for loose stools 100 tablet  Unknown at Unknown time   .    ROS:  The 10 point Review of Systems is negative other than noted in the HPI or here.    PHYSICAL EXAMINATION  Vital Signs:  B/P: 104/72,  T: 98.1,  P: 89,  R: 16    Cardio:  RRR  Pulmonary:  no respiratory  distress  Abdomen: not examined    Neurologic  Mentation: Awake, alert and oriented x3  Speech: Normal. No dysarthria or aphasia.   Cranial nerves: PERRLA, VFF, EOMI, Facial sensations intact, Face symmetric, Hearing normal to conversation, Palate elevates to midline, shoulder shrug strong B/L, tongue movements intact  Motor: Strength 5/5 in BUE and 0/5 in BLE  Sensations: Intact B/L to light touch in BUE and absent in BLE    LABS  (most recent Cr, BUN, GFR, PLT, INR, PTT within the past 7 days):    Recent Labs  Lab 05/23/18  0815           Platelet Function P2Y12 (PRU):  Not applicable    ASSESSMENT:  Hx of spinal subdural hemorrhage of unknown etiology    PLAN:  Follow up spinal angiogram    Patient was discussed with the Attending, Dr. Raymond, who agrees with the plan.    Elmer MCCANN Male   Pager: [phone]

## 2018-05-23 NOTE — ANESTHESIA POSTPROCEDURE EVALUATION
Patient: Derek Enriquez    Procedure(s):  Out Of O.R. Spinal  Angiogram     Diagnosis:Spinal Cord Hemorrhage   Diagnosis Additional Information: No value filed.    Anesthesia Type:  General, ETT    Note:  Anesthesia Post Evaluation    Patient location during evaluation: PACU  Patient participation: Able to fully participate in evaluation  Level of consciousness: awake  Pain management: adequate  Airway patency: patent  Cardiovascular status: acceptable  Respiratory status: acceptable  Hydration status: acceptable  PONV: none     Anesthetic complications: None          Last vitals:  Vitals:    05/23/18 1245 05/23/18 1300 05/23/18 1315   BP: 92/59 104/62 106/65   Pulse:      Resp: 16 16 18   Temp:   36.6  C (97.9  F)   SpO2: 98% 95% 96%         Electronically Signed By: Taras Azul MD  May 23, 2018  1:28 PM

## 2018-05-23 NOTE — OR NURSING
Prolonged pre op time due to multiple interruptions from IR team and anesthesia. Pt to IR before charting completed.

## 2018-05-23 NOTE — PROGRESS NOTES
Patient Name: Derek Enriquez  Medical Record Number: [id number]  Today's Date: 5/23/2018    Procedure: Diagnostic Spinal Angiogram  Proceduralist: Dr. Fuentes, Dr. Gross, Dr. Carpenter, Dr. Raymond    Sedation start time: General Anesthesia    Procedure start time: 0924  Puncture time: 0930  Procedure end time: 1110    Report given to: per anesthesia, PACU  : N/A    6 Fr Angioseal (LOT#[id number]) Closure device deployed to right groin @ 1045  Bedrest: 2 hours  Ambulation Time: 1245    Other Notes: Pt arrived to IR room 3 from . Pt denies any questions or concerns regarding procedure. Pt positioned supine and monitored per protocol. Pt monitored by general anesthesia throughout. Pt transferred back to .    Braden SCRUGGS

## 2018-05-23 NOTE — ANESTHESIA CARE TRANSFER NOTE
Patient: Derek Enriquez    Procedure(s):  Out Of O.R. Spinal  Angiogram     Diagnosis: Spinal Cord Hemorrhage   Diagnosis Additional Information: No value filed.    Anesthesia Type:   General, ETT     Note:    Patient transferred to:PACU  Comments: Anesthesia Care Transfer Note    Patient: Derek Enriquez    Transferred to: PACU with supplemental O2    Patient vital signs: stable    Airway: none    Monitors on, VSS, breathing spontaneously, report to SHEBA Osullivan CRNA   5/23/2018 11:01 AMHandoff Report: Identifed the Patient, Identified the Reponsible Provider, Reviewed the pertinent medical history, Discussed the surgical course, Reviewed Intra-OP anesthesia mangement and issues during anesthesia, Set expectations for post-procedure period and Allowed opportunity for questions and acknowledgement of understanding      Vitals: (Last set prior to Anesthesia Care Transfer)    CRNA VITALS  5/23/2018 1024 - 5/23/2018 1101      5/23/2018             Pulse: 79    SpO2: 100 %    Resp Rate (observed): (!)  1                Electronically Signed By: GREGORIO Vega CRNA  May 23, 2018  11:01 AM

## 2018-05-23 NOTE — IP AVS SNAPSHOT
MRN:[id number]                      After Visit Summary   5/23/2018    Derek Enriquez    MRN: [id number]           Thank you!     Thank you for choosing [facility] for your care. Our goal is always to provide you with excellent care. Hearing back from our patients is one way we can continue to improve our services. Please take a few minutes to complete the written survey that you may receive in the mail after you visit with us. Thank you!        Patient Information     Date Of Birth          1960        About your hospital stay     You were admitted on:  May 23, 2018 You last received care in the:  Same Day [facility]    You were discharged on:  May 23, 2018       Who to Call     For medical emergencies, please call 911.  For non-urgent questions about your medical care, please call your primary care provider or clinic, [phone]  For questions related to your surgery, please call your surgery clinic        Attending Provider     Provider Specialty    Jaciel Clark MD Radiology       Primary Care Provider Office Phone # Fax #    Eugene Burrell [phone]      Your next 10 appointments already scheduled     May 25, 2018  8:00 AM CDT   Cardiac Treatment with Sh Cardiac Rehab 1   [facility] ([facility])    [address]  [facility] [address]   [phone]            May 30, 2018  8:00 AM CDT   Cardiac Treatment with Sh Cardiac Rehab 1   [facility] ([facility])    [address]  [facility] [address]   [phone]            Jun 01, 2018  1:00 PM CDT   Cardiac Treatment with Sh Cardiac Rehab 1   [facility] ([facility])    [address]  [facility] [address]   [phone]            Jun 04, 2018  8:00 AM CDT   Cardiac Treatment with Sh Cardiac Rehab 1   [facility] ([facility])    [address]  [place] [address]   [phone]            Jun 06, 2018  8:00 AM CDT   Cardiac Treatment with  Cardiac Rehab 1   [facility] ([facility])    [address]  [place] [address]   [phone]            Jun 08, 2018  8:00 AM CDT   Cardiac Treatment with  Cardiac Rehab 1   [facility] ([facility])    [address]  [place] [address]   [phone]            Jun 08, 2018  9:00 AM CDT   CONSULT with  Cardiac Rehab 2   [facility] ([facility])    [address]  [place] [address]   [phone]            Jun 11, 2018  8:00 AM CDT   Cardiac Treatment with  Cardiac Rehab 1   [facility] ([facility])    [address]  [place] [address]   [phone]            Jun 13, 2018  8:00 AM CDT   Cardiac Treatment with  Cardiac Rehab 1   [facility] ([facility])    [address]  [place] [address]   [phone]            Messi 15, 2018  8:00 AM CDT   Cardiac Treatment with  Cardiac Rehab 1   [facility] ([facility])    [address]  [place] [address]   [phone]              Further instructions from your care team       -No lifting more then 10lbs for 7 days    -May Shower tomorrow     -No Soaking and Aqua therapy for 1 week    -May resume home medications    -May resume home diet    ***Please see attached Angiography Carotid Instructions as well***    Dr. Gross Discharge Instructions from bedside...     [facility]  Same-Day Surgery   Adult Discharge Orders & Instructions     For 24 hours after surgery    1. Get plenty of rest.  A responsible adult must stay with you for  at least 24 hours after you leave the hospital.   2. Do not drive or use heavy equipment.  If you have weakness or tingling, don't drive or use heavy equipment until this feeling goes away.  3. Do not drink alcohol.  4. Avoid strenuous or risky activities.  Ask for help when climbing stairs.   5. You may feel lightheaded.  IF so, sit for a few minutes before standing.  Have someone help you get up.   6. If you have nausea (feel sick to your stomach): Drink only clear liquids such as apple juice, ginger ale, broth or 7-Up.  Rest may also help.  Be sure to drink enough fluids.  Move to a regular diet as you feel able.  7. You may have a slight fever. Call the doctor if your fever is over 100 F (37.7 C) (taken under the tongue) or lasts longer than 24 hours.  8. You may have a dry mouth, a sore throat, muscle aches or trouble sleeping.  These should go away after 24 hours.  9. Do not make important or legal decisions.   Call your doctor for any of the followin.  Signs of infection (fever, growing tenderness at the surgery site, a large amount of drainage or bleeding, severe pain, foul-smelling drainage, redness, swelling).    2. It has been over 8 to 10 hours since surgery and you are still not able to urinate (pass water).    3.  Headache for over 24 hours.    4.  Numbness, tingling or weakness the day after surgery (if you had spinal anesthesia).  To contact a doctor, call .or:        [phone] and ask for the resident on call for   ______________________________________________ (answered 24 hours a day)      Emergency Department:    [facility]: [phone]       (TTY for hearing impaired: [phone])                Pending Results     Date and Time Order Name Status Description    2018 0756 IR Spinal Angiogram In process             Admission Information     Date & Time Provider Department Dept. Phone    2018 Jaciel Clark MD Same Day Surgery [facility] [phone]     "  Your Vitals Were     Blood Pressure Pulse Temperature Respirations Height Weight    100/51 89 97.9  F (36.6  C) (Oral) 9 1.803 m (5' 10.98\") 73.3 kg (161 lb 11.2 oz)    Pulse Oximetry BMI (Body Mass Index)                100% 22.56 kg/m2          [employer] Information     [employer] gives you secure access to your electronic health record. If you see a primary care provider, you can also send messages to your care team and make appointments. If you have questions, please call your primary care clinic.  If you do not have a primary care provider, please call [phone] and they will assist you.        Care EveryWhere ID     This is your Care EveryWhere ID. This could be used by other organizations to access your [facility] medical records  [id number]        Equal Access to Services     AUSTIN WILSON : Bri Francisco, kesha bob, jakub flood, minal sales . So [facility] [phone].    ATENCIÓN: Si habla español, tiene a humphrey disposición servicios gratuitos de asistencia lingüística. George al [phone].    We comply with applicable federal civil rights laws and [place] laws. We do not discriminate on the basis of race, color, national origin, age, disability, sex, sexual orientation, or gender identity.               Review of your medicines      UNREVIEWED medicines. Ask your doctor about these medicines        Dose / Directions    ascorbic acid 500 MG tablet   Commonly known as:  VITAMIN C        Dose:  500 mg   Take 500 mg by mouth 2 times daily   Refills:  0       atorvastatin 20 MG tablet   Commonly known as:  LIPITOR   Used for:  H/O heart artery stent        Dose:  20 mg   Take 1 tablet (20 mg) by mouth At Bedtime   Quantity:  90 tablet   Refills:  1       clopidogrel 75 MG tablet   Commonly known as:  PLAVIX   Used for:  H/O heart artery stent        Dose:  75 mg   Take 1 tablet (75 mg) by mouth daily   Quantity:  90 tablet   Refills:  1       docusate " sodium 100 MG capsule   Commonly known as:  COLACE        Dose:  100 mg   Take 100 mg by mouth daily as needed   Refills:  0       etidronate 200 MG tablet   Commonly known as:  DIDRONEL        2 times daily   Refills:  0       midodrine 2.5 MG tablet   Commonly known as:  PROAMATINE   Used for:  Neurogenic orthostatic hypotension (H)        Dose:  2.5 mg   Take 1 tablet (2.5 mg) by mouth 2 times daily Give before morning and before afternoon therapies start for orthostatic hypotension.   Refills:  0       MULTIVITAMIN MEN PO        Dose:  1 tablet   Take 1 tablet by mouth daily   Refills:  0       phenylephrine-shark liver oil-mineral oil-petrolatum 0.25-14-74.9 % rectal ointment   Commonly known as:  PREPARATION H        Place rectally daily as needed   Refills:  0       senna-docusate 8.6-50 MG per tablet   Commonly known as:  SENOKOT-S;PERICOLACE   Used for:  Neurogenic bowel        Dose:  1-2 tablet   Take 1-2 tablets by mouth 2 times daily Hold for loose stools   Quantity:  100 tablet   Refills:  0       vitamin B complex with vitamin C Tabs tablet        Dose:  1 tablet   Take 1 tablet by mouth daily   Refills:  0                Protect others around you: Learn how to safely use, store and throw away your medicines at www.disposemymeds.org.             Medication List: This is a list of all your medications and when to take them. Check marks below indicate your daily home schedule. Keep this list as a reference.      Medications           Morning Afternoon Evening Bedtime As Needed    ascorbic acid 500 MG tablet   Commonly known as:  VITAMIN C   Take 500 mg by mouth 2 times daily                                atorvastatin 20 MG tablet   Commonly known as:  LIPITOR   Take 1 tablet (20 mg) by mouth At Bedtime                                clopidogrel 75 MG tablet   Commonly known as:  PLAVIX   Take 1 tablet (75 mg) by mouth daily                                docusate sodium 100 MG capsule   Commonly known  as:  COLACE   Take 100 mg by mouth daily as needed                                etidronate 200 MG tablet   Commonly known as:  DIDRONEL   2 times daily                                midodrine 2.5 MG tablet   Commonly known as:  PROAMATINE   Take 1 tablet (2.5 mg) by mouth 2 times daily Give before morning and before afternoon therapies start for orthostatic hypotension.                                MULTIVITAMIN MEN PO   Take 1 tablet by mouth daily                                phenylephrine-shark liver oil-mineral oil-petrolatum 0.25-14-74.9 % rectal ointment   Commonly known as:  PREPARATION H   Place rectally daily as needed                                senna-docusate 8.6-50 MG per tablet   Commonly known as:  SENOKOT-S;PERICOLACE   Take 1-2 tablets by mouth 2 times daily Hold for loose stools                                vitamin B complex with vitamin C Tabs tablet   Take 1 tablet by mouth daily                                          More Information        Carotid Angiography    Carotid angiography is a type of X-ray test used to view the carotid arteries. These are the large blood vessels that supply your brain with blood. During the test, a thin, flexible tube called a catheter is passed into an artery in the leg or arm that leads to the carotids. Contrast fluid is then injected through the catheter. The fluid makes it easier to see the carotids on the X-rays.  Talk with your healthcare provider about the risks and complications of angiography.   How do I get ready for a carotid angiography?    Tell your healthcare provider about any past or current health conditions    Tell your healthcare provider about any allergies you may have, including allergy to contrast dye.    Tell your healthcare provider about all medicines you take. You may be told to stop taking some or all of them before the test. Tell your healthcare provider about:  ? All prescription medicines  ? Over-the-counter medicines that  don't need a prescription  ? Any street drugs you may use   ? Herbs, vitamins, kelp, seaweed, cough syrups, and supplements    Tell your healthcare provider if you are pregnant, think you could be pregnant, or are breastfeeding.     Don t eat or drink after midnight the night before the procedure. If your healthcare provider says to take your normal medicines, swallow them with only small sips of water.    Arrange for an adult family member or friend to drive you home.  What happens during carotid angiography?    An IV line is started in your arm. You may also be given a medicine that helps you relax (sedative).    You re given an injection to numb the site where the catheter will be inserted. This is usually the groin area.    A small puncture is made into the artery, and the catheter is inserted. Using X-rays, the catheter is then carefully guided through the artery.    Contrast fluid is injected through the catheter into the artery. You may feel warmth or pressure in your legs, back, neck, or head. You will need to lie still as X-rays are taken of the carotids. You may be asked to hold your breath during injections. When the procedure is complete, the catheter is removed.  What happens after carotid angiography?    You ll be taken to a recovery area.    Pressure will be applied to the insertion site for about 10 minutes.    You ll then need to lie flat for a few hours.    Your healthcare provider will discuss the results with you soon after the procedure.    Depending on your test results and your medical condition, you will either be discharged home or remain in the hospital.  Once you are home:    Don t drive for 24 hours.    Don't walk, bend, lift, or take stairs for 24 hours.    Don't lift anything over 5 pounds for 7 days.  Be sure to follow any other instructions from your healthcare provider.  When should I call my health care provider?  Call your health care provider if you have any of the  following:    Fever of 100.4 F (38 C) or higher lasting for 24 to 48 hours, or as advised by your health care provider    Bleeding, swelling, or a lump at the insertion site    Sharp or increasing pain at the insertion site    Dizziness or lightheadedness    Leg pain, numbness, or a cold leg or foot    Severe headache, visual problems, or trouble speaking    Any other symptoms your health care provider gave you to report based on your medical condition  Date Last Reviewed: 10/1/2017    5612-2204 [employer]. [address] [place], [place] [address]. All rights reserved. This information is not intended as a substitute for professional medical care. Always follow your healthcare professional's instructions.

## 2018-05-23 NOTE — ANESTHESIA PREPROCEDURE EVALUATION
Anesthesia Evaluation     . Pt has had prior anesthetic. Type: General    No history of anesthetic complications          ROS/MED HX    ENT/Pulmonary:  - neg pulmonary ROS     Neurologic: Comment: Paraplegia, spinal subdural hematoma      Cardiovascular:     (+) --CAD, -past MI,-stent,. Taking blood thinners : . . . :. .       METS/Exercise Tolerance:     Hematologic:         Musculoskeletal:         GI/Hepatic:  - neg GI/hepatic ROS       Renal/Genitourinary:  - ROS Renal section negative       Endo:  - neg endo ROS       Psychiatric:         Infectious Disease:  - neg infectious disease ROS       Malignancy:      - no malignancy   Other:                     Physical Exam  Normal systems: dental    Airway   Mallampati: I  TM distance: >3 FB  Neck ROM: full    Dental     Cardiovascular   Rhythm and rate: regular      Pulmonary    breath sounds clear to auscultation                    Anesthesia Plan      History & Physical Review  History and physical reviewed and following examination; no interval change.    ASA Status:  3 .    NPO Status:  > 8 hours    Plan for General and ETT with Intravenous induction. Maintenance will be Balanced.    PONV prophylaxis:  Ondansetron (or other 5HT-3) and Dexamethasone or Solumedrol       Postoperative Care  Postoperative pain management:  Multi-modal analgesia.      Consents  Anesthetic plan, risks, benefits and alternatives discussed with:  Patient.  Use of blood products discussed: No .   .                          .

## 2018-05-23 NOTE — PROCEDURES
[facility], [place]     Endovascular Surgical Neuroradiology Post-Procedure Note    Pre-Procedure Diagnosis:  Lower extremity weakness, spinal subdural hematoma  Post-Procedure Diagnosis:  same    Procedure(s):   Diagnostic spinal angiography    Findings:  No AVM, AVF visualized.  Persistent occlusion of right L2, consistent with previous angio.     Plan:      - home after recovery  - 2 hrs bedrest right leg straight    Primary Surgeon:  Dr. James Raymond  Fellow:  Male, Jayden Gibbs    Prior to the start of the procedure and with procedural staff participation, I verbally confirmed: the patient s identity using two indicators, relevant allergies, that the procedure was appropriate and matched the consent or emergent situation, and that the correct equipment/implants were available. Immediately prior to starting the procedure I conducted the Time Out with the procedural staff and re-confirmed the patient s name, procedure, and site/side. (The Joint Commission universal protocol was followed.)  Yes    PRU value: Not applicable    Anesthesia:  Performed by Anesthesia  Medications:  Fentanyl, Midazolam  Puncture site:  Right Femoral Artery    Fluoroscopy time (minutes):  43.5  Radiation dose (mGy):  529    Contrast amount (mL):  100     Estimated blood loss (mL):  minimal    Closure:  Device - angioseal    Disposition:  Home after recovery.        Sedation Post-Procedure Summary    Sedatives: GETA    Vital signs and pulse oximetry were monitored and remained stable throughout the procedure, and sedation was maintained until the procedure was complete.  The patient was monitored by staff until sedation discharge criteria were met.    Patient tolerance:  Patient tolerated the procedure well with no immediate complications.    Time of sedation in minutes:  See anesthesia    Monserrat Carpenter  Pager:  [phone]

## 2018-05-23 NOTE — IP AVS SNAPSHOT
Same Day Surgery [address] [address]    Phone:  [phone]                                       After Visit Summary   5/23/2018    Derek Enriquez    MRN: [id number]           After Visit Summary Signature Page     I have received my discharge instructions, and my questions have been answered. I have discussed any challenges I see with this plan with the nurse or doctor.    ..........................................................................................................................................  Patient/Patient Representative Signature      ..........................................................................................................................................  Patient Representative Print Name and Relationship to Patient    ..................................................               ................................................  Date                                            Time    ..........................................................................................................................................  Reviewed by Signature/Title    ...................................................              ..............................................  Date                                                            Time

## 2018-05-25 ENCOUNTER — CARE COORDINATION (OUTPATIENT)
Dept: NEUROLOGY | Facility: CLINIC | Age: 58
End: 2018-05-25

## 2018-05-25 NOTE — PROGRESS NOTES
Neuro-Interventional Discharge Coordination Note     Responsible Attending physician: Dr. Raymond     Operation performed: Diagnostic spinal angiogram to assess lower extremity weakness, spinal subdural hematoma     Findings:  No AVM, AVF visualized.  Persistent occlusion of right L2, consistent with previous angio.     Date of Discharge: 5/23/18    Discharge to: Home    Current Contact number: [phone]    Current Status: Patient states he is doing great. He has no questions/concerns. Denies bleeding, drainage, pain, swelling, redness at groin puncture site. Patient is aware of the plan. Will have scheduling reach out to him. Patient has my contact information and was encouraged to call with questions/concerns.     Plan: Follow-up with Dr. Joe.

## 2018-05-30 ENCOUNTER — HOSPITAL ENCOUNTER (OUTPATIENT)
Dept: CARDIAC REHAB | Facility: CLINIC | Age: 58
End: 2018-05-30
Attending: INTERNAL MEDICINE
Payer: COMMERCIAL

## 2018-05-30 PROCEDURE — 93798 PHYS/QHP OP CAR RHAB W/ECG: CPT | Performed by: REHABILITATION PRACTITIONER

## 2018-05-30 PROCEDURE — 40000116 ZZH STATISTIC OP CR VISIT: Performed by: REHABILITATION PRACTITIONER

## 2018-06-04 ENCOUNTER — HOSPITAL ENCOUNTER (OUTPATIENT)
Dept: CARDIAC REHAB | Facility: CLINIC | Age: 58
End: 2018-06-04
Attending: INTERNAL MEDICINE
Payer: COMMERCIAL

## 2018-06-04 PROCEDURE — 93798 PHYS/QHP OP CAR RHAB W/ECG: CPT

## 2018-06-04 PROCEDURE — 40000116 ZZH STATISTIC OP CR VISIT

## 2018-06-12 ENCOUNTER — PRE VISIT (OUTPATIENT)
Dept: UROLOGY | Facility: CLINIC | Age: 58
End: 2018-06-12

## 2018-06-12 NOTE — TELEPHONE ENCOUNTER
Reason for visit: Neurogenic bladder consult    Relevant information: pt is self referred    Records/imaging/labs: all appointments scheduled, orders in    Pt called: no need for a call    Rooming: Regular

## 2018-06-13 ENCOUNTER — HOSPITAL ENCOUNTER (OUTPATIENT)
Dept: CARDIAC REHAB | Facility: CLINIC | Age: 58
End: 2018-06-13
Attending: INTERNAL MEDICINE
Payer: COMMERCIAL

## 2018-06-13 PROCEDURE — 93798 PHYS/QHP OP CAR RHAB W/ECG: CPT

## 2018-06-13 PROCEDURE — 40000116 ZZH STATISTIC OP CR VISIT

## 2018-06-18 ENCOUNTER — RADIANT APPOINTMENT (OUTPATIENT)
Dept: ULTRASOUND IMAGING | Facility: CLINIC | Age: 58
End: 2018-06-18
Attending: UROLOGY
Payer: COMMERCIAL

## 2018-06-18 ENCOUNTER — OFFICE VISIT (OUTPATIENT)
Dept: UROLOGY | Facility: CLINIC | Age: 58
End: 2018-06-18
Payer: COMMERCIAL

## 2018-06-18 VITALS
DIASTOLIC BLOOD PRESSURE: 70 MMHG | BODY MASS INDEX: 22.68 KG/M2 | HEIGHT: 71 IN | HEART RATE: 80 BPM | SYSTOLIC BLOOD PRESSURE: 114 MMHG | WEIGHT: 162 LBS

## 2018-06-18 DIAGNOSIS — N31.9 NEUROGENIC BLADDER: ICD-10-CM

## 2018-06-18 DIAGNOSIS — I25.10 CORONARY ARTERY DISEASE INVOLVING NATIVE CORONARY ARTERY OF NATIVE HEART WITHOUT ANGINA PECTORIS: ICD-10-CM

## 2018-06-18 DIAGNOSIS — S24.109S INJURY OF THORACIC SPINAL CORD, SEQUELA (H): Primary | ICD-10-CM

## 2018-06-18 LAB
ALBUMIN SERPL-MCNC: 2.3 G/DL (ref 3.4–5)
ALP SERPL-CCNC: 370 U/L (ref 40–150)
ALT SERPL W P-5'-P-CCNC: 68 U/L (ref 0–70)
ANION GAP SERPL CALCULATED.3IONS-SCNC: 8 MMOL/L (ref 3–14)
AST SERPL W P-5'-P-CCNC: 70 U/L (ref 0–45)
BILIRUB SERPL-MCNC: 0.4 MG/DL (ref 0.2–1.3)
BUN SERPL-MCNC: 14 MG/DL (ref 7–30)
CALCIUM SERPL-MCNC: 8.3 MG/DL (ref 8.5–10.1)
CHLORIDE SERPL-SCNC: 102 MMOL/L (ref 94–109)
CK SERPL-CCNC: 100 U/L (ref 30–300)
CO2 SERPL-SCNC: 29 MMOL/L (ref 20–32)
CREAT SERPL-MCNC: 0.57 MG/DL (ref 0.66–1.25)
ERYTHROCYTE [DISTWIDTH] IN BLOOD BY AUTOMATED COUNT: 16.8 % (ref 10–15)
GFR SERPL CREATININE-BSD FRML MDRD: >90 ML/MIN/1.7M2
GLUCOSE SERPL-MCNC: 98 MG/DL (ref 70–99)
HCT VFR BLD AUTO: 30.2 % (ref 40–53)
HGB BLD-MCNC: 9.1 G/DL (ref 13.3–17.7)
MCH RBC QN AUTO: 24.3 PG (ref 26.5–33)
MCHC RBC AUTO-ENTMCNC: 30.1 G/DL (ref 31.5–36.5)
MCV RBC AUTO: 81 FL (ref 78–100)
PLATELET # BLD AUTO: 565 10E9/L (ref 150–450)
POTASSIUM SERPL-SCNC: 4.2 MMOL/L (ref 3.4–5.3)
PROT SERPL-MCNC: 6 G/DL (ref 6.8–8.8)
RBC # BLD AUTO: 3.74 10E12/L (ref 4.4–5.9)
SODIUM SERPL-SCNC: 139 MMOL/L (ref 133–144)
VIT B12 SERPL-MCNC: 651 PG/ML (ref 193–986)
WBC # BLD AUTO: 5.9 10E9/L (ref 4–11)

## 2018-06-18 PROCEDURE — 82728 ASSAY OF FERRITIN: CPT | Performed by: INTERNAL MEDICINE

## 2018-06-18 PROCEDURE — 83550 IRON BINDING TEST: CPT | Performed by: INTERNAL MEDICINE

## 2018-06-18 PROCEDURE — 83540 ASSAY OF IRON: CPT | Performed by: INTERNAL MEDICINE

## 2018-06-18 ASSESSMENT — ENCOUNTER SYMPTOMS
HALLUCINATIONS: 0
POLYDIPSIA: 0
FEVER: 0
WEIGHT GAIN: 0
INCREASED ENERGY: 0
BACK PAIN: 0
JOINT SWELLING: 0
NIGHT SWEATS: 1
FATIGUE: 0
CHILLS: 0
NECK PAIN: 0
MUSCLE WEAKNESS: 0
WEIGHT LOSS: 0
ALTERED TEMPERATURE REGULATION: 1
DECREASED APPETITE: 0
STIFFNESS: 1
MYALGIAS: 0
ARTHRALGIAS: 0
POLYPHAGIA: 0
MUSCLE CRAMPS: 0

## 2018-06-18 ASSESSMENT — PAIN SCALES - GENERAL: PAINLEVEL: NO PAIN (0)

## 2018-06-18 NOTE — LETTER
6/18/2018       RE: Derek Enriquez  [address]  [facility] [address]     Dear Colleague,    Thank you for referring your patient, Derek Enriquez, to the [facility] UROLOGY AND INST FOR PROSTATE AND UROLOGIC CANCERS at [facility]. Please see a copy of my visit note below.    New Consult for Neurogenic Bladder    Name: Derek Enriquez    MRN: [id number]   YOB: 1960  Accompanied at today's visit by:self                 Chief Complaint:   Neurogenic Bladder          History of Present Illness:   HISTORY: Derek Enriquez is a 57 year old male with a history of neurogenic bladder secondary to subdural hematoma at T10 from a cardiac arrest during exercise on 3/8/18. Patient is wheelchair bound and paraplegic. Patient reports he had had no issues with self cathing, reporting he uses a urethral catheter 3-4 times daily. Today he is wondering if he will be able to have an erection again and is concerned about general prostate health with the lack of sensation and control he has now. He denies nocturnal erections and has not tried to be intimate since his accident.    Of note, the patient reports he broke his right femur yesterday just moving his leg in his wheelchair and has surgery scheduled at [facility] next Monday.       Previous Bladder Surgeries:  Previous Bladder Augmentation: none  Catheterizable stoma:none  Anti-incontinence procedures: none  Botox injections: None    Pertinent Medications:  Current Anticholinergics: None  Current Prophylactic antibiotics: None  Intravesical gentamycin:  None  Intravesical oxybutinin: None    Catheterization History:  The patient uses a urethral catheter 3-4 times daily into the toilet and reports no problems. Discussed that the patient can use a less expensive catheter with lube if he wants without increased UTI risk.    Incontinence History:  He does not leak between voids/caths. He does not experience urgency of urination. He does  not experience stress urinary incontinence.      Urinary Tract Infection History:  Treated with antibiotics for positive culture and non-specific symptoms: 2 times in the last year  Treated with antibiotics for positive culture plus symptoms of UTI: 2 times in the last year    Bowel Movement History:  The patient has a bowel movement q1 days. Bowel regimen includes none           Past Medical History:     Past Medical History:   Diagnosis Date     CAD (coronary artery disease)      Neurogenic bladder      Neurogenic bowel      Orthostatic hypotension      Paraplegia (H)      Thoracic spinal cord injury (H)             Past Surgical History:     Past Surgical History:   Procedure Laterality Date     LAMINECTOMY THORACIC THREE LEVELS N/A 3/9/2018    Procedure: LAMINECTOMY THORACIC THREE LEVELS;  Thoracic 9-12 Laminectomy Evacuation of Subdural Hematoma, C-Arm, Prone, Gel Rolls.;  Surgeon: Abhijeet Joe MD;  Location: [facility] OR     wisdom teeth extraction              Social History:     Social History   Substance Use Topics     Smoking status: Former Smoker     Smokeless tobacco: Never Used      Comment: Quit at 31     Alcohol use Not on file            Family History:   No family history on file.           Allergies:   No Known Allergies         Medications:     Current Outpatient Prescriptions   Medication Sig     ascorbic acid (VITAMIN C) 500 MG tablet Take 500 mg by mouth 2 times daily     atorvastatin (LIPITOR) 20 MG tablet Take 1 tablet (20 mg) by mouth At Bedtime     clopidogrel (PLAVIX) 75 MG tablet Take 1 tablet (75 mg) by mouth daily     docusate sodium (COLACE) 100 MG capsule Take 100 mg by mouth daily as needed     etidronate (DIDRONEL) 200 MG tablet 2 times daily     midodrine (PROAMATINE) 2.5 MG tablet Take 1 tablet (2.5 mg) by mouth 2 times daily Give before morning and before afternoon therapies start for orthostatic hypotension. (Patient taking differently: Take 2.5 mg by mouth as needed Give  "before morning and before afternoon therapies start for orthostatic hypotension.)     Multiple Vitamins-Minerals (MULTIVITAMIN MEN PO) Take 1 tablet by mouth daily      phenylephrine-shark liver oil-mineral oil-petrolatum (PREPARATION H) 0.25-14-74.9 % rectal ointment Place rectally daily as needed     senna-docusate (SENOKOT-S;PERICOLACE) 8.6-50 MG per tablet Take 1-2 tablets by mouth 2 times daily Hold for loose stools     vitamin B complex with vitamin C (VITAMIN  B COMPLEX) TABS tablet Take 1 tablet by mouth daily     No current facility-administered medications for this visit.              Review of Systems:    ROS: 14 point ROS neg other than the symptoms noted above in the HPI and PMH.          Physical Exam:   B/P: 114/70, T: Data Unavailable, P: 80, R: Data Unavailable  Estimated body mass index is 22.61 kg/(m^2) as calculated from the following:    Height as of this encounter: 1.803 m (5' 10.98\").    Weight as of this encounter: 73.5 kg (162 lb).  General: age-appropriate appearing male in NAD sitting in a wheelchair.    No further exam         Data:    Imaging:  Renal/Bladder Ultrasound (Date = 6/18/2018) reviewed and discussed with patient:    Right kidney: Measures 11.5 cm in length. Parenchyma is of normal  thickness and echogenicity. No focal mass. No hydronephrosis.     Left kidney: Measures 10.7 cm in length. Parenchyma is of normal  thickness and echogenicity. No focal mass. No hydronephrosis. Cystic  structure in the lower aspect of the left renal hilum, favored to  represent a renal sinus cyst.     Bladder: Distended with fluid and layering debris.             Assessment and Plan:   57 year old male with neurogenic bladder secondary to subdural hematoma at T10 from a cardiac arrest during exercise on 3/8/18. Patient is wheelchair bound and paraplegic. Plan for urodynamics tests in 3 months, continue self-cathing, if leakage occurs will begin anticholinergics at that time. We also discussed trying " to be intimate and the chance that he may be able to have erections from manual, physical stimulation naturally. Discussed taking Cialis, vibratory devices and penile injections to help achieve erections if patient is interested. In regards to patient's concerns about his prostate we discussed that no ejaculation will not harm his prostate health and that we will monitor his PSA.   PAtient can be managed by Candy and involve me as needed.       Other orders as below:  Additional work-up planned:   No orders of the defined types were placed in this encounter.    FU in 3 months after urodynamics performed.    Candy Loving  June 18, 2018     As the medical student, I acted as a scribe for this note. All portions of the documented history and physical were personally performed by Andrei Pelaez MD as the attending physician.     The medical student acted as a scribe for this note. All portions of the documented history and physical were personally performed by me as the attending physician.     Again, thank you for allowing me to participate in the care of your patient.      Sincerely,    Andrei Pelaez MD

## 2018-06-18 NOTE — PATIENT INSTRUCTIONS
Schedule Urodynamics and follow up with Dr. Pelaez.    It was a pleasure meeting with you today.  Thank you for allowing me and my team the privilege of caring for you today.  YOU are the reason we are here, and I truly hope we provided you with the excellent service you deserve.  Please let us know if there is anything else we can do for you so that we can be sure you are leaving completely satisfied with your care experience.

## 2018-06-18 NOTE — NURSING NOTE
"Chief Complaint   Patient presents with     Consult     Neurogenic bladder consult, history of spinal cord injury       Blood pressure 114/70, pulse 80, height 1.803 m (5' 10.98\"), weight 73.5 kg (162 lb). Body mass index is 22.61 kg/(m^2).    Patient Active Problem List   Diagnosis     Cardiac arrest (H)     Coronary artery disease involving native coronary artery     Stented coronary artery     Spinal cord compression (H)     Subdural hematoma (H)       No Known Allergies    Current Outpatient Prescriptions   Medication Sig Dispense Refill     ascorbic acid (VITAMIN C) 500 MG tablet Take 500 mg by mouth 2 times daily       atorvastatin (LIPITOR) 20 MG tablet Take 1 tablet (20 mg) by mouth At Bedtime 90 tablet 1     clopidogrel (PLAVIX) 75 MG tablet Take 1 tablet (75 mg) by mouth daily 90 tablet 1     docusate sodium (COLACE) 100 MG capsule Take 100 mg by mouth daily as needed       etidronate (DIDRONEL) 200 MG tablet 2 times daily       midodrine (PROAMATINE) 2.5 MG tablet Take 1 tablet (2.5 mg) by mouth 2 times daily Give before morning and before afternoon therapies start for orthostatic hypotension. (Patient taking differently: Take 2.5 mg by mouth as needed Give before morning and before afternoon therapies start for orthostatic hypotension.)       Multiple Vitamins-Minerals (MULTIVITAMIN MEN PO) Take 1 tablet by mouth daily        phenylephrine-shark liver oil-mineral oil-petrolatum (PREPARATION H) 0.25-14-74.9 % rectal ointment Place rectally daily as needed       senna-docusate (SENOKOT-S;PERICOLACE) 8.6-50 MG per tablet Take 1-2 tablets by mouth 2 times daily Hold for loose stools 100 tablet      vitamin B complex with vitamin C (VITAMIN  B COMPLEX) TABS tablet Take 1 tablet by mouth daily         Social History   Substance Use Topics     Smoking status: Former Smoker     Smokeless tobacco: Never Used      Comment: Quit at 31     Alcohol use Not on file       KATHY Hollingsworth  6/18/2018  3:06 " PM

## 2018-06-18 NOTE — PROGRESS NOTES
New Consult for Neurogenic Bladder    Name: Derek Enriquez    MRN: [id number]   YOB: 1960  Accompanied at today's visit by:self                 Chief Complaint:   Neurogenic Bladder          History of Present Illness:   HISTORY: Derek Enriquez is a 57 year old male with a history of neurogenic bladder secondary to subdural hematoma at T10. He also recently suffered a  cardiac arrest during exercise on 3/8/18. Patient is wheelchair bound and paraplegic. Patient reports he had had no issues with self cathing, reporting he uses a urethral catheter 3-4 times daily. Today he is wondering if he will be able to have an erection again and is concerned about general prostate health with the lack of sensation and control he has now. He denies nocturnal erections and has not tried to be intimate since his accident.    Of note, the patient reports he broke his right femur yesterday just moving his leg in his wheelchair and has surgery scheduled at [facility] next Monday.       Previous Bladder Surgeries:  Previous Bladder Augmentation: none  Catheterizable stoma:none  Anti-incontinence procedures: none  Botox injections: None    Pertinent Medications:  Current Anticholinergics: None  Current Prophylactic antibiotics: None  Intravesical gentamycin:  None  Intravesical oxybutinin: None    Catheterization History:  The patient uses a urethral catheter 3-4 times daily into the toilet and reports no problems. Discussed that the patient can use a less expensive catheter with lube if he wants without increased UTI risk.    Incontinence History:  He does not leak between voids/caths. He does not experience urgency of urination. He does not experience stress urinary incontinence.      Urinary Tract Infection History:  Treated with antibiotics for positive culture and non-specific symptoms: 2 times in the last year  Treated with antibiotics for positive culture plus symptoms of UTI: 2 times in the last year    Bowel  Movement History:  The patient has a bowel movement q1 days. Bowel regimen includes none           Past Medical History:     Past Medical History:   Diagnosis Date     CAD (coronary artery disease)      Neurogenic bladder      Neurogenic bowel      Orthostatic hypotension      Paraplegia (H)      Thoracic spinal cord injury (H)             Past Surgical History:     Past Surgical History:   Procedure Laterality Date     LAMINECTOMY THORACIC THREE LEVELS N/A 3/9/2018    Procedure: LAMINECTOMY THORACIC THREE LEVELS;  Thoracic 9-12 Laminectomy Evacuation of Subdural Hematoma, C-Arm, Prone, Gel Rolls.;  Surgeon: Abhijeet Joe MD;  Location: [facility] OR     wisdom teeth extraction              Social History:     Social History   Substance Use Topics     Smoking status: Former Smoker     Smokeless tobacco: Never Used      Comment: Quit at 31     Alcohol use Not on file            Family History:   No family history on file.           Allergies:   No Known Allergies         Medications:     Current Outpatient Prescriptions   Medication Sig     ascorbic acid (VITAMIN C) 500 MG tablet Take 500 mg by mouth 2 times daily     atorvastatin (LIPITOR) 20 MG tablet Take 1 tablet (20 mg) by mouth At Bedtime     clopidogrel (PLAVIX) 75 MG tablet Take 1 tablet (75 mg) by mouth daily     docusate sodium (COLACE) 100 MG capsule Take 100 mg by mouth daily as needed     etidronate (DIDRONEL) 200 MG tablet 2 times daily     midodrine (PROAMATINE) 2.5 MG tablet Take 1 tablet (2.5 mg) by mouth 2 times daily Give before morning and before afternoon therapies start for orthostatic hypotension. (Patient taking differently: Take 2.5 mg by mouth as needed Give before morning and before afternoon therapies start for orthostatic hypotension.)     Multiple Vitamins-Minerals (MULTIVITAMIN MEN PO) Take 1 tablet by mouth daily      phenylephrine-shark liver oil-mineral oil-petrolatum (PREPARATION H) 0.25-14-74.9 % rectal ointment Place rectally daily  "as needed     senna-docusate (SENOKOT-S;PERICOLACE) 8.6-50 MG per tablet Take 1-2 tablets by mouth 2 times daily Hold for loose stools     vitamin B complex with vitamin C (VITAMIN  B COMPLEX) TABS tablet Take 1 tablet by mouth daily     No current facility-administered medications for this visit.              Review of Systems:    ROS: 14 point ROS neg other than the symptoms noted above in the HPI and PMH.          Physical Exam:   B/P: 114/70, T: Data Unavailable, P: 80, R: Data Unavailable  Estimated body mass index is 22.61 kg/(m^2) as calculated from the following:    Height as of this encounter: 1.803 m (5' 10.98\").    Weight as of this encounter: 73.5 kg (162 lb).  General: age-appropriate appearing male in NAD sitting in a wheelchair.    No further exam         Data:    Imaging:  Renal/Bladder Ultrasound (Date = 6/18/2018) reviewed and discussed with patient:    Right kidney: Measures 11.5 cm in length. Parenchyma is of normal  thickness and echogenicity. No focal mass. No hydronephrosis.     Left kidney: Measures 10.7 cm in length. Parenchyma is of normal  thickness and echogenicity. No focal mass. No hydronephrosis. Cystic  structure in the lower aspect of the left renal hilum, favored to  represent a renal sinus cyst.     Bladder: Distended with fluid and layering debris.             Assessment and Plan:   57 year old male with neurogenic bladder secondary to subdural hematoma at T10 from a cardiac arrest during exercise on 3/8/18. Patient is wheelchair bound and paraplegic. Plan for urodynamics tests in 3 months, continue self-cathing, if leakage occurs will begin anticholinergics at that time. We also discussed trying to be intimate and the chance that he may be able to have erections from manual, physical stimulation naturally. Discussed taking Cialis, vibratory devices and penile injections to help achieve erections if patient is interested. In regards to patient's concerns about his prostate we " discussed that no ejaculation will not harm his prostate health and that we will monitor his PSA.   PAtient can be managed by Candy and involve me as needed.       Other orders as below:  Additional work-up planned:   No orders of the defined types were placed in this encounter.    FU in 3 months after urodynamics performed.    Candy Loving  June 18, 2018     As the medical student, I acted as a scribe for this note. All portions of the documented history and physical were personally performed by Andrei Pelaez MD as the attending physician.     The medical student acted as a scribe for this note. All portions of the documented history and physical were personally performed by me as the attending physician.       Answers for HPI/ROS submitted by the patient on 6/18/2018   General Symptoms: Yes  Skin Symptoms: No  HENT Symptoms: No  EYE SYMPTOMS: No  HEART SYMPTOMS: No  LUNG SYMPTOMS: No  INTESTINAL SYMPTOMS: No  URINARY SYMPTOMS: No  REPRODUCTIVE SYMPTOMS: No  SKELETAL SYMPTOMS: Yes  BLOOD SYMPTOMS: No  NERVOUS SYSTEM SYMPTOMS: No  MENTAL HEALTH SYMPTOMS: No  Fever: No  Loss of appetite: No  Weight loss: No  Weight gain: No  Fatigue: No  Night sweats: Yes  Chills: No  Increased stress: No  Excessive hunger: No  Excessive thirst: No  Feeling hot or cold when others believe the temperature is normal: Yes  Loss of height: No  Post-operative complications: No  Surgical site pain: No  Hallucinations: No  Change in or Loss of Energy: No  Hyperactivity: No  Confusion: No  Back pain: No  Muscle aches: No  Neck pain: No  Swollen joints: No  Joint pain: No  Bone pain: No  Muscle cramps: No  Muscle weakness: No  Joint stiffness: Yes  Bone fracture: Yes

## 2018-06-18 NOTE — MR AVS SNAPSHOT
After Visit Summary   6/18/2018    Derek Enriquez    MRN: [id number]           Patient Information     Date Of Birth          1960        Visit Information        Provider Department      6/18/2018 4:00 PM Andrei Pelaez MD [facility]        Today's Diagnoses     Injury of thoracic spinal cord, sequela (H)    -  1    Neurogenic bladder          Care Instructions    Schedule Urodynamics and follow up with Dr. Pelaez.    It was a pleasure meeting with you today.  Thank you for allowing me and my team the privilege of caring for you today.  YOU are the reason we are here, and I truly hope we provided you with the excellent service you deserve.  Please let us know if there is anything else we can do for you so that we can be sure you are leaving completely satisfied with your care experience.                  Follow-ups after your visit        Your next 10 appointments already scheduled     Jun 25, 2018   Procedure with Toñito Kennedy MD   [facility] [place], Same Day Surgery (--)    [address] [facility] [id number] [phone]            Jul 30, 2018  8:30 AM CDT   (Arrive by 8:15 AM)   Return Visit with Abhijeet Joe MD   [facility] Neurosurgery ([facility] [place])    [address] [place] [id number] [phone]            Nov 07, 2018  2:00 PM CST   Ech Complete with [id number]   [facility] Echo ([facility])    [address] [place] [id number] [phone]           1. Please bring or wear a comfortable two-piece outfit. 2. You may eat, drink and take your normal medicines. 3. For any questions that cannot be answered, please contact the ordering physician            Nov 07, 2018  3:00 PM CST   Lab with  LAB   [facility] Lab ([facility])    [address] [place] [id number]  "  [phone]            Nov 07, 2018  3:30 PM CST   (Arrive by 3:15 PM)   RETURN LIPID VISIT with Anoop Jane MD   [facility] ([facility])    [address]  [address] [place] [id number] [phone]              Who to contact     Please call your clinic at [phone] to:    Ask questions about your health    Make or cancel appointments    Discuss your medicines    Learn about your test results    Speak to your doctor            Additional Information About Your Visit        [employer] Information     [employer] gives you secure access to your electronic health record. If you see a primary care provider, you can also send messages to your care team and make appointments. If you have questions, please call your primary care clinic.  If you do not have a primary care provider, please call [phone] and they will assist you.      [employer] is an electronic gateway that provides easy, online access to your medical records. With [employer], you can request a clinic appointment, read your test results, renew a prescription or communicate with your care team.     To access your existing account, please contact your [facility] or call [phone] for assistance.        Care EveryWhere ID     This is your Care EveryWhere ID. This could be used by other organizations to access your [facility] medical records  [id number]        Your Vitals Were     Pulse Height BMI (Body Mass Index)             80 1.803 m (5' 10.98\") 22.61 kg/m2          Blood Pressure from Last 3 Encounters:   06/18/18 114/70   05/23/18 106/65   05/02/18 112/72    Weight from Last 3 Encounters:   06/18/18 73.5 kg (162 lb)   05/23/18 73.3 kg (161 lb 11.2 oz)   03/30/18 78 kg (172 lb)              Today, you had the following     No orders found for display         Today's Medication Changes          These changes are accurate as of 6/18/18  6:22 PM.  If you have any questions, " ask your nurse or doctor.               These medicines have changed or have updated prescriptions.        Dose/Directions    midodrine 2.5 MG tablet   Commonly known as:  PROAMATINE   This may have changed:    - when to take this  - reasons to take this  - additional instructions   Used for:  Neurogenic orthostatic hypotension (H)        Dose:  2.5 mg   Take 1 tablet (2.5 mg) by mouth 2 times daily Give before morning and before afternoon therapies start for orthostatic hypotension.   Refills:  0                Primary Care Provider Office Phone # Fax #    Eugene Burrell [phone]       [facility] [address]  [facility] [address]        Equal Access to Services     [facility]: Hadii jace Francisco, waaxda lusola, qaybta kaalmada remigio, minal hamlin. So [facility] [phone].    ATENCIÓN: Si habla español, tiene a humphrey disposición servicios gratuitos de asistencia lingüística. [facility] [phone].    We comply with applicable federal civil rights laws and [place] laws. We do not discriminate on the basis of race, color, national origin, age, disability, sex, sexual orientation, or gender identity.            Thank you!     Thank you for choosing [facility] AND [facility] PROSTATE AND UROLOGIC CANCERS  for your care. Our goal is always to provide you with excellent care. Hearing back from our patients is one way we can continue to improve our services. Please take a few minutes to complete the written survey that you may receive in the mail after your visit with us. Thank you!             Your Updated Medication List - Protect others around you: Learn how to safely use, store and throw away your medicines at www.disposemymeds.org.          This list is accurate as of 6/18/18  6:22 PM.  Always use your most recent med list.                   Brand Name Dispense Instructions for use Diagnosis    ascorbic acid 500 MG tablet     VITAMIN C     Take 500 mg by mouth 2 times daily        atorvastatin 20 MG tablet    LIPITOR    90 tablet    Take 1 tablet (20 mg) by mouth At Bedtime    H/O heart artery stent       clopidogrel 75 MG tablet    PLAVIX    90 tablet    Take 1 tablet (75 mg) by mouth daily    H/O heart artery stent       docusate sodium 100 MG capsule    COLACE     Take 100 mg by mouth daily as needed        etidronate 200 MG tablet    DIDRONEL     2 times daily        midodrine 2.5 MG tablet    PROAMATINE     Take 1 tablet (2.5 mg) by mouth 2 times daily Give before morning and before afternoon therapies start for orthostatic hypotension.    Neurogenic orthostatic hypotension (H)       MULTIVITAMIN MEN PO      Take 1 tablet by mouth daily        phenylephrine-shark liver oil-mineral oil-petrolatum 0.25-14-74.9 % rectal ointment    PREPARATION H     Place rectally daily as needed        senna-docusate 8.6-50 MG per tablet    SENOKOT-S;PERICOLACE    100 tablet    Take 1-2 tablets by mouth 2 times daily Hold for loose stools    Neurogenic bowel       vitamin B complex with vitamin C Tabs tablet      Take 1 tablet by mouth daily

## 2018-06-19 ENCOUNTER — CARE COORDINATION (OUTPATIENT)
Dept: CARDIOLOGY | Facility: CLINIC | Age: 58
End: 2018-06-19
Payer: COMMERCIAL

## 2018-06-19 DIAGNOSIS — D50.9 ANEMIA, IRON DEFICIENCY: Primary | ICD-10-CM

## 2018-06-19 LAB
FERRITIN SERPL-MCNC: 717 NG/ML (ref 26–388)
IRON SATN MFR SERPL: 7 % (ref 15–46)
IRON SERPL-MCNC: 20 UG/DL (ref 35–180)
TIBC SERPL-MCNC: 292 UG/DL (ref 240–430)

## 2018-06-24 ENCOUNTER — ANESTHESIA EVENT (OUTPATIENT)
Dept: SURGERY | Facility: CLINIC | Age: 58
End: 2018-06-24
Payer: COMMERCIAL

## 2018-06-24 NOTE — ANESTHESIA PREPROCEDURE EVALUATION
Anesthesia Evaluation     . Pt has had prior anesthetic. Type: General    No history of anesthetic complications          ROS/MED HX    ENT/Pulmonary:  - neg pulmonary ROS     Neurologic:     (+)Spinal cord injury year sustained: 2018 without autonomic hyperflexia symptoms,     Cardiovascular:     (+) --CAD, --stent,3/2018  Drug Eluting Stent .. Taking blood thinners (stopped 2 days ago) Pt has received instructions: Instructions Given to patient: stop one day ago. . . :. . Previous cardiac testing Echodate:3/9/18results:Left ventricular systolic function is normal.  The visual ejection fraction is estimated at 60-65%.  No regional wall motion abnormalities noted.date: results: date: results: date: results:          METS/Exercise Tolerance:  >4 METS   Hematologic:     (+) Anemia, -      Musculoskeletal: Comment: paraplegia  (+) , fracture lower extremity: Femoral, -       GI/Hepatic:  - neg GI/hepatic ROS       Renal/Genitourinary:  - ROS Renal section negative       Endo:  - neg endo ROS       Psychiatric:  - neg psychiatric ROS       Infectious Disease:  - neg infectious disease ROS       Malignancy:      - no malignancy   Other:    (+) C-spine cleared: Yes,                    Physical Exam  Normal systems: cardiovascular, pulmonary and dental    Airway   Mallampati: II  TM distance: >3 FB  Neck ROM: full    Dental     Cardiovascular   Rhythm and rate: regular and normal  (-) no murmur    Pulmonary    breath sounds clear to auscultation                    Anesthesia Plan      History & Physical Review  History and physical reviewed and following examination; no interval change.    ASA Status:  3 .    NPO Status:  > 8 hours    Plan for ETT with Intravenous induction. Maintenance will be Balanced.    PONV prophylaxis:  Ondansetron (or other 5HT-3) and Dexamethasone or Solumedrol  Additional equipment: Videolaryngoscope, 2nd IV and Arterial Line Discussed risks of anesthesia including nausea, vomiting, sore throat,  dental damage, cardiopulmonary complications, neurologic complications, blood transfusion, and serious complications. Discussed possibility of in stent thrombosis. Likely blood transfusion        Postoperative Care  Postoperative pain management:  Multi-modal analgesia.      Consents  Anesthetic plan, risks, benefits and alternatives discussed with:  Patient.  Use of blood products discussed: Yes.   Use of blood products discussed with Patient.  Consented to blood products.  .                          .   ANESTHESIA PREOP EVALUATION      PMHx/PSHx/ROS:  PAST MEDICAL HISTORY:   Past Medical History:   Diagnosis Date     CAD (coronary artery disease)      Femur fracture (H)      Neurogenic bladder      Neurogenic bowel      Orthostatic hypotension      Paraplegia (H)      Thoracic spinal cord injury (H)        PAST SURGICAL HISTORY:   Past Surgical History:   Procedure Laterality Date     LAMINECTOMY THORACIC THREE LEVELS N/A 3/9/2018    Procedure: LAMINECTOMY THORACIC THREE LEVELS;  Thoracic 9-12 Laminectomy Evacuation of Subdural Hematoma, C-Arm, Prone, Gel Rolls.;  Surgeon: Abhijeet Joe MD;  Location: [facility] OR     wisdom teeth extraction         FAMILY HISTORY: History reviewed. No pertinent family history.        Allergies: No Known Allergies    Meds:   No prescriptions prior to admission.       Current Outpatient Prescriptions   Medication Sig Dispense Refill     ascorbic acid (VITAMIN C) 500 MG tablet Take 500 mg by mouth 2 times daily       ASPIRIN ADULT LOW STRENGTH PO Take 81 mg by mouth daily       atorvastatin (LIPITOR) 20 MG tablet Take 1 tablet (20 mg) by mouth At Bedtime 90 tablet 1     docusate sodium (COLACE) 100 MG capsule Take 100 mg by mouth daily as needed       etidronate (DIDRONEL) 200 MG tablet 2 times daily       midodrine (PROAMATINE) 2.5 MG tablet Take 1 tablet (2.5 mg) by mouth 2 times daily Give before morning and before afternoon therapies start for orthostatic hypotension. (Patient  taking differently: Take 2.5 mg by mouth as needed Give before morning and before afternoon therapies start for orthostatic hypotension.)       phenylephrine-shark liver oil-mineral oil-petrolatum (PREPARATION H) 0.25-14-74.9 % rectal ointment Place rectally daily as needed       vitamin B complex with vitamin C (VITAMIN  B COMPLEX) TABS tablet Take 1 tablet by mouth daily       clopidogrel (PLAVIX) 75 MG tablet Take 1 tablet (75 mg) by mouth daily 90 tablet 1     Multiple Vitamins-Minerals (MULTIVITAMIN MEN PO) Take 1 tablet by mouth daily          Physical Exam:  VS: T Data Unavailable, P Data Unavailable, BP Data Unavailable, R Data Unavailable, SpO2           BMP:  Lab Results   Component Value Date     06/18/2018      Lab Results   Component Value Date    POTASSIUM 4.2 06/18/2018     Lab Results   Component Value Date    CHLORIDE 102 06/18/2018     Lab Results   Component Value Date    PAT 8.3 06/18/2018     Lab Results   Component Value Date    CO2 29 06/18/2018     Lab Results   Component Value Date    BUN 14 06/18/2018     Lab Results   Component Value Date    CR 0.57 06/18/2018     Lab Results   Component Value Date    GLC 98 06/18/2018        CBC:  Lab Results   Component Value Date    WBC 5.9 06/18/2018     Lab Results   Component Value Date    HGB 9.1 06/18/2018     Lab Results   Component Value Date    HCT 30.2 06/18/2018     Lab Results   Component Value Date     06/18/2018        Coags/Type and Screen  Lab Results   Component Value Date    INR 1.20 05/23/2018       Lulú Ball M.D.    6/24/2018  3:21 PM          Anesthesia Evaluation     . Pt has had prior anesthetic. Type: General    No history of anesthetic complications          ROS/MED HX    ENT/Pulmonary:  - neg pulmonary ROS     Neurologic: Comment: Paraplegia, spinal subdural hematoma      Cardiovascular:     (+) --CAD, -past MI,-stent,. Taking blood thinners : . . . :. .       METS/Exercise Tolerance:     Hematologic:          Musculoskeletal:         GI/Hepatic:  - neg GI/hepatic ROS       Renal/Genitourinary:  - ROS Renal section negative       Endo:  - neg endo ROS       Psychiatric:         Infectious Disease:  - neg infectious disease ROS       Malignancy:      - no malignancy   Other:                     Physical Exam  Normal systems: dental    Airway   Mallampati: I  TM distance: >3 FB  Neck ROM: full    Dental     Cardiovascular   Rhythm and rate: regular      Pulmonary    breath sounds clear to auscultation                    Anesthesia Plan      History & Physical Review  History and physical reviewed and following examination; no interval change.    ASA Status:  3 .    NPO Status:  > 8 hours    Plan for General and ETT with Intravenous induction. Maintenance will be Balanced.    PONV prophylaxis:  Ondansetron (or other 5HT-3) and Dexamethasone or Solumedrol       Postoperative Care  Postoperative pain management:  Multi-modal analgesia.      Consents  Anesthetic plan, risks, benefits and alternatives discussed with:  Patient.  Use of blood products discussed: No .   .                          .

## 2018-06-25 ENCOUNTER — HOSPITAL ENCOUNTER (OUTPATIENT)
Facility: CLINIC | Age: 58
Setting detail: OBSERVATION
Discharge: HOME | End: 2018-06-26
Attending: SPECIALIST | Admitting: SPECIALIST
Payer: COMMERCIAL

## 2018-06-25 ENCOUNTER — APPOINTMENT (OUTPATIENT)
Dept: GENERAL RADIOLOGY | Facility: CLINIC | Age: 58
End: 2018-06-25
Attending: SPECIALIST
Payer: COMMERCIAL

## 2018-06-25 ENCOUNTER — ANESTHESIA (OUTPATIENT)
Dept: SURGERY | Facility: CLINIC | Age: 58
End: 2018-06-25
Payer: COMMERCIAL

## 2018-06-25 DIAGNOSIS — S72.21XS: Primary | ICD-10-CM

## 2018-06-25 LAB
ABO + RH BLD: NORMAL
ABO + RH BLD: NORMAL
ALBUMIN UR-MCNC: 10 MG/DL
APPEARANCE UR: CLEAR
BACTERIA #/AREA URNS HPF: ABNORMAL /HPF
BILIRUB UR QL STRIP: NEGATIVE
BLD GP AB SCN SERPL QL: NORMAL
BLOOD BANK CMNT PATIENT-IMP: NORMAL
COLOR UR AUTO: YELLOW
GLUCOSE SERPL-MCNC: 89 MG/DL (ref 70–99)
GLUCOSE UR STRIP-MCNC: NEGATIVE MG/DL
HGB BLD-MCNC: 8.9 G/DL (ref 13.3–17.7)
HGB BLD-MCNC: 9.5 G/DL (ref 13.3–17.7)
HGB UR QL STRIP: ABNORMAL
KETONES UR STRIP-MCNC: NEGATIVE MG/DL
LEUKOCYTE ESTERASE UR QL STRIP: ABNORMAL
MUCOUS THREADS #/AREA URNS LPF: PRESENT /LPF
NITRATE UR QL: NEGATIVE
PH UR STRIP: 6.5 PH (ref 5–7)
RBC #/AREA URNS AUTO: 6 /HPF (ref 0–2)
SOURCE: ABNORMAL
SP GR UR STRIP: 1.01 (ref 1–1.03)
SPECIMEN EXP DATE BLD: NORMAL
UROBILINOGEN UR STRIP-MCNC: NORMAL MG/DL (ref 0–2)
WBC #/AREA URNS AUTO: 44 /HPF (ref 0–5)

## 2018-06-25 PROCEDURE — 27210794 ZZH OR GENERAL SUPPLY STERILE: Performed by: SPECIALIST

## 2018-06-25 PROCEDURE — 25000132 ZZH RX MED GY IP 250 OP 250 PS 637: Performed by: ANESTHESIOLOGY

## 2018-06-25 PROCEDURE — 37000008 ZZH ANESTHESIA TECHNICAL FEE, 1ST 30 MIN: Performed by: SPECIALIST

## 2018-06-25 PROCEDURE — 25000132 ZZH RX MED GY IP 250 OP 250 PS 637: Performed by: ORTHOPAEDIC SURGERY

## 2018-06-25 PROCEDURE — 40000278 XR SURGERY CARM FLUORO LESS THAN 5 MIN: Mod: TC

## 2018-06-25 PROCEDURE — C1713 ANCHOR/SCREW BN/BN,TIS/BN: HCPCS | Performed by: SPECIALIST

## 2018-06-25 PROCEDURE — 99207 ZZC CONSULT E&M CHANGED TO INITIAL LEVEL: CPT | Performed by: INTERNAL MEDICINE

## 2018-06-25 PROCEDURE — 85018 HEMOGLOBIN: CPT | Mod: 91 | Performed by: ORTHOPAEDIC SURGERY

## 2018-06-25 PROCEDURE — 37000009 ZZH ANESTHESIA TECHNICAL FEE, EACH ADDTL 15 MIN: Performed by: SPECIALIST

## 2018-06-25 PROCEDURE — 86901 BLOOD TYPING SEROLOGIC RH(D): CPT | Performed by: ANESTHESIOLOGY

## 2018-06-25 PROCEDURE — 25000128 H RX IP 250 OP 636: Performed by: NURSE ANESTHETIST, CERTIFIED REGISTERED

## 2018-06-25 PROCEDURE — 27210995 ZZH RX 272: Performed by: SPECIALIST

## 2018-06-25 PROCEDURE — 25000566 ZZH SEVOFLURANE, EA 15 MIN: Performed by: SPECIALIST

## 2018-06-25 PROCEDURE — C1769 GUIDE WIRE: HCPCS | Performed by: SPECIALIST

## 2018-06-25 PROCEDURE — 82947 ASSAY GLUCOSE BLOOD QUANT: CPT | Performed by: ANESTHESIOLOGY

## 2018-06-25 PROCEDURE — G0463 HOSPITAL OUTPT CLINIC VISIT: HCPCS

## 2018-06-25 PROCEDURE — 87086 URINE CULTURE/COLONY COUNT: CPT | Performed by: SPECIALIST

## 2018-06-25 PROCEDURE — 99218 ZZC INITIAL OBSERVATION CARE,LEVL I: CPT | Performed by: INTERNAL MEDICINE

## 2018-06-25 PROCEDURE — 36415 COLL VENOUS BLD VENIPUNCTURE: CPT | Performed by: ORTHOPAEDIC SURGERY

## 2018-06-25 PROCEDURE — 25000128 H RX IP 250 OP 636: Performed by: ORTHOPAEDIC SURGERY

## 2018-06-25 PROCEDURE — 81001 URINALYSIS AUTO W/SCOPE: CPT | Performed by: ORTHOPAEDIC SURGERY

## 2018-06-25 PROCEDURE — 86850 RBC ANTIBODY SCREEN: CPT | Performed by: ANESTHESIOLOGY

## 2018-06-25 PROCEDURE — 85018 HEMOGLOBIN: CPT | Performed by: STUDENT IN AN ORGANIZED HEALTH CARE EDUCATION/TRAINING PROGRAM

## 2018-06-25 PROCEDURE — 40000171 ZZH STATISTIC PRE-PROCEDURE ASSESSMENT III: Performed by: SPECIALIST

## 2018-06-25 PROCEDURE — 36000062 ZZH SURGERY LEVEL 4 1ST 30 MIN - UMMC: Performed by: SPECIALIST

## 2018-06-25 PROCEDURE — 86900 BLOOD TYPING SEROLOGIC ABO: CPT | Performed by: ANESTHESIOLOGY

## 2018-06-25 PROCEDURE — 40000275 ZZH STATISTIC RCP TIME EA 10 MIN

## 2018-06-25 PROCEDURE — 25000128 H RX IP 250 OP 636: Performed by: INTERNAL MEDICINE

## 2018-06-25 PROCEDURE — 40000014 ZZH STATISTIC ARTERIAL MONITORING DAILY

## 2018-06-25 PROCEDURE — 71000014 ZZH RECOVERY PHASE 1 LEVEL 2 FIRST HR: Performed by: SPECIALIST

## 2018-06-25 PROCEDURE — 36000064 ZZH SURGERY LEVEL 4 EA 15 ADDTL MIN - UMMC: Performed by: SPECIALIST

## 2018-06-25 PROCEDURE — C9399 UNCLASSIFIED DRUGS OR BIOLOG: HCPCS | Performed by: NURSE ANESTHETIST, CERTIFIED REGISTERED

## 2018-06-25 PROCEDURE — 25000125 ZZHC RX 250: Performed by: NURSE ANESTHETIST, CERTIFIED REGISTERED

## 2018-06-25 DEVICE — IMPLANTABLE DEVICE: Type: IMPLANTABLE DEVICE | Site: FEMUR | Status: FUNCTIONAL

## 2018-06-25 DEVICE — IMP SCR SYN 5.0 TI LOCK T25 STARDRIVE 48MM 04.005.538S: Type: IMPLANTABLE DEVICE | Site: FEMUR | Status: FUNCTIONAL

## 2018-06-25 RX ORDER — ACETAMINOPHEN 325 MG/1
975 TABLET ORAL EVERY 8 HOURS
Status: DISCONTINUED | OUTPATIENT
Start: 2018-06-25 | End: 2018-06-26 | Stop reason: HOSPADM

## 2018-06-25 RX ORDER — MAGNESIUM HYDROXIDE 1200 MG/15ML
LIQUID ORAL PRN
Status: DISCONTINUED | OUTPATIENT
Start: 2018-06-25 | End: 2018-06-25 | Stop reason: HOSPADM

## 2018-06-25 RX ORDER — FENTANYL CITRATE 50 UG/ML
25-50 INJECTION, SOLUTION INTRAMUSCULAR; INTRAVENOUS EVERY 5 MIN PRN
Status: DISCONTINUED | OUTPATIENT
Start: 2018-06-25 | End: 2018-06-25 | Stop reason: HOSPADM

## 2018-06-25 RX ORDER — ACETAMINOPHEN 325 MG/1
650 TABLET ORAL EVERY 4 HOURS PRN
Status: DISCONTINUED | OUTPATIENT
Start: 2018-06-28 | End: 2018-06-26 | Stop reason: HOSPADM

## 2018-06-25 RX ORDER — ACETAMINOPHEN 325 MG/1
975 TABLET ORAL ONCE
Status: COMPLETED | OUTPATIENT
Start: 2018-06-25 | End: 2018-06-25

## 2018-06-25 RX ORDER — PROPOFOL 10 MG/ML
INJECTION, EMULSION INTRAVENOUS PRN
Status: DISCONTINUED | OUTPATIENT
Start: 2018-06-25 | End: 2018-06-25

## 2018-06-25 RX ORDER — HYDROMORPHONE HYDROCHLORIDE 1 MG/ML
.2-.4 INJECTION, SOLUTION INTRAMUSCULAR; INTRAVENOUS; SUBCUTANEOUS EVERY 10 MIN PRN
Status: DISCONTINUED | OUTPATIENT
Start: 2018-06-25 | End: 2018-06-25 | Stop reason: HOSPADM

## 2018-06-25 RX ORDER — SODIUM CHLORIDE, SODIUM LACTATE, POTASSIUM CHLORIDE, CALCIUM CHLORIDE 600; 310; 30; 20 MG/100ML; MG/100ML; MG/100ML; MG/100ML
INJECTION, SOLUTION INTRAVENOUS CONTINUOUS
Status: DISCONTINUED | OUTPATIENT
Start: 2018-06-25 | End: 2018-06-25 | Stop reason: HOSPADM

## 2018-06-25 RX ORDER — FENTANYL CITRATE 50 UG/ML
INJECTION, SOLUTION INTRAMUSCULAR; INTRAVENOUS PRN
Status: DISCONTINUED | OUTPATIENT
Start: 2018-06-25 | End: 2018-06-25

## 2018-06-25 RX ORDER — MIDODRINE HYDROCHLORIDE 2.5 MG/1
2.5 TABLET ORAL 2 TIMES DAILY
Status: DISCONTINUED | OUTPATIENT
Start: 2018-06-25 | End: 2018-06-26 | Stop reason: HOSPADM

## 2018-06-25 RX ORDER — ONDANSETRON 2 MG/ML
4 INJECTION INTRAMUSCULAR; INTRAVENOUS EVERY 30 MIN PRN
Status: DISCONTINUED | OUTPATIENT
Start: 2018-06-25 | End: 2018-06-25 | Stop reason: HOSPADM

## 2018-06-25 RX ORDER — NALOXONE HYDROCHLORIDE 0.4 MG/ML
.1-.4 INJECTION, SOLUTION INTRAMUSCULAR; INTRAVENOUS; SUBCUTANEOUS
Status: DISCONTINUED | OUTPATIENT
Start: 2018-06-25 | End: 2018-06-25 | Stop reason: HOSPADM

## 2018-06-25 RX ORDER — LIDOCAINE HYDROCHLORIDE 20 MG/ML
INJECTION, SOLUTION INFILTRATION; PERINEURAL PRN
Status: DISCONTINUED | OUTPATIENT
Start: 2018-06-25 | End: 2018-06-25

## 2018-06-25 RX ORDER — CEFAZOLIN SODIUM 1 G/3ML
1 INJECTION, POWDER, FOR SOLUTION INTRAMUSCULAR; INTRAVENOUS EVERY 8 HOURS
Status: COMPLETED | OUTPATIENT
Start: 2018-06-25 | End: 2018-06-25

## 2018-06-25 RX ORDER — DOCUSATE SODIUM 100 MG/1
100 CAPSULE, LIQUID FILLED ORAL DAILY
Status: DISCONTINUED | OUTPATIENT
Start: 2018-06-25 | End: 2018-06-26 | Stop reason: HOSPADM

## 2018-06-25 RX ORDER — ONDANSETRON 2 MG/ML
4 INJECTION INTRAMUSCULAR; INTRAVENOUS EVERY 6 HOURS PRN
Status: DISCONTINUED | OUTPATIENT
Start: 2018-06-25 | End: 2018-06-26 | Stop reason: HOSPADM

## 2018-06-25 RX ORDER — CEFTRIAXONE 1 G/1
1 INJECTION, POWDER, FOR SOLUTION INTRAMUSCULAR; INTRAVENOUS EVERY 24 HOURS
Status: DISCONTINUED | OUTPATIENT
Start: 2018-06-25 | End: 2018-06-26

## 2018-06-25 RX ORDER — OXYCODONE HYDROCHLORIDE 5 MG/1
5-10 TABLET ORAL
Status: DISCONTINUED | OUTPATIENT
Start: 2018-06-25 | End: 2018-06-26 | Stop reason: HOSPADM

## 2018-06-25 RX ORDER — ACETAMINOPHEN 325 MG/1
975 TABLET ORAL ONCE
Status: DISCONTINUED | OUTPATIENT
Start: 2018-06-25 | End: 2018-06-25 | Stop reason: HOSPADM

## 2018-06-25 RX ORDER — ONDANSETRON 4 MG/1
4 TABLET, ORALLY DISINTEGRATING ORAL EVERY 30 MIN PRN
Status: DISCONTINUED | OUTPATIENT
Start: 2018-06-25 | End: 2018-06-25 | Stop reason: HOSPADM

## 2018-06-25 RX ORDER — EPHEDRINE SULFATE 50 MG/ML
INJECTION, SOLUTION INTRAMUSCULAR; INTRAVENOUS; SUBCUTANEOUS PRN
Status: DISCONTINUED | OUTPATIENT
Start: 2018-06-25 | End: 2018-06-25

## 2018-06-25 RX ORDER — ASPIRIN 81 MG/1
81 TABLET, CHEWABLE ORAL DAILY
Status: DISCONTINUED | OUTPATIENT
Start: 2018-06-25 | End: 2018-06-26 | Stop reason: HOSPADM

## 2018-06-25 RX ORDER — ONDANSETRON 2 MG/ML
INJECTION INTRAMUSCULAR; INTRAVENOUS PRN
Status: DISCONTINUED | OUTPATIENT
Start: 2018-06-25 | End: 2018-06-25

## 2018-06-25 RX ORDER — ASCORBIC ACID 500 MG
500 TABLET ORAL 2 TIMES DAILY
Status: DISCONTINUED | OUTPATIENT
Start: 2018-06-25 | End: 2018-06-26 | Stop reason: HOSPADM

## 2018-06-25 RX ORDER — CEFAZOLIN SODIUM 1 G/3ML
1 INJECTION, POWDER, FOR SOLUTION INTRAMUSCULAR; INTRAVENOUS SEE ADMIN INSTRUCTIONS
Status: DISCONTINUED | OUTPATIENT
Start: 2018-06-25 | End: 2018-06-25 | Stop reason: HOSPADM

## 2018-06-25 RX ORDER — CLOPIDOGREL BISULFATE 75 MG/1
75 TABLET ORAL DAILY
Status: DISCONTINUED | OUTPATIENT
Start: 2018-06-25 | End: 2018-06-26 | Stop reason: HOSPADM

## 2018-06-25 RX ORDER — MEPERIDINE HYDROCHLORIDE 25 MG/ML
12.5 INJECTION INTRAMUSCULAR; INTRAVENOUS; SUBCUTANEOUS
Status: DISCONTINUED | OUTPATIENT
Start: 2018-06-25 | End: 2018-06-25 | Stop reason: HOSPADM

## 2018-06-25 RX ORDER — OXYCODONE HYDROCHLORIDE 5 MG/1
5 TABLET ORAL EVERY 4 HOURS PRN
Status: DISCONTINUED | OUTPATIENT
Start: 2018-06-25 | End: 2018-06-25

## 2018-06-25 RX ORDER — CEFAZOLIN SODIUM 2 G/100ML
2 INJECTION, SOLUTION INTRAVENOUS
Status: COMPLETED | OUTPATIENT
Start: 2018-06-25 | End: 2018-06-25

## 2018-06-25 RX ORDER — ONDANSETRON 4 MG/1
4 TABLET, ORALLY DISINTEGRATING ORAL EVERY 6 HOURS PRN
Status: DISCONTINUED | OUTPATIENT
Start: 2018-06-25 | End: 2018-06-26 | Stop reason: HOSPADM

## 2018-06-25 RX ORDER — ACETAMINOPHEN 325 MG/1
975 TABLET ORAL EVERY 6 HOURS PRN
Status: CANCELLED | OUTPATIENT
Start: 2018-06-25

## 2018-06-25 RX ORDER — NALOXONE HYDROCHLORIDE 0.4 MG/ML
.1-.4 INJECTION, SOLUTION INTRAMUSCULAR; INTRAVENOUS; SUBCUTANEOUS
Status: DISCONTINUED | OUTPATIENT
Start: 2018-06-25 | End: 2018-06-26 | Stop reason: HOSPADM

## 2018-06-25 RX ORDER — SODIUM CHLORIDE 9 MG/ML
INJECTION, SOLUTION INTRAVENOUS CONTINUOUS
Status: DISCONTINUED | OUTPATIENT
Start: 2018-06-25 | End: 2018-06-26 | Stop reason: HOSPADM

## 2018-06-25 RX ORDER — GABAPENTIN 300 MG/1
300 CAPSULE ORAL
Status: COMPLETED | OUTPATIENT
Start: 2018-06-25 | End: 2018-06-25

## 2018-06-25 RX ORDER — ATORVASTATIN CALCIUM 20 MG/1
20 TABLET, FILM COATED ORAL AT BEDTIME
Status: DISCONTINUED | OUTPATIENT
Start: 2018-06-25 | End: 2018-06-26 | Stop reason: HOSPADM

## 2018-06-25 RX ORDER — LIDOCAINE 40 MG/G
CREAM TOPICAL
Status: DISCONTINUED | OUTPATIENT
Start: 2018-06-25 | End: 2018-06-26 | Stop reason: HOSPADM

## 2018-06-25 RX ORDER — SODIUM CHLORIDE, SODIUM LACTATE, POTASSIUM CHLORIDE, CALCIUM CHLORIDE 600; 310; 30; 20 MG/100ML; MG/100ML; MG/100ML; MG/100ML
INJECTION, SOLUTION INTRAVENOUS CONTINUOUS PRN
Status: DISCONTINUED | OUTPATIENT
Start: 2018-06-25 | End: 2018-06-25

## 2018-06-25 RX ORDER — DEXAMETHASONE SODIUM PHOSPHATE 4 MG/ML
INJECTION, SOLUTION INTRA-ARTICULAR; INTRALESIONAL; INTRAMUSCULAR; INTRAVENOUS; SOFT TISSUE PRN
Status: DISCONTINUED | OUTPATIENT
Start: 2018-06-25 | End: 2018-06-25

## 2018-06-25 RX ORDER — DIAZEPAM 5 MG
5 TABLET ORAL EVERY 6 HOURS PRN
Status: DISCONTINUED | OUTPATIENT
Start: 2018-06-25 | End: 2018-06-26 | Stop reason: HOSPADM

## 2018-06-25 RX ADMIN — PROPOFOL 40 MG: 10 INJECTION, EMULSION INTRAVENOUS at 07:24

## 2018-06-25 RX ADMIN — ACETAMINOPHEN 975 MG: 325 TABLET, FILM COATED ORAL at 07:01

## 2018-06-25 RX ADMIN — PROPOFOL 120 MG: 10 INJECTION, EMULSION INTRAVENOUS at 07:23

## 2018-06-25 RX ADMIN — CEFTRIAXONE SODIUM 1 G: 1 INJECTION, POWDER, FOR SOLUTION INTRAMUSCULAR; INTRAVENOUS at 14:32

## 2018-06-25 RX ADMIN — PHENYLEPHRINE HYDROCHLORIDE 50 MCG: 10 INJECTION, SOLUTION INTRAMUSCULAR; INTRAVENOUS; SUBCUTANEOUS at 08:01

## 2018-06-25 RX ADMIN — CEFAZOLIN SODIUM 2 G: 2 INJECTION, SOLUTION INTRAVENOUS at 07:44

## 2018-06-25 RX ADMIN — LIDOCAINE HYDROCHLORIDE 70 MG: 20 INJECTION, SOLUTION INFILTRATION; PERINEURAL at 07:22

## 2018-06-25 RX ADMIN — Medication 2.5 MG: at 07:34

## 2018-06-25 RX ADMIN — PHENYLEPHRINE HYDROCHLORIDE 50 MCG: 10 INJECTION, SOLUTION INTRAMUSCULAR; INTRAVENOUS; SUBCUTANEOUS at 07:34

## 2018-06-25 RX ADMIN — Medication 2.5 MG: at 07:48

## 2018-06-25 RX ADMIN — DEXAMETHASONE SODIUM PHOSPHATE 4 MG: 4 INJECTION, SOLUTION INTRAMUSCULAR; INTRAVENOUS at 07:24

## 2018-06-25 RX ADMIN — PHENYLEPHRINE HYDROCHLORIDE 0.2 MCG/KG/MIN: 10 INJECTION, SOLUTION INTRAMUSCULAR; INTRAVENOUS; SUBCUTANEOUS at 07:59

## 2018-06-25 RX ADMIN — Medication 2.5 MG: at 08:05

## 2018-06-25 RX ADMIN — GABAPENTIN 300 MG: 300 CAPSULE ORAL at 07:01

## 2018-06-25 RX ADMIN — CLOPIDOGREL BISULFATE 75 MG: 75 TABLET, FILM COATED ORAL at 12:09

## 2018-06-25 RX ADMIN — ATORVASTATIN CALCIUM 20 MG: 20 TABLET, FILM COATED ORAL at 12:09

## 2018-06-25 RX ADMIN — MIDAZOLAM 2 MG: 1 INJECTION INTRAMUSCULAR; INTRAVENOUS at 07:11

## 2018-06-25 RX ADMIN — Medication 2.5 MG: at 08:01

## 2018-06-25 RX ADMIN — CEFAZOLIN 1 G: 330 INJECTION, POWDER, FOR SOLUTION INTRAMUSCULAR; INTRAVENOUS at 23:19

## 2018-06-25 RX ADMIN — ASPIRIN 81 MG CHEWABLE TABLET 81 MG: 81 TABLET CHEWABLE at 12:09

## 2018-06-25 RX ADMIN — FENTANYL CITRATE 125 MCG: 50 INJECTION, SOLUTION INTRAMUSCULAR; INTRAVENOUS at 07:22

## 2018-06-25 RX ADMIN — SODIUM CHLORIDE, POTASSIUM CHLORIDE, SODIUM LACTATE AND CALCIUM CHLORIDE: 600; 310; 30; 20 INJECTION, SOLUTION INTRAVENOUS at 07:11

## 2018-06-25 RX ADMIN — PHENYLEPHRINE HYDROCHLORIDE 50 MCG: 10 INJECTION, SOLUTION INTRAMUSCULAR; INTRAVENOUS; SUBCUTANEOUS at 08:05

## 2018-06-25 RX ADMIN — ROCURONIUM BROMIDE 50 MG: 10 INJECTION INTRAVENOUS at 07:24

## 2018-06-25 RX ADMIN — PHENYLEPHRINE HYDROCHLORIDE 75 MCG: 10 INJECTION, SOLUTION INTRAMUSCULAR; INTRAVENOUS; SUBCUTANEOUS at 07:48

## 2018-06-25 RX ADMIN — CEFAZOLIN 1 G: 330 INJECTION, POWDER, FOR SOLUTION INTRAMUSCULAR; INTRAVENOUS at 16:46

## 2018-06-25 RX ADMIN — SODIUM CHLORIDE: 9 INJECTION, SOLUTION INTRAVENOUS at 12:07

## 2018-06-25 RX ADMIN — ONDANSETRON 4 MG: 2 INJECTION INTRAMUSCULAR; INTRAVENOUS at 08:38

## 2018-06-25 RX ADMIN — SUGAMMADEX 150 MG: 100 INJECTION, SOLUTION INTRAVENOUS at 08:52

## 2018-06-25 NOTE — ANESTHESIA POSTPROCEDURE EVALUATION
Patient: Derek Enriquez    Procedure(s):  Right Open Reduction Internal Fixation Subtrochanteric Femur Fracture - Wound Class: I-Clean    Diagnosis:Right Subtrochanteric Femur Fracture  Diagnosis Additional Information: No value filed.    Anesthesia Type:  ETT, General    Note:  Anesthesia Post Evaluation    Patient location during evaluation: PACU  Patient participation: Able to fully participate in evaluation (baseline LE paralysis)  Level of consciousness: awake and alert  Pain management: adequate  Airway patency: patent  Cardiovascular status: hemodynamically stable  Respiratory status: spontaneous ventilation and room air  Hydration status: acceptable  PONV: none     Anesthetic complications: None          Last vitals:  Vitals:    06/25/18 1115 06/25/18 1130 06/25/18 1145   BP: (!) 92/37 103/55 112/60   Pulse:      Resp:      Temp:      SpO2:            Electronically Signed By: Rosa Ramirez MD  June 25, 2018  1:05 PM

## 2018-06-25 NOTE — CONSULTS
Consult Date:  06/25/2018      REQUESTING PHYSICIAN:  Toñito Kennedy MD       REASON FOR CONSULTATION:  Follow medically after surgery.      RECOMMENDATIONS:  Derek Enriquez is a 57-year-old gentleman with past medical history significant for sudden cardiac arrest due to ventricular fibrillation in march 2018 , status post coronary stenting further complicated by a spinal hematoma and subsequent  paraplegia.     1.  Coronary artery disease.  He had a cardiac arrest due to V. Fib with resuscitation at the site of cardiac arrest within 2 minutes of onset.  He had severe proximal left anterior descending artery stenosis and had successful stenting of the proximal left anterior descending artery with a Promus drug-eluting stent.  The patient is currently on aspirin 81 mg daily and Plavix 75 mg daily, continue this.  He has had an echocardiogram post-stenting with normal ejection fraction.   Resume asa and plavix as soon as ok from ortho perspective and resume statin . He is not on any Beta Blockers.    2.  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  He was subsequently admitted to the [facility] and had an emergent T9-T12 laminectomy with evacuation of the subdural hematoma on 03/09/2018.  He has been paraplegic since and is wheelchair bound.  He is trying to get enrolled in the spine program at the [facility] at the end of this month.     3.  He takes midodrine 2.5 mg if needed, although he says it is not effective.  We can hold that for now.     4.  UTI.  The patient self-catheterizes himself and self-evacuates for his bowels.  We will start him on ceftriaxone for now until the culture result is back.     5.  Right subtrochanteric femur fracture related to paraplegia, status post intramedullary nail fixation of the right subtrochanteric femur  "fracture by Dr. Toñito Kennedy with an estimated loss of 100 mL.  There was no hypertension or hypoxemia noted.  He got 800 mL of LR.      CHIEF COMPLAINT:  \"I'm here for right hip fracture.\"      HISTORY OF PRESENT ILLNESS:  The patient is a 57-year-old male with history of coronary artery disease and status post stenting, spinal hematoma resulting in paraplegia who was bending over to tie his shoes approximately 3 weeks ago, felt a loud pop and was diagnosed with a femur fracture.  There was an attempt to do a closed reduction which was not successful.  Therefore, he was admitted to the orthopedic service for an open reduction internal fixation of the right subtrochanteric femur fracture.      On questioning by me today, the patient feels well.  He has no chest pain, no shortness of breath, no nausea, vomiting, headache.  He stopped taking his aspirin and Plavix 2 days prior to surgery .  He catheterizes himself and he evacuates his bowels.  He has his 26-year-old son helping him at home ever since the paraplegia.      REVIEW OF SYSTEMS:  All 10 systems were reviewed.  The positives are mentioned above, the rest are negative.      PAST MEDICAL HISTORY:  Significant for coronary artery disease, spinal hematoma, paraplegia.      FAMILY HISTORY:  Positive for premature coronary artery disease.      ALLERGIES:  NO KNOWN DRUG ALLERGIES.      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  He does not smoke or drink. His 26 year old son lives with him currently.     PHYSICAL EXAMINATION:   GENERAL:  On physical examination, he is an alert and oriented, no acute distress, pleasant gentleman sitting in the bed comfortably.   VITAL SIGNS:  Blood pressure 108/59, heart rate is 81, temperature is 96 degrees Fahrenheit.   HEENT:  Head is atraumatic, normocephalic.  Pupils are equal, round, react to light.  EOMI.  Mouth mucosa is moist.   NECK:  Supple.   CHEST:  Clear bilaterally. No rales or rhonchi " . Bs equal b/l  CARDIOVASCULAR:  Regular rate and rhythm.  No murmurs, rubs or gallops.   GASTROINTESTINAL:  Abdomen is soft, nontender, bowel sounds are positive.   NEUROLOGIC:  Exam was not done.      ADMISSION LABORATORY:  The patient's UA is positive for small blood, large leukocyte esterase, WBC of 44, many bacteria.  UC is pending.  Hemoglobin is 8.9, on 2018 hemoglobin was 9.1.      Thank you for the consultation.  The medicine team will follow the patient along with you during this hospitalization.  These recommendations were given verbally to the orthopedic team.         BRIAN GIBBS MD             D: 2018   T: 2018   MT: FRANDY      Name:     LINDA MARTÍNEZ   MRN:      [id number]        Account:       [id number]   :      1960           Consult Date:  2018      Document: [id number]       cc: Eugene Kennedy MD

## 2018-06-25 NOTE — IP AVS SNAPSHOT
[facility]    [address]    [place] [address]    Phone:  [phone]                                       After Visit Summary   6/25/2018    Derek Enriquez    MRN: [id number]           After Visit Summary Signature Page     I have received my discharge instructions, and my questions have been answered. I have discussed any challenges I see with this plan with the nurse or doctor.    ..........................................................................................................................................  Patient/Patient Representative Signature      ..........................................................................................................................................  Patient Representative Print Name and Relationship to Patient    ..................................................               ................................................  Date                                            Time    ..........................................................................................................................................  Reviewed by Signature/Title    ...................................................              ..............................................  Date                                                            Time

## 2018-06-25 NOTE — ANESTHESIA PROCEDURE NOTES
Arterial Line Procedure Note  Staff:     Anesthesiologist:  LILI BARNEY  Location: In OR After Induction  Procedure Start/Stop Times:     patient identified, IV checked, site marked, risks and benefits discussed, informed consent, monitors and equipment checked, pre-op evaluation and at physician/surgeon's request      Correct Patient: Yes      Correct Position: Yes      Correct Site: Yes      Correct Procedure: Yes      Correct Laterality:  N/A    Site Marked:  N/A  Line Placement:     Procedure:  Arterial Line    Insertion Site:  Radial    Insertion laterality:  Right    Skin Prep: Chloraprep      Patient Prep: patient draped, mask, sterile gloves, sterile gown, hat and hand hygiene      Local skin infiltration:  None    Ultrasound Guided?: No      Catheter size:  20 gauge, Quick cath    Cath secured with: other (comment)      Cath secured with comment:  Tegaderm, tape    Dressing:  Tegaderm    Complications:  None obvious    Arterial waveform: Yes      IBP within 10% of NIBP: Yes    Assessment/Narrative:      One attempt

## 2018-06-25 NOTE — ANESTHESIA CARE TRANSFER NOTE
Patient: Derek Enriquez    Procedure(s):  Right Open Reduction Internal Fixation Subtrochanteric Femur Fracture - Wound Class: I-Clean    Diagnosis: Right Subtrochanteric Femur Fracture  Diagnosis Additional Information: No value filed.    Anesthesia Type:   ETT     Note:  Airway :Face Mask  Patient transferred to:PACU  Comments: Spont respirations, no s/s resp distress. + cough, opens eyes to commands. VSS. Oral and ETT suction done, extubated to FMO2 6L. No s/s resp distress, VS remain stable. Transported to PACU, report to RN.Handoff Report: Identifed the Patient, Identified the Reponsible Provider, Reviewed the pertinent medical history, Discussed the surgical course, Reviewed Intra-OP anesthesia mangement and issues during anesthesia, Set expectations for post-procedure period and Allowed opportunity for questions and acknowledgement of understanding      Vitals: (Last set prior to Anesthesia Care Transfer)    CRNA VITALS  6/25/2018 0833 - 6/25/2018 0908      6/25/2018             Pulse: 76    ART BP: 119/51    ART Mean: 75    SpO2: 100 %                Electronically Signed By: GREGORIO Tian CRNA  June 25, 2018  9:08 AM

## 2018-06-25 NOTE — IP AVS SNAPSHOT
MRN:[id number]                      After Visit Summary   6/25/2018    Derek Enriquez    MRN: [id number]           Thank you!     Thank you for choosing [facility] for your care. Our goal is always to provide you with excellent care. Hearing back from our patients is one way we can continue to improve our services. Please take a few minutes to complete the written survey that you may receive in the mail after you visit with us. Thank you!        Patient Information     Date Of Birth          1960        Designated Caregiver       Most Recent Value    Caregiver    Will someone help with your care after discharge? yes    Name of designated caregiver Hany    Phone number of caregiver [phone]    Caregiver address same      About your hospital stay     You were admitted on:  June 25, 2018 You last received care in the:  [facility] 8[facility]    You were discharged on:  June 26, 2018        Reason for your hospital stay       Right femur fx                  Who to Call     For medical emergencies, please call 911.  For non-urgent questions about your medical care, please call your primary care provider or clinic, [phone]  For questions related to your surgery, please call your surgery clinic        Attending Provider     Provider Specialty    Toñito Kennedy MD Orthopedics       Primary Care Provider Office Phone # Fax #    Eugene Burrell [phone]       When to contact your care team       Call your primary doctor if you have any of the following: temperature greater than 101.5 sustained over >2 hours, increased shortness of breath or increased drainage.                  After Care Instructions     Activity       Your activity upon discharge: activity as tolerated, WBAT            Diet       Follow this diet upon discharge: Regular            Discharge Instructions       See above. Activity and Range of motion as tolerated. Wound cares as needed. Take your previously  prescribed plavix and aspirin to prevent blood clots.            Wound care and dressings       Instructions to care for your wound at home: Keep your dressings clean and dry. If your dressings become saturated, you can change them with gauze and tape. You may shower and get your incisions wet beginning the Friday after surgery. Do not soak or scrub your incisions until cleared by your surgeon. After showering, you should pat your incisions dry and keep them covered.                  Follow-up Appointments     Follow Up and recommended labs and tests       Follow up with Dr. Miguel BAUMAN at 1 week                  Your next 10 appointments already scheduled     Jul 30, 2018  8:30 AM CDT   (Arrive by 8:15 AM)   Return Visit with Abhijeet Joe MD   [facility] ([facility])    [address] [place] [id number] [phone]            Nov 07, 2018  2:00 PM CST   Ech Complete with [id number] [place] [address] [facility] ([facility])    [address] [place] [address] [phone]           1. Please bring or wear a comfortable two-piece outfit. 2. You may eat, drink and take your normal medicines. 3. For any questions that cannot be answered, please contact the ordering physician            Nov 07, 2018  3:00 PM CST   Lab with  LAB   [facility] ([facility])    [address] [place] [id number] [phone]            Nov 07, 2018  3:30 PM CST   (Arrive by 3:15 PM)   RETURN LIPID VISIT with Anoop Jane MD   [facility] ([facility])    [address] [place] [address]  [address]  [facility] [address] [phone]              Additional Services     Physical Therapy Referral       [facility]  [address] [place] [address]  Phone: [phone]    Treatment: Evaluation &  "Treatment  Special Instructions/Modalities: None    Special Programs: None    Please be aware that coverage of these services is subject to the terms and limitations of your health insurance plan.  Call member services at your health plan with any benefit or coverage questions.      **Note to Provider:  If you are referring outside of [facility] for the therapy appointment, please list the name of the location in the \"special instructions\" above, print the referral and give to the patient to schedule the appointment.                  Further instructions from your care team        Left toes: Daily:  Cleanse with Microklenz, apply Skintegrety gel, cover with adaptic, wrap with roll gauze, kerlix  Coccyx:  Every other day:  Cleanse with Microklenz, apply Mepilex dressing, keep thierry    Pending Results     No orders found for last 3 day(s).            Statement of Approval     Ordered          06/26/18 0740  I have reviewed and agree with all the recommendations and orders detailed in this document.  EFFECTIVE NOW     Approved and electronically signed by:  Carlos Coy MD             Admission Information     Date & Time Provider Department Dept. Phone    6/25/2018 Toñito Kennedy MD  8A [phone]      Your Vitals Were     Blood Pressure Pulse Temperature Respirations Height Weight    95/44 86 97.4  F (36.3  C) 16 1.803 m (5' 10.98\") 73.1 kg (161 lb 2.5 oz)    Pulse Oximetry BMI (Body Mass Index)                99% 22.49 kg/m2          MyChart Information     [facility] gives you secure access to your electronic health record. If you see a primary care provider, you can also send messages to your care team and make appointments. If you have questions, please call your primary care clinic.  If you do not have a primary care provider, please call [phone] and they will assist you.        Care EveryWhere ID     This is your Care EveryWhere ID. This could be used by other organizations to access " your [facility] medical records  [id number]        Equal Access to Services     AUSTIN WILSON : Hadii aad ku hadmarinwojciech Francisco, kesha bob, qaernestine sagemaminal cuellar. So [facility] [phone].    ATENCIÓN: Si habla español, tiene a humphrey disposición servicios gratuitos de asistencia lingüística. Llame al [phone].    We comply with applicable federal civil rights laws and [place] laws. We do not discriminate on the basis of race, color, national origin, age, disability, sex, sexual orientation, or gender identity.               Review of your medicines      CONTINUE these medicines which may have CHANGED, or have new prescriptions. If we are uncertain of the size of tablets/capsules you have at home, strength may be listed as something that might have changed.        Dose / Directions    midodrine 2.5 MG tablet   Commonly known as:  PROAMATINE   This may have changed:    - when to take this  - reasons to take this  - additional instructions   Used for:  Neurogenic orthostatic hypotension (H)        Dose:  2.5 mg   Take 1 tablet (2.5 mg) by mouth 2 times daily Give before morning and before afternoon therapies start for orthostatic hypotension.   Refills:  0         CONTINUE these medicines which have NOT CHANGED        Dose / Directions    ascorbic acid 500 MG tablet   Commonly known as:  VITAMIN C        Dose:  500 mg   Take 500 mg by mouth 2 times daily   Refills:  0       ASPIRIN ADULT LOW STRENGTH PO        Dose:  81 mg   Take 81 mg by mouth daily   Refills:  0       atorvastatin 20 MG tablet   Commonly known as:  LIPITOR   Used for:  H/O heart artery stent        Dose:  20 mg   Take 1 tablet (20 mg) by mouth At Bedtime   Quantity:  90 tablet   Refills:  1       clopidogrel 75 MG tablet   Commonly known as:  PLAVIX   Used for:  H/O heart artery stent        Dose:  75 mg   Take 1 tablet (75 mg) by mouth daily   Quantity:  90 tablet   Refills:  1       docusate sodium  100 MG capsule   Commonly known as:  COLACE        Dose:  100 mg   Take 100 mg by mouth daily as needed   Refills:  0       etidronate 200 MG tablet   Commonly known as:  DIDRONEL        2 times daily   Refills:  0       MULTIVITAMIN MEN PO        Dose:  1 tablet   Take 1 tablet by mouth daily   Refills:  0       phenylephrine-shark liver oil-mineral oil-petrolatum 0.25-14-74.9 % rectal ointment   Commonly known as:  PREPARATION H        Place rectally daily as needed   Refills:  0       vitamin B complex with vitamin C Tabs tablet        Dose:  1 tablet   Take 1 tablet by mouth daily   Refills:  0                Protect others around you: Learn how to safely use, store and throw away your medicines at www.disposemymeds.org.             Medication List: This is a list of all your medications and when to take them. Check marks below indicate your daily home schedule. Keep this list as a reference.      Medications           Morning Afternoon Evening Bedtime As Needed    ascorbic acid 500 MG tablet   Commonly known as:  VITAMIN C   Take 500 mg by mouth 2 times daily                                ASPIRIN ADULT LOW STRENGTH PO   Take 81 mg by mouth daily   Last time this was given:  81 mg on 6/26/2018  8:06 AM                                atorvastatin 20 MG tablet   Commonly known as:  LIPITOR   Take 1 tablet (20 mg) by mouth At Bedtime   Last time this was given:  20 mg on 6/26/2018  8:06 AM                                clopidogrel 75 MG tablet   Commonly known as:  PLAVIX   Take 1 tablet (75 mg) by mouth daily   Last time this was given:  75 mg on 6/26/2018  8:06 AM                                docusate sodium 100 MG capsule   Commonly known as:  COLACE   Take 100 mg by mouth daily as needed                                etidronate 200 MG tablet   Commonly known as:  DIDRONEL   2 times daily                                midodrine 2.5 MG tablet   Commonly known as:  PROAMATINE   Take 1 tablet (2.5 mg) by  mouth 2 times daily Give before morning and before afternoon therapies start for orthostatic hypotension.                                MULTIVITAMIN MEN PO   Take 1 tablet by mouth daily                                phenylephrine-shark liver oil-mineral oil-petrolatum 0.25-14-74.9 % rectal ointment   Commonly known as:  PREPARATION H   Place rectally daily as needed                                vitamin B complex with vitamin C Tabs tablet   Take 1 tablet by mouth daily

## 2018-06-25 NOTE — OR NURSING
PACU to Inpatient Nursing Handoff    Patient Derek Enriquez is a 57 year old male who speaks English.   Procedure Procedure(s):  Right Open Reduction Internal Fixation Subtrochanteric Femur Fracture - Wound Class: I-Clean   Surgeon(s) Primary: Toñito Kennedy MD  Resident - Assisting: Carlos Coy MD     No Known Allergies    Isolation  [unfilled]     Past Medical History   has a past medical history of CAD (coronary artery disease); Femur fracture (H); Neurogenic bladder; Neurogenic bowel; Orthostatic hypotension; Paraplegia (H); and Thoracic spinal cord injury (H). He also has no past medical history of Complication of anesthesia or PONV (postoperative nausea and vomiting).    Anesthesia General   Dermatome Level     Preop Meds acetaminophen (Tylenol) - time given: 0700  gabapentin (Neurontin) - time given: 0700   Nerve block Not applicable   Intraop Meds dexamethasone (Decadron)  fentanyl (Sublimaze): 125 mcg total  ondansetron (Zofran): last given at 0838   Local Meds No   Antibiotics cefazolin (Ancef) - last given at 0744     Pain Patient Currently in Pain: denies  Comfort: negligible pain   PACU meds  Not applicable   PCA / epidural No   Capnography Respiratory Monitoring (EtCO2): 36 mmHg   Telemetry ECG Rhythm: Normal sinus rhythm   Inpatient Telemetry Monitor Ordered? No        Labs Glucose Lab Results   Component Value Date    GLC 89 06/25/2018       Hgb Lab Results   Component Value Date    HGB 8.9 06/25/2018       INR Lab Results   Component Value Date    INR 1.20 05/23/2018      PACU Imaging Not applicable     Wound/Incision Pressure Injury 05/23/18 Posterior Coccyx (Active)   Wound Base Erythema, blanchable 6/25/2018  7:00 AM   Cris-wound Assessment Macerated 5/23/2018 11:00 AM   Drainage Amount Scant 5/23/2018  8:25 AM   Drainage Color/Characteristics Serous 5/23/2018  8:25 AM   Wound Care/Cleansing Other (Comment) 5/23/2018 11:00 AM   Dressing Other (Comment) 6/25/2018  7:00 AM    Dressing Status Clean, dry, intact 6/25/2018  9:06 AM   Number of days:33       Incision/Surgical Site 05/23/18 Right Groin (Active)   Incision Assessment WDL 5/23/2018 12:00 PM   Cris-Incision Assessment Warm 5/23/2018 12:00 PM   Incision Drainage Amount Scant 5/23/2018 12:00 PM   Drainage Description Serosanguinous 5/23/2018 12:00 PM   Incision Care Barrier Film 5/23/2018 12:00 PM   Dressing Intervention Clean, dry, intact 5/23/2018 12:00 PM   Number of days:33       Incision/Surgical Site 06/25/18 Right;Lateral;Upper Leg (Active)   Incision Assessment UTV 6/25/2018 10:00 AM   Closure Adhesive strip(s) 6/25/2018  9:00 AM   Dressing Intervention Clean, dry, intact 6/25/2018 10:00 AM   Number of days:0       Incision/Surgical Site 06/25/18 Right Leg (Active)   Incision Assessment UTV 6/25/2018 10:00 AM   Dressing Intervention Clean, dry, intact 6/25/2018 10:00 AM   Number of days:0      CMS Peripheral Neurovascular WDL:  WDL except (06/25/18 1000)  LUE Sensation: no numbness;no tingling (06/25/18 1000)  RUE Sensation: no numbness;no tingling (06/25/18 1000)  LLE Temperature: warm (06/25/18 1000)  LLE Color: no discoloration (06/25/18 1000)  LLE Sensation: sensation absent (06/25/18 1000)  RLE Temperature: warm (06/25/18 1000)  RLE Color: no discoloration (06/25/18 1000)  RLE Sensation: sensation absent (06/25/18 1000)   Equipment Not applicable   Other LDA ETT (adult) (Active)   Number of days:0       Right Groin Interventional Procedure Access (Active)   Site Assessment WDL 5/23/2018  1:15 PM   Hemostasis management Steri-strips intact 5/23/2018  1:15 PM   Femoral Bruit present 5/23/2018  1:00 PM   CMS Right Extremity WDL 5/23/2018  1:15 PM   Dorsalis Pulse - Right Leg Normal 5/23/2018  1:15 PM   Popliteal Pulse - Right Leg Normal 5/23/2018  1:15 PM   Posterior Tibial Pulse - Right Leg Normal 5/23/2018  1:15 PM   Number of days:106        IV Access Peripheral IV 03/13/18 Right;Anterior Upper forearm (Active)    Site Assessment [facility] 5/23/2018 12:00 PM   Line Status Saline locked 5/23/2018 12:00 PM   Phlebitis Scale 0-->no symptoms 5/23/2018 12:00 PM   Infiltration Scale 0 5/23/2018 12:00 PM   Extravasation? No 3/13/2018  5:00 PM   Dressing Intervention New dressing  3/13/2018  5:00 PM   Number of days:104       Peripheral IV 05/23/18 Left Upper forearm (Active)   Site Assessment [facility] 6/25/2018  9:06 AM   Line Status Infusing 6/25/2018  9:06 AM   Phlebitis Scale 0-->no symptoms 6/25/2018  9:06 AM   Infiltration Scale 0 6/25/2018  9:06 AM   Number of days:33       Peripheral IV 06/25/18 Left Upper forearm (Active)   Site Assessment [facility] 6/25/2018  9:06 AM   Line Status Saline locked 6/25/2018  9:06 AM   Phlebitis Scale 0-->no symptoms 6/25/2018  9:06 AM   Infiltration Scale 0 6/25/2018  9:06 AM   Number of days:0       Right Groin Interventional Procedure Access (Active)   Site Assessment [facility] 5/23/2018  1:15 PM   Hemostasis management Steri-strips intact 5/23/2018  1:15 PM   Femoral Bruit present 5/23/2018  1:00 PM   CMS Right Extremity [facility] 5/23/2018  1:15 PM   Dorsalis Pulse - Right Leg Normal 5/23/2018  1:15 PM   Popliteal Pulse - Right Leg Normal 5/23/2018  1:15 PM   Posterior Tibial Pulse - Right Leg Normal 5/23/2018  1:15 PM   Number of days:106      Blood Products Not applicable  mL   Intake/Output Date 06/25/18 0700 - 06/26/18 0659   Shift [id number] 1440-3649 8120-0659 24 Hour Total   I  N  T  A  K  E   I.V. 900   900    Shift Total  (mL/kg) 900  (12.31)   900  (12.31)   O  U  T  P  U  T   Urine 800   800    Blood 100   100    Shift Total  (mL/kg) 900  (12.31)   900  (12.31)   Weight (kg) 73.1 73.1 73.1 73.1        Drains / Sandhu Urethral Catheter Double-lumen (Active)   Tube Description Positional 5/23/2018 11:00 AM   Catheter Care Done 5/23/2018 11:00 AM   Collection Container Standard;Patent 5/23/2018 11:00 AM   Securement Method Securing device (Describe) 5/23/2018 11:00 AM   Rationale for Continued Use  Strict 1-2 Hour I&O 5/23/2018 11:00 AM   Number of days:33       Right Groin Interventional Procedure Access (Active)   Site Assessment WDL 5/23/2018  1:15 PM   Hemostasis management Steri-strips intact 5/23/2018  1:15 PM   Femoral Bruit present 5/23/2018  1:00 PM   CMS Right Extremity WDL 5/23/2018  1:15 PM   Dorsalis Pulse - Right Leg Normal 5/23/2018  1:15 PM   Popliteal Pulse - Right Leg Normal 5/23/2018  1:15 PM   Posterior Tibial Pulse - Right Leg Normal 5/23/2018  1:15 PM   Number of days:106      Time of void PreOp Void Prior to Procedure: 0639 (06/25/18 0624)    PostOp Straight cath: 800 mL (06/25/18 1000)    Diapered? No   Bladder Scan Bladder Scan Volume (mL): 785 ml (06/25/18 1000)   PO    declines     Vitals    B/P: 91/62  T: 97.5  F (36.4  C)    Temp src: Oral  P:       Heart Rate: 72 (06/25/18 1000)     R: 14  O2:  SpO2: 100 %    O2 Device: Simple face mask (06/25/18 0906)              Family/support present none   Patient belongings Patient Belongings: wheelchair;wallet;cell phone/electronics  Disposition of Belongings: Locker   Patient transported on cart   DC meds/scripts (obs/outpt) Not applicable   Inpatient Pain Meds Released? Yes       Special needs/considerations None   Tasks needing completion None       Leonor Velez RN  [facility]COM [id number]

## 2018-06-25 NOTE — PROGRESS NOTES
Pt arrived to unit 8A at 1100 from PACU, report received from Leonor Velez RN. Capnography on, VSS, O2 >90% on RA. PCD's in place. Sips of water given. Visible skin inspected ex for coccyx, sacrum, and spine. Report given that pt has suspected pressure injury on coccyx and LLE. Heels elevated off bed, call light within reach. Will continue to monitor patient. Report given to Lynda Willard RN who will take over as shift RN.

## 2018-06-25 NOTE — PROGRESS NOTES
[facility]  WO Nurse Inpatient Adult Pressure INJURY (PI) Wound Assessment     Initial assessment of PU(s) on pt's:   Coccyx, Right posterior heel , Left medial knee                                                                       Traumatic wounds to Left dorsal surfaces of toes    Data:   Patient History:      per MD note(s):  57-year-old gentleman is 3-4 months out from a neurologic event, rendering him paraplegic.  He was bending over to tie his shoes approximately 2-3 weeks ago, felt a loud pop.  This was ultimately diagnosed as a femur fracture.  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  The plan is to attempt a closed reduction and if it is inadequate, to do an open reduction and place a long TFN to stabilize the entire femur and allow him to mobilize as tolerated.        Johnie Assessment and sub scores:   Johnie Score  Av.1  Min: 12  Max: 19    Positioning: Chair cushion,     Mattress:  Standard , Atmos Air mattress    Moisture Management:  Bowel Program    Catheter secured? Not applicable    Current Diet / Nutrition:           Active Diet Order      Advance Diet as Tolerated: Clear Liquid Diet    Other     Labs:   Recent Labs   Lab Test  18   0931   18   1357  18   0815   ALBUMIN   --    --   2.3*   --    HGB  8.9*   < >  9.1*  10.6*   INR   --    --    --   1.20*   WBC   --    --   5.9  6.0    < > = values in this interval not displayed.                                                                                                                        Pressure Injury Assessment  (location #):   Coccyx          Wound History:   Per patient , occurred at home when he was in bed with leg elevated for longer periods of time present ~2-3weeks    Wound Base:    Dermis,fibrin,  moist    Specific Dimensions (length x width x depth, in cm) :   1.0 x 1.2 x  0.1cm    Tunneling:  N/A    Undermining: N/A    Palpation of the wound bed:  normal    Slough appearance:  none    Eschar appearance:  none    Periwound Skin: exfoliating,      Color: pink    Temperature  normal     Drainage:  scant serous         Odor: none    Pain:  absent , insensate      Pressure Injury Assessment  (location #2):   Right heel          Wound History:   Per patient , occurred at home when he was in bed with leg elevated for longer peritods of time present ~2-3weeks      Wound Base:  ,  epidermis, dry(closed flat bulla)    Specific Dimensions (length x width x depth, in cm) :   1.4 x 1.5 x 0.0cm    Palpation of the wound bed:  firm    Slough appearance:  none    Eschar appearance:  none    Periwound Skin: intact,      Color: normal and consistent with surrounding tissue    Left medial knee:  2cm area of slightly edemetous bright red  But blanchable area of erythema    Wound assessment #3  Right foot ( all dorsal surfaces of toes): 1-2.5cm abrasions, 4th toe is into SC tissue  HX: Per Pateint,  accidentaly had foot under WC and was abraded with propelling forward             Intervention:     Patient's chart evaluated.      Johnie Interventions:  Current Johnie Interventions and Care Plan reviewed and updated, appropriate at this time.    Wound was assessed.    Wound Care: was done: Removal of existing dressing    Visual inspection    Application of clean dressing,    Orders  Written    Supplies  ordered: hydrogel    Discussed plan of care with Patient and Nurse           Assessment:     Pressure Injury (PI) located on Coccyx: 2    Status: wound  initial assessment, present on admision  Pressure Injury (PI) located on  Right heel:  2  Status :  Initial assessment , present on admission  Wounds:  Left dorsal aspects of all 5 toes: traumatic abrasions, present on admission  Lfet medial knee:  Reactive hyperemia       Plan:     Nursing to notify the Provider(s) and re-consult the [facility] Nurse if wound(s)  deteriorate(s).    Plan of care for wound located on Coccyx : Everyother day:  Cleanse with Microklenz, apply Mepilex dressing, keep heels of mattress,     Left toes: Daily:  Cleanse with Microklenz, apply Skintegrety gel( item # [id number]), cover with adaptic, wrap with roll gauze( conform # [id number])    WOC Nurse will return: weekly

## 2018-06-25 NOTE — OP NOTE
Procedure Date: 06/25/2018      DATE OF PROCEDURE:  06/25/2018      PREOPERATIVE DIAGNOSIS:  Right subtrochanteric femur fracture related to paraplegia.      POSTOPERATIVE DIAGNOSIS:  Right subtrochanteric femur fracture related to paraplegia.      PROCEDURE:  Intramedullary nail fixation of right subtrochanteric femur fracture.      SURGEON:  Toñito Kennedy MD      ASSISTANT:  Carlos Coy MD      ANESTHESIA:  General.      ESTIMATED BLOOD LOSS:  100 mL.      IMPLANT INSERTED:  Synthes TFN with a 95 mm compression screw, 11 mm x 420 mm length with a single dynamic lock distal.      COMPLICATIONS:  None.      INDICATIONS:  This 57-year-old gentleman is 3-4 months out from a neurologic event, rendering him paraplegic.  He was bending over to tie his shoes approximately 2-3 weeks ago, felt a loud pop.  This was ultimately diagnosed as a femur fracture.  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  The plan is to attempt a closed reduction and if it is inadequate, to do an open reduction and place a long TFN to stabilize the entire femur and allow him to mobilize as tolerated.      DESCRIPTION OF PROCEDURE:  The patient was evaluated in the holding area, the informed consent process completed.  The surgical site initialed.  He was brought to the operating room where general anesthesia was initiated.  He was turned in the lateral decubitus position with an appropriate chest roll, 2 g of IV Ancef was administered.  The right hind quarter and lower extremity was isolated, painted with iodine and alcohol, and draped in a sterile fashion.  A timeout was held, all agreed on the patient, the side and the procedure.      We began by assessing our ability to perform a closed reduction.  With the C-arm in position on the A/P view, we were able to confirm that we could regain the length with traction.   Therefore, a starting point was made through a 2 cm incision through the skin, subcutaneous tissue and gluteal fascia.  With index palpation the tip of the trochanter was identified and under biplanar image control the terminally threaded 4 mm guidewire was put in the appropriate starting point.  The starting point was made with the cannulated reamer and then a ball-tip guide was placed across the fracture site after percutaneously inserting a screwdriver to adduct the proximal fragment to align the canal.  While traction was in placed the ball-tip guide was sunk to the distal femoral physeal scar and we measured the length with the appropriate device at 425 mm.  We then sequentially reamed from 8.5 to 12, carefully watching his oxygen saturation, and there were no pulmonary events associated with reaming.  We then selected an 11 x 420 mm nail, 125 mm angle, and inserted it to the appropriate length with a mallet under A/P C-arm control.  We enlarged the percutaneous incision which had been made to place the screwdriver in order to insert the guide for the compression screw.  The tubes for the guide were inserted down to the bone and the terminally threaded guidewire was inserted centrally in the head on A/P and lateral view and measured at  mm.  We selected 95, reamed the outer cortex and then the depths to where we positioned the screw, and then inserted the screw with the hand .  The insertion device was removed.  We locked the device internally with the flexible screwdriver and obtained permanent images in A/P and lateral view proximally.      Distally using the free hand technique with perfect circles on the lateral view, we placed a single 8 mm incision down to the bone, predrilled, and then inserted a cortical screw 42 mm length and confirmed it to be through the dynamic hole at the distal end on the A/P and lateral view.  Those permanent images were sent to the PACS system.  The wounds were  irrigated with 2 liters of saline.  Distally, we closed the incision with Monocryl and Steri-Strips.  The more proximal incision for the compression screw was closed with 0 Vicryl for the deep fascia, inverted undyed 2-0 Vicryl, and Monocryl with Steri-Strips.  Proximally, we again irrigated, closed the gluteal fascia with figure-of-eight 0 Vicryl, subcutaneous tissue with inverted undyed 2-0 Vicryl, the skin with intracuticular 3-0 Monocryl reinforced with Steri-Strips.  The wounds were dressed sterilely with additional sponges, proximally.  There was minimal ooze and therefore I thought it was safe to perform a Monocryl closure per the patient's request.  He was taken to postanesthesia room having tolerated the procedure well.      POSTOPERATIVE PLAN:  Mobilize as tolerated bed to wheelchair.  We will restart his Plavix and aspirin immediately.  I will not use Lovenox to transition given his history of the bleed in the spinal cord.  He will be discharged on the day following surgery.  The incisions can be gotten wet at 3 days postoperatively.  He should be seen for a wound check in 7-10 days and then for an x-ray at 6 weeks, restarting all medications.  We will discuss the potential need to resect heterotopic bone around the left hip at an appointment in the future once the right femur is healed.         WILLIAM FAULKNER MD             D: 2018   T: 2018   MT: DIMITRIS      Name:     LINDA MARTÍNEZ   MRN:      [id number]        Account:        [id number]   :      1960           Procedure Date: 2018      Document: [id number]

## 2018-06-25 NOTE — PLAN OF CARE
Problem: Surgery Nonspecified (Adult)  Goal: Signs and Symptoms of Listed Potential Problems Will be Absent, Minimized or Managed (Surgery Nonspecified)  Signs and symptoms of listed potential problems will be absent, minimized or managed by discharge/transition of care (reference Surgery Nonspecified (Adult) CPG).  Outcome: Improving  Pt arrived to unit at 1100 and was oriented to room and call light  VS: VS and capnography stable   O2: >90% on RA   Output: Self SC at baseline. SC x1 this shift. Bladder scan 210 at 1800.   Last BM: 6/24   Activity: Paraplegic. 1A to reposition in bed.   Skin: R hip incision dressings CDI. WOC RN assessed/changed dressings on coccyx wound and  abrasions on L toes. Also has redness on L medial knee and healing blister on R heel.   Pain: Denies; educated on pain management plan.   CMS: Para - sensation absent BLE   Dressing: CDI   Diet: Tolerating regular diet   LDA: PIV infusing   Equipment: PCDs, capnography, IV pole   Plan: TBD   Additional Info: Pt received general anesthesia. .

## 2018-06-26 ENCOUNTER — APPOINTMENT (OUTPATIENT)
Dept: PHYSICAL THERAPY | Facility: CLINIC | Age: 58
End: 2018-06-26
Attending: ORTHOPAEDIC SURGERY
Payer: COMMERCIAL

## 2018-06-26 VITALS
RESPIRATION RATE: 16 BRPM | BODY MASS INDEX: 22.56 KG/M2 | SYSTOLIC BLOOD PRESSURE: 101 MMHG | HEIGHT: 71 IN | TEMPERATURE: 99.7 F | WEIGHT: 161.16 LBS | DIASTOLIC BLOOD PRESSURE: 49 MMHG | OXYGEN SATURATION: 97 % | HEART RATE: 86 BPM

## 2018-06-26 PROBLEM — Z98.890 POST-OPERATIVE STATE: Status: ACTIVE | Noted: 2018-06-26

## 2018-06-26 LAB
BACTERIA SPEC CULT: NORMAL
HGB BLD-MCNC: 8 G/DL (ref 13.3–17.7)
Lab: NORMAL
SPECIMEN SOURCE: NORMAL

## 2018-06-26 PROCEDURE — 25000132 ZZH RX MED GY IP 250 OP 250 PS 637: Performed by: ORTHOPAEDIC SURGERY

## 2018-06-26 PROCEDURE — G0378 HOSPITAL OBSERVATION PER HR: HCPCS

## 2018-06-26 PROCEDURE — 99225 ZZC SUBSEQUENT OBSERVATION CARE,LEVEL II: CPT | Performed by: INTERNAL MEDICINE

## 2018-06-26 PROCEDURE — 36415 COLL VENOUS BLD VENIPUNCTURE: CPT | Performed by: ORTHOPAEDIC SURGERY

## 2018-06-26 PROCEDURE — 97530 THERAPEUTIC ACTIVITIES: CPT | Mod: GP

## 2018-06-26 PROCEDURE — 99207 ZZC CDG-CODE CATEGORY CHANGED: CPT | Performed by: INTERNAL MEDICINE

## 2018-06-26 PROCEDURE — 97161 PT EVAL LOW COMPLEX 20 MIN: CPT | Mod: GP

## 2018-06-26 PROCEDURE — 40000193 ZZH STATISTIC PT WARD VISIT

## 2018-06-26 PROCEDURE — 85018 HEMOGLOBIN: CPT | Performed by: ORTHOPAEDIC SURGERY

## 2018-06-26 RX ADMIN — ASPIRIN 81 MG CHEWABLE TABLET 81 MG: 81 TABLET CHEWABLE at 08:06

## 2018-06-26 RX ADMIN — CLOPIDOGREL BISULFATE 75 MG: 75 TABLET, FILM COATED ORAL at 08:06

## 2018-06-26 RX ADMIN — ATORVASTATIN CALCIUM 20 MG: 20 TABLET, FILM COATED ORAL at 08:06

## 2018-06-26 NOTE — PLAN OF CARE
Problem: Patient Care Overview  Goal: Plan of Care/Patient Progress Review  Physical Therapy Discharge Summary    Reason for therapy discharge:    All goals and outcomes met, no further needs identified.    Progress towards therapy goal(s). See goals on Care Plan in [facility] electronic health record for goal details.  Goals met    Therapy recommendation(s):    Continued therapy is recommended.  Rationale/Recommendations:  Would benefit from OP PT in order to continue progress with functional use of LE's after SCI.

## 2018-06-26 NOTE — PLAN OF CARE
Problem: Patient Care Overview  Goal: Plan of Care/Patient Progress Review  Outcome: Improving  Pt is paraplegic. A/O x4. VSS. Tolerating reg diet, denies nausea. Dsg CDI to R hip. Dsg to coccyx CDI. Dsg to L toes changed per POC. Pt self-caths to empty bladder. BS +, passing flatus. Pt transferred with PT to w/c and back to bed. Pt has been discharged, he plans to discharge this pm, his son will come about 1630.    1530  Discharge instructions given and explained, no prescriptions. Pt states he understands all instructions and has no questions. Dsg change supplies also sent. Pt awaiting son and will discharge via own w/c to home.

## 2018-06-26 NOTE — PROGRESS NOTES
S- No discomfort of any type- ready to go home    O- afebrile, VSS  Dressings dry with only a small area of ooze noted from the hip screw incision  Hgb 8.9 down from 9.5- one pending from the am  Minimal swelling  Capillary refill WNL    A- stable    P- discharge home later today  No restrictions on activity/transfers  Keep incisions dry for 48 hrs  Replace steri strips before discharge if needed  OK to shower and get incisions wet Friday  Wound check at [facility] in 7-10 days  Follow up Xray with me in 6 weeks

## 2018-06-26 NOTE — PROGRESS NOTES
06/26/18 0800   Quick Adds   Type of Visit Initial PT Evaluation   Living Environment   Lives With child(pascual), adult   Living Arrangements house   Home Accessibility bed and bath on same level   Number of Stairs to Enter Home 0   Living Environment Comment Pt lives in single stroy rambler, laundry in basement, able to get electric w/c into bathroom.   Self-Care   Dominant Hand right   Usual Activity Tolerance good   Current Activity Tolerance moderate   Regular Exercise yes   Equipment Currently Used at Home shower chair;transfer board;wheelchair, manual;wheelchair, power   Activity/Exercise/Self-Care Comment Per pt his son is assisting him for ADL's, has a shower chair he uses.  Pt has a manual and powered w/c, uses powered w/c at home, able to transfer IND to both.  Per pt he has been going to OP rehab at [facility] but had to stop due to fx, wants to continue.   Functional Level Prior   Ambulation 1-->assistive equipment   Transferring 0-->independent   Toileting 0-->independent   Bathing 3-->assistive equipment and person   Dressing 2-->assistive person   Eating 0-->independent   Communication 0-->understands/communicates without difficulty   Swallowing 0-->swallows foods/liquids without difficulty   Cognition 0 - no cognition issues reported   Fall history within last six months no   Number of times patient has fallen within last six months 0   Which of the above functional risks had a recent onset or change? none   Prior Functional Level Comment Pt IND with transfers to/from chair and Jae for mobility with use of w/c.   General Information   Onset of Illness/Injury or Date of Surgery - Date 06/25/18   Referring Physician Carlos Coy MD   Patient/Family Goals Statement Pt wants to return home and continue therapy.   Pertinent History of Current Problem (include personal factors and/or comorbidities that impact the POC) 57-year-old male with history of coronary artery disease and status post  stenting, spinal hematoma resulting in paraplegia who was bending over to tie his shoes approximately 3 weeks ago, felt a loud pop and was diagnosed with a femur fracture.  There was an attempt to do a closed reduction which was not successful.  Therefore, he was admitted to the orthopedic service for an open reduction internal fixation of the right subtrochanteric femur fracture   Precautions/Limitations no known precautions/limitations   Weight-Bearing Status - LUE full weight-bearing   Weight-Bearing Status - RUE full weight-bearing   Weight-Bearing Status - LLE full weight-bearing   Weight-Bearing Status - RLE weight-bearing as tolerated   General Observations OBS, paraplegic   Cognitive Status Examination   Orientation orientation to person, place and time   Level of Consciousness alert   Follows Commands and Answers Questions 100% of the time   Personal Safety and Judgment intact   Memory intact   Pain Assessment   Patient Currently in Pain No   Integumentary/Edema   Integumentary/Edema Comments R LE swelling noted   Posture    Posture Not impaired   Range of Motion (ROM)   ROM Comment Pt limited in L hip ROM due to ossification   Strength   Strength Comments B UE's 5/5, no functional use/movement of B LE's due to SCI   Bed Mobility   Bed Mobility Comments Min-ModA at B LE's, per pt able to do IND intermittently otherwise son is there to assist   Transfer Skills   Transfer Comments IND with transfer to/from w/c   Gait   Gait Comments IND with w/c propulsion   Balance   Balance Comments Seated balance IND   Sensory Examination   Sensory Perception Comments No sensation from belly button down   General Therapy Interventions   Planned Therapy Interventions bed mobility training;ROM;transfer training;risk factor education;home program guidelines   Clinical Impression   Criteria for Skilled Therapeutic Intervention yes, treatment indicated   PT Diagnosis Impaired functional mobility   Influenced by the following  "impairments Decreased B hip/core strength, decreased activity tolerance, swelling   Functional limitations due to impairments Inability to complete bed mobility and transfers IND   Clinical Presentation Stable/Uncomplicated   Clinical Presentation Rationale Medically stable, on RA   Clinical Decision Making (Complexity) Low complexity   Therapy Frequency` (1x eval and treat)   Predicted Duration of Therapy Intervention (days/wks) 1 day   Anticipated Equipment Needs at Discharge (Has powered chair and manual chair at home)   Anticipated Discharge Disposition Home with Outpatient Therapy   Risk & Benefits of therapy have been explained Yes   Patient, Family & other staff in agreement with plan of care Yes   Clinical Impression Comments Pt would benefit from IP PT in order to ensure safety with functional transfers to ensure safety with d/c home.   [facility] [employer] TM \"6 Clicks\"   2016, Trustees of [facility], under license to [employer].  All rights reserved.   6 Clicks Short Forms Basic Mobility Inpatient Short Form   [facility] AM-PAC  \"6 Clicks\" V.2 Basic Mobility Inpatient Short Form   1. Turning from your back to your side while in a flat bed without using bedrails? 4 - None   2. Moving from lying on your back to sitting on the side of a flat bed without using bedrails? 3 - A Little   3. Moving to and from a bed to a chair (including a wheelchair)? 4 - None   4. Standing up from a chair using your arms (e.g., wheelchair, or bedside chair)? 1 - Total   5. To walk in hospital room? 1 - Total   6. Climbing 3-5 steps with a railing? 1 - Total   Basic Mobility Raw Score (Score out of 24.Lower scores equate to lower levels of function) 14   Total Evaluation Time   Total Evaluation Time (Minutes) 10     "

## 2018-06-26 NOTE — PROGRESS NOTES
Care Coordinator Progress Note    Admission Date/Time:  6/25/2018  Attending MD:  Toñito Kennedy*    Data  Chart reviewed, discussed with interdisciplinary team.   Patient was admitted for: Subtrochanteric fx-closed, right, sequela.    Concerns with insurance coverage for discharge needs: None.  Current Living Situation: Patient lives alone.  Support System: Supportive and Involved  Services Involved: Outpatient Rehab  Transportation at Discharge: Agency transportation  Transportation to Medical Appointments:   - Name of caregiver: brother Tom  Barriers to Discharge: None    Coordination of Care and Referrals: Provided patient/family with options for Outpatient Rehab. Met with patient at bedside to discuss discharge planning. Patient was not aware of any PT needs that he would have. Patient states, Dr. Alvarado said he could return to activity as tolerated. A referral for Mad[facility] Perales was made in care patient changes his mind. No further discharge concerns at this time. [facility] will continue to follow as needed.    Patricia Neal [facility].  : [address] [place] [address] Phone: [phone]        Plan  Anticipated Discharge Date:  6/26/2018  Anticipated Discharge Plan:  Home    Alba López RN, BSN  Care Coordinator, 8A  Phone [phone]  Pager [phone]

## 2018-06-26 NOTE — PLAN OF CARE
Problem: Patient Care Overview  Goal: Plan of Care/Patient Progress Review  OT:  Per PT, no therapy needs.  Will complete orders.

## 2018-06-26 NOTE — DISCHARGE INSTRUCTIONS
Left toes: Daily:  Cleanse with Microklenz, apply Skintegrety gel, cover with adaptic, wrap with roll gauze, kerlix  Coccyx:  Every other day:  Cleanse with Microklenz, apply Mepilex dressing, keep thierry

## 2018-06-26 NOTE — PLAN OF CARE
Problem: Patient Care Overview  Goal: Plan of Care/Patient Progress Review  Discharge Planner PT   Patient plan for discharge: Home with erica  Current status: PT evaluation completed and treatment initiated.  Per pt he was IND with transfers prior to surgery, use of slide pivot transfer from bed and use of slide board for getting into the car.  At home pt has no stairs, has an electric chair and has a son who assists him as needed.  Pt needing min-modA at B LE's to get to EOB, then able to transfer to/from w/c IND.  Per pt he is working on getting a new w/c as well as continuing his OP PT for SCI.    Barriers to return to prior living situation: None  Recommendations for discharge: Home with assist and OP PT  Rationale for recommendations: At this time pt is safe to d/c home, would benefit from continued OP PT to improve functional use and strength of LE's.       Entered by: Sara Morris 06/26/2018 8:53 AM

## 2018-06-26 NOTE — PROGRESS NOTES
"Ortho Progress Note     S: No acute overnight events. Pain controlled. Denies N/v/f/c.     O:  /57 (BP Location: Right arm)  Pulse 79  Temp 97.9  F (36.6  C) (Oral)  Resp 16  Ht 1.803 m (5' 10.98\")  Wt 73.1 kg (161 lb 2.5 oz)  SpO2 98%  BMI 22.49 kg/m2  Gen: A&Ox3, no acute distress  CV: 2+ dp/pt pulses, capillary refill < 2sec  Resp: breathing equal and non-labored, no wheezing  Extremities: Dressing with nickel size area of drainage over middle incision, otherwise cdi. toes wwp.     Hemoglobin   Date Value Ref Range Status   06/25/2018 8.9 (L) 13.3 - 17.7 g/dL Final   06/25/2018 9.5 (L) 13.3 - 17.7 g/dL Final     No components found for: PLATELETS  Hematocrit   Date Value Ref Range Status   06/18/2018 30.2 (L) 40.0 - 53.0 % Final   05/23/2018 34.4 (L) 40.0 - 53.0 % Final     WBC   Date Value Ref Range Status   06/18/2018 5.9 4.0 - 11.0 10e9/L Final   05/23/2018 6.0 4.0 - 11.0 10e9/L Final     No results found for: CRP      Your basic metabolic panel results:  Sodium   Date Value Ref Range Status   06/18/2018 139 133 - 144 mmol/L Final       (Sodium/Salt)  Potassium   Date Value Ref Range Status   06/18/2018 4.2 3.4 - 5.3 mmol/L Final     Glucose   Date Value Ref Range Status   06/25/2018 89 70 - 99 mg/dL Final       (Glucose/Blood Sugar/Diabetic Screen)  Calcium   Date Value Ref Range Status   06/18/2018 8.3 (L) 8.5 - 10.1 mg/dL Final       (Calcium)  Creatinine   Date Value Ref Range Status   06/18/2018 0.57 (L) 0.66 - 1.25 mg/dL Final       (Kidney Function)            A/P: S/p IMN right subtroch fx on 6/25. Doing well.     discharge home later today  No restrictions on activity/transfers  Keep incisions dry for 48 hrs  Replace steri strips before discharge if needed  OK to shower and get incisions wet Friday  Wound check at [facility] in 7-10 days  Follow up Xray with me in 6 weeks      DC home today    Carlos Coy  PGY-4, Orthopaedic Surgery  [phone]    "

## 2018-06-26 NOTE — PROGRESS NOTES
Progress Note        Derek Enriquez [[id number]] (M) - 57 year old          Assessment and Plan:   Derek Enriquez is a 57-year-old gentleman with past medical history significant for sudden cardiac arrest due to ventricular fibrillation in march 2018 , status post coronary stenting further complicated by a spinal hematoma and subsequent  paraplegia.      1.  Coronary artery disease.  He had a cardiac arrest due to V. Fib with resuscitation at the site of cardiac arrest within 2 minutes of onset.  He had severe proximal left anterior descending artery stenosis and had successful stenting of the proximal left anterior descending artery with a Promus drug-eluting stent.  The patient is currently on aspirin 81 mg daily and Plavix 75 mg daily, continue this.  He has had an echocardiogram post-stenting with normal ejection fraction.   Resume asa and plavix as soon as ok from ortho perspective and resume statin . He is not on any Beta Blockers.     2.  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  He was subsequently admitted to the [facility] and had an emergent T9-T12 laminectomy with evacuation of the subdural hematoma on 03/09/2018.  He has been paraplegic since and is wheelchair bound.  He is trying to get enrolled in the spine program at the [facility] at the end of this month.      3.  He takes midodrine 2.5 mg if needed, although he says it is not effective.  We can hold that for now.      4. UTI ruled out .  The patient self-catheterizes himself and self-evacuates for his bowels.      Urinalysis showed 44 wbc , MANY BACTERIA      started him on ceftriaxone      Cultured showed mixed urogenital charlene [id number]    Discontinued antibiotics on 10/26      5.  Right subtrochanteric femur fracture related to paraplegia, status post intramedullary nail  "fixation          Subjective: asymptomatic   No fever or chills   Denies pain   Want to go home this evening       Ros:  4-point ROS negative except in subjective/interval history.     Objective         Physical Exam:  BP 95/44  Pulse 86  Temp 97.4  F (36.3  C)  Resp 16  Ht 1.803 m (5' 10.98\")  Wt 73.1 kg (161 lb 2.5 oz)  SpO2 99%  BMI 22.49 kg/m2  GENERAL:  On physical examination, he is an alert and oriented, no acute distress, pleasant gentleman sitting in the bed comfortably.   VITAL SIGNS:  Blood pressure 108/59, heart rate is 81, temperature is 96 degrees Fahrenheit.   HEENT:  Head is atraumatic, normocephalic.  Pupils are equal, round, react to light.  EOMI.  Mouth mucosa is moist.   NECK:  Supple.   CHEST:  Clear bilaterally. No rales or rhonchi . Bs equal b/l  CARDIOVASCULAR:  Regular rate and rhythm.  No murmurs, rubs or gallops.   GASTROINTESTINAL:  Abdomen is soft, nontender, bowel sounds are positive.   NEUROLOGIC:  Exam was not done.   LABS (Last four results)  CMP  Recent Labs  Lab 06/25/18  0648   GLC 89     CBC  Recent Labs  Lab 06/26/18  0747 06/25/18  0931 06/25/18  0648   HGB 8.0* 8.9* 9.5*         Recent Labs  Lab 06/25/18  1009   CULT 10,000 to 50,000 colonies/mLmixed urogenital charlene            Doni Wells  Hospitalist   [facility]     6/26/2018              "

## 2018-06-26 NOTE — PLAN OF CARE
Problem: Surgery Nonspecified (Adult)  Goal: Signs and Symptoms of Listed Potential Problems Will be Absent, Minimized or Managed (Surgery Nonspecified)  Signs and symptoms of listed potential problems will be absent, minimized or managed by discharge/transition of care (reference Surgery Nonspecified (Adult) CPG).   Outcome: No Change  Patient left at 1735 via Wheelchair with son. Upon discharge, patient was AxO. Denied pain, chest pain, SOB, or any sensation changes. Patient left with belongings and discharge instructions given by previous nurse Brenda. Patient verbalized understanding.

## 2018-06-29 NOTE — DISCHARGE SUMMARY
ORTHOPAEDIC DISCHARGE SUMMARY     Date of Admission: 6/25/2018  Date of Discharge: 6/26/2018  5:39 PM  Disposition: Home  Staff Physician: No att. providers found  Primary Care Provider: Eugene Burrell    DISCHARGE DIAGNOSIS:  Right Subtrochanteric Femur Fracture    PROCEDURES: Procedure(s):  OPEN REDUCTION INTERNAL FIXATION RODDING INTRAMEDULLARY FEMUR on 6/25/2018    BRIEF HISTORY:  Hx of femur fx    HOSPITAL COURSE:    Surgery was uncomplicated. Derek Enriquez has done well post-operatively. Medicine was consulted post operatively to aid in management of medical comorbidities. See final recommendations below. The patient received routine nursing cares and is medically stable. Vital signs are stable. The patient is tolerating a regular diet without GI distress/nausea or vomiting. Voiding spontaneously. All PT/OT goals have been met for safe mobility. Pain is now controlled on oral medications which will be available on discharge. Stool softeners have been used while taking pain medications to help prevent constipation. Derek Enriquez is deemed medically safe to discharge.     Antibiotics:   given periop and 24 hours postop.   DVT prophylaxis:  plavix/aspirin weeks  PT Progress: Has met PT/OT goals for safe mobility.    Pain Meds:  Weaned off all IV pain meds by discharge.  Inpatient Events: No significant events or complications.     Discharge orders and instructions as below.    FOLLOWUP:    Follow up with Dr. chavez at 1 weeks postoperatively.  Future Appointments  Date Time Provider Department Center   7/30/2018 8:30 AM Abhijeet Joe MD [facility]   11/7/2018 2:00 PM [facility]   [facility]   11/7/2018 3:30 PM Anoop Jane MD [facility]       Appointments on [facility]. Call [phone] if you haven't heard regarding these appointments within 7 days of discharge.    PLANNED DISCHARGE ORDERS:           Discharge  Medication List as of 6/26/2018 12:47 PM      CONTINUE these medications which have NOT CHANGED    Details   ascorbic acid (VITAMIN C) 500 MG tablet Take 500 mg by mouth 2 times daily, Historical      ASPIRIN ADULT LOW STRENGTH PO Take 81 mg by mouth daily, Historical      atorvastatin (LIPITOR) 20 MG tablet Take 1 tablet (20 mg) by mouth At Bedtime, Disp-90 tablet, R-1, E-Prescribe      clopidogrel (PLAVIX) 75 MG tablet Take 1 tablet (75 mg) by mouth daily, Disp-90 tablet, R-1, E-Prescribe      docusate sodium (COLACE) 100 MG capsule Take 100 mg by mouth daily as needed, Historical      etidronate (DIDRONEL) 200 MG tablet 2 times daily, Historical      midodrine (PROAMATINE) 2.5 MG tablet Take 1 tablet (2.5 mg) by mouth 2 times daily Give before morning and before afternoon therapies start for orthostatic hypotension., Transitional      Multiple Vitamins-Minerals (MULTIVITAMIN MEN PO) Take 1 tablet by mouth daily , Historical      phenylephrine-shark liver oil-mineral oil-petrolatum (PREPARATION H) 0.25-14-74.9 % rectal ointment Place rectally daily as neededHistorical      vitamin B complex with vitamin C (VITAMIN  B COMPLEX) TABS tablet Take 1 tablet by mouth daily, Historical               Discharge Procedure Orders  Physical Therapy Referral   Referral Type: Rehab Therapy Physical Therapy     Reason for your hospital stay   Order Comments: Right femur fx     Follow Up and recommended labs and tests   Order Comments: Follow up with Dr. Rivera PA at 1 week     Activity   Order Comments: Your activity upon discharge: activity as tolerated, WBAT   Order Specific Question Answer Comments   Is discharge order? Yes      When to contact your care team   Order Comments: Call your primary doctor if you have any of the following: temperature greater than 101.5 sustained over >2 hours, increased shortness of breath or increased drainage.     Wound care and dressings   Order Comments: Instructions to care for your  wound at home: Keep your dressings clean and dry. If your dressings become saturated, you can change them with gauze and tape. You may shower and get your incisions wet beginning the Friday after surgery. Do not soak or scrub your incisions until cleared by your surgeon. After showering, you should pat your incisions dry and keep them covered.     Discharge Instructions   Order Comments: See above. Activity and Range of motion as tolerated. Wound cares as needed. Take your previously prescribed plavix and aspirin to prevent blood clots.     Diet   Order Comments: Follow this diet upon discharge: Regular   Order Specific Question Answer Comments   Is discharge order? Yes          Carlos Coy MD  PGY-4 Orthopaedic Surgery

## 2018-07-24 ENCOUNTER — MYC MEDICAL ADVICE (OUTPATIENT)
Dept: CARDIOLOGY | Facility: CLINIC | Age: 58
End: 2018-07-24

## 2018-08-06 ENCOUNTER — OFFICE VISIT (OUTPATIENT)
Dept: NEUROSURGERY | Facility: CLINIC | Age: 58
End: 2018-08-06
Payer: COMMERCIAL

## 2018-08-06 VITALS
HEART RATE: 75 BPM | DIASTOLIC BLOOD PRESSURE: 80 MMHG | HEIGHT: 71 IN | BODY MASS INDEX: 22.68 KG/M2 | WEIGHT: 162 LBS | SYSTOLIC BLOOD PRESSURE: 124 MMHG

## 2018-08-06 DIAGNOSIS — S06.5XAA SUBDURAL HEMATOMA (H): ICD-10-CM

## 2018-08-06 DIAGNOSIS — G95.20 SPINAL CORD COMPRESSION (H): ICD-10-CM

## 2018-08-06 ASSESSMENT — PAIN SCALES - GENERAL: PAINLEVEL: NO PAIN (0)

## 2018-08-06 NOTE — PROGRESS NOTES
"It was a pleasure to see Derek Enriquez today in Neurosurgery Clinic. He is a 57 year old male who underwent:    Procedure Date: 03/09/2018       DATE OF OPERATION:  03/09/2018       PREOPERATIVE DIAGNOSIS:  Subdural hematoma in thoracic spine.   Recent MI status post cardiac stent and antiplatelet therapy.       POSTOPERATIVE DIAGNOSIS:  Subdural hematoma in thoracic spine.   Recent MI status post cardiac stent and antiplatelet therapy.      PROCEDURES PERFORMED:     1.  Emergent T9 to T12 laminectomy and evacuation of subdural hematoma.   2.  Intraoperative use of microscope, intraoperative C-arm, intraoperative use of ultrasound.       STAFF SURGEON:  Abhijeet Joe MD       RESIDENT SURGEON:  Qamar Maya MD, PhD; Rubén Landaverde MD     He had a spontaneous spinal subdural hematoma after treatment for cardiac arrest/MI. He was basically complete at the time of surgery and has not had significant return of function. He does have the ability to push bowel movements, but does not seem to have any other volitional function. He broke his R hip in June. He has been having problems with heterotopic ossification. He had a second opinion at [place] last week with no new suggestions.     We discussed that he might be a candidate for the eSTAND trial here with Dr. Kilgore and Taj.      Vitals:    08/06/18 0959   BP: 124/80   BP Location: Left arm   Patient Position: Sitting   Cuff Size: Adult Regular   Pulse: 75   Weight: 73.5 kg (162 lb)   Height: 1.803 m (5' 10.98\")     Body mass index is 22.61 kg/(m^2).  No Pain (0)    Incision well healed.    Strength BUE 5/5  BLE 0/5.     Imaging: No new imaging    Assessment: Complete spinal cord injury from spinal subdural hematoma.    Plan: I would like to get a repeat MRI to assess the spinal cord at this point. I do not have anything else to offer at this time, but did mention that some of my colleagues here are doing a trial of spinal cord stimulation in spinal cord injury. I will " ask them if and when he would be a candidate for the trial. I will convey the results of the MRI to the patient via [facility].   Answers for HPI/ROS submitted by the patient on 8/6/2018   PHQ-2 Score: 0

## 2018-08-06 NOTE — LETTER
"8/6/2018       RE: Derek Enriquez  [address] [facility] [address]     Dear Colleague,    Thank you for referring your patient, Derek Enriquez, to the [facility] NEUROSURGERY at [facility]. Please see a copy of my visit note below.    It was a pleasure to see Derek Enriquez today in Neurosurgery Clinic. He is a 57 year old male who underwent:    Procedure Date: 03/09/2018       DATE OF OPERATION:  03/09/2018       PREOPERATIVE DIAGNOSIS:  Subdural hematoma in thoracic spine.   Recent MI status post cardiac stent and antiplatelet therapy.       POSTOPERATIVE DIAGNOSIS:  Subdural hematoma in thoracic spine.   Recent MI status post cardiac stent and antiplatelet therapy.      PROCEDURES PERFORMED:     1.  Emergent T9 to T12 laminectomy and evacuation of subdural hematoma.   2.  Intraoperative use of microscope, intraoperative C-arm, intraoperative use of ultrasound.       STAFF SURGEON:  Abhijeet Joe MD       RESIDENT SURGEON:  Qamar Maya MD, PhD; Rubén Landaverde MD     He had a spontaneous spinal subdural hematoma after treatment for cardiac arrest/MI. He was basically complete at the time of surgery and has not had significant return of function. He does have the ability to push bowel movements, but does not seem to have any other volitional function. He broke his R hip in June. He has been having problems with heterotopic ossification. He had a second opinion at [place] last week with no new suggestions.     We discussed that he might be a candidate for the eSTAND trial here with Dr. Jon.      Vitals:    08/06/18 0959   BP: 124/80   BP Location: Left arm   Patient Position: Sitting   Cuff Size: Adult Regular   Pulse: 75   Weight: 73.5 kg (162 lb)   Height: 1.803 m (5' 10.98\")     Body mass index is 22.61 kg/(m^2).  No Pain (0)    Incision well healed.    Strength BUE 5/5  BLE 0/5.     Imaging: No new imaging    Assessment: Complete spinal cord injury from spinal " subdural hematoma.    Plan: I would like to get a repeat MRI to assess the spinal cord at this point. I do not have anything else to offer at this time, but did mention that some of my colleagues here are doing a trial of spinal cord stimulation in spinal cord injury. I will ask them if and when he would be a candidate for the trial. I will convey the results of the MRI to the patient via [facility].     Again, thank you for allowing me to participate in the care of your patient.      Sincerely,    Abhijeet Joe MD

## 2018-08-11 ENCOUNTER — HOSPITAL ENCOUNTER (OUTPATIENT)
Dept: MRI IMAGING | Facility: CLINIC | Age: 58
Discharge: HOME | End: 2018-08-11
Attending: NEUROLOGICAL SURGERY | Admitting: NEUROLOGICAL SURGERY
Payer: COMMERCIAL

## 2018-08-11 DIAGNOSIS — G95.20 SPINAL CORD COMPRESSION (H): ICD-10-CM

## 2018-08-11 DIAGNOSIS — S06.5XAA SUBDURAL HEMATOMA (H): ICD-10-CM

## 2018-08-11 PROCEDURE — 72146 MRI CHEST SPINE W/O DYE: CPT

## 2018-08-31 NOTE — TELEPHONE ENCOUNTER
[facility] Call Center    Phone Message    May a detailed message be left on voicemail: yes    Reason for Call: Other: Pt has questions for Dr Jane: Sept 19th Pt has surgery scheduled to remove from Heterotopic ossification. Pt needs to be on a medicine called Indomethacin for 6 weeks following that surgery. Pt said he needs to know from Dr Jane about adjusting his medications called: clopidogrel (PLAVIX) 75 MG tablet; and atorvastatin (LIPITOR) 20 MG tablet; and ASPIRIN ADULT LOW STRENGTH PO; etc. Please return Pt call to discuss. Thanks!     Action Taken: Message routed to:  [facility] ([facility] Cardiology Clinic

## 2018-08-31 NOTE — TELEPHONE ENCOUNTER
"S-(situation): Pt called inquiring about usage of Indomethacin with his Clopidogrel 75, ASA 81.    B-(background): Mr Derek Enriquez is a  57-year-old gentleman, who [place] [facility] [place]  Patient was resuscitated and underwent a coronary angiogram , which showed   Single vessel CAD  LAD   - 95% stenotic ruptured plaque in the proximal LAD  Successful deployment of a drug eluting stent    3.0 x 20 mm Promus Premier drug eluting stent across the proximal LAD  and post-dilated with a 3.5 x15 mm non-compliant balloon  -Successful placement of balloon pump for LV support.  He also was put at [facility].                         Postop day 1 developed back pain then loss of sensation and movement in his lower extremities.  Identified spinal subdural hematoma and transferred emergently to [facility].  He underwent T9-T12 decompressive laminectomy and evacuation of subdural hematoma.  He unfortunately persists with complete paraplegia.  After medical stabilization he transferred to [facility] acute rehabilitation [place] 3/14 for comprehensive cares.    A-(assessment): Called and spoke with Pt.  He stated he needs to get the heterotropic ossification \"taken care of\" as Pt \"can't tolerate it anymore\".  Per Pt, he will be required to be on indomethacin for 6 weeks post surgery.  Per Pt, surgeon is leaving any med adjustments up to Dr Jane and has not given any requests or recommendations.    R-(recommendations): Informed Pt that above information would be sent to Dr Jane for recommendations.  Pt informed he would be contacted with additional information.    Marilin Ramirez LPN      "

## 2018-09-04 NOTE — TELEPHONE ENCOUNTER
Date: 9/4/2018    Time of Call: 9:02 AM     Diagnosis:  Heterotopic Ossification Removal Surgery     [ TORB ] Ordering provider: Dr Jane  Order:   - Stop Clopidogrel 7 days prior to surgery.  - Stop ASA 4 days prior to surgery  - Ok to resume post surgery unless there are any contraindications noted by the surgical team.     Order received by: Marilin Ramirez LPN     Follow-up/additional notes: Called and spoke with Pt.  Informed Pt of Dr Jane recommendations.  Discussed with Pt risks of Indomethacin and signs of bleeding.  Instructed Pt to contact his surgical team and clinic if he experienced any symptoms noting bleeding.  Pt verbalized understanding, agreed to plan of care and denied any further questions.  Pt agreed to contact clinic with any further questions or concerns.    Message  Received: 2 days ago       Anoop Jane MD Duncan, Lindsay, LPN       Caller: Unspecified (4 days ago, 11:07 AM)                     preferentially at least clopidogrel and aspirin 1 year     If surgery is necessary - than 6 months dual antiplatelet - which would mean after 09-08-18     Stop clopidogrel 1 week before surgery and aspirin 3 to 4 days before surgery     If surgery urgent then of course needs to stop both     After surgery - if no contra-indication form surgical point of view - than restart clopidogrel and aspirin     Thanks     AYLEEN Ramirez LPN

## 2018-09-05 ENCOUNTER — HOSPITAL ENCOUNTER (OUTPATIENT)
Dept: WOUND CARE | Facility: CLINIC | Age: 58
Discharge: HOME | End: 2018-09-05
Attending: PHYSICIAN ASSISTANT | Admitting: PHYSICIAN ASSISTANT
Payer: COMMERCIAL

## 2018-09-05 VITALS
TEMPERATURE: 97.8 F | BODY MASS INDEX: 22.69 KG/M2 | SYSTOLIC BLOOD PRESSURE: 139 MMHG | WEIGHT: 162.04 LBS | HEIGHT: 71 IN | HEART RATE: 83 BPM | DIASTOLIC BLOOD PRESSURE: 82 MMHG | RESPIRATION RATE: 18 BRPM

## 2018-09-05 DIAGNOSIS — L89.893 PRESSURE ULCER OF OTHER SITE, STAGE 3 (H): ICD-10-CM

## 2018-09-05 DIAGNOSIS — L98.491 CHRONIC SKIN ULCER, LIMITED TO BREAKDOWN OF SKIN (H): ICD-10-CM

## 2018-09-05 DIAGNOSIS — L89.153 DECUBITUS ULCER OF SACRAL REGION, STAGE 3 (H): ICD-10-CM

## 2018-09-05 PROCEDURE — A6212 FOAM DRG <=16 SQ IN W/BORDER: HCPCS

## 2018-09-05 PROCEDURE — A6261 WOUND FILLER GEL/PASTE /OZ: HCPCS

## 2018-09-05 PROCEDURE — G0463 HOSPITAL OUTPT CLINIC VISIT: HCPCS | Mod: 25

## 2018-09-05 PROCEDURE — 99202 OFFICE O/P NEW SF 15 MIN: CPT | Mod: 25 | Performed by: PHYSICIAN ASSISTANT

## 2018-09-05 PROCEDURE — 11042 DBRDMT SUBQ TIS 1ST 20SQCM/<: CPT

## 2018-09-05 PROCEDURE — 11042 DBRDMT SUBQ TIS 1ST 20SQCM/<: CPT | Performed by: PHYSICIAN ASSISTANT

## 2018-09-05 NOTE — PROGRESS NOTES
"[facility]    HISTORY OF PRESENT ILLNESS: Derek Enriquez is a 57 year old, paraplegic male who presents with pressure injuries over his coccyx, perineum and both feet. In March of this year, Derek suffered a cardiac arrest and underwent cardiac catheterization. The next afternoon he was discovered to have a hematoma compressing his spinal cord at [id number]. He has had several hospital admissions since the spinal cord injury and subsequently developed pressure sores.     WOUND CARE: bacitracin and bandages    OFFLOADING: on Roho cushion, sleeps on standard bed    DATE WOUND ACQUIRED: toe wound 6/18, coccyx 7/18    PAST MEDICAL HISTORY:  has a past medical history of CAD (coronary artery disease); Femur fracture (H); Neurogenic bladder; Neurogenic bowel; Orthostatic hypotension; Paraplegia (H); and Thoracic spinal cord injury (H).    SOCIAL HISTORY: lives with his 26 year old son who helps with wound dressings    MEDICATIONS:   Current Outpatient Prescriptions   Medication     ascorbic acid (VITAMIN C) 500 MG tablet     ASPIRIN ADULT LOW STRENGTH PO     atorvastatin (LIPITOR) 20 MG tablet     clopidogrel (PLAVIX) 75 MG tablet     etidronate (DIDRONEL) 200 MG tablet     Multiple Vitamins-Minerals (MULTIVITAMIN MEN PO)     phenylephrine-shark liver oil-mineral oil-petrolatum (PREPARATION H) 0.25-14-74.9 % rectal ointment     vitamin B complex with vitamin C (VITAMIN  B COMPLEX) TABS tablet     No current facility-administered medications for this encounter.      REVIEW OF SYSTEMS:  CONSTITUTIONAL: Denies fevers or acute illness  ENDOCRINE: Denies diabetes    VITALS: /82 (BP Location: Left arm)  Pulse 83  Temp 97.8  F (36.6  C) (Temporal)  Resp 18  Ht 1.803 m (5' 10.98\")  Wt 73.5 kg (162 lb 0.6 oz)  BMI 22.61 kg/m2    PHYSICAL EXAM:  GENERAL: Patient is alert and oriented and in no acute distress  INTEGUMENTARY:   WOUND ASSESSMENT #1:     Location: left great toe     Size: 3.5 cm x 2.0 cm " with a depth of 0.1 cm    Drainage: copious amount of serosanguinous drainage    Wound description: hypergranulation tissue surrounding nail bed  WOUND ASSESSMENT #2:     Location: coccyx     Size: 2.0 cm x 1.1 cm with a depth of 0.2 cm    Drainage: copious amount of serosanguinous drainage    Wound description: yellow slough with scarred sugar wound  WOUND ASSESSMENT #3:     Location: perineum     Size: 0.8 cm x 0.5 cm with a depth of 0.3 cm    Drainage: copious amount of serosanguinous drainage    Wound description: clean fissure, granular bed  WOUND ASSESSMENT #4:     Location: right medial foot     Size: 1.0 cm x 0.7 cm with a depth of 0.1 cm    Drainage: copious amount of serosanguinous drainage    Wound description: shallow and granular            PROCEDURE (all wounds): 4% topical lidocaine was applied to the wound by the CMA. Patient was determined to be capable of making their own medical decisions and informed consent was obtained. Using a sharp curette, and tissue nippers a surgical debridement was performed down to and including subcutaneous tissue of <20 cm. Hemostasis was achieved with pressure and silver nitrate. The patient tolerated the procedure well.      ASSESSMENT:   1. Full-thickness, left first toe ulcer with fat-layer exposed  2. Stage 3 coccygeal pressure ulcer  3. Full-thickness, perineal ulcer with fat-layer exposed  4. Full-thickness, right medial foot wound with fat-layer exposed    PLAN:   1. Perineal wound: CriticAid  2. Coccyx wound: Iodosorb and Mepilex or Tegaderm foam  3. Foot wounds: Iodosorb  4. Lower legs need compression garment (we recommend SpandaGrip)    FOLLOW-UP: 7-10 days    PEDRO MERLOS PA-C (DYKSTRA)

## 2018-09-05 NOTE — PROGRESS NOTES
Patient arrived for wound care visit. Certified Wound Care Nurse time spent evaluating patient record, completed a full evaluation and documented wound(s) & sugar-wound skin; provided recommendation based on treatment plan. Applied dressing, reviewed discharge instructions, patient education, and discussed plan of care with appropriate medical team staff members and patient and/or family members.

## 2018-09-05 NOTE — MR AVS SNAPSHOT
MRN:[id number]                      After Visit Summary   9/5/2018    Derek Enriquez    MRN: [id number]           Visit Information        Provider Department      9/5/2018  3:15 PM Rosario Sánchez PA-C [facility] [place]        Your next 10 appointments already scheduled     Sep 19, 2018   Procedure with Toñito Kennedy MD   [facility] [place], Same Day Surgery (--)    [address] [facility] [address]   [phone]            Nov 01, 2018  7:00 AM CDT   (Arrive by 6:45 AM)   Urodynamics with Candy Leary PA-C   [facility]rology [facility] Inst for Prostate and Urologic Cancers ([facility])    [address] [place] [address]  4th Floor  [facility] [id number] [phone]            Nov 07, 2018  2:00 PM CST   Ech Complete with UCECHCR4   [facility] Echo ([facility])    [address] [place] [address]  3rd Floor  [facility] [id number] [phone]           1.  Please bring or wear a comfortable two-piece outfit. 2.  You may eat, drink and take your normal medicines. 3.  For any questions that cannot be answered, please contact the ordering physician 4.  Please do not wear perfumes or scented lotions on the day of your exam.            Nov 07, 2018  3:00 PM CST   Lab with  LAB    Health Lab ([facility])    [address] [place] [address]  1st Floor  [facility] [id number] [phone]            Nov 07, 2018  3:30 PM CST   (Arrive by 3:15 PM)   RETURN LIPID VISIT with Anoop Jane MD   [facility] Heart Care ([facility])    [address] [place] [address]  [address] [place] [id number] [phone]            Nov 13, 2018  4:30 PM CST   Telephone Call with Andrei Pelaez MD   [facility] for Prostate and Urologic Cancers ([facility])    [address] [place] [address]  4th [facility] [id number] [phone]           Note:  this is not an onsite visit; there is no need to come to the facility.  Thank you for choosing [facility] for your health care needs. Below is some information for patients who are interested in having their follow-up visit with a physician by telephone. In some cases, a telephone visit can be an effective and convenient way to manage your follow-up care. Choosing a telephone visit rather than a face to face visit for your follow-up care is a decision that you and your physician can make together to ensure it meets all of your needs.  A face to face visit is always an available option, if you choose to do so.  We want to make sure you have all of the information you need about the telephone visit option and answer all of your questions before you decide to schedule a telephone follow-up visit. If you have any questions, you may talk to a staff member or our financial counselor at [phone].  1. General overview Our clinic sees patients for a variety of conditions and concerns. A face to face visit with your doctor is required for any new concerns or for your initial visit. If you and your doctor decide that a follow up visit by telephone is appropriate, you may decide to opt for a telephone visit.   2. Billing and insurance coverage There is a charge for telephone visits, similar to the charge for an in-person visit. Your bill is based on the amount of time you and your physician are on the phone. We will bill each visit to your insurance company (just like your other medical visits), and you will be responsible for any costs not paid by your insurance company. The charge may be denied by your insurance company, in which case you will be responsible for the entire amount billed. The decision to cover the cost is determined by your insurance company. If you want to know what your insurance company will cover, we encourage you to contact them to determine your coverage. The codes below are  the codes we use when billing for telephone visits and the associated charges. This may help you work with your insurance company to determine your benefits.   Billing CPT codes for Telephone visits  38506 ($30), 96899 ($35), 07270 ($40)                Further instructions from your care team             [facility] [place]  [address] [facility] [address], [place] [address]  Appointment Phone [phone] Nurse Advisors [phone]    Derek Enriquez      1960    Wound Dressing Change:Right and Left Foot and Coccyx  Cleanse wound with wound cleanser  Skin Care to perineum: Apply Critic Aid   Cover wound with Iodosorb gel on guaze  Cover wound on Coccyx with mepilex border foot with gauze  Change dressing daily  Compression:   You have a compression wrap Spandigrip F or wraps is supposed to be removed at night and put back on first thing in the morning.   Please remove compression dressing if toes turn blue and/or tingle and can not be relieved by raising the leg for one hour.       Repositioning:    Bed:  Reposition pt MINIMALLY every 1-2 hours in bed to relieve pressure and promote perfusion to tissue.     Chair:  When up to chair pt should not sit for longer than 30 minutes total before either tilting or returning to bed for at least 10 minutes, again to relieve pressure and promote perfusion to tissue.     o Pt should also sit on a chair cushion when up to the chair.        Rosario Sánchez PA-C. September 5, 2018    Call us at [phone] if you have any questions about your wounds, have redness or swelling around your wound, have a fever of 101 or greater or if you have any other problems or concerns. We answer the phone Monday through Friday 8 am to 4 pm, please leave a message as we check the voicemail frequently throughout the day.     Follow up with Provider -1- 2 weeks                MyChart Information     [employer] gives you secure access to your electronic health record. If  you see a primary care provider, you can also send messages to your care team and make appointments. If you have questions, please call your primary care clinic.  If you do not have a primary care provider, please call [phone] and they will assist you.        Care EveryWhere ID     This is your Care EveryWhere ID. This could be used by other organizations to access your [place] medical records  [id number]        Equal Access to Services     AUSTIN WILSON : Bri Francisco, kesha bob, jakub flood, minal hamlin. So [facility] [phone].    ATENCIÓN: Si habla dario, tiene a humphrey disposición servicios gratuitos de asistencia lingüística. Llame al [phone].    We comply with applicable federal civil rights laws and [place] laws. We do not discriminate on the basis of race, color, national origin, age, disability, sex, sexual orientation, or gender identity.

## 2018-09-05 NOTE — DISCHARGE INSTRUCTIONS
[facility]  [address] [facility] [address], [place] [address]  Appointment Phone [phone] Nurse Advisors [phone]    Derek Enriquez      1960    Wound Dressing Change:Right and Left Foot and Coccyx  Cleanse wound with wound cleanser  Skin Care to perineum: Apply Critic Aid   Cover wound with Iodosorb gel on guaze  Cover wound on Coccyx with mepilex border foot with gauze  Change dressing daily  Compression:   You have a compression wrap Spandigrip F or wraps is supposed to be removed at night and put back on first thing in the morning.   Please remove compression dressing if toes turn blue and/or tingle and can not be relieved by raising the leg for one hour.       Repositioning:    Bed:  Reposition pt MINIMALLY every 1-2 hours in bed to relieve pressure and promote perfusion to tissue.     Chair:  When up to chair pt should not sit for longer than 30 minutes total before either tilting or returning to bed for at least 10 minutes, again to relieve pressure and promote perfusion to tissue.     o Pt should also sit on a chair cushion when up to the chair.        Rsoario Sánchez PA-C. September 5, 2018    Call us at [phone] if you have any questions about your wounds, have redness or swelling around your wound, have a fever of 101 or greater or if you have any other problems or concerns. We answer the phone Monday through Friday 8 am to 4 pm, please leave a message as we check the voicemail frequently throughout the day.     Follow up with Provider -1- 2 weeks

## 2018-09-12 ENCOUNTER — HOSPITAL ENCOUNTER (OUTPATIENT)
Dept: WOUND CARE | Facility: CLINIC | Age: 58
Discharge: HOME | End: 2018-09-12
Attending: PHYSICIAN ASSISTANT | Admitting: PHYSICIAN ASSISTANT
Payer: COMMERCIAL

## 2018-09-12 VITALS
SYSTOLIC BLOOD PRESSURE: 105 MMHG | TEMPERATURE: 98 F | RESPIRATION RATE: 18 BRPM | HEART RATE: 93 BPM | DIASTOLIC BLOOD PRESSURE: 73 MMHG

## 2018-09-12 DIAGNOSIS — L98.491 CHRONIC SKIN ULCER, LIMITED TO BREAKDOWN OF SKIN (H): ICD-10-CM

## 2018-09-12 DIAGNOSIS — L89.153 DECUBITUS ULCER OF SACRAL REGION, STAGE 3 (H): ICD-10-CM

## 2018-09-12 DIAGNOSIS — L89.893 PRESSURE ULCER OF OTHER SITE, STAGE 3 (H): ICD-10-CM

## 2018-09-12 PROCEDURE — A6248 HYDROGEL DRSG GEL FILLER: HCPCS

## 2018-09-12 PROCEDURE — 17250 CHEM CAUT OF GRANLTJ TISSUE: CPT

## 2018-09-12 PROCEDURE — 17250 CHEM CAUT OF GRANLTJ TISSUE: CPT | Mod: 59 | Performed by: PHYSICIAN ASSISTANT

## 2018-09-12 PROCEDURE — 11042 DBRDMT SUBQ TIS 1ST 20SQCM/<: CPT

## 2018-09-12 PROCEDURE — 11042 DBRDMT SUBQ TIS 1ST 20SQCM/<: CPT | Performed by: PHYSICIAN ASSISTANT

## 2018-09-12 NOTE — MR AVS SNAPSHOT
MRN:[id number]                      After Visit Summary   9/12/2018    Derek Enriquez    MRN: [id number]           Visit Information        Provider Department      9/12/2018  3:30 PM Rosario Sánchez PA-C [facility] [place]        Your next 10 appointments already scheduled     Sep 19, 2018   Procedure with Toñito Kennedy MD   [facility] [place], Same Day Surgery (--)    [address] [facility] [address]   [phone]            Nov 01, 2018  7:00 AM CDT   (Arrive by 6:45 AM)   Urodynamics with Candy Leary PA-C   [facility] Urology and Inst for Prostate and Urologic Cancers ([facility])    [address] [place] [address]  4th Floor  [facility] [id number] [phone]            Nov 07, 2018  2:00 PM CST   Ech Complete with UCECHCR4   [facility] Echo ([facility])    [address] [place] [address]  3rd Floor  [facility] [id number] [phone]           1.  Please bring or wear a comfortable two-piece outfit. 2.  You may eat, drink and take your normal medicines. 3.  For any questions that cannot be answered, please contact the ordering physician 4.  Please do not wear perfumes or scented lotions on the day of your exam.            Nov 07, 2018  3:00 PM CST   Lab with  LAB    Health Lab ([facility])    [address] [place] [address]  1st Floor  [facility] [address] [phone]            Nov 07, 2018  3:30 PM CST   (Arrive by 3:15 PM)   RETURN LIPID VISIT with Anoop Jane MD   [facility] Heart Care ([facility])    [address] [place] [address]  [address] [place] [id number] [phone]            Nov 13, 2018  4:30 PM CST   Telephone Call with Andrei Pelaez MD   [facility]rology [facility] Inst for Prostate and Urologic Cancers ([facility])    [address] [place] [address]  4th [facility] [address] [phone]           Note:  this is not an onsite visit; there is no need to come to the facility.  Thank you for choosing [facility] for your health care needs. Below is some information for patients who are interested in having their follow-up visit with a physician by telephone. In some cases, a telephone visit can be an effective and convenient way to manage your follow-up care. Choosing a telephone visit rather than a face to face visit for your follow-up care is a decision that you and your physician can make together to ensure it meets all of your needs.  A face to face visit is always an available option, if you choose to do so.  We want to make sure you have all of the information you need about the telephone visit option and answer all of your questions before you decide to schedule a telephone follow-up visit. If you have any questions, you may talk to a staff member or our financial counselor at [phone].  1. General overview Our clinic sees patients for a variety of conditions and concerns. A face to face visit with your doctor is required for any new concerns or for your initial visit. If you and your doctor decide that a follow up visit by telephone is appropriate, you may decide to opt for a telephone visit.   2. Billing and insurance coverage There is a charge for telephone visits, similar to the charge for an in-person visit. Your bill is based on the amount of time you and your physician are on the phone. We will bill each visit to your insurance company (just like your other medical visits), and you will be responsible for any costs not paid by your insurance company. The charge may be denied by your insurance company, in which case you will be responsible for the entire amount billed. The decision to cover the cost is determined by your insurance company. If you want to know what your insurance company will cover, we encourage you to contact them to determine your coverage. The codes below are  the codes we use when billing for telephone visits and the associated charges. This may help you work with your insurance company to determine your benefits.   Billing CPT codes for Telephone visits  90764 ($30), 35926 ($35), 22000 ($40)                Further instructions from your care team             [facility] [place]  [address] [facility] [address], [place] [address]  Appointment Phone [phone] Nurse Advisors [phone]     Derek Enriquez      1960     Wound Dressing Change:Right and Left Foot and Coccyx  Cleanse wound with wound cleanser  Cover wound with wound gel on guaze  Cover wound on Coccyx with mepilex border foot with gauze  Change dressing daily  Compression:   You have a compression wrap Spandigrip F or wraps is supposed to be removed at night and put back on first thing in the morning.   Please remove compression dressing if toes turn blue and/or tingle and can not be relieved by raising the leg for one hour.     Repositioning:    Bed:  Reposition pt MINIMALLY every 1-2 hours in bed to relieve pressure and promote perfusion to tissue.     Chair:  When up to chair pt should not sit for longer than 30 minutes total before either tilting or returning to bed for at least 10 minutes, again to relieve pressure and promote perfusion to tissue.       Pt should also sit on a chair cushion when up to the chair.          Rosario Sánchez PA-C. September 12, 2018     Call us at [phone] if you have any questions about your wounds, have redness or swelling around your wound, have a fever of 101 or greater or if you have any other problems or concerns. We answer the phone Monday through Friday 8 am to 4 pm, please leave a message as we check the voicemail frequently throughout the day.      Follow up with Provider - 2 weeks           [employer] Information     [employer] gives you secure access to your electronic health record. If you see a primary care provider, you can also  send messages to your care team and make appointments. If you have questions, please call your primary care clinic.  If you do not have a primary care provider, please call [phone] and they will assist you.        Care EveryWhere ID     This is your Care EveryWhere ID. This could be used by other organizations to access your [place] medical records  [id number]        Equal Access to Services     AUSTIN WILSON : Bri Francisco, kesha bob, jakub flood, minal sales . So [facility] [phone].    ATENCIÓN: Si habla español, tiene a humphrey disposición servicios gratuitos de asistencia lingüística. Llame al [phone].    We comply with applicable federal civil rights laws and [place] laws. We do not discriminate on the basis of race, color, national origin, age, disability, sex, sexual orientation, or gender identity.

## 2018-09-18 ENCOUNTER — ANESTHESIA EVENT (OUTPATIENT)
Dept: SURGERY | Facility: CLINIC | Age: 58
DRG: 464 | End: 2018-09-18

## 2018-09-18 NOTE — ADDENDUM NOTE
Encounter addended by: Rosario Sánchez PA-C on: 9/18/2018  4:25 PM<BR>     Actions taken: Sign clinical note

## 2018-09-19 ENCOUNTER — HOSPITAL ENCOUNTER (INPATIENT)
Facility: CLINIC | Age: 58
LOS: 3 days | Discharge: HOME | DRG: 464 | End: 2018-09-22
Attending: SPECIALIST | Admitting: SPECIALIST
Payer: COMMERCIAL

## 2018-09-19 ENCOUNTER — ANESTHESIA (OUTPATIENT)
Dept: SURGERY | Facility: CLINIC | Age: 58
DRG: 464 | End: 2018-09-19

## 2018-09-19 LAB
ABO + RH BLD: NORMAL
ABO + RH BLD: NORMAL
BLD GP AB SCN SERPL QL: NORMAL
BLOOD BANK CMNT PATIENT-IMP: NORMAL
CREAT SERPL-MCNC: 0.75 MG/DL (ref 0.66–1.25)
GFR SERPL CREATININE-BSD FRML MDRD: >90 ML/MIN/1.7M2
GLUCOSE SERPL-MCNC: 85 MG/DL (ref 70–99)
POTASSIUM SERPL-SCNC: 4.5 MMOL/L (ref 3.4–5.3)
SPECIMEN EXP DATE BLD: NORMAL

## 2018-09-19 PROCEDURE — 25000128 H RX IP 250 OP 636: Performed by: ANESTHESIOLOGY

## 2018-09-19 PROCEDURE — 82565 ASSAY OF CREATININE: CPT | Performed by: ANESTHESIOLOGY

## 2018-09-19 PROCEDURE — 82947 ASSAY GLUCOSE BLOOD QUANT: CPT | Performed by: ANESTHESIOLOGY

## 2018-09-19 PROCEDURE — 25000566 ZZH SEVOFLURANE, EA 15 MIN: Performed by: SPECIALIST

## 2018-09-19 PROCEDURE — 25000125 ZZHC RX 250: Performed by: NURSE ANESTHETIST, CERTIFIED REGISTERED

## 2018-09-19 PROCEDURE — 40000171 ZZH STATISTIC PRE-PROCEDURE ASSESSMENT III: Performed by: SPECIALIST

## 2018-09-19 PROCEDURE — 25000128 H RX IP 250 OP 636: Performed by: NURSE ANESTHETIST, CERTIFIED REGISTERED

## 2018-09-19 PROCEDURE — 36415 COLL VENOUS BLD VENIPUNCTURE: CPT | Performed by: ANESTHESIOLOGY

## 2018-09-19 PROCEDURE — 71000015 ZZH RECOVERY PHASE 1 LEVEL 2 EA ADDTL HR: Performed by: SPECIALIST

## 2018-09-19 PROCEDURE — 25000132 ZZH RX MED GY IP 250 OP 250 PS 637: Performed by: INTERNAL MEDICINE

## 2018-09-19 PROCEDURE — 86850 RBC ANTIBODY SCREEN: CPT | Performed by: ANESTHESIOLOGY

## 2018-09-19 PROCEDURE — 25000128 H RX IP 250 OP 636: Performed by: SPECIALIST

## 2018-09-19 PROCEDURE — 88304 TISSUE EXAM BY PATHOLOGIST: CPT | Performed by: SPECIALIST

## 2018-09-19 PROCEDURE — 71000014 ZZH RECOVERY PHASE 1 LEVEL 2 FIRST HR: Performed by: SPECIALIST

## 2018-09-19 PROCEDURE — 25000132 ZZH RX MED GY IP 250 OP 250 PS 637: Performed by: ORTHOPAEDIC SURGERY

## 2018-09-19 PROCEDURE — 84132 ASSAY OF SERUM POTASSIUM: CPT | Performed by: ANESTHESIOLOGY

## 2018-09-19 PROCEDURE — 25000125 ZZHC RX 250: Performed by: SPECIALIST

## 2018-09-19 PROCEDURE — 36000059 ZZH SURGERY LEVEL 3 EA 15 ADDTL MIN UMMC: Performed by: SPECIALIST

## 2018-09-19 PROCEDURE — 0MBM0ZZ EXCISION OF LEFT HIP BURSA AND LIGAMENT, OPEN APPROACH: ICD-10-PCS | Performed by: SPECIALIST

## 2018-09-19 PROCEDURE — 0JBM0ZZ EXCISION OF LEFT UPPER LEG SUBCUTANEOUS TISSUE AND FASCIA, OPEN APPROACH: ICD-10-PCS | Performed by: SPECIALIST

## 2018-09-19 PROCEDURE — 36000057 ZZH SURGERY LEVEL 3 1ST 30 MIN - UMMC: Performed by: SPECIALIST

## 2018-09-19 PROCEDURE — 12000001 ZZH R&B MED SURG/OB UMMC

## 2018-09-19 PROCEDURE — 99207 ZZC CONSULT E&M CHANGED TO INITIAL LEVEL: CPT | Performed by: NURSE PRACTITIONER

## 2018-09-19 PROCEDURE — 27210794 ZZH OR GENERAL SUPPLY STERILE: Performed by: SPECIALIST

## 2018-09-19 PROCEDURE — 37000009 ZZH ANESTHESIA TECHNICAL FEE, EACH ADDTL 15 MIN: Performed by: SPECIALIST

## 2018-09-19 PROCEDURE — 25000128 H RX IP 250 OP 636: Performed by: ORTHOPAEDIC SURGERY

## 2018-09-19 PROCEDURE — 37000008 ZZH ANESTHESIA TECHNICAL FEE, 1ST 30 MIN: Performed by: SPECIALIST

## 2018-09-19 PROCEDURE — 86901 BLOOD TYPING SEROLOGIC RH(D): CPT | Performed by: ANESTHESIOLOGY

## 2018-09-19 PROCEDURE — 86900 BLOOD TYPING SEROLOGIC ABO: CPT | Performed by: ANESTHESIOLOGY

## 2018-09-19 PROCEDURE — 99222 1ST HOSP IP/OBS MODERATE 55: CPT | Performed by: NURSE PRACTITIONER

## 2018-09-19 PROCEDURE — 88304 TISSUE EXAM BY PATHOLOGIST: CPT | Mod: 26 | Performed by: SPECIALIST

## 2018-09-19 RX ORDER — ACETAMINOPHEN 325 MG/1
975 TABLET ORAL EVERY 8 HOURS
Status: DISPENSED | OUTPATIENT
Start: 2018-09-19 | End: 2018-09-22

## 2018-09-19 RX ORDER — CEFAZOLIN SODIUM 1 G/3ML
1 INJECTION, POWDER, FOR SOLUTION INTRAMUSCULAR; INTRAVENOUS EVERY 8 HOURS
Status: COMPLETED | OUTPATIENT
Start: 2018-09-19 | End: 2018-09-20

## 2018-09-19 RX ORDER — LIDOCAINE 40 MG/G
CREAM TOPICAL
Status: DISCONTINUED | OUTPATIENT
Start: 2018-09-19 | End: 2018-09-22 | Stop reason: HOSPADM

## 2018-09-19 RX ORDER — CEFAZOLIN SODIUM 2 G/100ML
2 INJECTION, SOLUTION INTRAVENOUS
Status: COMPLETED | OUTPATIENT
Start: 2018-09-19 | End: 2018-09-19

## 2018-09-19 RX ORDER — ONDANSETRON 2 MG/ML
INJECTION INTRAMUSCULAR; INTRAVENOUS PRN
Status: DISCONTINUED | OUTPATIENT
Start: 2018-09-19 | End: 2018-09-19

## 2018-09-19 RX ORDER — ATORVASTATIN CALCIUM 20 MG/1
20 TABLET, FILM COATED ORAL AT BEDTIME
Status: DISCONTINUED | OUTPATIENT
Start: 2018-09-19 | End: 2018-09-22 | Stop reason: HOSPADM

## 2018-09-19 RX ORDER — DOCUSATE SODIUM 100 MG/1
100 CAPSULE, LIQUID FILLED ORAL 2 TIMES DAILY
Status: DISCONTINUED | OUTPATIENT
Start: 2018-09-19 | End: 2018-09-22 | Stop reason: HOSPADM

## 2018-09-19 RX ORDER — NALOXONE HYDROCHLORIDE 0.4 MG/ML
.1-.4 INJECTION, SOLUTION INTRAMUSCULAR; INTRAVENOUS; SUBCUTANEOUS
Status: DISCONTINUED | OUTPATIENT
Start: 2018-09-19 | End: 2018-09-19

## 2018-09-19 RX ORDER — PROCHLORPERAZINE MALEATE 10 MG
10 TABLET ORAL EVERY 6 HOURS PRN
Status: DISCONTINUED | OUTPATIENT
Start: 2018-09-19 | End: 2018-09-22 | Stop reason: HOSPADM

## 2018-09-19 RX ORDER — HYDROXYZINE HYDROCHLORIDE 25 MG/1
25 TABLET, FILM COATED ORAL EVERY 6 HOURS PRN
Status: DISCONTINUED | OUTPATIENT
Start: 2018-09-19 | End: 2018-09-22 | Stop reason: HOSPADM

## 2018-09-19 RX ORDER — ONDANSETRON 2 MG/ML
4 INJECTION INTRAMUSCULAR; INTRAVENOUS EVERY 6 HOURS PRN
Status: DISCONTINUED | OUTPATIENT
Start: 2018-09-19 | End: 2018-09-22 | Stop reason: HOSPADM

## 2018-09-19 RX ORDER — ACETAMINOPHEN 325 MG/1
650 TABLET ORAL EVERY 4 HOURS PRN
Status: DISCONTINUED | OUTPATIENT
Start: 2018-09-22 | End: 2018-09-22 | Stop reason: HOSPADM

## 2018-09-19 RX ORDER — MULTIVITAMIN,THERAPEUTIC
TABLET ORAL DAILY
Status: DISCONTINUED | OUTPATIENT
Start: 2018-09-19 | End: 2018-09-22 | Stop reason: HOSPADM

## 2018-09-19 RX ORDER — NALOXONE HYDROCHLORIDE 0.4 MG/ML
.1-.4 INJECTION, SOLUTION INTRAMUSCULAR; INTRAVENOUS; SUBCUTANEOUS
Status: DISCONTINUED | OUTPATIENT
Start: 2018-09-19 | End: 2018-09-22 | Stop reason: HOSPADM

## 2018-09-19 RX ORDER — HYDROMORPHONE HYDROCHLORIDE 1 MG/ML
.3-.5 INJECTION, SOLUTION INTRAMUSCULAR; INTRAVENOUS; SUBCUTANEOUS EVERY 5 MIN PRN
Status: DISCONTINUED | OUTPATIENT
Start: 2018-09-19 | End: 2018-09-19 | Stop reason: HOSPADM

## 2018-09-19 RX ORDER — LIDOCAINE 40 MG/G
CREAM TOPICAL
Status: DISCONTINUED | OUTPATIENT
Start: 2018-09-19 | End: 2018-09-19 | Stop reason: HOSPADM

## 2018-09-19 RX ORDER — ONDANSETRON 2 MG/ML
4 INJECTION INTRAMUSCULAR; INTRAVENOUS EVERY 30 MIN PRN
Status: DISCONTINUED | OUTPATIENT
Start: 2018-09-19 | End: 2018-09-19 | Stop reason: HOSPADM

## 2018-09-19 RX ORDER — ONDANSETRON 4 MG/1
4 TABLET, ORALLY DISINTEGRATING ORAL EVERY 30 MIN PRN
Status: DISCONTINUED | OUTPATIENT
Start: 2018-09-19 | End: 2018-09-19 | Stop reason: HOSPADM

## 2018-09-19 RX ORDER — ONDANSETRON 4 MG/1
4 TABLET, ORALLY DISINTEGRATING ORAL EVERY 6 HOURS PRN
Status: DISCONTINUED | OUTPATIENT
Start: 2018-09-19 | End: 2018-09-22 | Stop reason: HOSPADM

## 2018-09-19 RX ORDER — OXYCODONE HYDROCHLORIDE 5 MG/1
5-10 TABLET ORAL
Status: DISCONTINUED | OUTPATIENT
Start: 2018-09-19 | End: 2018-09-22 | Stop reason: HOSPADM

## 2018-09-19 RX ORDER — BISACODYL 10 MG
10 SUPPOSITORY, RECTAL RECTAL DAILY
Status: DISCONTINUED | OUTPATIENT
Start: 2018-09-20 | End: 2018-09-22 | Stop reason: HOSPADM

## 2018-09-19 RX ORDER — PROPOFOL 10 MG/ML
INJECTION, EMULSION INTRAVENOUS PRN
Status: DISCONTINUED | OUTPATIENT
Start: 2018-09-19 | End: 2018-09-19

## 2018-09-19 RX ORDER — SODIUM CHLORIDE, SODIUM LACTATE, POTASSIUM CHLORIDE, CALCIUM CHLORIDE 600; 310; 30; 20 MG/100ML; MG/100ML; MG/100ML; MG/100ML
INJECTION, SOLUTION INTRAVENOUS CONTINUOUS
Status: DISCONTINUED | OUTPATIENT
Start: 2018-09-19 | End: 2018-09-22 | Stop reason: HOSPADM

## 2018-09-19 RX ORDER — SODIUM CHLORIDE, SODIUM LACTATE, POTASSIUM CHLORIDE, CALCIUM CHLORIDE 600; 310; 30; 20 MG/100ML; MG/100ML; MG/100ML; MG/100ML
INJECTION, SOLUTION INTRAVENOUS CONTINUOUS
Status: DISCONTINUED | OUTPATIENT
Start: 2018-09-19 | End: 2018-09-19 | Stop reason: HOSPADM

## 2018-09-19 RX ORDER — INDOMETHACIN 25 MG/1
75 CAPSULE ORAL
Status: DISCONTINUED | OUTPATIENT
Start: 2018-09-19 | End: 2018-09-20

## 2018-09-19 RX ORDER — METOCLOPRAMIDE 10 MG/1
10 TABLET ORAL EVERY 6 HOURS PRN
Status: DISCONTINUED | OUTPATIENT
Start: 2018-09-19 | End: 2018-09-22 | Stop reason: HOSPADM

## 2018-09-19 RX ORDER — PANTOPRAZOLE SODIUM 40 MG/1
40 TABLET, DELAYED RELEASE ORAL
Status: DISCONTINUED | OUTPATIENT
Start: 2018-09-20 | End: 2018-09-22 | Stop reason: HOSPADM

## 2018-09-19 RX ORDER — EPHEDRINE SULFATE 50 MG/ML
INJECTION, SOLUTION INTRAMUSCULAR; INTRAVENOUS; SUBCUTANEOUS PRN
Status: DISCONTINUED | OUTPATIENT
Start: 2018-09-19 | End: 2018-09-19

## 2018-09-19 RX ORDER — FENTANYL CITRATE 50 UG/ML
INJECTION, SOLUTION INTRAMUSCULAR; INTRAVENOUS PRN
Status: DISCONTINUED | OUTPATIENT
Start: 2018-09-19 | End: 2018-09-19

## 2018-09-19 RX ORDER — METOCLOPRAMIDE HYDROCHLORIDE 5 MG/ML
10 INJECTION INTRAMUSCULAR; INTRAVENOUS EVERY 6 HOURS PRN
Status: DISCONTINUED | OUTPATIENT
Start: 2018-09-19 | End: 2018-09-22 | Stop reason: HOSPADM

## 2018-09-19 RX ORDER — MAGNESIUM HYDROXIDE 1200 MG/15ML
LIQUID ORAL PRN
Status: DISCONTINUED | OUTPATIENT
Start: 2018-09-19 | End: 2018-09-19 | Stop reason: HOSPADM

## 2018-09-19 RX ADMIN — Medication 2.5 MG: at 09:13

## 2018-09-19 RX ADMIN — SUGAMMADEX 140 MG: 100 INJECTION, SOLUTION INTRAVENOUS at 10:10

## 2018-09-19 RX ADMIN — CEFAZOLIN SODIUM 2 G: 2 INJECTION, SOLUTION INTRAVENOUS at 07:57

## 2018-09-19 RX ADMIN — PHENYLEPHRINE HYDROCHLORIDE 50 MCG: 10 INJECTION, SOLUTION INTRAMUSCULAR; INTRAVENOUS; SUBCUTANEOUS at 08:20

## 2018-09-19 RX ADMIN — ROCURONIUM BROMIDE 50 MG: 10 INJECTION INTRAVENOUS at 07:44

## 2018-09-19 RX ADMIN — PHENYLEPHRINE HYDROCHLORIDE 0.3 MCG/KG/MIN: 10 INJECTION, SOLUTION INTRAMUSCULAR; INTRAVENOUS; SUBCUTANEOUS at 08:26

## 2018-09-19 RX ADMIN — PHENYLEPHRINE HYDROCHLORIDE 50 MCG: 10 INJECTION, SOLUTION INTRAMUSCULAR; INTRAVENOUS; SUBCUTANEOUS at 08:25

## 2018-09-19 RX ADMIN — ONDANSETRON 4 MG: 2 INJECTION INTRAMUSCULAR; INTRAVENOUS at 10:10

## 2018-09-19 RX ADMIN — CEFAZOLIN 1 G: 1 INJECTION, POWDER, FOR SOLUTION INTRAMUSCULAR; INTRAVENOUS at 16:26

## 2018-09-19 RX ADMIN — PHENYLEPHRINE HYDROCHLORIDE 50 MCG: 10 INJECTION, SOLUTION INTRAMUSCULAR; INTRAVENOUS; SUBCUTANEOUS at 07:54

## 2018-09-19 RX ADMIN — PHENYLEPHRINE HYDROCHLORIDE 50 MCG: 10 INJECTION, SOLUTION INTRAMUSCULAR; INTRAVENOUS; SUBCUTANEOUS at 08:06

## 2018-09-19 RX ADMIN — MIDAZOLAM 2 MG: 1 INJECTION INTRAMUSCULAR; INTRAVENOUS at 07:31

## 2018-09-19 RX ADMIN — Medication 5 MG: at 08:04

## 2018-09-19 RX ADMIN — SODIUM CHLORIDE, POTASSIUM CHLORIDE, SODIUM LACTATE AND CALCIUM CHLORIDE: 600; 310; 30; 20 INJECTION, SOLUTION INTRAVENOUS at 14:23

## 2018-09-19 RX ADMIN — Medication 2.5 MG: at 08:13

## 2018-09-19 RX ADMIN — PHENYLEPHRINE HYDROCHLORIDE: 10 INJECTION, SOLUTION INTRAMUSCULAR; INTRAVENOUS; SUBCUTANEOUS at 08:45

## 2018-09-19 RX ADMIN — INDOMETHACIN 75 MG: 25 CAPSULE ORAL at 20:03

## 2018-09-19 RX ADMIN — Medication 2.5 MG: at 08:25

## 2018-09-19 RX ADMIN — Medication 2.5 MG: at 09:37

## 2018-09-19 RX ADMIN — PROPOFOL 200 MG: 10 INJECTION, EMULSION INTRAVENOUS at 07:43

## 2018-09-19 RX ADMIN — PHENYLEPHRINE HYDROCHLORIDE 50 MCG: 10 INJECTION, SOLUTION INTRAMUSCULAR; INTRAVENOUS; SUBCUTANEOUS at 08:13

## 2018-09-19 RX ADMIN — SODIUM CHLORIDE, POTASSIUM CHLORIDE, SODIUM LACTATE AND CALCIUM CHLORIDE: 600; 310; 30; 20 INJECTION, SOLUTION INTRAVENOUS at 07:30

## 2018-09-19 RX ADMIN — ATORVASTATIN CALCIUM 20 MG: 20 TABLET, FILM COATED ORAL at 21:56

## 2018-09-19 RX ADMIN — FENTANYL CITRATE 100 MCG: 50 INJECTION, SOLUTION INTRAMUSCULAR; INTRAVENOUS at 07:43

## 2018-09-19 ASSESSMENT — ACTIVITIES OF DAILY LIVING (ADL)
ADLS_ACUITY_SCORE: 22
ADLS_ACUITY_SCORE: 22

## 2018-09-19 NOTE — PLAN OF CARE
Problem: Surgery Nonspecified (Adult)  Goal: Signs and Symptoms of Listed Potential Problems Will be Absent, Minimized or Managed (Surgery Nonspecified)  Signs and symptoms of listed potential problems will be absent, minimized or managed by discharge/transition of care (reference Surgery Nonspecified (Adult) CPG).   Outcome: Improving  Pt arrived to unit at 1230 and was oriented to room and call light  VS: VSS   O2: >90% on RA   Output: Neurogenic bladder; at baseline pt self caths q4-6 hours. Last S.C. at 1645   Last BM: Neurogenic bowel; LBM 9/19 upon arrival   Activity: 2A to reposition in bed   Up for meals? Bedrest   Skin: See flowsheet for all wounds   Pain: Denies besides baseline pain in abdomen   CMS: Paraplegic   Dressing: Dried drainage on aquacel on lateral thigh; marked   Diet: Regular; tolerated well   LDA: PIV infusing, 2 HVs patent   Equipment: PCDs, CPM, IV pole, capnography   Plan: TBD   Additional Info: Pt received general anesthesia.

## 2018-09-19 NOTE — OR NURSING
PACU to Inpatient Nursing Handoff    Patient Derek Enriquez is a 57 year old male who speaks English.   Procedure Procedure(s):  Left Hip Heterotopic Bone Resection - Wound Class: I-Clean   Surgeon(s) Primary: Toñito Kennedy MD  Resident - Assisting: Rose Mary Mccray MD     No Known Allergies    Isolation  [unfilled]     Past Medical History   has a past medical history of CAD (coronary artery disease); Femur fracture (H); Neurogenic bladder; Neurogenic bowel; Orthostatic hypotension; Paraplegia (H); and Thoracic spinal cord injury (H). He also has no past medical history of Complication of anesthesia or PONV (postoperative nausea and vomiting).    Anesthesia General   Dermatome Level     Preop Meds Not applicable   Nerve block Not applicable   Intraop Meds fentanyl (Sublimaze): 2 mcg total  ondansetron (Zofran): last given at 1010   Local Meds No   Antibiotics cefazolin (Ancef) - last given at 1024     Pain Patient Currently in Pain: denies  Comfort: negligible pain   PACU meds  Not applicable   PCA / epidural No   Capnography     Telemetry ECG Rhythm: Normal sinus rhythm   Inpatient Telemetry Monitor Ordered? No        Labs Glucose Lab Results   Component Value Date    GLC 85 09/19/2018       Hgb Lab Results   Component Value Date    HGB 8.0 06/26/2018       INR Lab Results   Component Value Date    INR 1.20 05/23/2018      PACU Imaging Not applicable     Wound/Incision Pressure Injury 05/23/18 Posterior Coccyx Stage 2 (Active)   Wound Base Other (Comment) 9/19/2018 10:36 AM   Cris-wound Assessment Warm 6/25/2018 11:15 AM   Drainage Amount Scant 6/26/2018 11:00 AM   Drainage Color/Characteristics Serous 5/23/2018  8:25 AM   Wound Care/Cleansing Wound cleanser 6/25/2018 11:15 AM   Dressing Other (Comment) 6/25/2018  7:00 AM   Dressing Status Clean, dry, intact 6/26/2018 11:00 AM   Number of days:119       Pressure Injury 06/25/18 Right Heel flat dessicted bulla Stage 2 (Active)   Wound Base Intact  skin 9/19/2018 10:36 AM   Cris-wound Assessment Warm 6/25/2018  8:15 PM   Drainage Amount None 9/19/2018 10:36 AM   Dressing Open to air 9/19/2018 10:36 AM   Number of days:86       Wound 06/25/18 Left Toe (Comment  which one) Abrasion(s) (Active)   Site Assessment UT 9/19/2018 10:36 AM   Cris-wound Assessment UT 6/25/2018  8:15 PM   Drainage Amount Scant 6/26/2018 11:00 AM   Drainage Color/Charcteristics Serous 6/26/2018 11:00 AM   Wound Care/Cleansing Wound cleanser 6/26/2018 11:00 AM   Dressing Per Plan of Care 6/26/2018 11:00 AM   Dressing Status Clean, dry, intact 9/19/2018 10:36 AM   Number of days:86       Incision/Surgical Site 05/23/18 Right Groin (Active)   Incision Assessment WDL 5/23/2018 12:00 PM   Cris-Incision Assessment Warm 5/23/2018 12:00 PM   Incision Drainage Amount Scant 5/23/2018 12:00 PM   Drainage Description Serosanguinous 5/23/2018 12:00 PM   Incision Care Barrier Film 5/23/2018 12:00 PM   Dressing Intervention Clean, dry, intact 5/23/2018 12:00 PM   Number of days:119       Incision/Surgical Site 06/25/18 Right;Lateral;Upper Leg (Active)   Incision Assessment [facility] 6/26/2018  4:00 PM   Cris-Incision Assessment [facility] 6/26/2018  4:00 PM   Closure Adhesive strip(s) 6/25/2018  9:00 AM   Incision Drainage Amount None 6/26/2018  4:00 PM   Drainage Description Serosanguinous 6/25/2018  8:15 PM   Incision Care Ice applied 6/25/2018 11:15 AM   Dressing Intervention Clean, dry, intact 6/26/2018  4:00 PM   Number of days:86       Incision/Surgical Site 06/25/18 Right Leg (Active)   Incision Assessment [facility] 6/26/2018  4:00 PM   Cris-Incision Assessment Warm 6/25/2018  8:15 PM   Dressing Intervention Clean, dry, intact 6/26/2018  4:00 PM   Number of days:86       Incision/Surgical Site 09/19/18 Left Leg (Active)   Number of days:0       Incision/Surgical Site 09/19/18 Left Hip (Active)   Incision Assessment [facility] 9/19/2018 10:36 AM   Cris-Incision Assessment [facility] 9/19/2018 10:36 AM   Closure [facility] 9/19/2018  10:36 AM   Incision Drainage Amount None 9/19/2018 10:36 AM   Dressing Intervention Clean, dry, intact 9/19/2018 10:36 AM   Number of days:0       Incision/Surgical Site 09/19/18 Left;Posterior Thigh (Active)   Incision Assessment [facility] 9/19/2018 10:36 AM   Cris-Incision Assessment [facility] 9/19/2018 10:36 AM   Closure [facility] 9/19/2018 10:36 AM   Incision Drainage Amount None 9/19/2018 10:36 AM   Dressing Intervention Clean, dry, intact 9/19/2018 10:36 AM   Number of days:0       Incision/Surgical Site 09/19/18 Left Hip (Active)   Incision Assessment [facility] 9/19/2018 10:36 AM   Cris-Incision Assessment [facility] 9/19/2018 10:36 AM   Closure [facility] 9/19/2018 10:36 AM   Incision Drainage Amount None 9/19/2018 10:36 AM   Dressing Intervention Clean, dry, intact 9/19/2018 10:36 AM   Number of days:0      CMS Peripheral Neurovascular WDL:  WDL except (09/19/18 1036)  LUE Temperature: warm (09/19/18 1036)  LUE Color: no discoloration (09/19/18 1036)  LUE Sensation: no numbness;no tingling (09/19/18 1036)  RUE Temperature: warm (09/19/18 1036)  RUE Color: no discoloration (09/19/18 1036)  RUE Sensation: no numbness;no tingling (09/19/18 1036)  LLE Temperature: warm (09/19/18 1036)  LLE Color: no discoloration (09/19/18 1036)  LLE Sensation: sensation absent (09/19/18 1036)  RLE Temperature: warm (09/19/18 1036)  RLE Color: no discoloration (09/19/18 1036)  RLE Sensation: sensation absent (09/19/18 1036)   Equipment continuous passive motion machine (CPM)   Other LDA Right Groin Interventional Procedure Access (Active)   Site Assessment [facility] 5/23/2018  1:15 PM   Hemostasis management Steri-strips intact 5/23/2018  1:15 PM   Femoral Bruit present 5/23/2018  1:00 PM   CMS Right Extremity WDL 5/23/2018  1:15 PM   Dorsalis Pulse - Right Leg Normal 5/23/2018  1:15 PM   Popliteal Pulse - Right Leg Normal 5/23/2018  1:15 PM   Posterior Tibial Pulse - Right Leg Normal 5/23/2018  1:15 PM   Number of days:192        IV Access Peripheral IV 09/19/18 Right  Lower forearm (Active)   Site Assessment [facility] 9/19/2018 10:36 AM   Line Status Saline locked 9/19/2018 10:36 AM   Phlebitis Scale 0-->no symptoms 9/19/2018 10:36 AM   Infiltration Scale 0 9/19/2018 10:36 AM   Extravasation? No 9/19/2018 10:36 AM   Number of days:0       Peripheral IV 09/19/18 Left Hand (Active)   Site Assessment [facility] 9/19/2018 10:36 AM   Line Status Infusing 9/19/2018 10:36 AM   Phlebitis Scale 0-->no symptoms 9/19/2018 10:36 AM   Infiltration Scale 0 9/19/2018 10:36 AM   Extravasation? No 9/19/2018 10:36 AM   Number of days:0       Right Groin Interventional Procedure Access (Active)   Site Assessment [facility] 5/23/2018  1:15 PM   Hemostasis management Steri-strips intact 5/23/2018  1:15 PM   Femoral Bruit present 5/23/2018  1:00 PM   CMS Right Extremity [facility] 5/23/2018  1:15 PM   Dorsalis Pulse - Right Leg Normal 5/23/2018  1:15 PM   Popliteal Pulse - Right Leg Normal 5/23/2018  1:15 PM   Posterior Tibial Pulse - Right Leg Normal 5/23/2018  1:15 PM   Number of days:192      Blood Products Not applicable    mL   Intake/Output Date 09/19/18 0700 - 09/20/18 0659   Shift 1169-3929 7309-0305 9043-4343 24 Hour Total   I  N  T  A  K  E   I.V. 800   800    Shift Total  (mL/kg) 800  (11.61)   800  (11.61)   O  U  T  P  U  T   Drains 0   0    Blood 530   530    Shift Total  (mL/kg) 530  (7.69)   530  (7.69)   Weight (kg) 68.9 68.9 68.9 68.9        Drains / Sandhu Closed/Suction Drain 1 Left;Posterior Thigh Accordion 10 Iraqi (Active)   Site Description [facility] 9/19/2018 10:36 AM   Drainage Appearance Bloody/Bright Red 9/19/2018 10:36 AM   Output (ml) 0 ml 9/19/2018 10:36 AM   Number of days:0       Closed/Suction Drain 2 Left Hip Accordion 10 Iraqi (Active)   Site Description [facility] 9/19/2018 10:36 AM   Drainage Appearance Bloody/Bright Red 9/19/2018 10:36 AM   Output (ml) 0 ml 9/19/2018 10:36 AM   Number of days:0       Right Groin Interventional Procedure Access (Active)   Site Assessment WDL 5/23/2018  1:15 PM    Hemostasis management Steri-strips intact 5/23/2018  1:15 PM   Femoral Bruit present 5/23/2018  1:00 PM   CMS Right Extremity WDL 5/23/2018  1:15 PM   Dorsalis Pulse - Right Leg Normal 5/23/2018  1:15 PM   Popliteal Pulse - Right Leg Normal 5/23/2018  1:15 PM   Posterior Tibial Pulse - Right Leg Normal 5/23/2018  1:15 PM   Number of days:192      Time of void PreOp Void Prior to Procedure: 0330 (09/19/18 0638)    PostOp      Diapered? No   Bladder Scan     PO    declined     Vitals    B/P: 111/60  T: 98.1  F (36.7  C)    Temp src: Oral  P:  Pulse: 70 (09/19/18 0602)    Heart Rate: 75 (09/19/18 1115)     R: 9  O2:  SpO2: 97 %    O2 Device: Oxymask (09/19/18 1045)    Oxygen Delivery: 7 LPM (09/19/18 1045)         Family/support present none   Patient belongings Patient Belongings: clothing;shoes;cell phone/electronics;glasses;wallet  Disposition of Belongings: Other (see comment) (labeled and secured)   Patient transported on cart   DC meds/scripts (obs/outpt) Not applicable   Inpatient Pain Meds Released? Yes       Special needs/considerations intermittent Straight cathing.    Tasks needing completion None       Nidhi Byrd RN  ASCOM [id number]

## 2018-09-19 NOTE — CONSULTS
"  Internal Medicine Consult - Initial Visit       Derek Enriquez MRN# [id number]   YOB: 1960 Age: 57 year old   Date of Admission: 9/19/2018  PCP: Eugene Burrell  Date of Service: 9/19/2018    Referring Provider: Toñito Kennedy MD  Reason for Consult: Postop medical management          Assessment and Recommendations:   Derek Enriquez is a 57 year old male with a history of paraplegia 2/2 spinal subdural hematoma s/p T9-T12 decompressive laminectomy (3/2018) c/b neurogenic bladder/bowel and orthostatic hypotension, CAD s/p cardiac arrest (3/2018) now s/p stent, IABP placement, HLD, and lower extremity edema admitted to Ortho for 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       # S/p resection L hip heterotopic ossification - POD #0.  Preop Hgb 14.5.  EBL 530cc.  Pt reports that he is unable to sense any pain at incision site and does not have sensation in this area at baseline.  Reports having \"joint stiffness\" as major complaint.        - BMP, Hgb in am   - Pt w/ autonomic dysfunction; please monitor VS carefully for abrupt swings in BP (including sustained HTN, hypotension), sweatiness, dizziness as these could indicate uncontrolled pain; please offer PRN Oxycodone if any of these are noted or if pt otherwise appearing uncomfortable   - Further management per Ortho (primary)     # CAD s/p MALI x 1 to LAD (3/2018); HLD - Stable.  CT angio on 1/2018 with severe proximal LAD stenosis, with plans for intervention on 3/19/18.  Unfortunately pt had cardiac arrest while exercising at the gym on 3/8/2018.  Underwent PCI with MALI to proximal LAD due to 95% stenotic ruptured plaque and placement of IABP.  On DAPT with ASA 81mg and Plavix 75mg daily since 3/2018.  Compliant with cardiac rehab.  Follows w/ Dr. Jane (Cardiology), last seen in clinic on 5/2/18.  Per chart review, given ineffective tx with Didronel and resultant need for resection, currently plan is tx with Indomethacin. "   - Continue DAPT with ASA and Plavix   - Continue PTA statin   - Will discuss further w/ Dr. Jane regarding plan for Indomethacin and concomitant therapy with DAPTs given increased bleeding risk  - Protonix for GI ppx    # Paraplegia 2/2 spinal subdural hematoma s/p T9-T12 decompressive laminectomy (3/2018) - Developed on POD#1 from MALI and IABP placement following cardiac arrest on 3/8; pt had sudden onset back pain, along with sudden motor and sensory loss of BLEs.  Wheelchair bound since that time.  Has developed generalized edema of BLEs since that time.    - Fall precautions   - Lymphedema consult for wraps vs boots      # Neurogenic bowel/bladder; ?recurrent UTIs - 2/2 SCI as above.  Self caths at home about 4-5x per day.  Follows with Urology, last seen in clinic on 6/18/18.  Pt reported being on antibiotics for UTIs during 3/2018 and 4/2018.  Noted to have positive nitrite on UA on 9/17.    - Orders placed for straight cath Q4H while on IVFs, consider stopping IVFs as PO intake improves   - Can offer straight cath QID PRN when IVFs discontinued   - Pt has received Ancef intraoperatively, ideally should cover for UTI; will not start additional antibiotics at this time     # Sacral, decubitus ulcers (present before admission) - 2/2 limited mobility due to paraplegia.  Follows with wound care clinic OP at [facility], last seen 9/17/18.    - WOCN consult              Recommendations reviewed with attending physician Dr. Anoop Saha.     Marilin Pimentel CNP  Hospitalist Service   Pager: [phone]       Reason for consult:   Postop medical co-management          History of Present Illness:   History is obtained from the patient and medical record.     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       Internal Medicine service was asked to see patient for postop medical management.  Derek is resting in bed.  He tells me that he does not have sensation below the waist, but rather experiences discomfort as the \"heaviness of his bones\" and general joint stiffness and aches.  He self caths at home 3 to 4 times per day, at least.  He is able to tell when he needs to have a BM.  Since becoming paralyzed, he experienced some evidence of autonomic dysfunction early on, with chills, sweating, and occasional coolness of extremities, but recently has not noted frequent sweating or dizziness.  Still notes cool hands at times.  He was started on DAPT and has tolerated that well.  He does not have any history of GI bleeds, ulcers, or varices.  Currently he is resting comfortably, and he denies chest pain, dyspnea, abdominal pain, nausea, or vomiting.            Review of Systems:   A 10 point ROS was performed and negative unless otherwise noted in HPI.           Past Medical History:   Reviewed and updated in Epic.  Past Medical History:   Diagnosis Date     CAD (coronary artery disease)      Femur fracture (H)      Neurogenic bladder      Neurogenic bowel      Orthostatic hypotension      Paraplegia (H)      Thoracic spinal cord injury (H)              Past Surgical History:   Reviewed and updated in Epic.  Past Surgical History:   Procedure Laterality Date     CARDIAC SURGERY       LAMINECTOMY THORACIC THREE LEVELS N/A 3/9/2018    Procedure: LAMINECTOMY THORACIC THREE LEVELS;  Thoracic 9-12 Laminectomy Evacuation of Subdural Hematoma, C-Arm, Prone, Gel Rolls.;  Surgeon: Abhijeet Joe MD;  Location: [facility] OR     OPEN REDUCTION INTERNAL FIXATION RODDING INTRAMEDULLARY FEMUR Right 6/25/2018    Procedure: OPEN REDUCTION INTERNAL FIXATION RODDING INTRAMEDULLARY FEMUR;  Right Open Reduction Internal Fixation Subtrochanteric " Femur Fracture;  Surgeon: Toñito Kennedy MD;  Location: [facility] OR     wisdom teeth extraction               Social History:   Reviewed and updated in [facility].  Social History     Social History     Marital status: Single     Spouse name: N/A     Number of children: N/A     Years of education: N/A     Occupational History     Not on file.     Social History Main Topics     Smoking status: Former Smoker     Smokeless tobacco: Never Used      Comment: Quit at 31     Alcohol use Yes      Comment: socially     Drug use: No     Sexual activity: Not on file     Other Topics Concern     Not on file     Social History Narrative              Family History:   Reviewed and updated in Epic.  History reviewed. No pertinent family history.          Allergies:   No Known Allergies          Medications:     Current Facility-Administered Medications   Medication     [START ON 9/22/2018] acetaminophen (TYLENOL) tablet 650 mg     acetaminophen (TYLENOL) tablet 975 mg     benzocaine-menthol (CEPACOL) 15-3.6 MG lozenge 1-2 lozenge     [START ON 9/20/2018] bisacodyl (DULCOLAX) Suppository 10 mg     ceFAZolin (ANCEF) 1 g vial to attach to  ml bag for ADULT or 50 ml bag for PEDS     docusate sodium (COLACE) capsule 100 mg     hydrOXYzine (ATARAX) tablet 25 mg     lactated ringers infusion     lidocaine (LMX4) cream     lidocaine 1 % 1 mL     metoclopramide (REGLAN) tablet 10 mg    Or     metoclopramide (REGLAN) injection 10 mg     naloxone (NARCAN) injection 0.1-0.4 mg     ondansetron (ZOFRAN-ODT) ODT tab 4 mg    Or     ondansetron (ZOFRAN) injection 4 mg     oxyCODONE IR (ROXICODONE) tablet 5-10 mg     prochlorperazine (COMPAZINE) injection 10 mg    Or     prochlorperazine (COMPAZINE) tablet 10 mg     sodium chloride (PF) 0.9% PF flush 3 mL     sodium chloride (PF) 0.9% PF flush 3 mL            Physical Exam:   Blood pressure 106/61, pulse 70, temperature 96  F (35.6  C), temperature source Oral, resp. rate 9, height 1.803 m  "(5' 10.98\"), weight 68.9 kg (151 lb 14.4 oz), SpO2 98 %.  Body mass index is 21.19 kg/(m^2).     GENERAL: Alert and oriented x 3. Well nourished, well developed.  No acute distress.    HEENT: Normocephalic, atraumatic. Anicteric sclera. Mucous membranes moist.   CV: RRR. S1, S2. No murmurs appreciated.   RESPIRATORY: Effort normal on room air. Lungs CTAB with no wheezing, rales, or rhonchi.   GI: Abdomen soft and non distended, bowel sounds present x all 4 quadrants. No tenderness, rebound, or guarding.   NEUROLOGICAL: No new focal deficits. Follows commands.   strength intact with upper extremities.      MUSCULOSKELETAL: No joint swelling or tenderness. Paraplegia of BLES.   EXTREMITIES: No gross deformities. Trace dependent peripheral edema. Intact bilateral pedal pulses. Feet are warm.  SKIN: Grossly warm, dry, and intact. No jaundice. No rashes.             Data:   I personally reviewed the following studies:    ROUTINE IP LABS (Last four results)  [facility]   Recent Labs  Lab 09/19/18  0635   POTASSIUM 4.5   GLC 85   CR 0.75     CBC No lab results found in last 7 days.  INR No lab results found in last 7 days.      Unresulted Labs Ordered in the Past 30 Days of this Admission     Date and Time Order Name Status Description    9/19/2018 0947 Surgical pathology exam In process              "

## 2018-09-19 NOTE — BRIEF OP NOTE
[facility], [place]    Orthopaedic Surgery  Brief Operative Note    Pre-operative diagnosis: Hip Pain, Eterotopic Ossification of Hip Left   Post-operative diagnosis Left hip HO   Procedure: Procedure(s):  Left Hip Heterotopic Bone Resection - Wound Class: I-Clean   Surgeon: Toñito Kennedy MD   Assistants(s): Rose Mary Alvarez MD   Anesthesia: General    Estimated blood loss: 530 cc   Total IV fluids: (See anesthesia record)   Blood transfusion: (See anesthesia record)   Total urine output: (See anesthesia record)   Drains: 2 hemovacs left thigh   Specimens:   ID Type Source Tests Collected by Time Destination   A : cystic mass left greater trochanteric region Tissue Hip, Left SURGICAL PATHOLOGY EXAM Toñito Kennedy MD 9/19/2018  9:45 AM       Findings: None   Complications: None   Implants: None       Surgey: left hip HO excision  Post op plan:  Activity as tolerated (paraplegic at baseline), up to chair PRN, left hip ROM as tolerated  CPM for left hip ROM 0-90 while inpatient  Left hip drains (2, both deep): remove when minimal output (<15 ml/shift), both are sewn in with nylon sutures, he may discharge home with drains in place and they can be removed at his [facility] follow up appt  Left hip dressings (2): keep aquacels c/d/i until POD7, then may remove and leave open to air; if dressings are saturated new dressing should be placed  Medicine consulted  Ancef x 24 hrs  HO ppx: indomethacin 75 mg TID x 6 wks  DVT ppx: may restart home plavix POD1  Dispo: discharge home Friday  Follow-up 1 wk at [facility] (scheduled)    Rose Mary Alvarez MD

## 2018-09-19 NOTE — IP AVS SNAPSHOT
[facility]    [address]    [place] [address]    Phone:  [phone]                                       After Visit Summary   9/19/2018    Derek Enriquez    MRN: [id number]           After Visit Summary Signature Page     I have received my discharge instructions, and my questions have been answered. I have discussed any challenges I see with this plan with the nurse or doctor.    ..........................................................................................................................................  Patient/Patient Representative Signature      ..........................................................................................................................................  Patient Representative Print Name and Relationship to Patient    ..................................................               ................................................  Date                                   Time    ..........................................................................................................................................  Reviewed by Signature/Title    ...................................................              ..............................................  Date                                               Time          22EPIC Rev 08/18

## 2018-09-19 NOTE — ANESTHESIA PREPROCEDURE EVALUATION
Anesthesia Evaluation    ROS/Med Hx    No history of anesthetic complications  Comments: Hip Pain, Eterotopic Ossification of Hip Left    Past Medical History:  No date: CAD (coronary artery disease)  No date: Femur fracture (H)  No date: Neurogenic bladder  No date: Neurogenic bowel  No date: Orthostatic hypotension  No date: Paraplegia (H)  No date: Thoracic spinal cord injury (H)    Past Surgical History:  No date: CARDIAC SURGERY  3/9/2018: LAMINECTOMY THORACIC THREE LEVELS N/A      Comment: Procedure: LAMINECTOMY THORACIC THREE LEVELS;                Thoracic 9-12 Laminectomy Evacuation of                Subdural Hematoma, C-Arm, Prone, Gel Rolls.;                 Surgeon: Abhijeet Joe MD;  Location: [facility]               OR  6/25/2018: OPEN REDUCTION INTERNAL FIXATION RODDING INTRA* Right      Comment: Procedure: OPEN REDUCTION INTERNAL FIXATION                RODDING INTRAMEDULLARY FEMUR;  Right Open                Reduction Internal Fixation Subtrochanteric                Femur Fracture;  Surgeon: Toñito Kennedy MD;  Location:  OR  No date: wisdom teeth extraction     No Known Allergies    Current Facility-Administered Medications:  ceFAZolin (ANCEF) intermittent infusion 2 g in 100 mL dextrose PRE-MIX  lactated ringers infusion  lidocaine (LMX4) cream  lidocaine 1 % 1 mL  sodium chloride (PF) 0.9% PF flush 3 mL  sodium chloride (PF) 0.9% PF flush 3 mL        Temp: 36.4  C (97.5  F) Temp src: Oral BP: 110/72 Pulse: 70   Resp: 18 SpO2: 100 % O2 Device: None (Room air)      Prescriptions Prior to Admission:  ascorbic acid (VITAMIN C) 500 MG tablet, Take 500 mg by mouth 2 times daily, Disp: , Rfl: , 9/18/2018 at 2300  ASPIRIN ADULT LOW STRENGTH PO, Take 81 mg by mouth daily, Disp: , Rfl: , 9/8/2018  atorvastatin (LIPITOR) 20 MG tablet, Take 1 tablet (20 mg) by mouth At Bedtime, Disp: 90 tablet, Rfl: 1, 9/17/2018 at 0800  clopidogrel (PLAVIX) 75 MG tablet, Take 1 tablet (75 mg)  by mouth daily, Disp: 90 tablet, Rfl: 1, 9/8/2018  etidronate (DIDRONEL) 200 MG tablet, 2 times daily, Disp: , Rfl: , 9/18/2018 at 0300  Multiple Vitamins-Minerals (MULTIVITAMIN MEN PO), Take 1 tablet by mouth daily , Disp: , Rfl: , 9/18/2018 at 2300  phenylephrine-shark liver oil-mineral oil-petrolatum (PREPARATION H) 0.25-14-74.9 % rectal ointment, Place rectally daily as needed, Disp: , Rfl: , Past Month at Unknown time  vitamin B complex with vitamin C (VITAMIN  B COMPLEX) TABS tablet, Take 1 tablet by mouth daily, Disp: , Rfl: , 9/18/2018 at 1200  VITAMIN K, PHYTONADIONE, PO, Take 1 tablet by mouth as needed, Disp: , Rfl: , 9/18/2018 at 2100  order for DME, Equipment being ordered: [facility] Phone [phone] Fax [phone] Dressing Iodosorb Gel   Qty not neededSecondary Dressing  tegaderm 1adhesive foam dressing 3x3 Qty 30Secondary Dressing 2x2 gauze Qty 1 loafSecondary Dressing 2' tape Qty 1 rollLength of Need: 1 monthFrequency of dressing change: daily, Disp: 30 days, Rfl: 0, 9/12/2018        Lab Results       Component                Value               Date                       WBC                      5.9                 06/18/2018                 HGB                      8.0 (L)             06/26/2018                 HCT                      30.2 (L)            06/18/2018                 PLT                      565 (H)             06/18/2018                 CHOL                     128                 05/02/2018                 TRIG                     136                 05/02/2018                 HDL                      35 (L)              05/02/2018                 ALT                      68                  06/18/2018                 AST                      70 (H)              06/18/2018                 NA                       139                 06/18/2018                 BUN                      14                  06/18/2018                 CO2                      29                   06/18/2018                 INR                      1.20 (H)            05/23/2018                  Neuro Findings   Comments: paraplegic    Pulmonary Findings - negative ROS          GI/Hepatic/Renal Findings - negative ROS    Endocrine/Metabolic Findings - negative ROS      Genetic/Syndrome Findings   (+) genetic syndrome      Additional Notes  Hip Pain, Eterotopic Ossification of Hip Left    Past Medical History:  No date: CAD (coronary artery disease)  No date: Femur fracture (H)  No date: Neurogenic bladder  No date: Neurogenic bowel  No date: Orthostatic hypotension  No date: Paraplegia (H)  No date: Thoracic spinal cord injury (H)    Past Surgical History:  No date: CARDIAC SURGERY  3/9/2018: LAMINECTOMY THORACIC THREE LEVELS N/A      Comment: Procedure: LAMINECTOMY THORACIC THREE LEVELS;                Thoracic 9-12 Laminectomy Evacuation of                Subdural Hematoma, C-Arm, Prone, Gel Rolls.;                 Surgeon: Abhijeet Joe MD;  Location: [facility]               OR  6/25/2018: OPEN REDUCTION INTERNAL FIXATION RODDING INTRA* Right      Comment: Procedure: OPEN REDUCTION INTERNAL FIXATION                RODDING INTRAMEDULLARY FEMUR;  Right Open                Reduction Internal Fixation Subtrochanteric                Femur Fracture;  Surgeon: Toñito Kennedy MD;  Location:  OR  No date: wisdom teeth extraction     No Known Allergies    Current Facility-Administered Medications:  ceFAZolin (ANCEF) intermittent infusion 2 g in 100 mL dextrose PRE-MIX  lactated ringers infusion  lidocaine (LMX4) cream  lidocaine 1 % 1 mL  sodium chloride (PF) 0.9% PF flush 3 mL  sodium chloride (PF) 0.9% PF flush 3 mL        Temp: 36.4  C (97.5  F) Temp src: Oral BP: 110/72 Pulse: 70   Resp: 18 SpO2: 100 % O2 Device: None (Room air)      Prescriptions Prior to Admission:  ascorbic acid (VITAMIN C) 500 MG tablet, Take 500 mg by mouth 2 times daily, Disp: , Rfl: , 9/18/2018  at 2300  ASPIRIN ADULT LOW STRENGTH PO, Take 81 mg by mouth daily, Disp: , Rfl: , 9/8/2018  atorvastatin (LIPITOR) 20 MG tablet, Take 1 tablet (20 mg) by mouth At Bedtime, Disp: 90 tablet, Rfl: 1, 9/17/2018 at 0800  clopidogrel (PLAVIX) 75 MG tablet, Take 1 tablet (75 mg) by mouth daily, Disp: 90 tablet, Rfl: 1, 9/8/2018  etidronate (DIDRONEL) 200 MG tablet, 2 times daily, Disp: , Rfl: , 9/18/2018 at 0300  Multiple Vitamins-Minerals (MULTIVITAMIN MEN PO), Take 1 tablet by mouth daily , Disp: , Rfl: , 9/18/2018 at 2300  phenylephrine-shark liver oil-mineral oil-petrolatum (PREPARATION H) 0.25-14-74.9 % rectal ointment, Place rectally daily as needed, Disp: , Rfl: , Past Month at Unknown time  vitamin B complex with vitamin C (VITAMIN  B COMPLEX) TABS tablet, Take 1 tablet by mouth daily, Disp: , Rfl: , 9/18/2018 at 1200  VITAMIN K, PHYTONADIONE, PO, Take 1 tablet by mouth as needed, Disp: , Rfl: , 9/18/2018 at 2100  order for DME, Equipment being ordered: [facility] Phone [phone] Fax [phone] Dressing Iodosorb Gel   Qty not neededSecondary Dressing  tegaderm 1adhesive foam dressing 3x3 Qty 30Secondary Dressing 2x2 gauze Qty 1 loafSecondary Dressing 2' tape Qty 1 rollLength of Need: 1 monthFrequency of dressing change: daily, Disp: 30 days, Rfl: 0, 9/12/2018        Lab Results       Component                Value               Date                       WBC                      5.9                 06/18/2018                 HGB                      8.0 (L)             06/26/2018                 HCT                      30.2 (L)            06/18/2018                 PLT                      565 (H)             06/18/2018                 CHOL                     128                 05/02/2018                 TRIG                     136                 05/02/2018                 HDL                      35 (L)              05/02/2018                 ALT                      68                   06/18/2018                 AST                      70 (H)              06/18/2018                 NA                       139                 06/18/2018                 BUN                      14                  06/18/2018                 CO2                      29                  06/18/2018                 INR                      1.20 (H)            05/23/2018                     Physical Exam  Normal systems: dental    Airway   Mallampati: I  TM distance: >3 FB  Neck ROM: full    Dental     Cardiovascular   Rhythm and rate: regular and normal      Pulmonary    breath sounds clear to auscultation          Anesthesia Plan      History & Physical Review  History and physical reviewed and following examination; no interval change.    ASA Status:  3 .    NPO Status:  > 8 hours    Plan for General and ETT with Propofol and Intravenous induction. Maintenance will be Balanced.    PONV prophylaxis:  Ondansetron (or other 5HT-3)  Additional equipment: Videolaryngoscope GETA     Risks versus benefits discussed. All questions answered      Postoperative Care  Postoperative pain management:  IV analgesics.      Consents  Anesthetic plan, risks, benefits and alternatives discussed with:  Patient.  Use of blood products discussed: No .   .

## 2018-09-19 NOTE — OP NOTE
Procedure Date: 09/19/2018      PREOPERATIVE DIAGNOSIS:     1.  Heterotopic ossification, left hip.   2.  Mass, left greater trochanter.       POSTOPERATIVE DIAGNOSIS:     1.  Heterotopic ossification, left hip.   2.  Mass, left greater trochanter.      PROCEDURE PERFORMED:   1.  Resection of heterotopic ossification, left hip.   2.  Resection of soft tissue mass, left trochanteric region.      SURGEON:  Toñito Kennedy MD      ASSISTANT:  Rose Mary Wilkes MD      ANESTHESIA:  General.      ESTIMATED BLOOD LOSS:  520 mL.      REPLACEMENT:  800 mL crystalloid.      DRAINS:  Two 1/8-inch Hemovac sewn in       SPECIMEN SENT:  Soft tissue mass, left greater trochanter for pathology.      OPERATIVE FINDINGS:  There was a massive amount of heterotopic bone around the anterior, anteromedial and anterolateral aspect of the pelvis at the inguinal ligament down to the proximal femur, distal to the lesser trochanter which inhibited flexion.  His preoperative hip flexion was at 45 degrees.  The patient is paraplegic and this was limiting his mobility and compromising his transfers and limiting his ability to be considered for an exo-walking apparatus.  The plan therefore is to resect the heterotopic bone to improve his hip range of motion.  He additionally has a soft tissue mass which is a pressure point over the greater trochanter and the plan is to resect the mass to prevent soft tissue breakdown.      DESCRIPTION OF PROCEDURE:  The patient was evaluated in the holding area, the informed consent process completed.  Surgical site initialed.  He was brought to the operating room where general anesthesia via MAC was initiated.  He was kept in the supine position with bath blankets underneath the left hip.  IV antibiotics were administered.  The left hip and lower extremity was isolated, painted with iodine and alcohol and draped in a sterile fashion.  A timeout was held and all agreed on the patient, the side and the  procedure.      The presurgical range of motion was hip flexion at 45 degrees, internal rotation of 10 degrees, external rotation of 20 degrees, abduction of 15 degrees.      We made a straight anterior incision starting at the inguinal ligament, extending distally about 15 cm.  Skin and subcutaneous tissue incised sharply.  Hemostasis achieved with electrocautery.  We dissected through the subcutaneous tissue with electrocautery and incised the anterior hip fascia.  We immediately encountered the first large heterotopic mass within the mass of the rectus femoris.  We teased this out with electrocautery and removed it by dissecting it completely free with a Guo elevator using electrocautery.  We then moved more medially and encountered this second large mass and it was removed with the same technique.  There was a small branch of the medial circumflex which we had to cauterize, otherwise we could palpate to the femoral artery, clinically through the skin and stayed well lateral to it with the dissection.  We could palpate the lesser trochanter after resecting the more medial mass.      We then used a rongeur to remove some smaller masses along the vastus intermedius in the midline.      We then identified the third large mass laterally and teased it out with electrocautery and removed it.  The mass of bone was substantial and a clinical photograph was obtained with the ruler.  We continually checked flexion and there was an additional bit of heterotopic bone along the joint capsule just distal to the acetabulum, which was resected, which finally maximized the flexion.  We documented the flexion after the procedure to be 95 degrees to 100 degrees of flexion, internal rotation of 20, external rotation of 20, abduction of 25.      We then adducted the limb and made a 6 cm incision anterior to the soft tissue mass.  The skin and subcutaneous tissue was incised sharply.  Hemostasis achieved with electrocautery.   Dissected through the subcutaneous fat and incised the fascia mylene.  The mass was not adherent to the lesser trochanter and we teased it out with electrocautery.  It felt like a thickened mass of trochanteric bursal tissue but because of the uncertainty and the fact that I did not penetrate the mass to see if there was fluid, we sent it to pathology.  That wound was irrigated with a liter of saline.  We repaired the fascia mylene with figure-of-eight 0 Vicryl, the subcutaneous tissue with inverted undyed 2-0 Vicryl, the skin with intracuticular 3-0 Monocryl, reinforced with Steri-Strips.      Anteriorly, we irrigated with over 2 liters of saline, two 1/8 inch Hemovac drains were placed.  The deep thigh fascia was repaired with figure-of-eight 0 Vicryl, the subcutaneous tissue with inverted undyed 2-0 Vicryl, the skin with intracuticular Monocryl, reinforced with Steri-Strips.  The drains were sutured in with 3-0 nylon and the skin incisions were dressed sterilely with silver impregnated dressings.  He was taken to the postanesthesia room having tolerated the procedure well.      Postoperative plan will be to use CPM while in the hospital.  We would like to keep the drains in until they are less 10 hours per 8-hour shift which may require leaving them in following discharge.  He does not need CPM following discharge but will it while in the hospital.  He should be prone a minimum of 3 times a day as much as possible given that his right hip is also involved with heterotopic bone.  The patient will need to be followed carefully for wound complications given the large amount of dead space.  He will be restarted on his Plavix for his cardiac condition on the first day postoperatively, which will serve as VTE prophylaxis.  Anticipate the need for a wound check at 7-10 days following discharge, but he can be involved with transfers immediately postoperatively.         WILLIAM FAULKNER MD             D: 09/19/2018   T:  2018   MT: FRANDY      Name:     LINDA MARTÍNEZ   MRN:      [id number]        Account:        [id number]   :      1960           Procedure Date: 2018      Document: [id number]

## 2018-09-19 NOTE — ANESTHESIA POSTPROCEDURE EVALUATION
Patient: Derek Enriquez    Procedure(s):  Left Hip Heterotopic Bone Resection - Wound Class: I-Clean    Diagnosis:Hip Pain, Eterotopic Ossification of Hip Left  Diagnosis Additional Information: No value filed.    Anesthesia Type:  General, ETT    Note:  Anesthesia Post Evaluation    Patient location during evaluation: PACU  Patient participation: Unable to participate in evaluation secondary to age  Level of consciousness: awake  Pain management: adequate  Airway patency: patent  Cardiovascular status: acceptable  Respiratory status: acceptable  Hydration status: acceptable  PONV: none     Anesthetic complications: None          Last vitals:  Vitals:    09/19/18 1340 09/19/18 1410 09/19/18 1412   BP: 106/53 (!) 84/58 105/63   Pulse:      Resp:      Temp:      SpO2: 98% 99%          Electronically Signed By: Emmanuel Banks MD  September 19, 2018  3:17 PM

## 2018-09-19 NOTE — PROVIDER NOTIFICATION
Scabs x2 on right lateral knee.  Blister on right thumb.  Left great toe wound - bandaids applied.  Bruises on top of both feet from foam boot velcro straps.  Scab on bunion area right foot.  Left hip redness over trochanter.  Non-blanchable redness below coccyx.  Foam dressing on coccyx for protection.  Healed pressure sore under dressing.

## 2018-09-19 NOTE — IP AVS SNAPSHOT
MRN:[id number]                      After Visit Summary   9/19/2018    Derek Enriquez    MRN: [id number]           Thank you!     Thank you for choosing [facility] for your care. Our goal is always to provide you with excellent care. Hearing back from our patients is one way we can continue to improve our services. Please take a few minutes to complete the written survey that you may receive in the mail after you visit with us. Thank you!        Patient Information     Date Of Birth          1960        Designated Caregiver       Most Recent Value    Caregiver    Will someone help with your care after discharge? yes    Name of designated caregiver Adry    Phone number of caregiver      Caregiver address        About your hospital stay     You were admitted on:  September 19, 2018 You last received care in the:  [facility] 8A    You were discharged on:  September 22, 2018        Reason for your hospital stay       You were hospitalized after surgery for rehabilitation and wound monitoring.                  Who to Call     For medical emergencies, please call 911.  For non-urgent questions about your medical care, please call your primary care provider or clinic, [phone]  For questions related to your surgery, please call your surgery clinic        Attending Provider     Provider Specialty    Toñito Kennedy MD Orthopedics       Primary Care Provider Office Phone # Fax #    Eugene Burrell [phone]      After Care Instructions     Activity       Activity as tolerated. No hip range of motion restrictions.            Diet       Follow this diet upon discharge: Orders Placed This Encounter      Regular Diet Adult            Discharge Instructions       -You may resume your prior to admission Plavix after the drains have been discontinued.     -Wound Care: If you have a brown/tan rubber-like dressing (called Aquacel) applied to your surgical site, you may remove  the dressing 1 week after surgery. You may replace this with gauze and tape. If you would like to keep covered, change the bandages every other day and keep wound clean and dry. If the incision is dry, you do not need to keep covered after 1 week. Showering is not allowed until the drains are removed. No scrubbing or submerging your incision in standing water such as a bath x 4 weeks. Do not apply creams or lotions to your wound.     -CALL OUR CLINIC ([phone] during regular office hours, or [phone] for the on-call resident MD for questions - RESIDENT DOES NOT HAVE ABILITY TO PRESCRIBE NARCOTICS) IF: Pain in your surgical area persists or worsens in the first few days after surgery. Excessive redness or drainage of cloudy or bloody material from the wounds (clear red tinted fluid and some mild drainage should be expected). Drainage of any kind > one week after surgery should be reported to the doctor. You have a temperature elevation greater than 101.5  You have pain, swelling or redness in your calf. You have numbness or weakness in your leg or foot.    -Call your primary doctor or seek medical care if you have any of the following: temperature greater than 101.4, chest pain, increased shortness of breath, nausea or vomiting.            Discharge Instructions       Resume aspirin and plavix when drain removed and Ok with Dr. Kennedy  Check with him regarding didronel.  Keep cardiology follow up.  Call primary MD to discuss follow up.            Monitor and record       Drain output.            Tubes and drains       You are going home with the following tubes or drains: hemovac.  Tube cares per hospital or home care instructions. Keep drain sites clean and dry. Please record daily output of drains.                  Follow-up Appointments     Adult [facility] Follow-up and recommended labs and tests       You have an appointment with Dr. Kennedy on 9/28 at [facility].                  Your next 10  appointments already scheduled     Oct 04, 2018  3:15 PM CDT   Return Visit with Rosario Sánchez PA-C   [facility] [place] ([facility])    [address]  [address]  [facility] [address]   [phone]            Nov 01, 2018  7:00 AM CDT   (Arrive by 6:45 AM)   Urodynamics with Candy Leary PA-C   [facility]rologic [facility] ([facility])    [address]  4th Floor  [facility] [address] [phone]            Nov 07, 2018  2:00 PM CST   Ech Complete with UCECHCR4   [facility] Echo ([facility])    [address] [place] [address]  3rd Floor  [facility] [id number] [phone]           1.  Please bring or wear a comfortable two-piece outfit. 2.  You may eat, drink and take your normal medicines. 3.  For any questions that cannot be answered, please contact the ordering physician 4.  Please do not wear perfumes or scented lotions on the day of your exam.            Nov 07, 2018  3:00 PM CST   Lab with UC LAB   [facility] Lab ([facility])    [address]  1st Floor  [facility] [address] [phone]            Nov 07, 2018  3:30 PM CST   (Arrive by 3:15 PM)   RETURN LIPID VISIT with Anoop Jane MD   [facility] Care ([facility])    [address]  [address]  [facility] [address] [phone]            Nov 13, 2018  4:30 PM CST   Telephone Call with Andrei Pelaez MD   [facility]rolog[facility] ([facility])    [address]  4th Floor  [facility] [address] [phone]           Note: this is not an onsite visit; there is no need to come to the facility.  Thank you for choosing [facility] for your health care needs. Below is some information for patients who are interested in having their  follow-up visit with a physician by telephone. In some cases, a telephone visit can be an effective and convenient way to manage your follow-up care. Choosing a telephone visit rather than a face to face visit for your follow-up care is a decision that you and your physician can make together to ensure it meets all of your needs.  A face to face visit is always an available option, if you choose to do so.  We want to make sure you have all of the information you need about the telephone visit option and answer all of your questions before you decide to schedule a telephone follow-up visit. If you have any questions, you may talk to a staff member or our financial counselor at [phone].  1. General overview Our clinic sees patients for a variety of conditions and concerns. A face to face visit with your doctor is required for any new concerns or for your initial visit. If you and your doctor decide that a follow up visit by telephone is appropriate, you may decide to opt for a telephone visit.   2. Billing and insurance coverage There is a charge for telephone visits, similar to the charge for an in-person visit. Your bill is based on the amount of time you and your physician are on the phone. We will bill each visit to your insurance company (just like your other medical visits), and you will be responsible for any costs not paid by your insurance company. The charge may be denied by your insurance company, in which case you will be responsible for the entire amount billed. The decision to cover the cost is determined by your insurance company. If you want to know what your insurance company will cover, we encourage you to contact them to determine your coverage. The codes below are the codes we use when billing for telephone visits and the associated charges. This may help you work with your insurance company to determine your benefits.   Billing CPT codes for Telephone visits  01901 ($30), 42490 ($35), 31246  "($40)              Future tests that were ordered for you     Assign Questionnaire Series to Patient                 Pending Results     Date and Time Order Name Status Description    9/19/2018 0947 Surgical pathology exam In process             Statement of Approval     Ordered          09/22/18 0552  I have reviewed and agree with all the recommendations and orders detailed in this document.  EFFECTIVE NOW     Approved and electronically signed by:  Rose Mary Kolb MD           09/22/18 4301  I have reviewed and agree with all the recommendations and orders detailed in this document.     Approved and electronically signed by:  Yoshi Jones MD             Admission Information     Date & Time Provider Department Dept. Phone    9/19/2018 Toñito Kennedy MD  [phone] [phone]      Your Vitals Were     Blood Pressure Pulse Temperature Respirations Height Weight    108/51 (BP Location: Left arm) 70 96.8  F (36  C) (Oral) 16 1.803 m (5' 10.98\") 68.9 kg (151 lb 14.4 oz)    Pulse Oximetry BMI (Body Mass Index)                100% 21.19 kg/m2          [employer] Information     [employer] gives you secure access to your electronic health record. If you see a primary care provider, you can also send messages to your care team and make appointments. If you have questions, please call your primary care clinic.  If you do not have a primary care provider, please call [phone] and they will assist you.        Care EveryWhere ID     This is your Care EveryWhere ID. This could be used by other organizations to access your [facility] medical records  [id number]        Equal Access to Services     AUSTIN WILSON AH: Hadii jace landry Sovianey, waaxda luqadaha, qaybta kaalmada minal flood. So [facility] [phone].    ATENCIÓN: Si habla español, tiene a humphrey disposición servicios gratuitos de asistencia lingüística. Llame al [phone].    We comply with applicable federal " civil rights laws and [place] laws. We do not discriminate on the basis of race, color, national origin, age, disability, sex, sexual orientation, or gender identity.               Review of your medicines      CONTINUE these medicines which have NOT CHANGED        Dose / Directions    ascorbic acid 500 MG tablet   Commonly known as:  VITAMIN C        Dose:  500 mg   Take 500 mg by mouth 2 times daily   Refills:  0       ASPIRIN ADULT LOW STRENGTH PO        Dose:  81 mg   Take 81 mg by mouth daily   Refills:  0       atorvastatin 20 MG tablet   Commonly known as:  LIPITOR   Used for:  H/O heart artery stent        Dose:  20 mg   Take 1 tablet (20 mg) by mouth At Bedtime   Quantity:  90 tablet   Refills:  1       clopidogrel 75 MG tablet   Commonly known as:  PLAVIX   Used for:  H/O heart artery stent        Dose:  75 mg   Take 1 tablet (75 mg) by mouth daily   Quantity:  90 tablet   Refills:  1       etidronate 200 MG tablet   Commonly known as:  DIDRONEL        2 times daily   Refills:  0       MULTIVITAMIN MEN PO        Dose:  1 tablet   Take 1 tablet by mouth daily   Refills:  0       order for DME   Used for:  Decubitus ulcer of sacral region, stage 3 (H), Chronic skin ulcer, limited to breakdown of skin (H), Pressure ulcer of other site, stage 3 (H)        Equipment being ordered: [employer] [facility] Phone [phone] Fax [phone]  Primary Dressing Iodosorb Gel   Qty not needed Secondary Dressing  tegaderm 1adhesive foam dressing 3x3 Qty 30 Secondary Dressing 2x2 gauze Qty 1 loaf Secondary Dressing 2' tape Qty 1 roll Length of Need: 1 month Frequency of dressing change: daily   Quantity:  30 days   Refills:  0       phenylephrine-shark liver oil-mineral oil-petrolatum 0.25-14-74.9 % rectal ointment   Commonly known as:  PREPARATION H        Place rectally daily as needed   Refills:  0       vitamin B complex with vitamin C Tabs tablet        Dose:  1 tablet   Take 1 tablet by mouth daily   Refills:  0                 Protect others around you: Learn how to safely use, store and throw away your medicines at www.disposemymeds.org.             Medication List: This is a list of all your medications and when to take them. Check marks below indicate your daily home schedule. Keep this list as a reference.      Medications           Morning Afternoon Evening Bedtime As Needed    ascorbic acid 500 MG tablet   Commonly known as:  VITAMIN C   Take 500 mg by mouth 2 times daily                                ASPIRIN ADULT LOW STRENGTH PO   Take 81 mg by mouth daily                                atorvastatin 20 MG tablet   Commonly known as:  LIPITOR   Take 1 tablet (20 mg) by mouth At Bedtime   Last time this was given:  20 mg on 9/21/2018  9:50 PM                                clopidogrel 75 MG tablet   Commonly known as:  PLAVIX   Take 1 tablet (75 mg) by mouth daily                                etidronate 200 MG tablet   Commonly known as:  DIDRONEL   2 times daily                                MULTIVITAMIN MEN PO   Take 1 tablet by mouth daily                                order for DME   Equipment being ordered: [facility] Phone [phone] Fax [phone]  Primary Dressing Iodosorb Gel   Qty not needed Secondary Dressing  tegaderm 1adhesive foam dressing 3x3 Qty 30 Secondary Dressing 2x2 gauze Qty 1 loaf Secondary Dressing 2' tape Qty 1 roll Length of Need: 1 month Frequency of dressing change: daily                                phenylephrine-shark liver oil-mineral oil-petrolatum 0.25-14-74.9 % rectal ointment   Commonly known as:  PREPARATION H   Place rectally daily as needed                                vitamin B complex with vitamin C Tabs tablet   Take 1 tablet by mouth daily   Last time this was given:  1 tablet on 9/22/2018  8:43 AM

## 2018-09-19 NOTE — ANESTHESIA CARE TRANSFER NOTE
Patient: Derek Enriquez    Procedure(s):  Left Hip Heterotopic Bone Resection - Wound Class: I-Clean    Diagnosis: Hip Pain, Eterotopic Ossification of Hip Left  Diagnosis Additional Information: No value filed.    Anesthesia Type:   General, ETT     Note:  Airway :Face Mask  Patient transferred to:PACU  Handoff Report: Identifed the Patient, Identified the Reponsible Provider, Reviewed the pertinent medical history, Discussed the surgical course, Reviewed Intra-OP anesthesia mangement and issues during anesthesia, Set expectations for post-procedure period and Allowed opportunity for questions and acknowledgement of understanding      Vitals: (Last set prior to Anesthesia Care Transfer)    CRNA VITALS  9/19/2018 1001 - 9/19/2018 1036      9/19/2018             NIBP: 114/78    Pulse: 91    NIBP Mean: 86    SpO2: 98 %    Resp Rate (observed): (!)  3                Electronically Signed By: GREGORIO Hilario CRNA  September 19, 2018  10:36 AM

## 2018-09-20 ENCOUNTER — APPOINTMENT (OUTPATIENT)
Dept: PHYSICAL THERAPY | Facility: CLINIC | Age: 58
DRG: 464 | End: 2018-09-20
Attending: SPECIALIST

## 2018-09-20 ENCOUNTER — TELEPHONE (OUTPATIENT)
Dept: CARDIOLOGY | Facility: CLINIC | Age: 58
End: 2018-09-20

## 2018-09-20 LAB
ANION GAP SERPL CALCULATED.3IONS-SCNC: 8 MMOL/L (ref 3–14)
BUN SERPL-MCNC: 17 MG/DL (ref 7–30)
CALCIUM SERPL-MCNC: 8.2 MG/DL (ref 8.5–10.1)
CHLORIDE SERPL-SCNC: 103 MMOL/L (ref 94–109)
CO2 SERPL-SCNC: 29 MMOL/L (ref 20–32)
CREAT SERPL-MCNC: 0.7 MG/DL (ref 0.66–1.25)
GFR SERPL CREATININE-BSD FRML MDRD: >90 ML/MIN/1.7M2
GLUCOSE SERPL-MCNC: 93 MG/DL (ref 70–99)
HGB BLD-MCNC: 11.3 G/DL (ref 13.3–17.7)
POTASSIUM SERPL-SCNC: 3.8 MMOL/L (ref 3.4–5.3)
SODIUM SERPL-SCNC: 140 MMOL/L (ref 133–144)

## 2018-09-20 PROCEDURE — 85018 HEMOGLOBIN: CPT | Performed by: ORTHOPAEDIC SURGERY

## 2018-09-20 PROCEDURE — 25000132 ZZH RX MED GY IP 250 OP 250 PS 637: Performed by: INTERNAL MEDICINE

## 2018-09-20 PROCEDURE — 36415 COLL VENOUS BLD VENIPUNCTURE: CPT | Performed by: ORTHOPAEDIC SURGERY

## 2018-09-20 PROCEDURE — 99207 ZZC CDG-MDM COMPONENT: MEETS MODERATE - UP CODED: CPT | Performed by: INTERNAL MEDICINE

## 2018-09-20 PROCEDURE — 40000193 ZZH STATISTIC PT WARD VISIT: Performed by: PHYSICAL THERAPIST

## 2018-09-20 PROCEDURE — 97602 WOUND(S) CARE NON-SELECTIVE: CPT

## 2018-09-20 PROCEDURE — G0463 HOSPITAL OUTPT CLINIC VISIT: HCPCS | Mod: 25

## 2018-09-20 PROCEDURE — 99232 SBSQ HOSP IP/OBS MODERATE 35: CPT | Performed by: INTERNAL MEDICINE

## 2018-09-20 PROCEDURE — 25000132 ZZH RX MED GY IP 250 OP 250 PS 637: Performed by: NURSE PRACTITIONER

## 2018-09-20 PROCEDURE — 12000001 ZZH R&B MED SURG/OB UMMC

## 2018-09-20 PROCEDURE — 25000132 ZZH RX MED GY IP 250 OP 250 PS 637: Performed by: ORTHOPAEDIC SURGERY

## 2018-09-20 PROCEDURE — 97161 PT EVAL LOW COMPLEX 20 MIN: CPT | Mod: GP | Performed by: PHYSICAL THERAPIST

## 2018-09-20 PROCEDURE — 97110 THERAPEUTIC EXERCISES: CPT | Mod: GP | Performed by: PHYSICAL THERAPIST

## 2018-09-20 PROCEDURE — 25000128 H RX IP 250 OP 636: Performed by: ORTHOPAEDIC SURGERY

## 2018-09-20 PROCEDURE — 80048 BASIC METABOLIC PNL TOTAL CA: CPT | Performed by: ORTHOPAEDIC SURGERY

## 2018-09-20 PROCEDURE — 97530 THERAPEUTIC ACTIVITIES: CPT | Mod: GP | Performed by: PHYSICAL THERAPIST

## 2018-09-20 RX ORDER — CLOPIDOGREL BISULFATE 75 MG/1
75 TABLET ORAL DAILY
Status: DISCONTINUED | OUTPATIENT
Start: 2018-09-20 | End: 2018-09-21

## 2018-09-20 RX ORDER — CALCIUM CARBONATE 500 MG/1
500 TABLET, CHEWABLE ORAL DAILY PRN
Status: DISCONTINUED | OUTPATIENT
Start: 2018-09-20 | End: 2018-09-22 | Stop reason: HOSPADM

## 2018-09-20 RX ORDER — LANOLIN ALCOHOL/MO/W.PET/CERES
3 CREAM (GRAM) TOPICAL
Status: DISCONTINUED | OUTPATIENT
Start: 2018-09-20 | End: 2018-09-22 | Stop reason: HOSPADM

## 2018-09-20 RX ADMIN — CEFAZOLIN 1 G: 1 INJECTION, POWDER, FOR SOLUTION INTRAMUSCULAR; INTRAVENOUS at 00:08

## 2018-09-20 RX ADMIN — INDOMETHACIN 75 MG: 25 CAPSULE ORAL at 11:35

## 2018-09-20 RX ADMIN — ATORVASTATIN CALCIUM 20 MG: 20 TABLET, FILM COATED ORAL at 23:13

## 2018-09-20 RX ADMIN — INDOMETHACIN 75 MG: 25 CAPSULE ORAL at 08:06

## 2018-09-20 ASSESSMENT — ACTIVITIES OF DAILY LIVING (ADL)
ADLS_ACUITY_SCORE: 20
ADLS_ACUITY_SCORE: 20
ADLS_ACUITY_SCORE: 22
ADLS_ACUITY_SCORE: 22
ADLS_ACUITY_SCORE: 20
ADLS_ACUITY_SCORE: 22

## 2018-09-20 NOTE — PLAN OF CARE
"Problem: Patient Care Overview  Goal: Plan of Care/Patient Progress Review  Discharge Planner PT   Patient plan for discharge: home with son to assist  Current status: Pt does not need formal lymphedema evaluation as he has specialty garments already purchased.  He has a question regarding foot wraps for the left foot as he finds it is not as effective in reducing swelling as it is on the right foot. Call place to out-pt certified lymphedema therapists regarding suggestions/alternative to present foot wrap options.  Pt is on an air mattress in the hospital which will limit his independence with transfers. He was able to transfer to a commode with min A of 2. (Drop arm commode unavailable during session).   Declined getting up to the wheelchair.  Was able to tolerate prone positioning. Checked in after 10 minutes but he was \"comfortable\" so allowed to continue in prone until RN able to turn him back to supine.  Per OP note.  MD suggested prone positioning 3x/day as much as possible.  Pt states son does ROM at home \"but not as often as he could.\"    Barriers to return to prior living situation: medical issues: drainage.  Son will assist with transfers  Recommendations for discharge: home with son, RN to follow for wound care  Rationale for recommendations: PT will assist with transfers, ROM.        Entered by: Carolina Mcdonald 09/20/2018 11:09 AM           "

## 2018-09-20 NOTE — TELEPHONE ENCOUNTER
[facility] Call Center    Phone Message    May a detailed message be left on voicemail: yes    Reason for Call: Cadence NP at the  on the [facility] is calling to leave a message for 's nurses. Patient is admitted to orthoptics service but medicine is following along for medical management patient is one day post op from HO resection. Patient and  has been discussing starting indomethacin and then continuing with alma and plavix. Cadence wants to clarify  recommendation for patient starting the alma and plavix even thought the ortho team have already started patient on indomethacin. Cadence wants some guidances on the timing for resuming antiplatelet therapy with patient's stent. Cadence wants clarification on the antiplatelet therapy since patient has started the indomethacin. Please follow up with Cadence at her cell number , or Cadence's pager number         Action Taken: Message routed to:  [facility] ([facility] Cardiology

## 2018-09-20 NOTE — PROGRESS NOTES
WO Nurse Inpatient Wound Assessment   Reason for consultation: Evaluate and treat left foot and sacral wound     Assessment  Left great toe wound due to Trauma  Status: initial assessment    Left lateral foot and left 4th toe: Suspected Deep Tissue Pressure Injury (sDTPI), present on admission  Status: initial assessment     Coccyx wound due to Pressure Injury  Patient declined assessment of coccyx- we do not have the dressing he uses. According to patient, Mepilex does not stick to his skin and the Tegaderm foam dressing works best to protect his skin from breakdown, changing every 2-3 days, last changed per patient on 9/18/18. Asks for product to be available prior to removal of current dressing and assessment of wound.    Treatment Plan  Left great toe wound: Every other day: wash toe with wound cleanser and gauze (black tissue is a result of chemical cauterization, not tissue necrosis). Apply Skintegrity HydroGel around nail bed. Cover with bandaid. Left lateral foot: cover with Mepilex, change every other day.    Coccyx: if dressing becomes soiled or falls off: wash with wound cleanser and gauze. Pat dry. Paint periwound skin with Cavalon No Sting and allow to dry. Cover with Mepilex Sacral dressing.       Orders Written  WO Nurse follow-up plan:weekly  Nursing to notify the Provider(s) and re-consult the WO Nurse if wound(s) deteriorates or new skin concern.    Patient History  According to provider note(s):  Derek Enriquez is a 57 year old male with PMH including thoracic SCI w/ resultant paraplegia, neurogenic bowel and bladder, CAD and history of femur fracture s/p IMN with post-operative development of HO, now s/p L hip HO excision on 9/19 with Dr. Kennedy.    Objective Data  Containment of urine/stool: Continent of bowel and Continent of bladder    Active Diet Order    Active Diet Order      Regular Diet Adult    Output:   I/O last 3 completed shifts:  In: 912 [I.V.:912]  Out: 2570 [Urine:1650;  "Drains:390; Blood:530]    Risk Assessment:   Sensory Perception: 2-->very limited  Moisture: 4-->rarely moist  Activity: 2-->chairfast  Mobility: 2-->very limited  Nutrition: 3-->adequate  Friction and Shear: 2-->potential problem  Johnie Score: 15                          Labs: No lab results found in last 7 days.    Invalid input(s): GLUCOMBO    Physical Exam  Skin inspection: focused left foot and perineum    Wound Location:  Left great toe  Date of last photo 9/20/18      Wound History: Present on admission. Wound from trauma, [facility] at [facility].   Measurements (length x width x depth, in cm) open akin around nail bed.   Wound Base: granulation tissue, some stained black from chemical cauterization at last clinic visit.   Tunneling N/A  Undermining N/A  Palpation of the wound bed: normal   Periwound skin: intact  Color: pink  Temperature: normal   Drainage:, scant  Description of drainage: bloody  Odor: none  Pain: denies      Wound Location:  Left lateral foot and 4th toe  Date of last photo 9/20/18      Wound History: Appears to be a deep tissue injury, present on admission  Measurements (length x width x depth, in cm) lateral foot: 2 cm x 2.4 cm  x  0 cm; 4th toe 1.2 cm x 1 cm x 0 cm  Wound Base:  Non-blanchable intact epidermis  Palpation of the wound bed: normal   Periwound skin: intact  Color: pale  Temperature: normal   Drainage: none  Description of drainage: none  Odor: none  Pain: denies     Wound Location:  Coccyx/Sacrum  Date of last photo: denied assessment, see above  Wound History: Present on admission, unknown stage, patient states it's \"mostly healed\". Declines assessment    Interventions  Current support surface: Standard  Low air loss mattress  Current off-loading measures: Pillows under calves  Visual inspection of wound(s) completed  Wound Care: done per plan of care  Supplies: placed at the bedside  Education provided: importance of repositioning, wound progress and Infection prevention "   Discussed plan of care with Patient and Nurse    Lora Hartley RN, CWOCN

## 2018-09-20 NOTE — PROGRESS NOTES
Patient comfortable lying prone, hip comes to full extension in supine position.  I showed the patient the image of the resected bone and texted it to his phone    O- afebrile  dressings dry with the exception of a small spot on lateral wound  60ml out drains last 8 hrs  Hgb 11.6    A- stable  P- OK for patient to be up in wheel chair  Medicine to decide if Indomethacin too risky given the need for Plavix- If they feel that is the case I am OK with discontinuing the Indomethacin- discussed this with patient  Anticipate need to leave drains in after discharge, given the magnitude of dead space  If patient discharged later Friday or Saturday he will need education on daily wound dressing changes and measuring and emptying drains  Would plan on seeing him as an outpatient to remove drains on the following Thursday at [facility]

## 2018-09-20 NOTE — PROGRESS NOTES
[facility]    HISTORY OF PRESENT ILLNESS: Derek Enriquez is a 57 year old, paraplegic male who presents with pressure injuries over his coccyx, perineum and both feet. In March of this year, Derek suffered a cardiac arrest and underwent cardiac catheterization. The next afternoon he was discovered to have a hematoma compressing his spinal cord at [id number]. He has had several hospital admissions since the spinal cord injury and subsequently developed pressure sores.     Last visit we debrided the wounds and started him on Iodosorb dressings. The perineal wound has now healed and all other wounds have improved. He is anticipating removal of HO from his hip next week.     WOUND CARE: Iodosorb    OFFLOADING: on Roho cushion, sleeps on standard bed    DATE WOUND ACQUIRED: toe wound 6/18, coccyx 7/18    PAST MEDICAL HISTORY:  has a past medical history of CAD (coronary artery disease); Femur fracture (H); Neurogenic bladder; Neurogenic bowel; Orthostatic hypotension; Paraplegia (H); and Thoracic spinal cord injury (H).    SOCIAL HISTORY: lives with his 26 year old son who helps with wound dressings    MEDICATIONS:   No current facility-administered medications for this encounter.      No current outpatient prescriptions on file.     Facility-Administered Medications Ordered in Other Encounters   Medication     [START ON 9/22/2018] acetaminophen (TYLENOL) tablet 650 mg     acetaminophen (TYLENOL) tablet 975 mg     atorvastatin (LIPITOR) tablet 20 mg     benzocaine-menthol (CEPACOL) 15-3.6 MG lozenge 1-2 lozenge     bisacodyl (DULCOLAX) Suppository 10 mg     docusate sodium (COLACE) capsule 100 mg     hydrOXYzine (ATARAX) tablet 25 mg     indomethacin (INDOCIN) capsule 75 mg     lactated ringers infusion     lidocaine (LMX4) cream     lidocaine 1 % 1 mL     metoclopramide (REGLAN) tablet 10 mg    Or     metoclopramide (REGLAN) injection 10 mg     multivitamin, therapeutic (THERA-VIT) tablet     naloxone  (NARCAN) injection 0.1-0.4 mg     ondansetron (ZOFRAN-ODT) ODT tab 4 mg    Or     ondansetron (ZOFRAN) injection 4 mg     oxyCODONE IR (ROXICODONE) tablet 5-10 mg     pantoprazole (PROTONIX) EC tablet 40 mg     prochlorperazine (COMPAZINE) injection 10 mg    Or     prochlorperazine (COMPAZINE) tablet 10 mg     sodium chloride (PF) 0.9% PF flush 3 mL     sodium chloride (PF) 0.9% PF flush 3 mL     vitamin B complex with vitamin C (STRESS TAB) tablet 1 tablet     VITALS: /73 (BP Location: Right arm)  Pulse 93  Temp 98  F (36.7  C) (Temporal)  Resp 18    PHYSICAL EXAM:  GENERAL: Patient is alert and oriented and in no acute distress  INTEGUMENTARY:   WOUND ASSESSMENT #1:     Location: left great toe     Size: 0.5 cm x 0.3 cm with a depth of 0.1 cm    Drainage: copious amount of serosanguinous drainage    Wound description: hypergranulation tissue surrounding nail bed  WOUND ASSESSMENT #2:     Location: coccyx     Size: 1.8 cm x 1.0 cm with a depth of 0.1 cm    Drainage: copious amount of serosanguinous drainage    Wound description: yellow slough with scarred sugar wound  WOUND ASSESSMENT #4:     Location: right medial foot     Size: 0.3 cm x 0.2 cm with a depth of 0.1 cm    Drainage: copious amount of serosanguinous drainage    Wound description: shallow and granular          PROCEDURE (all wounds): 4% topical lidocaine was applied to the wound by the CMA. Patient was determined to be capable of making their own medical decisions and informed consent was obtained verbally today, this was confirming the written consent that he gave at our last visit for the same procedure. Using a sharp curette, and tissue nippers a surgical debridement was performed down to and including subcutaneous tissue of <20 cm. Hemostasis was achieved with pressure and silver nitrate. The patient tolerated the procedure well.      ASSESSMENT:   1. Full-thickness, left first toe ulcer with fat-layer exposed  2. Stage 3 coccygeal pressure  ulcer  3. Full-thickness, right medial foot wound with fat-layer exposed    PLAN:   1. Coccyx wound: WounDres and Mepilex or Tegaderm foam  2. Foot wounds: WounDres  3. Lower legs need compression garment (we recommend SpandaGrip)    FOLLOW-UP: 2 weeks    PEDRO MERLOS PA-C ([facility]KSTRA)

## 2018-09-20 NOTE — ADDENDUM NOTE
Encounter addended by: Rosario Sánchez PA-C on: 9/20/2018  9:46 AM<BR>     Actions taken: Pend clinical note, Sign clinical note

## 2018-09-20 NOTE — PROGRESS NOTES
Care Coordinator - Discharge Planning    Admission Date/Time:  9/19/2018  Attending MD:  Toñito Kennedy*     Data  Date of initial CC assessment:   9/20/18  Chart reviewed, discussed with interdisciplinary team.   Patient was admitted for: No diagnosis found.     Assessment   Concerns with insurance coverage for discharge needs: None.  Current Living Situation: Patient lives with adult child.  Support System: Supportive and Involved  Services Involved: None  Transportation at Discharge: Car, Family or friend will provide and [employer]  Transportation to Medical Appointments:    - Name of caregiver: Son is able to assist  Barriers to Discharge: None     Pt  S/p left hip heterotopic ossification resection with a medical history significant for  paraplegia 2/2 spinal subdural hematoma s/p T9-T12 decompressive laminectomy (3/2018) c/b neurogenic bladder/bowel and orthostatic hypotension, CAD s/p cardiac arrest (3/2018) now s/p stent, IABP placement, HLD, and lower extremity edema admitted to Ortho service.    Per care team rounds anticpiate discharge in 1-2 days.  Pt will discharge with a drain and wound care management.   PT/OT have cleared pt for home.  Pt would benefit from home RN support secondary to drain cares and wound care management.    Met with pt.  Introduced RNCC role.   Discussed anticipated plan for discharge planning.  Discussed home care service options.  Per discussion with patient he anticipates that he will return to work on Monday 9/24.  Pt would be open to having home health (if he does not have to be home bound).  [facility] preferred provider.   Pt is scheduled for f/u with Dr. Venegas on Thursday the 27th.   Agreed to check with home care regarding RN service options.    Per discussion with Mirna Mckay NP Ortho patient would benefit from home RN services and plan to f/u with ortho provider as scheduled  P t has a aquacell dressing in place that will not require daily wound care  management.         Spoke with EHSAN Acevedo Liaison regarding pt sitaution.   Anticipate one time only RN visit.   She recommended benefit check/referral be made for further review.  Email referral made.    .      Coordination of Care and Referrals: Provided patient/family with options for Home Care.      Plan  Anticipated Discharge Date:  1-2 days  Anticipated Discharge Plan:  Home with home care vs home with outpatient f/u.     CTS Handoff completed:  DARIN Patel RN BSN, PHN Holdenat RN Care Coordinator covering for JOHAN eMjia  [email or web address]  Pager [phone]  9/20/2018 2:44 PM

## 2018-09-20 NOTE — PLAN OF CARE
Problem: Patient Care Overview  Goal: Plan of Care/Patient Progress Review  VS: VSS   O2: RA   Output: Neurogenic bladder, Pt straight cathed twice this shift   Last BM: Neurogenic bowel; LBM 9/19    Activity: 1A to reposition in bed   Skin: Wounds, incisions   Pain: Denies pain at this time   CMS: Paraplegic   Dressing: Dried drainage on aquacel on lateral thigh; marked - no change   Diet: Regular   LDA: PIV infusing, 2 HVs patent   Equipment: PCDs, CPM, IV pole, capnography   Plan: TBD   Additional Info:

## 2018-09-20 NOTE — PLAN OF CARE
Problem: Patient Care Overview  Goal: Plan of Care/Patient Progress Review  OT:  Discussed with PT, one discipline is appropriate and no OT needs indicated and PT to continue to follow.  Will defer to PT.

## 2018-09-20 NOTE — PROGRESS NOTES
Internal Medicine Progress Note   Date of Service: 9/20/2018      Patient: Derek Enriquez  MRN: [id number]  Admission Date: 9/19/2018  Hospital Day # 1    Assessment & Plan: Derek Enriquez is a 57 year old male with a history of paraplegia 2/2 spinal subdural hematoma s/p T9-T12 decompressive laminectomy (3/2018) c/b neurogenic bladder/bowel and orthostatic hypotension, CAD s/p cardiac arrest (3/2018) now s/p stent, IABP placement, HLD, and lower extremity edema admitted to Ortho service for 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       # S/p resection L hip heterotopic ossification - POD #1. Currently stable, reported good sleep overnight but still experiencing joint stiffness.  Hgb 11.3 this am.          - Pt w/ autonomic dysfunction; please monitor VS carefully for abrupt swings in BP (including sustained HTN, hypotension), sweatiness, dizziness as these could indicate uncontrolled pain; please offer PRN Oxycodone if any of these are noted or if pt otherwise appearing uncomfortable   - Further management per Ortho (primary)      # CAD s/p MALI x 1 to LAD (3/2018); HLD - Stable.  CT angio on 1/2018 with severe proximal LAD stenosis, with plans for intervention on 3/19/18.  Unfortunately pt had cardiac arrest while exercising at the gym on 3/8/2018.  Underwent PCI with MALI to proximal LAD due to 95% stenotic ruptured plaque and placement of IABP.  On DAPT with ASA 81mg and Plavix 75mg daily since 3/2018, which has been on hold due to current surgery.  Follows w/ Dr. Jane (Cardiology), last seen in clinic on 5/2/18.    - Will d/w Cardiology prior to resuming DAPT as pt now started on Indomethacin  - Continue PTA statin  - Protonix for GI ppx     # Paraplegia 2/2 spinal subdural hematoma s/p T9-T12 decompressive laminectomy (3/2018) - Developed on POD#1 from MALI and IABP placement following cardiac arrest on 3/8; pt had sudden onset back pain, along with sudden motor and sensory loss of BLEs.   "Wheelchair bound since that time.  Has developed generalized edema of BLEs since that time.    - Fall precautions   - Lymphedema consult for wraps vs boots       # Neurogenic bowel/bladder; ?recurrent UTIs - 2/2 SCI as above.  Self caths at home about 4-5x per day.  Follows with Urology, last seen in clinic on 6/18/18.  Pt reported being on antibiotics for UTIs during 3/2018 and 4/2018.  Noted to have positive nitrite on UA on 9/17.  Unable to sense bladder fullness.   - Continue to offer straight cath q4H daily per home routine   - Encourage PO fluids   - Pt has received Ancef intraoperatively, ideally should cover for UTI; will not start additional antibiotics at this time      # Sacral, decubitus ulcers (present before admission) - 2/2 limited mobility due to paraplegia.  Follows with wound care clinic [facility] at [facility], last seen 9/17/18.    - WOCN consult               Today's plan of care was reviewed with attending physician, Dr. Jonse.     Marilin Pimentel, [facility]  Hospitalist Service   Pager: [phone]    ___________________________________________________________________    Subjective & Interval History:      Derek is resting in bed.  He is comfortable this AM, but continues to experience joint stiffness.  Has good appetite but prefers to \"graze\".  He denies sweating, headaches, chest pain, dyspnea, abdominal pain, nausea, and vomiting.        Last 24 hour care team notes reviewed.   ROS: 4 point ROS (including Respiratory, CV, GI and ) was performed and negative unless otherwise noted in HPI.     Medications: Reviewed in EPIC.    Physical Exam:    Blood pressure 104/54, pulse 70, temperature 97.8  F (36.6  C), temperature source Oral, resp. rate 16, height 1.803 m (5' 10.98\"), weight 68.9 kg (151 lb 14.4 oz), SpO2 100 %.    GENERAL: Alert and oriented x 3. Well nourished, well developed.  No acute distress.    HEENT: Normocephalic, atraumatic. Anicteric sclera. Mucous membranes moist.   CV: RRR. S1, S2. No " murmurs appreciated.   RESPIRATORY: Effort normal on room air. Lungs CTAB with no wheezing, rales, or rhonchi.   GI: Abdomen soft and non distended, bowel sounds present x all 4 quadrants. No tenderness, rebound, or guarding.   NEUROLOGICAL: No focal deficits. Follows commands.   strength equal in upper extremities.   MUSCULOSKELETAL: No joint swelling or tenderness.  Paralysis of BLEs.   EXTREMITIES: No gross deformities. Trace dependent peripheral edema of BLEs.   SKIN: Grossly warm, dry, and intact. No jaundice. No rashes.     Lines/Tubes/Drains:   Peripheral IV 09/19/18 Right Lower forearm (Active)   Site Assessment [facility] 9/20/2018  5:00 AM   Line Status Saline locked 9/20/2018  5:00 AM   Phlebitis Scale 0-->no symptoms 9/20/2018  5:00 AM   Infiltration Scale 0 9/20/2018  5:00 AM   Extravasation? No 9/19/2018 11:46 AM   Number of days:1       Peripheral IV 09/19/18 Left Hand (Active)   Site Assessment [facility] 9/20/2018  5:00 AM   Line Status Infusing 9/20/2018  5:00 AM   Phlebitis Scale 0-->no symptoms 9/20/2018  5:00 AM   Infiltration Scale 0 9/20/2018  5:00 AM   Extravasation? No 9/19/2018 11:46 AM   Number of days:1       Right Groin Interventional Procedure Access (Active)   Number of days:193       Labs & Studies of Note: I personally reviewed the following studies:    ROUTINE IP LABS (Last four results)  CMP   Recent Labs  Lab 09/19/18  0635   POTASSIUM 4.5   GLC 85   CR 0.75     CBC No lab results found in last 7 days.  INR No lab results found in last 7 days.      Unresulted Labs Ordered in the Past 30 Days of this Admission     Date and Time Order Name Status Description    9/19/2018 0947 Surgical pathology exam In process

## 2018-09-20 NOTE — PLAN OF CARE
"Problem: Surgery Nonspecified (Adult)  Goal: Signs and Symptoms of Listed Potential Problems Will be Absent, Minimized or Managed (Surgery Nonspecified)  Signs and symptoms of listed potential problems will be absent, minimized or managed by discharge/transition of care (reference Surgery Nonspecified (Adult) CPG).   Outcome: No Change    VS: VSS   O2: Sats >90% on RA   Output: Neurogenic bladder, straight cath every 4-5 hours   Last BM: 9/20 - very small, dig stims himself at baseline   Activity: Up to bedside commode with Ax2, pt was in prone position for about 15 minutes this AM. Otherwise repositions left side, right side, back, and prone with Ax1   Skin: Abrasions on feet and knees, incisions   Pain: Denies pain, \"stiffness\" in his back, offered heat backs but pt declined    CMS: Paraplegic, pulses intact   Dressing: CDI   Diet: Regular diet, well tolerated. No N/V reported   LDA: PIV, SL. Hemovac x2   Equipment: CPM - will not go home with it, PCDs, pulsate mattress    Plan: Continue to monitor drain output, probable discharge home later Friday or Saturday   Additional Info: Pt does not want to take Vitamins or Tylenol while he is here. Refused Plavix until he speaks with his cardiologist. Pt is alert and oriented and able to make needs known.              "

## 2018-09-20 NOTE — PROGRESS NOTES
"Orthopaedic Surgery Progress Note   September 19, 2018    Subjective: No acute events post-operatively. Having difficulty with having to lie on his back for a prolonged period today - feels tight in his pelvis. Tolerating diet. Straight cath without complication.      Objective: BP (!) 96/39 (BP Location: Left arm)  Pulse 70  Temp 97.6  F (36.4  C) (Oral)  Resp 10  Ht 1.803 m (5' 10.98\")  Wt 68.9 kg (151 lb 14.4 oz)  SpO2 100%  BMI 21.19 kg/m2    Drain:   #1: 50 cc  #2: 30 cc    General: NAD, alert and oriented, cooperative with exam.   Cardio: RRR, extremities wwp.   Respiratory: Non-labored breathing.  MSK: Focused examination of LLE reveals small area of strikethrough on posterior dressing but otherwise c/d/i. Anterior dressing c/d/i. Drains with serosanguinous output.     Labs: Hgb and BMP tomorrow AM.     Assessment and Plan: Derek Enriquez is a 57 year old male with PMH including thoracic SCI w/ resultant paraplegia, neurogenic bowel and bladder, CAD and history of femur fracture s/p IMN with post-operative development of HO, now s/p L hip HO excision on 9/19 with Dr. Kennedy.    Orthopaedic Surgery Primary  Activity: As tolerated. Up to chair PRN. L hip ROM as tolerated. CPM L hip ROM 0-90 degrees while inpatient.   Pain management: Transition from IV to PO as tolerated.    Antibiotics: Ancef x 24 hours.  Diet: Begin with clear fluids and progress diet as tolerated.   DVT prophylaxis: PTA Plavix POD#1.  Imaging: No further imaging needed at this time.  Labs: Hgb POD#1-3. Monitor pathology.  Bracing/Splinting: None.   Dressings: Keep clean, dry and intact x 7 days - change if become saturated.   Drains: Document output per shift, discontinue when <15cc/shift - these are sewn in. Okay for patient to discharge with drains.   Consults: IM.  Follow-up: Clinic with Dr. Kennedy in 1 week.   Disposition: Pending wound monitoring and drain output - earliest discharge to home on 9/21.    Rose Mary Kolb, " MD  Orthopaedic Surgery Resident, PGY-4  Pager: [phone]    For questions about this patient during the day, please attempt to contact me at my pager ([phone]) prior to contacting the Orthopaedic Surgery resident on call. Thank you!

## 2018-09-20 NOTE — PROGRESS NOTES
"Orthopaedic Surgery Progress Note   September 20, 2018    Subjective: No acute events overnight. Having muscle spasms. Tolerating diet. Straight cath without complication.     Objective: /53 (BP Location: Left arm)  Pulse 70  Temp 97.5  F (36.4  C) (Oral)  Resp 12  Ht 1.803 m (5' 10.98\")  Wt 68.9 kg (151 lb 14.4 oz)  SpO2 99%  BMI 21.19 kg/m2    Drain:   #1: 50 - 30 cc  #2: 30- 280 cc    General: NAD, alert and oriented, cooperative with exam.   Cardio: RRR, extremities wwp.   Respiratory: Non-labored breathing.  MSK: Focused examination of LLE reveals small area of strikethrough on posterior dressing which is stable but otherwise c/d/i. Anterior dressing c/d/i. Drains with serosanguinous output.     Labs: Hgb and BMP this AM.   Surgical pathology in process.     Assessment and Plan: Derek Enriquez is a 57 year old male with PMH including thoracic SCI w/ resultant paraplegia, neurogenic bowel and bladder, CAD and history of femur fracture s/p IMN with post-operative development of HO, now s/p L hip HO excision on 9/19 with Dr. Kennedy.    Orthopaedic Surgery Primary  HO prophylaxis: Indomethican 75 mg TID x 6 weeks.   Activity: As tolerated. Up to chair PRN. L hip ROM as tolerated. CPM L hip ROM 0-90 degrees while inpatient.   Pain management: Transition from IV to PO as tolerated.    Antibiotics: Ancef x 24 hours.  Diet: Begin with clear fluids and progress diet as tolerated.   DVT prophylaxis: PTA Plavix POD#1.  Imaging: No further imaging needed at this time.  Labs: Hgb POD#1-3. Monitor pathology.  Bracing/Splinting: None.   Dressings: Keep clean, dry and intact x 7 days - change if become saturated.   Drains: Document output per shift, discontinue when <15cc/shift - these are sewn in. Okay for patient to discharge with drains.   Consults: IM.  Follow-up: Clinic with Dr. Kennedy in 1 week.   Disposition: Pending wound monitoring and drain output - earliest discharge to home tomorrow. "     Rose Mary Kolb MD  Orthopaedic Surgery Resident, PGY-4  Pager: [phone]    For questions about this patient during the day, please attempt to contact me at my pager ([phone]) prior to contacting the Orthopaedic Surgery resident on call. Thank you!

## 2018-09-21 ENCOUNTER — APPOINTMENT (OUTPATIENT)
Dept: RADIATION ONCOLOGY | Facility: CLINIC | Age: 58
DRG: 464 | End: 2018-09-21
Attending: RADIOLOGY

## 2018-09-21 VITALS — WEIGHT: 151 LBS | BODY MASS INDEX: 21.07 KG/M2

## 2018-09-21 DIAGNOSIS — M61.452: Primary | ICD-10-CM

## 2018-09-21 DIAGNOSIS — M89.8X9 HETEROTOPIC OSSIFICATION: ICD-10-CM

## 2018-09-21 DIAGNOSIS — I27.0 PRIMARY PULMONARY HYPERTENSION (H): Primary | ICD-10-CM

## 2018-09-21 LAB
ANION GAP SERPL CALCULATED.3IONS-SCNC: 6 MMOL/L (ref 3–14)
BUN SERPL-MCNC: 17 MG/DL (ref 7–30)
CALCIUM SERPL-MCNC: 8.2 MG/DL (ref 8.5–10.1)
CHLORIDE SERPL-SCNC: 107 MMOL/L (ref 94–109)
CO2 SERPL-SCNC: 30 MMOL/L (ref 20–32)
CREAT SERPL-MCNC: 0.72 MG/DL (ref 0.66–1.25)
GFR SERPL CREATININE-BSD FRML MDRD: >90 ML/MIN/1.7M2
GLUCOSE SERPL-MCNC: 97 MG/DL (ref 70–99)
HGB BLD-MCNC: 10.2 G/DL (ref 13.3–17.7)
POTASSIUM SERPL-SCNC: 3.9 MMOL/L (ref 3.4–5.3)
SODIUM SERPL-SCNC: 143 MMOL/L (ref 133–144)

## 2018-09-21 PROCEDURE — 77387 GUIDANCE FOR RADJ TX DLVR: CPT | Performed by: RADIOLOGY

## 2018-09-21 PROCEDURE — 77307 TELETHX ISODOSE PLAN CPLX: CPT | Performed by: RADIOLOGY

## 2018-09-21 PROCEDURE — 25000132 ZZH RX MED GY IP 250 OP 250 PS 637: Performed by: INTERNAL MEDICINE

## 2018-09-21 PROCEDURE — 12000001 ZZH R&B MED SURG/OB UMMC

## 2018-09-21 PROCEDURE — 77334 RADIATION TREATMENT AID(S): CPT | Performed by: RADIOLOGY

## 2018-09-21 PROCEDURE — 77290 THER RAD SIMULAJ FIELD CPLX: CPT | Performed by: RADIOLOGY

## 2018-09-21 PROCEDURE — 80048 BASIC METABOLIC PNL TOTAL CA: CPT | Performed by: ORTHOPAEDIC SURGERY

## 2018-09-21 PROCEDURE — 85018 HEMOGLOBIN: CPT | Performed by: ORTHOPAEDIC SURGERY

## 2018-09-21 PROCEDURE — 77412 RADIATION TX DELIVERY LVL 3: CPT | Performed by: RADIOLOGY

## 2018-09-21 PROCEDURE — 99232 SBSQ HOSP IP/OBS MODERATE 35: CPT | Performed by: INTERNAL MEDICINE

## 2018-09-21 PROCEDURE — 25000132 ZZH RX MED GY IP 250 OP 250 PS 637: Performed by: ORTHOPAEDIC SURGERY

## 2018-09-21 PROCEDURE — 36415 COLL VENOUS BLD VENIPUNCTURE: CPT | Performed by: ORTHOPAEDIC SURGERY

## 2018-09-21 PROCEDURE — 77336 RADIATION PHYSICS CONSULT: CPT | Performed by: RADIOLOGY

## 2018-09-21 RX ADMIN — DOCUSATE SODIUM 100 MG: 100 CAPSULE, LIQUID FILLED ORAL at 21:51

## 2018-09-21 RX ADMIN — B-COMPLEX W/ C & FOLIC ACID TAB 1 TABLET: TAB at 08:22

## 2018-09-21 RX ADMIN — ATORVASTATIN CALCIUM 20 MG: 20 TABLET, FILM COATED ORAL at 21:50

## 2018-09-21 RX ADMIN — THERA TABS 2 TABLET: TAB at 08:22

## 2018-09-21 ASSESSMENT — ACTIVITIES OF DAILY LIVING (ADL)
ADLS_ACUITY_SCORE: 20

## 2018-09-21 NOTE — PROGRESS NOTES
A radiation therapy treatment planning simulation was performed.  Please see the "Shahab P. Tabatabai, Broker" record for documentation.    Sapphire Urabn MD  Radiation Oncology

## 2018-09-21 NOTE — LETTER
2018       RE: Derek Enriquez  [address]  [place] [place] [address]     Dear Colleague,    Thank you for referring your patient, Derek Enriquez, to the RADIATION ONCOLOGY CLINIC. Please see a copy of my visit note below.    [facility]  SPECIALIZING IN BREAKTHROUGHS  Radiation Oncology    On Treatment Visit Note      Derek Enriquez      Date: 2018   MRN: [id number]   : 1960  Diagnosis: Heterotopic Ossification    Treatment Summary to Date   Left Hip Current Dose: 700/700 cGy Fractions:       Subjective: Mr. Enriquez received one fraction of radiation to the left hip for prophylaxis of heterotopic ossification. He tolerated treatment well.    Nursing ROS:   Nutrition Alteration  Diet Type: Patient's Preference    Gastrointestinal  Nausea: 0 - None    Pain Assessment  0-10 Pain Scale: 0    Objective:   Wt 68.5 kg (151 lb)  BMI 21.07 kg/m2  Gen: Appears well, NAD  Skin: No erythema    Assessment:    Tolerating radiation therapy well.  All questions and concerns addressed.    Treatment-related toxicities (CTCAE v4.0): None    Plan:   1. Follow up with Orthopedic Surgery  2. Return to clinic prn    Mosaiq chart and setup information reviewed  Port images reviewed    Medication Review  Med list reviewed with patient?: Yes    Educational Topic Discussed  Education Instructions: reviewed    Sapphire Urban MD  Department of Radiation Oncology  [facility]    Again, thank you for allowing me to participate in the care of your patient.      Sincerely,    Sapphire Urban MD

## 2018-09-21 NOTE — PLAN OF CARE
Problem: Patient Care Overview  Goal: Individualization & Mutuality  Outcome: Improving  Patient A&O x4, lungs sound clear, Bowel sound active, rectal stimulation done and he had a medium amount of bowel movement on commode, patient refused suppository and stool stoftener, Denied CP, lightheadedness, dizziness, and SOB, dressing on left hip with dried drainage, 2 Hemovac intact, in prone position for 15 minutes and tolerated well, CPM on for 30 minutes so far, drinking well and straight cath at 0930 for 400cc, patient is Para and strong upper extremities strength, transfers with assist of one , denied pain, patient requested to change left great toes dressing order, wound nurse called and got order changed but patient wants band aid on toe only as patient stated he is going home tomorrow. incentive spirometer encouraged and done several times, repositioned and turned in bed, heels elevated off bed, demonstrates the ability to use call light appropriately, patient transferred to the [facility] for radiology treatment. Will be back within 4-5 hours.

## 2018-09-21 NOTE — PROGRESS NOTES
Care Coordinator - Discharge Planning    Admission Date/Time:  9/19/2018  Attending MD:  Toñito Kennedy*     Data  Date of initial CC assessment:  9/20  Chart reviewed, discussed with interdisciplinary team.   Patient was admitted for: No diagnosis found.     Assessment   Full assessment completed in previous note    Per care team rounds anticipate discharge home tomorrow.   Received confirmation that [facility] is not able to open patient to home care services with only a one time visit.   Per discussion with Mirna Mckay, NP would plan for patient to f/u with Dr. Butcher, as scheduled on Thursday 9/27th.       Attempted to meet with pt that home care f/u is not an option however pt is off the unit for radiation treatment.       Plan  Anticipated Discharge Date: 9/22/18  Anticipated Discharge Plan:  Home with outpatient f/u.    Jaz Patel RN BSN, PHN Yadi RN Care Coordinator covering for JOHAN Mejia  [email or web address]  Pager [phone]  9/21/2018 3:24 PM

## 2018-09-21 NOTE — CONSULTS
Department of Radiation Oncology     [place] [address]  [address] [place]  [address]  Office:  [phone]  Fax:  [phone]   Radiation Oncology Clinic  [address] [place] [address]  Phone:  [phone]  Fax:  [phone]     RE: Derek Enriquez : 1960   MRN: [id number] LAW: 2018     RADIATION ONCOLOGY CONSULTATION       Mr. Derek Enriquez was seen for initial consultation at the request of Dr. Kennedy for heterotopic ossification of the left femur.    HISTORY OF PRESENT ILLNESS:   Mr. Enriquez is a 57-year-old male with paraplegia secondary to spinal cord compression from an epidural hematoma following a cardiac arrest within the past year.  He has since developed bilateral heterotopic ossification around the hips and proximal femurs. He reports sustained muscle damage following an episode of physical therapy and feels that this may be the cause of his heterotopic ossification.  The ossification was initially noted to involve the right femur and hip at the time of ORIF on 2018.  Due to symptoms of left hip stiffness and inability to tolerate an exo-walking apparatus, he was scheduled for resection of heterotopic ossification of the left hip on 18.  The initial plan to prevent heterotopic ossification recurrence was to give indomethacin which he started immediately following his surgery. However, cardiology recommended that he stop the indomethacin given the patient's need for Plavix and concern regarding bleeding.  We were therefore consulted for consideration of prophylactic radiotherapy to the left hip for heterotopic ossification prophylaxis.    Today, Mr. Enriquez reports that he is doing well since the time of surgery.  He notes that he has continued stiffness in the left hip but has poor sensation due to his spinal cord injury and does not have any significant pain.  No recent fevers or chills.  No history of connective tissue or autoimmune diseases.      PAST MEDICAL HISTORY:   Past Medical History:   Diagnosis Date     CAD (coronary artery disease)      Femur fracture, Right (H)      Neurogenic bladder      Neurogenic bowel      Orthostatic hypotension      Paraplegia (H)      Thoracic spinal cord injury (H)      PAST SURGICAL HISTORY:    Procedure Laterality Date     ARTHROTOMY HIP Left 9/19/2018    Procedure: ARTHROTOMY HIP;  Left Hip Heterotopic Bone Resection;  Surgeon: Toñito Kennedy MD;  Location: [facility] OR     CARDIAC SURGERY       LAMINECTOMY THORACIC THREE LEVELS N/A 3/9/2018    Procedure: LAMINECTOMY THORACIC THREE LEVELS;  Thoracic 9-12 Laminectomy Evacuation of Subdural Hematoma, C-Arm, Prone, Gel Rolls.;  Surgeon: Abhijeet Joe MD;  Location: [facility] OR     OPEN REDUCTION INTERNAL FIXATION RODDING INTRAMEDULLARY FEMUR Right 6/25/2018    Procedure: OPEN REDUCTION INTERNAL FIXATION RODDING INTRAMEDULLARY FEMUR;  Right Open Reduction Internal Fixation Subtrochanteric Femur Fracture;  Surgeon: Toñito Kennedy MD;  Location: [facility] OR     wisdom teeth extraction        CHEMOTHERAPY HISTORY: None     PAST RADIATION THERAPY HISTORY: None    PACEMAKER: None     MEDICATIONS:   Current Facility-Administered Medications   Medication     [START ON 9/22/2018] acetaminophen (TYLENOL) tablet 650 mg     acetaminophen (TYLENOL) tablet 975 mg     atorvastatin (LIPITOR) tablet 20 mg     benzocaine-menthol (CEPACOL) 15-3.6 MG lozenge 1-2 lozenge     bisacodyl (DULCOLAX) Suppository 10 mg     calcium carbonate (TUMS) chewable tablet 500 mg     docusate sodium (COLACE) capsule 100 mg     hydroxyzine (ATARAX) tablet 25 mg     lactated ringers infusion     lidocaine (LMX4) cream     lidocaine 1 % 1 mL     melatonin tablet 3 mg     metoclopramide (REGLAN) tablet 10 mg    Or     metoclopramide (REGLAN) injection 10 mg     multivitamin, therapeutic (THERA-VIT) tablet     naloxone (NARCAN) injection 0.1-0.4 mg     ondansetron (ZOFRAN-ODT) ODT tab 4 mg    Or  "    ondansetron (ZOFRAN) injection 4 mg     oxycodone IR (ROXICODONE) tablet 5-10 mg     pantoprazole (PROTONIX) EC tablet 40 mg     prochlorperazine (COMPAZINE) injection 10 mg    Or     prochlorperazine (COMPAZINE) tablet 10 mg     sodium chloride (PF) 0.9% PF flush 3 mL     sodium chloride (PF) 0.9% PF flush 3 mL     vitamin B complex with vitamin C (STRESS TAB) tablet 1 tablet     ALLERGIES:  has no known drug allergies.    SOCIAL HISTORY: Lives in [place]. Works as an .    Social History     Marital status: Single     Spouse name: N/A     Number of children: N/A     Years of education: N/A       Social History Main Topics     Smoking status: Former Smoker     Smokeless tobacco: Never Used      Comment: Quit at 31     Alcohol use Yes      Comment: socially     Drug use: No     Sexual activity: Not on file     FAMILY HISTORY: Arrhythmia in his father; Cardiac Sudden Death in his father; Myocardial Infarction in his brother    REVIEW OF SYMPTOMS:  A full 14-point review of systems was performed and reviewed in the medical record.     PHYSICAL EXAMINATION:    /57 (BP Location: Left arm)  Pulse 70  Temp 97.4  F (36.3  C) (Oral)  Resp 16  Ht 1.803 m (5' 10.98\")  Wt 68.9 kg (151 lb 14.4 oz)  SpO2 97%  BMI 21.19 kg/m2 , pain 0/10  General: Alert, oriented, lying on gurney  HEENT: Normocephalic, atraumatic  Neck: Full ROM, no adenopathy  Heart: Regular rhythm  Lungs: Breathing comfortably on RA  Abd: Nondistended  Ext: Atraumatic, strength not assessed. Surgical drains in place.   Neuro: CN II-XII grossly intact    ECOG: 3     ASSESSMENT AND PLAN: Mr. Enriquez is a 57 year old male with a history of heterotopic ossification of the bilateral hips and proximal femurs who is now approximately 52 hours s/p surgery to remove soft tissue heterotopic ossification around the left hip. We discussed the high likelihood of recurrence of heterotopic ossification without prophylactic therapy immediately " following surgery and that this can be accomplished either with indomethacin or radiation therapy.  The patient is not a candidate for indomethacin therapy given need for anticoagulation.  We therefore recommend prophylactic radiotherapy to the left hip and proximal femur with the goal of preventing heterotopic ossification in these areas.  We discussed that this will consist of a single fraction delivered today.  We emphasized the importance of completing this treatment within the first 72 hours following surgery.  We reviewed the logistics as well as expected acute and potential late side effects of this treatment including but not limited to fatigue, skin reaction, and secondary malignancy.  The patient had a number of questions which were answered so that he verbalized understanding.  Informed consent was obtained. He underwent a CT simulation in our department today and will be treated later today once his radiotherapy has been planned.     Please contact us if questions/concerns.     The patient was seen and discussed with staff, Dr. Urban.     Aaron Wilcox MD  Resident, PGY-4  Department of Radiation Oncology  [facility]    Mr. Enriquez was seen and examined by me. Note above by Dr. Wilcox was reviewed and edited by me and reflects our mutual findings and plan of care.  Sapphire Urban MD  Department of Radiation Oncology  [facility] [place]

## 2018-09-21 NOTE — MR AVS SNAPSHOT
After Visit Summary   9/21/2018    Derek Enriquez    MRN: [id number]           Patient Information     Date Of Birth          1960        Visit Information        Provider Department      9/21/2018 3:30 PM Sapphire Urban MD Radiation Oncology Clinic         Follow-ups after your visit        Your next 10 appointments already scheduled     Oct 04, 2018  3:15 PM CDT   Return Visit with Rosario Sánchez PA-C   [facility] [place] ([facility])    [address]  [address]  [facility] [address]   [phone]            Nov 01, 2018  7:00 AM CDT   (Arrive by 6:45 AM)   Urodynamics with Candy Leary PA-C   [facility] ([facility])    [address] [place] [address]  4th Floor  [facility] [id number] [phone]            Nov 07, 2018  2:00 PM CST   Ech Complete with [id number]   [facility] Echo ([facility])    [address] [place] [address]  3rd Floor  [facility] [id number] [phone]           1.  Please bring or wear a comfortable two-piece outfit. 2.  You may eat, drink and take your normal medicines. 3.  For any questions that cannot be answered, please contact the ordering physician 4.  Please do not wear perfumes or scented lotions on the day of your exam.            Nov 07, 2018  3:00 PM CST   Lab with  LAB   [facility] ([facility])    [address]  1st Floor  [facility] [id number] [phone]            Nov 07, 2018  3:30 PM CST   (Arrive by 3:15 PM)   RETURN LIPID VISIT with Anoop Jane MD   [facility] ([facility])    [address] [place] [address]  [address] [place] [id number] [phone]            Nov 13, 2018  4:30 PM CST   Telephone Call with Andrei Pelaez MD   [facility] ([facility]  [place])    [address] [facility]  4th [facility] [address] [phone]           Note: this is not an onsite visit; there is no need to come to the facility.  Thank you for choosing [facility] for your health care needs. Below is some information for patients who are interested in having their follow-up visit with a physician by telephone. In some cases, a telephone visit can be an effective and convenient way to manage your follow-up care. Choosing a telephone visit rather than a face to face visit for your follow-up care is a decision that you and your physician can make together to ensure it meets all of your needs.  A face to face visit is always an available option, if you choose to do so.  We want to make sure you have all of the information you need about the telephone visit option and answer all of your questions before you decide to schedule a telephone follow-up visit. If you have any questions, you may talk to a staff member or our financial counselor at [phone].  1. General overview Our clinic sees patients for a variety of conditions and concerns. A face to face visit with your doctor is required for any new concerns or for your initial visit. If you and your doctor decide that a follow up visit by telephone is appropriate, you may decide to opt for a telephone visit.   2. Billing and insurance coverage There is a charge for telephone visits, similar to the charge for an in-person visit. Your bill is based on the amount of time you and your physician are on the phone. We will bill each visit to your insurance company (just like your other medical visits), and you will be responsible for any costs not paid by your insurance company. The charge may be denied by your insurance company, in which case you will be responsible for the entire amount billed. The decision to cover the cost is determined by your insurance company. If you want to know what your insurance  company will cover, we encourage you to contact them to determine your coverage. The codes below are the codes we use when billing for telephone visits and the associated charges. This may help you work with your insurance company to determine your benefits.   Billing CPT codes for Telephone visits  50601 ($30), 20753 ($35), 91555 ($40)              Future tests that were ordered for you today     Open Future Orders        Priority Expected Expires Ordered    6 minute walk test Routine  9/21/2019 9/21/2018            Who to contact     Please call your clinic at [phone] to:    Ask questions about your health    Make or cancel appointments    Discuss your medicines    Learn about your test results    Speak to your doctor            Additional Information About Your Visit        [employer] Information     [employer]ala gives you secure access to your electronic health record. If you see a primary care provider, you can also send messages to your care team and make appointments. If you have questions, please call your primary care clinic.  If you do not have a primary care provider, please call [phone] and they will assist you.      Kudoala is an electronic gateway that provides easy, online access to your medical records. With [employer], you can request a clinic appointment, read your test results, renew a prescription or communicate with your care team.     To access your existing account, please contact your [facility] or call [phone] for assistance.        Care EveryWhere ID     This is your Care EveryWhere ID. This could be used by other organizations to access your [facility] medical records  [id number]        Your Vitals Were     BMI (Body Mass Index)                   21.07 kg/m2            Blood Pressure from Last 3 Encounters:   09/21/18 105/57   09/12/18 105/73   09/05/18 139/82    Weight from Last 3 Encounters:   09/19/18 68.9 kg (151 lb 14.4 oz)   09/21/18 68.5 kg (151  lb)   09/05/18 73.5 kg (162 lb 0.6 oz)              Today, you had the following     No orders found for display         Today's Medication Changes      Notice     This visit is during an admission. Changes to the med list made in this visit will be reflected in the After Visit Summary of the admission.             Primary Care Provider Office Phone # Fax #    Eugene Burrell [phone]       [facility] [address]  [facility] [address]        Equal Access to Services     BENY WILSON : Hadii aad ku hadasho Soomaali, waaxda luqadaha, qaybta kaalmada adeegyada, waxay idiin hayalisan johnson sales . So [facility] [phone].    ATENCIÓN: Si habla español, tiene a humphrey disposición servicios gratuitos de asistencia lingüística. [facility] [phone].    We comply with applicable federal civil rights laws and [place] laws. We do not discriminate on the basis of race, color, national origin, age, disability, sex, sexual orientation, or gender identity.            Thank you!     Thank you for choosing RADIATION ONCOLOGY CLINIC  for your care. Our goal is always to provide you with excellent care. Hearing back from our patients is one way we can continue to improve our services. Please take a few minutes to complete the written survey that you may receive in the mail after your visit with us. Thank you!             Your Updated Medication List - Protect others around you: Learn how to safely use, store and throw away your medicines at www.disposemymeds.org.      Notice     This visit is during an admission. Changes to the med list made in this visit will be reflected in the After Visit Summary of the admission.

## 2018-09-21 NOTE — PROGRESS NOTES
"[facility] Internal Medicine Progress Note            Interval History:   Record reviewed.  Seen with RN.  Decision to proceed with radiation treatment to prevent recurrence of HO.  Performed earlier today.  Tolerated well.  Lower abdominal stiffness as before.   No CP, SOB, cough, nausea, reflux.  Last BM this AM.  Off indomethacin.           Medications:   All medications reviewed today          Physical Exam:   Blood pressure 108/51, pulse 70, temperature 97.6  F (36.4  C), temperature source Oral, resp. rate 16, height 1.803 m (5' 10.98\"), weight 68.9 kg (151 lb 14.4 oz), SpO2 98 %.    Intake/Output Summary (Last 24 hours) at 09/21/18 1724  Last data filed at 09/21/18 0930   Gross per 24 hour   Intake              450 ml   Output             1555 ml   Net            -1105 ml       General:  Alert.  Appropriate.  No distress.  No  O2.     Heent:      Neck:    Skin:    Chest:  clear    Cardiac:  Reg without gallop, murmur.  No JVD.     Abdomen:  Not examined as patient prone.     Extremities:       Neuro:            Data:     Results for orders placed or performed during the hospital encounter of 09/19/18 (from the past 24 hour(s))   Hemoglobin   Result Value Ref Range    Hemoglobin 10.2 (L) 13.3 - 17.7 g/dL   Basic metabolic panel   Result Value Ref Range    Sodium 143 133 - 144 mmol/L    Potassium 3.9 3.4 - 5.3 mmol/L    Chloride 107 94 - 109 mmol/L    Carbon Dioxide 30 20 - 32 mmol/L    Anion Gap 6 3 - 14 mmol/L    Glucose 97 70 - 99 mg/dL    Urea Nitrogen 17 7 - 30 mg/dL    Creatinine 0.72 0.66 - 1.25 mg/dL    GFR Estimate >90 >60 mL/min/1.7m2    GFR Estimate If Black >90 >60 mL/min/1.7m2    Calcium 8.2 (L) 8.5 - 10.1 mg/dL                Assessment and Plan:   1)  S/p resection L hip heterotopic ossification.  Clinically doing well.  No pain.   Radiation therapy treatment as above.   2)  Paraplegia 2/2 spinal subdural hematoma s/p T9-T12 decompressive laminectomy (3/2018).  Developed on POD#1 from MALI " and IABP placement following cardiac arrest on 3/8.  3)  Autonomic dysreflexia.  Stable hemodynamics.   4)  CAD s/p MALI x 1 to LAD (3/2018).  Off ASA and plavix.  No symptoms of ischemia.   5)  Acute blood loss anemia.  Adequate.   6)  HLD on statin.   7)  Neurogenic bowel/bladder  8)  Recurrent UTIs.  9)  Sacral, decubitus ulcers (present before admission).    PLAN:  1)  Ortho note reviewed.  Plan to resume plavix and presumed ASA after drains removed.  2)  AM Hgb.  3)  Monitor clinically.  Anticipate discontinue home 9/22.  Disposition Plan   Expected discharge in 1 days to prior living arrangement.     Entered: Yoshi Jones 09/21/2018, 5:24 PM              Attestation:  I have reviewed today's vital signs, notes, medications, labs and imaging.     Yoshi Jones MD

## 2018-09-21 NOTE — PROGRESS NOTES
Patient comfortable  Tolerating CPM and prone lying well    O- Aferbrile VSS  Drains with 135 ml output yesterday    Dressings with small area of seepage anterior as well as lateral  Path report for lateral mass not available    A- stable  P- after lengthy discussion patient agrees to XRT and discontinuing Indomethacin  Will hold Plavix until drains out  Anticipate discharge Saturday with drains in (unless < 10ml over a 8' period Friday night) and follow up with me at [facility] next Thursday for Drain removal and wound check    Hopefully XRT today, 700 grey centered over femur at level of lesser trochanter

## 2018-09-21 NOTE — PLAN OF CARE
Problem: Patient Care Overview  Goal: Plan of Care/Patient Progress Review  VS:      VSS baseline low BPs   Output:      Straight caths q4-6hrs. BM 9/20   Activity:     Minimal assist to reposition   Skin:    Incisions, wounds, scars.   Pain:        Denies pain.    CMS:        CMS and Neuro's are intact to baseline. Pt is paraplegic and has absence of sensation from belly button/mid back down.        Dressing:      CDI   Diet:        Regular   LDA:        No PIV   Equipment:        PCD's on BLE's. Bilateral heels are elevated off the bed. Pt is on a pulsate mattress.    Plan:        TBD       Additional Info:

## 2018-09-21 NOTE — PLAN OF CARE
Problem: Surgery Nonspecified (Adult)  Goal: Signs and Symptoms of Listed Potential Problems Will be Absent, Minimized or Managed (Surgery Nonspecified)  Signs and symptoms of listed potential problems will be absent, minimized or managed by discharge/transition of care (reference Surgery Nonspecified (Adult) CPG).           VS:       Pt A/O X 4. Afebrile. VSS. Lungs-clear bilaterally with both       anterior and posterior. IS encouraged. Denies nausea, shortness of breath, and chest pain.     Output:       Bowels- active in all four quadrants. Pt had BM on commode today after dig stim. Pt voids via straight cath every 4 hours. Was straight cath'd at 2045 for 380 mL, writer also noticed urine leakage on gown and bed at that time. Linens changed and new gown applied. Pt also given bed bath.       Activity:       Pt turns/repostions Q2 hours with assist of 1. Pt turned prone this evening for 15 minutes.     Skin:   Incisions, wounds, scars.     Pain:       Pt had negligible pain and declined pain interventions this shift.      CMS:       CMS and Neuro's are intact to baseline. Pt is paraplegic and has absence of sensation from belly button/mid back down.       Dressing:       Left hip/thigh incisional dressings are intact. Hemovac drains are intact and patent and had output of 15 mL and 20 mL this shift. Drain insertion sites had a large amount of drainage and were leaking outside of dressing, instructed by Ortho MD to change and dressings were changed this shift.      Diet:       Pt is on a regular diet and appetite was good this shift.       LDA:       PIV infiltrated and was removed.       Equipment:       PCD's on BLE's. Bilateral heels are elevated off the bed. Pt is on a pulsate mattress.      Plan:       Pt is able to make needs known and the call light is within the pt's reach. Continue to monitor.       Additional Info:       Indomethacin discontinued this shift. Pt was under the impression after talking with  Hospitalist today that Indomethacin would be given for 2 weeks, and then at that point he would resume taking ASA and Plavix. Ortho on call updated by phone, unable to get answer as to why Indomethacin was discontinued and instructed writer to have primary team resolve issue tomorrow. Pt updated, and stated he is upset that meds were changed without him being consulted.

## 2018-09-21 NOTE — PLAN OF CARE
Problem: Patient Care Overview  Goal: Plan of Care/Patient Progress Review  PT: cx pt session today pt at [facility] for PM.

## 2018-09-21 NOTE — PROGRESS NOTES
"Orthopaedic Surgery Progress Note   September 21, 2018    Subjective: No acute events overnight. Frustration regarding conversations regarding Plavix, ASA and indomethicin, as well as XRT. Discussed with Dr. Kennedy this AM and willing to pursue XRT.     Objective: /57 (BP Location: Left arm)  Pulse 70  Temp 97.4  F (36.3  C) (Oral)  Resp 16  Ht 1.803 m (5' 10.98\")  Wt 68.9 kg (151 lb 14.4 oz)  SpO2 97%  BMI 21.19 kg/m2    Drain:   #1: 15 - 20 cc  #2: 20- 20 cc    General: NAD, alert and oriented, cooperative with exam.   Cardio: RRR, extremities wwp.   Respiratory: Non-labored breathing.  MSK: Focused examination of LLE reveals small area of strikethrough on posterior dressing which is stable. Anterior dressing with small area of new strikethrough but otherwise c/d/i. Anterior dressing c/d/i. Drains with serosanguinous output.     Labs:   Hgb 10.2  Surgical pathology in process.     Assessment and Plan: Derek Enriquez is a 57 year old male with PMH including thoracic SCI w/ resultant paraplegia, neurogenic bowel and bladder, CAD and history of femur fracture s/p IMN with post-operative development of HO, now s/p L hip HO excision on 9/19 with Dr. Kennedy.    Orthopaedic Surgery Primary  HO prophylaxis: Given concern surrounding indomethicin with ASA and Plavix, will pursue XRT therapy.   Activity: As tolerated. Up to chair PRN. L hip ROM as tolerated. CPM L hip ROM 0-90 degrees while inpatient.   Pain management: Transition from IV to PO as tolerated.    Antibiotics: Ancef x 24 hours - completed.  Diet: Begin with clear fluids and progress diet as tolerated.   Chemical prophylaxis: Will hold Plavix until drains discontinued.   Imaging: No further imaging needed at this time.  Labs: Hgb POD#1-3. Monitor pathology.  Bracing/Splinting: None.   Dressings: Keep clean, dry and intact x 7 days - change if become saturated.   Drains: Document output per shift, discontinue when <15cc/shift - these are " kimberly in. Okay for patient to discharge with drains.   Consults: IM.  Follow-up: Clinic with Dr. Kennedy in 1 week.   Disposition: Pending XRT - could discharge to home tomorrow.     Rose Mary Kolb MD  Orthopaedic Surgery Resident, PGY-4  Pager: [phone]    For questions about this patient during the day, please attempt to contact me at my pager ([phone]) prior to contacting the Orthopaedic Surgery resident on call. Thank you!

## 2018-09-21 NOTE — MR AVS SNAPSHOT
After Visit Summary   9/21/2018    Derek Enriquez    MRN: [id number]           Patient Information     Date Of Birth          1960        Visit Information        Provider Department      9/21/2018 12:30 PM Sapphire Urban MD [facility]        Today's Diagnoses     Other calcification of muscle, left thigh    -  1       Follow-ups after your visit        Your next 10 appointments already scheduled     Oct 04, 2018  3:15 PM CDT   Return Visit with Rosario Sánchez PA-C   [facility]    [address]  [address]  [facility] [address]   [phone]            Nov 01, 2018  7:00 AM CDT   (Arrive by 6:45 AM)   Urodynamics with Candy Leary PA-C   [facility] ([facility])    [address] [place] [address]  4th Floor  [facility] [id number] [phone]            Nov 07, 2018  2:00 PM CST   Ech Complete with [email or web address]   [facility] ([facility])    [address] [place] [address]  3rd Floor  [facility] [id number] [phone]           1.  Please bring or wear a comfortable two-piece outfit. 2.  You may eat, drink and take your normal medicines. 3.  For any questions that cannot be answered, please contact the ordering physician 4.  Please do not wear perfumes or scented lotions on the day of your exam.            Nov 07, 2018  3:00 PM CST   Lab with  LAB   [facility] ([facility])    [address] [place] [address]  1st Floor  [facility] [id number] [phone]            Nov 07, 2018  3:30 PM CST   (Arrive by 3:15 PM)   RETURN LIPID VISIT with Anoop Jane MD   [facility] ([facility])    [address] [place] [address]  [address] [place] [id number] [phone]            Nov 13, 2018  4:30 PM CST   Telephone Call with Andrei Pelaez MD   [facility]rologic [facility] ([facility])    [address] [facility]  4th Floor  [facility] [address] [phone]           Note: this is not an onsite visit; there is no need to come to the facility.  Thank you for choosing [facility] for your health care needs. Below is some information for patients who are interested in having their follow-up visit with a physician by telephone. In some cases, a telephone visit can be an effective and convenient way to manage your follow-up care. Choosing a telephone visit rather than a face to face visit for your follow-up care is a decision that you and your physician can make together to ensure it meets all of your needs.  A face to face visit is always an available option, if you choose to do so.  We want to make sure you have all of the information you need about the telephone visit option and answer all of your questions before you decide to schedule a telephone follow-up visit. If you have any questions, you may talk to a staff member or our financial counselor at [phone].  1. General overview Our clinic sees patients for a variety of conditions and concerns. A face to face visit with your doctor is required for any new concerns or for your initial visit. If you and your doctor decide that a follow up visit by telephone is appropriate, you may decide to opt for a telephone visit.   2. Billing and insurance coverage There is a charge for telephone visits, similar to the charge for an in-person visit. Your bill is based on the amount of time you and your physician are on the phone. We will bill each visit to your insurance company (just like your other medical visits), and you will be responsible for any costs not paid by your insurance company. The charge may be denied by your insurance company, in which case you will be responsible for the entire amount billed. The decision to cover the cost  is determined by your insurance company. If you want to know what your insurance company will cover, we encourage you to contact them to determine your coverage. The codes below are the codes we use when billing for telephone visits and the associated charges. This may help you work with your insurance company to determine your benefits.   Billing CPT codes for Telephone visits  00658 ($30), 46315 ($35), 26481 ($40)              Future tests that were ordered for you today     Open Future Orders        Priority Expected Expires Ordered    6 minute walk test Routine  9/21/2019 9/21/2018            Who to contact     Please call your clinic at [phone] to:    Ask questions about your health    Make or cancel appointments    Discuss your medicines    Learn about your test results    Speak to your doctor            Additional Information About Your Visit        streamit Information     streamit gives you secure access to your electronic health record. If you see a primary care provider, you can also send messages to your care team and make appointments. If you have questions, please call your primary care clinic.  If you do not have a primary care provider, please call [phone] and they will assist you.      streamit is an electronic gateway that provides easy, online access to your medical records. With streamit, you can request a clinic appointment, read your test results, renew a prescription or communicate with your care team.     To access your existing account, please contact your [facility] or call [phone] for assistance.        Care EveryWhere ID     This is your Care EveryWhere ID. This could be used by other organizations to access your [facility] medical records  [id number]         Blood Pressure from Last 3 Encounters:   09/21/18 105/57   09/12/18 105/73   09/05/18 139/82    Weight from Last 3 Encounters:   09/19/18 68.9 kg (151 lb 14.4 oz)   09/05/18 73.5 kg (162 lb  0.6 oz)   08/06/18 73.5 kg (162 lb)              Today, you had the following     No orders found for display         Today's Medication Changes      Notice     This visit is during an admission. Changes to the med list made in this visit will be reflected in the After Visit Summary of the admission.             Primary Care Provider Office Phone # Fax #    Eugene Burrell [phone]       [facility] [address]  [facility] [address]        Equal Access to Services     [facility] Center[facility] : Hadii aad ku hadasho Soomaali, waaxda luqadaha, qaybta kaalmada adeegyada, waxay idiin hayalisan johnson sales . So [facility] [phone].    ATENCIÓN: Si habla español, tiene a humphrey disposición servicios gratuitos de asistencia lingüística. LlThe [facility] [phone].    We comply with applicable federal civil rights laws and [place] laws. We do not discriminate on the basis of race, color, national origin, age, disability, sex, sexual orientation, or gender identity.            Thank you!     Thank you for choosing [facility]  for your care. Our goal is always to provide you with excellent care. Hearing back from our patients is one way we can continue to improve our services. Please take a few minutes to complete the written survey that you may receive in the mail after your visit with us. Thank you!             Your Updated Medication List - Protect others around you: Learn how to safely use, store and throw away your medicines at www.disposemymeds.org.      Notice     This visit is during an admission. Changes to the med list made in this visit will be reflected in the After Visit Summary of the admission.

## 2018-09-22 ENCOUNTER — APPOINTMENT (OUTPATIENT)
Dept: PHYSICAL THERAPY | Facility: CLINIC | Age: 58
DRG: 464 | End: 2018-09-22
Attending: SPECIALIST

## 2018-09-22 VITALS
HEART RATE: 70 BPM | DIASTOLIC BLOOD PRESSURE: 51 MMHG | RESPIRATION RATE: 16 BRPM | TEMPERATURE: 96.8 F | HEIGHT: 71 IN | BODY MASS INDEX: 21.27 KG/M2 | OXYGEN SATURATION: 100 % | SYSTOLIC BLOOD PRESSURE: 108 MMHG | WEIGHT: 151.9 LBS

## 2018-09-22 LAB — HGB BLD-MCNC: 11.1 G/DL (ref 13.3–17.7)

## 2018-09-22 PROCEDURE — 25000132 ZZH RX MED GY IP 250 OP 250 PS 637: Performed by: ORTHOPAEDIC SURGERY

## 2018-09-22 PROCEDURE — 40000193 ZZH STATISTIC PT WARD VISIT

## 2018-09-22 PROCEDURE — 25000132 ZZH RX MED GY IP 250 OP 250 PS 637: Performed by: INTERNAL MEDICINE

## 2018-09-22 PROCEDURE — 85018 HEMOGLOBIN: CPT | Performed by: INTERNAL MEDICINE

## 2018-09-22 PROCEDURE — 36415 COLL VENOUS BLD VENIPUNCTURE: CPT | Performed by: INTERNAL MEDICINE

## 2018-09-22 PROCEDURE — 97530 THERAPEUTIC ACTIVITIES: CPT | Mod: GP

## 2018-09-22 PROCEDURE — 25000132 ZZH RX MED GY IP 250 OP 250 PS 637: Performed by: NURSE PRACTITIONER

## 2018-09-22 PROCEDURE — 99232 SBSQ HOSP IP/OBS MODERATE 35: CPT | Performed by: INTERNAL MEDICINE

## 2018-09-22 PROCEDURE — 97110 THERAPEUTIC EXERCISES: CPT | Mod: GP

## 2018-09-22 RX ADMIN — B-COMPLEX W/ C & FOLIC ACID TAB 1 TABLET: TAB at 08:43

## 2018-09-22 RX ADMIN — PANTOPRAZOLE SODIUM 40 MG: 40 TABLET, DELAYED RELEASE ORAL at 08:44

## 2018-09-22 RX ADMIN — DOCUSATE SODIUM 100 MG: 100 CAPSULE, LIQUID FILLED ORAL at 08:43

## 2018-09-22 RX ADMIN — THERA TABS 1 TABLET: TAB at 08:43

## 2018-09-22 ASSESSMENT — ACTIVITIES OF DAILY LIVING (ADL)
ADLS_ACUITY_SCORE: 20

## 2018-09-22 NOTE — PLAN OF CARE
Problem: Patient Care Overview  Goal: Individualization & Mutuality  Pt A&O x's 4. VSS. Afebrile. 02 sats in the 90s on RA. Lungs clear. Denies SOB, CP or nausea. Tolerating regular diet. Bowel sound active in all quadrants. No BM during the shift  but passing gas. Pt straight cath every 4 hours. Pain well managed,  Pt declined pain med when offered. CMS intact per baseline, pt is paraplegic, Left hip and  coccyx dressing clean, dry and intact. Pt turned and repositioined per care plan..Pt slept between care and is able to make needs known, call light with in reach. Will continue to monitor.

## 2018-09-22 NOTE — DISCHARGE SUMMARY
ORTHOPAEDIC SURGERY DISCHARGE SUMMARY     Date of Admission: 9/19/2018  Date of Discharge: 9/22/2018 12:42 PM  Disposition: Home  Staff Physician: Toñito Kennedy*  Primary Care Provider: Eugene Burrell    DISCHARGE DIAGNOSIS: Heterotopic Ossification of Hip Left    PROCEDURES: Left hip HO excision on 9/19    BRIEF HISTORY:  Derek Enriquez is a 57 year old male with history of paraplegia with heterotopic ossification surrounding left hip joint that was limiting his range of motion and consequently, his ability to perform transfers and work with therapy with an exoskeleton. After discussion of the risks, benefits and alternatives, he elected to undergo the aforementioned procedure.     HOSPITAL COURSE:    The patient was admitted following the above listed procedures for wound monitoring and rehabilitation. Derek Enriquez did well post-operatively. Medicine was consulted post operatively to aid in management of medical co-morbidities. The patient received routine nursing cares and at the time of discharge was medically stable. Vital signs were stable throughout admission. The patient is tolerating a regular diet and is voiding via straight catheterization. All PT/OT goals have been met for safe mobility. The patient underwent XRT for heterotopic ossification prophylaxis on post-operative day 2. Derek Enriquez is deemed medically safe to discharge.     Antibiotics:  Ancef given periop and 24 hours postop.   DVT prophylaxis:  SCDs.   PT Progress:  Has met PT/OT goals for safe mobility.   Inpatient Events: No significant events or complications.     PHYSICAL EXAM:    General: NAD, alert and oriented, cooperative with exam.   Cardio: RRR, extremities wwp.   Respiratory: Non-labored breathing.  MSK: Focused examination of LLE reveals small area of strikethrough on posterior dressing which is stable. Anterior dressing with small area of new strikethrough but otherwise c/d/i. Anterior dressing c/d/i. Drains with  serosanguinous output.     FOLLOWUP:    Follow up with Dr. Kennedy at 1 week postoperatively.    Orthopaedic Surgery appointments are at the [facility] ([address] [place] [address]). Call [phone] to schedule a follow-up appointment at this location with your provider.     PLANNED DISCHARGE ORDERS:      Discharge Medication List as of 9/22/2018 11:35 AM      CONTINUE these medications which have NOT CHANGED    Details   ascorbic acid (VITAMIN C) 500 MG tablet Take 500 mg by mouth 2 times daily, Historical      ASPIRIN ADULT LOW STRENGTH PO Take 81 mg by mouth daily, Historical      atorvastatin (LIPITOR) 20 MG tablet Take 1 tablet (20 mg) by mouth At Bedtime, Disp-90 tablet, R-1, E-Prescribe      clopidogrel (PLAVIX) 75 MG tablet Take 1 tablet (75 mg) by mouth daily, Disp-90 tablet, R-1, E-Prescribe      etidronate (DIDRONEL) 200 MG tablet 2 times daily, Historical      Multiple Vitamins-Minerals (MULTIVITAMIN MEN PO) Take 1 tablet by mouth daily , Historical      order for DME Equipment being ordered: [employer] Phone [phone] Fax [phone]    Primary Dressing Iodosorb Gel   Qty not needed  Secondary Dressing  tegaderm 1adhesive foam dressing 3x3 Qty 30  Secondary Dressing 2x2 gauze Qty 1 loaf  Secondary Dressi ng 2' tape Qty 1 roll  Length of Need: 1 month  Frequency of dressing change: dailyDisp-30 days, R-0, Local Print      phenylephrine-shark liver oil-mineral oil-petrolatum (PREPARATION H) 0.25-14-74.9 % rectal ointment Place rectally daily as neededHistorical      vitamin B complex with vitamin C (VITAMIN  B COMPLEX) TABS tablet Take 1 tablet by mouth daily, Historical               Discharge Procedure Orders  Reason for your hospital stay   Order Comments: You were hospitalized after surgery for rehabilitation and wound monitoring.     Activity   Order Comments: Activity as tolerated. No hip range of motion restrictions.   Order Specific Question  Answer Comments   Is discharge order? Yes      Monitor and record   Order Comments: Drain output.     Tubes and drains   Order Comments: You are going home with the following tubes or drains: hemovac.  Tube cares per hospital or home care instructions. Keep drain sites clean and dry. Please record daily output of drains.     Adult [facility] Follow-up and recommended labs and tests   Order Comments: You have an appointment with Dr. Kennedy on 9/28 at [facility].     Discharge Instructions   Order Comments: -You may resume your prior to admission Plavix after the drains have been discontinued.     -Wound Care: If you have a brown/tan rubber-like dressing (called Aquacel) applied to your surgical site, you may remove the dressing 1 week after surgery. You may replace this with gauze and tape. If you would like to keep covered, change the bandages every other day and keep wound clean and dry. If the incision is dry, you do not need to keep covered after 1 week. Showering is not allowed until the drains are removed. No scrubbing or submerging your incision in standing water such as a bath x 4 weeks. Do not apply creams or lotions to your wound.     -CALL OUR CLINIC ([phone] during regular office hours, or [phone] for the on-call resident MD for questions - RESIDENT DOES NOT HAVE ABILITY TO PRESCRIBE NARCOTICS) IF: Pain in your surgical area persists or worsens in the first few days after surgery. Excessive redness or drainage of cloudy or bloody material from the wounds (clear red tinted fluid and some mild drainage should be expected). Drainage of any kind > one week after surgery should be reported to the doctor. You have a temperature elevation greater than 101.5  You have pain, swelling or redness in your calf. You have numbness or weakness in your leg or foot.    -Call your primary doctor or seek medical care if you have any of the following: temperature greater than 101.4, chest pain, increased shortness of  breath, nausea or vomiting.     Discharge Instructions   Order Comments: Resume aspirin and plavix when drain removed and Ok with Dr. Kennedy  Check with him regarding didronel.  Keep cardiology follow up.  Call primary MD to discuss follow up.     Diet   Order Comments: Follow this diet upon discharge: Orders Placed This Encounter     Regular Diet Adult   Order Specific Question Answer Comments   Is discharge order? Yes      Assign Questionnaire Series to Patient         Rose Mary Kolb MD  Orthopaedic Surgery Resident, PGY-4  Pager: [phone]

## 2018-09-22 NOTE — PLAN OF CARE
Problem: Patient Care Overview  Goal: Plan of Care/Patient Progress Review  Discharge Planner PT   Patient plan for discharge: Home with assist from son  Current status: Pt to be discharged to home with son today - pt has shown progress with therapy and continues to progress towards baseline. Currently not at baseline due to limitations in hip ROM but able to complete bed mobility and pivot transfers with CGA. Son will be home to provide assist as needed including continuing PROM program. PT provided patient with UE and core strengthening HEP.   Barriers to return to prior living situation: none.   Recommendations for discharge: Home with assist from son  Rationale for recommendations: Patient exhibits safety with bed mobility and transfers - will have assist as needed. PT recommending continued PROM of bilateral LEs to ensure ROM does not regress.        Entered by: Leonora Barrera 09/22/2018 10:23 AM

## 2018-09-22 NOTE — PROGRESS NOTES
"Orthopaedic Surgery Progress Note   September 22, 2018    Subjective: No acute events overnight. Tolerated XRT yesterday. Tolerating diet. Voiding with straight cath.     Objective: /54 (BP Location: Left arm)  Pulse 70  Temp 98.1  F (36.7  C) (Oral)  Resp 16  Ht 1.803 m (5' 10.98\")  Wt 68.9 kg (151 lb 14.4 oz)  SpO2 99%  BMI 21.19 kg/m2    Drain:   #1: 20 - 40 cc  #2: 20 - 5 cc    General: NAD, alert and oriented, cooperative with exam.   Cardio: RRR, extremities wwp.   Respiratory: Non-labored breathing.  MSK: Focused examination of LLE reveals small area of strikethrough on posterior dressing which is stable. Anterior dressing with small area of new strikethrough but otherwise c/d/i. Anterior dressing c/d/i. Drains with serosanguinous output.     Labs:   Hgb 10.2  Surgical pathology in process.     Assessment and Plan: Derek Enriquez is a 57 year old male with PMH including thoracic SCI w/ resultant paraplegia, neurogenic bowel and bladder, CAD and history of femur fracture s/p IMN with post-operative development of HO, now s/p L hip HO excision on 9/19 with Dr. Kennedy.    Orthopaedic Surgery Primary  HO prophylaxis: XRT therapy yesterday. Appreciate, Radiation Oncology assistance.   Activity: As tolerated. Up to chair PRN. L hip ROM as tolerated. CPM L hip ROM 0-90 degrees while inpatient.   Pain management: Transition from IV to PO as tolerated.    Antibiotics: Ancef x 24 hours - completed.  Diet: Begin with clear fluids and progress diet as tolerated.   Chemical prophylaxis: Will hold Plavix until drains discontinued.   Imaging: No further imaging needed at this time.  Labs: Hgb POD#1-3. Monitor pathology.  Bracing/Splinting: None.   Dressings: Keep clean, dry and intact x 7 days - change if become saturated.   Drains: Document output per shift, discontinue when <15cc/shift - these are sewn in. Okay for patient to discharge with drains.   Consults: IM.  Follow-up: Clinic with Dr. Kennedy " in 1 week.   Disposition: Discharge home today.     Rose Mary Kolb MD  Orthopaedic Surgery Resident, PGY-4  Pager: [phone]    For questions about this patient during the day, please attempt to contact me at my pager ([phone]) prior to contacting the Orthopaedic Surgery resident on call. Thank you!

## 2018-09-22 NOTE — PLAN OF CARE
Problem: Patient Care Overview  Goal: Plan of Care/Patient Progress Review  Physical Therapy Discharge Summary    Reason for therapy discharge:    Discharged to home.    Progress towards therapy goal(s). See goals on Care Plan in [facility] electronic [facility] record for goal details.  Goals partially met.  Barriers to achieving goals:   discharge from facility.    Therapy recommendation(s):    Continued therapy is recommended.  Rationale/Recommendations:  strength, endurance, mobility.  Continue home exercise program.

## 2018-09-22 NOTE — PROGRESS NOTES
"[facility] Internal Medicine Progress Note            Interval History:   Record reviewed.  Seen earlier with RN.  Ready for discharge home. 1 drain removed.  Has follow up with  9/27 for 2nd drain removal with anricipated resumption of ASA and plavix if ortho concurs.  Clinically doing well.  No pain issues.  No CP, SOB, cough, nausea, reflux.  BM 9/21 with anticipated BM today. Doing IC.           Medications:   All medications reviewed today          Physical Exam:   Blood pressure 108/51, pulse 70, temperature 96.8  F (36  C), temperature source Oral, resp. rate 16, height 1.803 m (5' 10.98\"), weight 68.9 kg (151 lb 14.4 oz), SpO2 100 %.    Intake/Output Summary (Last 24 hours) at 09/21/18 1724  Last data filed at 09/21/18 0930   Gross per 24 hour   Intake              450 ml   Output             1555 ml   Net            -1105 ml       General:  Alert.  Appropriate.  No distress.  No  O2.     Heent:      Neck:    Skin:    Chest:  clear    Cardiac:  Reg without gallop, murmur.  No JVD.     Abdomen:  Benign.     Extremities:   No edema, areas of induration.     Neuro:            Data:     Results for orders placed or performed during the hospital encounter of 09/19/18 (from the past 24 hour(s))   Hemoglobin   Result Value Ref Range    Hemoglobin 11.1 (L) 13.3 - 17.7 g/dL     Hemoglobin   Date Value Ref Range Status   09/22/2018 11.1 (L) 13.3 - 17.7 g/dL Final   09/21/2018 10.2 (L) 13.3 - 17.7 g/dL Final                  Assessment and Plan:   1)  S/p resection L hip heterotopic ossification.  Clinically doing well.  No pain.   S/P [rophylactic Radiation therapy treatment.  2)  Paraplegia 2/2 spinal subdural hematoma s/p T9-T12 decompressive laminectomy (3/2018).  Developed on POD#1 from MALI and IABP placement following cardiac arrest on 3/8.  3)  Autonomic dysreflexia.  Stable hemodynamics.   4)  CAD s/p MALI x 1 to LAD (3/2018).  Off ASA and plavix.  No symptoms of ischemia.   5)  Acute blood " loss anemia.  Adequate.   6)  HLD on statin.   7)  Neurogenic bowel/bladder  8)  Recurrent UTIs.  9)  Sacral, decubitus ulcers (present before admission).    PLAN:  1)  Medically stable for discontinue home.  2)  Meds, orders reviewed.  Resume plavix and ASA when OK with ortho.  Keep cardiology follow up.  Contact PMD for follow up.   Disposition Plan   Expected discharge today.     Entered: Yoshi Jones 09/22/2018, 4:01 PM              Attestation:  I have reviewed today's vital signs, notes, medications, labs and imaging.     Yoshi Jones MD

## 2018-09-22 NOTE — PLAN OF CARE
Problem: Patient Care Overview  Goal: Plan of Care/Patient Progress Review  Outcome: Adequate for Discharge Date Met: 09/22/18  Patient adequate for discharge.  Reviewed discharge instructions including when to seek medical attention. Reviewed medications. Questions encouraged and answered.  Helped patient transfer to wheelchair and patient wheeled self to lobby to meet son.

## 2018-09-23 ENCOUNTER — PATIENT OUTREACH (OUTPATIENT)
Dept: CARE COORDINATION | Facility: CLINIC | Age: 58
End: 2018-09-23

## 2018-09-24 PROBLEM — M61.452: Status: ACTIVE | Noted: 2018-09-24

## 2018-09-24 LAB — COPATH REPORT: NORMAL

## 2018-09-24 NOTE — PROGRESS NOTES
[facility] PHYSICIANS  SPECIALIZING IN BREAKTHROUGHS  Radiation Oncology    On Treatment Visit Note      Derek Enriquez      Date: 2018   MRN: [id number]   : 1960  Diagnosis: Heterotopic Ossification    Treatment Summary to Date   Left Hip Current Dose: 700/700 cGy Fractions:       Subjective: Mr. Enriquez received one fraction of radiation to the left hip for prophylaxis of heterotopic ossification. He tolerated treatment well.    Nursing ROS:   Nutrition Alteration  Diet Type: Patient's Preference    Gastrointestinal  Nausea: 0 - None    Pain Assessment  0-10 Pain Scale: 0    Objective:   Wt 68.5 kg (151 lb)  BMI 21.07 kg/m2  Gen: Appears well, NAD  Skin: No erythema    Assessment:    Tolerating radiation therapy well.  All questions and concerns addressed.    Treatment-related toxicities (CTCAE v4.0): None    Plan:   1. Follow up with Orthopedic Surgery  2. Return to clinic prn    Mosaiq chart and setup information reviewed  Port images reviewed    Medication Review  Med list reviewed with patient?: Yes    Educational Topic Discussed  Education Instructions: reviewed    Sapphire Urban MD  Department of Radiation Oncology  [facility]

## 2018-09-24 NOTE — PROGRESS NOTES
[facility]: Post-Discharge Note  SITUATION                                                      Admission:    Admission Date: 09/19/18   Reason for Admission: Heterotopic Ossification of Hip Left  Discharge:   Discharge Date: 09/22/18  Discharge Diagnosis: Heterotopic Ossification of Hip Left  Discharge Service: Orthopedics    BACKGROUND                                                      Derek Enriquez is a 57 year old male with history of paraplegia with heterotopic ossification surrounding left hip joint that was limiting his range of motion and consequently, his ability to perform transfers and work with therapy with an exoskeleton. After discussion of the risks, benefits and alternatives, he elected to undergo the aforementioned procedure.     ASSESSMENT      Discharge Assessment  Patient reports symptoms are: Improved  Does the patient have all of their medications?: Yes  Does patient know what their new medications are for?: Yes  Does paient have a follow-up appointment scheduled?: Yes  Does patient have any other questions or concerns?: No    Post-op  Did the patient have surgery or a procedure: Yes    PLAN                                                      Outpatient Plan:      Resume aspirin and plavix when drain removed and Ok with Dr. Kennedy  Check with him regarding didronel.  Keep cardiology follow up.  Call primary MD to discuss follow up.    Future Appointments  Date Time Provider Department Center   10/4/2018 3:15 PM Rosario Sánchez PA-C [facility]   11/1/2018 7:00 AM Candy Leary PA-C [facility]   11/7/2018 2:00 PM [email or web address]   11/7/2018 3:00 PM  LAB [facility]   11/7/2018 3:30 PM Anoop Jane MD [facility]   11/13/2018 4:30 PM Andrei Pelaez MD [facility]           Emy Alford, [facility]

## 2018-09-25 NOTE — TELEPHONE ENCOUNTER
Mai Vila NP in regards to her previous inquiry pertaining to the Pt's Plavix and ASA 81.  Cadence returned page and noted that her attending was able to speak with Dr Jane at the time of admission and a plan was set.  No further inquires noted.    Marilin Ramirez LPN

## 2018-09-27 ENCOUNTER — ONCOLOGY VISIT (OUTPATIENT)
Dept: RADIATION ONCOLOGY | Facility: CLINIC | Age: 58
End: 2018-09-27

## 2018-09-27 NOTE — MR AVS SNAPSHOT
After Visit Summary   9/27/2018    Derek Enriquez    MRN: [id number]           Patient Information     Date Of Birth          1960        Visit Information        Provider Department      9/27/2018 10:00 PM Sapphire Urban MD Radiation Oncology Clinic         Follow-ups after your visit        Your next 10 appointments already scheduled     Oct 04, 2018  3:15 PM CDT   Return Visit with Rosario Sánchez PA-C   [facility] ([facility])    [address]  [address]  [facility] [address]   [phone]            Nov 01, 2018  7:00 AM CDT   (Arrive by 6:45 AM)   Urodynamics with Candy Leary PA-C   [facility] ([facility])    [address] [place] [address]  4th Floor  [facility] [id number] [phone]            Nov 07, 2018  2:00 PM CST   Ech Complete with UCECHCR4   [facility] ([facility])    [address] [place] [address]  3rd Floor  [facility] [id number] [phone]           1.  Please bring or wear a comfortable two-piece outfit. 2.  You may eat, drink and take your normal medicines. 3.  For any questions that cannot be answered, please contact the ordering physician 4.  Please do not wear perfumes or scented lotions on the day of your exam.            Nov 07, 2018  3:00 PM CST   Lab with  LAB   [facility] ([facility])    [address]  1st Floor  [facility] [address] [phone]            Nov 07, 2018  3:30 PM CST   (Arrive by 3:15 PM)   RETURN LIPID VISIT with Anoop Jane MD   [facility] ([facility])    [address] [place] [address]  [address] [place] [id number] [phone]            Nov 13, 2018  4:30 PM CST   Telephone Call with Andrei Pelaez MD   [facility] ([facility])    [address]  4th [facility] [address] [phone]           Note: this is not an onsite visit; there is no need to come to the facility.  Thank you for choosing [facility] for your health care needs. Below is some information for patients who are interested in having their follow-up visit with a physician by telephone. In some cases, a telephone visit can be an effective and convenient way to manage your follow-up care. Choosing a telephone visit rather than a face to face visit for your follow-up care is a decision that you and your physician can make together to ensure it meets all of your needs.  A face to face visit is always an available option, if you choose to do so.  We want to make sure you have all of the information you need about the telephone visit option and answer all of your questions before you decide to schedule a telephone follow-up visit. If you have any questions, you may talk to a staff member or our financial counselor at [phone].  1. General overview Our clinic sees patients for a variety of conditions and concerns. A face to face visit with your doctor is required for any new concerns or for your initial visit. If you and your doctor decide that a follow up visit by telephone is appropriate, you may decide to opt for a telephone visit.   2. Billing and insurance coverage There is a charge for telephone visits, similar to the charge for an in-person visit. Your bill is based on the amount of time you and your physician are on the phone. We will bill each visit to your insurance company (just like your other medical visits), and you will be responsible for any costs not paid by your insurance company. The charge may be denied by your insurance company, in which case you will be responsible for the entire amount billed. The decision to cover the cost is determined by your insurance company. If you want to know what your insurance  company will cover, we encourage you to contact them to determine your coverage. The codes below are the codes we use when billing for telephone visits and the associated charges. This may help you work with your insurance company to determine your benefits.   Billing CPT codes for Telephone visits  06658 ($30), 78177 ($35), 88030 ($40)              Who to contact     Please call your clinic at [phone] to:    Ask questions about your health    Make or cancel appointments    Discuss your medicines    Learn about your test results    Speak to your doctor            Additional Information About Your Visit        [employer] Information     [employer] gives you secure access to your electronic health record. If you see a primary care provider, you can also send messages to your care team and make appointments. If you have questions, please call your primary care clinic.  If you do not have a primary care provider, please call [phone] and they will assist you.      [employer] is an electronic gateway that provides easy, online access to your medical records. With [employer], you can request a clinic appointment, read your test results, renew a prescription or communicate with your care team.     To access your existing account, please contact your [facility] or call [phone] for assistance.        Care EveryWhere ID     This is your Care EveryWhere ID. This could be used by other organizations to access your [facility] medical records  [id number]         Blood Pressure from Last 3 Encounters:   No data found for BP    Weight from Last 3 Encounters:   No data found for Wt              Today, you had the following     No orders found for display       Primary Care Provider Office Phone # Fax #    Eugene Burrell [phone]       [facility] [address]  [facility] [address]        Equal Access to Services     [facility]USTIN WILSON AH: Bri Francisco  waheatherda priyankacelina, qaybta kaadwoa flood, minal rodriguezn ah. So [facility] [phone].    ATENCIÓN: Si estella bishop, tiene a humphrey disposición servicios gratuitos de asistencia lingüística. George al [phone].    We comply with applicable federal civil rights laws and [place] laws. We do not discriminate on the basis of race, color, national origin, age, disability, sex, sexual orientation, or gender identity.            Thank you!     Thank you for choosing [facility]  for your care. Our goal is always to provide you with excellent care. Hearing back from our patients is one way we can continue to improve our services. Please take a few minutes to complete the written survey that you may receive in the mail after your visit with us. Thank you!             Your Updated Medication List - Protect others around you: Learn how to safely use, store and throw away your medicines at www.disposemymeds.org.          This list is accurate as of 9/27/18 11:59 PM.  Always use your most recent med list.                   Brand Name Dispense Instructions for use Diagnosis    ascorbic acid 500 MG tablet    VITAMIN C     Take 500 mg by mouth 2 times daily        ASPIRIN ADULT LOW STRENGTH PO      Take 81 mg by mouth daily        atorvastatin 20 MG tablet    LIPITOR    90 tablet    Take 1 tablet (20 mg) by mouth At Bedtime    H/O heart artery stent       clopidogrel 75 MG tablet    PLAVIX    90 tablet    Take 1 tablet (75 mg) by mouth daily    H/O heart artery stent       etidronate 200 MG tablet    DIDRONEL     2 times daily        MULTIVITAMIN MEN PO      Take 1 tablet by mouth daily        order for DME     30 days    Equipment being ordered: [facility] [employer] [facility] Phone [phone] Fax [phone]  Primary Dressing Iodosorb Gel   Qty not needed Secondary Dressing  tegaderm 1adhesive foam dressing 3x3 Qty 30 Secondary Dressing 2x2 gauze Qty 1 loaf Secondary Dressing 2' tape Qty 1 roll Length  of Need: 1 month Frequency of dressing change: daily    Decubitus ulcer of sacral region, stage 3 (H), Chronic skin ulcer, limited to breakdown of skin (H), Pressure ulcer of other site, stage 3 (H)       phenylephrine-shark liver oil-mineral oil-petrolatum 0.25-14-74.9 % rectal ointment    PREPARATION H     Place rectally daily as needed        vitamin B complex with vitamin C Tabs tablet      Take 1 tablet by mouth daily

## 2018-10-02 NOTE — PROCEDURES
RADIOTHERAPY TREATMENT SUMMARY          DATE OF REPORT: 2018    PATIENT: LINDA MARTÍNEZ  MEDICAL RECORD NO: [id number]  : 1960    DIAGNOSIS: Heterotopic ossification, M61.452 Other calcification of muscle, left thigh  INTENT OF RADIOTHERAPY: Prophylaxis  STAGE: Benign    TREATMENT SUMMARY:  Details of the treatments summarized below are found in records kept in the Department of Radiation Oncology at [facility].    Radiation Oncology - Course: 1  Treatment Site Dose Modality From To Days Fx.  1 Left Hip    700 cGy 18 X 9/21/2018 2018 1 1    Dose per Fraction:  700 cGy      Total Dose:  700 cGy        COMMENTS: Mr. Martínez is a 57-year-old male with paraplegia and bilateral heterotopic ossification around the hips and proximal femurs. The ossification was initially noted to involve the right femur and hip at the time of ORIF on 2018.  Due to symptoms of left hip stiffness and inability to tolerate an exo-walking apparatus, he was scheduled for resection of heterotopic ossification of the left hip on 18.  The initial plan to prevent heterotopic ossification recurrence was to give indomethacin which he started immediately following his surgery. However, cardiology recommended that he stop the indomethacin given the patient's need for Plavix and concern regarding bleeding.  We were then consulted for prophylactic radiotherapy to the left hip for heterotopic ossification prophylaxis. He was treated within 72 hours of his surgery as outlined above. He received a single dose to the left hip using a 3D technique. He tolerated treatment without any acute toxicity. Acute Toxicity Profile by CTC v5.0: None    PAIN MANAGEMENT: Per inpatient team                            FOLLOW UP PLAN: Return to clinic as needed                          Resident Physician: Aaron Wilcox M.D.   Staff Physician: Sapphire Urban M.D.  Physicist: Yandel Ritter, PhD    CC:   MD Eugene Guerrero,  MD

## 2018-10-04 ENCOUNTER — HOSPITAL ENCOUNTER (OUTPATIENT)
Dept: WOUND CARE | Facility: CLINIC | Age: 58
Discharge: HOME | End: 2018-10-04
Attending: PHYSICIAN ASSISTANT | Admitting: PHYSICIAN ASSISTANT
Payer: COMMERCIAL

## 2018-10-04 VITALS
HEART RATE: 80 BPM | TEMPERATURE: 97.9 F | DIASTOLIC BLOOD PRESSURE: 49 MMHG | RESPIRATION RATE: 16 BRPM | SYSTOLIC BLOOD PRESSURE: 87 MMHG

## 2018-10-04 DIAGNOSIS — L89.893 PRESSURE ULCER OF OTHER SITE, STAGE 3 (H): ICD-10-CM

## 2018-10-04 DIAGNOSIS — L98.491 CHRONIC SKIN ULCER, LIMITED TO BREAKDOWN OF SKIN (H): ICD-10-CM

## 2018-10-04 DIAGNOSIS — L89.153 DECUBITUS ULCER OF SACRAL REGION, STAGE 3 (H): ICD-10-CM

## 2018-10-04 PROCEDURE — 17250 CHEM CAUT OF GRANLTJ TISSUE: CPT | Performed by: PHYSICIAN ASSISTANT

## 2018-10-04 PROCEDURE — 17250 CHEM CAUT OF GRANLTJ TISSUE: CPT

## 2018-10-04 PROCEDURE — A6197 ALGINATE DRSG >16 <=48 SQ IN: HCPCS

## 2018-10-04 NOTE — DISCHARGE INSTRUCTIONS
[facility]  [address] [facility] [address], [place] [address]  Appointment Phone [phone] Nurse Advisors [phone]    Derek Enriquez      1960    Wound Dressing Change:Left Great Toe Nail  Cleanse wound and surrounding skin with: soap and water  Skin Care: Coccyx Apply Urea Cream daily  Cover wound with Silvercel cut to fit the size of the wound   Cover wound with band-aid or gauze  Change dressing daily.  Repositioning:    Bed:  Reposition pt MINIMALLY every 1-2 hours in bed to relieve pressure and promote perfusion to tissue.     Chair:  When up to chair pt should not sit for longer than one hour total before either standing or returning to bed for at least 10 minutes, again to relieve pressure and promote perfusion to tissue.     o Pt should also sit on a chair cushion when up to the chair.      Rosario Sánchez PA-C. October 4, 2018  Call us at [phone] if you have any questions about your wounds, have redness or swelling around your wound, have a fever of 101 or greater or if you have any other problems or concerns. We answer the phone Monday through Friday 8 am to 4 pm, please leave a message as we check the voicemail frequently throughout the day.     Follow up with Provider - if needed.

## 2018-10-04 NOTE — MR AVS SNAPSHOT
MRN:[id number]                      After Visit Summary   10/4/2018    Derek Enriquez    MRN: [id number]           Visit Information        Provider Department      10/4/2018  3:15 PM Rosario Sánhcez PA-C [facility]        Your next 10 appointments already scheduled     Nov 01, 2018  7:00 AM CDT   (Arrive by 6:45 AM)   Urodynamics with Candy Leary PA-C   [facility]state [facility] Urologic Cancers ([facility])    [address]  4th Floor  [facility] [id number] [phone]            Nov 07, 2018  2:00 PM CST   Ech Complete with UCECHCR4   [facility] ([facility])    [address] [place] [address]  3rd Floor  [facility] [id number] [phone]           1.  Please bring or wear a comfortable two-piece outfit. 2.  You may eat, drink and take your normal medicines. 3.  For any questions that cannot be answered, please contact the ordering physician 4.  Please do not wear perfumes or scented lotions on the day of your exam.            Nov 07, 2018  3:00 PM CST   Lab with UC LAB   [facility] ([facility])    [address]  1st Floor  [facility] [address] [phone]            Nov 07, 2018  3:30 PM CST   (Arrive by 3:15 PM)   RETURN LIPID VISIT with Anoop Jane MD   [facility] ([facility])    [address] [place] [address]  [address]  [facility] [id number] [phone]            Nov 13, 2018  4:30 PM CST   Telephone Call with Andrei Pelaez MD   [facility] Urologic Cancers ([facility])    [address] [place] [address]  4th Floor  [facility] [id number] [phone]           Note: this is not an onsite visit; there is no need to come to the facility.  Thank you for choosing [facility] for your health care needs. Below is  some information for patients who are interested in having their follow-up visit with a physician by telephone. In some cases, a telephone visit can be an effective and convenient way to manage your follow-up care. Choosing a telephone visit rather than a face to face visit for your follow-up care is a decision that you and your physician can make together to ensure it meets all of your needs.  A face to face visit is always an available option, if you choose to do so.  We want to make sure you have all of the information you need about the telephone visit option and answer all of your questions before you decide to schedule a telephone follow-up visit. If you have any questions, you may talk to a staff member or our financial counselor at [phone].  1. General overview Our clinic sees patients for a variety of conditions and concerns. A face to face visit with your doctor is required for any new concerns or for your initial visit. If you and your doctor decide that a follow up visit by telephone is appropriate, you may decide to opt for a telephone visit.   2. Billing and insurance coverage There is a charge for telephone visits, similar to the charge for an in-person visit. Your bill is based on the amount of time you and your physician are on the phone. We will bill each visit to your insurance company (just like your other medical visits), and you will be responsible for any costs not paid by your insurance company. The charge may be denied by your insurance company, in which case you will be responsible for the entire amount billed. The decision to cover the cost is determined by your insurance company. If you want to know what your insurance company will cover, we encourage you to contact them to determine your coverage. The codes below are the codes we use when billing for telephone visits and the associated charges. This may help you work with your insurance company to determine your benefits.   Billing  CPT codes for Telephone visits  58388 ($30), 87948 ($35), 25749 ($40)                Further instructions from your care team             [facility]  [address] [facility] [address] [address]  Appointment Phone [phone] Nurse Advisors [phone]    Derek Enriquez      1960    Wound Dressing Change:Left Great Toe Nail  Cleanse wound and surrounding skin with: soap and water  Skin Care: Coccyx Apply Urea Cream daily  Cover wound with Silvercel cut to fit the size of the wound   Cover wound with band-aid or gauze  Change dressing daily.  Repositioning:    Bed:  Reposition pt MINIMALLY every 1-2 hours in bed to relieve pressure and promote perfusion to tissue.     Chair:  When up to chair pt should not sit for longer than one hour total before either standing or returning to bed for at least 10 minutes, again to relieve pressure and promote perfusion to tissue.     o Pt should also sit on a chair cushion when up to the chair.      Rosario Sánchez PA-C. October 4, 2018  Call us at [phone] if you have any questions about your wounds, have redness or swelling around your wound, have a fever of 101 or greater or if you have any other problems or concerns. We answer the phone Monday through Friday 8 am to 4 pm, please leave a message as we check the voicemail frequently throughout the day.     Follow up with Provider - if needed.             [employer] Information     [employer] gives you secure access to your electronic health record. If you see a primary care provider, you can also send messages to your care team and make appointments. If you have questions, please call your primary care clinic.  If you do not have a primary care provider, please call [phone] and they will assist you.        Care EveryWhere ID     This is your Care EveryWhere ID. This could be used by other organizations to access your [facility] medical records  [id number]        Equal Access to  Services     [facility]: Bri Francisco, waheatherda luqadaha, qaybta kaalchilango flood, minal hamlin. [facility] [phone].    ATENCIÓN: Si habla español, tiene a humphrey disposición servicios gratuitos de asistencia lingüística. Llame al [phone].    We comply with applicable federal civil rights laws and [place] laws. We do not discriminate on the basis of race, color, national origin, age, disability, sex, sexual orientation, or gender identity.

## 2018-10-22 NOTE — ADDENDUM NOTE
Encounter addended by: Rosario Sánchez PA-C on: 10/22/2018 12:06 PM<BR>     Actions taken: Sign clinical note

## 2018-10-22 NOTE — PROGRESS NOTES
[facility]    HISTORY OF PRESENT ILLNESS: Derek Enriquez is a 57 year old, paraplegic male who returns for follow up. We were previously following for coccyx, and foot wounds. All have resolved except his left toe wound which appears to be related to dystrophic toenails. Has been developing less hypergranulation tissue since his treatments with silver nitrate in the clinic. Of note, recently discharged from [facility] after resection of HO on his left trochanter    WOUND CARE: WounDres gel    OFFLOADING: on Roho cushion, sleeps on standard bed    DATE WOUND ACQUIRED: toe wound 6/18, coccyx 7/18    PAST MEDICAL HISTORY:  has a past medical history of CAD (coronary artery disease); Femur fracture (H); Neurogenic bladder; Neurogenic bowel; Orthostatic hypotension; Paraplegia (H); and Thoracic spinal cord injury (H).    SOCIAL HISTORY: lives with his 26 year old son who helps with wound dressings    MEDICATIONS:   Current Outpatient Prescriptions   Medication     ascorbic acid (VITAMIN C) 500 MG tablet     ASPIRIN ADULT LOW STRENGTH PO     atorvastatin (LIPITOR) 20 MG tablet     clopidogrel (PLAVIX) 75 MG tablet     etidronate (DIDRONEL) 200 MG tablet     Multiple Vitamins-Minerals (MULTIVITAMIN MEN PO)     order for DME     phenylephrine-shark liver oil-mineral oil-petrolatum (PREPARATION H) 0.25-14-74.9 % rectal ointment     vitamin B complex with vitamin C (VITAMIN  B COMPLEX) TABS tablet     No current facility-administered medications for this encounter.      VITALS: BP (!) 87/49 (BP Location: Left arm)  Pulse 80  Temp 97.9  F (36.6  C) (Temporal)  Resp 16    PHYSICAL EXAM:  GENERAL: Patient is alert and oriented and in no acute distress  INTEGUMENTARY:   WOUND ASSESSMENT #1:     Location: left great toe     Size: 0.3 cm x 0.3 cm with a depth of 0.1 cm    Drainage: copious amount of serosanguinous drainage    Wound description: hypergranulation tissue surrounding nail bed            PROCEDURE (toe  wound): Hypergranulation tissue was treated with silver nitrate. Patient tolerated this well.       ASSESSMENT:   1. Full-thickness, left first toe ulcer with fat-layer exposed  2. Ingrown toenail left first toe    PLAN:   1. Discussed reason of continued hypergranulation tissue and wounds on first toe is from ingrowing toenail, he may need this removed if the problem continues  2. Foot wounds: SilveCel  3. Lower legs need compression garment (we recommend SpandaGrip)  4. Exfoliation (gentle manual) and lotion PRN for coccyx    FOLLOW-UP: with podiatry regarding ingrowing toenails    PEDRO MERLOS PA-C ([facility])

## 2018-10-29 ENCOUNTER — TELEPHONE (OUTPATIENT)
Dept: UROLOGY | Facility: CLINIC | Age: 58
End: 2018-10-29

## 2018-10-29 NOTE — TELEPHONE ENCOUNTER
[facility] Call Center    Phone Message    May a detailed message be left on voicemail: yes    Reason for Call: Other: Pt called to inquire about pre visit instructions for his appt. on 11/2/18. Pt is also wondering what a urodynamics test entails and would like a call back to discuss his questions. Please call Pt back     Action Taken: Message routed to:  [facility] ([facility] URO

## 2018-10-30 ENCOUNTER — PATIENT OUTREACH (OUTPATIENT)
Dept: CARE COORDINATION | Facility: CLINIC | Age: 58
End: 2018-10-30

## 2018-11-01 ENCOUNTER — ANESTHESIA EVENT (OUTPATIENT)
Dept: SURGERY | Facility: CLINIC | Age: 58
End: 2018-11-01
Payer: COMMERCIAL

## 2018-11-01 ENCOUNTER — OFFICE VISIT (OUTPATIENT)
Dept: UROLOGY | Facility: CLINIC | Age: 58
End: 2018-11-01
Payer: COMMERCIAL

## 2018-11-01 ENCOUNTER — RADIANT APPOINTMENT (OUTPATIENT)
Dept: RADIOLOGY | Facility: AMBULATORY SURGERY CENTER | Age: 58
End: 2018-11-01
Attending: PHYSICIAN ASSISTANT
Payer: COMMERCIAL

## 2018-11-01 VITALS
HEART RATE: 71 BPM | HEIGHT: 70 IN | SYSTOLIC BLOOD PRESSURE: 122 MMHG | DIASTOLIC BLOOD PRESSURE: 59 MMHG | BODY MASS INDEX: 21.62 KG/M2 | WEIGHT: 151 LBS

## 2018-11-01 DIAGNOSIS — S24.109S INJURY OF THORACIC SPINAL CORD, SEQUELA (H): ICD-10-CM

## 2018-11-01 DIAGNOSIS — N31.9 NEUROGENIC BLADDER: Primary | ICD-10-CM

## 2018-11-01 DIAGNOSIS — N31.9 BLADDER DYSFUNCTION: ICD-10-CM

## 2018-11-01 DIAGNOSIS — R82.90 ABNORMAL URINALYSIS: ICD-10-CM

## 2018-11-01 LAB
APPEARANCE UR: NORMAL
BILIRUB UR QL: NORMAL
COLOR UR: YELLOW
GLUCOSE URINE: NORMAL MG/DL
HGB UR QL: NORMAL
KETONES UR QL: NORMAL MG/DL
LEUKOCYTE ESTERASE URINE: NORMAL
NITRITE UR QL STRIP: NORMAL
PH UR STRIP: 5.5 PH (ref 5–7)
PROTEIN ALBUMIN URINE: NORMAL MG/DL
SOURCE: NORMAL
SP GR UR STRIP: 1.03 (ref 1–1.03)
UROBILINOGEN UR QL STRIP: 0.2 EU/DL (ref 0.2–1)

## 2018-11-01 ASSESSMENT — PAIN SCALES - GENERAL: PAINLEVEL: NO PAIN (0)

## 2018-11-01 NOTE — PROGRESS NOTES
PREPROCEDURE DIAGNOSES:    1. Neurogenic bladder secondary to subdural hematoma at T10 s/p T9-12 laminectomy and evacuation of subdural hematoma eventually leading to paraplegia.    POSTPROCEDURE DIAGNOSES:  -Normal bladder capacity (500 mL) with impaired filling sensations.  -Multiple episodes of detrusor overactivity without incontinence starting at bladder volumes > 325 mL with max Pdet reaching ~70 cm H2O. Pressures return to baseline (~0 cm H2O) when filling is paused.   -Otherwise good bladder compliance between uninhibited detrusor contractions. PDet=9 cmH20 at capacity. Compliance ratio of 55.  -No voiding phase as patient does not void volitionally.   -Fluoroscopy reveals a smooth-walled bladder without diverticuli or VUR. The bladder neck was primarily closed during filling except during episodes of DO when it appeared open with contrast abruptly stopping at the level of the external urinary sphincter.    (See below for full assessment and plan).    PROCEDURE:    1. Sterile urethral catheterization for measurement of residual urine volume.  2. Complex filling cystometrogram with measurement of bladder and rectal pressures.  3. Electromyography of the pelvic floor during urodynamics.  4. Fluoroscopic imaging of the bladder during urodynamics, at least 3 views.    5. Interpretation of urodynamics and flouroscopic imaging.      INDICATIONS FOR PROCEDURE:  Mr. Derek Enriquez is a pleasant 57 year old male with neurogenic bladder secondary to subdural hematoma at T10 following a cardiac arrest in March 2018 s/p T9-12 laminectomy evacuation of subdural hematoma eventually leading to quadriplegia. Baseline video urodynamic assessment is requested today by Dr. Pelaez to better characterize Mr. Derek Enriquez's voiding dysfunction.    VOIDING DIARY:  Patient did not complete. Per chart review:  Catheterization History:  The patient uses a urethral catheter 3-4 times daily into the toilet and reports no problems.       Incontinence History:  He does not leak between voids/caths. He does not experience urgency of urination. He does not experience stress urinary incontinence.    DESCRIPTION OF PROCEDURE:  Risks, benefits, and alternatives to urodynamics were discussed with the patient and he wished to proceed.  Urodynamics are planned to better assess the primary etiology for Mr. Enriquez's urologic dysfunction.  The patient does not currently take anticholinergic medications for his bladder.  After informed consent was obtained, the patient was taken to the procedure room where uroflowmetry was performed. Findings below.     PRE-STUDY UROFLOWMETRY:  Not performed as patient does not void volitionally.  Residual by catheter: 180 mL.  Pretest urine dipstick was positive for nitrites and moderate leukocytes. The patient denies any UTI symptoms today; specifically, denies frequency, urgency, hematuria, fevers, chills, N/V, flank pain. In this patient who self-catheterizes multiple times per day, colonization is likely and therefore some degree of pyuria is to be expected. We discussed the risks vs benefits of proceeding with today's study. The patient verbalized understanding and wishes to proceed.     Next a 7F double-lumen urodynamics catheter was inserted into the bladder under sterile technique via urethra.  A 7F abdominal manometry catheter was placed in the rectum.  EMG pads were placed on both sides of the anal verge.  The bladder was filled with 200 mL of Cystografin at 25 mL/minute and serial pressures were recorded.  With coughing there was an appropriate rise in vesical and abdominal pressures with no change in detrusor pressure, confirming good study catheter placement.    DURING THE FILLING PHASE:  First sensation: 349 mL.  First Desire: not reported  Strong Desire: not reoprted  Maximum Capacity: filling stopped at 500 mL per clinician discretion    Uninhibited detrusor contractions: multiple episodes of DO without  incontinence starting at bladder volumes > 325 mL with max Pdet reaching ~70 cm H2O. Pressures return to baseline (~0 cm H2O) when filling is paused.   Compliance: otherwise good between UDC's. PDet=9 cmH20 at capacity. Compliance ratio of 55.  Continence: no ZARINA or DOI  EMG: mostly concordant during filling.    DURING THE VOIDING PHASE:  Not performed as patient does not void volitionally.    FLUOROSCOPIC IMAGING OF THE BLADDER DURING URODYNAMICS:  Fluoroscopy during today's procedure demonstrated a smooth walled bladder without diverticulae or cellules.  No vesicoureteral reflux was observed.  The bladder neck was primarily closed during filling except during episodes of DO when it appeared open with contrast abruptly stopping at the level of the external urinary sphincter.  After voiding to completion, all catheters were removed and the patient was brought back into the consultation room to further discuss today's study results.      ASSESSMENT/PLAN:  Mr. Derek Enriquez is a pleasant 57 year old male with neurogenic bladder secondary to subdural hematoma at T10 who demonstrated the following findings today on urodynamic evaluation:    -Normal bladder capacity (500 mL) with impaired filling sensations.  -Multiple episodes of detrusor overactivity without incontinence starting at bladder volumes > 325 mL with max Pdet reaching ~70 cm H2O. Pressures return to baseline (~0 cm H2O) when filling is paused.   -Otherwise good bladder compliance between uninhibited detrusor contractions. PDet=9 cmH20 at capacity. Compliance ratio of 55.  -No voiding phase as patient does not void volitionally.   -Fluoroscopy reveals a smooth-walled bladder without diverticuli or VUR. The bladder neck was primarily closed during filling except during episodes of DO when it appeared open with contrast abruptly stopping at the level of the external urinary sphincter.    We discussed his study results in detail today. He has DO without  incontinence starting at bladder volumes >325 mL. We discussed starting an anticholinergic medication or mirabegron but he is somewhat resistant to starting another medication at this time. We discussed the role for anticholinergics in patients with a spastic neurogenic bladder (to prevent spasms, lower risk for UTI's, preserve upper tracts, etc.), but he still elects to hold off for now. He is not currently experiencing any incontinence, UTI's, or other urinary symptoms and his renal ultrasound from June showed no hydronephrosis. He catheterizes for average volumes of 200-300 mL and therefore may not be experiencing bladder spasms in his daily life since these do not start until volumes >325 mL on today's study.   -He therefore elects to defer anticholinergic therapy for now.  -Will call clinic if he develops new/worsening incontinence or frequent UTI's and would then plan to start anticholinergic or mirabegron at that time.  -Otherwise, he will continue to catheterize q4 hours during the day.   -Will plan to repeat UDS in late March 2019 at the 1 year mariano of his initial SCI.    - A single Cipro antibiotic was provided for UTI prophylaxis following completion of today's study per department protocol.  The risk of UTI with VUDS is low at ~2.5-3%.      Thank you for allowing me to participate in the care of . Derek Enriquez and please don't hesitate to contact me with any questions or concerns.      Candy Leary PA-C  Urology Physician Assistant

## 2018-11-01 NOTE — LETTER
11/1/2018       RE: Derek Enriquez  [address] [place] [facility] [address]     Dear Colleague,    Thank you for referring your patient, Derek Enriquez, to the [facility] at [facility]. Please see a copy of my visit note below.    PREPROCEDURE DIAGNOSES:    1. Neurogenic bladder secondary to subdural hematoma at T10 s/p T9-12 laminectomy and evacuation of subdural hematoma eventually leading to paraplegia.    POSTPROCEDURE DIAGNOSES:  -Normal bladder capacity (500 mL) with impaired filling sensations.  -Multiple episodes of detrusor overactivity without incontinence starting at bladder volumes > 325 mL with max Pdet reaching ~70 cm H2O. Pressures return to baseline (~0 cm H2O) when filling is paused.   -Otherwise good bladder compliance between uninhibited detrusor contractions. PDet=9 cmH20 at capacity. Compliance ratio of 55.  -No voiding phase as patient does not void volitionally.   -Fluoroscopy reveals a smooth-walled bladder without diverticuli or VUR. The bladder neck was primarily closed during filling except during episodes of DO when it appeared open with contrast abruptly stopping at the level of the external urinary sphincter.    (See below for full assessment and plan).    PROCEDURE:    1. Sterile urethral catheterization for measurement of residual urine volume.  2. Complex filling cystometrogram with measurement of bladder and rectal pressures.  3. Electromyography of the pelvic floor during urodynamics.  4. Fluoroscopic imaging of the bladder during urodynamics, at least 3 views.    5. Interpretation of urodynamics and flouroscopic imaging.      INDICATIONS FOR PROCEDURE:  Mr. Derek Enriquez is a pleasant 57 year old male with neurogenic bladder secondary to subdural hematoma at T10 following a cardiac arrest in March 2018 s/p T9-12 laminectomy evacuation of subdural hematoma eventually leading to quadriplegia. Baseline  video urodynamic assessment is requested today by Dr. Pelaez to better characterize Mr. Derek Enriquez's voiding dysfunction.    VOIDING DIARY:  Patient did not complete. Per chart review:  Catheterization History:  The patient uses a urethral catheter 3-4 times daily into the toilet and reports no problems.      Incontinence History:  He does not leak between voids/caths. He does not experience urgency of urination. He does not experience stress urinary incontinence.    DESCRIPTION OF PROCEDURE:  Risks, benefits, and alternatives to urodynamics were discussed with the patient and he wished to proceed.  Urodynamics are planned to better assess the primary etiology for Mr. Enriquez's urologic dysfunction.  The patient does not currently take anticholinergic medications for his bladder.  After informed consent was obtained, the patient was taken to the procedure room where uroflowmetry was performed. Findings below.     PRE-STUDY UROFLOWMETRY:  Not performed as patient does not void volitionally.  Residual by catheter: 180 mL.  Pretest urine dipstick was positive for nitrites and moderate leukocytes. The patient denies any UTI symptoms today; specifically, denies frequency, urgency, hematuria, fevers, chills, N/V, flank pain. In this patient who self-catheterizes multiple times per day, colonization is likely and therefore some degree of pyuria is to be expected. We discussed the risks vs benefits of proceeding with today's study. The patient verbalized understanding and wishes to proceed.     Next a 7F double-lumen urodynamics catheter was inserted into the bladder under sterile technique via urethra.  A 7F abdominal manometry catheter was placed in the rectum.  EMG pads were placed on both sides of the anal verge.  The bladder was filled with 200 mL of Cystografin at 25 mL/minute and serial pressures were recorded.  With coughing there was an appropriate rise in vesical and abdominal pressures with no change in detrusor  pressure, confirming good study catheter placement.    DURING THE FILLING PHASE:  First sensation: 349 mL.  First Desire: not reported  Strong Desire: not reoprted  Maximum Capacity: filling stopped at 500 mL per clinician discretion    Uninhibited detrusor contractions: multiple episodes of DO without incontinence starting at bladder volumes > 325 mL with max Pdet reaching ~70 cm H2O. Pressures return to baseline (~0 cm H2O) when filling is paused.   Compliance: otherwise good between UDC's. PDet=9 cmH20 at capacity. Compliance ratio of 55.  Continence: no ZARINA or DOI  EMG: mostly concordant during filling.    DURING THE VOIDING PHASE:  Not performed as patient does not void volitionally.    FLUOROSCOPIC IMAGING OF THE BLADDER DURING URODYNAMICS:  Fluoroscopy during today's procedure demonstrated a smooth walled bladder without diverticulae or cellules.  No vesicoureteral reflux was observed.  The bladder neck was primarily closed during filling except during episodes of DO when it appeared open with contrast abruptly stopping at the level of the external urinary sphincter.  After voiding to completion, all catheters were removed and the patient was brought back into the consultation room to further discuss today's study results.      ASSESSMENT/PLAN:  Mr. Derek Enriquez is a pleasant 57 year old male with neurogenic bladder secondary to subdural hematoma at T10 who demonstrated the following findings today on urodynamic evaluation:    -Normal bladder capacity (500 mL) with impaired filling sensations.  -Multiple episodes of detrusor overactivity without incontinence starting at bladder volumes > 325 mL with max Pdet reaching ~70 cm H2O. Pressures return to baseline (~0 cm H2O) when filling is paused.   -Otherwise good bladder compliance between uninhibited detrusor contractions. PDet=9 cmH20 at capacity. Compliance ratio of 55.  -No voiding phase as patient does not void volitionally.   -Fluoroscopy reveals a  smooth-walled bladder without diverticuli or VUR. The bladder neck was primarily closed during filling except during episodes of DO when it appeared open with contrast abruptly stopping at the level of the external urinary sphincter.    We discussed his study results in detail today. He has DO without incontinence starting at bladder volumes >325 mL. We discussed starting an anticholinergic medication or mirabegron but he is somewhat resistant to starting another medication at this time. We discussed the role for anticholinergics in patients with a spastic neurogenic bladder (to prevent spasms, lower risk for UTI's, preserve upper tracts, etc.), but he still elects to hold off for now. He is not currently experiencing any incontinence, UTI's, or other urinary symptoms and his renal ultrasound from June showed no hydronephrosis. He catheterizes for average volumes of 200-300 mL and therefore may not be experiencing bladder spasms in his daily life since these do not start until volumes >325 mL on today's study.   -He therefore elects to defer anticholinergic therapy for now.  -Will call clinic if he develops new/worsening incontinence or frequent UTI's and would then plan to start anticholinergic or mirabegron at that time.  -Otherwise, he will continue to catheterize q4 hours during the day.   -Will plan to repeat UDS in late March 2019 at the 1 year mariano of his initial SCI.    - A single Cipro antibiotic was provided for UTI prophylaxis following completion of today's study per department protocol.  The risk of UTI with VUDS is low at ~2.5-3%.      Thank you for allowing me to participate in the care of Mr. Derek Enriquez and please don't hesitate to contact me with any questions or concerns.      Candy Leary PA-C  Urology Physician Assistant

## 2018-11-01 NOTE — PATIENT INSTRUCTIONS
UROLOGY CLINIC VISIT PATIENT INSTRUCTIONS    Schedule for repeat urodynamics testing in late March or early April 2019.     Call sooner if you develop worsening urinary incontinence, UTI's, or other issues.     Try the Cialis. If this does not help and you would like to consider other options (such as vacuum or vibratory devices, or penile injections), feel free to return to see me in clinic sooner than March.    If you have any issues, questions or concerns in the meantime, do not hesitate to contact us at [phone] or via [email or web address].     It was a pleasure meeting with you today.  Thank you for allowing me and my team the privilege of caring for you today.  YOU are the reason we are here, and I truly hope we provided you with the excellent service you deserve.  Please let us know if there is anything else we can do for you so that we can be sure you are leaving completely satisfied with your care experience.

## 2018-11-01 NOTE — NURSING NOTE
Invasive Procedure Safety Checklist:    Procedure: Urodynamics Study    Action: Complete sections and checkboxes as appropriate.    Pre-procedure:  1. Patient ID Verified with 2 identifiers (Maril[place] and  or MRN) : YES    2. Procedure and site verified with patient/designee (when able) : YES    3. Accurate consent documentation in medical record : YES    4. H&P (or appropriate assessment) documented in medical record : NO  H&P must be up to 30 days prior to procedure an updated within 24 hours of                 Procedure as applicable.     5. Relevant diagnostic and radiology test results appropriately labeled and displayed as applicable : NO    6. Blood products, implants, devices, and/or special equipment available for the procedure as applicable : NO    7. Procedure site(s) marked with provider initials [Exclusions: ] : NO    8. Marking not required. Reason : Yes  Procedure does not require site marking    Time Out:     Time-Out performed immediately prior to starting procedure, including verbal and active participation of all team members addressing: YES    1. Correct patient identity.  2. Confirmed that the correct side and site are marked.  3. An accurate procedure to be done.  4. Agreement on the procedure to be done.  5. Correct patient position.  6. Relevant images and results are properly labeled and appropriately displayed.  7. The need to administer antibiotics or fluids for irrigation purposes during the procedure as applicable.  8. Safety precautions based on patient history or medication use.    During Procedure: Verification of correct person, site, and procedure occurs any time the responsibility for care of the patient is transferred to another member of the care team.    The following medication was given:     MEDICATION: Ciprofloxacin  ROUTE: PO  SITE: Medication was given orally   DOSE: 500 mg  LOT #: [id number]  :  [employer]  EXPIRATION DATE:  [id number]  NDC#:  05/2020    Shaista Caceres A

## 2018-11-01 NOTE — MR AVS SNAPSHOT
After Visit Summary   11/1/2018    Derek Enriquez    MRN: [id number]           Patient Information     Date Of Birth          1960        Visit Information        Provider Department      11/1/2018 7:00 AM Candy Leary PA-C [facility] Urology and [facility] for Prostate and Urologic Cancers        Today's Diagnoses     Neurogenic bladder    -  1    Injury of thoracic spinal cord, sequela (H)        Abnormal urinalysis          Care Instructions    UROLOGY CLINIC VISIT PATIENT INSTRUCTIONS    Schedule for repeat urodynamics testing in late March or early April 2019.     Call sooner if you develop worsening urinary incontinence, UTI's, or other issues.     Try the Cialis. If this does not help and you would like to consider other options (such as vacuum or vibratory devices, or penile injections), feel free to return to see me in clinic sooner than March.    If you have any issues, questions or concerns in the meantime, do not hesitate to contact us at [phone] or via [employer].     It was a pleasure meeting with you today.  Thank you for allowing me and my team the privilege of caring for you today.  YOU are the reason we are here, and I truly hope we provided you with the excellent service you deserve.  Please let us know if there is anything else we can do for you so that we can be sure you are leaving completely satisfied with your care experience.                Follow-ups after your visit        Your next 10 appointments already scheduled     Nov 02, 2018   Procedure with Toñito Kennedy MD   [facility] [place], Same Day Surgery (--)    [address] [id number] [phone]            Nov 02, 2018 12:00 PM CDT   Return Visit with Sapphire Urban MD   Radiation Oncology Clinic ([facility])    [facility]  1st Floor  [address] [address] [phone]            Nov 02, 2018  1:00 PM CDT   TCT/SIM Suite Visit  with Sapphire Urban MD   Radiation Oncology Clinic ([facility])    [facility]  1st Floor  [address]  [facility] [address]   [phone]            Nov 05, 2018  3:15 PM CST   Lab with  LAB   [facility] ([facility])    [address]  1st Floor  [facility] [address]   [phone]            Nov 05, 2018  3:30 PM CST   (Arrive by 3:15 PM)   RETURN LIPID VISIT with Anoop Jane MD   [facility] Heart Care ([facility])    [address]  [address]  [facility] [address] [phone]            Nov 05, 2018  4:00 PM CST   Ech Complete with UCECHCR4   [facility] Echo ([facility])    [address]  3rd Floor  [facility] [address] [phone]           1.  Please bring or wear a comfortable two-piece outfit. 2.  You may eat, drink and take your normal medicines. 3.  For any questions that cannot be answered, please contact the ordering physician 4.  Please do not wear perfumes or scented lotions on the day of your exam.            Nov 13, 2018  4:30 PM CST   Telephone Call with Andrei Pelaez MD   [facility]rology and [facility] for Prostate and Urologic Cancers ([facility])    [address] [place] [address]  4th Floor  [facility] [address] [phone]           Note: this is not an onsite visit; there is no need to come to the facility.  Thank you for choosing [facility] for your health care needs. Below is some information for patients who are interested in having their follow-up visit with a physician by telephone. In some cases, a telephone visit can be an effective and convenient way to manage your follow-up care. Choosing a telephone visit rather than a face to face visit for your follow-up care is a decision that you and your physician can make together to ensure it meets all of your needs.  A face to  face visit is always an available option, if you choose to do so.  We want to make sure you have all of the information you need about the telephone visit option and answer all of your questions before you decide to schedule a telephone follow-up visit. If you have any questions, you may talk to a staff member or our financial counselor at [phone].  1. General overview Our clinic sees patients for a variety of conditions and concerns. A face to face visit with your doctor is required for any new concerns or for your initial visit. If you and your doctor decide that a follow up visit by telephone is appropriate, you may decide to opt for a telephone visit.   2. Billing and insurance coverage There is a charge for telephone visits, similar to the charge for an in-person visit. Your bill is based on the amount of time you and your physician are on the phone. We will bill each visit to your insurance company (just like your other medical visits), and you will be responsible for any costs not paid by your insurance company. The charge may be denied by your insurance company, in which case you will be responsible for the entire amount billed. The decision to cover the cost is determined by your insurance company. If you want to know what your insurance company will cover, we encourage you to contact them to determine your coverage. The codes below are the codes we use when billing for telephone visits and the associated charges. This may help you work with your insurance company to determine your benefits.   Billing CPT codes for Telephone visits  33663 ($30), 83025 ($35), 79043 ($40)              Who to contact     Please call your clinic at [phone] to:    Ask questions about your health    Make or cancel appointments    Discuss your medicines    Learn about your test results    Speak to your doctor            Additional Information About Your Visit        [employer] Information     [employer] gives you secure access  "to your electronic health record. If you see a primary care provider, you can also send messages to your care team and make appointments. If you have questions, please call your primary care clinic.  If you do not have a primary care provider, please call [phone] and they will assist you.      [employer] is an electronic gateway that provides easy, online access to your medical records. With [employer], you can request a clinic appointment, read your test results, renew a prescription or communicate with your care team.     To access your existing account, please contact your [facility] or call [phone] for assistance.        Care EveryWhere ID     This is your Care EveryWhere ID. This could be used by other organizations to access your [place] medical records  [id number]        Your Vitals Were     Pulse Height BMI (Body Mass Index)             71 1.778 m (5' 10\") 21.67 kg/m2          Blood Pressure from Last 3 Encounters:   11/01/18 122/59   10/04/18 (!) 87/49   09/22/18 108/51    Weight from Last 3 Encounters:   11/01/18 68.5 kg (151 lb)   09/19/18 68.9 kg (151 lb 14.4 oz)   09/21/18 68.5 kg (151 lb)              We Performed the Following     Urinalysis chemical screen POCT     Urine Culture Aerobic Bacterial        Primary Care Provider Office Phone # Fax #    Eugene Burrell [phone]       [facility] [address]  [facility] [address]        Equal Access to Services     AUSTIN WILSON : Hadii aad ku hadasho Soomaali, waaxda luqadaha, qaybta kaalmada adeegyada, minal sales . So [facility] [phone].    ATENCIÓN: Si habla español, tiene a humphrey disposición servicios gratuitos de asistencia lingüística. Llame al [phone].    We comply with applicable federal civil rights laws and [place] laws. We do not discriminate on the basis of race, color, national origin, age, disability, sex, sexual orientation, or " gender identity.            Thank you!     Thank you for choosing [facility] UROLOGY AND [facility] FOR PROSTATE AND UROLOGIC CANCERS  for your care. Our goal is always to provide you with excellent care. Hearing back from our patients is one way we can continue to improve our services. Please take a few minutes to complete the written survey that you may receive in the mail after your visit with us. Thank you!             Your Updated Medication List - Protect others around you: Learn how to safely use, store and throw away your medicines at www.disposemymeds.org.          This list is accurate as of 11/1/18  8:31 AM.  Always use your most recent med list.                   Brand Name Dispense Instructions for use Diagnosis    ascorbic acid 500 MG tablet    VITAMIN C     Take 500 mg by mouth 2 times daily        ASPIRIN ADULT LOW STRENGTH PO      Take 81 mg by mouth daily        atorvastatin 20 MG tablet    LIPITOR    90 tablet    Take 1 tablet (20 mg) by mouth At Bedtime    H/O heart artery stent       clopidogrel 75 MG tablet    PLAVIX    90 tablet    Take 1 tablet (75 mg) by mouth daily    H/O heart artery stent       etidronate 200 MG tablet    DIDRONEL     2 times daily        MULTIVITAMIN MEN PO      Take 1 tablet by mouth daily        order for DME     30 days    Equipment being ordered: [employer] [facility] Phone [phone] Fax [phone]  Primary Dressing Iodosorb Gel   Qty not needed Secondary Dressing  tegaderm 1adhesive foam dressing 3x3 Qty 30 Secondary Dressing 2x2 gauze Qty 1 loaf Secondary Dressing 2' tape Qty 1 roll Length of Need: 1 month Frequency of dressing change: daily    Decubitus ulcer of sacral region, stage 3 (H), Chronic skin ulcer, limited to breakdown of skin (H), Pressure ulcer of other site, stage 3 (H)       phenylephrine-shark liver oil-mineral oil-petrolatum 0.25-14-74.9 % rectal ointment    PREPARATION H     Place rectally daily as needed        vitamin B complex with vitamin C Tabs  tablet      Take 1 tablet by mouth daily

## 2018-11-02 ENCOUNTER — OFFICE VISIT (OUTPATIENT)
Dept: RADIATION ONCOLOGY | Facility: CLINIC | Age: 58
End: 2018-11-02
Attending: RADIOLOGY
Payer: COMMERCIAL

## 2018-11-02 ENCOUNTER — ANESTHESIA (OUTPATIENT)
Dept: SURGERY | Facility: CLINIC | Age: 58
End: 2018-11-02
Payer: COMMERCIAL

## 2018-11-02 ENCOUNTER — HOSPITAL ENCOUNTER (INPATIENT)
Facility: CLINIC | Age: 58
LOS: 3 days | Discharge: HOME | End: 2018-11-05
Attending: SPECIALIST | Admitting: SPECIALIST
Payer: COMMERCIAL

## 2018-11-02 DIAGNOSIS — S91.109D OPEN WOUND OF TOE, SUBSEQUENT ENCOUNTER: Primary | ICD-10-CM

## 2018-11-02 DIAGNOSIS — M89.8X9 HETEROTOPIC OSSIFICATION: Primary | ICD-10-CM

## 2018-11-02 PROBLEM — Z98.890 STATUS POST HIP SURGERY: Status: ACTIVE | Noted: 2018-11-02

## 2018-11-02 LAB
ALBUMIN UR-MCNC: NEGATIVE MG/DL
ANION GAP SERPL CALCULATED.3IONS-SCNC: 6 MMOL/L (ref 3–14)
APPEARANCE UR: CLEAR
BACTERIA SPEC CULT: ABNORMAL
BACTERIA SPEC CULT: ABNORMAL
BILIRUB UR QL STRIP: NEGATIVE
BUN SERPL-MCNC: 19 MG/DL (ref 7–30)
CALCIUM SERPL-MCNC: 8 MG/DL (ref 8.5–10.1)
CHLORIDE SERPL-SCNC: 105 MMOL/L (ref 94–109)
CO2 SERPL-SCNC: 29 MMOL/L (ref 20–32)
COLOR UR AUTO: YELLOW
CREAT SERPL-MCNC: 0.61 MG/DL (ref 0.66–1.25)
ERYTHROCYTE [DISTWIDTH] IN BLOOD BY AUTOMATED COUNT: 16.5 % (ref 10–15)
GFR SERPL CREATININE-BSD FRML MDRD: >90 ML/MIN/1.7M2
GLUCOSE BLDC GLUCOMTR-MCNC: 89 MG/DL (ref 70–99)
GLUCOSE SERPL-MCNC: 191 MG/DL (ref 70–99)
GLUCOSE UR STRIP-MCNC: NEGATIVE MG/DL
HCT VFR BLD AUTO: 28.4 % (ref 40–53)
HGB BLD-MCNC: 12.5 G/DL (ref 13.3–17.7)
HGB BLD-MCNC: 8.6 G/DL (ref 13.3–17.7)
HGB UR QL STRIP: NEGATIVE
INTERPRETATION ECG - MUSE: NORMAL
KETONES UR STRIP-MCNC: 40 MG/DL
LEUKOCYTE ESTERASE UR QL STRIP: NEGATIVE
Lab: ABNORMAL
MCH RBC QN AUTO: 25.8 PG (ref 26.5–33)
MCHC RBC AUTO-ENTMCNC: 30.3 G/DL (ref 31.5–36.5)
MCV RBC AUTO: 85 FL (ref 78–100)
MUCOUS THREADS #/AREA URNS LPF: PRESENT /LPF
NITRATE UR QL: NEGATIVE
PH UR STRIP: 6.5 PH (ref 5–7)
PLATELET # BLD AUTO: 286 10E9/L (ref 150–450)
POTASSIUM SERPL-SCNC: 4.5 MMOL/L (ref 3.4–5.3)
RBC # BLD AUTO: 3.33 10E12/L (ref 4.4–5.9)
RBC #/AREA URNS AUTO: 1 /HPF (ref 0–2)
SODIUM SERPL-SCNC: 140 MMOL/L (ref 133–144)
SOURCE: ABNORMAL
SP GR UR STRIP: 1.02 (ref 1–1.03)
SPECIMEN SOURCE: ABNORMAL
UROBILINOGEN UR STRIP-MCNC: NORMAL MG/DL (ref 0–2)
WBC # BLD AUTO: 7.9 10E9/L (ref 4–11)
WBC #/AREA URNS AUTO: <1 /HPF (ref 0–5)

## 2018-11-02 PROCEDURE — G0463 HOSPITAL OUTPT CLINIC VISIT: HCPCS | Mod: 25 | Performed by: RADIOLOGY

## 2018-11-02 PROCEDURE — 71000014 ZZH RECOVERY PHASE 1 LEVEL 2 FIRST HR: Performed by: SPECIALIST

## 2018-11-02 PROCEDURE — 25000125 ZZHC RX 250: Performed by: NURSE ANESTHETIST, CERTIFIED REGISTERED

## 2018-11-02 PROCEDURE — 77290 THER RAD SIMULAJ FIELD CPLX: CPT | Performed by: RADIOLOGY

## 2018-11-02 PROCEDURE — 86901 BLOOD TYPING SEROLOGIC RH(D): CPT | Performed by: ANESTHESIOLOGY

## 2018-11-02 PROCEDURE — 85027 COMPLETE CBC AUTOMATED: CPT | Performed by: PHYSICIAN ASSISTANT

## 2018-11-02 PROCEDURE — 99207 ZZC CONSULT E&M CHANGED TO INITIAL LEVEL: CPT | Performed by: PHYSICIAN ASSISTANT

## 2018-11-02 PROCEDURE — 25000128 H RX IP 250 OP 636: Performed by: NURSE ANESTHETIST, CERTIFIED REGISTERED

## 2018-11-02 PROCEDURE — 77412 RADIATION TX DELIVERY LVL 3: CPT | Performed by: RADIOLOGY

## 2018-11-02 PROCEDURE — 77334 RADIATION TREATMENT AID(S): CPT | Performed by: RADIOLOGY

## 2018-11-02 PROCEDURE — 12000008 ZZH R&B INTERMEDIATE UMMC

## 2018-11-02 PROCEDURE — 40000901 ZZH STATISTIC WOC PT EDUCATION, 0-15 MIN

## 2018-11-02 PROCEDURE — 40000170 ZZH STATISTIC PRE-PROCEDURE ASSESSMENT II: Performed by: SPECIALIST

## 2018-11-02 PROCEDURE — 86923 COMPATIBILITY TEST ELECTRIC: CPT | Performed by: ANESTHESIOLOGY

## 2018-11-02 PROCEDURE — 36000059 ZZH SURGERY LEVEL 3 EA 15 ADDTL MIN UMMC: Performed by: SPECIALIST

## 2018-11-02 PROCEDURE — 77336 RADIATION PHYSICS CONSULT: CPT | Performed by: RADIOLOGY

## 2018-11-02 PROCEDURE — 36415 COLL VENOUS BLD VENIPUNCTURE: CPT | Performed by: PHYSICIAN ASSISTANT

## 2018-11-02 PROCEDURE — 27210794 ZZH OR GENERAL SUPPLY STERILE: Performed by: SPECIALIST

## 2018-11-02 PROCEDURE — 37000008 ZZH ANESTHESIA TECHNICAL FEE, 1ST 30 MIN: Performed by: SPECIALIST

## 2018-11-02 PROCEDURE — 36415 COLL VENOUS BLD VENIPUNCTURE: CPT | Performed by: ANESTHESIOLOGY

## 2018-11-02 PROCEDURE — 36000061 ZZH SURGERY LEVEL 3 W FLUORO 1ST 30 MIN - UMMC: Performed by: SPECIALIST

## 2018-11-02 PROCEDURE — 0QB60ZZ EXCISION OF RIGHT UPPER FEMUR, OPEN APPROACH: ICD-10-PCS | Performed by: SPECIALIST

## 2018-11-02 PROCEDURE — 25000128 H RX IP 250 OP 636: Performed by: ANESTHESIOLOGY

## 2018-11-02 PROCEDURE — 25000566 ZZH SEVOFLURANE, EA 15 MIN: Performed by: SPECIALIST

## 2018-11-02 PROCEDURE — 25000128 H RX IP 250 OP 636: Performed by: SPECIALIST

## 2018-11-02 PROCEDURE — 81001 URINALYSIS AUTO W/SCOPE: CPT | Performed by: PHYSICIAN ASSISTANT

## 2018-11-02 PROCEDURE — 99222 1ST HOSP IP/OBS MODERATE 55: CPT | Performed by: PHYSICIAN ASSISTANT

## 2018-11-02 PROCEDURE — 93005 ELECTROCARDIOGRAM TRACING: CPT

## 2018-11-02 PROCEDURE — 93010 ELECTROCARDIOGRAM REPORT: CPT | Performed by: INTERNAL MEDICINE

## 2018-11-02 PROCEDURE — 37000009 ZZH ANESTHESIA TECHNICAL FEE, EACH ADDTL 15 MIN: Performed by: SPECIALIST

## 2018-11-02 PROCEDURE — 00000146 ZZHCL STATISTIC GLUCOSE BY METER IP

## 2018-11-02 PROCEDURE — 77307 TELETHX ISODOSE PLAN CPLX: CPT | Performed by: RADIOLOGY

## 2018-11-02 PROCEDURE — 86900 BLOOD TYPING SEROLOGIC ABO: CPT | Performed by: ANESTHESIOLOGY

## 2018-11-02 PROCEDURE — 25000128 H RX IP 250 OP 636: Performed by: ORTHOPAEDIC SURGERY

## 2018-11-02 PROCEDURE — 27210995 ZZH RX 272: Performed by: SPECIALIST

## 2018-11-02 PROCEDURE — 80048 BASIC METABOLIC PNL TOTAL CA: CPT | Performed by: PHYSICIAN ASSISTANT

## 2018-11-02 PROCEDURE — P9041 ALBUMIN (HUMAN),5%, 50ML: HCPCS | Performed by: NURSE ANESTHETIST, CERTIFIED REGISTERED

## 2018-11-02 PROCEDURE — 85018 HEMOGLOBIN: CPT | Performed by: ANESTHESIOLOGY

## 2018-11-02 PROCEDURE — 86850 RBC ANTIBODY SCREEN: CPT | Performed by: ANESTHESIOLOGY

## 2018-11-02 PROCEDURE — 25000125 ZZHC RX 250: Performed by: SPECIALIST

## 2018-11-02 RX ORDER — ONDANSETRON 2 MG/ML
INJECTION INTRAMUSCULAR; INTRAVENOUS PRN
Status: DISCONTINUED | OUTPATIENT
Start: 2018-11-02 | End: 2018-11-02

## 2018-11-02 RX ORDER — LIDOCAINE HYDROCHLORIDE 20 MG/ML
INJECTION, SOLUTION INFILTRATION; PERINEURAL PRN
Status: DISCONTINUED | OUTPATIENT
Start: 2018-11-02 | End: 2018-11-02

## 2018-11-02 RX ORDER — ALBUMIN, HUMAN INJ 5% 5 %
SOLUTION INTRAVENOUS CONTINUOUS PRN
Status: DISCONTINUED | OUTPATIENT
Start: 2018-11-02 | End: 2018-11-02

## 2018-11-02 RX ORDER — ACETAMINOPHEN 325 MG/1
975 TABLET ORAL EVERY 8 HOURS
Status: DISPENSED | OUTPATIENT
Start: 2018-11-02 | End: 2018-11-05

## 2018-11-02 RX ORDER — ONDANSETRON 4 MG/1
4 TABLET, ORALLY DISINTEGRATING ORAL EVERY 6 HOURS PRN
Status: DISCONTINUED | OUTPATIENT
Start: 2018-11-02 | End: 2018-11-05 | Stop reason: HOSPADM

## 2018-11-02 RX ORDER — ONDANSETRON 2 MG/ML
4 INJECTION INTRAMUSCULAR; INTRAVENOUS EVERY 30 MIN PRN
Status: DISCONTINUED | OUTPATIENT
Start: 2018-11-02 | End: 2018-11-02 | Stop reason: HOSPADM

## 2018-11-02 RX ORDER — MAGNESIUM HYDROXIDE 1200 MG/15ML
LIQUID ORAL PRN
Status: DISCONTINUED | OUTPATIENT
Start: 2018-11-02 | End: 2018-11-02 | Stop reason: HOSPADM

## 2018-11-02 RX ORDER — FENTANYL CITRATE 50 UG/ML
25-50 INJECTION, SOLUTION INTRAMUSCULAR; INTRAVENOUS
Status: DISCONTINUED | OUTPATIENT
Start: 2018-11-02 | End: 2018-11-02 | Stop reason: HOSPADM

## 2018-11-02 RX ORDER — ASCORBIC ACID 500 MG
500 TABLET ORAL 2 TIMES DAILY
Status: DISCONTINUED | OUTPATIENT
Start: 2018-11-02 | End: 2018-11-03

## 2018-11-02 RX ORDER — DIPHENHYDRAMINE HCL 25 MG
25 CAPSULE ORAL EVERY 6 HOURS PRN
Status: DISCONTINUED | OUTPATIENT
Start: 2018-11-02 | End: 2018-11-05 | Stop reason: HOSPADM

## 2018-11-02 RX ORDER — SODIUM CHLORIDE, SODIUM LACTATE, POTASSIUM CHLORIDE, CALCIUM CHLORIDE 600; 310; 30; 20 MG/100ML; MG/100ML; MG/100ML; MG/100ML
INJECTION, SOLUTION INTRAVENOUS CONTINUOUS
Status: DISCONTINUED | OUTPATIENT
Start: 2018-11-02 | End: 2018-11-03

## 2018-11-02 RX ORDER — HYDROMORPHONE HYDROCHLORIDE 1 MG/ML
.3-.5 INJECTION, SOLUTION INTRAMUSCULAR; INTRAVENOUS; SUBCUTANEOUS EVERY 5 MIN PRN
Status: DISCONTINUED | OUTPATIENT
Start: 2018-11-02 | End: 2018-11-02 | Stop reason: HOSPADM

## 2018-11-02 RX ORDER — ACETAMINOPHEN 325 MG/1
650 TABLET ORAL EVERY 4 HOURS PRN
Status: DISCONTINUED | OUTPATIENT
Start: 2018-11-05 | End: 2018-11-05 | Stop reason: HOSPADM

## 2018-11-02 RX ORDER — LANOLIN ALCOHOL/MO/W.PET/CERES
3 CREAM (GRAM) TOPICAL
Status: DISCONTINUED | OUTPATIENT
Start: 2018-11-02 | End: 2018-11-05 | Stop reason: HOSPADM

## 2018-11-02 RX ORDER — CALCIUM CARBONATE 500 MG/1
500 TABLET, CHEWABLE ORAL DAILY PRN
Status: DISCONTINUED | OUTPATIENT
Start: 2018-11-02 | End: 2018-11-05 | Stop reason: HOSPADM

## 2018-11-02 RX ORDER — METOCLOPRAMIDE 10 MG/1
10 TABLET ORAL EVERY 6 HOURS PRN
Status: DISCONTINUED | OUTPATIENT
Start: 2018-11-02 | End: 2018-11-05 | Stop reason: HOSPADM

## 2018-11-02 RX ORDER — ONDANSETRON 2 MG/ML
4 INJECTION INTRAMUSCULAR; INTRAVENOUS EVERY 6 HOURS PRN
Status: DISCONTINUED | OUTPATIENT
Start: 2018-11-02 | End: 2018-11-05 | Stop reason: HOSPADM

## 2018-11-02 RX ORDER — SODIUM CHLORIDE, SODIUM LACTATE, POTASSIUM CHLORIDE, CALCIUM CHLORIDE 600; 310; 30; 20 MG/100ML; MG/100ML; MG/100ML; MG/100ML
INJECTION, SOLUTION INTRAVENOUS CONTINUOUS
Status: DISCONTINUED | OUTPATIENT
Start: 2018-11-02 | End: 2018-11-02 | Stop reason: HOSPADM

## 2018-11-02 RX ORDER — HYDROMORPHONE HYDROCHLORIDE 1 MG/ML
.3-.5 INJECTION, SOLUTION INTRAMUSCULAR; INTRAVENOUS; SUBCUTANEOUS
Status: DISCONTINUED | OUTPATIENT
Start: 2018-11-02 | End: 2018-11-03

## 2018-11-02 RX ORDER — FENTANYL CITRATE 50 UG/ML
INJECTION, SOLUTION INTRAMUSCULAR; INTRAVENOUS PRN
Status: DISCONTINUED | OUTPATIENT
Start: 2018-11-02 | End: 2018-11-02

## 2018-11-02 RX ORDER — EPHEDRINE SULFATE 50 MG/ML
INJECTION, SOLUTION INTRAMUSCULAR; INTRAVENOUS; SUBCUTANEOUS PRN
Status: DISCONTINUED | OUTPATIENT
Start: 2018-11-02 | End: 2018-11-02

## 2018-11-02 RX ORDER — CIPROFLOXACIN 250 MG/1
250 TABLET, FILM COATED ORAL 2 TIMES DAILY
Status: DISCONTINUED | OUTPATIENT
Start: 2018-11-02 | End: 2018-11-03

## 2018-11-02 RX ORDER — PROPOFOL 10 MG/ML
INJECTION, EMULSION INTRAVENOUS PRN
Status: DISCONTINUED | OUTPATIENT
Start: 2018-11-02 | End: 2018-11-02

## 2018-11-02 RX ORDER — CLOPIDOGREL BISULFATE 75 MG/1
75 TABLET ORAL DAILY
Status: CANCELLED | OUTPATIENT
Start: 2018-11-02

## 2018-11-02 RX ORDER — NALOXONE HYDROCHLORIDE 0.4 MG/ML
.1-.4 INJECTION, SOLUTION INTRAMUSCULAR; INTRAVENOUS; SUBCUTANEOUS
Status: DISCONTINUED | OUTPATIENT
Start: 2018-11-02 | End: 2018-11-05 | Stop reason: HOSPADM

## 2018-11-02 RX ORDER — ASPIRIN 81 MG/1
81 TABLET, CHEWABLE ORAL DAILY
Status: CANCELLED | OUTPATIENT
Start: 2018-11-02

## 2018-11-02 RX ORDER — CEFAZOLIN SODIUM 1 G/3ML
1 INJECTION, POWDER, FOR SOLUTION INTRAMUSCULAR; INTRAVENOUS EVERY 8 HOURS
Status: COMPLETED | OUTPATIENT
Start: 2018-11-02 | End: 2018-11-03

## 2018-11-02 RX ORDER — LIDOCAINE 40 MG/G
CREAM TOPICAL
Status: DISCONTINUED | OUTPATIENT
Start: 2018-11-02 | End: 2018-11-05 | Stop reason: HOSPADM

## 2018-11-02 RX ORDER — NALOXONE HYDROCHLORIDE 0.4 MG/ML
.1-.4 INJECTION, SOLUTION INTRAMUSCULAR; INTRAVENOUS; SUBCUTANEOUS
Status: ACTIVE | OUTPATIENT
Start: 2018-11-02 | End: 2018-11-03

## 2018-11-02 RX ORDER — HYDROXYZINE HYDROCHLORIDE 25 MG/1
50 TABLET, FILM COATED ORAL EVERY 6 HOURS PRN
Status: DISCONTINUED | OUTPATIENT
Start: 2018-11-02 | End: 2018-11-05 | Stop reason: HOSPADM

## 2018-11-02 RX ORDER — ATORVASTATIN CALCIUM 20 MG/1
20 TABLET, FILM COATED ORAL AT BEDTIME
Status: DISCONTINUED | OUTPATIENT
Start: 2018-11-02 | End: 2018-11-03

## 2018-11-02 RX ORDER — CEFAZOLIN SODIUM 2 G/100ML
2 INJECTION, SOLUTION INTRAVENOUS
Status: COMPLETED | OUTPATIENT
Start: 2018-11-02 | End: 2018-11-02

## 2018-11-02 RX ORDER — ONDANSETRON 4 MG/1
4 TABLET, ORALLY DISINTEGRATING ORAL EVERY 30 MIN PRN
Status: DISCONTINUED | OUTPATIENT
Start: 2018-11-02 | End: 2018-11-02 | Stop reason: HOSPADM

## 2018-11-02 RX ORDER — LIDOCAINE 40 MG/G
CREAM TOPICAL
Status: DISCONTINUED | OUTPATIENT
Start: 2018-11-02 | End: 2018-11-02 | Stop reason: HOSPADM

## 2018-11-02 RX ORDER — METOCLOPRAMIDE HYDROCHLORIDE 5 MG/ML
10 INJECTION INTRAMUSCULAR; INTRAVENOUS EVERY 6 HOURS PRN
Status: DISCONTINUED | OUTPATIENT
Start: 2018-11-02 | End: 2018-11-05 | Stop reason: HOSPADM

## 2018-11-02 RX ORDER — DIPHENHYDRAMINE HYDROCHLORIDE 50 MG/ML
25 INJECTION INTRAMUSCULAR; INTRAVENOUS EVERY 6 HOURS PRN
Status: DISCONTINUED | OUTPATIENT
Start: 2018-11-02 | End: 2018-11-05 | Stop reason: HOSPADM

## 2018-11-02 RX ORDER — BISACODYL 10 MG
10 SUPPOSITORY, RECTAL RECTAL DAILY
Status: DISCONTINUED | OUTPATIENT
Start: 2018-11-03 | End: 2018-11-05 | Stop reason: HOSPADM

## 2018-11-02 RX ORDER — PROCHLORPERAZINE MALEATE 5 MG
10 TABLET ORAL EVERY 6 HOURS PRN
Status: DISCONTINUED | OUTPATIENT
Start: 2018-11-02 | End: 2018-11-05 | Stop reason: HOSPADM

## 2018-11-02 RX ADMIN — SUGAMMADEX 140 MG: 100 INJECTION, SOLUTION INTRAVENOUS at 09:53

## 2018-11-02 RX ADMIN — SODIUM CHLORIDE, POTASSIUM CHLORIDE, SODIUM LACTATE AND CALCIUM CHLORIDE: 600; 310; 30; 20 INJECTION, SOLUTION INTRAVENOUS at 07:10

## 2018-11-02 RX ADMIN — ONDANSETRON 4 MG: 2 INJECTION INTRAMUSCULAR; INTRAVENOUS at 09:35

## 2018-11-02 RX ADMIN — VASOPRESSIN 1 UNITS: 20 INJECTION, SOLUTION INTRAMUSCULAR; SUBCUTANEOUS at 09:18

## 2018-11-02 RX ADMIN — MIDAZOLAM 2 MG: 1 INJECTION INTRAMUSCULAR; INTRAVENOUS at 07:24

## 2018-11-02 RX ADMIN — FENTANYL CITRATE 100 MCG: 50 INJECTION, SOLUTION INTRAMUSCULAR; INTRAVENOUS at 07:37

## 2018-11-02 RX ADMIN — CEFAZOLIN 1 G: 1 INJECTION, POWDER, FOR SOLUTION INTRAMUSCULAR; INTRAVENOUS at 17:14

## 2018-11-02 RX ADMIN — PHENYLEPHRINE HYDROCHLORIDE 200 MCG: 10 INJECTION, SOLUTION INTRAMUSCULAR; INTRAVENOUS; SUBCUTANEOUS at 07:37

## 2018-11-02 RX ADMIN — CEFAZOLIN SODIUM 2 G: 2 INJECTION, SOLUTION INTRAVENOUS at 07:45

## 2018-11-02 RX ADMIN — SODIUM CHLORIDE, POTASSIUM CHLORIDE, SODIUM LACTATE AND CALCIUM CHLORIDE: 600; 310; 30; 20 INJECTION, SOLUTION INTRAVENOUS at 09:45

## 2018-11-02 RX ADMIN — VASOPRESSIN 1 UNITS: 20 INJECTION, SOLUTION INTRAMUSCULAR; SUBCUTANEOUS at 09:31

## 2018-11-02 RX ADMIN — PHENYLEPHRINE HYDROCHLORIDE 100 MCG: 10 INJECTION, SOLUTION INTRAMUSCULAR; INTRAVENOUS; SUBCUTANEOUS at 08:41

## 2018-11-02 RX ADMIN — ROCURONIUM BROMIDE 50 MG: 10 INJECTION INTRAVENOUS at 07:37

## 2018-11-02 RX ADMIN — PHENYLEPHRINE HYDROCHLORIDE 0.5 MCG/KG/MIN: 10 INJECTION, SOLUTION INTRAMUSCULAR; INTRAVENOUS; SUBCUTANEOUS at 08:02

## 2018-11-02 RX ADMIN — ALBUMIN HUMAN: 0.05 INJECTION, SOLUTION INTRAVENOUS at 07:45

## 2018-11-02 RX ADMIN — SODIUM CHLORIDE, POTASSIUM CHLORIDE, SODIUM LACTATE AND CALCIUM CHLORIDE: 600; 310; 30; 20 INJECTION, SOLUTION INTRAVENOUS at 23:53

## 2018-11-02 RX ADMIN — PROPOFOL 150 MG: 10 INJECTION, EMULSION INTRAVENOUS at 07:37

## 2018-11-02 RX ADMIN — Medication 10 MG: at 08:50

## 2018-11-02 RX ADMIN — PHENYLEPHRINE HYDROCHLORIDE 100 MCG: 10 INJECTION, SOLUTION INTRAMUSCULAR; INTRAVENOUS; SUBCUTANEOUS at 07:57

## 2018-11-02 RX ADMIN — LIDOCAINE HYDROCHLORIDE 100 MG: 20 INJECTION, SOLUTION INFILTRATION; PERINEURAL at 07:37

## 2018-11-02 RX ADMIN — CEFAZOLIN SODIUM 1 G: 2 INJECTION, SOLUTION INTRAVENOUS at 09:45

## 2018-11-02 ASSESSMENT — ACTIVITIES OF DAILY LIVING (ADL)
ADLS_ACUITY_SCORE: 30
ADLS_ACUITY_SCORE: 30

## 2018-11-02 NOTE — BRIEF OP NOTE
Orthopedic Brief Operative Note    Pre-operative diagnosis: Hip Pain, Heterotopic Ossification of Hip Right    Post-operative diagnosis: Same   Procedure: Right Hip Heterotopic Bone Resection   Surgeon: Toñito Kennedy MD   Assistant(s): Renato Munroe MD   Anesthesia: General endotracheal anesthesia   Estimated blood loss: 1000mL       Total urine output: Not measured   Drains: HV x2 deep right thigh   Specimens: None   Implants: None   Findings: Heterotopic bone present anterior thigh, significant improvement in hip ROM following resection   Complications: None   Disposition: Stable to PACU         Plan:  Weight bearing status: Ok for all normal activities, including wheelchair and transfers  Bracing:  None.  CPM:    R hip ROM 0-90 while inpatient, will not discharge with CPM  DVT PPx:  Will resume home ASA + plavix after final drain is removed at postop appointment  Antibiotics:  Ancef x24h  Pain control:  PO PRN, do not anticipate need for narcotic pain medications  Drain:  Monitor and chart output.  If <15cc/shift, may remove one prior to discharge. He will discharge with one drain in place, with plans to remove in clinic at [facility] already scheduled 1 week post op  Dressing:  Leave in place until follow up appointment if remains c/d/i  HO Prophylaxis:  XRT scheduled for 1200 today    Discharge plan:  Pending PT, post-op cares, stable vitals and Hgb. Anticipate 2-3 days  Follow-up:  1 week (11/12) with Dr Kennedy at [facility] (already scheduled)      Renato Munroe  Orthopaedic Surgery PGY-5  Pager:  [phone]

## 2018-11-02 NOTE — PROGRESS NOTES
Reviewed RN consult for Toe wound, provider order required for WOC service. WOC RN spoke to preop nurse Kristina who entered order and she indicates she will relay information to PACU nurse following surgery today. Pt will be admitted for weekend only and is currently followed by outpatient wound nurse. Bedside RN to obtain provider order/consult if still appropriate once admitted.  WOC consult completed by WOC- RN, await provider consult if services desired

## 2018-11-02 NOTE — PROGRESS NOTES
"Derek Enriquez is a 57 year old male patient.  No diagnosis found.  Past Medical History:   Diagnosis Date     CAD (coronary artery disease)     stent     Femur fracture (H)      Neurogenic bladder      Neurogenic bowel      Orthostatic hypotension      Paraplegia (H)      Thoracic spinal cord injury (H)      No current outpatient prescriptions on file.     No Known Allergies  Active Problems:    Status post hip surgery    Blood pressure 123/58, pulse 84, temperature 96.5  F (35.8  C), temperature source Oral, resp. rate 14, height 1.803 m (5' 11\"), weight 71.7 kg (158 lb), SpO2 100 %.    Subjective- comfortable - returning to XRT now  Shared photo of HO removed with patient    Objective- Drains ~ 250ml output  Dressing dry  Minimal swelling    Assessment & Plan  Needs air bed (ordered)  CPM 0-90 degrees 12-14 hrs is the goal- does not need it post discharge  Patient needs to be prone BID for 15-20\"  Anticipate discharge Monday with at least one drain in  Follow up with me at [facility] in one week (scheduled)  Follow Hgb    Toñito Kennedy  11/2/2018    "

## 2018-11-02 NOTE — PLAN OF CARE
"Problem: Patient Care Overview  Goal: Plan of Care/Patient Progress Review  Outcome: No Change  /66  Pulse 79  Temp 96.5  F (35.8  C) (Oral)  Resp 14  Ht 1.803 m (5' 11\")  Wt 71.7 kg (158 lb)  SpO2 100%  BMI 22.04 kg/m2    Reason for admission: PO right hip heterotopic bone resection today  Vitals: VSS  Activity: Reposition with assist of I-2  Pain: denies, but has lack of sensation below his umbilicus d/t a hx of spinal hematoma  Neuro:  AO x 4   Cardiac: WDL  Respiratory: WDL. Capnography  GI/: Neurogenic bowel and bladder.  Patient straight caths 4 times per day.  Diet: Regular diet advance as tolerated.  Lines: PIV on the left infusing.  Skin/Wounds:  healing ulcer on buttock covered in Mepilex.  Long incision on right thigh with Dermabond and Aquacel with scant discharge.  Wound on left great toe.  No orders at this time for wound care.      Continue to monitor and follow POC    Pete Madison RN on 11/2/2018 at 2:45 PM             "

## 2018-11-02 NOTE — MR AVS SNAPSHOT
After Visit Summary   11/2/2018    Derek Enriquez    MRN: [id number]           Patient Information     Date Of Birth          1960        Visit Information        Provider Department      11/2/2018 1:00 PM Sapphire Urban MD Radiation Oncology Clinic         Follow-ups after your visit        Your next 10 appointments already scheduled     Nov 05, 2018  3:15 PM CST   Lab with  LAB    Health Lab ([facility])    [address]  1st Floor  [facility] [id number] [phone]            Nov 05, 2018  3:30 PM CST   (Arrive by 3:15 PM)   RETURN LIPID VISIT with Anoop Jane MD   [facility] Care ([facility])    [address]  [address] [place] [id number] [phone]            Nov 05, 2018  4:00 PM CST   Ech Complete with UCECHCR4    Health Echo ([facility])    [address]  3rd Floor  [facility] [address] [phone]           1.  Please bring or wear a comfortable two-piece outfit. 2.  You may eat, drink and take your normal medicines. 3.  For any questions that cannot be answered, please contact the ordering physician 4.  Please do not wear perfumes or scented lotions on the day of your exam.            Mar 27, 2019  3:00 PM CDT   (Arrive by 2:45 PM)   Urodynamics with Candy Leary PA-C   [facility] and Inst for Prostate and Urologic Cancers ([facility])    [address] [place] [address]  4th Floor  [facility] [address] [phone]              Who to contact     Please call your clinic at [phone] to:    Ask questions about your health    Make or cancel appointments    Discuss your medicines    Learn about your test results    Speak to your doctor            Additional Information About Your Visit        MyChart Information     [employer] gives you secure access to your electronic health record. If you see a primary care provider,  you can also send messages to your care team and make appointments. If you have questions, please call your primary care clinic.  If you do not have a primary care provider, please call [phone] and they will assist you.      Your [employer] is an electronic gateway that provides easy, online access to your medical records. With [employer], you can request a clinic appointment, read your test results, renew a prescription or communicate with your care team.     To access your existing account, please contact your [facility] or call [phone] for assistance.        Care EveryWhere ID     This is your Care EveryWhere ID. This could be used by other organizations to access your [place] medical records  [id number]         Blood Pressure from Last 3 Encounters:   No data found for BP    Weight from Last 3 Encounters:   No data found for Wt              Today, you had the following     No orders found for display         Today's Medication Changes      Notice     This visit is during an admission. Changes to the med list made in this visit will be reflected in the After Visit Summary of the admission.             Primary Care Provider Office Phone # Fax #    Eugene Burrell [phone]       [facility] [address]  [facility] [address]        Equal Access to Services     AUSTIN WILSON : Hadii jace sullivano Sovianey, waaxda luqadaha, qaybta kaalmada adeegyada, minal hamlin. So [facility] [phone].    ATENCIÓN: Si habla español, tiene a humphrey disposición servicios gratuitos de asistencia lingüística. George al [phone].    We comply with applicable federal civil rights laws and [place] laws. We do not discriminate on the basis of race, color, national origin, age, disability, sex, sexual orientation, or gender identity.            Thank you!     Thank you for choosing RADIATION ONCOLOGY CLINIC  for your care. Our goal is always  to provide you with excellent care. Hearing back from our patients is one way we can continue to improve our services. Please take a few minutes to complete the written survey that you may receive in the mail after your visit with us. Thank you!             Your Updated Medication List - Protect others around you: Learn how to safely use, store and throw away your medicines at www.disposemymeds.org.      Notice     This visit is during an admission. Changes to the med list made in this visit will be reflected in the After Visit Summary of the admission.

## 2018-11-02 NOTE — LETTER
2018       RE: Derek Enriquez  [address]  [facility] [address]     Dear Colleague,    Thank you for referring your patient, Derek Enriquez, to the RADIATION ONCOLOGY CLINIC. Please see a copy of my visit note below.    [facility] [place]  Radiation Oncology Follow-up Note  2018    Name: Derek Enriquez  MRN: [id number]  : 1960    DISEASE TREATED: Heterotopic ossification (Left hip)      RADIATION THERAPY DELIVERED: 700 cGy in 1 fraction on 18 to the left hip       INTERVAL SINCE COMPLETION OF RT: Approximately 6 weeks     SUBJECTIVE: Mr. 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 The ossification was initially noted to involve the right femur and hip at the time of ORIF secondary to right subtrochanteric femoral fracture on 2018.  Due to symptoms of left hip stiffness and inability to tolerate an exo-walking apparatus, he was scheduled for resection of heterotopic ossification of the left hip on 18.  The initial plan to prevent heterotopic ossification recurrence was to give indomethacin which he started immediately following his surgery. However, cardiology recommended that he stop the indomethacin given the patient's need for Plavix and concern regarding bleeding. Therefore, he received prophylactic radiotherapy to the left hip 700 cGy in single fraction within 72 hours of his surgery.      He saw Dr. Kennedy on follow up on 10/18/18 who recommended to proceed with resection of heterotopic bone around the right hip and femur. The patient underwent surgery at [facility] earlier today. He is referred to us for prophylactic radiotherapy to the right hip. He tolerated the surgery well. He notes mild right pelvic pain and heaviness. His remaining ROS are unremarkable.     OBJECTIVE:  Vital Signs: There were no vitals taken for this visit.  Gen: In no acute distress,  lying on hospital bed with dressings over surgical site     Head: Normocephalic atraumatic  Pulm: Breathing comfortably on room air  CV: No visible cyanosis  Skin: Normal color and turgor of the visualized skin  MKS: Right hip: clean drain sites with no erythema or swelling.     IMPRESSION AND PLAN: Mr. 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 He received prophylactic radiotherapy to the left hip on 9/21/18 and tolerated it well.  He is an excellent candidate for prophylactic radiotherapy to the right hip for heterotopic ossification prevention. We can explained to the patient the benefits, logistics, and radiation related side effects. The patient signed informed consent and we will perform treatment planning simulation and treatment today, 700 cGy in 1 fraction.     The patient was seen and discussed with staff, Dr. Urban. Thank you for involving us in the care of this patient. Please feel free to contact us with questions or concerns at any time.    Jason Pavon MD  PGY-2 Resident, Radiation Oncology  [facility]    Mr. Enriquez was seen and examined by me. Note above by Dr. Pavon was reviewed and edited by me and reflects our mutual findings and plan of care.    Sapphire Urban MD  Department of Radiation Oncology  [facility]

## 2018-11-02 NOTE — ANESTHESIA PREPROCEDURE EVALUATION
Anesthesia Pre-Procedure Evaluation    Patient: Derek Enriquez   MRN:     [id number] Gender:   male   Age:    57 year old :      1960        Preoperative Diagnosis: Hip Pain, Eterotopic Ossification of Hip Right    Procedure(s):  Right Hip Heterotopic Bone Resection  Open Reduction Internal Fixation Rodding Intramedullary Femur      Past Medical History:   Diagnosis Date     CAD (coronary artery disease)     stent     Femur fracture (H)      Neurogenic bladder      Neurogenic bowel      Orthostatic hypotension      Paraplegia (H)      Thoracic spinal cord injury (H)       Past Surgical History:   Procedure Laterality Date     ARTHROTOMY HIP Left 2018    Procedure: ARTHROTOMY HIP;  Left Hip Heterotopic Bone Resection;  Surgeon: Toñito Kennedy MD;  [facility]     CARDIAC SURGERY       LAMINECTOMY THORACIC THREE LEVELS N/A 3/9/2018    Procedure: LAMINECTOMY THORACIC THREE LEVELS;  Thoracic 9-12 Laminectomy Evacuation of Subdural Hematoma, C-Arm, Prone, Gel Rolls.;  Surgeon: Abhijeet Joe MD;  [facility]: [facility]     OPEN REDUCTION INTERNAL FIXATION RODDING INTRAMEDULLARY FEMUR Right 2018    Procedure: OPEN REDUCTION INTERNAL FIXATION RODDING INTRAMEDULLARY FEMUR;  Right Open Reduction Internal Fixation Subtrochanteric Femur Fracture;  Surgeon: Toñito Kennedy MD;  Location: [facility] OR     wisdom teeth extraction            Anesthesia Evaluation     .             ROS/MED HX    ENT/Pulmonary:       Neurologic:       Cardiovascular:     (+) --CAD, --. : . . . :. .       METS/Exercise Tolerance:     Hematologic:         Musculoskeletal:         GI/Hepatic:         Renal/Genitourinary:         Endo:         Psychiatric:         Infectious Disease:         Malignancy:         Other:                     NIMISHAG VAHE AN PHYSICAL EXAM    Lab Results   Component Value Date    WBC 5.9 2018    HGB 12.5 (L) 2018    HCT 30.2 (L) 2018     (H) 2018      "09/21/2018    POTASSIUM 3.9 09/21/2018    CHLORIDE 107 09/21/2018    CO2 30 09/21/2018    BUN 17 09/21/2018    CR 0.72 09/21/2018    GLC 97 09/21/2018    PAT 8.2 (L) 09/21/2018    PHOS 2.7 03/12/2018    MAG 1.9 03/12/2018    ALBUMIN 2.3 (L) 06/18/2018    PROTTOTAL 6.0 (L) 06/18/2018    ALT 68 06/18/2018    AST 70 (H) 06/18/2018    ALKPHOS 370 (H) 06/18/2018    BILITOTAL 0.4 06/18/2018    PTT 33 05/23/2018    INR 1.20 (H) 05/23/2018    FIBR 407 03/09/2018       Preop Vitals  BP Readings from Last 3 Encounters:   11/02/18 120/74   11/01/18 122/59   10/04/18 (!) 87/49    Pulse Readings from Last 3 Encounters:   11/01/18 71   10/04/18 80   09/19/18 70      Resp Readings from Last 3 Encounters:   11/02/18 16   10/04/18 16   09/22/18 16    SpO2 Readings from Last 3 Encounters:   11/02/18 100%   09/22/18 100%   06/26/18 97%      Temp Readings from Last 1 Encounters:   11/02/18 36.6  C (97.9  F) (Oral)    Ht Readings from Last 1 Encounters:   11/02/18 1.803 m (5' 11\")      Wt Readings from Last 1 Encounters:   11/02/18 71.7 kg (158 lb)    Estimated body mass index is 22.04 kg/(m^2) as calculated from the following:    Height as of this encounter: 1.803 m (5' 11\").    Weight as of this encounter: 71.7 kg (158 lb).     LDA:  Right Groin Interventional Procedure Access (Active)   Site Assessment WDL 5/23/2018  1:15 PM   Hemostasis management Steri-strips intact 5/23/2018  1:15 PM   Femoral Bruit present 5/23/2018  1:00 PM   CMS Right Extremity WDL 5/23/2018  1:15 PM   Dorsalis Pulse - Right Leg Normal 5/23/2018  1:15 PM   Popliteal Pulse - Right Leg Normal 5/23/2018  1:15 PM   Posterior Tibial Pulse - Right Leg Normal 5/23/2018  1:15 PM   Number of days:236       Closed/Suction Drain 1 Left;Posterior Thigh Accordion 10 [place] (Active)   Site Description UTV 9/21/2018  8:55 PM   Dressing Status Normal: Clean, Dry & Intact 9/21/2018  8:55 PM   Drainage Appearance Bloody/Bright Red 9/21/2018  8:55 PM   Status Other (Comment) " 9/20/2018  6:30 PM   Output (ml) 30 ml 9/22/2018  6:15 AM   Number of days:44       Closed/Suction Drain 2 Left Hip Accordion 10 Hungarian (Active)   Site Description WDL 9/22/2018  8:55 AM   Dressing Status Normal: Clean, Dry & Intact 9/21/2018  8:55 PM   Drainage Appearance Bloody/Bright Red 9/21/2018  8:55 PM   Status Other (Comment) 9/20/2018  6:30 PM   Output (ml) 5 ml 9/21/2018  6:00 PM   Number of days:44        Anesthesia attending addendum    Prior to anesthetic I have examined the patient, reviewed the medical history, and discussed the ssessment and plan with the anesthesia and surgery teams.  57 year old male who  has a past medical history of CAD (coronary artery disease); Femur fracture (H); Neurogenic bladder; Neurogenic bowel; Orthostatic hypotension; Paraplegia (H); and Thoracic spinal cord injury (H). to OR for Procedure(s):  Right Hip Heterotopic Bone Resection  Open Reduction Internal Fixation Rodding Intramedullary Femur   NPO status adequate.    Hemoglobin   Date Value Ref Range Status   11/02/2018 12.5 (L) 13.3 - 17.7 g/dL Final     Potassium   Date Value Ref Range Status   09/21/2018 3.9 3.4 - 5.3 mmol/L Final     ECHO 5/2018 Interpretation Summary   Left ventricular systolic function is normal.  The visual ejection fraction is estimated at 60-65%.  No regional wall motion abnormalities noted.  The study was technically difficult. There is no comparison study available.  _____________________________________________________________________________    Plan for GA, standard monitors, large bore IVs, PONV prophylaxis.  Antibiotics per primary service.  Anesthetic risks discussed with the patient, consent in chart.    Patricia Wright MD  11/02/18    Assessment:   ASA SCORE: 3    NPO Status: > 6 hours since completed Solid [employer]   Documentation: H&P complete; Preop Testing complete; Consents complete   Proceeding: Proceed without further delay  Tobacco Use:  NO Active use of Tobacco/UNKNOWN Tobacco use  status     Plan:   Anes. Type:  General   Pre-Induction: Midazolam IV; Acetaminophen PO   Induction:  IV (Standard)   Airway: Oral ETT   Access/Monitoring: PIV   Maintenance: Balanced   Emergence: Procedure Site   Logistics: Same Day Surgery     Postop Pain/Sedation Strategy:  Standard-Options: Opioids PRN     PONV Management:  Adult Risk Factors:, Non-Smoker, Postop Opioids  Prevention: Ondansetron     CONSENT: Direct conversation   Plan and risks discussed with: Patient   Blood Products: Consented (ALL Blood Products)                         Patricia Wright MD

## 2018-11-02 NOTE — PROGRESS NOTES
Per Wound Nurse Mu: Wound consult for toe wound on left foot needs MD order. Plan: New consult will need to be placed by MD when patient admitted to floor.

## 2018-11-02 NOTE — ANESTHESIA POSTPROCEDURE EVALUATION
Anesthesia POST Procedure Evaluation    Patient: Derek Enriquez   MRN:     [id number] Gender:   male   Age:    57 year old :      1960        Preoperative Diagnosis: Hip Pain, Eterotopic Ossification of Hip Right    Procedure(s):  Right Hip Heterotopic Bone Resection   Postop Comments: No value filed.       Anesthesia Type:  General    Reportable Event: NO     PAIN: Uncomplicated   Sign Out status: Comfortable, Well controlled pain     PONV: No PONV   Sign Out status:  No Nausea or Vomiting     Neuro/Psych: Uneventful perioperative course   Sign Out Status: Preoperative baseline; Age appropriate mentation     Airway/Resp.: Uneventful perioperative course   Sign Out Status: Non labored breathing, age appropriate RR; Resp. Status within EXPECTED Parameters     CV: Uneventful perioperative course   Sign Out status: Appropriate BP and perfusion indices; Appropriate HR/Rhythm     Disposition:   Sign Out in:  PACU  Disposition:  Floor  Recovery Course: Uneventful  Follow-Up: Not required           Last Anesthesia Record Vitals:  CRNA VITALS  2018 0938 - 2018 1038      2018             Resp Rate (observed): (!)  2          Last PACU/Preop Vitals:  Vitals:    18 0603 18 1010   BP: 120/74 97/53   Resp: 16 14   Temp: 36.6  C (97.9  F) 36.4  C (97.5  F)   SpO2: 100% 100%         Electronically Signed By: Patricia Wright MD, 2018, 10:47 AM

## 2018-11-02 NOTE — PROGRESS NOTES
[facility], [place]  Radiation Oncology Follow-up Note  2018    Name: Derek Enriquez  MRN: [id number]  : 1960    DISEASE TREATED: Heterotopic ossification (Left hip)      RADIATION THERAPY DELIVERED: 700 cGy in 1 fraction on 18 to the left hip       INTERVAL SINCE COMPLETION OF RT: Approximately 6 weeks     SUBJECTIVE: Mr. 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 The ossification was initially noted to involve the right femur and hip at the time of ORIF secondary to right subtrochanteric femoral fracture on 2018.  Due to symptoms of left hip stiffness and inability to tolerate an exo-walking apparatus, he was scheduled for resection of heterotopic ossification of the left hip on 18.  The initial plan to prevent heterotopic ossification recurrence was to give indomethacin which he started immediately following his surgery. However, cardiology recommended that he stop the indomethacin given the patient's need for Plavix and concern regarding bleeding. Therefore, he received prophylactic radiotherapy to the left hip 700 cGy in single fraction within 72 hours of his surgery.      He saw Dr. Kennedy on follow up on 10/18/18 who recommended to proceed with resection of heterotopic bone around the right hip and femur. The patient underwent surgery at [facility] earlier today. He is referred to us for prophylactic radiotherapy to the right hip. He tolerated the surgery well. He notes mild right pelvic pain and heaviness. His remaining ROS are unremarkable.     OBJECTIVE:  Vital Signs: There were no vitals taken for this visit.  Gen: In no acute distress, lying on hospital bed with dressings over surgical site     Head: Normocephalic atraumatic  Pulm: Breathing comfortably on room air  CV: No visible cyanosis  Skin: Normal color and turgor of the visualized skin  MKS: Right hip:  clean drain sites with no erythema or swelling.     IMPRESSION AND PLAN: Mr. 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 He received prophylactic radiotherapy to the left hip on 9/21/18 and tolerated it well.  He is an excellent candidate for prophylactic radiotherapy to the right hip for heterotopic ossification prevention. We can explained to the patient the benefits, logistics, and radiation related side effects. The patient signed informed consent and we will perform treatment planning simulation and treatment today, 700 cGy in 1 fraction.     The patient was seen and discussed with staff, Dr. Urban. Thank you for involving us in the care of this patient. Please feel free to contact us with questions or concerns at any time.    Jason Pavon MD  PGY-2 Resident, Radiation Oncology  [facility]    Mr. Enriquez was seen and examined by me. Note above by Dr. Pavon was reviewed and edited by me and reflects our mutual findings and plan of care.    Sapphire Urban MD  Department of Radiation Oncology  [facility]

## 2018-11-02 NOTE — CONSULTS
[facility], [place]    Internal Medicine Consult - Gold Service       Date of Admission:  11/2/2018  Consult Requested by: Dr. 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 Internal medicine consulted for assistance with management in the perioperative phase    S/p R hip heterotopic bone resection: Performed by Dr. Kennedy on 11/20/2018 for R heterotopic ossification. H/o prophylactic radiation hip for heterotopic ossification. 1L EBL. Drains x2 from right hip. Left hip heterotopic resection 9/2018. Preop hgb 12.5  - Orthopedics primary   - Post-op BMP, CBC in the setting of high EBL  - Post-op pain management per primary  - ADAT  - Continue Didronel   - PTA ASA and Plavix per primary team   - Pt w/ autonomic dysfunction; monitor carefully for abrupt swings in BP (including sustained HTN, hypotension), diaphoresis, dizziness as these could indicate uncontrolled pain; please offer prn meds if any of these are noted or if pt otherwise appearing uncomfortable     H/o MI with stenting x1 to LAD: 3/8/2018. CAT 1/2018 severe proximal LAD stenosis, plan for intervention 3/19/18, however went to cardiac arrest 3/8 due to MI. S/p PCI with DEX to proximal LAD, (95% rupture plaque), placement of IABP. PTA on DAPT ASA 81 and Plavix 75 mg daily. Follows OP with Dr. Jane, last seen 5/2/2018. EKG 11/2 NSR with normal QTc  - Suggest resuming ASA  and Plavix asap  - Tele  - Follow up with cardiology as scheduled 11/5/2018    Decubitus ulcers: Stage II decubitus ulcer of gluteal cleft, stage IV ulcer of perianal area (L lateral to anus). 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 2/2 MALI and IABP placement in the setting of cardiac arrest 3/8. Presented with sudden onset back pain, motor/sensory loss of BLE. Wheelchair bound. Chronic BLE edema at BLE  - Suggest fall precautions  - Lymphedema consult  - PT/OT    Neurogenic bowel and bladder, reucrrent UTIs: 2/2 paraplegia. Self caths 4-5 times daily. Follows OP with urology, last seen 11/1/2018 and was prescribed Cipro x2 tabs for UTI ppx and Ancef x24 hours sugar-op for surgical ppx. UC 11/1 with citrobacter and group B strep  - Repeat UA/UC today  - Straight cath q4h, will decrease once off IVF  - Will hold off on further antibiotics for now  - Follow up with urology as suggested in 4-5 months for urodynamics testing    HLD: Continue statin    Autonomic orthostatic hypotension: BL BP 100s/50s-60s. BP 90s-100s/50s-60s post-op. Will need close monitoring in the sugar-op phase      DVT Prophylaxis: Defer to primary service    The patient's care was discussed with the patient, PACU team, orthopedics, and attending physician, Dr. Jil Farris  Internal Medicine Staff Hospitalist Service  [facility]  Pager: [phone]  After 5 PM, page gold team cross cover at [phone]. If no response, page job code [phone].  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 Internal medicine consulted for assistance with management in the perioperative phase    Per chart review, patient suffered cardiac arrest on 3/8/2018 while at the gym. He underwent stenting and did remarkably well. In the setting of anticoagulation, he suffered a SDH of the spine at T10. He was subsequently paralyzed from the umbilicus down. He has made good recovery since then. He follows closely with cardiology, NS, urology, and PCP. He was last seen by urology yesterday and started on prophylactic treatment for UTI. He does not experience typical urinary symptoms so it is difficult to determine urinary symptoms. Patient reports BLE peripheral edema is stable and worst when sitting up in a chair working all day. Patient reports feeling pretty well at this time. He denies any pain and reports he did not have pain after similar surgery 9/2018. No chest pain or dyspnea. Denies recent illness, fever, headache, confusion,  N/V, change in bowels, new onset joint pain, or rash. He reports chronic abdominal cramping since his initial SDH insult. He does not feel as if anyone has been able to determine the etiology of this.     Review of Systems   The 10 point Review of Systems is negative other than noted in the HPI or here.     Past Medical History    I have reviewed this patient's medical history and updated it with pertinent information if needed.   Past Medical History:   Diagnosis Date     CAD (coronary artery disease)     stent     Femur fracture (H)      Neurogenic bladder      Neurogenic bowel      Orthostatic hypotension      Paraplegia (H)      Thoracic spinal cord injury (H)       Past  Surgical History   I have reviewed this patient's surgical history and updated it with pertinent information if needed.  Past Surgical History:   Procedure Laterality Date     ARTHROTOMY HIP Left 9/19/2018    Procedure: ARTHROTOMY HIP;  Left Hip Heterotopic Bone Resection;  Surgeon: Toñito Kennedy MD;  Location[facility]     CARDIAC SURGERY       LAMINECTOMY THORACIC THREE LEVELS N/A 3/9/2018    Procedure: LAMINECTOMY THORACIC THREE LEVELS;  Thoracic 9-12 Laminectomy Evacuation of Subdural Hematoma, C-Arm, Prone, Gel Rolls.;  Surgeon: Abhijeet Joe MD;  [facility] OR     OPEN REDUCTION INTERNAL FIXATION RODDING INTRAMEDULLARY FEMUR Right 6/25/2018    Procedure: OPEN REDUCTION INTERNAL FIXATION RODDING INTRAMEDULLARY FEMUR;  Right Open Reduction Internal Fixation Subtrochanteric Femur Fracture;  Surgeon: Toñito Kennedy MD;  Location: [facility] OR     wisdom teeth extraction        Social History   Social History   Substance Use Topics     Smoking status: Former Smoker     Smokeless tobacco: Never Used      Comment: Quit at 31     Alcohol use Yes      Comment: socially     Family History   I have reviewed this patient's family history and updated it with pertinent information if needed.   Family History   Problem Relation Age of Onset     Cardiac Sudden Death Father      Arrhythmia Father      v fib arrest      Myocardial Infarction Brother      Other - See Comments Brother      myeloid dysplasia     Medications   I have reviewed this patient's current medications    Allergies   No Known Allergies    Physical Exam   Vital Signs: Temp: 97.5  F (36.4  C) Temp src: Oral BP: 103/60   Heart Rate: 78 Resp: 14 SpO2: 99 % O2 Device: Simple face mask Oxygen Delivery: 6 LPM  Weight: 158 lbs 0 oz    General Appearance: Alert and oriented x3, very pleasant. In PACU  HEENT: PERRLA, MMM  Respiratory: CTAB without wheezing or rales  Cardiovascular: RRR, S1, S2. No murmur noted  GI: Abdomen soft, non-tender.  Bowel sounds active. No guarding or rebound  Lymph/Hematologic: 2+ BLE peripheral edema, distal pulses palpable   Skin: No rash or jaundice   Musculoskeletal: No movement from of the lower extremities. Moves upper extremities. Right sided drains at the hip x2 with about 200 ml output each  Neurologic: CN II-XII intact. Moves upper extremities, sensation intact from the umbilicus up. No sensation or movement from the umbilicus down  Psychiatric: Mood appears stable     Data   Data reviewed today: I reviewed all medications, new labs and imaging results over the last 24 hours. I personally reviewed prior admission notes, most recent urology/cardiology/NS/neurology notes, ortho notes, etc      Recent Labs  Lab 11/02/18  0629   HGB 12.5*

## 2018-11-02 NOTE — IP AVS SNAPSHOT
[address] [address]    Phone:  [phone]                                       After Visit Summary   11/2/2018    Derek Enriquez    MRN: [id number]           After Visit Summary Signature Page     I have received my discharge instructions, and my questions have been answered. I have discussed any challenges I see with this plan with the nurse or doctor.    ..........................................................................................................................................  Patient/Patient Representative Signature      ..........................................................................................................................................  Patient Representative Print Name and Relationship to Patient    ..................................................               ................................................  Date                                   Time    ..........................................................................................................................................  Reviewed by Signature/Title    ...................................................              ..............................................  Date                                               Time          22EPIC Rev 08/18

## 2018-11-02 NOTE — IP AVS SNAPSHOT
MRN:[id number]                      After Visit Summary   11/2/2018    Derek Enriquez    MRN: [id number]           Thank you!     Thank you for choosing [facility] for your care. Our goal is always to provide you with excellent care. Hearing back from our patients is one way we can continue to improve our services. Please take a few minutes to complete the written survey that you may receive in the mail after you visit with us. Thank you!        Patient Information     Date Of Birth          1960        Designated Caregiver       Most Recent Value    Caregiver    Will someone help with your care after discharge? yes    Name of designated caregiver Hany    Phone number of caregiver [phone]    Caregiver address per chart info      About your hospital stay     You were admitted on:  November 2, 2018 You last received care in the:  [address] [facility]    You were discharged on:  November 5, 2018        Reason for your hospital stay       Right hip resection of heterotopic ossification                  Who to Call     For medical emergencies, please call 911.  For non-urgent questions about your medical care, please call your primary care provider or clinic, [phone]  For questions related to your surgery, please call your surgery clinic        Attending Provider     Provider Toñito Arnold MD Orthopedics       Primary Care Provider Office Phone # Fax #    Eugene Burrell [phone]      After Care Instructions     Activity       Your activity upon discharge: resume activity and transfers as you were prior to the surgery.            Diet       Follow this diet upon discharge: Regular            Discharge Instructions       Do not get the incision of drain site wet. Cover so it does not get wet when bathing.            Wound care and dressings       Instructions to care for your wound at home: leave the dressing on until clinic follow-up on  Friday. Keep the drain in place until clinic follow-up. Record output from the drain daily.                  Follow-up Appointments     Adult [facility] Follow-up and recommended labs and tests       Follow-up with Dr. Kennedy this Friday (11/9) at [facility], as scheduled.                  Your next 10 appointments already scheduled     Nov 05, 2018  3:15 PM CST   Lab with  LAB   [facility] ([facility])    [address]  1st Floor  [place] [id number] [phone]            Nov 05, 2018  3:30 PM CST   (Arrive by 3:15 PM)   RETURN LIPID VISIT with Anoop Jane MD   [facility] ([facility])    [address]  [address]  [place] [id number] [phone]            Nov 05, 2018  4:00 PM CST   Ech Complete with [id number]    [facility] ([facility])    [address]  3rd Floor  [place] [id number] [phone]           1.  Please bring or wear a comfortable two-piece outfit. 2.  You may eat, drink and take your normal medicines. 3.  For any questions that cannot be answered, please contact the ordering physician 4.  Please do not wear perfumes or scented lotions on the day of your exam.            Mar 27, 2019  3:00 PM CDT   (Arrive by 2:45 PM)   Urodynamics with Candy Leary PA-C   [facility] ([facility])    [address]  4th Floor  [place] [id number] [phone]              Additional Services     PODIATRY/FOOT & ANKLE SURGERY REFERRAL       [place] provider [place] you to[place] [facility] [place] [facility] [place] [phone]   [email or web address]    Please be aware that coverage of these services is subject to the terms and limitations of your health insurance plan.  Call member services at your health plan with any benefit or coverage questions.      Please bring the  "following to your appointment:  >>   Any x-rays, CTs or MRIs which have been performed.  Contact the facility where they were done to arrange for  prior to your scheduled appointment.    >>   List of current medications   >>   This referral request   >>   Any documents/labs given to you for this referral                  Pending Results     Date and Time Order Name Status Description    11/5/2018 1117 US Lower Extremity Venous Duplex Right Port In process             Statement of Approval     Ordered          11/05/18 0714  I have reviewed and agree with all the recommendations and orders detailed in this document.  EFFECTIVE NOW     Approved and electronically signed by:  Conrad Putnam MD             Admission Information     Date & Time Provider Department Dept. Phone    11/2/2018 Toñito Kennedy MD [address] [facility] [phone]      Your Vitals Were     Blood Pressure Pulse Temperature Respirations Height Weight    107/58 (BP Location: Left arm) 95 98.5  F (36.9  C) (Oral) 16 1.803 m (5' 11\") 71.7 kg (158 lb)    Pulse Oximetry BMI (Body Mass Index)                100% 22.04 kg/m2          [employer] Information     [facility] [employer] gives you secure access to your electronic health record. If you see a primary care provider, you can also send messages to your care team and make appointments. If you have questions, please call your primary care clinic.  If you do not have a primary care provider, please call [phone] and they will assist you.        Care EveryWhere ID     This is your Care EveryWhere ID. This could be used by other organizations to access your [facility] medical records  [id number]        Equal Access to Services     [facility]: Hadii jace landry Sovianey, waaxda luqadaha, qaybta kaalmada remigio, minal hamlin. So [facility] [phone].    ATENCIÓN: Si habla español, tiene a humphrey disposición servicios gratuitos de asistencia lingüística. " George mcclendon [phone].    We comply with applicable federal civil rights laws and [place] laws. We do not discriminate on the basis of race, color, national origin, age, disability, sex, sexual orientation, or gender identity.               Review of your medicines      CONTINUE these medicines which may have CHANGED, or have new prescriptions. If we are uncertain of the size of tablets/capsules you have at home, strength may be listed as something that might have changed.        Dose / Directions    atorvastatin 20 MG tablet   Commonly known as:  LIPITOR   This may have changed:  when to take this   Used for:  H/O heart artery stent        Dose:  20 mg   Take 1 tablet (20 mg) by mouth At Bedtime   Quantity:  90 tablet   Refills:  1         CONTINUE these medicines which have NOT CHANGED        Dose / Directions    ascorbic acid 500 MG tablet   Commonly known as:  VITAMIN C        Dose:  500 mg   Take 500 mg by mouth every morning   Refills:  0       MULTIVITAMIN MEN PO        Dose:  1 tablet   Take 1 tablet by mouth daily   Refills:  0       order for DME   Used for:  Decubitus ulcer of sacral region, stage 3 (H), Chronic skin ulcer, limited to breakdown of skin (H), Pressure ulcer of other site, stage 3 (H)        Equipment being ordered: [employer] Phone [phone] Fax [phone]  Primary Dressing Iodosorb Gel   Qty not needed Secondary Dressing  tegaderm 1adhesive foam dressing 3x3 Qty 30 Secondary Dressing 2x2 gauze Qty 1 loaf Secondary Dressing 2' tape Qty 1 roll Length of Need: 1 month Frequency of dressing change: daily   Quantity:  30 days   Refills:  0       vitamin B complex with vitamin C Tabs tablet        Dose:  1 tablet   Take 1 tablet by mouth daily   Refills:  0         STOP taking     ASPIRIN ADULT LOW STRENGTH PO           clopidogrel 75 MG tablet   Commonly known as:  PLAVIX                    Protect others around you: Learn how to safely use, store and throw away your medicines  at www.disposemymeds.org.             Medication List: This is a list of all your medications and when to take them. Check marks below indicate your daily home schedule. Keep this list as a reference.      Medications           Morning Afternoon Evening Bedtime As Needed    ascorbic acid 500 MG tablet   Commonly known as:  VITAMIN C   Take 500 mg by mouth every morning   Last time this was given:  500 mg on 11/5/2018  8:28 AM                                atorvastatin 20 MG tablet   Commonly known as:  LIPITOR   Take 1 tablet (20 mg) by mouth At Bedtime   Last time this was given:  20 mg on 11/5/2018  8:28 AM                                MULTIVITAMIN MEN PO   Take 1 tablet by mouth daily                                order for DME   Equipment being ordered: [employer] Phone [phone] Fax [phone]  Primary Dressing Iodosorb Gel   Qty not needed Secondary Dressing  tegaderm 1adhesive foam dressing 3x3 Qty 30 Secondary Dressing 2x2 gauze Qty 1 loaf Secondary Dressing 2' tape Qty 1 roll Length of Need: 1 month Frequency of dressing change: daily                                vitamin B complex with vitamin C Tabs tablet   Take 1 tablet by mouth daily   Last time this was given:  1 tablet on 11/5/2018  8:28 AM

## 2018-11-02 NOTE — PHARMACY-ADMISSION MEDICATION HISTORY
Admission medication history interview status for the 11/2/2018 admission is complete. See Epic admission navigator for allergy information, pharmacy, prior to admission medications and immunization status.     Medication history interview sources: patient, outside pharmacy records    Changes made to PTA medication list (reason)  Added: none  Deleted: ciprofloxacin (patient reports no longer taking), etidronate (patient reports no longer taking), Preparation H (patient reports no longer using)  Changed:   - vitamin C: updated dose from 500 mg twice daily --> 500 mg in the morning    Additional medication history information (including reliability of information, actions taken by pharmacist):  - aspirin and clopidogrel: Patient reports he has not taken these medications for a week. They have been on hold for his procedure.  - Patient reports he typically takes his medications in the morning, but yesterday (11/1) he took them in the evening.      Prior to Admission medications    Medication Sig Last Dose Taking? Auth Provider   ascorbic acid (VITAMIN C) 500 MG tablet Take 500 mg by mouth every morning  11/1/2018 at PM Yes Reported, Patient   atorvastatin (LIPITOR) 20 MG tablet Take 1 tablet (20 mg) by mouth At Bedtime  Patient taking differently: Take 20 mg by mouth every morning  11/1/2018 at PM Yes Anoop Jane MD   Multiple Vitamins-Minerals (MULTIVITAMIN MEN PO) Take 1 tablet by mouth daily  11/1/2018 at PM Yes Reported, Patient   vitamin B complex with vitamin C (VITAMIN  B COMPLEX) TABS tablet Take 1 tablet by mouth daily 11/1/2018 at PM Yes Reported, Patient   ASPIRIN ADULT LOW STRENGTH PO Take 81 mg by mouth daily on hold  Reported, Patient   clopidogrel (PLAVIX) 75 MG tablet Take 1 tablet (75 mg) by mouth daily on hold  Anoop Jane MD   order for DME Equipment being ordered: [employer] Phone [phone] Fax [phone]    Primary Dressing Iodosorb Gel   Qty not needed  Secondary Dressing   tegaderm 1adhesive foam dressing 3x3 Qty 30  Secondary Dressing 2x2 gauze Qty 1 loaf  Secondary Dressing 2' tape Qty 1 roll  Length of Need: 1 month  Frequency of dressing change: daily   Rosario Sánchez PA-C         Medication history completed by: Jacklyn Montenegro, PharmD IV Student

## 2018-11-02 NOTE — OR NURSING
Dr. Kennedy given UA results from clinic yesterday. Antibiotic x 2 doses taken by patient yesterday. No further orders at this time.

## 2018-11-02 NOTE — ANESTHESIA CARE TRANSFER NOTE
Patient: Derek Enriquez    Procedure(s):  Right Hip Heterotopic Bone Resection    Diagnosis: Hip Pain, Eterotopic Ossification of Hip Right   Diagnosis Additional Information: No value filed.    Anesthesia Type:   General     Note:  Airway :Face Mask  Patient transferred to:PACU  Handoff Report: Identifed the Patient, Identified the Reponsible Provider, Reviewed the pertinent medical history, Discussed the surgical course, Reviewed Intra-OP anesthesia mangement and issues during anesthesia, Set expectations for post-procedure period and Allowed opportunity for questions and acknowledgement of understanding      Vitals: (Last set prior to Anesthesia Care Transfer)    CRNA VITALS  11/2/2018 0938 - 11/2/2018 1018      11/2/2018             Resp Rate (observed): (!)  2                Electronically Signed By: GREGORIO Shepherd CRNA  November 2, 2018  10:18 AM

## 2018-11-02 NOTE — MR AVS SNAPSHOT
After Visit Summary   11/2/2018    Derek Enriquez    MRN: [id number]           Patient Information     Date Of Birth          1960        Visit Information        Provider Department      11/2/2018 12:00 PM Sapphire Urban MD [facility]        Today's Diagnoses     Heterotopic ossification    -  1       Follow-ups after your visit        Your next 10 appointments already scheduled     Mar 27, 2019  3:00 PM CDT   (Arrive by 2:45 PM)   Urodynamics with Candy Leary PA-C   [facility]rologic C[facility] ([facility] [place])    [address] [place] [address]  4th [facility] [address] [phone]              Who to contact     Please call your clinic at [phone] to:    Ask questions about your health    Make or cancel appointments    Discuss your medicines    Learn about your test results    Speak to your doctor            Additional Information About Your Visit        MyChart Information     [employer] gives you secure access to your electronic health record. If you see a primary care provider, you can also send messages to your care team and make appointments. If you have questions, please call your primary care clinic.  If you do not have a primary care provider, please call [phone] and they will assist you.      [employer] is an electronic gateway that provides easy, online access to your medical records. With [employer], you can request a clinic appointment, read your test results, renew a prescription or communicate with your care team.     To access your existing account, please contact your [facility] or call [phone] for assistance.        Care EveryWhere ID     This is your Care EveryWhere ID. This could be used by other organizations to access your [facility] medical records  [id number]         Blood Pressure from Last 3 Encounters:   11/05/18 107/58   11/05/18 126/75   11/01/18  122/59    Weight from Last 3 Encounters:   11/02/18 71.7 kg (158 lb)   11/05/18 71.7 kg (158 lb)   11/01/18 68.5 kg (151 lb)              Today, you had the following     No orders found for display         Today's Medication Changes      Notice     This visit is during an admission. Changes to the med list made in this visit will be reflected in the After Visit Summary of the admission.             Primary Care Provider Office Phone # Fax #    Eugene Burrell [phone]       [facility] [address]  [facility] [address]        Equal Access to Services     [facility]: Hadii jace hahn hadasho Sopatali, waaxda luqadaha, qaybta kaalmada ademarvinyada, minal sales . So [facility] [phone].    ATENCIÓN: Si habla español, tiene a humphrey disposición servicios gratuitos de asistencia lingüística. Llame al [phone].    We comply with applicable federal civil rights laws and [place] laws. We do not discriminate on the basis of race, color, national origin, age, disability, sex, sexual orientation, or gender identity.            Thank you!     Thank you for choosing [facility]  for your care. Our goal is always to provide you with excellent care. Hearing back from our patients is one way we can continue to improve our services. Please take a few minutes to complete the written survey that you may receive in the mail after your visit with us. Thank you!             Your Updated Medication List - Protect others around you: Learn how to safely use, store and throw away your medicines at www.disposemymeds.org.      Notice     This visit is during an admission. Changes to the med list made in this visit will be reflected in the After Visit Summary of the admission.

## 2018-11-03 LAB
ABO + RH BLD: NORMAL
ABO + RH BLD: NORMAL
ANION GAP SERPL CALCULATED.3IONS-SCNC: 8 MMOL/L (ref 3–14)
BLD GP AB SCN SERPL QL: NORMAL
BLD PROD TYP BPU: NORMAL
BLD UNIT ID BPU: 0
BLD UNIT ID BPU: 0
BLOOD BANK CMNT PATIENT-IMP: NORMAL
BLOOD PRODUCT CODE: NORMAL
BLOOD PRODUCT CODE: NORMAL
BPU ID: NORMAL
BPU ID: NORMAL
BUN SERPL-MCNC: 20 MG/DL (ref 7–30)
CALCIUM SERPL-MCNC: 7.6 MG/DL (ref 8.5–10.1)
CHLORIDE SERPL-SCNC: 105 MMOL/L (ref 94–109)
CO2 SERPL-SCNC: 25 MMOL/L (ref 20–32)
CREAT SERPL-MCNC: 0.52 MG/DL (ref 0.66–1.25)
ERYTHROCYTE [DISTWIDTH] IN BLOOD BY AUTOMATED COUNT: 16.5 % (ref 10–15)
GFR SERPL CREATININE-BSD FRML MDRD: >90 ML/MIN/1.7M2
GLUCOSE SERPL-MCNC: 105 MG/DL (ref 70–99)
HCT VFR BLD AUTO: 24.5 % (ref 40–53)
HGB BLD-MCNC: 7.5 G/DL (ref 13.3–17.7)
HGB BLD-MCNC: 8.3 G/DL (ref 13.3–17.7)
MCH RBC QN AUTO: 26.1 PG (ref 26.5–33)
MCHC RBC AUTO-ENTMCNC: 30.6 G/DL (ref 31.5–36.5)
MCV RBC AUTO: 85 FL (ref 78–100)
NUM BPU REQUESTED: 2
PLATELET # BLD AUTO: 237 10E9/L (ref 150–450)
POTASSIUM SERPL-SCNC: 3.6 MMOL/L (ref 3.4–5.3)
RBC # BLD AUTO: 2.87 10E12/L (ref 4.4–5.9)
SODIUM SERPL-SCNC: 139 MMOL/L (ref 133–144)
SPECIMEN EXP DATE BLD: NORMAL
TRANSFUSION STATUS PATIENT QL: NORMAL
WBC # BLD AUTO: 6.6 10E9/L (ref 4–11)

## 2018-11-03 PROCEDURE — 36415 COLL VENOUS BLD VENIPUNCTURE: CPT | Performed by: PHYSICIAN ASSISTANT

## 2018-11-03 PROCEDURE — 12000008 ZZH R&B INTERMEDIATE UMMC

## 2018-11-03 PROCEDURE — 85027 COMPLETE CBC AUTOMATED: CPT | Performed by: PHYSICIAN ASSISTANT

## 2018-11-03 PROCEDURE — 80048 BASIC METABOLIC PNL TOTAL CA: CPT | Performed by: PHYSICIAN ASSISTANT

## 2018-11-03 PROCEDURE — 85018 HEMOGLOBIN: CPT | Performed by: INTERNAL MEDICINE

## 2018-11-03 PROCEDURE — 25000128 H RX IP 250 OP 636: Performed by: ORTHOPAEDIC SURGERY

## 2018-11-03 PROCEDURE — 36415 COLL VENOUS BLD VENIPUNCTURE: CPT | Performed by: INTERNAL MEDICINE

## 2018-11-03 PROCEDURE — 25000132 ZZH RX MED GY IP 250 OP 250 PS 637: Performed by: PHYSICIAN ASSISTANT

## 2018-11-03 PROCEDURE — 25000128 H RX IP 250 OP 636: Performed by: PHYSICIAN ASSISTANT

## 2018-11-03 PROCEDURE — 85018 HEMOGLOBIN: CPT | Performed by: PHYSICIAN ASSISTANT

## 2018-11-03 PROCEDURE — 99207 ZZC APP CREDIT; MD BILLING SHARED VISIT: CPT | Performed by: PHYSICIAN ASSISTANT

## 2018-11-03 PROCEDURE — 99233 SBSQ HOSP IP/OBS HIGH 50: CPT | Performed by: PEDIATRICS

## 2018-11-03 PROCEDURE — 25000132 ZZH RX MED GY IP 250 OP 250 PS 637: Performed by: ORTHOPAEDIC SURGERY

## 2018-11-03 PROCEDURE — P9016 RBC LEUKOCYTES REDUCED: HCPCS | Performed by: ANESTHESIOLOGY

## 2018-11-03 RX ORDER — ATORVASTATIN CALCIUM 20 MG/1
20 TABLET, FILM COATED ORAL EVERY MORNING
Status: DISCONTINUED | OUTPATIENT
Start: 2018-11-03 | End: 2018-11-05 | Stop reason: HOSPADM

## 2018-11-03 RX ORDER — SODIUM CHLORIDE, SODIUM LACTATE, POTASSIUM CHLORIDE, CALCIUM CHLORIDE 600; 310; 30; 20 MG/100ML; MG/100ML; MG/100ML; MG/100ML
INJECTION, SOLUTION INTRAVENOUS CONTINUOUS
Status: DISCONTINUED | OUTPATIENT
Start: 2018-11-03 | End: 2018-11-04

## 2018-11-03 RX ORDER — OXYCODONE HYDROCHLORIDE 5 MG/1
5-10 TABLET ORAL EVERY 4 HOURS PRN
Status: DISCONTINUED | OUTPATIENT
Start: 2018-11-03 | End: 2018-11-05 | Stop reason: HOSPADM

## 2018-11-03 RX ORDER — SODIUM CHLORIDE, SODIUM LACTATE, POTASSIUM CHLORIDE, CALCIUM CHLORIDE 600; 310; 30; 20 MG/100ML; MG/100ML; MG/100ML; MG/100ML
INJECTION, SOLUTION INTRAVENOUS CONTINUOUS
Status: DISCONTINUED | OUTPATIENT
Start: 2018-11-03 | End: 2018-11-03

## 2018-11-03 RX ORDER — MULTIVITAMIN,THERAPEUTIC
1 TABLET ORAL DAILY
Status: DISCONTINUED | OUTPATIENT
Start: 2018-11-03 | End: 2018-11-05 | Stop reason: HOSPADM

## 2018-11-03 RX ORDER — ASCORBIC ACID 500 MG
500 TABLET ORAL EVERY MORNING
Status: DISCONTINUED | OUTPATIENT
Start: 2018-11-03 | End: 2018-11-05 | Stop reason: HOSPADM

## 2018-11-03 RX ORDER — LIDOCAINE 4 G/G
1-3 PATCH TOPICAL
Status: DISCONTINUED | OUTPATIENT
Start: 2018-11-03 | End: 2018-11-04

## 2018-11-03 RX ADMIN — OXYCODONE HYDROCHLORIDE AND ACETAMINOPHEN 500 MG: 500 TABLET ORAL at 08:12

## 2018-11-03 RX ADMIN — B-COMPLEX W/ C & FOLIC ACID TAB 1 TABLET: TAB at 09:38

## 2018-11-03 RX ADMIN — LIDOCAINE 1 PATCH: 560 PATCH PERCUTANEOUS; TOPICAL; TRANSDERMAL at 15:07

## 2018-11-03 RX ADMIN — ACETAMINOPHEN 975 MG: 325 TABLET, FILM COATED ORAL at 21:35

## 2018-11-03 RX ADMIN — THERA TABS 1 TABLET: TAB at 09:38

## 2018-11-03 RX ADMIN — SODIUM CHLORIDE, POTASSIUM CHLORIDE, SODIUM LACTATE AND CALCIUM CHLORIDE: 600; 310; 30; 20 INJECTION, SOLUTION INTRAVENOUS at 22:00

## 2018-11-03 RX ADMIN — CEFAZOLIN 1 G: 1 INJECTION, POWDER, FOR SOLUTION INTRAMUSCULAR; INTRAVENOUS at 03:06

## 2018-11-03 RX ADMIN — ATORVASTATIN CALCIUM 20 MG: 20 TABLET, FILM COATED ORAL at 09:38

## 2018-11-03 ASSESSMENT — ACTIVITIES OF DAILY LIVING (ADL)
ADLS_ACUITY_SCORE: 30

## 2018-11-03 NOTE — PLAN OF CARE
Problem: Patient Care Overview  Goal: Plan of Care/Patient Progress Review  Pt VSS. Incisions CDI, Has two HV coming out of right leg. Dark red blood, 170mL out at 0300 . Pt Hgb trending down.was 12.5, now is 8.6. No c/o pain.Pt paraplegic.St cath q 4 hr. Last one at 0300,125cc. Pt has mepilex on buttocks and needs a wound consult. Old healed sores on feet. Pt on Pulsate bed, and lift room. CPM has not been started yet,due to it not being delivered yet.Pt states will start tomm. PCDs on. Pt eating and drinking well. Capno on until 14:30 11/3 .Pt asking about a shingles shot before discharge also. Continue with POC.

## 2018-11-03 NOTE — PLAN OF CARE
Problem: Patient Care Overview  Goal: Plan of Care/Patient Progress Review  PT 7B: Orders received for PT/lymphedema evaluation. Pt with other providers upon attempts x 2 today. Evaluation rescheduled.

## 2018-11-03 NOTE — PLAN OF CARE
Problem: Patient Care Overview  Goal: Plan of Care/Patient Progress Review  Patient denied need for pain med.Reports on going abdominal discomfort, MD aware.Initiated Lidocaine patches per orders.Straight cathed 160ml/5 hour period,clear jovanni urine.Tolerated food/fluid.Reports no stool episode X 2 days.Declined Ducolax,will attempt on commode per routine.Receiving pRBC units X 2.Recheck hbg level at 1830. Tolerated CPM to left LE at goal 0-90 degree.Continue to monitor.

## 2018-11-03 NOTE — PLAN OF CARE
Pt VSS.Incisions CDI,Has two HV coming out of right leg.Dark red blood,total of 280cc.Pt Hgb trending down.was 12.5,now is 8.6..No c/o pain.Pt paraplegic..St cath q 4 hr.Last one at 1900,400cc.Pt has mepilex on buttocks and needs a wound consult.Old healed sores on feet.Placed on Pulsate bed,and lift room.CPM has not been started yet,due to it not being delivered yet.Pt states will start tomm.PCDs on.Pt eating and drinking well,will SL IV.Capno on until 14:30 11/3.Pt asking about a shingles shot before discharge also.Continue with POC.

## 2018-11-03 NOTE — PROGRESS NOTES
"Orthopaedic Surgery Progress Note  November 3, 2018    Subjective: No acute events overnight. Pain controlled.    Denies fever or chills, CP, SOB, numbness or tingling, motor dysfunction or weakness.    Objective: BP 99/48 (BP Location: Right arm)  Pulse 115  Temp 98.3  F (36.8  C) (Oral)  Resp 16  Ht 1.803 m (5' 11\")  Wt 71.7 kg (158 lb)  SpO2 100%  BMI 22.04 kg/m2    Physical Exam:  General: healthy, alert, no distress  Respiratory: Nonlabored breathing  MSK:  Focused examination of:   RLE: Dressing clean dry intact, 2 drains in place, no surrounding erythema or fluctuance.    Labs:     Recent Labs  Lab 11/03/18  0651 11/02/18  2123 11/02/18  0629   HGB 7.5* 8.6* 12.5*   WBC 6.6 7.9  --        All cultures:    Recent Labs  Lab 11/01/18  0710   CULT 50,000 to 100,000 colonies/mLCitrobacter freundii complex*  <10,000 colonies/mLStreptococcus agalactiae sero group BSusceptibility testing not routinely done on this organism from the genitourinary tract. Our antibiogram indicates that Group B streptococci are susceptible to ampicillin, penicillin, vancomycin and the cephalosporins. Susceptibility testing must be requested within 5 days.*       Assessment:  Derek Enriquez is a 57 year old male with a history of paraplegia and heterotopic ossification limiting his range of motion.  Plan was to undergo excision of the heterotopic ossification.    Procedures:  1. 11/2: Right hip heterotopic ossification resection    Orthopedics primary  Activity: Okay for all normal activities, including wheelchair and transfers  Antibiotics: Completed  Diet: Regular  DVT prophylaxis: We will resume aspirin and Plavix after the drain is removed at the postoperative appointment  Imaging: None  Bracing/Splinting: None  Dressings: Leave in place until clinic follow-up  Drains: 2 drains in place.  One drain will be removed prior to discharge when below 15 cc per shift  Consults: Medicine    Follow-up: Clinic with Dr. Kennedy this " coming Friday    Disposition: Pending progress with therapies, pain control on orals, and medical stability, anticipate discharge Monday.    Conrad Putnam MD  Orthopaedic Resident, PGY-4  Pager: [phone]

## 2018-11-03 NOTE — PROGRESS NOTES
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 Internal medicine consulted for assistance with management in the perioperative phase     S/p R hip heterotopic bone resection: Per Dr. Kennedy 11/20/2018 for R heterotopic ossification. H/o prophylactic radiation hip for heterotopic ossification. 1L EBL with drains x2 from right hip and brisk output <24 hours post-op. Left hip heterotopic resection 9/2018.   - Orthopedics primary   - Anemia management as below   - Regular diet  - PTA ASA and Plavix per primary team   - Stop IVF  - Stop IV dilaudid, transition to oral oxy prn  - Pt w/ autonomic dysfunction; monitor carefully for abrupt swings in BP (including sustained HTN, hypotension), diaphoresis, dizziness as these could indicate uncontrolled pain; please offer prn meds if any of these are noted or if pt otherwise appearing uncomfortable      Acute blood loss anemia: Pre-op hgb 12.5 with 1L EBL and 450 ml output from drains on 11/3. Ongoing drain output 11/3. Hgb 7.5 (8.6) with tachycardia. No other symptoms  - 2 units RBC today  - Hgb bid  - Drain management per primary     H/o MI with stenting x1 to LAD: 3/8/2018. CTA 1/2018 severe proximal LAD stenosis, plan for intervention 3/19/18, however went into cardiac arrest 3/8 due to MI. S/p PCI with DEX to proximal LAD, (95% rupture plaque), placement of IABP. PTA on ASA  and Plavix daily. Follows OP with Dr. Jane, last seen 5/2/2018. EKG 11/2 NSR with normal QTc  - Resume ASA and Plavix when possible  - Tele  - Follow up with cardiology as scheduled 11/5/2018     Decubitus ulcers: Stage II decubitus ulcer of gluteal cleft, stage IV ulcer of perianal area (L lateral to anus). Of note, L foot wound also noted in PACU  - WOCN consult     Paraplegia 2/2 spinal SDH: S/p T9-12 decompressive laminectomy 3/2018. 2/2 MALI and IABP placement in the setting of cardiac arrest 3/8. Presented with sudden onset back pain, motor/sensory loss of BLE. Wheelchair bound. Chronic BLE edema at BLE  - Suggest fall precautions  - Lymphedema consult  - PT/OT     Neurogenic bowel and bladder, reucrrent UTIs: 2/2 paraplegia. Self caths 4-5 times daily. Follows OP with urology, last seen 11/1/2018 and was prescribed Cipro x2 tabs for UTI ppx and Ancef x24 hours sugar-op for surgical ppx. UC 11/1 with citrobacter (resistant to Ancef) and group B strep. Repeat UA 11/2 no LE/nitrites/blood/RBC, <1 WBC  - Will hold off on further antibiotics given improvement with Cipro ppx  - Straight cath qid and prn  - Follow up with urology as suggested in 4-5 months for urodynamics testing     HLD: Continue statin     Autonomic orthostatic hypotension: BL BP 100s/50s-60s. BP 90s-100s/50s-60s post-op. Will need close monitoring in the sugar-op phase    Chronic abdominal pain: Band-like in the lower abdomen since paralysis. Both MSK and deep pain in nature. Frustrated he does not know etiology of pain  - Lidoderm patches    Diet: Advance Diet as Tolerated: Regular Diet Adult  Fluids: None  Sandhu Catheter: not present    DVT Prophylaxis: Defer to primary service  Code Status: Full Code    Expected discharge: per primary team    The patient's care was discussed with the patient, nursing, orthopedics, and attending physician, MICHELLE CarrascoC  Internal Medicine Staff Hospitalist Service  [facility]  [place] [facility]  Pager: [phone]  Please see sticky note for cross cover information    Interval History   Doing well this am. Reports frustrating afternoon regarding need for specialized mattress, slow movement with transfer, etc. Denies chest pain, palpitations, dyspnea, wheezing, chest tightness. No confusion, fever or chills. Tight abdominal pain continues and is uncharged from BL. It is both like a sore muscle and deep like bone pain. Tolerating diet. No issues with urination. Urine is clear, non-odorous. Agrees with plan to hold off on further antibiotics given improvement in UA and symptoms      Data reviewed today: I reviewed all medications, new labs and imaging results over the last 24 hours. I personally reviewed WOC/ortho/nursing notes    Physical Exam   Vital Signs: Temp: 98.3  F (36.8  C) Temp src: Oral BP: 99/48 Pulse: 115 Heart Rate: 85 Resp: 16 SpO2: 100 % O2 Device: Nasal cannula Oxygen Delivery: 2 LPM  Weight: 158 lbs 0 oz  General Appearance: Alert and oriented x3, very pleasant  HEENT: PERRLA, MMM  Respiratory: CTAB without wheezing or rales  Cardiovascular: RRR, intermittent tachycardia to 100s. S1, S2. No murmur noted  GI: Abdomen soft, non-tender. Lower abdominal muscle tightness with area of spasm in LLQ. Bowel sounds active. No guarding or rebound  Lymph/Hematologic: 2+ BLE peripheral edema, distal pulses palpable   Skin: No rash or jaundice   Musculoskeletal: No movement from of the lower extremities. Moves upper extremities. Right sided drains at the hip x2   Neurologic: CN II-XII intact. Moves upper extremities, sensation intact from the umbilicus up. No sensation or movement from the umbilicus down      Data     Recent Labs  Lab 11/03/18  0651 11/02/18  2123 11/02/18  0629   WBC 6.6 7.9  --    HGB 7.5* 8.6* 12.5*   MCV 85 85  --     286  --     140  --    POTASSIUM 3.6 4.5  --    CHLORIDE 105 105  --    CO2 25 29  --    BUN 20 19  --    CR 0.52* 0.61*  --    ANIONGAP 8  6  --    PAT 7.6* 8.0*  --    * 191*  --

## 2018-11-03 NOTE — PLAN OF CARE
Problem: Patient Care Overview  Goal: Plan of Care/Patient Progress Review  [id number]: VSS, denies pain. LE wrapped with coban per pt request. WOC consult placed for toe wounds, will see pt on Monday. Small BM today per pt. Straight cath every 4 hours. Lymphedema to see pt tomorrow. Hgb recheck pending from 1830. Anticipate possible discharge home on Monday. Continue to assess and monitor, notify Ortho team with concerns.

## 2018-11-04 ENCOUNTER — APPOINTMENT (OUTPATIENT)
Dept: PHYSICAL THERAPY | Facility: CLINIC | Age: 58
End: 2018-11-04
Attending: SPECIALIST
Payer: COMMERCIAL

## 2018-11-04 ENCOUNTER — PATIENT OUTREACH (OUTPATIENT)
Dept: CARE COORDINATION | Facility: CLINIC | Age: 58
End: 2018-11-04

## 2018-11-04 LAB — HGB BLD-MCNC: 8.3 G/DL (ref 13.3–17.7)

## 2018-11-04 PROCEDURE — 36415 COLL VENOUS BLD VENIPUNCTURE: CPT | Performed by: SPECIALIST

## 2018-11-04 PROCEDURE — 25000132 ZZH RX MED GY IP 250 OP 250 PS 637: Performed by: PHYSICIAN ASSISTANT

## 2018-11-04 PROCEDURE — 12000008 ZZH R&B INTERMEDIATE UMMC

## 2018-11-04 PROCEDURE — 85018 HEMOGLOBIN: CPT | Performed by: SPECIALIST

## 2018-11-04 PROCEDURE — 40000193 ZZH STATISTIC PT WARD VISIT

## 2018-11-04 PROCEDURE — 99233 SBSQ HOSP IP/OBS HIGH 50: CPT | Performed by: PEDIATRICS

## 2018-11-04 PROCEDURE — 97535 SELF CARE MNGMENT TRAINING: CPT | Mod: GP

## 2018-11-04 PROCEDURE — 97161 PT EVAL LOW COMPLEX 20 MIN: CPT | Mod: GP

## 2018-11-04 PROCEDURE — 25000132 ZZH RX MED GY IP 250 OP 250 PS 637: Performed by: ORTHOPAEDIC SURGERY

## 2018-11-04 PROCEDURE — 99207 ZZC APP CREDIT; MD BILLING SHARED VISIT: CPT | Performed by: PHYSICIAN ASSISTANT

## 2018-11-04 RX ADMIN — ATORVASTATIN CALCIUM 20 MG: 20 TABLET, FILM COATED ORAL at 09:04

## 2018-11-04 RX ADMIN — OXYCODONE HYDROCHLORIDE AND ACETAMINOPHEN 500 MG: 500 TABLET ORAL at 09:04

## 2018-11-04 RX ADMIN — ACETAMINOPHEN 975 MG: 325 TABLET, FILM COATED ORAL at 14:36

## 2018-11-04 RX ADMIN — THERA TABS 1 TABLET: TAB at 09:04

## 2018-11-04 RX ADMIN — ACETAMINOPHEN 975 MG: 325 TABLET, FILM COATED ORAL at 22:15

## 2018-11-04 RX ADMIN — ACETAMINOPHEN 975 MG: 325 TABLET, FILM COATED ORAL at 07:11

## 2018-11-04 RX ADMIN — B-COMPLEX W/ C & FOLIC ACID TAB 1 TABLET: TAB at 09:04

## 2018-11-04 ASSESSMENT — ACTIVITIES OF DAILY LIVING (ADL)
ADLS_ACUITY_SCORE: 30

## 2018-11-04 NOTE — DISCHARGE SUMMARY
ORTHOPAEDIC DISCHARGE SUMMARY     Date of Admission: 11/2/2018  Date of Discharge: 11/5/2018  3:54 PM  Disposition: Home  Staff Physician: Toñito Kennedy  Primary Care Provider: Eugene Burrell    DISCHARGE DIAGNOSIS:  Hip Pain, Eterotopic Ossification of Hip Right     PROCEDURES: Procedure(s):  Right Hip Heterotopic Bone Resection on 11/2/2018    BRIEF HISTORY:  Mr. Enriquez is a 57-year-old male, with a history of paraplegia with heterotopic ossification of his bilateral hips.  The patient underwent resection of the heterotopic ossification on the left side on 9/19/2018.  Patient did well postoperatively.  The patient continued to have good range of motion of his right hip, therefore the plan was to resect the right hip heterotopic ossification.  After discussion of risk and benefits and alternatives, he elected to undergo the above-mentioned procedure.    HOSPITAL COURSE:    Surgery was uncomplicated. Derek Enriquez has done well post-operatively. Medicine was consulted post operatively to aid in management of medical comorbidities. See final recommendations below. The patient received routine nursing cares and is medically stable. Vital signs are stable. The patient is tolerating a regular diet without GI distress/nausea or vomiting. Voiding spontaneously. All PT/OT goals have been met for safe mobility/transfers. Pain is now controlled on oral medications without narcotics. He had swelling in his leg on POD3; duplex US was negative for DVT. Derek Enriquez is deemed medically safe to discharge.     Antibiotics:  Ancef given periop and 24 hours postop.  DVT prophylaxis:  Aspirin and clopidogrel will be held until clinic follow-up on Friday  PT Progress:  Has met PT/OT goals for safe transfers and mobility  Pain Meds:   Did not require pain medication postoperatively  Inpatient Events: No significant events or complications.     Discharge orders and instructions as below.    FOLLOWUP:    Follow up with  Dr. Kennedy at [facility] on Friday 11/9.  Future Appointments  Date Time Provider Department Center   11/5/2018 3:15 PM  LAB [facility]   11/5/2018 3:30 PM Anoop Jane MD [facility]   11/5/2018 4:00 PM [id number]   3/27/2019 3:00 PM Candy Leary PA-C [facility]       Appointments on [facility]. Call [phone] if you haven't heard regarding these appointments within 7 days of discharge.    PLANNED DISCHARGE ORDERS:           Discharge Medication List as of 11/5/2018  1:32 PM      CONTINUE these medications which have NOT CHANGED    Details   ascorbic acid (VITAMIN C) 500 MG tablet Take 500 mg by mouth every morning , Historical      Multiple Vitamins-Minerals (MULTIVITAMIN MEN PO) Take 1 tablet by mouth daily , Historical      order for DME Equipment being ordered: [employer] Phone [phone] Fax [phone]    Primary Dressing Iodosorb Gel   Qty not needed  Secondary Dressing  tegaderm 1adhesive foam dressing 3x3 Qty 30  Secondary Dressing 2x2 gauze Qty 1 loaf  Secondary Dressi ng 2' tape Qty 1 roll  Length of Need: 1 month  Frequency of dressing change: dailyDisp-30 days, R-0, Local Print      vitamin B complex with vitamin C (VITAMIN  B COMPLEX) TABS tablet Take 1 tablet by mouth daily, Historical      atorvastatin (LIPITOR) 20 MG tablet Take 1 tablet (20 mg) by mouth At Bedtime, Disp-90 tablet, R-1, E-Prescribe         STOP taking these medications       ASPIRIN ADULT LOW STRENGTH PO Comments:   Reason for Stopping:         clopidogrel (PLAVIX) 75 MG tablet Comments:   Reason for Stopping:         phenylephrine-shark liver oil-mineral oil-petrolatum (PREPARATION H) 0.25-14-74.9 % rectal ointment Comments:   Reason for Stopping:                 Discharge Procedure Orders  PODIATRY/FOOT & ANKLE SURGERY REFERRAL   Referral Type: Consultation     Reason for your hospital stay   Order Comments: Right hip resection of heterotopic  ossification     Adult [facility] Follow-up and recommended labs and tests   Order Comments: Follow-up with Dr. Kennedy this Friday (11/9) at [facility], as scheduled.     Activity   Order Comments: Your activity upon discharge: resume activity and transfers as you were prior to the surgery.   Order Specific Question Answer Comments   Is discharge order? Yes      Wound care and dressings   Order Comments: Instructions to care for your wound at home: leave the dressing on until clinic follow-up on Friday. Keep the drain in place until clinic follow-up. Record output from the drain daily.     Discharge Instructions   Order Comments: Do not get the incision of drain site wet. Cover so it does not get wet when bathing.     Diet   Order Comments: Follow this diet upon discharge: Regular   Order Specific Question Answer Comments   Is discharge order? Yes        Conrad Putnam MD   Orthopaedic Surgery Resident, PGY-4

## 2018-11-04 NOTE — PROGRESS NOTES
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 Internal medicine consulted for assistance with management in the perioperative phase     S/p R hip heterotopic bone resection: Per Dr. Kennedy 11/20/2018 for R heterotopic ossification. H/o prophylactic radiation hip for heterotopic ossification. 1L EBL with drains x2 from right hip and brisk output <24 hours post-op. Left hip heterotopic resection 9/2018. Drain x1 removed per ortho 11/4  - Orthopedics primary   - Anemia management as below   - Regular diet  - PTA ASA and Plavix per primary team   - Continue prn oxycodone      Acute blood loss anemia: Pre-op hgb 12.5 with 1L EBL and 450 ml output from drains on 11/3. Ongoing drain output 11/3, slowing 11/4. Hgb 7.5 (8.6) with tachycardia 11/3, treated with 2 units RBC 11/3 with recheck 8.3. Hgb stable at 8.3 with ongoing mild blood loss at drain site. HR stabilized to 70s-80s following transfusion   - Daily hgb, sooner if acute changes  - Drain management per primary     Fever: Tmax 100.4 overnight. Reports feeling hot in the room. No dyspnea, hypoxia, diarrhea, rash. Denies issues with infection site. No return of urinary symptoms (cloudiness or foul odor). Likely stress response in the post-op phase. Monitor     H/o MI with stenting x1 to LAD: 3/8/2018. CTA 1/2018 severe proximal LAD stenosis, plan for intervention  3/19/18, however went into cardiac arrest 3/8 due to MI. S/p PCI with DEX to proximal LAD, (95% rupture plaque), placement of IABP. PTA on ASA and Plavix daily. Follows OP with Dr. Jane, last seen 5/2/2018. EKG 11/2 NSR with normal QTc  - Resume ASA and Plavix when possible  - Follow up with cardiology as scheduled 11/5/2018     Neurogenic bowel and bladder, reucrrent UTIs: 2/2 paraplegia. Self caths 4-5 times daily. Follows OP with urology, last seen 11/1/2018 and was prescribed Cipro x2 tabs for UTI ppx and Ancef x24 hours sugar-op for surgical ppx. UC 11/1 with citrobacter (resistant to Ancef) and group B strep. Repeat UA 11/2 no LE/nitrites/blood/RBC, <1 WBC  - Will hold off on further antibiotics given improvement with Cipro ppx  - Straight cath qid and prn  - Follow up with urology as suggested in 4-5 months for urodynamics testing     Other Medical Conditions:  HLD: Continue statin  Autonomic orthostatic hypotension: BL BP 100s/50s-60s. BP stable  Decubitus ulcers: Stage II decubitus ulcer of gluteal cleft, stage IV ulcer of perianal area (L lateral to anus). Of note, L foot wound also noted in PACU. WOCN consulted  Paraplegia 2/2 spinal SDH: S/p T9-12 decompressive laminectomy 3/2018. 2/2 MALI and IABP placement in the setting of cardiac arrest 3/8. Presented with sudden onset back pain, motor/sensory loss of BLE. Wheelchair bound. Chronic BLE edema at BLE. Lymphedema, PT/OT consulted  Chronic abdominal pain: Band-like in the lower abdomen since paralysis. Both MSK and deep pain in nature. Frustrated he does not know etiology of pain.Lidoderm patches not helpful. Started Icy Hot patches 11/4    Diet: Advance Diet as Tolerated: Regular Diet Adult  Diet  Fluids: None  Sandhu Catheter: not present    DVT Prophylaxis: Defer to primary service  Code Status: Full Code    Expected discharge: per primary team    The patient's care was discussed with the patient, nursing, and attending physician,   Jil Farris PA-C  Internal Medicine Staff Hospitalist Service  [facility]  Pager: [phone]  Please see sticky note for cross cover information    Interval History   No acute changes. Would like to get up and use wheelchair. Does not want to spend today in bed. Reports improvement with bleeding and removal of one drain. Reports decreased output from other drain. No pain. Reports feeling warm but not febrile overnight. Denies cloudiness or foul small to urine. No issue with straight caths. No diarrhea. Denies dyspnea, chest pain, wheezing, cough. No new rashes. Denies not feeling febrile at this time. Tolerating diet. BM today    Data reviewed today: I reviewed all medications, new labs and imaging results over the last 24 hours.     Physical Exam   Vital Signs: Temp: 98.3  F (36.8  C) Temp src: Oral BP: 107/52 Pulse: 76 Heart Rate: 77 Resp: 16 SpO2: 100 % O2 Device: None (Room air)    Weight: 158 lbs 0 oz  General Appearance: Alert and oriented x3, very pleasant  HEENT: PERRLA, MMM  Respiratory: CTAB without wheezing or rales  Cardiovascular: RRR. S1, S2. No murmur noted  GI: Abdomen soft, non-tender. Lower abdominal muscle tightness with area of spasm in LLQ. Bowel sounds active. No guarding or rebound  Lymph/Hematologic: 2+ BLE peripheral edema, distal pulses palpable. Lymphedema stockings in place  Skin: No rash or jaundice   Musculoskeletal: No movement from of the lower extremities. Moves upper extremities. Right sided drain at the hip x1  Neurologic: CN II-XII intact. Moves upper extremities, sensation intact from the umbilicus up. No sensation or movement from the umbilicus down      Data     Recent Labs  Lab 11/04/18  0809 11/03/18 2020 11/03/18  0651 11/02/18 2123   WBC  --   --  6.6 7.9   HGB 8.3* 8.3* 7.5* 8.6*   MCV  --   --  85 85   PLT  --   --  237 286   NA  --   --  139 140   POTASSIUM  --   --  3.6 4.5   CHLORIDE  --   --  105 105   CO2  --   --  25 29    BUN  --   --  20 19   CR  --   --  0.52* 0.61*   ANIONGAP  --   --  8 6   PAT  --   --  7.6* 8.0*   GLC  --   --  105* 191*

## 2018-11-04 NOTE — PLAN OF CARE
Problem: Patient Care Overview  Goal: Plan of Care/Patient Progress Review  IP OT 7B:  Evaluation orders received, per discussion with PT and chart review, pt independent in all ADLs with Assist at home and PT addressing transfers.  No OT needs at this time.  Will complete orders.  Should other concerns arise please re-consult OT.  Will defer to PT at this time.

## 2018-11-04 NOTE — PLAN OF CARE
"Problem: Patient Care Overview  Goal: Plan of Care/Patient Progress Review  Outcome: No Change  /43 (BP Location: Left arm)  Pulse 77  Temp 97.8  F (36.6  C) (Oral)  Resp 16  Ht 1.803 m (5' 11\")  Wt 71.7 kg (158 lb)  SpO2 99%  BMI 22.04 kg/m2    Neuro:A&O x4. Paraplegic. Good BUE strength.   Cardiac: AVSS on RA  Respiratory:WDL  GI/: Small BM with digstim. Straight cath Q4hrs with adequate UOP.  Diet/appetite: Tolerating Regular diet. Poor Po intake. LR infusing at 50ml/hr.  Activity: Repositioned Q2hrs with assist of 1-2. Tolerating CPM machine this AM.    Pain: Denied pain. scheduled tylenol given  Skin:Mepliex to coccyx.Left great toe covered, cdi. R hip dressing,cdi. Pt on pulsate mattress.   LDA: Hemovac drains x2 with scant output.  Plan: WOC and Lymphedema consult in place. Continue POC        "

## 2018-11-04 NOTE — PLAN OF CARE
"Problem: Patient Care Overview  Goal: Plan of Care/Patient Progress Review  Outcome: Improving  /52 (BP Location: Left arm)  Pulse 76  Temp 98.3  F (36.8  C) (Oral)  Resp 16  Ht 1.803 m (5' 11\")  Wt 71.7 kg (158 lb)  SpO2 100%  BMI 22.04 kg/m2    Reason for admission: post op right hip surgery  Vitals: VSS  Activity: Up in wheelchair  Pain: denies   Neuro: UE WDL, LE no sensation   Cardiac: WDL  Respiratory: WDL  GI/: neurogenic bowel and bladder.  Straight cath q4h.  Diet: Regular  Lines: PIV infusing right toe wound.   Skin/Wounds:  Will see WOC tomorrow.  Healing area on coccyx, covered in Mepilex.      Continue to monitor and follow POC     Pete Madison RN on 11/4/2018 at 2:48 PM        "

## 2018-11-04 NOTE — PROGRESS NOTES
11/04/18 0900   Rehab Discipline   Discipline PT  (Edema)   Type of Visit   Type of visit Initial Edema Evaluation   General Information   Start of care 11/04/18   Orders Evaluate and treat as indicated   Order date 11/02/18   Medical diagnosis Heterotopic ossification removal in R hip   Onset of illness / date of surgery 11/02/18   Edema onset (Has had long standing chronic dep edema since SCI 3/2018)   Affected body parts RLE;LLE   Edema etiology comments SCI, prolonged dep positioning   Pertinent history of current problem (PT: include personal factors and/or comorbidities that impact the POC; OT: include additional occupational profile info) Derek Enriquez is a 57 year old male with a history of paraplegia and heterotopic ossification limiting his range of motion.  Plan was to undergo excision of the heterotopic ossification.   Surgical / medical history reviewed Yes   Prior level of functional mobility Mod indep with manual w/c. Only requires assists to move LE over bathtub from son   Prior treatment Compression garments   Community support Family / friend caregiver   Patient role / employment history Employed  ()   Living environment [place] [facility]   Living environment comments W/c accessible   Current assistive devices Manual wheel chair   General observations Pt supine in bed; agreeable to session.   Patient / Family Goals   Patient / family goals statement Wants to dec pedal edema as they get quite large and more difficult to manipulate   Pain   Patient currently in pain No   Cognitive Status   Orientation Orientation to person, place and time   Level of consciousness Alert   Follows commands and answers questions 100% of the time   Personal safety and judgement Intact   Memory Intact   Edema Exam / Assessment   Skin condition comments Left great toe has small blister on medial side, 3x small bandaids with chronic wound on R great toe nailbed (has been seeing wound care nsg), otherwise 1 to 2+  pitting pockets throughout B LE; mildly worse on R LE due to recent surgery in R hip area.   Capillary refill Symmetrical   Ulceration comments See above   Range of Motion   ROM comments PROM WFL; R knee PROM 0-90 degrees   Strength   Strength comments Complete SCI   Bed Mobility   Bed mobility Not assessed; pt voices indep  (Not assessed; pt voices indep)   Transfers   Transfers Not assessed; pt voices indep   Gait / Locomotion   Gait / Locomotion Not assessed; pt voices indep with manual w/c   Sensory   Sensory perception comments Absent sensation to B LE   Muscle Tone   Muscle tone comments Flaccid B LE   Planned Edema Interventions   Planned edema interventions Fit for compression garment;Precautions to prevent infection / exacerbation;Education;Skin care / precautions;Home management program development   Clinical Impression   Criteria for skilled therapeutic intervention met Yes   Therapy diagnosis Worsened funcitonal mobility with increased edema impairment   Influenced by the following impairments / conditions Edema   Functional limitations due to impairments / conditions Impaired transfers with worsened edema   Clinical Presentation Stable/Uncomplicated   Clinical Presentation Rationale Clinical judgement   Clinical Decision Making (Complexity) Low complexity   Treatment frequency 3 times / week   Treatment duration 2-3 visits   Patient / family and/or staff in agreement with plan of care Yes   Risks and benefits of therapy have been explained Yes   Total Evaluation Time   Total evaluation time 8

## 2018-11-04 NOTE — PROGRESS NOTES
"Orthopaedic Surgery Progress Note  November 4, 2018    Subjective: No acute events overnight. Pain controlled. Knee in CPM this AM. Working with PT.  Drain 2 removed this AM.    Denies fever or chills, CP, SOB, numbness or tingling, motor dysfunction or weakness.    Objective: /52 (BP Location: Left arm)  Pulse 76  Temp 98.3  F (36.8  C) (Oral)  Resp 16  Ht 1.803 m (5' 11\")  Wt 71.7 kg (158 lb)  SpO2 100%  BMI 22.04 kg/m2    Physical Exam:  General: healthy, alert, no distress  Respiratory: Nonlabored breathing  MSK:  Focused examination of:   RLE: Dressing clean dry intact, 2 drains in place, no surrounding erythema or fluctuance.    Labs:     Recent Labs  Lab 11/03/18 2020 11/03/18  0651 11/02/18 2123 11/02/18  0629   HGB 8.3* 7.5* 8.6* 12.5*   WBC  --  6.6 7.9  --        All cultures:    Recent Labs  Lab 11/01/18  0710   CULT 50,000 to 100,000 colonies/mLCitrobacter freundii complex*  <10,000 colonies/mLStreptococcus agalactiae sero group BSusceptibility testing not routinely done on this organism from the genitourinary tract. Our antibiogram indicates that Group B streptococci are susceptible to ampicillin, penicillin, vancomycin and the cephalosporins. Susceptibility testing must be requested within 5 days.*       Assessment:  Derek Enriquez is a 57 year old male with a history of paraplegia and heterotopic ossification limiting his range of motion.  Plan was to undergo excision of the heterotopic ossification.    Procedures:  1.  11/2: Right hip heterotopic ossification resection    Orthopedics primary  Activity: Okay for all normal activities, including wheelchair and transfers  Antibiotics: Completed  Diet: Regular  DVT prophylaxis: We will resume aspirin and Plavix after the drain is removed at the postoperative appointment  Imaging: None  Bracing/Splinting: None  Dressings: Leave in place until clinic follow-up  Drains: 1 drain in place; this drain will remain in until follow-up on " Friday.  Consults: Medicine    Follow-up: Clinic with Dr. Kennedy this coming Friday    Disposition: Pending progress with therapies, pain control on orals, and medical stability, anticipate discharge Monday.    Conrad Putnam MD  Orthopaedic Resident, PGY-4  Pager: [phone]

## 2018-11-04 NOTE — PLAN OF CARE
Problem: Patient Care Overview  Goal: Plan of Care/Patient Progress Review  EDEMA/PT 7B: No physical therapy needs identified. Pt reports indep with mobility at his baseline at this time. In fact surgery will make it easier at this time. Has assistance at home. Also, he doesn't have active movement in B LE at baseline as well as a CPM for PROM. No ROM needs indicated.    Applied JOBST stockings to B LE and pt reports comfort. Will follow up for tolerance and continued donning training.    Currently recommend pt discharge home with PRN assist. Please remove compression if the following happen: pt complains of acute onset pain/numbness/tingling, progressively worsening SOB, skin color changes around compression, or if they become soiled. Please do not throw compression away if they become soiled but instead place in white patient belongings bag near bedside.

## 2018-11-05 ENCOUNTER — OFFICE VISIT (OUTPATIENT)
Dept: CARDIOLOGY | Facility: CLINIC | Age: 58
End: 2018-11-05
Attending: INTERNAL MEDICINE
Payer: COMMERCIAL

## 2018-11-05 ENCOUNTER — TELEPHONE (OUTPATIENT)
Dept: CARDIOLOGY | Facility: CLINIC | Age: 58
End: 2018-11-05

## 2018-11-05 ENCOUNTER — APPOINTMENT (OUTPATIENT)
Dept: ULTRASOUND IMAGING | Facility: CLINIC | Age: 58
End: 2018-11-05
Attending: PHYSICIAN ASSISTANT
Payer: COMMERCIAL

## 2018-11-05 VITALS
BODY MASS INDEX: 22.04 KG/M2 | WEIGHT: 158 LBS | DIASTOLIC BLOOD PRESSURE: 75 MMHG | SYSTOLIC BLOOD PRESSURE: 126 MMHG | OXYGEN SATURATION: 100 % | HEART RATE: 97 BPM

## 2018-11-05 VITALS
WEIGHT: 158 LBS | TEMPERATURE: 98.5 F | RESPIRATION RATE: 16 BRPM | BODY MASS INDEX: 22.12 KG/M2 | DIASTOLIC BLOOD PRESSURE: 58 MMHG | OXYGEN SATURATION: 100 % | SYSTOLIC BLOOD PRESSURE: 107 MMHG | HEART RATE: 95 BPM | HEIGHT: 71 IN

## 2018-11-05 DIAGNOSIS — R00.2 PALPITATIONS: ICD-10-CM

## 2018-11-05 DIAGNOSIS — I25.10 CORONARY ARTERY DISEASE INVOLVING NATIVE CORONARY ARTERY OF NATIVE HEART WITHOUT ANGINA PECTORIS: ICD-10-CM

## 2018-11-05 DIAGNOSIS — M79.10 MUSCLE PAIN: ICD-10-CM

## 2018-11-05 DIAGNOSIS — Z95.5 STENTED CORONARY ARTERY: ICD-10-CM

## 2018-11-05 DIAGNOSIS — I46.9 CARDIAC ARREST (H): Primary | ICD-10-CM

## 2018-11-05 DIAGNOSIS — Z95.5 H/O HEART ARTERY STENT: ICD-10-CM

## 2018-11-05 DIAGNOSIS — E78.00 HYPERCHOLESTEREMIA: ICD-10-CM

## 2018-11-05 LAB
ALBUMIN SERPL-MCNC: 2.6 G/DL (ref 3.4–5)
ALP SERPL-CCNC: 221 U/L (ref 40–150)
ALT SERPL W P-5'-P-CCNC: 18 U/L (ref 0–70)
ANION GAP SERPL CALCULATED.3IONS-SCNC: 4 MMOL/L (ref 3–14)
AST SERPL W P-5'-P-CCNC: 25 U/L (ref 0–45)
BILIRUB SERPL-MCNC: 0.4 MG/DL (ref 0.2–1.3)
BUN SERPL-MCNC: 14 MG/DL (ref 7–30)
CALCIUM SERPL-MCNC: 7.9 MG/DL (ref 8.5–10.1)
CHLORIDE SERPL-SCNC: 107 MMOL/L (ref 94–109)
CHOLEST SERPL-MCNC: 102 MG/DL
CK SERPL-CCNC: 359 U/L (ref 30–300)
CO2 SERPL-SCNC: 28 MMOL/L (ref 20–32)
CREAT SERPL-MCNC: 0.56 MG/DL (ref 0.66–1.25)
ERYTHROCYTE [DISTWIDTH] IN BLOOD BY AUTOMATED COUNT: 16.3 % (ref 10–15)
GFR SERPL CREATININE-BSD FRML MDRD: >90 ML/MIN/1.7M2
GLUCOSE SERPL-MCNC: 89 MG/DL (ref 70–99)
HCT VFR BLD AUTO: 28.9 % (ref 40–53)
HDLC SERPL-MCNC: 36 MG/DL
HGB BLD-MCNC: 8.5 G/DL (ref 13.3–17.7)
HGB BLD-MCNC: 8.5 G/DL (ref 13.3–17.7)
LDLC SERPL CALC-MCNC: 44 MG/DL
MCH RBC QN AUTO: 26.1 PG (ref 26.5–33)
MCHC RBC AUTO-ENTMCNC: 29.4 G/DL (ref 31.5–36.5)
MCV RBC AUTO: 89 FL (ref 78–100)
NONHDLC SERPL-MCNC: 66 MG/DL
NT-PROBNP SERPL-MCNC: 370 PG/ML (ref 0–125)
PLATELET # BLD AUTO: 239 10E9/L (ref 150–450)
POTASSIUM SERPL-SCNC: 4.4 MMOL/L (ref 3.4–5.3)
PROT SERPL-MCNC: 5.6 G/DL (ref 6.8–8.8)
RBC # BLD AUTO: 3.26 10E12/L (ref 4.4–5.9)
SODIUM SERPL-SCNC: 139 MMOL/L (ref 133–144)
TRIGL SERPL-MCNC: 113 MG/DL
WBC # BLD AUTO: 4.8 10E9/L (ref 4–11)

## 2018-11-05 PROCEDURE — 85027 COMPLETE CBC AUTOMATED: CPT | Performed by: INTERNAL MEDICINE

## 2018-11-05 PROCEDURE — 80061 LIPID PANEL: CPT | Performed by: INTERNAL MEDICINE

## 2018-11-05 PROCEDURE — 80053 COMPREHEN METABOLIC PANEL: CPT | Performed by: INTERNAL MEDICINE

## 2018-11-05 PROCEDURE — 85018 HEMOGLOBIN: CPT | Performed by: SPECIALIST

## 2018-11-05 PROCEDURE — 40000893 ZZH STATISTIC PT IP EVAL DEFER

## 2018-11-05 PROCEDURE — 25000128 H RX IP 250 OP 636: Performed by: SPECIALIST

## 2018-11-05 PROCEDURE — G0463 HOSPITAL OUTPT CLINIC VISIT: HCPCS | Mod: 25,ZF

## 2018-11-05 PROCEDURE — 99213 OFFICE O/P EST LOW 20 MIN: CPT | Mod: ZP | Performed by: INTERNAL MEDICINE

## 2018-11-05 PROCEDURE — G0463 HOSPITAL OUTPT CLINIC VISIT: HCPCS

## 2018-11-05 PROCEDURE — 99232 SBSQ HOSP IP/OBS MODERATE 35: CPT | Performed by: PHYSICIAN ASSISTANT

## 2018-11-05 PROCEDURE — 90682 RIV4 VACC RECOMBINANT DNA IM: CPT | Performed by: SPECIALIST

## 2018-11-05 PROCEDURE — 25000132 ZZH RX MED GY IP 250 OP 250 PS 637: Performed by: PHYSICIAN ASSISTANT

## 2018-11-05 PROCEDURE — 82550 ASSAY OF CK (CPK): CPT | Performed by: INTERNAL MEDICINE

## 2018-11-05 PROCEDURE — 25000132 ZZH RX MED GY IP 250 OP 250 PS 637: Performed by: ORTHOPAEDIC SURGERY

## 2018-11-05 PROCEDURE — 93971 EXTREMITY STUDY: CPT | Mod: RT

## 2018-11-05 PROCEDURE — 83880 ASSAY OF NATRIURETIC PEPTIDE: CPT | Performed by: INTERNAL MEDICINE

## 2018-11-05 PROCEDURE — 36415 COLL VENOUS BLD VENIPUNCTURE: CPT | Performed by: SPECIALIST

## 2018-11-05 RX ORDER — ATORVASTATIN CALCIUM 20 MG/1
20 TABLET, FILM COATED ORAL AT BEDTIME
Qty: 90 TABLET | Refills: 3 | Status: SHIPPED | OUTPATIENT
Start: 2018-11-05 | End: 2019-03-11

## 2018-11-05 RX ORDER — CLOPIDOGREL BISULFATE 75 MG/1
75 TABLET ORAL DAILY
Qty: 90 TABLET | Refills: 3 | Status: SHIPPED | OUTPATIENT
Start: 2018-11-05 | End: 2019-03-11

## 2018-11-05 RX ADMIN — OXYCODONE HYDROCHLORIDE AND ACETAMINOPHEN 500 MG: 500 TABLET ORAL at 08:28

## 2018-11-05 RX ADMIN — ATORVASTATIN CALCIUM 20 MG: 20 TABLET, FILM COATED ORAL at 08:28

## 2018-11-05 RX ADMIN — THERA TABS 1 TABLET: TAB at 08:28

## 2018-11-05 RX ADMIN — INFLUENZA A VIRUS A/MICHIGAN/45/2015 (H1N1) RECOMBINANT HEMAGGLUTININ ANTIGEN, INFLUENZA A VIRUS A/SINGAPORE/INFIMH-16-0019/2016 (H3N2) RECOMBINANT HEMAGGLUTININ ANTIGEN, INFLUENZA B VIRUS B/MARYLAND/15/2016 RECOMBINANT HEMAGGLUTININ ANTIGEN, AND INFLUENZA B VIRUS B/PHUKET/3073/2013 RECOMBINANT HEMAGGLUTININ ANTIGEN 0.5 ML: 45; 45; 45; 45 INJECTION INTRAMUSCULAR at 13:21

## 2018-11-05 RX ADMIN — ACETAMINOPHEN 975 MG: 325 TABLET, FILM COATED ORAL at 06:47

## 2018-11-05 RX ADMIN — MENTHOL 1 PATCH: 205.5 PATCH TOPICAL at 06:47

## 2018-11-05 RX ADMIN — B-COMPLEX W/ C & FOLIC ACID TAB 1 TABLET: TAB at 08:28

## 2018-11-05 ASSESSMENT — ACTIVITIES OF DAILY LIVING (ADL)
ADLS_ACUITY_SCORE: 31
ADLS_ACUITY_SCORE: 30

## 2018-11-05 ASSESSMENT — PAIN SCALES - GENERAL: PAINLEVEL: NO PAIN (0)

## 2018-11-05 NOTE — PROGRESS NOTES
Ortho notified of right hip edema (increased per pt report) and localized hip redness at #[id number]. MD called back. No new orders. Continue to monitor.

## 2018-11-05 NOTE — PROGRESS NOTES
HPI:     Mr. Enriquez is a 56 y/o M w/ CAD c/b cardiac arrest at [facility] 3/8/18 with successful recusitation w/ culprrit being 95% pLAD reptured plaque s/p PCI.    Post-procedure      Mr Derek Enriquez is a  57-year-old gentleman, who had a cardiac arrest in [facility] during exercise (03-)  Patient was resuscitated and underwent a coronary angiogram , which showed   Single vessel CAD  LAD   - 95% stenotic ruptured plaque in the proximal LAD  Successful deployment of a drug eluting stent    3.0 x 20 mm Promus Premier drug eluting stent across the proximal LAD  and post-dilated with a 3.5 x15 mm non-compliant balloon  -Successful placement of balloon pump for LV support.  He also was put at IABP.                         Postop day 1 developed back pain then loss of sensation and movement in his lower extremities.  Identified spinal subdural hematoma and transferred emergently to [facility].  He underwent T9-T12 decompressive laminectomy and evacuation of subdural hematoma.  He unfortunately persists with complete paraplegia.  After medical stabilization he transferred to [facility] acute rehabilitation [place] 3/14 for comprehensive cares.     Patient comes for follow-up - he is now convinced that he needs to take the classically CV preventive therapy.      Patient underwent a Right Hip Heterotopic Bone Resection on 11/2/2018    Patient feels well; denies chest pain, shortness of breath, palpitations and intermittent claudication.    Patient works full time.    PAST MEDICAL HISTORY:  Past Medical History:   Diagnosis Date     CAD (coronary artery disease)     stent     Femur fracture (H)      Neurogenic bladder      Neurogenic bowel      Orthostatic hypotension      Paraplegia (H)      Thoracic spinal cord injury (H)        CURRENT MEDICATIONS:  Current Outpatient Prescriptions   Medication Sig Dispense Refill     ascorbic acid (VITAMIN C) 500 MG tablet Take 500 mg by  mouth every morning        atorvastatin (LIPITOR) 20 MG tablet Take 1 tablet (20 mg) by mouth At Bedtime 90 tablet 3     clopidogrel (PLAVIX) 75 MG tablet Take 1 tablet (75 mg) by mouth daily 90 tablet 3     Multiple Vitamins-Minerals (MULTIVITAMIN MEN PO) Take 1 tablet by mouth daily        order for DME Equipment being ordered: [employer] Phone [phone] Fax [phone]    Primary Dressing Iodosorb Gel   Qty not needed  Secondary Dressing  tegaderm 1adhesive foam dressing 3x3 Qty 30  Secondary Dressing 2x2 gauze Qty 1 loaf  Secondary Dressing 2' tape Qty 1 roll  Length of Need: 1 month  Frequency of dressing change: daily 30 days 0     vitamin B complex with vitamin C (VITAMIN  B COMPLEX) TABS tablet Take 1 tablet by mouth daily         PAST SURGICAL HISTORY:  Past Surgical History:   Procedure Laterality Date     ARTHROTOMY HIP Left 9/19/2018    Procedure: ARTHROTOMY HIP;  Left Hip Heterotopic Bone Resection;  Surgeon: Toñito Kennedy MD;  Location: [facility] OR     ARTHROTOMY HIP Right 11/02/2018     ARTHROTOMY HIP Right 11/2/2018    Procedure: Right Hip Heterotopic Bone Resection;  Surgeon: Toñito Kennedy MD;  Location: [facility] OR     CARDIAC SURGERY       LAMINECTOMY THORACIC THREE LEVELS N/A 3/9/2018    Procedure: LAMINECTOMY THORACIC THREE LEVELS;  Thoracic 9-12 Laminectomy Evacuation of Subdural Hematoma, C-Arm, Prone, Gel Rolls.;  Surgeon: Abhijeet Joe MD;  Location: [facility] OR     OPEN REDUCTION INTERNAL FIXATION RODDING INTRAMEDULLARY FEMUR Right 6/25/2018    Procedure: OPEN REDUCTION INTERNAL FIXATION RODDING INTRAMEDULLARY FEMUR;  Right Open Reduction Internal Fixation Subtrochanteric Femur Fracture;  Surgeon: Toñito Kennedy MD;  Location: [facility] OR     wisdom teeth extraction         ALLERGIES   No Known Allergies    FAMILY HISTORY:  Family History   Problem Relation Age of Onset     Cardiac Sudden Death Father      Arrhythmia Father      v fib arrest       Myocardial Infarction Brother      Other - See Comments Brother      myeloid dysplasia       SOCIAL HISTORY:  Social History     Social History     Marital status: Single     Spouse name: N/A     Number of children: N/A     Years of education: N/A     Social History Main Topics     Smoking status: Former Smoker     Smokeless tobacco: Never Used      Comment: Quit at 31     Alcohol use Yes      Comment: socially     Drug use: No     Sexual activity: Not Asked     Other Topics Concern     None     Social History Narrative       ROS:   Constitutional: No fever, chills, or sweats. No weight gain/loss   ENT: No visual disturbance, ear ache, epistaxis, sore throat  Allergies/Immunologic: Negative.   Respiratory: No cough, hemoptysia  Cardiovascular: As per HPI  GI: No nausea, vomiting, hematemesis, melena, or hematochezia  : No urinary frequency, dysuria, or hematuria  Integument: Negative  Psychiatric: Negative  Neuro: Negative  Endocrinology: Negative   Musculoskeletal: Negative    EXAM:  /75 (BP Location: Right arm, Patient Position: Chair, Cuff Size: Adult Regular)  Pulse 97  Wt 71.7 kg (158 lb)  SpO2 100%  BMI 22.04 kg/m2  In general, the patient is a pleasant male in no apparent distress.    HEENT: NC/AT.  PERRLA.  EOMI.  Sclerae white, not injected.  Nares clear.  Pharynx without erythema or exudate.  Dentition intact.    Neck: No adenopathy.  No thyromegaly. Carotids +4/4 bilaterally without bruits.  No jugular venous distension.   Heart: RRR. Normal S1, S2 splits physiologically. No murmur, rub, click, or gallop. The PMI is in the 5th ICS in the midclavicular line. There is no heave.    Lungs: CTA.  No ronchi, wheezes, rales.  No dullness to percussion.   Abdomen: Soft, nontender, nondistended. No organomegaly.  No bruits.   Extremities: No clubbing, cyanosis, or edema.  The pulses are +4/4 at the radial, brachial, femoral, popliteal, DP, and PT sites bilaterally.  No bruits are noted.  Neurologic:  Alert and oriented to person/place/time, normal speech, gait and affect  Skin: No petechiae, purpura or rash.    Labs:       Ref. Range 11/5/2018 06:16   Sodium Latest Ref Range: 133 - 144 mmol/L 139   Potassium Latest Ref Range: 3.4 - 5.3 mmol/L 4.4   Chloride Latest Ref Range: 94 - 109 mmol/L 107   Carbon Dioxide Latest Ref Range: 20 - 32 mmol/L 28   Urea Nitrogen Latest Ref Range: 7 - 30 mg/dL 14   Creatinine Latest Ref Range: 0.66 - 1.25 mg/dL 0.56 (L)   GFR Estimate Latest Ref Range: >60 mL/min/1.7m2 >90   GFR Estimate If Black Latest Ref Range: >60 mL/min/1.7m2 >90   Calcium Latest Ref Range: 8.5 - 10.1 mg/dL 7.9 (L)   Anion Gap Latest Ref Range: 3 - 14 mmol/L 4   Albumin Latest Ref Range: 3.4 - 5.0 g/dL 2.6 (L)   Protein Total Latest Ref Range: 6.8 - 8.8 g/dL 5.6 (L)   Bilirubin Total Latest Ref Range: 0.2 - 1.3 mg/dL 0.4   Alkaline Phosphatase Latest Ref Range: 40 - 150 U/L 221 (H)   ALT Latest Ref Range: 0 - 70 U/L 18   AST Latest Ref Range: 0 - 45 U/L 25   Cholesterol Latest Ref Range: <200 mg/dL 102   CK Total Latest Ref Range: 30 - 300 U/L 359 (H)   HDL Cholesterol Latest Ref Range: >39 mg/dL 36 (L)   LDL Cholesterol Calculated Latest Ref Range: <100 mg/dL 44   Non HDL Cholesterol Latest Ref Range: <130 mg/dL 66   N-Terminal Pro Bnp Latest Ref Range: 0 - 125 pg/mL 370 (H)   Triglycerides Latest Ref Range: <150 mg/dL 113   Glucose Latest Ref Range: 70 - 99 mg/dL 89   WBC Latest Ref Range: 4.0 - 11.0 10e9/L 4.8   Hemoglobin Latest Ref Range: 13.3 - 17.7 g/dL 8.5 (L)   Hematocrit Latest Ref Range: 40.0 - 53.0 % 28.9 (L)   Platelet Count Latest Ref Range: 150 - 450 10e9/L 239   RBC Count Latest Ref Range: 4.4 - 5.9 10e12/L 3.26 (L)   MCV Latest Ref Range: 78 - 100 fl 89   MCH Latest Ref Range: 26.5 - 33.0 pg 26.1 (L)   MCHC Latest Ref Range: 31.5 - 36.5 g/dL 29.4 (L)   RDW Latest Ref Range: 10.0 - 15.0 % 16.3 (H)     Procedures:  Echocardiogram is rescheduled    Assessment and Plan:     We discussed the  results with patient - there is a slightly elevation of CK (S/P orthopedic intervention 11/2)  We discussed the importance of a heart healthy lifestyle and diet.  Lipids are well controlled  Hb is low - S/P orthopedic surgery.  NT-proBNP elevated - echocardiogram is pending.  We continue with same medical regimen.    Follow-up within 6 months.       Anoop Jane MD, PhD  Professor of Medicine  Division of Cardiology            CC  Patient Care Team:  Eugene Burrell MD as PCP - General (Family Practice)  Anoop Jane MD as MD (Cardiology)  Andrei Pelaez MD as MD (Urology)  Candice Bridges, RN as Registered Nurse (Urology)  Marilin Ramirez LPN as Nurse Coordinator (Cardiology)  Thomas Connelly EP as Cardiac Rehabilitation Therapist (Cardiac Rehab)  SELF, REFERRED

## 2018-11-05 NOTE — PLAN OF CARE
"Problem: Patient Care Overview  Goal: Plan of Care/Patient Progress Review  Outcome: No Change  Blood pressure 128/56, pulse 95, temperature 99.3  F (37.4  C), temperature source Oral, resp. rate 16, height 1.803 m (5' 11\"), weight 71.7 kg (158 lb), SpO2 99 %.      Neuro:A&O x4. Paraplegic.   Cardiac: WDL  Respiratory:WDL  GI/: Small BM with digstim. One episode of large urine incontinence. Sheets soaked in urine around 4.15 pm. Straight cathed Q3hrs per pt request with adequate UOP. Last straight cath was  at 10 pm.  Diet/appetite: Tolerating Regular diet. Fair PO intake.   Activity: Repositioned Q2hrs with assist of 1. Tolerated CPM machine for about 1 hr this shift. Compression stockings removed at bedtime.   Pain: Denied pain. Scheduled tylenol given. Icy hot patch applied to lower abdomen.  Skin: Mepliex to coccyx changed. Left great toe wound, Right great toe and Right right pinky (allan metatarsal ) wounds present. Right pinky wound has with dark center (suspected pressure ulcer). Skin cleansed and bacitracin applied. Pt does not like like mepilex. Band-Aid applied. Right hip dressing CDI. Pt on pulsate mattress.  LDA: Hemovac drains x1 intact with small bloody output.  Plan: WOC consult in place.Continue POC         "

## 2018-11-05 NOTE — OP NOTE
Procedure Date: 11/02/2018      PREOPERATIVE DIAGNOSIS:  Heterotopic ossification, right hip, with decreased hip range of motion.      POSTOPERATIVE DIAGNOSIS:  Heterotopic ossification, right hip, with decreased hip range of motion.      PROCEDURE PERFORMED:  Resection of heterotopic ossification, right hip.      SURGEON:  Toñito Kennedy MD      ASSISTANT:  Renato Munroe MD      ANESTHESIA:  General.      ESTIMATED BLOOD LOSS:  1000 mL.      REPLACEMENT:  1200 mL crystalloid.      DRAINS:  Two 1/8 inch Hemovacs.      OPERATIVE FINDINGS:  There was a large amount of heterotopic bone between the inguinal ligament and the proximal third of the thigh restricting hip flexion, extension and rotation.      INDICATIONS:  This 57-year-old gentleman who is a recent paraplegic has developed bilateral heterotopic ossification of the hips, which restrict range of motion.  Approximately 2 months ago, we did the resection on the left side, which gave him hip flexion to 90 degrees and improved his mobility.  The plan is to repeat the heterotopic bone resection on the right.  Additionally, this right side was treated for a subtrochanteric hip fracture about 5-6 months ago.      DESCRIPTION OF PROCEDURE:  The patient was evaluated in the holding area.  The informed consent process completed.  Surgical site initialed.  He was brought to the operating room, where general anesthesia with orotracheal intubation was performed.  He was kept in the supine position with a folded bath blanket underneath the right buttock.  IV antibiotics were administered.  The right lower extremity and hindquarter was isolated, painted with iodine and alcohol and draped in a sterile fashion.  A timeout was held and all agreed on the patient, the side and the procedure.      A straight anterior incision, the distal half of the Caballero-Jalloh approach was made, symmetric with the opposite scar.  Hemostasis achieved with electrocautery, dissected through the  subcutaneous fat with electrocautery, hemostasis and divided the anterior thigh fascia.  We followed the lateral border of the sartorius and then came down directly onto the most anterior and superficial mass of heterotopic bone.  We then used electrocautery to remove the soft tissue from the bone anteriorly, medially and laterally.  Moderate bleeding was encountered medially.  We did use some Gelfoam and thrombin to help control this.  Eventually, we used an osteotome to transect the mass and then removed the most distal portion of the heterotopic bone by osteotomes and a Guo elevator.  We went to the distal extent of the incision and then turned our attention to the more proximal extent of the heterotopic bone, which was the functional part restricting hip range of motion.  With the Guo elevator directly on the anterior femur, we elevated the bone off of the femur and then carefully dissected medially up to the level of the inguinal ligament.  We were able to find a plane over the hip capsule and used the Guo elevator along this to remove the massive bone in 3 segments.  Additional bone was resected and all free bone fragments were resected.  We used copious irrigation throughout the procedure and electrocautery to control venous and minor arterial bleeding.  At the beginning of the procedure, the patient had a hip flexion contracture of about 10-15 degrees with further flexion to 45.  At the end, we could flex the hip to 100 with gentle pressure and he had full passive extension.  He had abduction to 20 degrees, adduction to 10 degrees, internal rotation of 5-10 degrees, external rotation of 10 degrees.      After further copious irrigation, we tried to close the dead space with running 0 Vicryl, suturing the quadriceps heads together as best as possible.  We placed two 1/8-inch Hemovac drains deep into the wound and sutured them in to the skin with 3-0 nylon.  We closed the anterior thigh fascia with  interrupted figure-of-eight 0 Vicryl, the subcutaneous tissue with inverted undyed 2-0 Vicryl, the skin with intracuticular 3-0 Monocryl, reinforced with Steri-Strips.  A silver impregnated dressing was applied.  He was taken to the postanesthesia room having tolerated the procedure well.      POSTOPERATIVE PLAN:  Will be for CPM while in the hospital.  He may transfer to the wheelchair.  We will plan on leaving at least 1 drain in for 1 week and have him record the output.  He will receive x-ray therapy of 700 Gy to the hip for heterotopic bone recurrence prevention on the day of the surgery.  He will be discharged on postoperative day #2 or #3 pending wound appearance and the Hemovac output.  We will use the same postoperative regimen that was used 7 weeks ago for the left hip.         WILLIAM FAULKNER MD             D: 2018   T: 2018   MT: JIMENA      Name:     LINDA MARTÍNEZ   MRN:      [id number]        Account:        [id number]   :      1960           Procedure Date: 2018      Document: [id number]

## 2018-11-05 NOTE — PLAN OF CARE
"Problem: Patient Care Overview  Goal: Plan of Care/Patient Progress Review  Outcome: No Change    /57 (BP Location: Left arm)  Pulse 95  Temp 99  F (37.2  C) (Oral)  Resp 16  Ht 1.803 m (5' 11\")  Wt 71.7 kg (158 lb)  SpO2 98%  BMI 22.04 kg/m2     Neuro:A&O x4. Paraplegic. Good BUE strength.   Cardiac: AVSS on RA. Bp soft  Respiratory:WDL  GI/:LBM yesterday. Straight cath Q3-4hrs with adequate UOP.  Diet/appetite: Tolerating Regular diet.   Activity: Repositioned Q2hrs with assist of 1-2. Declined CPM machine. Pt on pulsate mattress.  Pain: Denied pain. scheduled tylenol given  Skin:Mepliex to coccyx.Left great toe covered, cdi. R hip dressing,cdi. Increased edema and Redness noted to R hip. 1 drain removed yesterday. Providers aware.  LDA: Hemovac drain with scant s/s output. PIV SL  Plan: WOC consult in place for today. Continue POC      "

## 2018-11-05 NOTE — PROGRESS NOTES
"S: WOC consult received for Coccyx and L Hallux.  B: Derek Enriquez is a 57 year old male with history of MI (with cardiac arrest, LAD stenting x1 with IABP placement 3/8/2018), paradriplegia 2/2 spinal SDH (3/9/2018 AVM in the setting of anticoagulation for MI) s/p T9-12 decompressive laminectomy 3/2018, bilateral hip heterotopic ossification (s/p L hip heterotopic bone resection 9/19/2018), neurogenic bladder/bowel, autonomic orthostatic hypotension, HLD, and chronic lower extremity edema who was admitted to orthopedics 11/2/2018 following R hip heterotopic bone resection. Internal medicine consulted for assistance with management in the perioperative phase  A: Pt has 2cm x 2cm of blanchable erythema, mepilex currently in place.   L hallux, pt wanting WOC to cut extra skin. Pt has ingrown toe nail with flap of tissue laying over nail bed. Provider at bedside assessing with WOC today and indicates due to Discharge he will have to be assessed by podiatry outpatient. Pt very upset that WOC will not just cut the skin and WOC explained scope of practice as well as infection control.   R:   Wound care to Coccyx: every 3 days  1. Clean wound with saline or MicroKlenz Spray, pat dry  2. Paint periwound tissue with No Sting Skin Prep (#[id number]) and allow to dry thoroughly  3. Press a Mepilex  Border Dressing (#[id number]) to the area, making sure to conform nicely to skin curvatures   4. Time and date dressing change and mariano with a \"T\" for treatment of a wound  5. Reposition pt every 1 to 2 hours when in bed and hourly when up to the chair to relieve pressure and promote perfusion to tissue  NOTE- continue to assess under dressing and document findings BID, per protocol    Wound care to L Hallux: Cleanse with Microklenz, apply thin layer of bacitracin and cover with a bandaid. Pt to follow up outpatient with podiatry.   "

## 2018-11-05 NOTE — PLAN OF CARE
Problem: Patient Care Overview  Goal: Plan of Care/Patient Progress Review  EDEMA 7B: Checked in with pt. No further acute PT/edema needs at this time. Able to slide transfer to w/c. Discussed options of continued edema management as WOC RN present to assess wounds. Concern re: shearing. No concerns for shearing as long as baby powder applied to LE prior to JOBST donning. Has assistance at home. Recommended that if further shear/pressure injury occurred that compression would take a secondary role to wound healing. Pt agreeable and verbalized understanding. Edema to sign off at this time.

## 2018-11-05 NOTE — LETTER
11/5/2018      RE: Derek Enriquez  [address] [facility] [address]       Dear Colleague,    Thank you for the opportunity to participate in the care of your patient, Derek Enriquez, at the [facility] acute [facility]. Please see a copy of my visit note below.    HPI:     Mr. Enriquez is a 56 y/o M w/ CAD c/b cardiac arrest at [facility] 3/8/18 with successful recusitation w/ culprrit being 95% pLAD reptured plaque s/p PCI.    Post-procedure      Mr Derek Enriquez is a  57-year-old gentleman, who had a cardiac arrest in [facility] during exercise (03-)  Patient was resuscitated and underwent a coronary angiogram , which showed   Single vessel CAD  LAD   - 95% stenotic ruptured plaque in the proximal LAD  Successful deployment of a drug eluting stent    3.0 x 20 mm Promus Premier drug eluting stent across the proximal LAD  and post-dilated with a 3.5 x15 mm non-compliant balloon  -Successful placement of balloon pump for LV support.  He also was put at IABP.                         Postop day 1 developed back pain then loss of sensation and movement in his lower extremities.  Identified spinal subdural hematoma and transferred emergently to [facility].  He underwent T9-T12 decompressive laminectomy and evacuation of subdural hematoma.  He unfortunately persists with complete paraplegia.  After medical stabilization he transferred to [facility] acute rehabilitation [place] 3/14 for comprehensive cares.     Patient comes for follow-up - he is now convinced that he needs to take the classically CV preventive therapy.      Patient underwent a Right Hip Heterotopic Bone Resection on 11/2/2018    Patient feels well; denies chest pain, shortness of breath, palpitations and intermittent claudication.    Patient works full time.    PAST MEDICAL HISTORY:  Past Medical History:   Diagnosis Date     CAD (coronary artery disease)     stent      Femur fracture (H)      Neurogenic bladder      Neurogenic bowel      Orthostatic hypotension      Paraplegia (H)      Thoracic spinal cord injury (H)        CURRENT MEDICATIONS:  Current Outpatient Prescriptions   Medication Sig Dispense Refill     ascorbic acid (VITAMIN C) 500 MG tablet Take 500 mg by mouth every morning        atorvastatin (LIPITOR) 20 MG tablet Take 1 tablet (20 mg) by mouth At Bedtime 90 tablet 3     clopidogrel (PLAVIX) 75 MG tablet Take 1 tablet (75 mg) by mouth daily 90 tablet 3     Multiple Vitamins-Minerals (MULTIVITAMIN MEN PO) Take 1 tablet by mouth daily        order for DME Equipment being ordered: [email or web address] Phone [phone] Fax [phone]    Primary Dressing Iodosorb Gel   Qty not needed  Secondary Dressing  tegaderm 1adhesive foam dressing 3x3 Qty 30  Secondary Dressing 2x2 gauze Qty 1 loaf  Secondary Dressing 2' tape Qty 1 roll  Length of Need: 1 month  Frequency of dressing change: daily 30 days 0     vitamin B complex with vitamin C (VITAMIN  B COMPLEX) TABS tablet Take 1 tablet by mouth daily         PAST SURGICAL HISTORY:  Past Surgical History:   Procedure Laterality Date     ARTHROTOMY HIP Left 9/19/2018    Procedure: ARTHROTOMY HIP;  Left Hip Heterotopic Bone Resection;  Surgeon: Toñito Kennedy MD;  Location: [facility] OR     ARTHROTOMY HIP Right 11/02/2018     ARTHROTOMY HIP Right 11/2/2018    Procedure: Right Hip Heterotopic Bone Resection;  Surgeon: Toñito Kennedy MD;  Location: [facility] OR     CARDIAC SURGERY       LAMINECTOMY THORACIC THREE LEVELS N/A 3/9/2018    Procedure: LAMINECTOMY THORACIC THREE LEVELS;  Thoracic 9-12 Laminectomy Evacuation of Subdural Hematoma, C-Arm, Prone, Gel Rolls.;  Surgeon: Abhijeet Joe MD;  [facility] OR     OPEN REDUCTION INTERNAL FIXATION RODDING INTRAMEDULLARY FEMUR Right 6/25/2018    Procedure: OPEN REDUCTION INTERNAL FIXATION RODDING INTRAMEDULLARY FEMUR;  Right Open Reduction Internal Fixation  Subtrochanteric Femur Fracture;  Surgeon: Toñito Kennedy MD;  Location: [facility] OR     wisdom teeth extraction         ALLERGIES   No Known Allergies    FAMILY HISTORY:  Family History   Problem Relation Age of Onset     Cardiac Sudden Death Father      Arrhythmia Father      v fib arrest      Myocardial Infarction Brother      Other - See Comments Brother      myeloid dysplasia       SOCIAL HISTORY:  Social History     Social History     Marital status: Single     Spouse name: N/A     Number of children: N/A     Years of education: N/A     Social History Main Topics     Smoking status: Former Smoker     Smokeless tobacco: Never Used      Comment: Quit at 31     Alcohol use Yes      Comment: socially     Drug use: No     Sexual activity: Not Asked     Other Topics Concern     None     Social History Narrative       ROS:   Constitutional: No fever, chills, or sweats. No weight gain/loss   ENT: No visual disturbance, ear ache, epistaxis, sore throat  Allergies/Immunologic: Negative.   Respiratory: No cough, hemoptysia  Cardiovascular: As per HPI  GI: No nausea, vomiting, hematemesis, melena, or hematochezia  : No urinary frequency, dysuria, or hematuria  Integument: Negative  Psychiatric: Negative  Neuro: Negative  Endocrinology: Negative   Musculoskeletal: Negative    EXAM:  /75 (BP Location: Right arm, Patient Position: Chair, Cuff Size: Adult Regular)  Pulse 97  Wt 71.7 kg (158 lb)  SpO2 100%  BMI 22.04 kg/m2  In general, the patient is a pleasant male in no apparent distress.    HEENT: NC/AT.  PERRLA.  EOMI.  Sclerae white, not injected.  Nares clear.  Pharynx without erythema or exudate.  Dentition intact.    Neck: No adenopathy.  No thyromegaly. Carotids +4/4 bilaterally without bruits.  No jugular venous distension.   Heart: RRR. Normal S1, S2 splits physiologically. No murmur, rub, click, or gallop. The PMI is in the 5th ICS in the midclavicular line. There is no heave.    Lungs: CTA.  No  ronchi, wheezes, rales.  No dullness to percussion.   Abdomen: Soft, nontender, nondistended. No organomegaly.  No bruits.   Extremities: No clubbing, cyanosis, or edema.  The pulses are +4/4 at the radial, brachial, femoral, popliteal, DP, and PT sites bilaterally.  No bruits are noted.  Neurologic: Alert and oriented to person/place/time, normal speech, gait and affect  Skin: No petechiae, purpura or rash.    Labs:       Ref. Range 11/5/2018 06:16   Sodium Latest Ref Range: 133 - 144 mmol/L 139   Potassium Latest Ref Range: 3.4 - 5.3 mmol/L 4.4   Chloride Latest Ref Range: 94 - 109 mmol/L 107   Carbon Dioxide Latest Ref Range: 20 - 32 mmol/L 28   Urea Nitrogen Latest Ref Range: 7 - 30 mg/dL 14   Creatinine Latest Ref Range: 0.66 - 1.25 mg/dL 0.56 (L)   GFR Estimate Latest Ref Range: >60 mL/min/1.7m2 >90   GFR Estimate If Black Latest Ref Range: >60 mL/min/1.7m2 >90   Calcium Latest Ref Range: 8.5 - 10.1 mg/dL 7.9 (L)   Anion Gap Latest Ref Range: 3 - 14 mmol/L 4   Albumin Latest Ref Range: 3.4 - 5.0 g/dL 2.6 (L)   Protein Total Latest Ref Range: 6.8 - 8.8 g/dL 5.6 (L)   Bilirubin Total Latest Ref Range: 0.2 - 1.3 mg/dL 0.4   Alkaline Phosphatase Latest Ref Range: 40 - 150 U/L 221 (H)   ALT Latest Ref Range: 0 - 70 U/L 18   AST Latest Ref Range: 0 - 45 U/L 25   Cholesterol Latest Ref Range: <200 mg/dL 102   CK Total Latest Ref Range: 30 - 300 U/L 359 (H)   HDL Cholesterol Latest Ref Range: >39 mg/dL 36 (L)   LDL Cholesterol Calculated Latest Ref Range: <100 mg/dL 44   Non HDL Cholesterol Latest Ref Range: <130 mg/dL 66   N-Terminal Pro Bnp Latest Ref Range: 0 - 125 pg/mL 370 (H)   Triglycerides Latest Ref Range: <150 mg/dL 113   Glucose Latest Ref Range: 70 - 99 mg/dL 89   WBC Latest Ref Range: 4.0 - 11.0 10e9/L 4.8   Hemoglobin Latest Ref Range: 13.3 - 17.7 g/dL 8.5 (L)   Hematocrit Latest Ref Range: 40.0 - 53.0 % 28.9 (L)   Platelet Count Latest Ref Range: 150 - 450 10e9/L 239   RBC Count Latest Ref Range: 4.4 -  5.9 10e12/L 3.26 (L)   MCV Latest Ref Range: 78 - 100 fl 89   MCH Latest Ref Range: 26.5 - 33.0 pg 26.1 (L)   MCHC Latest Ref Range: 31.5 - 36.5 g/dL 29.4 (L)   RDW Latest Ref Range: 10.0 - 15.0 % 16.3 (H)     Procedures:  Echocardiogram is rescheduled    Assessment and Plan:     We discussed the results with patient - there is a slightly elevation of CK (S/P orthopedic intervention 11/2)  We discussed the importance of a heart healthy lifestyle and diet.  Lipids are well controlled  Hb is low - S/P orthopedic surgery.  NT-proBNP elevated - echocardiogram is pending.  We continue with same medical regimen.    Follow-up within 6 months.      Anoop Jane MD, PhD  Professor of Medicine  Division of Cardiology        CC  Patient Care Team:  Eugene Burrell MD as PCP - General (Family Practice)  Anoop Jane MD as MD (Cardiology)  Andrei Pelaez MD as MD (Urology)  Candice Bridges RN as Registered Nurse (Urology)  Marilin Ramirez LPN as Nurse Coordinator (Cardiology)  Thomas Connelly EP as Cardiac Rehabilitation Therapist (Cardiac Rehab)

## 2018-11-05 NOTE — MR AVS SNAPSHOT
After Visit Summary   11/5/2018    Derek Enriquez    MRN: [id number]           Patient Information     Date Of Birth          1960        Visit Information        Provider Department      11/5/2018 3:30 PM Anoop Jane MD [facility]        Today's Diagnoses     Cardiac arrest (H)    -  1    Coronary artery disease involving native coronary artery of native heart without angina pectoris        Stented coronary artery        H/O heart artery stent          Care Instructions    Patient Instructions:  It was a pleasure to see you in the cardiology clinic today.      If you have any questions, you can reach my nurse, Marilin Ramirez LPN, at [phone].  Press Option #1 for the [facility], and then press Option #3 for nursing.    We are encouraging the use of [employer] to communicate with your HealthCare Provider    Medication Changes: None.    Studies Ordered: Echocardiogram in March.    The results from today include: Labs    Please follow up: With Dr. Jane in March 2019 with fasting labs prior.    Sincerely,    Anoop Jane MD     If you have an urgent need after hours (8:00 am to 4:30 pm) please call [phone] and ask for the cardiology fellow on call.                Follow-ups after your visit        Additional Services     Follow-Up with Cardiologist                 Your next 10 appointments already scheduled     Mar 27, 2019  3:00 PM CDT   (Arrive by 2:45 PM)   Urodynamics with Candy Leary PA-C   [facility] ([facility])    [address] [place] [id number] [phone]              Future tests that were ordered for you today     Open Future Orders        Priority Expected Expires Ordered    Follow-Up with Cardiologist Routine 3/1/2019 11/6/2019 11/5/2018    Comprehensive metabolic panel Routine 3/1/2019 11/6/2019 11/5/2018    Lipid panel  reflex to direct LDL Fasting Routine 3/1/2019 11/6/2019 11/5/2018            Who to contact     If you have questions or need follow up information about today's clinic visit or your schedule please contact [facility] directly at [phone].  Normal or non-critical lab and imaging results will be communicated to you by [employer], letter or phone within 4 business days after the clinic has received the results. If you do not hear from us within 7 days, please contact the clinic through [employer] or phone. If you have a critical or abnormal lab result, we will notify you by phone as soon as possible.  Submit refill requests through [employer] or call your pharmacy and they will forward the refill request to us. Please allow 3 business days for your refill to be completed.          Additional Information About Your Visit        [employer] Information     [employer] gives you secure access to your electronic health record. If you see a primary care provider, you can also send messages to your care team and make appointments. If you have questions, please call your primary care clinic.  If you do not have a primary care provider, please call [phone] and they will assist you.        Care EveryWhere ID     This is your Care EveryWhere ID. This could be used by other organizations to access your [facility] medical records  [id number]        Your Vitals Were     Pulse Pulse Oximetry BMI (Body Mass Index)             97 100% 22.04 kg/m2          Blood Pressure from Last 3 Encounters:   11/05/18 107/58   11/05/18 126/75   11/01/18 122/59    Weight from Last 3 Encounters:   11/02/18 71.7 kg (158 lb)   11/05/18 71.7 kg (158 lb)   11/01/18 68.5 kg (151 lb)              We Performed the Following     Follow-Up with Cardiologist          Today's Medication Changes      Notice     This visit is during an admission. Changes to the med list made in this visit will be reflected in the After Visit Summary of the admission.              Primary Care Provider Office Phone # Fax #    Eugene Burrell [phone]       [facility] [address]  [facility] [address]        Equal Access to Services     AUSTIN WILSON : Hadii jace hahn hadmarino Sopatali, waaxda luqadaha, qaybta kaalmada adeegyada, minal vaughann bertrandmarvin pike mónica hamlin. So [facility] [phone].    ATENCIÓN: Si habla español, tiene a humphrey disposición servicios gratuitos de asistencia lingüística. Llame al [phone].    We comply with applicable federal civil rights laws and [place] laws. We do not discriminate on the basis of race, color, national origin, age, disability, sex, sexual orientation, or gender identity.            Thank you!     Thank you for choosing [facility]  for your care. Our goal is always to provide you with excellent care. Hearing back from our patients is one way we can continue to improve our services. Please take a few minutes to complete the written survey that you may receive in the mail after your visit with us. Thank you!             Your Updated Medication List - Protect others around you: Learn how to safely use, store and throw away your medicines at www.disposemymeds.org.      Notice     This visit is during an admission. Changes to the med list made in this visit will be reflected in the After Visit Summary of the admission.

## 2018-11-05 NOTE — PLAN OF CARE
"Problem: Patient Care Overview  Goal: Plan of Care/Patient Progress Review  Outcome: Adequate for Discharge Date Met: 11/05/18  /58 (BP Location: Left arm)  Pulse 95  Temp 98.5  F (36.9  C) (Oral)  Resp 16  Ht 1.803 m (5' 11\")  Wt 71.7 kg (158 lb)  SpO2 100%  BMI 22.04 kg/m2     Neuro: A&O X 4, CMS intact  Cardiac: VSS  Respiratory: lung sounds clear bilaterally, 100% on RA  GI/: dig stim completed, no bm- just gas, straight cath X 2 for urine, good ouput  Diet/Appetite: tolerating regular diet  Skin: mepilex intact on coccyx, incision site CDI  LDA: IV removed, new hemovac bag placed  Activity: transfer to chair with A X 1-2  Pain: c/o pain, denied pain meds, reports \"doesn't work\"  Plan: Pt discharge to home or self care.  Declined transport when offered, and reports will take himself.  Discussed discharge paperwork, questions answered, pt verbalized understanding and no questions.  Advised to follow up with PCP        "

## 2018-11-05 NOTE — NURSING NOTE
Cardiac Testing: Patient given instructions regarding echocardiogram . Discussed purpose, preparation, procedure and when to expect results reported back to the patient. Patient demonstrated understanding of this information and agreed to call with further questions or concerns.  Labs: Patient was given results of the laboratory testing obtained today. Patient was instructed to return for the next laboratory testing in March 2019. Patient demonstrated understanding of this information and agreed to call with further questions or concerns.   Med Reconcile: Reviewed and verified all current medications with the patient. The updated medication list was printed and given to the patient.  Return Appointment: Patient given instructions regarding scheduling next clinic visit. Patient demonstrated understanding of this information and agreed to call with further questions or concerns.  Patient stated he understood all health information given and agreed to call with further questions or concerns.    Marilin Ramirez LPN

## 2018-11-05 NOTE — PROGRESS NOTES
[facility]    Internal Medicine Progress Note - Gold Service      Assessment & Plan   Derek Enriquez is a 57 year old male with history of MI (with cardiac arrest, LAD stenting x1 with IABP placement 3/8/2018), paradriplegia 2/2 spinal SDH (3/9/2018 AVM in the setting of anticoagulation for MI) s/p T9-12 decompressive laminectomy 3/2018, bilateral hip heterotopic ossification (s/p L hip heterotopic bone resection 9/19/2018), neurogenic bladder/bowel, autonomic orthostatic hypotension, HLD, and chronic lower extremity edema who was admitted to orthopedics 11/2/2018 following R hip heterotopic bone resection. Internal medicine consulted for assistance with management in the perioperative phase     S/p R hip heterotopic bone resection: Per Dr. Kennedy 11/20/2018 for R heterotopic ossification. H/o prophylactic radiation hip for heterotopic ossification. 1L EBL with drains x2 from right hip and brisk output <24 hours post-op. Left hip heterotopic resection 9/2018. Drain x1 removed per ortho 11/4  - Orthopedics primary   - Anemia management as below   - Regular diet  - PTA ASA and Plavix per primary team   - Continue prn oxycodone       Acute blood loss anemia: Pre-op hgb 12.5 with 1L EBL and 450 ml output from drains on 11/3. Ongoing drain output 11/3, slowing 11/4. Hgb 7.5 (8.6) with tachycardia 11/3, treated with 2 units RBC 11/3 with recheck 8.3. Hgb stable this am at 8.5 (8.3) with ongoing mild blood loss at drain site. HR stabilized to 70s-80s following transfusion.   - Drain management per primary   - F/u with PCP after discharge in 1-2 weeks for repeat labs    H/o MI with stenting x1 to LAD: 3/8/2018. CTA 1/2018 severe proximal LAD stenosis, plan for intervention 3/19/18, however went into cardiac arrest 3/8 due to MI. S/p PCI with DEX to proximal LAD, (95% rupture plaque), placement of IABP. PTA on ASA and Plavix daily. Follows OP with Dr. Jane, last seen 5/2/2018. EKG  11/2 NSR with normal QTc. Currently without any cardiovascular complaints.  - Per ortho, will resume aspirin and Plavix after the drain is removed at the postoperative ortho appointment on Friday.  - Follow up with cardiology as scheduled 11/5/2018     Neurogenic bowel and bladder, reucrrent UTIs: 2/2 paraplegia. Self caths 4-5 times daily. Follows OP with urology, last seen 11/1/2018 and was prescribed Cipro x2 tabs for UTI ppx and Ancef x24 hours sugar-op for surgical ppx. UC 11/1 with citrobacter (resistant to Ancef) and group B strep. Repeat UA 11/2 no LE/nitrites/blood/RBC, <1 WBC. Further antibiotics deferred given improvement with Cipro ppx.  - Continue to straight cath qid and prn  - Follow up with urology as suggested in 4-5 months for urodynamics testing    Hx of Fever, resolved: Tmax 100.4 night of 11/3. Reported feeling hot in the room at that time. No associated dyspnea, hypoxia, diarrhea, rash. Denies issues with infection site. No return of urinary symptoms (cloudiness or foul odor). Likely was stress response in the post-op phase.       Other Medical Conditions:  HLD: Continue statin  Autonomic orthostatic hypotension: BPs stable. Most recent BP today 107/58  Concern for developing decubitus ulcer: Blanchable erythema over gluteal cleft. Seen by [facility] today and appreciated cares and rec's. Also with L foot, 1st great toe wound, per pt present for the past 3 years and understands he needs to be seen after discharge by Podiatry. Podiatry referral made in discharge orders to be seen by a Podiatrist at [facility] in near future.  Paraplegia 2/2 spinal SDH: S/p T9-12 decompressive laminectomy 3/2018. 2/2 MALI and IABP placement in the setting of cardiac arrest 3/8. Presented with sudden onset back pain, motor/sensory loss of BLE. Wheelchair bound. Chronic BLE edema at BLE. Lymphedema, PT/OT consulted. Recommended discharge home with as needed assist.   Chronic abdominal pain: Band-like in the lower  abdomen since paralysis. Both MSK and deep pain in nature. Frustrated he does not know etiology of pain. Lidoderm patches not helpful. Started Icy Hot patches 11/4 but with no improvement. F/u with PCP after discharge for further eval and management as indicated.     Diet: Advance Diet as Tolerated: Regular Diet Adult  Diet  Fluids: None  Sandhu Catheter: not present  DVT Prophylaxis: Defer to primary service  Code Status: Full Code    Expected discharge: Per primary team today    The patient's care was discussed with the patient, nursing, primary team and attending physician, Dr. Jil Leyva PA-C  Internal Medicine Staff Hospitalist Service  [facility]  Pager: [phone]  Please see sticky note for cross cover information    Interval History   No acute events overnight. Pain controlled. Would like to see podiatry regarding R great toe wound. To discharge later this afternoon. Denies other acute physical concerns at this time.     Data reviewed today: I reviewed all medications, new labs and imaging results over the last 24 hours.     Physical Exam   Vital Signs: Temp: 98.5  F (36.9  C) Temp src: Oral BP: 107/58 Pulse: 95 Heart Rate: 74 Resp: 16 SpO2: 100 % O2 Device: None (Room air)    Weight: 158 lbs 0 oz   GEN: In NAD  HEENT: NCAT; PERRL; sclerae non-icteric  LUNGS: CTAB  CV: RRR  ABD: +BSs; SNTND  EXT: 2+ BLE edema  SKIN: No acute rashes noted on exposed areas; blanchable erythema over sacral area with no open lesions noted. R great toe with ingrown, nearly fully necrotic toenail and lateral loose skin flap with small amount of bleeding.   NEURO: AAOx3; CNs grossly intact; No lower extremity sensation. No acute focal deficits noted.        Data     CMP  Recent Labs  Lab 11/03/18  0651 11/02/18  2123    140   POTASSIUM 3.6 4.5   CHLORIDE 105 105   CO2 25 29   ANIONGAP 8 6   * 191*   BUN 20 19   CR 0.52* 0.61*   GFRESTIMATED >90 >90   GFRESTBLACK >90 >90   PAT  7.6* 8.0*     CBC  Recent Labs  Lab 11/05/18  0616 11/04/18  0809 11/03/18 2020 11/03/18  0651 11/02/18 2123   WBC  --   --   --  6.6 7.9   RBC  --   --   --  2.87* 3.33*   HGB 8.5* 8.3* 8.3* 7.5* 8.6*   HCT  --   --   --  24.5* 28.4*   MCV  --   --   --  85 85   MCH  --   --   --  26.1* 25.8*   MCHC  --   --   --  30.6* 30.3*   RDW  --   --   --  16.5* 16.5*   PLT  --   --   --  237 286

## 2018-11-05 NOTE — TELEPHONE ENCOUNTER
Pharmacy requesting to refill Clopidogrel 75 mg tablets.  This medication is not on the patient's list.    Pharm[facility]: [facility]  Phone: [phone]  Fax: [phone]

## 2018-11-05 NOTE — NURSING NOTE
Chief Complaint   Patient presents with     Follow Up For     6 Mo F/U     Vitals were taken and medications were reconciled.     Nicolasa Dimas RMA  3:29 PM

## 2018-11-05 NOTE — PATIENT INSTRUCTIONS
Patient Instructions:  It was a pleasure to see you in the cardiology clinic today.      If you have any questions, you can reach my nurse, Marilin Ramirez LPN, at [phone].  Press Option #1 for the [facility], and then press Option #3 for nursing.    We are encouraging the use of [employer] to communicate with your HealthCare Provider    Medication Changes: None.    Studies Ordered: Echocardiogram in March.    The results from today include: Labs    Please follow up: With Dr. Jane in March 2019 with fasting labs prior.    Sincerely,    Anoop Jane MD     If you have an urgent need after hours (8:00 am to 4:30 pm) please call [phone] and ask for the cardiology fellow on call.

## 2018-11-05 NOTE — PROGRESS NOTES
"Orthopaedic Surgery Progress Note  November 5, 2018    Subjective: No acute events overnight. Pain controlled.    Denies fever or chills, CP, SOB, numbness or tingling, motor dysfunction or weakness.    Objective: /57 (BP Location: Left arm)  Pulse 95  Temp 99  F (37.2  C) (Oral)  Resp 16  Ht 1.803 m (5' 11\")  Wt 71.7 kg (158 lb)  SpO2 98%  BMI 22.04 kg/m2    Physical Exam:  General: healthy, alert, no distress  Respiratory: Nonlabored breathing  MSK:  Focused examination of:   RLE: Dressing clean dry intact, 1 drains in place, mild surrounding erythema. no fluctuance.     Labs:     Recent Labs  Lab 11/05/18  0616 11/04/18  0809 11/03/18 2020 11/03/18  0651 11/02/18 2123 11/02/18  0629   HGB 8.5* 8.3* 8.3* 7.5* 8.6* 12.5*   WBC  --   --   --  6.6 7.9  --        All cultures:    Recent Labs  Lab 11/01/18  0710   CULT 50,000 to 100,000 colonies/mLCitrobacter freundii complex*  <10,000 colonies/mLStreptococcus agalactiae sero group BSusceptibility testing not routinely done on this organism from the genitourinary tract. Our antibiogram indicates that Group B streptococci are susceptible to ampicillin, penicillin, vancomycin and the cephalosporins. Susceptibility testing must be requested within 5 days.*       Assessment:  Derek Enriquez is a 57 year old male with a history of paraplegia and heterotopic ossification limiting his range of motion.  Plan was to undergo excision of the heterotopic ossification.    Procedures:  1. 11/2: Right hip heterotopic ossification resection    Orthopedics primary  Activity: Okay for all normal activities, including wheelchair and transfers  Antibiotics: Completed  Diet: Regular  DVT prophylaxis: We will resume aspirin and Plavix after the drain is removed at the postoperative appointment  Imaging: None  Bracing/Splinting: None  Dressings: Leave in place until clinic follow-up  Drains: 1 drain in place; this drain will remain in until follow-up on Friday.  Consults: " Medicine    Follow-up: Clinic with Dr. Kennedy this coming Friday    Disposition: Pending progress with therapies, pain control on orals, and medical stability, anticipate discharge Monday, today.    Conrad Putnam MD  Orthopaedic Resident, PGY-4  Pager: [phone]

## 2018-11-06 ENCOUNTER — PATIENT OUTREACH (OUTPATIENT)
Dept: CARE COORDINATION | Facility: CLINIC | Age: 58
End: 2018-11-06

## 2018-11-08 NOTE — PROCEDURES
RADIOTHERAPY TREATMENT SUMMARY          DATE OF REPORT: 2018    PATIENT: LINDA MARTÍNEZ  MEDICAL RECORD NO: [id number]  : 1960    DIAGNOSIS: M61.451 Other calcification of muscle, right thigh  INTENT OF RADIOTHERAPY: Prophylactic  STAGE: Benign    TREATMENT SUMMARY:  Details of the treatments summarized below are found in records kept in the Department of Radiation Oncology at [facility].    Radiation Oncology - Course: 2  Treatment Site Current Dose Modality From To Elapsed Days Fx.  Right Hip    700 cGy 18 X 11/02/2018 2018   1    Dose per Fraction: 700 cGy   Total Dose:  700 cGy    COMMENTS: Mr. Martínez is a 57-year-old man with paraplegia and bilateral heterotopic ossification around the hips and proximal femurs. The ossification was initially noted to involve the right femur and hip at the time of ORIF secondary to right subtrochanteric femoral fracture on 2018. Due to symptoms of left hip stiffness and inability to tolerate an exo-skeleton walking apparatus, he was scheduled for resection of heterotopic ossification of the left hip on 18. The initial plan to prevent heterotopic ossification recurrence was to give indomethacin which he started immediately following his surgery. However, cardiology recommended that he stop the indomethacin given the patient's need for Plavix and concern regarding bleeding. Therefore, he received prophylactic radiotherapy to the left hip, 700 cGy in single fraction, within 72 hours of his surgery. He saw Dr. Kennedy in follow up on 10/18/18 who recommended proceeding with resection of heterotopic bone around the right hip and femur. The patient underwent surgery at [facility] earlier today and received prophylactic radiotherapy to the right hip, 700 cGy in single fraction. He tolerated treatment without any acute toxicity. Acute Toxicity Profile by CTC v4.0: None    PAIN MANAGEMENT: Pain was managed by inpatient team.    FOLLOW UP PLAN:     1. Return  to clinic as needed      Resident Physician: Jason Pavon M.D.   Staff Physician: Sapphire Urban M.D.  Physicist: Carmelo Muller, PhD    CC:   Toñito Kennedy MD

## 2018-11-09 NOTE — PROGRESS NOTES
A radiation therapy treatment planning simulation was performed.  Please see the [employer] record for documentation.    Sapphire Urban MD  Radiation Oncology

## 2018-11-20 ENCOUNTER — HOSPITAL ENCOUNTER (OUTPATIENT)
Dept: CARDIOLOGY | Facility: CLINIC | Age: 58
Discharge: HOME | End: 2018-11-20
Attending: INTERNAL MEDICINE | Admitting: INTERNAL MEDICINE
Payer: COMMERCIAL

## 2018-11-20 DIAGNOSIS — I25.10 CORONARY ARTERY DISEASE INVOLVING NATIVE CORONARY ARTERY OF NATIVE HEART WITHOUT ANGINA PECTORIS: ICD-10-CM

## 2018-11-20 PROCEDURE — 93306 TTE W/DOPPLER COMPLETE: CPT

## 2018-11-20 PROCEDURE — 93306 TTE W/DOPPLER COMPLETE: CPT | Mod: 26 | Performed by: INTERNAL MEDICINE

## 2018-11-27 ENCOUNTER — TELEPHONE (OUTPATIENT)
Dept: UROLOGY | Facility: CLINIC | Age: 58
End: 2018-11-27

## 2018-11-27 NOTE — TELEPHONE ENCOUNTER
I spoke to patient to let him know the name of the medical supply company Candy Leary PA-C referred him to is [employer] and their number is [phone].    Jenna Lopez MA

## 2018-11-27 NOTE — TELEPHONE ENCOUNTER
Health Call Center    Phone Message    May a detailed message be left on voicemail: yes    Reason for Call: Other: Please call Pt back with the name of the company Candy put in order for Pt to order catheters through.  This was discussed at last visit with her and he lost the information and needs to contact them to place an order for his catheters.     Action Taken: Message routed to:  [facility] Urology Clinic

## 2018-11-28 ENCOUNTER — HOSPITAL ENCOUNTER (OUTPATIENT)
Dept: ULTRASOUND IMAGING | Facility: CLINIC | Age: 58
Discharge: HOME | End: 2018-11-28
Attending: SPECIALIST | Admitting: SPECIALIST
Payer: COMMERCIAL

## 2018-11-28 DIAGNOSIS — M79.89 SWELLING OF RIGHT LOWER EXTREMITY: ICD-10-CM

## 2018-11-28 PROCEDURE — 93971 EXTREMITY STUDY: CPT | Mod: RT

## 2018-12-07 ENCOUNTER — HOSPITAL ENCOUNTER (OUTPATIENT)
Dept: WOUND CARE | Facility: CLINIC | Age: 58
Discharge: HOME | End: 2018-12-07
Attending: PODIATRIST | Admitting: PODIATRIST
Payer: COMMERCIAL

## 2018-12-07 VITALS
SYSTOLIC BLOOD PRESSURE: 124 MMHG | TEMPERATURE: 97.2 F | DIASTOLIC BLOOD PRESSURE: 74 MMHG | RESPIRATION RATE: 18 BRPM | HEART RATE: 76 BPM

## 2018-12-07 DIAGNOSIS — L60.0 INGROWN NAIL: ICD-10-CM

## 2018-12-07 DIAGNOSIS — L98.491 CHRONIC SKIN ULCER, LIMITED TO BREAKDOWN OF SKIN (H): ICD-10-CM

## 2018-12-07 DIAGNOSIS — T14.8XXA OPEN WOUND: ICD-10-CM

## 2018-12-07 PROCEDURE — A6261 WOUND FILLER GEL/PASTE /OZ: HCPCS

## 2018-12-07 PROCEDURE — 11750 EXCISION NAIL&NAIL MATRIX: CPT

## 2018-12-07 PROCEDURE — 11750 EXCISION NAIL&NAIL MATRIX: CPT | Performed by: PODIATRIST

## 2018-12-07 NOTE — MR AVS SNAPSHOT
MRN:[id number]                      After Visit Summary   12/7/2018    Derek Enriquez    MRN: [id number]           Visit Information        Provider Department      12/7/2018  8:00 AM Irma Lara DPM, Podiatry/Foot and Ankle Surgery [facility] [place]        Your next 10 appointments already scheduled     Mar 11, 2019  4:00 PM CDT   Lab with [facility]   [facility] ([facility])    [address]  1st Floor  [facility] [id number] [phone]            Mar 11, 2019  4:30 PM CDT   (Arrive by 4:15 PM)   Return Visit with Anoop Jane MD   [facility] ([facility])    [address]  [address]  [facility] [address] [phone]            Mar 27, 2019  3:00 PM CDT   (Arrive by 2:45 PM)   Urodynamics with Candy Leary PA-C   [facility] ([facility])    [address] [place] [address]  4th Floor  [facility] [address] [phone]                Further instructions from your care team             [facility]  [address] [place] [address] [address], [facility] [address]  Appointment Phone [phone] Nurse Advisors [phone]    Derek ANASTACIO Zoe      1960    Wound Dressing Change:left lateral foot wound  Cleanse wound with soap and water  Cover wound with Iodosorb gel on guaze  Cover wound with xeroform gauze and bandaid  Change dressing daily    Bacitracin and bandaid to left great toe     VENKATESH Magallon.P.M.. December 7, 2018    Call us at [phone] if you have any questions about your wounds, have redness or swelling around your wound, have a fever of 101 or greater or if you have any other problems or concerns. We answer the phone Monday through Friday 8 am to 4 pm, please leave a message as we check the voicemail frequently throughout the day.     Follow up with Provider - as needed              [facility] Information     [facility] gives you secure access to your electronic health record. If you see a primary care provider, you can also send messages to your care team and make appointments. If you have questions, please call your primary care clinic.  If you do not have a primary care provider, please call [phone] and they will assist you.        Care EveryWhere ID     This is your Care EveryWhere ID. This could be used by other organizations to access your [place] medical records  [id number]        Equal Access to Services     AUSTIN WILSON : Hadnella Francisco, waheatherda lusola, qaernestine kaalmakeena flood, minal hamlin. So [facility] [phone].    ATENCIÓN: Si habla español, tiene a humphrey disposición servicios gratuitos de asistencia lingüística. Llame al [phone].    We comply with applicable federal civil rights laws and [place] laws. We do not discriminate on the basis of race, color, national origin, age, disability, sex, sexual orientation, or gender identity.

## 2018-12-07 NOTE — PROGRESS NOTES
[facility] [place] Progress Note    Subject: Patient was seen at wound center.  Here for follow-up on ingrown nail and left foot ulcer. Nose that he hasn't been putting anything on his wounds except for Band-Aids and Adaptic. Does not wear shoes to keep pressure off of the foot. Denies fever, nausea, vomiting, shortness of breath.    PMH:   Past Medical History:   Diagnosis Date     CAD (coronary artery disease)     stent     Femur fracture (H)      Neurogenic bladder      Neurogenic bowel      Orthostatic hypotension      Paraplegia (H)      Thoracic spinal cord injury (H)        Social Hx:   Social History     Social History     Marital status: Single     Spouse name: N/A     Number of children: N/A     Years of education: N/A     Occupational History     Not on file.     Social History Main Topics     Smoking status: Former Smoker     Smokeless tobacco: Never Used      Comment: Quit at 31     Alcohol use Yes      Comment: socially     Drug use: No     Sexual activity: Not on file     Other Topics Concern     Not on file     Social History Narrative       Surgical Hx:   Past Surgical History:   Procedure Laterality Date     ARTHROTOMY HIP Left 9/19/2018    Procedure: ARTHROTOMY HIP;  Left Hip Heterotopic Bone Resection;  Surgeon: Toñito Kennedy MD;  Location: [facility] OR     ARTHROTOMY HIP Right 11/02/2018     ARTHROTOMY HIP Right 11/2/2018    Procedure: Right Hip Heterotopic Bone Resection;  Surgeon: Toñito Kennedy MD;  Location: [facility] OR     CARDIAC SURGERY       LAMINECTOMY THORACIC THREE LEVELS N/A 3/9/2018    Procedure: LAMINECTOMY THORACIC THREE LEVELS;  Thoracic 9-12 Laminectomy Evacuation of Subdural Hematoma, C-Arm, Prone, Gel Rolls.;  Surgeon: Abhijeet Joe MD;  [facility]     OPEN REDUCTION INTERNAL FIXATION RODDING INTRAMEDULLARY FEMUR Right 6/25/2018    Procedure: OPEN REDUCTION INTERNAL FIXATION RODDING INTRAMEDULLARY FEMUR;  Right Open Reduction Internal  Fixation Subtrochanteric Femur Fracture;  Surgeon: Toñito Kennedy MD;  Location: [facility] OR     wisdom teeth extraction         Allergies:  No Known Allergies    Medications:   Current Outpatient Prescriptions   Medication     ascorbic acid (VITAMIN C) 500 MG tablet     atorvastatin (LIPITOR) 20 MG tablet     clopidogrel (PLAVIX) 75 MG tablet     Multiple Vitamins-Minerals (MULTIVITAMIN MEN PO)     order for DME     vitamin B complex with vitamin C (VITAMIN  B COMPLEX) TABS tablet     No current facility-administered medications for this encounter.          Objective:  Vitals:  /74  Pulse 76  Temp 97.2  F (36.2  C) (Temporal)  Resp 18    General:  Patient is alert and orientated.  NAD     Dermatologic: Incurvation of the lateral left great toenail border. There is hypertrophic granulation tissue over this area. No streaking redness or purulent drainage noted. There is a full-thickness ulceration to the lateral aspect of the left fifth metatarsal head. This is fibrous. No purulent Drainage or redness noted. This measures 1.2 x 1.2 x 0.5 cm. Concerned that joint capsule is exposed.     Vascular: DP & PT pulses are intact & regular bilaterally.  No significant edema or varicosities noted.  CFT and skin temperature is normal to both lower extremities.     Neurologic: Lower extremity sensation is absent from the waist down.     Musculoskeletal: Patient is non- ambulatory and uses a motorized scooter to get around..    Assessment: 57-year-old paraplegic male with left ingrown toenail and ulceration with fat layer exposed left foot    Plan:  Ingrown toenail was removed. Had a long discussion with patient about treatments for the ulcer. Suggested an x-ray as the ulcer is a little deeper than last time. Patient declines x-rays at this time. Did not want anything other than Adaptic on his ulcer. Did recommend Iodosorb to the lateral foot ulcer. Discussed concern for deeper infection that could be in the bone  he is at risk for amputation. Recommend that patient apply bacitracin to the left great toe daily for the next 2 weeks. He was told to call if further questions or concerns.    Procedure:  After verbal consent,  The lateral border was then raised from the nail bed and then cut the length of the nail. No anesthesia was used as patient is numb. The offending nail border was then removed.   Bacitracin was applied to the nail bed.  The tourniquet was removed.  Bandage was applied to the toe.  The patient tolerated the procedure and anesthesia well.          Irma Lara DPM, Podiatry/Foot and Ankle Surgery

## 2018-12-07 NOTE — DISCHARGE INSTRUCTIONS
[facility]  [address] [facility] [address], [place] [address]  Appointment Phone [phone] Nurse Advisors [phone]    Derek Enriquez      1960    Wound Dressing Change:left lateral foot wound  Cleanse wound with soap and water  Cover wound with Iodosorb gel on guaze  Cover wound with xeroform gauze and bandaid  Change dressing daily    Bacitracin and bandaid to left great toe     VENKATESH Magallon.P.M.. December 7, 2018    Call us at [phone] if you have any questions about your wounds, have redness or swelling around your wound, have a fever of 101 or greater or if you have any other problems or concerns. We answer the phone Monday through Friday 8 am to 4 pm, please leave a message as we check the voicemail frequently throughout the day.     Follow up with Provider - as needed

## 2019-02-27 ENCOUNTER — TELEPHONE (OUTPATIENT)
Dept: CARDIOLOGY | Facility: CLINIC | Age: 59
End: 2019-02-27

## 2019-02-27 NOTE — TELEPHONE ENCOUNTER
[facility] Call Center    Phone Message    May a detailed message be left on voicemail: yes    Reason for Call: Other: Dr. Jane cancelled clinic date of March 11.  Patient finds next available appt in June unexceptable.  Requesting that clinic fit him in as soon as possible.  Patient also added to waitlist.     Action Taken: Message routed to:  [facility] ([facility] clinic coor cardiology

## 2019-03-06 ENCOUNTER — HOSPITAL ENCOUNTER (OUTPATIENT)
Dept: WOUND CARE | Facility: CLINIC | Age: 59
Discharge: HOME | End: 2019-03-06
Attending: PHYSICIAN ASSISTANT | Admitting: PHYSICIAN ASSISTANT
Payer: COMMERCIAL

## 2019-03-06 ENCOUNTER — TELEPHONE (OUTPATIENT)
Dept: CARDIOLOGY | Facility: CLINIC | Age: 59
End: 2019-03-06

## 2019-03-06 VITALS
TEMPERATURE: 96.3 F | HEART RATE: 74 BPM | RESPIRATION RATE: 16 BRPM | DIASTOLIC BLOOD PRESSURE: 75 MMHG | SYSTOLIC BLOOD PRESSURE: 122 MMHG

## 2019-03-06 DIAGNOSIS — L98.491 CHRONIC SKIN ULCER, LIMITED TO BREAKDOWN OF SKIN (H): ICD-10-CM

## 2019-03-06 DIAGNOSIS — L60.0 INGROWN NAIL: ICD-10-CM

## 2019-03-06 DIAGNOSIS — L89.893 PRESSURE ULCER OF OTHER SITE, STAGE 3 (H): ICD-10-CM

## 2019-03-06 DIAGNOSIS — T14.8XXA OPEN WOUND: Primary | ICD-10-CM

## 2019-03-06 DIAGNOSIS — L89.153 DECUBITUS ULCER OF SACRAL REGION, STAGE 3 (H): ICD-10-CM

## 2019-03-06 PROBLEM — I95.1 ORTHOSTATIC HYPOTENSION: Status: ACTIVE | Noted: 2018-03-21

## 2019-03-06 PROBLEM — N31.9 NEUROGENIC BLADDER: Status: ACTIVE | Noted: 2018-03-21

## 2019-03-06 PROBLEM — G82.20 PARAPLEGIA (H): Status: ACTIVE | Noted: 2018-07-31

## 2019-03-06 PROBLEM — K59.2 NEUROGENIC BOWEL: Status: ACTIVE | Noted: 2018-03-21

## 2019-03-06 PROBLEM — Z01.818 PREOP EXAMINATION: Status: ACTIVE | Noted: 2018-10-25

## 2019-03-06 PROBLEM — S24.109A INJURY OF THORACIC SPINAL CORD (H): Status: ACTIVE | Noted: 2018-03-21

## 2019-03-06 PROBLEM — I25.10 CORONARY ATHEROSCLEROSIS: Status: ACTIVE | Noted: 2018-03-09

## 2019-03-06 PROBLEM — M89.8X9 HETEROTOPIC OSSIFICATION: Status: ACTIVE | Noted: 2018-09-19

## 2019-03-06 PROCEDURE — 11042 DBRDMT SUBQ TIS 1ST 20SQCM/<: CPT | Performed by: PHYSICIAN ASSISTANT

## 2019-03-06 PROCEDURE — 11042 DBRDMT SUBQ TIS 1ST 20SQCM/<: CPT

## 2019-03-06 NOTE — PROGRESS NOTES
[facility]    HISTORY OF PRESENT ILLNESS: Derek Enriquez is a 58 year old, paraplegic male who presents for pressure injuries over his coccyx, perineum, and right foot. He believes his coccyx wound started from a traumatic transfer and his perineal wound was caused by using an alternative wheelchair that does not have a pressure relieving cushion. His foot wound was caused by a pair of shoes which he has since stopped using. He has significant pedal edema as his legs are often dependent. Not utilizing any compression garment at this time, he feels that wrapping his foot with a Coflex type bandage works best for reducing his edema.      WOUND CARE: Hydrogel + Tegaderm foam on coccyx, and bandaid on foot    OFFLOADING: on Roho cushion, sleeps on standard bed    DATE WOUND ACQUIRED: toe wound 6/18, coccyx 7/18 - closed but has recurred in the last month    PAST MEDICAL HISTORY:  has a past medical history of CAD (coronary artery disease), Femur fracture (H), Neurogenic bladder, Neurogenic bowel, Orthostatic hypotension, Paraplegia (H), and Thoracic spinal cord injury (H).    SOCIAL HISTORY: lives with his 26 year old son who helps with wound dressings    MEDICATIONS:   Current Outpatient Medications   Medication     ascorbic acid (VITAMIN C) 500 MG tablet     atorvastatin (LIPITOR) 20 MG tablet     clopidogrel (PLAVIX) 75 MG tablet     Multiple Vitamins-Minerals (MULTIVITAMIN MEN PO)     order for DME     vitamin B complex with vitamin C (VITAMIN  B COMPLEX) TABS tablet     No current facility-administered medications for this encounter.      VITALS: /75   Pulse 74   Temp 96.3  F (35.7  C) (Temporal)   Resp 16     PHYSICAL EXAM:  GENERAL: Patient is alert and oriented and in no acute distress  INTEGUMENTARY:   Wound (used by [facility] only) 03/06/19 0831 perineum (Active)   Length (cm) 0.8 3/6/2019  8:00 AM   Width (cm) 0.6 3/6/2019  8:00 AM   Depth (cm) 0.1 3/6/2019  8:00 AM   Wound (cm^2)  0.48 cm^2 3/6/2019  8:00 AM   Wound Volume (cm^3) 0.05 cm^3 3/6/2019  8:00 AM   Dressing Appearance moist drainage 3/6/2019  8:00 AM   Drainage Characteristics/Odor serosanguineous 3/6/2019  8:00 AM   Drainage Amount moderate 3/6/2019  8:00 AM       Wound (used by [facility] only) 03/06/19 0831 coccyx (Active)   Length (cm) 1 3/6/2019  8:00 AM   Width (cm) 0.9 3/6/2019  8:00 AM   Depth (cm) 0.2 3/6/2019  8:00 AM   Wound (cm^2) 0.9 cm^2 3/6/2019  8:00 AM   Wound Volume (cm^3) 0.18 cm^3 3/6/2019  8:00 AM   Dressing Appearance moist drainage 3/6/2019  8:00 AM   Drainage Characteristics/Odor serosanguineous 3/6/2019  8:00 AM   Drainage Amount moderate 3/6/2019  8:00 AM       Wound (used by [facility] only) 03/06/19 0832 Left lateral foot (Active)   Length (cm) 1.4 3/6/2019  8:00 AM   Width (cm) 1.1 3/6/2019  8:00 AM   Depth (cm) 0.5 3/6/2019  8:00 AM   Wound (cm^2) 1.54 cm^2 3/6/2019  8:00 AM   Wound Volume (cm^3) 0.77 cm^3 3/6/2019  8:00 AM   Dressing Appearance moist drainage 3/6/2019  8:00 AM   Drainage Characteristics/Odor serosanguineous 3/6/2019  8:00 AM   Drainage Amount moderate 3/6/2019  8:00 AM           PROCEDURE: 4% topical lidocaine was applied to the wound by the nursing staff. Patient was determined to be capable of making their own medical decisions and informed consent was obtained. Using a sharp curette a surgical debridement was performed down to and including subcutaneous tissue of <20 cm. Hemostasis was achieved with pressure. The patient tolerated the procedure well.      ASSESSMENT:   1. Stage 4 pressure ulcer of right foot   2. Stage 3 pressure ulcer of coccyx      PLAN:   1. Hydrogel to all wounds per patient preference, cover coccyx with Tegaderm foam, perineum with Tegaderm pad and foot with polymem strip  2. MRI to assess for osteomyelitis    FOLLOW-UP: with Dr. Marco HART) CHELITA ARANGO

## 2019-03-06 NOTE — DISCHARGE INSTRUCTIONS
[facility]  [address] [address]  Appointment Phone [phone] Nurse Advisors [phone]    Derek Enriquez      1960    Wound Dressing Change:Left Lateral Foot, Coccyx and perineum   Cleanse wound with:soap and water  Cover wound with wound gel   Cover wound with gauze or bandage  Change dressing daily.  Please raise your legs above your heart for 30 mins 3 times a day to promote wound healing.  Repositioning: is key to aid in healing and relieving pressure    Bed:  Reposition pt MINIMALLY every 1-2 hours in bed to relieve pressure and promote perfusion to tissue.     Chair:  When up to chair pt should not sit for longer than one hour total before either standing or returning to bed for at least 10 minutes, again to relieve pressure and promote perfusion to tissue.     o Pt should also sit on a chair cushion when up to the chair. Offloading frequently through out the work day    Please call [facility] [phone] ([address]. [place], [place]) to schedule your appointment if you have not heard from them in two business days.    Arrive 15 minutes early.  If you need to cancel or change the appointment please call [facility] [phone] ([address]. [place], [place])  Othersites  [facility] [phone]  [facility] [phone]  [facility] [phone]             Rosario Sánchez PA-C. March 6, 2019    Call us at [phone] if you have any questions about your wounds, have redness or swelling around your wound, have a fever of 101 or greater or if you have any other problems or concerns. We answer the phone Monday through Friday 8 am to 4 pm, please leave a message as we check the voicemail frequently throughout the day.     Follow up with Provider - 3 weeks

## 2019-03-06 NOTE — PROGRESS NOTES
"Attempted to redress wounds and perform education on the plan of care for patient after his office visit with Rea Sánchez. Patient very argumentative stating that he only has 2 minutes and \"doesn't have time for this\". I attempted to redress his wounds as ordered but patient stated that we did not have the correct products and wanted me to get the dressings that he wanted. I informed him that our office did not have the particular brand that he was looking for but an appropriate alternative would be used and he became very upset. Patient then tried to get out of the exam chair by himself with a post procedure bloody gauze still on his foot. I educated him on the need for a clean dressing and he again became argumentative and then had multiple complaints about our office and the care that was given. I did apply clean dressings on his coccyx, left foot and perineum ulcer but was not able to follow through on patient education regarding follow up, need for imaging or repositioning due to the patient's behavior.   "

## 2019-03-06 NOTE — TELEPHONE ENCOUNTER
"[facility]    Phone Message    May a detailed message be left on voicemail: yes    Reason for Call: Other: Derek calling to confirm that his appointment on 3/11 is still valid.  He stated he spoke with someone who told him that he got approval to keep the appointment with Dr Jane, but the \"reschedule needed\" flag is still in place.  Please call him to verify that the appointment is still good as no notes are present with whom he spoke with and the flag is still in place    Action Taken: Message routed to:  [facility]  Cardiology  "

## 2019-03-07 ENCOUNTER — PRE VISIT (OUTPATIENT)
Dept: UROLOGY | Facility: CLINIC | Age: 59
End: 2019-03-07

## 2019-03-11 ENCOUNTER — TELEPHONE (OUTPATIENT)
Dept: PODIATRY | Facility: CLINIC | Age: 59
End: 2019-03-11

## 2019-03-11 ENCOUNTER — ANCILLARY PROCEDURE (OUTPATIENT)
Dept: CT IMAGING | Facility: CLINIC | Age: 59
End: 2019-03-11
Attending: INTERNAL MEDICINE
Payer: COMMERCIAL

## 2019-03-11 ENCOUNTER — OFFICE VISIT (OUTPATIENT)
Dept: CARDIOLOGY | Facility: CLINIC | Age: 59
End: 2019-03-11
Attending: INTERNAL MEDICINE
Payer: COMMERCIAL

## 2019-03-11 VITALS
HEART RATE: 78 BPM | HEIGHT: 72 IN | WEIGHT: 160.2 LBS | BODY MASS INDEX: 21.7 KG/M2 | OXYGEN SATURATION: 98 % | DIASTOLIC BLOOD PRESSURE: 75 MMHG | SYSTOLIC BLOOD PRESSURE: 128 MMHG

## 2019-03-11 DIAGNOSIS — Z95.5 STENTED CORONARY ARTERY: ICD-10-CM

## 2019-03-11 DIAGNOSIS — Z95.5 H/O HEART ARTERY STENT: ICD-10-CM

## 2019-03-11 DIAGNOSIS — I25.10 CORONARY ARTERY DISEASE INVOLVING NATIVE CORONARY ARTERY OF NATIVE HEART WITHOUT ANGINA PECTORIS: ICD-10-CM

## 2019-03-11 DIAGNOSIS — E78.5 DYSLIPIDEMIA: Primary | ICD-10-CM

## 2019-03-11 DIAGNOSIS — L97.522 SKIN ULCER OF LEFT FOOT WITH FAT LAYER EXPOSED (H): Primary | ICD-10-CM

## 2019-03-11 DIAGNOSIS — I46.9 CARDIAC ARREST (H): ICD-10-CM

## 2019-03-11 DIAGNOSIS — R91.8 PULMONARY NODULES: ICD-10-CM

## 2019-03-11 LAB
ALBUMIN SERPL-MCNC: 3.7 G/DL (ref 3.4–5)
ALP SERPL-CCNC: 149 U/L (ref 40–150)
ALT SERPL W P-5'-P-CCNC: 27 U/L (ref 0–70)
ANION GAP SERPL CALCULATED.3IONS-SCNC: 6 MMOL/L (ref 3–14)
AST SERPL W P-5'-P-CCNC: 29 U/L (ref 0–45)
BILIRUB SERPL-MCNC: 0.3 MG/DL (ref 0.2–1.3)
BUN SERPL-MCNC: 17 MG/DL (ref 7–30)
CALCIUM SERPL-MCNC: 8.6 MG/DL (ref 8.5–10.1)
CHLORIDE SERPL-SCNC: 106 MMOL/L (ref 94–109)
CHOLEST SERPL-MCNC: 143 MG/DL
CO2 SERPL-SCNC: 27 MMOL/L (ref 20–32)
CREAT SERPL-MCNC: 0.58 MG/DL (ref 0.66–1.25)
GFR SERPL CREATININE-BSD FRML MDRD: >90 ML/MIN/{1.73_M2}
GLUCOSE SERPL-MCNC: 94 MG/DL (ref 70–99)
HDLC SERPL-MCNC: 39 MG/DL
LDLC SERPL CALC-MCNC: 47 MG/DL
NONHDLC SERPL-MCNC: 104 MG/DL
POTASSIUM SERPL-SCNC: 3.6 MMOL/L (ref 3.4–5.3)
PROT SERPL-MCNC: 7.6 G/DL (ref 6.8–8.8)
SODIUM SERPL-SCNC: 138 MMOL/L (ref 133–144)
TRIGL SERPL-MCNC: 286 MG/DL

## 2019-03-11 PROCEDURE — G0463 HOSPITAL OUTPT CLINIC VISIT: HCPCS | Mod: ZF

## 2019-03-11 PROCEDURE — 99214 OFFICE O/P EST MOD 30 MIN: CPT | Mod: ZP | Performed by: INTERNAL MEDICINE

## 2019-03-11 PROCEDURE — 80061 LIPID PANEL: CPT | Performed by: INTERNAL MEDICINE

## 2019-03-11 PROCEDURE — 36415 COLL VENOUS BLD VENIPUNCTURE: CPT | Performed by: INTERNAL MEDICINE

## 2019-03-11 PROCEDURE — 80053 COMPREHEN METABOLIC PANEL: CPT | Performed by: INTERNAL MEDICINE

## 2019-03-11 RX ORDER — ATORVASTATIN CALCIUM 20 MG/1
20 TABLET, FILM COATED ORAL AT BEDTIME
Qty: 90 TABLET | Refills: 3 | Status: SHIPPED | OUTPATIENT
Start: 2019-03-11 | End: 2020-03-12

## 2019-03-11 ASSESSMENT — MIFFLIN-ST. JEOR: SCORE: 1594.66

## 2019-03-11 ASSESSMENT — PAIN SCALES - GENERAL: PAINLEVEL: NO PAIN (0)

## 2019-03-11 NOTE — LETTER
3/11/2019      RE: Derek Enriquez  [address]  [facility] [id number]       Dear Colleague,    Thank you for the opportunity to participate in the care of your patient, Derek Enriquez, at the [facility] HEART [facility] at [facility]. Please see a copy of my visit note below.    HPI:   Mr. Enriquez is a 59 y/o M, who comes for follow-up:     In summary, Mr Derek Enriquez is a  57-year-old gentleman, who had a cardiac arrest in [employer] during exercise (03-)  Patient was resuscitated and underwent a coronary angiogram , which showed   Single vessel CAD  LAD   - 95% stenotic ruptured plaque in the proximal LAD  Successful deployment of a drug eluting stent    3.0 x 20 mm Promus Premier drug eluting stent across the proximal LAD  and post-dilated with a 3.5 x15 mm non-compliant balloon  -Successful placement of balloon pump for LV support.  He also was put at [facility].                     Postop day 1 developed back pain then loss of sensation and movement in his lower extremities.  Identified spinal subdural hematoma and transferred emergently to [facility].  He underwent T9-T12 decompressive laminectomy and evacuation of subdural hematoma.  He unfortunately persists with complete paraplegia.  After medical stabilization he transferred to [facility] acute rehabilitation [place] 3/14 for comprehensive cares.     Patient is now convinced that he needs to take the classically CV preventive therapy.  Patient underwent a Right Hip Heterotopic Bone Resection on 11/2/2018   Patient feels well; denies chest pain, shortness of breath, palpitations and intermittent claudication.   Patient works full time.    PAST MEDICAL HISTORY:  Past Medical History:   Diagnosis Date     CAD (coronary artery disease)     stent     Femur fracture (H)      Neurogenic bladder      Neurogenic bowel      Orthostatic hypotension      Paraplegia (H)      Thoracic spinal cord injury  (H)        CURRENT MEDICATIONS:  Current Outpatient Medications   Medication Sig Dispense Refill     ascorbic acid (VITAMIN C) 500 MG tablet Take 500 mg by mouth every morning        atorvastatin (LIPITOR) 20 MG tablet Take 1 tablet (20 mg) by mouth At Bedtime 90 tablet 3     clopidogrel (PLAVIX) 75 MG tablet Take 1 tablet (75 mg) by mouth daily 90 tablet 3     Multiple Vitamins-Minerals (MULTIVITAMIN MEN PO) Take 1 tablet by mouth daily        order for DME Equipment being ordered: [employer] [facility] Phone [phone] Fax [phone]    Left Lateral Foot Primary Dressing Polymem Cloth Strips (not dots) Qty 30  Coccyx Secondary Dressing  tegaderm adhesive foam dressing 3x3 Qty 30  Perineum Secondary Dressing tegaderm + pad ( ref 3582) qty 30  Length of Need: 1 month  Frequency of dressing change: daily 30 days 0     vitamin B complex with vitamin C (VITAMIN  B COMPLEX) TABS tablet Take 1 tablet by mouth daily         PAST SURGICAL HISTORY:  Past Surgical History:   Procedure Laterality Date     ARTHROTOMY HIP Left 9/19/2018    Procedure: ARTHROTOMY HIP;  Left Hip Heterotopic Bone Resection;  Surgeon: Toñito Kennedy MD;  [facility]     ARTHROTOMY HIP Right 11/02/2018     ARTHROTOMY HIP Right 11/2/2018    Procedure: Right Hip Heterotopic Bone Resection;  Surgeon: Toñito Kennedy MD;  [facility]     CARDIAC SURGERY       LAMINECTOMY THORACIC THREE LEVELS N/A 3/9/2018    Procedure: LAMINECTOMY THORACIC THREE LEVELS;  Thoracic 9-12 Laminectomy Evacuation of Subdural Hematoma, C-Arm, Prone, Gel Rolls.;  Surgeon: Abhijeet Joe MD;  [facility]     OPEN REDUCTION INTERNAL FIXATION RODDING INTRAMEDULLARY FEMUR Right 6/25/2018    Procedure: OPEN REDUCTION INTERNAL FIXATION RODDING INTRAMEDULLARY FEMUR;  Right Open Reduction Internal Fixation Subtrochanteric Femur Fracture;  Surgeon: Toñito Kennedy MD;  [facility] OR     wisdom teeth extraction         ALLERGIES    No Known Allergies    FAMILY HISTORY:  Family History   Problem Relation Age of Onset     Cardiac Sudden Death Father      Arrhythmia Father         v fib arrest      Myocardial Infarction Brother      Other - See Comments Brother         myeloid dysplasia       SOCIAL HISTORY:  Social History     Socioeconomic History     Marital status: Single     Spouse name: Not on file     Number of children: Not on file     Years of education: Not on file     Highest education level: Not on file   Occupational History     Not on file   Social Needs     Financial resource strain: Not on file     Food insecurity:     Worry: Not on file     Inability: Not on file     Transportation needs:     Medical: Not on file     Non-medical: Not on file   Tobacco Use     Smoking status: Former Smoker     Smokeless tobacco: Never Used     Tobacco comment: Quit at 31   Substance and Sexual Activity     Alcohol use: Yes     Comment: socially     Drug use: No     Sexual activity: Not on file   Lifestyle     Physical activity:     Days per week: Not on file     Minutes per session: Not on file     Stress: Not on file   Relationships     Social connections:     Talks on phone: Not on file     Gets together: Not on file     Attends Sikhism service: Not on file     Active member of club or organization: Not on file     Attends meetings of clubs or organizations: Not on file     Relationship status: Not on file     Intimate partner violence:     Fear of current or ex partner: Not on file     Emotionally abused: Not on file     Physically abused: Not on file     Forced sexual activity: Not on file   Other Topics Concern     Parent/sibling w/ CABG, MI or angioplasty before 65F 55M? Not Asked   Social History Narrative     Not on file       ROS:   Constitutional: No fever, chills, or sweats. No weight gain/loss   ENT: No visual disturbance, ear ache, epistaxis, sore throat  Allergies/Immunologic: Negative.   Respiratory: No cough,  hemoptysia  Cardiovascular: As per HPI  GI: No nausea, vomiting, hematemesis, melena, or hematochezia  : No urinary frequency, dysuria, or hematuria  Integument: Negative  Psychiatric: Negative  Neuro: Negative  Endocrinology: Negative   Musculoskeletal: Negative    EXAM:  /75 (BP Location: Right arm, Patient Position: Chair, Cuff Size: Adult Regular)   Pulse 78   Ht 1.829 m (6')   Wt 72.7 kg (160 lb 3.2 oz)   SpO2 98%   BMI 21.73 kg/m     In general, the patient is a pleasant male in no apparent distress.    HEENT: NC/AT.  PERRLA.  EOMI.  Sclerae white, not injected.  Nares clear.  Pharynx without erythema or exudate.  Dentition intact.    Neck: No adenopathy.  No thyromegaly. Carotids +4/4 bilaterally without bruits.  No jugular venous distension.   Heart: RRR. Normal S1, S2 splits physiologically. No murmur, rub, click, or gallop. The PMI is in the 5th ICS in the midclavicular line. There is no heave.    Lungs: CTA.  No ronchi, wheezes, rales.  No dullness to percussion.   Abdomen: Soft, nontender, nondistended. No organomegaly.  No bruits.   Extremities: No clubbing, cyanosis, or edema.  The pulses are +4/4 at the radial, brachial, femoral, popliteal, DP, and PT sites bilaterally.  No bruits are noted.  Neurologic: Alert and oriented to person/place/time, normal speech, gait and affect  Skin: No petechiae, purpura or rash.    Labs:  LIPID RESULTS:  Lab Results   Component Value Date    CHOL 143 03/11/2019    HDL 39 (L) 03/11/2019    LDL 47 03/11/2019    TRIG 286 (H) 03/11/2019    NHDL 104 03/11/2019       LIVER ENZYME RESULTS:  Lab Results   Component Value Date    AST 29 03/11/2019    ALT 27 03/11/2019       CBC RESULTS:  Lab Results   Component Value Date    WBC 4.8 11/05/2018    RBC 3.26 (L) 11/05/2018    HGB 8.5 (L) 11/05/2018    HGB 8.5 (L) 11/05/2018    HCT 28.9 (L) 11/05/2018    MCV 89 11/05/2018    MCH 26.1 (L) 11/05/2018    MCHC 29.4 (L) 11/05/2018    RDW 16.3 (H) 11/05/2018      11/05/2018       BMP RESULTS:  Lab Results   Component Value Date     03/11/2019    POTASSIUM 3.6 03/11/2019    CHLORIDE 106 03/11/2019    CO2 27 03/11/2019    ANIONGAP 6 03/11/2019    GLC 94 03/11/2019    BUN 17 03/11/2019    CR 0.58 (L) 03/11/2019    GFRESTIMATED >90 03/11/2019    GFRESTBLACK >90 03/11/2019    PAT 8.6 03/11/2019        INR RESULTS:  Lab Results   Component Value Date    INR 1.20 (H) 05/23/2018    INR 1.20 (H) 03/09/2018       Procedures:    Echocardiogram 11/20/18    The visual ejection fraction is estimated at 55-60%.  Grade I or early diastolic dysfunction.  Septal motion is consistent with conduction abnormality.  _____________________________________________________________________________  __        Left Ventricle  The left ventricle is normal in size. There is normal left ventricular wall  thickness. The visual ejection fraction is estimated at 55-60%. Grade I or  early diastolic dysfunction. Septal motion is consistent with conduction  abnormality.     Right Ventricle  The right ventricle is normal in structure, function and size.     Atria  Normal left atrial size. Right atrial size is normal. There is no color  Doppler evidence of an atrial shunt.     Mitral Valve  The mitral valve is normal in structure and function.     Tricuspid Valve  The tricuspid valve is normal in structure and function. There is trace  tricuspid regurgitation. Right ventricular systolic pressure is normal.        Aortic Valve  The aortic valve is normal in structure and function.     Pulmonic Valve  The pulmonic valve is not well seen, but is grossly normal.     Vessels  Normal size aorta. The IVC is normal in size and reactivity with respiration,  suggesting normal central venous pressure.     Pericardium  The pericardium appears normal.     Rhythm  Sinus rhythm was noted.     _____________________________________________________________________________  __  MMode/2D Measurements & Calculations  IVSd: 0.82  cm  LVIDd: 4.2 cm  LVIDs: 2.9 cm  LVPWd: 0.93 cm  FS: 30.2 %  LV mass(C)d: 115.2 grams  LV mass(C)dI: 60.4 grams/m2  Ao root diam: 3.3 cm  LA dimension: 2.2 cm     asc Aorta Diam: 3.1 cm  LA/Ao: 0.68  LA Volume (BP): 31.3 ml  LA Volume Index (BP): 16.4 ml/m2  RWT: 0.44        Doppler Measurements & Calculations  MV E max ramila: 69.4 cm/sec  MV A max ramila: 105.3 cm/sec  MV E/A: 0.66  MV dec time: 0.29 sec  PA acc time: 0.14 sec     PI end-d ramila: 85.5 cm/sec  TR max ramila: 275.5 cm/sec  TR max P.4 mmHg  E/E' av.8  Lateral E/e': 5.5  Medial E/e': 8.2        Assessment and Plan:     We discussed the results with patient:  We discussed he importance of a heart healthy diet and lifestyle.    We disucssed following written instructions for the patient with the patient:    Medication Changes: Stop Clopidogrel after you finish your current bottle.     Studies Ordered: Echocardiogram in one year.     The results from today include: Labs and CT.     Please follow up: With Dr. Jane in one year with fasting labs prior.    Anoop Jane MD, PhD  Professor of Medicine  Division of Cardiology.    CC  Patient Care Team:  Eugene Burrell MD as PCP - General (Family Practice)  Anoop Jane MD as MD (Cardiology)  Andrei Pelaez MD as MD (Urology)  Candice Bridges RN as Registered Nurse (Urology)  Marilin Ramirez LPN as Nurse Coordinator (Cardiology)  Thomas Connelly EP as Cardiac Rehabilitation Therapist (Cardiac Rehab)  [facility] ([facility]ALTH [facility] ([facility]), ([place]))  SELF, REFERRED

## 2019-03-11 NOTE — TELEPHONE ENCOUNTER
Spoke with patient. Will get MRI. Ordered it for tobi.     Gave him number to call for appointment: [phone]    Irma Lara DPM

## 2019-03-11 NOTE — NURSING NOTE
Cardiac Testing: Patient given instructions regarding  echocardiogram . Discussed purpose, preparation, procedure and when to expect results reported back to the patient. Patient demonstrated understanding of this information and agreed to call with further questions or concerns.  Labs: Patient was given results of the laboratory testing obtained today. Patient was instructed to return for the next laboratory testing in one year. Patient demonstrated understanding of this information and agreed to call with further questions or concerns.   Med Reconcile: Reviewed and verified all current medications with the patient. The updated medication list was printed and given to the patient.  Return Appointment: Patient given instructions regarding scheduling next clinic visit. Patient demonstrated understanding of this information and agreed to call with further questions or concerns.  Medication Change: Patient was educated regarding prescribed medication change, including discussion of the indication, administration, side effects, and when to report to MD or RN. Patient demonstrated understanding of this information and agreed to call with further questions or concerns.  Patient stated he understood all health information given and agreed to call with further questions or concerns.    Marilin Ramirez LPN

## 2019-03-11 NOTE — PROGRESS NOTES
HPI:     Mr. Enriquez is a 57 y/o M, who comes for follow-up:     In summary, Mr Derek Enriquez is a  57-year-old gentleman, who had a cardiac arrest [place] during exercise (03-)  Patient was resuscitated and underwent a coronary angiogram , which showed   Single vessel CAD  LAD   - 95% stenotic ruptured plaque in the proximal LAD  Successful deployment of a drug eluting stent    3.0 x 20 mm Promus Premier drug eluting stent across the proximal LAD  and post-dilated with a 3.5 x15 mm non-compliant balloon  -Successful placement of balloon pump for LV support.  He also was put at [facility].                     Postop day 1 developed back pain then loss of sensation and movement in his lower extremities.  Identified spinal subdural hematoma and transferred emergently to [facility].  He underwent T9-T12 decompressive laminectomy and evacuation of subdural hematoma.  He unfortunately persists with complete paraplegia.  After medical stabilization he transferred to [facility] rehabilitation [place] 3/14 for comprehensive cares.     Patient is now convinced that he needs to take the classically CV preventive therapy.  Patient underwent a Right Hip Heterotopic Bone Resection on 11/2/2018   Patient feels well; denies chest pain, shortness of breath, palpitations and intermittent claudication.   Patient works full time.          PAST MEDICAL HISTORY:  Past Medical History:   Diagnosis Date     CAD (coronary artery disease)     stent     Femur fracture (H)      Neurogenic bladder      Neurogenic bowel      Orthostatic hypotension      Paraplegia (H)      Thoracic spinal cord injury (H)        CURRENT MEDICATIONS:  Current Outpatient Medications   Medication Sig Dispense Refill     ascorbic acid (VITAMIN C) 500 MG tablet Take 500 mg by mouth every morning        atorvastatin (LIPITOR) 20 MG tablet Take 1 tablet (20 mg) by mouth At Bedtime 90 tablet 3     clopidogrel (PLAVIX) 75 MG  tablet Take 1 tablet (75 mg) by mouth daily 90 tablet 3     Multiple Vitamins-Minerals (MULTIVITAMIN MEN PO) Take 1 tablet by mouth daily        order for DME Equipment being ordered: [facility] [facility] Phone [phone] Fax [phone]    Left Lateral Foot Primary Dressing Polymem Cloth Strips (not dots) Qty 30  Coccyx Secondary Dressing  tegaderm adhesive foam dressing 3x3 Qty 30  Perineum Secondary Dressing tegaderm + pad ( ref 3582) qty 30  Length of Need: 1 month  Frequency of dressing change: daily 30 days 0     vitamin B complex with vitamin C (VITAMIN  B COMPLEX) TABS tablet Take 1 tablet by mouth daily         PAST SURGICAL HISTORY:  Past Surgical History:   Procedure Laterality Date     ARTHROTOMY HIP Left 9/19/2018    Procedure: ARTHROTOMY HIP;  Left Hip Heterotopic Bone Resection;  Surgeon: Toñito Kennedy MD;  [facility]     ARTHROTOMY HIP Right 11/02/2018     ARTHROTOMY HIP Right 11/2/2018    Procedure: Right Hip Heterotopic Bone Resection;  Surgeon: Toñito Kennedy MD;  [facility]     CARDIAC SURGERY       LAMINECTOMY THORACIC THREE LEVELS N/A 3/9/2018    Procedure: LAMINECTOMY THORACIC THREE LEVELS;  Thoracic 9-12 Laminectomy Evacuation of Subdural Hematoma, C-Arm, Prone, Gel Rolls.;  Surgeon: Abhijeet Joe MD;  [facility]     OPEN REDUCTION INTERNAL FIXATION RODDING INTRAMEDULLARY FEMUR Right 6/25/2018    Procedure: OPEN REDUCTION INTERNAL FIXATION RODDING INTRAMEDULLARY FEMUR;  Right Open Reduction Internal Fixation Subtrochanteric Femur Fracture;  Surgeon: Toñito Kennedy MD;  [facility]     wisdom teeth extraction         ALLERGIES   No Known Allergies    FAMILY HISTORY:  Family History   Problem Relation Age of Onset     Cardiac Sudden Death Father      Arrhythmia Father         v fib arrest      Myocardial Infarction Brother      Other - See Comments Brother         myeloid dysplasia       SOCIAL HISTORY:  Social History      Socioeconomic History     Marital status: Single     Spouse name: Not on file     Number of children: Not on file     Years of education: Not on file     Highest education level: Not on file   Occupational History     Not on file   Social Needs     Financial resource strain: Not on file     Food insecurity:     Worry: Not on file     Inability: Not on file     Transportation needs:     Medical: Not on file     Non-medical: Not on file   Tobacco Use     Smoking status: Former Smoker     Smokeless tobacco: Never Used     Tobacco comment: Quit at 31   Substance and Sexual Activity     Alcohol use: Yes     Comment: socially     Drug use: No     Sexual activity: Not on file   Lifestyle     Physical activity:     Days per week: Not on file     Minutes per session: Not on file     Stress: Not on file   Relationships     Social connections:     Talks on phone: Not on file     Gets together: Not on file     Attends Tenriism service: Not on file     Active member of club or organization: Not on file     Attends meetings of clubs or organizations: Not on file     Relationship status: Not on file     Intimate partner violence:     Fear of current or ex partner: Not on file     Emotionally abused: Not on file     Physically abused: Not on file     Forced sexual activity: Not on file   Other Topics Concern     Parent/sibling w/ CABG, MI or angioplasty before 65F 55M? Not Asked   Social History Narrative     Not on file       ROS:   Constitutional: No fever, chills, or sweats. No weight gain/loss   ENT: No visual disturbance, ear ache, epistaxis, sore throat  Allergies/Immunologic: Negative.   Respiratory: No cough, hemoptysia  Cardiovascular: As per HPI  GI: No nausea, vomiting, hematemesis, melena, or hematochezia  : No urinary frequency, dysuria, or hematuria  Integument: Negative  Psychiatric: Negative  Neuro: Negative  Endocrinology: Negative   Musculoskeletal: Negative    EXAM:  /75 (BP Location: Right arm,  Patient Position: Chair, Cuff Size: Adult Regular)   Pulse 78   Ht 1.829 m (6')   Wt 72.7 kg (160 lb 3.2 oz)   SpO2 98%   BMI 21.73 kg/m    In general, the patient is a pleasant male in no apparent distress.    HEENT: NC/AT.  PERRLA.  EOMI.  Sclerae white, not injected.  Nares clear.  Pharynx without erythema or exudate.  Dentition intact.    Neck: No adenopathy.  No thyromegaly. Carotids +4/4 bilaterally without bruits.  No jugular venous distension.   Heart: RRR. Normal S1, S2 splits physiologically. No murmur, rub, click, or gallop. The PMI is in the 5th ICS in the midclavicular line. There is no heave.    Lungs: CTA.  No ronchi, wheezes, rales.  No dullness to percussion.   Abdomen: Soft, nontender, nondistended. No organomegaly.  No bruits.   Extremities: No clubbing, cyanosis, or edema.  The pulses are +4/4 at the radial, brachial, femoral, popliteal, DP, and PT sites bilaterally.  No bruits are noted.  Neurologic: Alert and oriented to person/place/time, normal speech, gait and affect  Skin: No petechiae, purpura or rash.    Labs:  LIPID RESULTS:  Lab Results   Component Value Date    CHOL 143 03/11/2019    HDL 39 (L) 03/11/2019    LDL 47 03/11/2019    TRIG 286 (H) 03/11/2019    NHDL 104 03/11/2019       LIVER ENZYME RESULTS:  Lab Results   Component Value Date    AST 29 03/11/2019    ALT 27 03/11/2019       CBC RESULTS:  Lab Results   Component Value Date    WBC 4.8 11/05/2018    RBC 3.26 (L) 11/05/2018    HGB 8.5 (L) 11/05/2018    HGB 8.5 (L) 11/05/2018    HCT 28.9 (L) 11/05/2018    MCV 89 11/05/2018    MCH 26.1 (L) 11/05/2018    MCHC 29.4 (L) 11/05/2018    RDW 16.3 (H) 11/05/2018     11/05/2018       BMP RESULTS:  Lab Results   Component Value Date     03/11/2019    POTASSIUM 3.6 03/11/2019    CHLORIDE 106 03/11/2019    CO2 27 03/11/2019    ANIONGAP 6 03/11/2019    GLC 94 03/11/2019    BUN 17 03/11/2019    CR 0.58 (L) 03/11/2019    GFRESTIMATED >90 03/11/2019    GFRESTBLACK >90 03/11/2019     PAT 8.6 03/11/2019        INR RESULTS:  Lab Results   Component Value Date    INR 1.20 (H) 05/23/2018    INR 1.20 (H) 03/09/2018       Procedures:    Echocardiogram 11/20/18    The visual ejection fraction is estimated at 55-60%.  Grade I or early diastolic dysfunction.  Septal motion is consistent with conduction abnormality.  _____________________________________________________________________________  __        Left Ventricle  The left ventricle is normal in size. There is normal left ventricular wall  thickness. The visual ejection fraction is estimated at 55-60%. Grade I or  early diastolic dysfunction. Septal motion is consistent with conduction  abnormality.     Right Ventricle  The right ventricle is normal in structure, function and size.     Atria  Normal left atrial size. Right atrial size is normal. There is no color  Doppler evidence of an atrial shunt.     Mitral Valve  The mitral valve is normal in structure and function.     Tricuspid Valve  The tricuspid valve is normal in structure and function. There is trace  tricuspid regurgitation. Right ventricular systolic pressure is normal.        Aortic Valve  The aortic valve is normal in structure and function.     Pulmonic Valve  The pulmonic valve is not well seen, but is grossly normal.     Vessels  Normal size aorta. The IVC is normal in size and reactivity with respiration,  suggesting normal central venous pressure.     Pericardium  The pericardium appears normal.     Rhythm  Sinus rhythm was noted.     _____________________________________________________________________________  __  MMode/2D Measurements & Calculations  IVSd: 0.82 cm  LVIDd: 4.2 cm  LVIDs: 2.9 cm  LVPWd: 0.93 cm  FS: 30.2 %  LV mass(C)d: 115.2 grams  LV mass(C)dI: 60.4 grams/m2  Ao root diam: 3.3 cm  LA dimension: 2.2 cm     asc Aorta Diam: 3.1 cm  LA/Ao: 0.68  LA Volume (BP): 31.3 ml  LA Volume Index (BP): 16.4 ml/m2  RWT: 0.44        Doppler Measurements & Calculations  MV  E max ramila: 69.4 cm/sec  MV A max ramila: 105.3 cm/sec  MV E/A: 0.66  MV dec time: 0.29 sec  PA acc time: 0.14 sec     PI end-d ramila: 85.5 cm/sec  TR max ramila: 275.5 cm/sec  TR max P.4 mmHg  E/E' av.8  Lateral E/e': 5.5  Medial E/e': 8.2        Assessment and Plan:     We discussed the results with patient:  We discussed he importance of a heart healthy diet and lifestyle.    We disucssed following written instructions for the patient with the patient:    Medication Changes: Stop Clopidogrel after you finish your current bottle.     Studies Ordered: Echocardiogram in one year.     The results from today include: Labs and CT.     Please follow up: With Dr. Jane in one year with fasting labs prior.    Anoop Jane MD, PhD  Professor of Medicine  Division of Cardiology.      CC  Patient Care Team:  Eugene Burrell MD as PCP - General (Family Practice)  Anoop Jane MD as MD (Cardiology)  Andrei Pelaez MD as MD (Urology)  Candice Bridges RN as Registered Nurse (Urology)  Marilin Ramirez LPN as Nurse Coordinator (Cardiology)  Thomas Connelly EP as Cardiac Rehabilitation Therapist (Cardiac Rehab)  [facility] ([facility] ([facility] ([place]))  SELF, REFERRED

## 2019-03-11 NOTE — PATIENT INSTRUCTIONS
Patient Instructions:  It was a pleasure to see you in the cardiology clinic today.      If you have any questions, you can reach my nurse, Marilin Ramirez LPN, at [phone].  Press Option #1 for the [facility], and then press Option #3 for nursing.    We are encouraging the use of [employer] to communicate with your HealthCare Provider    Medication Changes: Stop Clopidogrel after you finish your current bottle.    Studies Ordered: Echocardiogram in one year.    The results from today include: Labs and CT.    Please follow up: With Dr. Jane in one year with fasting labs prior.    Sincerely,    Anoop Jane MD     If you have an urgent need after hours (8:00 am to 4:30 pm) please call [phone] and ask for the cardiology fellow on call.

## 2019-03-12 ENCOUNTER — HOSPITAL ENCOUNTER (OUTPATIENT)
Dept: MRI IMAGING | Facility: CLINIC | Age: 59
Discharge: HOME | End: 2019-03-12
Attending: PODIATRIST | Admitting: PODIATRIST
Payer: COMMERCIAL

## 2019-03-12 DIAGNOSIS — L97.522 SKIN ULCER OF LEFT FOOT WITH FAT LAYER EXPOSED (H): ICD-10-CM

## 2019-03-12 PROCEDURE — 73720 MRI LWR EXTREMITY W/O&W/DYE: CPT | Mod: LT

## 2019-03-12 PROCEDURE — 25500064 ZZH RX 255 OP 636: Performed by: PODIATRIST

## 2019-03-12 PROCEDURE — A9585 GADOBUTROL INJECTION: HCPCS | Performed by: PODIATRIST

## 2019-03-12 RX ORDER — GADOBUTROL 604.72 MG/ML
7 INJECTION INTRAVENOUS ONCE
Status: COMPLETED | OUTPATIENT
Start: 2019-03-12 | End: 2019-03-12

## 2019-03-12 RX ADMIN — GADOBUTROL 7 ML: 604.72 INJECTION INTRAVENOUS at 07:43

## 2019-03-15 ENCOUNTER — TELEPHONE (OUTPATIENT)
Dept: PODIATRY | Facility: CLINIC | Age: 59
End: 2019-03-15

## 2019-03-15 ENCOUNTER — HOSPITAL ENCOUNTER (OUTPATIENT)
Dept: WOUND CARE | Facility: CLINIC | Age: 59
Discharge: HOME | End: 2019-03-15
Attending: PODIATRIST | Admitting: PODIATRIST
Payer: COMMERCIAL

## 2019-03-15 ENCOUNTER — HOSPITAL ENCOUNTER (OUTPATIENT)
Facility: CLINIC | Age: 59
End: 2019-03-15
Attending: PODIATRIST | Admitting: PODIATRIST
Payer: COMMERCIAL

## 2019-03-15 DIAGNOSIS — G82.20 PARAPLEGIA (H): ICD-10-CM

## 2019-03-15 DIAGNOSIS — T14.8XXA OPEN WOUND: ICD-10-CM

## 2019-03-15 DIAGNOSIS — L97.522 ULCER OF LEFT FOOT, WITH FAT LAYER EXPOSED (H): Primary | ICD-10-CM

## 2019-03-15 DIAGNOSIS — M86.272 SUBACUTE OSTEOMYELITIS OF LEFT FOOT (H): ICD-10-CM

## 2019-03-15 PROCEDURE — 97602 WOUND(S) CARE NON-SELECTIVE: CPT

## 2019-03-15 PROCEDURE — 99214 OFFICE O/P EST MOD 30 MIN: CPT | Performed by: PODIATRIST

## 2019-03-15 RX ORDER — CEFAZOLIN SODIUM 2 G/100ML
2 INJECTION, SOLUTION INTRAVENOUS
Status: CANCELLED | OUTPATIENT
Start: 2019-03-15

## 2019-03-15 RX ORDER — CEFAZOLIN SODIUM 1 G/3ML
1 INJECTION, POWDER, FOR SOLUTION INTRAMUSCULAR; INTRAVENOUS SEE ADMIN INSTRUCTIONS
Status: CANCELLED | OUTPATIENT
Start: 2019-03-15

## 2019-03-15 NOTE — PROGRESS NOTES
[facility] [place] Progress Note    Subject: Patient was seen at wound center.  Patient presents to clinic for follow-up on left foot ulceration and MRI results. Denies fever, nausea, vomiting, shortness of breath. Patient refused unwrapping of his left foot ulcer with foot as the measurements until after talking  With physician.    PMH:   Past Medical History:   Diagnosis Date     CAD (coronary artery disease)     stent     Femur fracture (H)      Neurogenic bladder      Neurogenic bowel      Orthostatic hypotension      Paraplegia (H)      Thoracic spinal cord injury (H)        Social Hx:   Social History     Socioeconomic History     Marital status: Single     Spouse name: Not on file     Number of children: Not on file     Years of education: Not on file     Highest education level: Not on file   Occupational History     Not on file   Social Needs     Financial resource strain: Not on file     Food insecurity:     Worry: Not on file     Inability: Not on file     Transportation needs:     Medical: Not on file     Non-medical: Not on file   Tobacco Use     Smoking status: Former Smoker     Smokeless tobacco: Never Used     Tobacco comment: Quit at 31   Substance and Sexual Activity     Alcohol use: Yes     Comment: socially     Drug use: No     Sexual activity: Not on file   Lifestyle     Physical activity:     Days per week: Not on file     Minutes per session: Not on file     Stress: Not on file   Relationships     Social connections:     Talks on phone: Not on file     Gets together: Not on file     Attends Sikhism service: Not on file     Active member of club or organization: Not on file     Attends meetings of clubs or organizations: Not on file     Relationship status: Not on file     Intimate partner violence:     Fear of current or ex partner: Not on file     Emotionally abused: Not on file     Physically abused: Not on file     Forced sexual activity: Not on file   Other Topics Concern      Parent/sibling w/ CABG, MI or angioplasty before 65F 55M? Not Asked   Social History Narrative     Not on file       Surgical Hx:   Past Surgical History:   Procedure Laterality Date     ARTHROTOMY HIP Left 9/19/2018    Procedure: ARTHROTOMY HIP;  Left Hip Heterotopic Bone Resection;  Surgeon: Toñito Kennedy MD;  Location: [facility]     ARTHROTOMY HIP Right 11/02/2018     ARTHROTOMY HIP Right 11/2/2018    Procedure: Right Hip Heterotopic Bone Resection;  Surgeon: Toñito Kennedy MD;  Location[facility] OR     CARDIAC SURGERY       LAMINECTOMY THORACIC THREE LEVELS N/A 3/9/2018    Procedure: LAMINECTOMY THORACIC THREE LEVELS;  Thoracic 9-12 Laminectomy Evacuation of Subdural Hematoma, C-Arm, Prone, Gel Rolls.;  Surgeon: Abhijeet Joe MD;  [facility]     OPEN REDUCTION INTERNAL FIXATION RODDING INTRAMEDULLARY FEMUR Right 6/25/2018    Procedure: OPEN REDUCTION INTERNAL FIXATION RODDING INTRAMEDULLARY FEMUR;  Right Open Reduction Internal Fixation Subtrochanteric Femur Fracture;  Surgeon: Toñito Kennedy MD;  Location: [facility] OR     wisdom teeth extraction         Allergies:  No Known Allergies    Medications:   Current Outpatient Medications   Medication     ascorbic acid (VITAMIN C) 500 MG tablet     atorvastatin (LIPITOR) 20 MG tablet     Multiple Vitamins-Minerals (MULTIVITAMIN MEN PO)     order for DME     vitamin B complex with vitamin C (VITAMIN  B COMPLEX) TABS tablet     No current facility-administered medications for this encounter.          Objective:  Vitals:  There were no vitals taken for this visit.    General:  Patient is alert and orientated.  NAD     Dermatologic: please see wound measurements below.. Currently no surrounding erythema, purulent drainage or systemic signs of infection noted.    .   Wound (used by OP [facility] only) 03/06/19 0832 Left lateral foot pressure injury (Active)   Length (cm) 1.5 3/15/2019  8:00 AM   Width (cm) 1.7 3/15/2019  8:00 AM   Depth  (cm) 0.8 3/15/2019  8:00 AM   Wound (cm^2) 2.55 cm^2 3/15/2019  8:00 AM   Wound Volume (cm^3) 2.04 cm^3 3/15/2019  8:00 AM   Wound healing % -65.58 3/15/2019  8:00 AM   Dressing Appearance moist drainage 3/15/2019  8:00 AM   Drainage Characteristics/Odor serosanguineous 3/15/2019  8:00 AM   Drainage Amount copious 3/15/2019  8:00 AM   Thickness/Stage Stage 4 3/15/2019  8:00 AM   Base red/granulating;black eschar;slough 3/15/2019  8:00 AM   Black (%), Wound Tissue Color 10 3/15/2019  8:00 AM   Red (%), Wound Tissue Color 70 3/15/2019  8:00 AM   Yellow (%), Wound Tissue Color 20 3/15/2019  8:00 AM   Periwound intact;moist;pink 3/15/2019  8:00 AM   Periwound Temperature warm 3/15/2019  8:00 AM   Periwound Skin Turgor soft 3/15/2019  8:00 AM   Edges open 3/15/2019  8:00 AM   Care, Wound dressing removed 3/15/2019  8:00 AM       Vascular: DP & PT pulses are intact & regular bilaterally.  No significant edema or varicosities noted.  CFT and skin temperature is normal to both lower extremities.     Neurologic: Lower extremity sensation absent to both legs due to paraplegia.     Musculoskeletal: Patient is paraplegic and nonambulatory.    Imaging:  MRI left foot -Lateral forefoot suspected wound over the fifth metatarsal head.  Metatarsophalangeal joint mild fluid. Given proximity to the wound, septic arthritis cannot be excluded.  Fifth metatarsal head and proximal phalangeal base mild marrow edema without corresponding T1 signal abnormality or apparent bone erosion. This therefore could represent reactive edema. However, given proximity to the wound, early osteomyelitis is not excluded. Forefoot dorsal and deeper soft tissue edema/enhancement. MR cannot  distinguish reactive edema from cellulitis. No evidence of trim-enhancing soft tissue fluid collection or forefoot soft tissue  abscess is demonstrated.  First metatarsophalangeal joint degenerative arthrosis    Assessment:    Ulcer of left foot, with fat layer exposed  (H)  Subacute osteomyelitis of left foot (H)  Paraplegia (H)    Plan:  Did not debride wound today. Had a long discussion with patient about his MRI results and bone infection. Discussed that we wanted go in and surgically remove that as this is not going to be treated with IV antibiotics. Discussed removing the head of the fifth metatarsal bone and packing it with antibiotic bone cement. We talked about removing the fifth toe to try to close the wound however patient does not want that and wants to keep his fifth toe. Discussed that if we are not able to close the wound that we would have to use a wound VAC. Discussed that the stitches today in for 3-6 weeks until the skin is healed.Talked about risks including infection, numbness, continued pain, recurrence, need for further surgery, blood loss, blood clotting. You will scar.    At this time we will have him do Iodosorb dressing changes with PolyMem to keep the wound clean.  He will call to schedule surgery. All questions were answered to patient's satisfaction. He was told to call if further questions or concerns.      Irma Lara DPM, Podiatry/Foot and Ankle Surgery    No images are attached to the encounter as patient declined.

## 2019-03-15 NOTE — DISCHARGE INSTRUCTIONS
[facility]  [address] [facility] [address] [address]  Appointment Phone [phone] Nurse Advisors [phone]   Derek Enriquez 1960     Wound Dressing Change:Coccyx and perineum  Cleanse wound with:soap and water  Cover wound with wound gel  Cover wound with gauze or bandage  Change dressing daily.    Wound Dressing Change: Left Lateral Foot  Cleanse wound with soap and water  Cover wound with Iodosorb gel on gauze  Cover wound with gauze  Change dressing daily    Please raise your legs above your heart for 30 mins 3 times a day to promote  wound healing.    Repositioning: is key to aid in healing and relieving pressure    Bed: Reposition pt MINIMALLY every 1-2 hours in bed to relieve pressure and  promote perfusion to tissue.    Chair: When up to chair pt should not sit for longer than one hour total before  either standing or returning to bed for at least 10 minutes, again to relieve pressure  and promote perfusion to tissue.  ? Pt should also sit on a chair cushion when up to the chair. Offloading frequently  through out the work day    Please call Dr. Lara's  to schedule surgery  DR. LARA'S CLINIC SCHEDULE  MONDAY AM - [place] TUESDAY - [place]   [address] [facility] [address] [address] [address] [place] [address]   [phone] / [phone] / [phone]       WEDNESDAY - [place] FRIDAY AM - WOUND CENTER   [address] [facility] [address]   [place] [address] [place] [address]   [phone] / [phone]       FRIDAY PM - [place] SCHEDULE SURGERY: [phone] [address] BILLING QUESTIONS: [phone]   [place] [address] AFTER HOURS: [phone]   [phone] / [phone] APPOINTMENTS: [phone]         Dr. Irma Lara    Call us at [phone] if you have any questions about your wounds, have redness or swelling around your wound, have a fever of 101 or  greater or if you have any other problems or concerns. We answer the phone Monday through Friday 8 am to 4 pm, please leave a message as we check the voicemail frequently throughout the day.

## 2019-03-15 NOTE — TELEPHONE ENCOUNTER
Patient called to schedule surgery.     Type of surgery: left foot 5th metataral head excision with flap closure  Location of surgery: [facility]  Date and time of surgery: 3/21/19@ 1300  Surgeon: Marco  Pre-Op Appt Date: Patient will call [facility] clinic  Post-Op Appt Date: 3/29/19   Packet sent out: Yes  Pre-cert/Authorization completed:  Not Applicable  Date: 3/15/19      Corin Joyner, Surgery Scheduler

## 2019-03-20 ENCOUNTER — TELEPHONE (OUTPATIENT)
Dept: WOUND CARE | Facility: CLINIC | Age: 59
End: 2019-03-20

## 2019-03-20 ENCOUNTER — ANESTHESIA EVENT (OUTPATIENT)
Dept: SURGERY | Facility: CLINIC | Age: 59
End: 2019-03-20

## 2019-03-20 DIAGNOSIS — N31.9 NEUROGENIC BLADDER: Primary | ICD-10-CM

## 2019-03-20 NOTE — OR NURSING
PT. STATES THAT HE WILL DRIVE HIS ELECTRIC WHEELCHAIR HOME POST OP AND HIS SON WILL WALK ALONG WITH HIM.

## 2019-03-20 NOTE — PROGRESS NOTES
Spoke with patient- orders for UA/UC placed per Candy Leary PA-C.  Orders faxed to [facility] at [phone].  Patient is to do a UA/UC this week to rule out UTI prior to UDS next week.    KATHY Arroyo

## 2019-03-20 NOTE — TELEPHONE ENCOUNTER
"Patient called [facility] to discuss his surgical procedure tomorrow. Attempted to answer his questions as best as this writer could based on Dr. Lara's note but patient became very augmentative stating that he \"doesn't understand how we got to this point\" and does not appear to be in agreement with the proposed procedure. Patient demanding to talk to Dr. Lara prior to surgery tomorrow.   "

## 2019-03-21 ENCOUNTER — TELEPHONE (OUTPATIENT)
Dept: PODIATRY | Facility: CLINIC | Age: 59
End: 2019-03-21

## 2019-03-21 ENCOUNTER — ANESTHESIA (OUTPATIENT)
Dept: SURGERY | Facility: CLINIC | Age: 59
End: 2019-03-21

## 2019-03-21 DIAGNOSIS — L89.893 PRESSURE ULCER OF OTHER SITE, STAGE 3 (H): Primary | ICD-10-CM

## 2019-03-21 NOTE — TELEPHONE ENCOUNTER
Patient called and stated he is cancelling surgery.  Patient is seeking 2nd opinion for his foot.     Notified [facility] to cancel surgery.       Corin Joyner, Surgery Scheduler

## 2019-03-21 NOTE — TELEPHONE ENCOUNTER
Received message from Dr. Lara to refer Patient to [facility] or [facility] as he cancelled surgery with her. Referral placed.

## 2019-03-27 ENCOUNTER — OFFICE VISIT (OUTPATIENT)
Dept: UROLOGY | Facility: CLINIC | Age: 59
End: 2019-03-27
Payer: COMMERCIAL

## 2019-03-27 ENCOUNTER — ANCILLARY PROCEDURE (OUTPATIENT)
Dept: RADIOLOGY | Facility: AMBULATORY SURGERY CENTER | Age: 59
End: 2019-03-27
Attending: PHYSICIAN ASSISTANT
Payer: COMMERCIAL

## 2019-03-27 VITALS
HEIGHT: 72 IN | WEIGHT: 160 LBS | HEART RATE: 81 BPM | BODY MASS INDEX: 21.67 KG/M2 | SYSTOLIC BLOOD PRESSURE: 117 MMHG | DIASTOLIC BLOOD PRESSURE: 77 MMHG

## 2019-03-27 DIAGNOSIS — S24.109S INJURY OF THORACIC SPINAL CORD, SEQUELA (H): ICD-10-CM

## 2019-03-27 DIAGNOSIS — R32 URINARY INCONTINENCE, UNSPECIFIED TYPE: ICD-10-CM

## 2019-03-27 DIAGNOSIS — N31.9 NEUROGENIC BLADDER: Primary | ICD-10-CM

## 2019-03-27 DIAGNOSIS — N52.9 ERECTILE DYSFUNCTION, UNSPECIFIED ERECTILE DYSFUNCTION TYPE: ICD-10-CM

## 2019-03-27 LAB
APPEARANCE UR: NORMAL
BILIRUB UR QL: NORMAL
COLOR UR: YELLOW
GLUCOSE URINE: NORMAL MG/DL
HGB UR QL: NORMAL
KETONES UR QL: NORMAL MG/DL
LEUKOCYTE ESTERASE URINE: NORMAL
NITRITE UR QL STRIP: NORMAL
PH UR STRIP: 5.5 PH (ref 5–7)
PROTEIN ALBUMIN URINE: NORMAL MG/DL
SOURCE: NORMAL
SP GR UR STRIP: 1.02 (ref 1–1.03)
UROBILINOGEN UR QL STRIP: 0.2 EU/DL (ref 0.2–1)

## 2019-03-27 RX ORDER — TADALAFIL 20 MG/1
TABLET ORAL
Qty: 5 TABLET | Refills: 3 | Status: SHIPPED | OUTPATIENT
Start: 2019-03-27

## 2019-03-27 RX ORDER — CIPROFLOXACIN 500 MG/1
500 TABLET, FILM COATED ORAL ONCE
Status: COMPLETED | OUTPATIENT
Start: 2019-03-27 | End: 2019-03-27

## 2019-03-27 RX ORDER — TROSPIUM CHLORIDE ER 60 MG/1
60 CAPSULE ORAL
Qty: 90 CAPSULE | Refills: 3 | Status: SHIPPED | OUTPATIENT
Start: 2019-03-27 | End: 2020-07-15

## 2019-03-27 RX ADMIN — CIPROFLOXACIN 500 MG: 500 TABLET, FILM COATED ORAL at 15:28

## 2019-03-27 ASSESSMENT — MIFFLIN-ST. JEOR: SCORE: 1593.76

## 2019-03-27 ASSESSMENT — PAIN SCALES - GENERAL: PAINLEVEL: NO PAIN (0)

## 2019-03-27 NOTE — LETTER
RE: Derek Enriquez  [address] [address]     Dear Colleague,    Thank you for referring your patient, Derek Enriquez, to the [facility] AND [facility]ST FOR PROSTATE AND UROLOGIC CANCERS at [facility]. Please see a copy of my visit note below.    PREPROCEDURE DIAGNOSES:    1. Neurogenic bladder secondary to subdural hematoma at T10 s/p T9-12 laminectomy and evacuation of subdural hematoma eventually leading to paraplegia.  2. 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    POSTPROCEDURE DIAGNOSES:  -Normal bladder capacity (550 mL) with absent filling sensations.  -Poor bladder compliance with Pdet pressures exceeding 40 cm H2O without leakage at a volume of 245 mL. Detrusor pressures continue to steadily increase throughout the remainder of filling into the 80 cm H2O range. He finally leaks at Pdet pressure of 83 cm H2O at a volume of 503 mL which represents DLPP.  -No stress urinary incontinence.   -Fluoroscopy during filling reveals a smooth-walled bladder without diverticuli or VUR. The bladder neck has an open appearance during filling but contrast never passes distal to the external urinary sphincter, until he leaks a small volume at capacity.   -No voiding phase as patient is unable to void volitionally.     PROCEDURE:    1. Sterile urethral catheterization for measurement of residual urine volume.  2. Complex filling cystometrogram with measurement of bladder and rectal pressures.  3. Electromyography of the pelvic floor during urodynamics.  4. Fluoroscopic imaging of the bladder during urodynamics, at least 3 views.    5. Interpretation of urodynamics and flouroscopic imaging.      INDICATIONS FOR PROCEDURE:  Mr. Derek Enriquez is a pleasant 58 year old male with neurogenic bladder secondary to subdural hematoma at T10 s/p T9-12 laminectomy and evacuation of  subdural hematoma eventually leading to paraplegia. UDS on 11/1/18 demonstrated normal capacity and compliance with DO without incontinence at bladder volumes >325 mL. Anticholinergic therapy was recommended at that time but patient declined. As he is now 1 year out from his initial SCI, repeat video urodynamic assess is requested today to better characterize Mr. Derek Enriquez's voiding dysfunction.      VOIDING DIARY:  Patient did not complete. Per chart review:  Catheterization History:  The patient uses a urethral catheter 3-4 times daily into the toilet and reports no problems.      Incontinence History:  He does not leak between voids/caths. He does not experience urgency of urination. He does not experience stress urinary incontinence.    DESCRIPTION OF PROCEDURE:  Risks, benefits, and alternatives to urodynamics were discussed with the patient and he wished to proceed.  Urodynamics are planned to better assess the primary etiology for Mr. Enriquez's urologic dysfunction.  The patient does not currently take anticholinergic medications for his bladder.  After informed consent was obtained, the patient was taken to the procedure room where uroflowmetry was performed. Findings below.     PRE-STUDY UROFLOWMETRY:  Not performed as patient does not void volitionally.  Residual by catheter: 190 mL.  Pretest urine dipstick was positive for nitrites and small LE. The patient denies any UTI symptoms today; specifically, denies frequency, urgency, hematuria, fevers, chills, N/V. In this patient who self-catheterizes multiple times per day, colonization is likely and therefore some degree of pyuria is to be expected. We discussed the risks vs benefits of proceeding with today's study. The patient verbalized understanding and wishes to proceed. Of note, he had a urine culture performed through his PCP's office on 3/21/19 which grew Staph aureus (quantity not specified), pan-sensitive.    Next a 7F double-lumen urodynamics  catheter was inserted into the bladder under sterile technique via urethra.  A 7F abdominal manometry catheter was placed in the rectum.  EMG pads were placed on both sides of the anal verge.  The bladder was filled with 200 mL of Cystografin at 50 mL/minute and serial pressures were recorded.  With coughing there was an appropriate rise in vesical and abdominal pressures with no change in detrusor pressure, confirming good study catheter placement.    DURING THE FILLING PHASE:  First sensation: not reported  First Desire: not reported  Strong Desire: not reported  Maximum Capacity: 550 mL.    Uninhibited detrusor contractions: none.  Compliance: poor. PDet=~83 cmH20 at capacity. Compliance ratio of 6.6.  Continence: small volume DOI at Pet 83 cm H2O at a volume of 503 mL.  EMG: mostly concordant during filling.    DURING THE VOIDING PHASE:  No voiding phase as patient unable to void volitionally.  Postvoid Residual: 550 mL.    FLUOROSCOPIC IMAGING OF THE BLADDER DURING URODYNAMICS:  Please note, image numbers on UDS tracings correlate with iSite series numbers on PACS images. Fluoroscopy during today's procedure demonstrated a smooth bladder wall without diverticulae or cellules.  No vesicoureteral reflux was observed.  The bladder neck had an open appearance during filling but contrast never passed distal to the external urinary sphincter, until he leaks a small volume at capacity.  At the completion of today's study, all catheters were removed, the patient was straight catheterized for residual volume, and was brought back into the consultation room to further discuss today's study results.      ASSESSMENT/PLAN:  Mr. Derek Enriquez is a pleasant 58 year old male with neurogenic bladder secondary to SCI who demonstrated the following findings today on urodynamic evaluation:    -Normal bladder capacity (550 mL) with absent filling sensations.  -Poor bladder compliance with Pdet pressures exceeding 40 cm H2O without  leakage at a volume of 245 mL. Detrusor pressures continue to steadily increase throughout the remainder of filling into the 80 cm H2O range. He finally leaks at Pdet pressure of 83 cm H2O at a volume of 503 mL which represents DLPP.  -No stress urinary incontinence.   -Fluoroscopy during filling reveals a smooth-walled bladder without diverticuli or VUR. The bladder neck has an open appearance during filling but contrast never passes distal to the external urinary sphincter, until he leaks a small volume at capacity.   -No voiding phase as patient is unable to void volitionally.     We discussed his study results in detail today. He exhibits poor bladder compliance with high detrusor pressures at volumes >245 mL. DLPP is 83 cm H2O at a volume of 503 mL. We discussed how his poor compliance with high detrusor pressures puts him at risk for upper tract damage/deterioration, UTI's, and worsening incontinence. Strongly recommend initiating anticholinergic therapy in an effort to reduce bladder pressures. Patient is agreeable but is very concerned about side effects and the possibility of being on a medication for life.   -Will start Sanctura XR 60 mg once daily in the morning on an empty stomach. Side effects discussed.   -Schedule repeat video UDS in 6-8 weeks to reassess bladder pressures. If not improved, consider addition of Myrbetriq versus bladder Botox injections.   -Patient also requests a prescription for Cialis for erectile dysfunction. He previously took Viagra which he could not tolerate secondary to headaches. Discussed that insurance may not cover Cialis and it may be expensive, but he would still like a Rx sent. Rx for Cialis 20 mg PO once PRN prior to sexual activity (max: 1 pill per 36 hours). Side effects/warning signs, including risk for priapism, discussed. If Cialis not effective, then consider ICI as a next step.     - A single Cipro antibiotic was provided for UTI prophylaxis following completion  of today's study per department protocol.  The risk of UTI with VUDS is low at ~2.5-3%.      Thank you for allowing me to participate in the care of Mr. Derek Enriquez and please don't hesitate to contact me with any questions or concerns.      Candy Leary PA-C  Urology Physician Assistant

## 2019-03-27 NOTE — PATIENT INSTRUCTIONS
UROLOGY CLINIC VISIT PATIENT INSTRUCTIONS    1) Schedule repeat video urodynamics in 6-7 weeks.    2) Start taking trospium (Sanctura XR) 60 mg once daily in the morning at least 30 minutes before a meal.    3) Try the Cialis prescription. If not covered by your insurance, give us a call to discuss alternative options.    If you have any issues, questions or concerns in the meantime, do not hesitate to contact us at [phone] or via [employer].     It was a pleasure meeting with you today.  Thank you for allowing me and my team the privilege of caring for you today.  YOU are the reason we are here, and I truly hope we provided you with the excellent service you deserve.  Please let us know if there is anything else we can do for you so that we can be sure you are leaving completely satisfied with your care experience.

## 2019-03-27 NOTE — NURSING NOTE
Invasive Procedure Safety Checklist:    Procedure: Urodynamics Study    Action: Complete sections and checkboxes as appropriate.    Pre-procedure:  1. Patient ID Verified with 2 identifiers ([place] and  or MRN) : YES    2. Procedure and site verified with patient/designee (when able) : YES    3. Accurate consent documentation in medical record : YES    4. H&P (or appropriate assessment) documented in medical record : NO  H&P must be up to 30 days prior to procedure an updated within 24 hours of                 Procedure as applicable.     5. Relevant diagnostic and radiology test results appropriately labeled and displayed as applicable : NO    6. Blood products, implants, devices, and/or special equipment available for the procedure as applicable : NO    7. Procedure site(s) marked with provider initials [Exclusions: ] : NO    8. Marking not required. Reason : Yes  Procedure does not require site marking    Time Out:     Time-Out performed immediately prior to starting procedure, including verbal and active participation of all team members addressing: YES    1. Correct patient identity.  2. Confirmed that the correct side and site are marked.  3. An accurate procedure to be done.  4. Agreement on the procedure to be done.  5. Correct patient position.  6. Relevant images and results are properly labeled and appropriately displayed.  7. The need to administer antibiotics or fluids for irrigation purposes during the procedure as applicable.  8. Safety precautions based on patient history or medication use.    During Procedure: Verification of correct person, site, and procedure occurs any time the responsibility for care of the patient is transferred to another member of the care team.    The following medication was given:     MEDICATION:  Ciprofloxacin  ROUTE: PO  SITE: Medication was given orally   DOSE: 500 mg  LOT #: [id number]  : [employer]  EXPIRATION DATE: 10/2020  NDC#:  [id number]   Was there drug waste? No      Shaista Caceres, [facility]  March 27, 2019

## 2019-03-27 NOTE — PROGRESS NOTES
PREPROCEDURE DIAGNOSES:    1. Neurogenic bladder secondary to subdural hematoma at T10 s/p T9-12 laminectomy and evacuation of subdural hematoma eventually leading to paraplegia.  2. 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    POSTPROCEDURE DIAGNOSES:  -Normal bladder capacity (550 mL) with absent filling sensations.  -Poor bladder compliance with Pdet pressures exceeding 40 cm H2O without leakage at a volume of 245 mL. Detrusor pressures continue to steadily increase throughout the remainder of filling into the 80 cm H2O range. He finally leaks at Pdet pressure of 83 cm H2O at a volume of 503 mL which represents DLPP.  -No stress urinary incontinence.   -Fluoroscopy during filling reveals a smooth-walled bladder without diverticuli or VUR. The bladder neck has an open appearance during filling but contrast never passes distal to the external urinary sphincter, until he leaks a small volume at capacity.   -No voiding phase as patient is unable to void volitionally.     PROCEDURE:    1. Sterile urethral catheterization for measurement of residual urine volume.  2. Complex filling cystometrogram with measurement of bladder and rectal pressures.  3. Electromyography of the pelvic floor during urodynamics.  4. Fluoroscopic imaging of the bladder during urodynamics, at least 3 views.    5. Interpretation of urodynamics and flouroscopic imaging.      INDICATIONS FOR PROCEDURE:  Mr. Derek Enriquez is a pleasant 58 year old male with neurogenic bladder secondary to subdural hematoma at T10 s/p T9-12 laminectomy and evacuation of subdural hematoma eventually leading to paraplegia. UDS on 11/1/18 demonstrated normal capacity and compliance with DO without incontinence at bladder volumes >325 mL. Anticholinergic therapy was recommended at that time but patient declined. As he is now 1 year out from his initial SCI, repeat video  urodynamic assess is requested today to better characterize Mr. Derek Enriquez's voiding dysfunction.      VOIDING DIARY:  Patient did not complete. Per chart review:  Catheterization History:  The patient uses a urethral catheter 3-4 times daily into the toilet and reports no problems.      Incontinence History:  He does not leak between voids/caths. He does not experience urgency of urination. He does not experience stress urinary incontinence.    DESCRIPTION OF PROCEDURE:  Risks, benefits, and alternatives to urodynamics were discussed with the patient and he wished to proceed.  Urodynamics are planned to better assess the primary etiology for Mr. Enriquez's urologic dysfunction.  The patient does not currently take anticholinergic medications for his bladder.  After informed consent was obtained, the patient was taken to the procedure room where uroflowmetry was performed. Findings below.     PRE-STUDY UROFLOWMETRY:  Not performed as patient does not void volitionally.  Residual by catheter: 190 mL.  Pretest urine dipstick was positive for nitrites and small LE. The patient denies any UTI symptoms today; specifically, denies frequency, urgency, hematuria, fevers, chills, N/V. In this patient who self-catheterizes multiple times per day, colonization is likely and therefore some degree of pyuria is to be expected. We discussed the risks vs benefits of proceeding with today's study. The patient verbalized understanding and wishes to proceed. Of note, he had a urine culture performed through his PCP's office on 3/21/19 which grew Staph aureus (quantity not specified), pan-sensitive.    Next a 7F double-lumen urodynamics catheter was inserted into the bladder under sterile technique via urethra.  A 7F abdominal manometry catheter was placed in the rectum.  EMG pads were placed on both sides of the anal verge.  The bladder was filled with 200 mL of Cystografin at 50 mL/minute and serial pressures were recorded.  With  coughing there was an appropriate rise in vesical and abdominal pressures with no change in detrusor pressure, confirming good study catheter placement.    DURING THE FILLING PHASE:  First sensation: not reported  First Desire: not reported  Strong Desire: not reported  Maximum Capacity: 550 mL.    Uninhibited detrusor contractions: none.  Compliance: poor. PDet=~83 cmH20 at capacity. Compliance ratio of 6.6.  Continence: small volume DOI at Pet 83 cm H2O at a volume of 503 mL.  EMG: mostly concordant during filling.    DURING THE VOIDING PHASE:  No voiding phase as patient unable to void volitionally.  Postvoid Residual: 550 mL.    FLUOROSCOPIC IMAGING OF THE BLADDER DURING URODYNAMICS:  Please note, image numbers on UDS tracings correlate with iSite series numbers on PACS images. Fluoroscopy during today's procedure demonstrated a smooth bladder wall without diverticulae or cellules.  No vesicoureteral reflux was observed.  The bladder neck had an open appearance during filling but contrast never passed distal to the external urinary sphincter, until he leaks a small volume at capacity.  At the completion of today's study, all catheters were removed, the patient was straight catheterized for residual volume, and was brought back into the consultation room to further discuss today's study results.      ASSESSMENT/PLAN:  Mr. Derek Enriquez is a pleasant 58 year old male with neurogenic bladder secondary to SCI who demonstrated the following findings today on urodynamic evaluation:    -Normal bladder capacity (550 mL) with absent filling sensations.  -Poor bladder compliance with Pdet pressures exceeding 40 cm H2O without leakage at a volume of 245 mL. Detrusor pressures continue to steadily increase throughout the remainder of filling into the 80 cm H2O range. He finally leaks at Pdet pressure of 83 cm H2O at a volume of 503 mL which represents DLPP.  -No stress urinary incontinence.   -Fluoroscopy during filling  reveals a smooth-walled bladder without diverticuli or VUR. The bladder neck has an open appearance during filling but contrast never passes distal to the external urinary sphincter, until he leaks a small volume at capacity.   -No voiding phase as patient is unable to void volitionally.     We discussed his study results in detail today. He exhibits poor bladder compliance with high detrusor pressures at volumes >245 mL. DLPP is 83 cm H2O at a volume of 503 mL. We discussed how his poor compliance with high detrusor pressures puts him at risk for upper tract damage/deterioration, UTI's, and worsening incontinence. Strongly recommend initiating anticholinergic therapy in an effort to reduce bladder pressures. Patient is agreeable but is very concerned about side effects and the possibility of being on a medication for life.   -Will start Sanctura XR 60 mg once daily in the morning on an empty stomach. Side effects discussed.   -Schedule repeat video UDS in 6-8 weeks to reassess bladder pressures. If not improved, consider addition of Myrbetriq versus bladder Botox injections.   -Patient also requests a prescription for Cialis for erectile dysfunction. He previously took Viagra which he could not tolerate secondary to headaches. Discussed that insurance may not cover Cialis and it may be expensive, but he would still like a Rx sent. Rx for Cialis 20 mg PO once PRN prior to sexual activity (max: 1 pill per 36 hours). Side effects/warning signs, including risk for priapism, discussed. If Cialis not effective, then consider ICI as a next step.     - A single Cipro antibiotic was provided for UTI prophylaxis following completion of today's study per department protocol.  The risk of UTI with VUDS is low at ~2.5-3%.      Thank you for allowing me to participate in the care of Mr. Derek Enriquez and please don't hesitate to contact me with any questions or concerns.      Candy Leary PA-C  Urology Physician  Assistant

## 2019-04-30 ENCOUNTER — PRE VISIT (OUTPATIENT)
Dept: UROLOGY | Facility: CLINIC | Age: 59
End: 2019-04-30

## 2019-05-08 ENCOUNTER — ANCILLARY PROCEDURE (OUTPATIENT)
Dept: RADIOLOGY | Facility: AMBULATORY SURGERY CENTER | Age: 59
End: 2019-05-08
Attending: PHYSICIAN ASSISTANT
Payer: COMMERCIAL

## 2019-05-08 ENCOUNTER — OFFICE VISIT (OUTPATIENT)
Dept: UROLOGY | Facility: CLINIC | Age: 59
End: 2019-05-08
Payer: COMMERCIAL

## 2019-05-08 VITALS
DIASTOLIC BLOOD PRESSURE: 83 MMHG | WEIGHT: 160 LBS | BODY MASS INDEX: 21.67 KG/M2 | HEART RATE: 69 BPM | SYSTOLIC BLOOD PRESSURE: 136 MMHG | HEIGHT: 72 IN

## 2019-05-08 DIAGNOSIS — N39.498 OTHER URINARY INCONTINENCE: ICD-10-CM

## 2019-05-08 DIAGNOSIS — S24.109S INJURY OF THORACIC SPINAL CORD, SEQUELA (H): ICD-10-CM

## 2019-05-08 DIAGNOSIS — N31.9 NEUROGENIC BLADDER: Primary | ICD-10-CM

## 2019-05-08 DIAGNOSIS — N31.8 BLADDER COMPLIANCE LOW: ICD-10-CM

## 2019-05-08 LAB
APPEARANCE UR: CLEAR
BILIRUB UR QL: NORMAL
COLOR UR: YELLOW
GLUCOSE URINE: NORMAL MG/DL
HGB UR QL: NORMAL
KETONES UR QL: NORMAL MG/DL
LEUKOCYTE ESTERASE URINE: NORMAL
NITRITE UR QL STRIP: NORMAL
PH UR STRIP: 5 PH (ref 5–7)
PROTEIN ALBUMIN URINE: NORMAL MG/DL
SOURCE: NORMAL
SP GR UR STRIP: 1.01 (ref 1–1.03)
UROBILINOGEN UR QL STRIP: 0.2 EU/DL (ref 0.2–1)

## 2019-05-08 RX ORDER — SULFAMETHOXAZOLE/TRIMETHOPRIM 800-160 MG
1 TABLET ORAL ONCE
Status: COMPLETED | OUTPATIENT
Start: 2019-05-08 | End: 2019-05-08

## 2019-05-08 RX ADMIN — SULFAMETHOXAZOLE AND TRIMETHOPRIM 1 TABLET: 800; 160 TABLET ORAL at 15:38

## 2019-05-08 ASSESSMENT — MIFFLIN-ST. JEOR: SCORE: 1593.76

## 2019-05-08 ASSESSMENT — PAIN SCALES - GENERAL: PAINLEVEL: NO PAIN (0)

## 2019-05-08 NOTE — PATIENT INSTRUCTIONS
UROLOGY CLINIC VISIT PATIENT INSTRUCTIONS    Schedule cystoscopy with bladder Botox with Dr. Stroud in 1 month.    You can stop taking the trospium medication.     CYSTOSCOPY    What is a Cystoscopy?  This is a procedure done to check for problems inside the bladder.  Problems may include polyps (growths), tumors, inflammation (swelling and redness) and other concerns.    The doctor inserts a thin tube (called a cystoscope) into the bladder.  The tube is about the size of a pencil.  We will give you numbing medicine to reduce the pain or discomfort you may feel.    The tube allows the doctor to:  The doctor will be able to see inside the bladder by filling the bladder with water.  The water makes it easier to see any problems that may be present.    If needed, the doctor may use the tube to:  The doctor is able to take tissue samples (biopsies).  Samples are sent to the lab for testing.  The doctor can also burn off any small growths or tumors that are found.  This is call fulguration.    How should I get ready for the exam?  To prepare, stop taking any medications as instructed. Ask whether you should avoid eating or drinking anything after midnight before the procedure. Follow any other instructions your doctor gives you.    If you are having this procedure done at the clinic, you will be there for up to an hour.  You will receive care before and after the procedure.    Please tell your doctor if:  - You have a history of urinary tract infections.  - You know that you have a tumor in your bladder.  - You have bleeding problems.  - You have any allergies.  - You are or may be pregnant.      What happens after the exam?  You may go back to your normal diet and activity as you feel ready, unless your doctor tells you not to.    For the next two days, you may notice:  - Some blood in your urine.  - Some burning when you urinate (use the toilet).  - An urge to urinate more often.  - Bladder spasms.    These are normal  after the procedure. They should go away on their own after a day or two.      - You can help to relieve the above listed symptoms by:  - Drinking 6 to 8 large glasses of water each day (includes drinks at meals).  This will help clear the urine.  - Take warm baths to relieve pain and bladder spasms.  Do not add anything to the bath water.  - Your doctor may prescribe pain medicine.  You may also take Tylenol (acetaminophen) for pain.    When should I call my doctor?  - A fever over 100.0 F (38 C) for more than a day.  (Before you call the doctor, check your temperature under your tongue.)  - Chills.  - Failure to urinate: No urine comes out when you try to use the toilet.  (Try soaking in a bathtub full of warm water.  If still no urine, call your doctor.)  - A lot of blood in the urine or blood clots larger than a nickel.  - Pain in the back or abdomen (belly / stomach area).  - Pain or spasms that are not relieved by warm tub baths and pain medicine.  - Severe pain, burning or other problems while passing urine.  - Pain that gets worse after two days.        If you have any issues, questions or concerns in the meantime, do not hesitate to contact us at [phone] or via [email or web address].     It was a pleasure meeting with you today.  Thank you for allowing me and my team the privilege of caring for you today.  YOU are the reason we are here, and I truly hope we provided you with the excellent service you deserve.  Please let us know if there is anything else we can do for you so that we can be sure you are leaving completely satisfied with your care experience.

## 2019-05-08 NOTE — NURSING NOTE
Invasive Procedure Safety Checklist:    Procedure: Urodynamics Study    Action: Complete sections and checkboxes as appropriate.    Pre-procedure:  1. Patient ID Verified with 2 identifiers ([place] and  or MRN) : YES    2. Procedure and site verified with patient/designee (when able) : YES    3. Accurate consent documentation in medical record : YES    4. H&P (or appropriate assessment) documented in medical record : NO  H&P must be up to 30 days prior to procedure an updated within 24 hours of                 Procedure as applicable.     5. Relevant diagnostic and radiology test results appropriately labeled and displayed as applicable : NO    6. Blood products, implants, devices, and/or special equipment available for the procedure as applicable : NO    7. Procedure site(s) marked with provider initials [Exclusions: ] : NO    8. Marking not required. Reason : Yes  Procedure does not require site marking    Time Out:     Time-Out performed immediately prior to starting procedure, including verbal and active participation of all team members addressing: YES    1. Correct patient identity.  2. Confirmed that the correct side and site are marked.  3. An accurate procedure to be done.  4. Agreement on the procedure to be done.  5. Correct patient position.  6. Relevant images and results are properly labeled and appropriately displayed.  7. The need to administer antibiotics or fluids for irrigation purposes during the procedure as applicable.  8. Safety precautions based on patient history or medication use.    During Procedure: Verification of correct person, site, and procedure occurs any time the responsibility for care of the patient is transferred to another member of the care team.    KATHY Arroyo

## 2019-05-08 NOTE — PROGRESS NOTES
PREPROCEDURE DIAGNOSES:    1. Neurogenic bladder secondary to subdural hematoma at T10 s/p T9-12 laminectomy and evacuation of subdural hematoma eventually leading to paraplegia.  2. Poor bladder compliance with DLPP 83 cm H2O on UDS from 3/27/19  3. Urinary retention, managed with CIC 4 times daily    POSTPROCEDURE DIAGNOSES:  -Normal bladder capacity (600 mL) with absent filling sensations.  -Poor bladder compliance with some stress-induced DO without incontinence. Detrusor pressures are significantly and consistently elevated to 80 cm H2O at volumes > 200 mL and 93+ cm H2O at volumes >300 mL, but he never leaks to define DLPP.  -No stress urinary incontinence.   -Fluoroscopy reveals a smooth-walled bladder without diverticuli or VUR. The bladder neck appears closed during early stages of filling and then appears open as he nears capacity but contrast never passes distally to the external urinary sphincter.   -No voiding phase as patient is unable to void volitionally.     PROCEDURE:    1. Sterile urethral catheterization for measurement of residual urine volume.  2. Complex filling cystometrogram with measurement of bladder and rectal pressures.  3. Electromyography of the pelvic floor during urodynamics.  4. Fluoroscopic imaging of the bladder during urodynamics, at least 3 views.    5. Interpretation of urodynamics and flouroscopic imaging.      INDICATIONS FOR PROCEDURE:  Mr. Derek Enriquez is a pleasant 58 year old male with neurogenic bladder secondary to subdural hematoma at T10 s/p T9-12 laminectomy and evacuation of subdural hematoma eventually leading to paraplegia. He has urinary retention requiring CIC 4 times per day. UDS on 11/1/18 demonstrated normal capacity and compliance with DO without incontinence at bladder volumes >325 mL. Anticholinergic therapy was recommended at that time but the patient declined. He then underwent repeat VUDS on 3/27/19 at the one year mariano from his initial SCI. This  demonstrated poor bladder compliance with detrusor pressures exceeding 40 cm H2O at a volume of 245 mL and final DLPP of 83 cm H2O at a volume of 503 mL. He was therefore started on Sanctura XR 60 mg daily (chosen for side effect profile per patient request). He returns today for repeat video urodynamic assessment to reassess bladder pressures. Notes that he is unable to tolerate trospium secondary to constipation and dry mouth. He has been taking it once daily for the past several days, but not regularly prior to that.    VOIDING DIARY:  Patient did not complete.   Reports that he is catheterizing 4 times per day. Volumes usually <300-400 mL.    DESCRIPTION OF PROCEDURE:  Risks, benefits, and alternatives to urodynamics were discussed with the patient and he wished to proceed.  Urodynamics are planned to better assess the primary etiology for Mr. Enriquez's urologic dysfunction.  The patient last took anticholinergic medication 9 hours ago.  After informed consent was obtained, the patient was taken to the procedure room where uroflowmetry was performed. Findings below.     PRE-STUDY UROFLOWMETRY:  Not performed as patient does not void volitionally.  Residual by catheter: 290 mL.  Pretest urine dipstick was negative for leukocytes and nitrites.    Next a 7F double-lumen urodynamics catheter was inserted into the bladder under sterile technique via urethra.  A 7F abdominal manometry catheter was placed in the rectum.  EMG pads were placed on both sides of the anal verge.  The bladder was filled with 200 mL of Cystografin at 50 mL/minute and serial pressures were recorded.  With coughing there was an appropriate rise in vesical and abdominal pressures with no change in detrusor pressure, confirming good study catheter placement.    DURING THE FILLING PHASE:  No filling sensations reported.  Maximum Capacity: filling stopped at 600 mL per clinician discretion.    Uninhibited detrusor contractions: stress-induced DO  without incontinence at 210 mL with Pdet reaching 80 cm H2O.  Compliance: poor. PDet= 93 cmH20 at capacity. Compliance ratio of 7.5.  Continence: he never leaks to define DLPP; no ZARINA  EMG: intermittently increased throughout during filling.    DURING THE VOIDING PHASE:  No voiding phase as patient unable to void volitionally.   Residual Volume: 700 mL.    FLUOROSCOPIC IMAGING OF THE BLADDER DURING URODYNAMICS:  Please note, image numbers on UDS tracings correlate with iSite series numbers on PACS images. Fluoroscopy during today's procedure demonstrated a smooth bladder wall without diverticulae or cellules.  No vesicoureteral reflux was observed.  The bladder neck appeared closed during filling and then open as he neared capacity with contrast never passing distally to the external urinary sphincter.  At the completion of today's study, all catheters were removed, the patient was straight catheterized for residual volume, and was brought back into the consultation room to further discuss today's study results.      ASSESSMENT/PLAN:  Mr. Derek Enriquez is a pleasant 58 year old male with neurogenic bladder with poor bladder compliance and high pressures who demonstrated the following findings today on urodynamic evaluation:    -Normal bladder capacity (600 mL) with absent filling sensations.  -Poor bladder compliance with some stress-induced DO without incontinence. Detrusor pressures are significantly and consistently elevated to 80 cm H2O at volumes > 200 mL and 93+ cm H2O at volumes >300 mL, but he never leaks to define DLPP.  -No stress urinary incontinence.   -Fluoroscopy reveals a smooth-walled bladder without diverticuli or VUR. The bladder neck appears closed during early stages of filling and then appears open as he nears capacity but contrast never passes distally to the external urinary sphincter.   -No voiding phase as patient is unable to void volitionally.     We discussed his study results in detail  today. Continues to exhibit poor compliance with significantly elevated detrusor pressures to 80-90+ cm H2O at volumes > 200 mL. He has tried/failed trospium secondary to intolerable side effects of dry eyes and constipation. We therefore discussed bladder Botox injections as an escalation in therapy and he would like to proceed.  -Dr. Stroud briefly met with the patient in clinic today to discuss bladder Botox in the clinic including risks/benefits.  -Schedule cystoscopy with bladder Botox injections with Dr. Stroud in 1 month. As he is due for annual renal ultrasound, will schedule this prior to Botox injection. He will be having comprehensive labs done as part of his annual physical next week so will defer ordering BMP at this time.     - A single Bactrim antibiotic was provided for UTI prophylaxis following completion of today's study per department protocol.  The risk of UTI with VUDS is low at ~2.5-3%.      Thank you for allowing me to participate in the care of Mr. Derek Enriquez and please don't hesitate to contact me with any questions or concerns.      Candy Leary PA-C  Urology Physician Assistant

## 2019-05-08 NOTE — NURSING NOTE
The following medication was given:     MEDICATION:  Bactrim DS  ROUTE: PO  SITE: Medication was given orally   DOSE: 800 mg/ 160 mg  LOT #: [id number]  : Major  EXPIRATION DATE: 09/2019  NDC#: 4151-4881-20   Was there drug waste? No      Shaista Caceres CMA  May 8, 2019

## 2019-05-08 NOTE — LETTER
5/8/2019     RE: Derek Enriquez  [address]  [facility] [address]     Dear Colleague,    Thank you for referring your patient, Derek Enriquez, to the [facility] UROLOGY AND INST FOR PROSTATE AND UROLOGIC CANCERS at [facility]. Please see a copy of my visit note below.    PREPROCEDURE DIAGNOSES:    1. Neurogenic bladder secondary to subdural hematoma at T10 s/p T9-12 laminectomy and evacuation of subdural hematoma eventually leading to paraplegia.  2. Poor bladder compliance with DLPP 83 cm H2O on UDS from 3/27/19  3. Urinary retention, managed with CIC 4 times daily    POSTPROCEDURE DIAGNOSES:  -Normal bladder capacity (600 mL) with absent filling sensations.  -Poor bladder compliance with some stress-induced DO without incontinence. Detrusor pressures are significantly and consistently elevated to 80 cm H2O at volumes > 200 mL and 93+ cm H2O at volumes >300 mL, but he never leaks to define DLPP.  -No stress urinary incontinence.   -Fluoroscopy reveals a smooth-walled bladder without diverticuli or VUR. The bladder neck appears closed during early stages of filling and then appears open as he nears capacity but contrast never passes distally to the external urinary sphincter.   -No voiding phase as patient is unable to void volitionally.     PROCEDURE:    1. Sterile urethral catheterization for measurement of residual urine volume.  2. Complex filling cystometrogram with measurement of bladder and rectal pressures.  3. Electromyography of the pelvic floor during urodynamics.  4. Fluoroscopic imaging of the bladder during urodynamics, at least 3 views.    5. Interpretation of urodynamics and flouroscopic imaging.      INDICATIONS FOR PROCEDURE:  Mr. Derek Enriquez is a pleasant 58 year old male with neurogenic bladder secondary to subdural hematoma at T10 s/p T9-12 laminectomy and evacuation of subdural hematoma eventually leading to paraplegia. He has urinary retention  requiring CIC 4 times per day. UDS on 11/1/18 demonstrated normal capacity and compliance with DO without incontinence at bladder volumes >325 mL. Anticholinergic therapy was recommended at that time but the patient declined. He then underwent repeat VUDS on 3/27/19 at the one year mariano from his initial SCI. This demonstrated poor bladder compliance with detrusor pressures exceeding 40 cm H2O at a volume of 245 mL and final DLPP of 83 cm H2O at a volume of 503 mL. He was therefore started on Sanctura XR 60 mg daily (chosen for side effect profile per patient request). He returns today for repeat video urodynamic assessment to reassess bladder pressures. Notes that he is unable to tolerate trospium secondary to constipation and dry mouth. He has been taking it once daily for the past several days, but not regularly prior to that.    VOIDING DIARY:  Patient did not complete.   Reports that he is catheterizing 4 times per day. Volumes usually <300-400 mL.    DESCRIPTION OF PROCEDURE:  Risks, benefits, and alternatives to urodynamics were discussed with the patient and he wished to proceed.  Urodynamics are planned to better assess the primary etiology for Mr. Enriquez's urologic dysfunction.  The patient last took anticholinergic medication 9 hours ago.  After informed consent was obtained, the patient was taken to the procedure room where uroflowmetry was performed. Findings below.     PRE-STUDY UROFLOWMETRY:  Not performed as patient does not void volitionally.  Residual by catheter: 290 mL.  Pretest urine dipstick was negative for leukocytes and nitrites.    Next a 7F double-lumen urodynamics catheter was inserted into the bladder under sterile technique via urethra.  A 7F abdominal manometry catheter was placed in the rectum.  EMG pads were placed on both sides of the anal verge.  The bladder was filled with 200 mL of Cystografin at 50 mL/minute and serial pressures were recorded.  With coughing there was an  appropriate rise in vesical and abdominal pressures with no change in detrusor pressure, confirming good study catheter placement.    DURING THE FILLING PHASE:  No filling sensations reported.  Maximum Capacity: filling stopped at 600 mL per clinician discretion.    Uninhibited detrusor contractions: stress-induced DO without incontinence at 210 mL with Pdet reaching 80 cm H2O.  Compliance: poor. PDet= 93 cmH20 at capacity. Compliance ratio of 7.5.  Continence: he never leaks to define DLPP; no ZARINA  EMG: intermittently increased throughout during filling.    DURING THE VOIDING PHASE:  No voiding phase as patient unable to void volitionally.   Residual Volume: 700 mL.    FLUOROSCOPIC IMAGING OF THE BLADDER DURING URODYNAMICS:  Please note, image numbers on UDS tracings correlate with iSite series numbers on PACS images. Fluoroscopy during today's procedure demonstrated a smooth bladder wall without diverticulae or cellules.  No vesicoureteral reflux was observed.  The bladder neck appeared closed during filling and then open as he neared capacity with contrast never passing distally to the external urinary sphincter.  At the completion of today's study, all catheters were removed, the patient was straight catheterized for residual volume, and was brought back into the consultation room to further discuss today's study results.      ASSESSMENT/PLAN:  Mr. Derek Enriquez is a pleasant 58 year old male with neurogenic bladder with poor bladder compliance and high pressures who demonstrated the following findings today on urodynamic evaluation:    -Normal bladder capacity (600 mL) with absent filling sensations.  -Poor bladder compliance with some stress-induced DO without incontinence. Detrusor pressures are significantly and consistently elevated to 80 cm H2O at volumes > 200 mL and 93+ cm H2O at volumes >300 mL, but he never leaks to define DLPP.  -No stress urinary incontinence.   -Fluoroscopy reveals a smooth-walled  bladder without diverticuli or VUR. The bladder neck appears closed during early stages of filling and then appears open as he nears capacity but contrast never passes distally to the external urinary sphincter.   -No voiding phase as patient is unable to void volitionally.     We discussed his study results in detail today. Continues to exhibit poor compliance with significantly elevated detrusor pressures to 80-90+ cm H2O at volumes > 200 mL. He has tried/failed trospium secondary to intolerable side effects of dry eyes and constipation. We therefore discussed bladder Botox injections as an escalation in therapy and he would like to proceed.  -Dr. Stroud briefly met with the patient in clinic today to discuss bladder Botox in the clinic including risks/benefits.  -Schedule cystoscopy with bladder Botox injections with Dr. Stroud in 1 month. As he is due for annual renal ultrasound, will schedule this prior to Botox injection. He will be having comprehensive labs done as part of his annual physical next week so will defer ordering BMP at this time.     - A single Bactrim antibiotic was provided for UTI prophylaxis following completion of today's study per department protocol.  The risk of UTI with VUDS is low at ~2.5-3%.      Thank you for allowing me to participate in the care of Mr. Derek Enriquez and please don't hesitate to contact me with any questions or concerns.      Candy Leary PA-C  Urology Physician Assistant

## 2019-05-09 DIAGNOSIS — N31.9 NEUROGENIC BLADDER: Primary | ICD-10-CM

## 2019-05-13 ENCOUNTER — HOSPITAL ENCOUNTER (OUTPATIENT)
Dept: ULTRASOUND IMAGING | Facility: CLINIC | Age: 59
Discharge: HOME | End: 2019-05-13
Attending: PHYSICIAN ASSISTANT | Admitting: PHYSICIAN ASSISTANT
Payer: COMMERCIAL

## 2019-05-13 DIAGNOSIS — N31.9 NEUROGENIC BLADDER: ICD-10-CM

## 2019-05-13 PROCEDURE — 76770 US EXAM ABDO BACK WALL COMP: CPT

## 2019-05-29 ENCOUNTER — TELEPHONE (OUTPATIENT)
Dept: UROLOGY | Facility: CLINIC | Age: 59
End: 2019-05-29

## 2019-05-29 NOTE — TELEPHONE ENCOUNTER
Health Call Center    Phone Message    May a detailed message be left on voicemail: yes    Reason for Call: Other: pt reporting to Candy Leary that his botox was denied and pt is requesting a call back with more options, please. Thank you.     Action Taken: Message routed to:  [facility]  Urology Clinic

## 2019-06-07 ENCOUNTER — PRE VISIT (OUTPATIENT)
Dept: UROLOGY | Facility: CLINIC | Age: 59
End: 2019-06-07

## 2019-06-07 NOTE — TELEPHONE ENCOUNTER
Cystoscopy with Botox  paraplegic no lido needed  LM or CC to either move patient up or to another day due to pharmacy being closed at this time.

## 2019-06-07 NOTE — TELEPHONE ENCOUNTER
[facility] Call Center    Phone Message    May a detailed message be left on voicemail: yes    Reason for Call: Other: Pt called to follow up on his previous message. Please give him a call back.     Action Taken: Message routed to:  [facility] Urology

## 2019-06-11 NOTE — TELEPHONE ENCOUNTER
Left patient message to discuss the recommendation from Candy Leary PA-C to try Myrbetriq 50 mg once daily.    Candice Bridges RN, BSN  Care Coordinator- Reconstructive Urology

## 2019-06-18 NOTE — ADDENDUM NOTE
Encounter addended by: Milagros Torres RN on: 6/18/2019 3:51 PM   Actions taken: [employer] properties accepted

## 2019-07-01 ENCOUNTER — MYC REFILL (OUTPATIENT)
Dept: CARDIOLOGY | Facility: CLINIC | Age: 59
End: 2019-07-01

## 2019-07-01 DIAGNOSIS — Z95.5 H/O HEART ARTERY STENT: ICD-10-CM

## 2019-07-01 RX ORDER — ATORVASTATIN CALCIUM 20 MG/1
20 TABLET, FILM COATED ORAL AT BEDTIME
Qty: 90 TABLET | Refills: 3 | Status: CANCELLED | OUTPATIENT
Start: 2019-07-01

## 2019-10-26 NOTE — PLAN OF CARE
Problem: Patient Care Overview  Goal: Plan of Care/Patient Progress Review  Pt. A&Ox4. VSS. Afebrile. Lung sounds CTA. Maintaining sats on RA. IS encouraged. Bowel sounds active. PP+ DP+. CMS and neuro's are intact. Pt is para, baseline absent sensation to BLEs. Denies nausea, shortness of breath, and chest pain. Denies pain, declined prn pain meds. SC at baseline w/ adequate output. Tolerating regular diet. R hip incisional dressing is intact w/ dime sized dried drainage, RLE primapore dressing CDI, and L foot dressing CDI. Dressing over coccyx also CDI. Pt repositioning w/ ax1. PIV is patent and infusing. PCD's on BLE's. Bilateral heels are elevated off the bed. Call light is within reach, pt able to make needs known, resting comfortably between cares. Will continue to monitor.         unaffiliated

## 2019-11-05 ENCOUNTER — HOSPITAL PATHOLOGY (OUTPATIENT)
Dept: OTHER | Facility: CLINIC | Age: 59
End: 2019-11-05

## 2019-11-07 LAB — COPATH REPORT: NORMAL

## 2019-12-15 ENCOUNTER — HEALTH MAINTENANCE LETTER (OUTPATIENT)
Age: 59
End: 2019-12-15

## 2020-01-24 ENCOUNTER — TELEPHONE (OUTPATIENT)
Dept: UROLOGY | Facility: CLINIC | Age: 60
End: 2020-01-24

## 2020-01-24 DIAGNOSIS — N31.9 NEUROGENIC BLADDER: Primary | ICD-10-CM

## 2020-01-24 NOTE — TELEPHONE ENCOUNTER
Lev Britton,     I would have him schedule follow up with Dr. Stroud with renal ultrasound prior since he never did that to make sure he's still good candidate for botox     Thanks,   Candice

## 2020-01-24 NOTE — TELEPHONE ENCOUNTER
Hi ladies,    Please call patient to schedule a follow up with Dr. Cody Stroud with a renal ultrasound prior to the appointment.      Thanks, Jenna Lopez MA

## 2020-01-24 NOTE — TELEPHONE ENCOUNTER
[facility] Call Center    Phone Message    May a detailed message be left on voicemail: yes    Reason for Call: Other: Patient is seen annually in urology by Candy Leary.  Last seen in March 2019 for Urodynamics.  Would Candy want patient to schedule another Urodynamics or clinic visit.  Please respond to patient with which visit type is needed.     Action Taken: Message Routed to [facility] UROLOGY

## 2020-01-24 NOTE — TELEPHONE ENCOUNTER
Lev Harvey,    Should we just schedule a follow up with Dr. Cody Stroud? Or Cystoscopy with botox.Patient was scheduled to have cystoscopy with botox with Dr.Joseph Stroud last year. His appointment was cancelled. Please advise.      Jenna Lopez MA

## 2020-01-28 NOTE — TELEPHONE ENCOUNTER
"Called patient to schedule, patient does not believe we know what we are caling to schedule him for. Patient states there is no way any nurse knows anything about what we need to schedule him for and states he is \"requring anymore information to come directly from Candy herself\". Patient informed of what message stated in regards to scheduling US and follow up. Patient then asks CC \" do you know my condition?\" CC states no, I am just calling to schedule what is being asked. Patient states he is not scheduling anything until he speaks with Candy. Advised patient I would wrote this message with this information back to the nursing staff   "

## 2020-02-19 ENCOUNTER — PRE VISIT (OUTPATIENT)
Dept: UROLOGY | Facility: CLINIC | Age: 60
End: 2020-02-19

## 2020-03-12 ENCOUNTER — ANCILLARY PROCEDURE (OUTPATIENT)
Dept: CARDIOLOGY | Facility: CLINIC | Age: 60
End: 2020-03-12
Attending: INTERNAL MEDICINE
Payer: COMMERCIAL

## 2020-03-12 ENCOUNTER — ANCILLARY PROCEDURE (OUTPATIENT)
Dept: ULTRASOUND IMAGING | Facility: CLINIC | Age: 60
End: 2020-03-12
Attending: UROLOGY
Payer: COMMERCIAL

## 2020-03-12 VITALS
OXYGEN SATURATION: 100 % | SYSTOLIC BLOOD PRESSURE: 124 MMHG | HEIGHT: 71 IN | BODY MASS INDEX: 22.32 KG/M2 | DIASTOLIC BLOOD PRESSURE: 78 MMHG | HEART RATE: 60 BPM

## 2020-03-12 DIAGNOSIS — I25.10 CORONARY ARTERY DISEASE INVOLVING NATIVE CORONARY ARTERY OF NATIVE HEART WITHOUT ANGINA PECTORIS: ICD-10-CM

## 2020-03-12 DIAGNOSIS — Z95.5 H/O HEART ARTERY STENT: ICD-10-CM

## 2020-03-12 DIAGNOSIS — Z95.5 STENTED CORONARY ARTERY: ICD-10-CM

## 2020-03-12 DIAGNOSIS — N31.9 NEUROGENIC BLADDER: ICD-10-CM

## 2020-03-12 DIAGNOSIS — E78.5 DYSLIPIDEMIA: ICD-10-CM

## 2020-03-12 DIAGNOSIS — I46.9 CARDIAC ARREST (H): Primary | ICD-10-CM

## 2020-03-12 DIAGNOSIS — I46.9 CARDIAC ARREST (H): ICD-10-CM

## 2020-03-12 DIAGNOSIS — I25.10 ATHEROSCLEROSIS OF NATIVE CORONARY ARTERY OF NATIVE HEART WITHOUT ANGINA PECTORIS: ICD-10-CM

## 2020-03-12 LAB
ALBUMIN SERPL-MCNC: 3.8 G/DL (ref 3.4–5)
ALP SERPL-CCNC: 97 U/L (ref 40–150)
ALT SERPL W P-5'-P-CCNC: 24 U/L (ref 0–70)
ANION GAP SERPL CALCULATED.3IONS-SCNC: 4 MMOL/L (ref 3–14)
AST SERPL W P-5'-P-CCNC: 18 U/L (ref 0–45)
BILIRUB SERPL-MCNC: 0.7 MG/DL (ref 0.2–1.3)
BUN SERPL-MCNC: 13 MG/DL (ref 7–30)
CALCIUM SERPL-MCNC: 8.8 MG/DL (ref 8.5–10.1)
CHLORIDE SERPL-SCNC: 105 MMOL/L (ref 94–109)
CHOLEST SERPL-MCNC: 148 MG/DL
CO2 SERPL-SCNC: 30 MMOL/L (ref 20–32)
CREAT SERPL-MCNC: 0.62 MG/DL (ref 0.66–1.25)
GFR SERPL CREATININE-BSD FRML MDRD: >90 ML/MIN/{1.73_M2}
GLUCOSE SERPL-MCNC: 87 MG/DL (ref 70–99)
HDLC SERPL-MCNC: 46 MG/DL
LDLC SERPL CALC-MCNC: 74 MG/DL
NONHDLC SERPL-MCNC: 102 MG/DL
POTASSIUM SERPL-SCNC: 3.9 MMOL/L (ref 3.4–5.3)
PROT SERPL-MCNC: 6.8 G/DL (ref 6.8–8.8)
SODIUM SERPL-SCNC: 139 MMOL/L (ref 133–144)
TRIGL SERPL-MCNC: 140 MG/DL

## 2020-03-12 PROCEDURE — 80061 LIPID PANEL: CPT

## 2020-03-12 PROCEDURE — 99213 OFFICE O/P EST LOW 20 MIN: CPT | Mod: 25 | Performed by: INTERNAL MEDICINE

## 2020-03-12 PROCEDURE — G0463 HOSPITAL OUTPT CLINIC VISIT: HCPCS | Mod: 25,ZF

## 2020-03-12 PROCEDURE — 80053 COMPREHEN METABOLIC PANEL: CPT

## 2020-03-12 RX ORDER — ASPIRIN 81 MG/1
81 TABLET ORAL DAILY
Status: ON HOLD | COMMUNITY
End: 2024-04-06

## 2020-03-12 RX ORDER — ATORVASTATIN CALCIUM 20 MG/1
20 TABLET, FILM COATED ORAL AT BEDTIME
Qty: 90 TABLET | Refills: 3 | Status: SHIPPED | OUTPATIENT
Start: 2020-03-12 | End: 2021-03-22

## 2020-03-12 RX ADMIN — Medication 3 ML: at 10:30

## 2020-03-12 ASSESSMENT — PAIN SCALES - GENERAL: PAINLEVEL: NO PAIN (0)

## 2020-03-12 NOTE — NURSING NOTE
Cardiac Testing: Patient given instructions regarding  echocardiogram . Discussed purpose, preparation, procedure and when to expect results reported back to the patient. Patient demonstrated understanding of this information and agreed to call with further questions or concerns.  Labs: Patient was given results of the laboratory testing obtained today. Patient was instructed to return for the next laboratory testing in one year. Patient demonstrated understanding of this information and agreed to call with further questions or concerns.   Med Reconcile: Reviewed and verified all current medications with the patient. The updated medication list was printed and given to the patient.  Return Appointment: Patient given instructions regarding scheduling next clinic visit. Patient demonstrated understanding of this information and agreed to call with further questions or concerns.  Patient stated he understood all health information given and agreed to call with further questions or concerns.    Marilin Ramirez LPN

## 2020-03-12 NOTE — LETTER
3/12/2020      RE: Derek Enriquez  [address]  [facility] [address]       Dear Colleague,    Thank you for the opportunity to participate in the care of your patient, Derek Enriquez, at the [facility] HEART [facility] at [facility]. Please see a copy of my visit note below.    HPI:     Mr. Enriquez is a 59 y/o M, who comes for follow-up:     In summary, Mr Derek Enriquez is a  59-year-old gentleman, who had a cardiac arrest in [employer] during exercise (03-)  Patient was resuscitated and underwent a coronary angiogram , which showed   Single vessel CAD  LAD   - 95% stenotic ruptured plaque in the proximal LAD  Successful deployment of a drug eluting stent    3.0 x 20 mm Promus Premier drug eluting stent across the proximal LAD  and post-dilated with a 3.5 x15 mm non-compliant balloon  -Successful placement of balloon pump for LV support.  He also was put at [facility].                     Postop day 1 developed back pain then loss of sensation and movement in his lower extremities.  Identified spinal subdural hematoma and transferred emergently to [facility].  He underwent T9-T12 decompressive laminectomy and evacuation of subdural hematoma.  He unfortunately persists with complete paraplegia.  After medical stabilization he transferred to [facility] acute rehabilitation [place] 3/14 for comprehensive cares.     Patient is now convinced that he needs to take the classically CV preventive therapy.    Patient underwent a Right Hip Heterotopic Bone Resection on 11/2/2018.    Today, patient feels well; denies chest pain, shortness of breath, palpitations and intermittent claudication.  Patient works full time.      PAST MEDICAL HISTORY:  Past Medical History:   Diagnosis Date     CAD (coronary artery disease)     stent     Cardiac arrest (H) 03/2018     Femur fracture (H)      Neurogenic bladder      Neurogenic bowel      Orthostatic hypotension       Paraplegia (H)      Thoracic spinal cord injury (H)        CURRENT MEDICATIONS:  Current Outpatient Medications   Medication Sig Dispense Refill     ascorbic acid (VITAMIN C) 500 MG tablet Take 500 mg by mouth every morning        aspirin 81 MG EC tablet Take 81 mg by mouth daily       atorvastatin (LIPITOR) 20 MG tablet Take 1 tablet (20 mg) by mouth At Bedtime 90 tablet 3     Multiple Vitamins-Minerals (MULTIVITAMIN MEN PO) Take 1 tablet by mouth daily        order for DME Equipment being ordered: [employer] Phone [phone] Fax [phone]    Left Lateral Foot Primary Dressing Polymem Cloth Strips (not dots) Qty 30  Coccyx Secondary Dressing  tegaderm adhesive foam dressing 3x3 Qty 30  Perineum Secondary Dressing tegaderm + pad ( ref [id number]) qty 30  Length of Need: 1 month  Frequency of dressing change: daily 30 days 0     vitamin B complex with vitamin C (VITAMIN  B COMPLEX) TABS tablet Take 1 tablet by mouth daily       tadalafil (CIALIS) 20 MG tablet Take 1 tablet (20 mg) PO PRN prior to sexual activity. Max: 1 tablet per 36 hours. (Patient not taking: Reported on 3/12/2020) 5 tablet 3     trospium (SANCTURA XR) 60 MG CP24 24 hr capsule Take 1 capsule (60 mg) by mouth every morning (before breakfast) (Patient not taking: Reported on 3/12/2020) 90 capsule 3       PAST SURGICAL HISTORY:  Past Surgical History:   Procedure Laterality Date     ARTHROTOMY HIP Left 9/19/2018    Procedure: ARTHROTOMY HIP;  Left Hip Heterotopic Bone Resection;  Surgeon: Toñito Kennedy MD;  Location: [facility] OR     ARTHROTOMY HIP Right 11/02/2018     ARTHROTOMY HIP Right 11/2/2018    Procedure: Right Hip Heterotopic Bone Resection;  Surgeon: Toñito Kennedy MD;  Location: [facility] OR     CARDIAC SURGERY       LAMINECTOMY THORACIC THREE LEVELS N/A 3/9/2018    Procedure: LAMINECTOMY THORACIC THREE LEVELS;  Thoracic 9-12 Laminectomy Evacuation of Subdural Hematoma, C-Arm, Prone, Gel Rolls.;  Surgeon: Abhijeet Joe  MD Agustin;  Location: [facility] OR     OPEN REDUCTION INTERNAL FIXATION RODDING INTRAMEDULLARY FEMUR Right 6/25/2018    Procedure: OPEN REDUCTION INTERNAL FIXATION RODDING INTRAMEDULLARY FEMUR;  Right Open Reduction Internal Fixation Subtrochanteric Femur Fracture;  Surgeon: Toñito Kennedy MD;  Location: [facility] OR     wisdom teeth extraction         ALLERGIES   No Known Allergies    FAMILY HISTORY:  Family History   Problem Relation Age of Onset     Cardiac Sudden Death Father      Arrhythmia Father         v fib arrest      Myocardial Infarction Brother      Other - See Comments Brother         myeloid dysplasia       SOCIAL HISTORY:  Social History     Socioeconomic History     Marital status: Single     Spouse name: None     Number of children: None     Years of education: None     Highest education level: None   Occupational History     None   Social Needs     Financial resource strain: None     Food insecurity     Worry: None     Inability: None     Transportation needs     Medical: None     Non-medical: None   Tobacco Use     Smoking status: Former Smoker     Smokeless tobacco: Never Used     Tobacco comment: Quit at 31   Substance and Sexual Activity     Alcohol use: Yes     Comment: socially     Drug use: No     Sexual activity: None   Lifestyle     Physical activity     Days per week: None     Minutes per session: None     Stress: None   Relationships     Social connections     Talks on phone: None     Gets together: None     Attends Latter-day service: None     Active member of club or organization: None     Attends meetings of clubs or organizations: None     Relationship status: None     Intimate partner violence     Fear of current or ex partner: None     Emotionally abused: None     Physically abused: None     Forced sexual activity: None   Other Topics Concern     Parent/sibling w/ CABG, MI or angioplasty before 65F 55M? Not Asked   Social History Narrative     None       ROS:   Constitutional: No  "fever, chills, or sweats. No weight gain/loss   ENT: No visual disturbance, ear ache, epistaxis, sore throat  Allergies/Immunologic: Negative.   Respiratory: No cough, hemoptysia  Cardiovascular: As per HPI  GI: No nausea, vomiting, hematemesis, melena, or hematochezia  : No urinary frequency, dysuria, or hematuria  Integument: Negative  Psychiatric: Negative  Neuro: Negative  Endocrinology: Negative   Musculoskeletal: Negative    EXAM:  /78 (BP Location: Left arm, Patient Position: Chair, Cuff Size: Adult Regular)   Pulse 60   Ht 1.803 m (5' 11\")   SpO2 100%   BMI 22.32 kg/m    In general, the patient is a pleasant male in no apparent distress.    HEENT: NC/AT.  PERRLA.  EOMI.  Sclerae white, not injected.  Nares clear.  Pharynx without erythema or exudate.  Dentition intact.    Neck: No adenopathy.  No thyromegaly. Carotids +4/4 bilaterally without bruits.  No jugular venous distension.   Heart: RRR. Normal S1, S2 splits physiologically. No murmur, rub, click, or gallop. The PMI is in the 5th ICS in the midclavicular line. There is no heave.    Lungs: CTA.  No ronchi, wheezes, rales.  No dullness to percussion.   Abdomen: Soft, nontender, nondistended. No organomegaly.  No bruits.   Extremities: No clubbing, cyanosis, or edema.  The pulses are +4/4 at the radial, brachial, femoral, popliteal, DP, and PT sites bilaterally.  No bruits are noted.  Neurologic: Alert and oriented to person/place/time, normal speech, gait and affect  Skin: No petechiae, purpura or rash.    Labs:  LIPID RESULTS:  Lab Results   Component Value Date    CHOL 148 03/12/2020    HDL 46 03/12/2020    LDL 74 03/12/2020    TRIG 140 03/12/2020    NHDL 102 03/12/2020       LIVER ENZYME RESULTS:  Lab Results   Component Value Date    AST 18 03/12/2020    ALT 24 03/12/2020       CBC RESULTS:  Lab Results   Component Value Date    WBC 4.8 11/05/2018    RBC 3.26 (L) 11/05/2018    HGB 8.5 (L) 11/05/2018    HGB 8.5 (L) 11/05/2018    HCT 28.9 " (L) 11/05/2018    MCV 89 11/05/2018    MCH 26.1 (L) 11/05/2018    MCHC 29.4 (L) 11/05/2018    RDW 16.3 (H) 11/05/2018     11/05/2018       BMP RESULTS:  Lab Results   Component Value Date     03/12/2020    POTASSIUM 3.9 03/12/2020    CHLORIDE 105 03/12/2020    CO2 30 03/12/2020    ANIONGAP 4 03/12/2020    GLC 87 03/12/2020    BUN 13 03/12/2020    CR 0.62 (L) 03/12/2020    GFRESTIMATED >90 03/12/2020    GFRESTBLACK >90 03/12/2020    PAT 8.8 03/12/2020        INR RESULTS:  Lab Results   Component Value Date    INR 1.20 (H) 05/23/2018    INR 1.20 (H) 03/09/2018       Procedures:    Echocardiogram 03-    Global and regional left ventricular function is normal with an EF of 60-65%.  Right ventricular function, chamber size, wall motion, and thickness are  normal.  No significant valvular abnormalities were noted.  This study was compared with the study from 11/20/18: There has been no  change.  _____________________________________________________________________________  __        Left Ventricle  Global and regional left ventricular function is normal with an EF of 60-65%.  Left ventricular size is normal. Relative wall thickness is increased  consistent with concentric remodeling. Left ventricular diastolic function is  indeterminate. Abnormal non-specific septal motion is present.     Right Ventricle  Right ventricular function, chamber size, wall motion, and thickness are  normal.     Atria  Both atria appear normal. The atrial septum is intact as assessed by color  Doppler .     Mitral Valve  The mitral valve is normal.        Aortic Valve  Aortic valve is normal in structure and function.     Tricuspid Valve  The tricuspid valve is normal. Trace tricuspid insufficiency is present. The  right ventricular systolic pressure is approximated at 16.0 mmHg plus the  right atrial pressure. Pulmonary artery systolic pressure is normal.     Pulmonic Valve  The pulmonic valve is normal.     Vessels  The  aorta root is normal. The thoracic aorta is normal. The pulmonary artery  cannot be assessed. The inferior vena cava was normal in size with preserved  respiratory variability. IVC diameter <2.1 cm collapsing >50% with sniff  suggests a normal RA pressure of 3 mmHg.     Pericardium  No pericardial effusion is present.        Compared to Previous Study  This study was compared with the study from 18 . There has been no  change.  _____________________________________________________________________________  __  MMode/2D Measurements & Calculations     IVSd: 0.99 cm  LVIDd: 3.8 cm  LVIDs: 2.5 cm  LVPWd: 0.86 cm  FS: 32.4 %  LV mass(C)d: 102.8 grams  LV mass(C)dI: 53.1 grams/m2  Ao root diam: 2.9 cm  asc Aorta Diam: 2.8 cm  LVOT diam: 2.0 cm  LVOT area: 3.2 cm2  LA Volume (BP): 35.6 ml  LA Volume Index (BP): 18.4 ml/m2  RWT: 0.46           Doppler Measurements & Calculations  MV E max ramila: 54.2 cm/sec  MV A max ramila: 55.1 cm/sec  MV E/A: 0.98  MV dec time: 0.26 sec  PA acc time: 0.13 sec  TR max ramila: 199.8 cm/sec  TR max P.0 mmHg  E/E' av.1  Lateral E/e': 4.4  Medial E/e': 7.8         Assessment and Plan:    We discussed the results with patient:  We discussed the importance of a heart healthy lifestyle and diet.  We continue with same medical regimen.  Follow-up within 1 year with fasting labs.    Anoop Jane MD, PhD  Professor of Medicine  Division of Cardiology        CC  Patient Care Team:  Eugene Burrell MD as PCP - General (Family Practice)  Anoop Jane MD as MD (Cardiology)  Andrei Pelaez MD as MD (Urology)  Sanh, Candice, RN as Registered Nurse (Urology)  Marilin Ramirez LPN as Nurse Coordinator (Cardiology)  [facility], [facility] ([facility] ([facility]), ([place]))

## 2020-03-12 NOTE — PATIENT INSTRUCTIONS
Patient Instructions:  It was a pleasure to see you in the cardiology clinic today.      If you have any questions, you can reach my nurse, Marilin RIDDLE LPN, at [phone].  Press Option #1 for the [facility], and then press Option #4 for nursing.    We are encouraging the use of [employer] to communicate with your HealthCare Provider    Medication Changes: None.    Recommendations: Repeat fasting labs in one year prior to seeing Dr Jane.    Studies Ordered: Repeat Echocardiogram in one year.    The results from today include: Labs and echocardiogram (pending)    Please follow up: With Dr. Jane in one year.    Sincerely,    Anoop Jane MD     If you have an urgent need after hours (8:00 am to 4:30 pm) please call [phone] and ask for the cardiology fellow on call.

## 2020-03-12 NOTE — PROGRESS NOTES
HPI:     Mr. Enriquez is a 59 y/o M, who comes for follow-up:     In summary, Mr Derek Enriquez is a  59-year-old gentleman, [facility]  Patient was resuscitated and underwent a coronary angiogram , which showed   Single vessel CAD  LAD   - 95% stenotic ruptured plaque in the proximal LAD  Successful deployment of a drug eluting stent    3.0 x 20 mm Promus Premier drug eluting stent across the proximal LAD  and post-dilated with a 3.5 x15 mm non-compliant balloon  -Successful placement of balloon pump for LV support.  He also was put at [facility].                     Postop day 1 developed back pain then loss of sensation and movement in his lower extremities.  Identified spinal subdural hematoma and transferred emergently to [facility].  He underwent T9-T12 decompressive laminectomy and evacuation of subdural hematoma.  He unfortunately persists with complete paraplegia.  After medical stabilization he transferred to [place] [place] 3/14 for comprehensive cares.     Patient is now convinced that he needs to take the classically CV preventive therapy.    Patient underwent a Right Hip Heterotopic Bone Resection on 11/2/2018.    Today, patient feels well; denies chest pain, shortness of breath, palpitations and intermittent claudication.  Patient works full time.      PAST MEDICAL HISTORY:  Past Medical History:   Diagnosis Date     CAD (coronary artery disease)     stent     Cardiac arrest (H) 03/2018     Femur fracture (H)      Neurogenic bladder      Neurogenic bowel      Orthostatic hypotension      Paraplegia (H)      Thoracic spinal cord injury (H)        CURRENT MEDICATIONS:  Current Outpatient Medications   Medication Sig Dispense Refill     ascorbic acid (VITAMIN C) 500 MG tablet Take 500 mg by mouth every morning        aspirin 81 MG EC tablet Take 81 mg by mouth daily       atorvastatin (LIPITOR) 20 MG  tablet Take 1 tablet (20 mg) by mouth At Bedtime 90 tablet 3     Multiple Vitamins-Minerals (MULTIVITAMIN MEN PO) Take 1 tablet by mouth daily        order for DME Equipment being ordered: [employer] Phone [phone] Fax [phone]    Left Lateral Foot Primary Dressing Polymem Cloth Strips (not dots) Qty 30  Coccyx Secondary Dressing  tegaderm adhesive foam dressing 3x3 Qty 30  Perineum Secondary Dressing tegaderm + pad ( ref [id number]) qty 30  Length of Need: 1 month  Frequency of dressing change: daily 30 days 0     vitamin B complex with vitamin C (VITAMIN  B COMPLEX) TABS tablet Take 1 tablet by mouth daily       tadalafil (CIALIS) 20 MG tablet Take 1 tablet (20 mg) PO PRN prior to sexual activity. Max: 1 tablet per 36 hours. (Patient not taking: Reported on 3/12/2020) 5 tablet 3     trospium (SANCTURA XR) 60 MG CP24 24 hr capsule Take 1 capsule (60 mg) by mouth every morning (before breakfast) (Patient not taking: Reported on 3/12/2020) 90 capsule 3       PAST SURGICAL HISTORY:  Past Surgical History:   Procedure Laterality Date     ARTHROTOMY HIP Left 9/19/2018    Procedure: ARTHROTOMY HIP;  Left Hip Heterotopic Bone Resection;  Surgeon: Toñito Kennedy MD;  Location: [facility] OR     ARTHROTOMY HIP Right 11/02/2018     ARTHROTOMY HIP Right 11/2/2018    Procedure: Right Hip Heterotopic Bone Resection;  Surgeon: Toñito Kennedy MD;  Location: [facility] OR     CARDIAC SURGERY       LAMINECTOMY THORACIC THREE LEVELS N/A 3/9/2018    Procedure: LAMINECTOMY THORACIC THREE LEVELS;  Thoracic 9-12 Laminectomy Evacuation of Subdural Hematoma, C-Arm, Prone, Gel Rolls.;  Surgeon: Abhijeet Joe MD;  Location: [facility] OR     OPEN REDUCTION INTERNAL FIXATION RODDING INTRAMEDULLARY FEMUR Right 6/25/2018    Procedure: OPEN REDUCTION INTERNAL FIXATION RODDING INTRAMEDULLARY FEMUR;  Right Open Reduction Internal Fixation Subtrochanteric Femur Fracture;  Surgeon: Toñito Kennedy MD;  Location: [facility]  OR     wisdom teeth extraction         ALLERGIES   No Known Allergies    FAMILY HISTORY:  Family History   Problem Relation Age of Onset     Cardiac Sudden Death Father      Arrhythmia Father         v fib arrest      Myocardial Infarction Brother      Other - See Comments Brother         myeloid dysplasia       SOCIAL HISTORY:  Social History     Socioeconomic History     Marital status: Single     Spouse name: None     Number of children: None     Years of education: None     Highest education level: None   Occupational History     None   Social Needs     Financial resource strain: None     Food insecurity     Worry: None     Inability: None     Transportation needs     Medical: None     Non-medical: None   Tobacco Use     Smoking status: Former Smoker     Smokeless tobacco: Never Used     Tobacco comment: Quit at 31   Substance and Sexual Activity     Alcohol use: Yes     Comment: socially     Drug use: No     Sexual activity: None   Lifestyle     Physical activity     Days per week: None     Minutes per session: None     Stress: None   Relationships     Social connections     Talks on phone: None     Gets together: None     Attends Worship service: None     Active member of club or organization: None     Attends meetings of clubs or organizations: None     Relationship status: None     Intimate partner violence     Fear of current or ex partner: None     Emotionally abused: None     Physically abused: None     Forced sexual activity: None   Other Topics Concern     Parent/sibling w/ CABG, MI or angioplasty before 65F 55M? Not Asked   Social History Narrative     None       ROS:   Constitutional: No fever, chills, or sweats. No weight gain/loss   ENT: No visual disturbance, ear ache, epistaxis, sore throat  Allergies/Immunologic: Negative.   Respiratory: No cough, hemoptysia  Cardiovascular: As per HPI  GI: No nausea, vomiting, hematemesis, melena, or hematochezia  : No urinary frequency, dysuria, or  "hematuria  Integument: Negative  Psychiatric: Negative  Neuro: Negative  Endocrinology: Negative   Musculoskeletal: Negative    EXAM:  /78 (BP Location: Left arm, Patient Position: Chair, Cuff Size: Adult Regular)   Pulse 60   Ht 1.803 m (5' 11\")   SpO2 100%   BMI 22.32 kg/m    In general, the patient is a pleasant male in no apparent distress.    HEENT: NC/AT.  PERRLA.  EOMI.  Sclerae white, not injected.  Nares clear.  Pharynx without erythema or exudate.  Dentition intact.    Neck: No adenopathy.  No thyromegaly. Carotids +4/4 bilaterally without bruits.  No jugular venous distension.   Heart: RRR. Normal S1, S2 splits physiologically. No murmur, rub, click, or gallop. The PMI is in the 5th ICS in the midclavicular line. There is no heave.    Lungs: CTA.  No ronchi, wheezes, rales.  No dullness to percussion.   Abdomen: Soft, nontender, nondistended. No organomegaly.  No bruits.   Extremities: No clubbing, cyanosis, or edema.  The pulses are +4/4 at the radial, brachial, femoral, popliteal, DP, and PT sites bilaterally.  No bruits are noted.  Neurologic: Alert and oriented to person/place/time, normal speech, gait and affect  Skin: No petechiae, purpura or rash.    Labs:  LIPID RESULTS:  Lab Results   Component Value Date    CHOL 148 03/12/2020    HDL 46 03/12/2020    LDL 74 03/12/2020    TRIG 140 03/12/2020    NHDL 102 03/12/2020       LIVER ENZYME RESULTS:  Lab Results   Component Value Date    AST 18 03/12/2020    ALT 24 03/12/2020       CBC RESULTS:  Lab Results   Component Value Date    WBC 4.8 11/05/2018    RBC 3.26 (L) 11/05/2018    HGB 8.5 (L) 11/05/2018    HGB 8.5 (L) 11/05/2018    HCT 28.9 (L) 11/05/2018    MCV 89 11/05/2018    MCH 26.1 (L) 11/05/2018    MCHC 29.4 (L) 11/05/2018    RDW 16.3 (H) 11/05/2018     11/05/2018       BMP RESULTS:  Lab Results   Component Value Date     03/12/2020    POTASSIUM 3.9 03/12/2020    CHLORIDE 105 03/12/2020    CO2 30 03/12/2020    ANIONGAP 4 " 03/12/2020    GLC 87 03/12/2020    BUN 13 03/12/2020    CR 0.62 (L) 03/12/2020    GFRESTIMATED >90 03/12/2020    GFRESTBLACK >90 03/12/2020    PAT 8.8 03/12/2020        INR RESULTS:  Lab Results   Component Value Date    INR 1.20 (H) 05/23/2018    INR 1.20 (H) 03/09/2018       Procedures:    Echocardiogram 03-    Global and regional left ventricular function is normal with an EF of 60-65%.  Right ventricular function, chamber size, wall motion, and thickness are  normal.  No significant valvular abnormalities were noted.  This study was compared with the study from 11/20/18: There has been no  change.  _____________________________________________________________________________  __        Left Ventricle  Global and regional left ventricular function is normal with an EF of 60-65%.  Left ventricular size is normal. Relative wall thickness is increased  consistent with concentric remodeling. Left ventricular diastolic function is  indeterminate. Abnormal non-specific septal motion is present.     Right Ventricle  Right ventricular function, chamber size, wall motion, and thickness are  normal.     Atria  Both atria appear normal. The atrial septum is intact as assessed by color  Doppler .     Mitral Valve  The mitral valve is normal.        Aortic Valve  Aortic valve is normal in structure and function.     Tricuspid Valve  The tricuspid valve is normal. Trace tricuspid insufficiency is present. The  right ventricular systolic pressure is approximated at 16.0 mmHg plus the  right atrial pressure. Pulmonary artery systolic pressure is normal.     Pulmonic Valve  The pulmonic valve is normal.     Vessels  The aorta root is normal. The thoracic aorta is normal. The pulmonary artery  cannot be assessed. The inferior vena cava was normal in size with preserved  respiratory variability. IVC diameter <2.1 cm collapsing >50% with sniff  suggests a normal RA pressure of 3 mmHg.     Pericardium  No pericardial effusion  is present.        Compared to Previous Study  This study was compared with the study from 18 . There has been no  change.  _____________________________________________________________________________  __  MMode/2D Measurements & Calculations     IVSd: 0.99 cm  LVIDd: 3.8 cm  LVIDs: 2.5 cm  LVPWd: 0.86 cm  FS: 32.4 %  LV mass(C)d: 102.8 grams  LV mass(C)dI: 53.1 grams/m2  Ao root diam: 2.9 cm  asc Aorta Diam: 2.8 cm  LVOT diam: 2.0 cm  LVOT area: 3.2 cm2  LA Volume (BP): 35.6 ml  LA Volume Index (BP): 18.4 ml/m2  RWT: 0.46           Doppler Measurements & Calculations  MV E max ramila: 54.2 cm/sec  MV A max ramila: 55.1 cm/sec  MV E/A: 0.98  MV dec time: 0.26 sec  PA acc time: 0.13 sec  TR max ramila: 199.8 cm/sec  TR max P.0 mmHg  E/E' av.1  Lateral E/e': 4.4  Medial E/e': 7.8         Assessment and Plan:    We discussed the results with patient:  We discussed the importance of a heart healthy lifestyle and diet.  We continue with same medical regimen.  Follow-up within 1 year with fasting labs.    Anoop Jane MD, PhD  Professor of Medicine  Division of Cardiology        CC  Patient Care Team:  Eugene Burrell MD as PCP - General (Family Practice)  Anoop Jane MD as MD (Cardiology)  Andrei Pelaez MD as MD (Urology)  Candice Bridges, RN as Registered Nurse (Urology)  Marilin Ramirez LPN as Nurse Coordinator (Cardiology)  [facility] ([facility] ([facility] ([place]  ANOOP JANE

## 2020-03-12 NOTE — NURSING NOTE
Chief Complaint   Patient presents with     Follow Up     1 Yr F/U     Vitals were taken and medications were reconciled.     Milvia Dimas CMA    11:14 AM

## 2020-03-16 ENCOUNTER — TELEPHONE (OUTPATIENT)
Dept: UROLOGY | Facility: CLINIC | Age: 60
End: 2020-03-16

## 2020-03-18 ENCOUNTER — VIRTUAL VISIT (OUTPATIENT)
Dept: UROLOGY | Facility: CLINIC | Age: 60
End: 2020-03-18
Payer: COMMERCIAL

## 2020-03-18 DIAGNOSIS — N31.9 NEUROGENIC BLADDER: Primary | ICD-10-CM

## 2020-03-18 DIAGNOSIS — N52.9 IMPOTENCE OF ORGANIC ORIGIN: ICD-10-CM

## 2020-03-18 NOTE — PROGRESS NOTES
Derek Enriquez is a 59 year old male who is being evaluated via a billable telephone visit.      Derek Enriquez complains of  Neurogenic bladder secondary to subdural hematoma at T10 s/p T9-12 laminectomy and evacuation of subdural hematoma eventually leading to paraplegia.  Chief Complaint   Patient presents with     Telephone     1 yr NGB follow up       I have reviewed and updated the patient's Past Medical History, Social History, Family History and Medication List.    ALLERGIES  Patient has no known allergies.      Additional provider notes:   He underwent UDS in March 2019 and May 2019.  Most recently, in May, UDS showed   -Normal bladder capacity (600 mL) with absent filling sensations.  -Poor bladder compliance with some stress-induced DO without incontinence. Detrusor pressures are significantly and consistently elevated to 80 cm H2O at volumes > 200 mL and 93+ cm H2O at volumes >300 mL, but he never leaks to define DLPP.  -No stress urinary incontinence.   -Fluoroscopy reveals a smooth-walled bladder without diverticuli or VUR. The bladder neck appears closed during early stages of filling and then appears open as he nears capacity but contrast never passes distally to the external urinary sphincter.   -No voiding phase as patient is unable to void volitionally.     I met him briefly on that day and discussed bladder botox injection. Unfortunately, insurance denied coverage.  Today is a discussion of what to do next.  He underwent a RICHARD which showed no hydronephrosis.  He does not get UTIs and does not leak.  He is cathing regularly with volumes 200-300.  He tried anticholinergic and had significant side effects of dry eyes and constipation.    He also had some questions regarding ED and sexual function. He has a high libido. Tried PDE5 and did not work.    Assessment/Plan:  Neurogenic bladder    Reviewed urodynamics  Reviewed erectile dysfunction/sexual dysfunction related to SCI.    -Repeat UDS in Summer  2020. Then we can discuss.  -Discussed options for ED. Orals, ICI, IPP. He also discussed reducing libido with T blockers, which I did not recommend. Will continue to consider. We can discuss again next time.    I have reviewed the note as documented above.  This accurately captures the substance of my conversation with the patient.    Phone call contact time  Call Started at 8:35a  Call Ended at 8:46a    Cody Stroud MD

## 2020-07-06 ENCOUNTER — PRE VISIT (OUTPATIENT)
Dept: UROLOGY | Facility: CLINIC | Age: 60
End: 2020-07-06

## 2020-07-07 ENCOUNTER — PRE VISIT (OUTPATIENT)
Dept: UROLOGY | Facility: CLINIC | Age: 60
End: 2020-07-07

## 2020-07-07 NOTE — TELEPHONE ENCOUNTER
Chief Complaint : In Clinic     Hx/Sx: NGB/ UDS Follow up    Records/Orders: UDS Same Day    Pt Contacted: n/a    At Rooming: Normal

## 2020-07-09 ENCOUNTER — TELEPHONE (OUTPATIENT)
Dept: UROLOGY | Facility: CLINIC | Age: 60
End: 2020-07-09

## 2020-07-15 ENCOUNTER — TELEPHONE (OUTPATIENT)
Dept: UROLOGY | Facility: CLINIC | Age: 60
End: 2020-07-15

## 2020-07-15 ENCOUNTER — OFFICE VISIT (OUTPATIENT)
Dept: UROLOGY | Facility: CLINIC | Age: 60
End: 2020-07-15
Payer: COMMERCIAL

## 2020-07-15 ENCOUNTER — VIRTUAL VISIT (OUTPATIENT)
Dept: UROLOGY | Facility: CLINIC | Age: 60
End: 2020-07-15
Payer: COMMERCIAL

## 2020-07-15 ENCOUNTER — ANCILLARY PROCEDURE (OUTPATIENT)
Dept: RADIOLOGY | Facility: AMBULATORY SURGERY CENTER | Age: 60
End: 2020-07-15
Attending: PHYSICIAN ASSISTANT
Payer: COMMERCIAL

## 2020-07-15 DIAGNOSIS — N31.9 NEUROGENIC BLADDER: Primary | ICD-10-CM

## 2020-07-15 DIAGNOSIS — N39.41 URGE INCONTINENCE: ICD-10-CM

## 2020-07-15 DIAGNOSIS — N31.9 NEUROGENIC BLADDER: ICD-10-CM

## 2020-07-15 LAB
APPEARANCE UR: CLEAR
BILIRUB UR QL: NORMAL
COLOR UR: YELLOW
GLUCOSE URINE: NORMAL MG/DL
HGB UR QL: NORMAL
KETONES UR QL: NORMAL MG/DL
LEUKOCYTE ESTERASE URINE: NORMAL
NITRITE UR QL STRIP: NORMAL
PH UR STRIP: 5 PH (ref 5–7)
PROTEIN ALBUMIN URINE: NORMAL MG/DL
SOURCE: NORMAL
SP GR UR STRIP: 1.02 (ref 1–1.03)
UROBILINOGEN UR QL STRIP: 0.2 EU/DL (ref 0.2–1)

## 2020-07-15 RX ORDER — SULFAMETHOXAZOLE/TRIMETHOPRIM 800-160 MG
1 TABLET ORAL ONCE
Status: COMPLETED | OUTPATIENT
Start: 2020-07-15 | End: 2020-07-15

## 2020-07-15 RX ADMIN — SULFAMETHOXAZOLE AND TRIMETHOPRIM 1 TABLET: 800; 160 TABLET ORAL at 08:18

## 2020-07-15 ASSESSMENT — PAIN SCALES - GENERAL: PAINLEVEL: NO PAIN (0)

## 2020-07-15 NOTE — NURSING NOTE
Invasive Procedure Safety Checklist:    Procedure: Urodynamics Study    Action: Complete sections and checkboxes as appropriate.    Pre-procedure:  1. Patient ID Verified with 2 identifiers (Mariluz and  or MRN) : YES    2. Procedure and site verified with patient/designee (when able) : YES    3. Accurate consent documentation in medical record : YES    4. H&P (or appropriate assessment) documented in medical record : NO  H&P must be up to 30 days prior to procedure an updated within 24 hours of                 Procedure as applicable.     5. Relevant diagnostic and radiology test results appropriately labeled and displayed as applicable : NO    6. Blood products, implants, devices, and/or special equipment available for the procedure as applicable : NO    7. Procedure site(s) marked with provider initials [Exclusions: ] : NOProcedure does not require site marking  8. Marking not required. Reason : Yes  Procedure does not require site marking    Time Out:     Time-Out performed immediately prior to starting procedure, including verbal and active participation of all team members addressing: YES    1. Correct patient identity.  2. Confirmed that the correct side and site are marked.  3. An accurate procedure to be done.  4. Agreement on the procedure to be done.  5. Correct patient position.  6. Relevant images and results are properly labeled and appropriately displayed.  7. The need to administer antibiotics or fluids for irrigation purposes during the procedure as applicable.  8. Safety precautions based on patient history or medication use.    During Procedure: Verification of correct person, site, and procedure occurs any time the responsibility for care of the patient is transferred to another member of the care team.    Shaista Caceres, [facility]

## 2020-07-15 NOTE — NURSING NOTE
The following medication was given: Sulfamethoxazole / Trimethoprim    MEDICATION:  Bactrim  ROUTE: PO  SITE: Orally  DOSE: 800/160mg  LOT #: [id number]  : [employer]  EXPIRATION DATE: 11/2020  NDC#: 6695-5682-07   Was there drug waste? No    Prior to administration, verified patient identity using patient's name and date of birth.  Due to administration, patient instructed to remain in clinic for 15 minutes  afterwards, and to report any adverse reaction to me immediately.    Drug Amount Wasted:  None.  Vial/Syringe: Single dose vial      The following medication was given:     MEDICATION:  Omnipaque (Iohexol Injection) (240mgI/mL)  ROUTE: Provider Administered  SITE: Provider Administered via catheter  DOSE: 200mL  LOT #: [id number]  : [employer]  EXPIRATION DATE: 9/6/2022  NDC#: [address]-19   Was there drug waste? No    Prior to injection, verified patient identity using patient's name and date of birth.  Due to injection administration, patient instructed to remain in clinic for 15 minutes  afterwards, and to report any adverse reaction to me immediately.    Drug Amount Wasted:  None.  Vial/Syringe: Single dose vial      Shaista Caceres [facility]  7/15/2020  7:04 AM

## 2020-07-15 NOTE — PROGRESS NOTES
"Derek Enriquez is a 59 year old male who is being evaluated via a billable telephone visit.       Please Call # [phone]        The patient has been notified of following:      \"This telephone visit will be conducted via a call between you and your physician/provider. We have found that certain health care needs can be provided without the need for a physical exam.  This service lets us provide the care you need with a short phone conversation.  If a prescription is necessary we can send it directly to your pharmacy.  If lab work is needed we can place an order for that and you can then stop by our lab to have the test done at a later time.     Telephone visits are billed at different rates depending on your insurance coverage. During this emergency period, for some insurers they may be billed the same as an in-person visit.  Please reach out to your insurance provider with any questions.     If during the course of the call the physician/provider feels a telephone visit is not appropriate, you will not be charged for this service.\"     Patient has given verbal consent for Telephone visit?  Yes 9:55 AM 07/15/20      How would you like to obtain your AVS? Mail a copy       I spoke to patient for 6 minutes today.    Derek Enriquez complains of  Neurogenic bladder secondary to subdural hematoma at T10 s/p T9-12 laminectomy and evacuation of subdural hematoma eventually leading to paraplegia.  Tried Trospium but significant side effect of constipation. He cannot tolerate anticholinergics.  Has not tried Botox.  Performs CIC.  Underwent UDS today - not the first time - which shows significant NDO with very high pressures starting around 125mL  He has urinary urge incontinence due to his neurogenic DO    A/P   Persistent neurogenic DO in neurogenic bladder.  Failed oral medications due to significant side effects.  I strongly recommend Botox injection. Apparently, he tried this before but had insurance denial. Perhaps now " that he's tried anticholinergic, we can obtain coverage.  We could consider myrbetriq, but the amount of pressure he is generating is likely not to improve with single medication alone.  Botox next week if insurance will approve - 200u.    Cody Stroud MD

## 2020-07-15 NOTE — TELEPHONE ENCOUNTER
Spoke to patient and Dr. Stroud and the provider will give the patient a call back sometime today.

## 2020-07-15 NOTE — PROGRESS NOTES
PREPROCEDURE DIAGNOSES:    1. Neurogenic bladder secondary to subdural hematoma at T10 s/p T9-12 laminectomy and evacuation of subdural hematoma eventually leading to paraplegia.  2. Poor bladder compliance with DLPP 83 cm H2O on UDS from 3/27/19  3. Urinary retention, managed with CIC 4 times daily  4. Urinary incontinence     POSTPROCEDURE DIAGNOSES:  -Small cystometric bladder capacity (~160 mL) with impaired filling sensations. Capacity on last UDS on 5/8/19 was 600 mL. He reports catheterizing for volumes between 200-300 mL on average, rarely 400-500 mL.  -Multiple strong uninhibited detrusor contractions to pressures well over 100 cm H2O which start at bladder volumes >115 mL and are associated with small volume incontinence.   -DLPP 111 cm H2O (was 83 cm H2O on 3/27/19 UDS).  -Compliance is normal until these sudden and rapid pressure spikes associated with DOI (the first around 115 mL). Pdet then slowly decreases back to baseline when filling is paused before another sudden spike with second episode of DOI around 160 mL.  -Did not assess for stress incontinence due to frequent DOI.  -EMG remains mostly quiet throughout the study.  -No voiding phase as patient does not void volitionally.  -Fluoroscopy reveals a mildly trabeculated bladder wall without diverticuli or VUR. The bladder neck initially appeared closed, but appeared open during episodes of DOI with a slightly dilated proximal urethra and tapering at the level of the external urinary sphincter.    PROCEDURE:    1. Sterile urethral catheterization for measurement of residual urine volume.  2. Complex filling cystometrogram with measurement of bladder and rectal pressures.  3. Electromyography of the pelvic floor during urodynamics.  4. Fluoroscopic imaging of the bladder during urodynamics, at least 3 views.    5. Interpretation of urodynamics and flouroscopic imaging.      INDICATIONS FOR PROCEDURE:  Mr. eDrek Enriquez is a pleasant 59 year old male  with neurogenic bladder secondary to subdural hematoma at T10 s/p T9-12 laminectomy and evacuation of subdural hematoma eventually leading to paraplegia. He has urinary retention requiring CIC 4 times per day. UDS on 11/1/18 demonstrated normal capacity and compliance with DO without incontinence at bladder volumes >325 mL. Anticholinergic therapy was recommended at that time but the patient declined. He then underwent repeat VUDS on 3/27/19 at the one year mariano from his initial SCI. This demonstrated poor bladder compliance with detrusor pressures exceeding 40 cm H2O at a volume of 245 mL and final DLPP of 83 cm H2O at a volume of 503 mL. He was therefore started on Sanctura XR 60 mg daily. Repeat UDS on 5/8/19 continued to demonstrate poor bladder compliance with significantly elevated detrusor pressures to 80-90+ cm H2O at volumes >200 mL. He tried/failed trospium due to intolerable dry eyes and constipation. As a result, Dr. Stroud briefly met with the patient on that day and plan was to proceed with bladder Botox injections. Unfortunately, insurance denied coverage for Botox until he tried/failed another medication. He was recommended to start Myrbetriq, but it does not appear that this was ever prescribed. He does describe occasional urinary incontinence between catheterizations, worse when laying down. Repeat video urodynamics is requested today by Dr. Stroud to reassess Mr. Enriquez's bladder storage pressures and compliance.    VOIDING DIARY:  Patient did not complete.     DESCRIPTION OF PROCEDURE:  Risks, benefits, and alternatives to urodynamics were discussed with the patient and he wished to proceed.  Urodynamics are planned to better assess the primary etiology for Mr. Enriquez's urologic dysfunction.  The patient does not currently take anticholinergic medications or mirabegron for his bladder.  After informed consent was obtained, the patient was taken to the procedure room where uroflowmetry was  performed. Findings below.     PRE-STUDY UROFLOWMETRY:  Not performed as patient does not void volitionally.  Residual by catheter: 100 mL.  Pretest urine dipstick was negative for leukocytes and nitrites.    Next a 7F double-lumen urodynamics catheter was inserted into the bladder under sterile technique via urethra.  A 7F abdominal manometry catheter was placed in the rectum.  EMG pads were placed on both sides of the anal verge.  The bladder was filled with 200 mL of Iohexol at 30 mL/minute and serial pressures were recorded.  With coughing there was an appropriate rise in vesical and abdominal pressures with no change in detrusor pressure, confirming good study catheter placement.    DURING THE FILLING PHASE:  First sensation: 127 mL.  First Desire: not reported  Strong Desire: not reported  Maximum Capacity: ~160 mL    Uninhibited detrusor contractions: multiple strong uninhibited detrusor contractions to pressures well over 100 cm H2O which start at bladder volumes >115 mL and are associated with incontinence.   Compliance: normal until sudden DOI around 115 mL. Pdet then slowly decreases back to baseline when filling is paused before another sudden spike with second episode of DOI around 160 mL.  Continence: DLPP 111 cm H2O. Unable to assess for stress incontinence due to frequent DOI.  EMG: concordant during filling.    DURING THE VOIDING PHASE:  No voiding phase as patient does not void volitionally.   Voided volume: 0 mL.  Leaked volume: 43 mL  Residual volume: 160 mL.    FLUOROSCOPIC IMAGING OF THE BLADDER DURING URODYNAMICS:  Please note, image numbers on UDS tracings correlate with iSite series numbers on PACS images. Fluoroscopy during today's procedure demonstrated a mildly trabeculated bladder wall without diverticulae or cellules.  No vesicoureteral reflux was observed.  The bladder neck initially appeared closed, but appeared open during episodes of DOI with a slightly dilated proximal urethra and  tapering at the level of the external urinary sphincter. At the completion of today's study, all catheters were removed and the patient was straight catheterized for residual contrast volume.     ASSESSMENT/PLAN:  Mr. Derek Enriquez is a pleasant 59 year old male with neurogenic bladder and urinary retention who demonstrated the following findings today on urodynamic evaluation:    -Small cystometric bladder capacity (~160 mL) with impaired filling sensations. Capacity on last UDS on 5/8/19 was 600 mL. He reports catheterizing for volumes between 200-300 mL on average, rarely 400-500 mL.  -Multiple strong uninhibited detrusor contractions to pressures well over 100 cm H2O which start at bladder volumes >115 mL and are associated with small volume incontinence.   -DLPP 111 cm H2O (was 83 cm H2O on 3/27/19 UDS).  -Compliance is normal until these sudden and rapid pressure spikes associated with DOI (the first around 115 mL). Pdet then slowly decreases back to baseline when filling is paused before another sudden spike with second episode of DOI around 160 mL.  -Did not assess for stress incontinence due to frequent DOI.  -EMG remains mostly quiet throughout the study.  -No voiding phase as patient does not void volitionally.  -Fluoroscopy reveals a mildly trabeculated bladder wall without diverticuli or VUR. The bladder neck initially appeared closed, but appeared open during episodes of DOI with a slightly dilated proximal urethra and tapering at the level of the external urinary sphincter.      The patient will follow up as scheduled with Dr. Stroud to further discuss today's study results and make plans for how best to proceed.      - A single Bactrim was provided for UTI prophylaxis following completion of today's study per department protocol.  The risk of UTI with VUDS is low at ~2.5-3%.      Thank you for allowing me to participate in the care of Mr. Derek Enriquez and please don't hesitate to contact me with any  questions or concerns.      Candy Leary PA-C  Urology Physician Assistant

## 2020-07-15 NOTE — TELEPHONE ENCOUNTER
----- Message from IBIS Ricci sent at 7/15/2020  8:14 AM CDT -----  Regarding: Reschedsonal  Derek Zaragoza had to leave after his UDS today and did/could not stay for his Apt with Dr. Stroud.    Please schedule Derek to follow up with Dr. Stroud next available. In clinic or not.    Luc Samayoa EMT

## 2020-07-15 NOTE — PROGRESS NOTES
"Derek Enriquez is a 59 year old male who is being evaluated via a billable telephone visit.      Please Call # [phone]      The patient has been notified of following:     \"This telephone visit will be conducted via a call between you and your physician/provider. We have found that certain health care needs can be provided without the need for a physical exam.  This service lets us provide the care you need with a short phone conversation.  If a prescription is necessary we can send it directly to your pharmacy.  If lab work is needed we can place an order for that and you can then stop by our lab to have the test done at a later time.    Telephone visits are billed at different rates depending on your insurance coverage. During this emergency period, for some insurers they may be billed the same as an in-person visit.  Please reach out to your insurance provider with any questions.    If during the course of the call the physician/provider feels a telephone visit is not appropriate, you will not be charged for this service.\"    Patient has given verbal consent for Telephone visit?  Yes 9:55 AM 07/15/20     How would you like to obtain your AVS? Mail a copy    Additional provider notes: {use your own note template here:[id number]} ***    Phone call duration: *** minutes    {signature options:[id number]}      "

## 2020-07-15 NOTE — LETTER
7/15/2020       RE: Derek Enriquez  [address]  [facility] [address]     Dear Colleague,    Thank you for referring your patient, Derek Enriquez, to the [facility] UROLOGY AND INST FOR PROSTATE AND UROLOGIC CANCERS at [facility]. Please see a copy of my visit note below.    PREPROCEDURE DIAGNOSES:    1. Neurogenic bladder secondary to subdural hematoma at T10 s/p T9-12 laminectomy and evacuation of subdural hematoma eventually leading to paraplegia.  2. Poor bladder compliance with DLPP 83 cm H2O on UDS from 3/27/19  3. Urinary retention, managed with CIC 4 times daily  4. Urinary incontinence     POSTPROCEDURE DIAGNOSES:  -Small cystometric bladder capacity (~160 mL) with impaired filling sensations. Capacity on last UDS on 5/8/19 was 600 mL. He reports catheterizing for volumes between 200-300 mL on average, rarely 400-500 mL.  -Multiple strong uninhibited detrusor contractions to pressures well over 100 cm H2O which start at bladder volumes >115 mL and are associated with small volume incontinence.   -DLPP 111 cm H2O (was 83 cm H2O on 3/27/19 UDS).  -Compliance is normal until these sudden and rapid pressure spikes associated with DOI (the first around 115 mL). Pdet then slowly decreases back to baseline when filling is paused before another sudden spike with second episode of DOI around 160 mL.  -Did not assess for stress incontinence due to frequent DOI.  -EMG remains mostly quiet throughout the study.  -No voiding phase as patient does not void volitionally.  -Fluoroscopy reveals a mildly trabeculated bladder wall without diverticuli or VUR. The bladder neck initially appeared closed, but appeared open during episodes of DOI with a slightly dilated proximal urethra and tapering at the level of the external urinary sphincter.    PROCEDURE:    1. Sterile urethral catheterization for measurement of residual urine volume.  2. Complex filling cystometrogram with measurement  of bladder and rectal pressures.  3. Electromyography of the pelvic floor during urodynamics.  4. Fluoroscopic imaging of the bladder during urodynamics, at least 3 views.    5. Interpretation of urodynamics and flouroscopic imaging.      INDICATIONS FOR PROCEDURE:  Mr. Derek Enriquez is a pleasant 59 year old male with neurogenic bladder secondary to subdural hematoma at T10 s/p T9-12 laminectomy and evacuation of subdural hematoma eventually leading to paraplegia. He has urinary retention requiring CIC 4 times per day. UDS on 11/1/18 demonstrated normal capacity and compliance with DO without incontinence at bladder volumes >325 mL. Anticholinergic therapy was recommended at that time but the patient declined. He then underwent repeat VUDS on 3/27/19 at the one year mariano from his initial SCI. This demonstrated poor bladder compliance with detrusor pressures exceeding 40 cm H2O at a volume of 245 mL and final DLPP of 83 cm H2O at a volume of 503 mL. He was therefore started on Sanctura XR 60 mg daily. Repeat UDS on 5/8/19 continued to demonstrate poor bladder compliance with significantly elevated detrusor pressures to 80-90+ cm H2O at volumes >200 mL. He tried/failed trospium due to intolerable dry eyes and constipation. As a result, Dr. Stroud briefly met with the patient on that day and plan was to proceed with bladder Botox injections. Unfortunately, insurance denied coverage for Botox until he tried/failed another medication. He was recommended to start Myrbetriq, but it does not appear that this was ever prescribed. He does describe occasional urinary incontinence between catheterizations, worse when laying down. Repeat video urodynamics is requested today by Dr. Stroud to reassess Mr. Enriquez's bladder storage pressures and compliance.    VOIDING DIARY:  Patient did not complete.     DESCRIPTION OF PROCEDURE:  Risks, benefits, and alternatives to urodynamics were discussed with the patient and he wished to  proceed.  Urodynamics are planned to better assess the primary etiology for Mr. Enriquez's urologic dysfunction.  The patient does not currently take anticholinergic medications or mirabegron for his bladder.  After informed consent was obtained, the patient was taken to the procedure room where uroflowmetry was performed. Findings below.     PRE-STUDY UROFLOWMETRY:  Not performed as patient does not void volitionally.  Residual by catheter: 100 mL.  Pretest urine dipstick was negative for leukocytes and nitrites.    Next a 7F double-lumen urodynamics catheter was inserted into the bladder under sterile technique via urethra.  A 7F abdominal manometry catheter was placed in the rectum.  EMG pads were placed on both sides of the anal verge.  The bladder was filled with 200 mL of Iohexol at 30 mL/minute and serial pressures were recorded.  With coughing there was an appropriate rise in vesical and abdominal pressures with no change in detrusor pressure, confirming good study catheter placement.    DURING THE FILLING PHASE:  First sensation: 127 mL.  First Desire: not reported  Strong Desire: not reported  Maximum Capacity: ~160 mL    Uninhibited detrusor contractions: multiple strong uninhibited detrusor contractions to pressures well over 100 cm H2O which start at bladder volumes >115 mL and are associated with incontinence.   Compliance: normal until sudden DOI around 115 mL. Pdet then slowly decreases back to baseline when filling is paused before another sudden spike with second episode of DOI around 160 mL.  Continence: DLPP 111 cm H2O. Unable to assess for stress incontinence due to frequent DOI.  EMG: concordant during filling.    DURING THE VOIDING PHASE:  No voiding phase as patient does not void volitionally.   Voided volume: 0 mL.  Leaked volume: 43 mL  Residual volume: 160 mL.    FLUOROSCOPIC IMAGING OF THE BLADDER DURING URODYNAMICS:  Please note, image numbers on UDS tracings correlate with iSite series  numbers on PACS images. Fluoroscopy during today's procedure demonstrated a mildly trabeculated bladder wall without diverticulae or cellules.  No vesicoureteral reflux was observed.  The bladder neck initially appeared closed, but appeared open during episodes of DOI with a slightly dilated proximal urethra and tapering at the level of the external urinary sphincter. At the completion of today's study, all catheters were removed and the patient was straight catheterized for residual contrast volume.     ASSESSMENT/PLAN:  Mr. Derek Enriquez is a pleasant 59 year old male with neurogenic bladder and urinary retention who demonstrated the following findings today on urodynamic evaluation:    -Small cystometric bladder capacity (~160 mL) with impaired filling sensations. Capacity on last UDS on 5/8/19 was 600 mL. He reports catheterizing for volumes between 200-300 mL on average, rarely 400-500 mL.  -Multiple strong uninhibited detrusor contractions to pressures well over 100 cm H2O which start at bladder volumes >115 mL and are associated with small volume incontinence.   -DLPP 111 cm H2O (was 83 cm H2O on 3/27/19 UDS).  -Compliance is normal until these sudden and rapid pressure spikes associated with DOI (the first around 115 mL). Pdet then slowly decreases back to baseline when filling is paused before another sudden spike with second episode of DOI around 160 mL.  -Did not assess for stress incontinence due to frequent DOI.  -EMG remains mostly quiet throughout the study.  -No voiding phase as patient does not void volitionally.  -Fluoroscopy reveals a mildly trabeculated bladder wall without diverticuli or VUR. The bladder neck initially appeared closed, but appeared open during episodes of DOI with a slightly dilated proximal urethra and tapering at the level of the external urinary sphincter.      The patient will follow up as scheduled with Dr. Stroud to further discuss today's study results and make plans for  how best to proceed.      - A single Bactrim was provided for UTI prophylaxis following completion of today's study per department protocol.  The risk of UTI with VUDS is low at ~2.5-3%.      Thank you for allowing me to participate in the care of . Derek Enriquez and please don't hesitate to contact me with any questions or concerns.      Candy Leary PA-C  Urology Physician Assistant

## 2020-07-17 ENCOUNTER — TELEPHONE (OUTPATIENT)
Dept: UROLOGY | Facility: CLINIC | Age: 60
End: 2020-07-17

## 2020-07-17 NOTE — TELEPHONE ENCOUNTER
Health Call Center    Phone Message    May a detailed message be left on voicemail: yes     Reason for Call: Other: Derek is calling regarding an appointment he seems to be scheduled for on 7/22/20 for a botox injection. He states that he is able to make the appointment but he has not heard back yet from Dr. Stroud on whether they were able to obtain approval from his insurance for the injection. Please give him a call back to discuss. Thank you     Action Taken: Message routed to:  [facility] Urology    Travel Screening: Not Applicable

## 2020-07-17 NOTE — TELEPHONE ENCOUNTER
Approval is still pending I checked today.     Its under the pts 'snapshot' and scroll down to 'specialty comments'.     Luc Samayoa, EMT

## 2020-07-17 NOTE — TELEPHONE ENCOUNTER
Patient was notified that botox medication is pending for approval. Patient notified that we will give him a call if we need to reschedule his cystoscopy botox procedure on 7/22/2020. Patient agreed with the plan.    Jenna Lopez MA

## 2020-07-17 NOTE — TELEPHONE ENCOUNTER
Hi all,    Do we know if the patient's insurance the patient's 200 unit(s) botox medication? I tried looking for the approval but I cannot find it. Please advise.    Thanks, Jenna Lopez MA

## 2020-07-21 ENCOUNTER — TELEPHONE (OUTPATIENT)
Dept: UROLOGY | Facility: CLINIC | Age: 60
End: 2020-07-21

## 2020-07-27 ENCOUNTER — TELEPHONE (OUTPATIENT)
Dept: UROLOGY | Facility: CLINIC | Age: 60
End: 2020-07-27

## 2020-07-27 NOTE — TELEPHONE ENCOUNTER
Left message for pt to call and reschedule appt on 8/12 with Maximus from 8:30 to 9:30 due to Dr. Stroud having surgery case that morning. Schedule per Luc

## 2020-07-27 NOTE — TELEPHONE ENCOUNTER
----- Message from Luc Samayoa EMT sent at 7/23/2020  2:38 PM CDT -----  Regarding: ReschedDr. Maximus Gomez has to add on a surgery case and we need to reschedule Derek's Botox to later on 8/12 OR another day. Botox in clinic for 30 min Cysto Apt.    Luc Samayoa EMT    Thanks!

## 2020-08-04 ENCOUNTER — PRE VISIT (OUTPATIENT)
Dept: UROLOGY | Facility: CLINIC | Age: 60
End: 2020-08-04

## 2020-08-04 ENCOUNTER — TELEPHONE (OUTPATIENT)
Dept: UROLOGY | Facility: CLINIC | Age: 60
End: 2020-08-04

## 2020-08-04 DIAGNOSIS — N39.41 URGE INCONTINENCE: Primary | ICD-10-CM

## 2020-08-04 RX ORDER — MIRABEGRON 25 MG/1
25 TABLET, FILM COATED, EXTENDED RELEASE ORAL DAILY
Qty: 30 TABLET | Refills: 4 | Status: SHIPPED | OUTPATIENT
Start: 2020-08-04 | End: 2020-09-03

## 2020-08-04 NOTE — TELEPHONE ENCOUNTER
Called 08/04/20 at 11:08 AM and discussed new mediation because Botox was denied.  Also converted apt on 8/12 to phone apt.    IBIS Ricci Maxine, RN sent to Cody Stroud MD; Luc Samayoa EMT               Will send to the [facility] would you call him to let him know   thanks    Previous Messages     ----- Message -----   From: Cody Stroud MD   Sent: 8/4/2020  10:14 AM CDT   To: Cynthia Mckeon RN, IBIS Ricci   Subject: RE: Botox Denied                                 Can we do myrbetriq?   ----- Message -----   From: Luc Samayoa EMT   Sent: 8/4/2020   8:56 AM CDT   To: Cody Stroud MD, Cynthia Mckeon RN   Subject: Botox Denied                                     Hey,     I got an email saying the denied Derek's prior Auth for Botox.     What is our next move? He is scheduled for 8/12.     Luc Samayoa EMT

## 2020-08-04 NOTE — TELEPHONE ENCOUNTER
myrbetriq 25 mg po daily sent to [facility] in [place] on [address]    Cynthia Mckeon, RN   Care Coordinator Urology

## 2020-08-04 NOTE — TELEPHONE ENCOUNTER
[facility] Call Center    Phone Message    May a detailed message be left on voicemail: yes     Reason for Call: Other: Pt calling in returning missed call from venecia, please call pt back as soon as possible, thank you      Action Taken: Message routed to:  [facility] uro     Travel Screening: Not Applicable

## 2020-08-04 NOTE — TELEPHONE ENCOUNTER
Visit Type : Phone-Return    Hx/Sx: BAYRON w/ [facility]-Discuss new Meds    Records/Orders: Yes    Pt Contacted: n/a    At [phone]

## 2020-08-04 NOTE — TELEPHONE ENCOUNTER
Called 08/04/20 at 11:34 AM and had questions about the medication, its side effects and cost. I was unable to answer so pt is now scheduled for 1400 on 8/5/20 with Dr. Stroud.    He was also given the Botox code and Dx numbers we used when trying for Prior Auth. Derek stated he will call his insurance company and check in.    Luc Samayoa, EMT

## 2020-08-04 NOTE — TELEPHONE ENCOUNTER
----- Message from Cody Stroud MD sent at 8/4/2020 10:14 AM CDT -----  Regarding: RE: Botox Denied  Can we do myrbetriq?  ----- Message -----  From: Luc Samayoa EMT  Sent: 8/4/2020   8:56 AM CDT  To: Cody Stroud MD, Cynthia Mckeon RN  Subject: Botox Denied                                     Hey,    I got an email saying the denied Derek's prior Auth for Botox.  What is our next move? He is scheduled for 8/12.    Luc Samayoa, EMT

## 2020-08-05 ENCOUNTER — VIRTUAL VISIT (OUTPATIENT)
Dept: UROLOGY | Facility: CLINIC | Age: 60
End: 2020-08-05
Payer: COMMERCIAL

## 2020-08-05 DIAGNOSIS — N31.9 NEUROGENIC BLADDER: Primary | ICD-10-CM

## 2020-08-05 NOTE — PROGRESS NOTES
"Derek Enriquez is a 59 year old male who is being evaluated via a billable telephone visit.      Please Call # [phone]      The patient has been notified of following:     \"This telephone visit will be conducted via a call between you and your physician/provider. We have found that certain health care needs can be provided without the need for a physical exam.  This service lets us provide the care you need with a short phone conversation.  If a prescription is necessary we can send it directly to your pharmacy.  If lab work is needed we can place an order for that and you can then stop by our lab to have the test done at a later time.    Telephone visits are billed at different rates depending on your insurance coverage. During this emergency period, for some insurers they may be billed the same as an in-person visit.  Please reach out to your insurance provider with any questions.    If during the course of the call the physician/provider feels a telephone visit is not appropriate, you will not be charged for this service.\"    Patient has given verbal consent for Telephone visit?  Yes 1:22 PM 08/05/20     How would you like to obtain your AVS? Loretta      Phone call duration: 6 minutes    Derek Enriquez complains of  Neurogenic bladder secondary to subdural hematoma at T10 s/p T9-12 laminectomy and evacuation of subdural hematoma eventually leading to paraplegia.  Tried Trospium but significant side effect of constipation. He cannot tolerate anticholinergics.  Has not tried Botox.  Performs CIC.  Underwent UDS today - not the first time - which shows significant NDO with very high pressures starting around 125mL  He has urinary urge incontinence due to his neurogenic DO  Will plan on myrbetriq  Follow as previously scheduled in another 3 weeks  Will increase dose to myrbetriq 50mg at that visit if tolerates. Then re-do US to eval pressures 1-2 months later.  If that fails, will pursue Botox to get to safe " pressures.    Cody Stroud MD

## 2020-08-05 NOTE — LETTER
"8/5/2020       RE: Derek Enriquez  [address]  [facility] [address]     Dear Colleague,    Thank you for referring your patient, Derek Enriquez, to the [place] [facility]OST[facility] UROLOGIC CANCERS at [facility]. Please see a copy of my visit note below.    Derek Enriquez is a 59 year old male who is being evaluated via a billable telephone visit.      Please Call # [phone]      The patient has been notified of following:     \"This telephone visit will be conducted via a call between you and your physician/provider. We have found that certain health care needs can be provided without the need for a physical exam.  This service lets us provide the care you need with a short phone conversation.  If a prescription is necessary we can send it directly to your pharmacy.  If lab work is needed we can place an order for that and you can then stop by our lab to have the test done at a later time.    Telephone visits are billed at different rates depending on your insurance coverage. During this emergency period, for some insurers they may be billed the same as an in-person visit.  Please reach out to your insurance provider with any questions.    If during the course of the call the physician/provider feels a telephone visit is not appropriate, you will not be charged for this service.\"    Patient has given verbal consent for Telephone visit?  Yes 1:22 PM 08/05/20     How would you like to obtain your AVS? MyChart      Phone call duration: 6 minutes    Derek Enriquez complains of  Neurogenic bladder secondary to subdural hematoma at T10 s/p T9-12 laminectomy and evacuation of subdural hematoma eventually leading to paraplegia.  Tried Trospium but significant side effect of constipation. He cannot tolerate anticholinergics.  Has not tried Botox.  Performs CIC.  Underwent UDS today - not the first time - which shows significant NDO with very high pressures starting " around 125mL  He has urinary urge incontinence due to his neurogenic DO  Will plan on myrbetriq  Follow as previously scheduled in another 3 weeks  Will increase dose to myrbetriq 50mg at that visit if tolerates. Then re-do US to eval pressures 1-2 months later.  If that fails, will pursue Botox to get to safe pressures.    Cody Stroud MD

## 2020-08-05 NOTE — NURSING NOTE
Derek Enriquez has given verbal permission for a phone visit 08/05/20 1:24 PM and would like to be reached at # [phone]     Called the pt 08/05/20, and 1:24 PM and reviewed their medications, history, and allergies. I then asked that they be in a quiet location with minimal distractions so they can hear the provider well.    Chief Complaint   Patient presents with     Telemedicine consult     Discuss Botox/Meds       There were no vitals taken for this visit. There is no height or weight on file to calculate BMI.    Patient Active Problem List   Diagnosis     Cardiac arrest (H)     Coronary atherosclerosis     Stented coronary artery     Spinal cord compression (H)     Subdural hematoma (H)     Post-operative state     Decubitus ulcer of sacral region, stage 3 (H)     Chronic skin ulcer, limited to breakdown of skin (H)     Pressure ulcer of other site, stage 3 (H)     Heterotopic ossification     Other calcification of muscle, left thigh     Status post hip surgery     Ingrown nail     Open wound, left lateral foot     Abnormal CT scan, heart     Dyslipidemia     Family history of premature CAD     Impotence of organic origin     Injury of thoracic spinal cord (H)     Neurogenic bladder     Neurogenic bowel     Orthostatic hypotension     Paraplegia (H)     Preop examination     Coronary artery disease involving native coronary artery of native heart without angina pectoris       No Known Allergies    Current Outpatient Medications   Medication Sig Dispense Refill     ascorbic acid (VITAMIN C) 500 MG tablet Take 500 mg by mouth every morning        aspirin 81 MG EC tablet Take 81 mg by mouth daily       atorvastatin (LIPITOR) 20 MG tablet Take 1 tablet (20 mg) by mouth At Bedtime 90 tablet 3     mirabegron (MYRBETRIQ) 25 MG 24 hr tablet Take 1 tablet (25 mg) by mouth daily 30 tablet 4     Multiple Vitamins-Minerals (MULTIVITAMIN MEN PO) Take 1 tablet by mouth daily        order for DME Equipment being ordered:  [facility] Phone [phone] Fax [phone]    Left Lateral Foot Primary Dressing Polymem Cloth Strips (not dots) Qty 30  Coccyx Secondary Dressing  tegaderm adhesive foam dressing 3x3 Qty 30  Perineum Secondary Dressing tegaderm + pad ( ref 3582) qty 30  Length of Need: 1 month  Frequency of dressing change: daily 30 days 0     tadalafil (CIALIS) 20 MG tablet Take 1 tablet (20 mg) PO PRN prior to sexual activity. Max: 1 tablet per 36 hours. 5 tablet 3     vitamin B complex with vitamin C (VITAMIN  B COMPLEX) TABS tablet Take 1 tablet by mouth daily         Social History     Tobacco Use     Smoking status: Former Smoker     Smokeless tobacco: Never Used     Tobacco comment: Quit at 31   Substance Use Topics     Alcohol use: Yes     Comment: socially     Drug use: No       Luc Samayoa, EMT,  8/5/2020  1:24 PM

## 2020-08-18 ENCOUNTER — PRE VISIT (OUTPATIENT)
Dept: UROLOGY | Facility: CLINIC | Age: 60
End: 2020-08-18

## 2020-08-18 NOTE — TELEPHONE ENCOUNTER
Visit Type : Return-Virtual    Hx/Sx: Med Check/ NGB w/ leaking    Records/Orders: Yes    Pt Contacted: n/a    At [phone]   [email or web address]

## 2020-08-26 ENCOUNTER — VIRTUAL VISIT (OUTPATIENT)
Dept: UROLOGY | Facility: CLINIC | Age: 60
End: 2020-08-26
Payer: COMMERCIAL

## 2020-08-26 DIAGNOSIS — N39.41 URGE INCONTINENCE: ICD-10-CM

## 2020-08-26 NOTE — PATIENT INSTRUCTIONS
Please schedule UDS w/o imaging and follow up with Dr. Storud virtually after.    Luc Samayoa, EMT

## 2020-08-26 NOTE — LETTER
"8/26/2020       RE: Derek Enriquez  [address]  [facility] [address]     Dear Colleague,    Thank you for referring your patient, Derek Enriquez, to the [facility] FOR PROSTATE AND UROLOGIC CANCERS at [facility]. Please see a copy of my visit note below.    Derek Enriquez is a 59 year old male who is being evaluated via a billable telephone visit.      Please Call # [phone]      The patient has been notified of following:     \"This telephone visit will be conducted via a call between you and your physician/provider. We have found that certain health care needs can be provided without the need for a physical exam.  This service lets us provide the care you need with a short phone conversation.  If a prescription is necessary we can send it directly to your pharmacy.  If lab work is needed we can place an order for that and you can then stop by our lab to have the test done at a later time.    Telephone visits are billed at different rates depending on your insurance coverage. During this emergency period, for some insurers they may be billed the same as an in-person visit.  Please reach out to your insurance provider with any questions.    If during the course of the call the physician/provider feels a telephone visit is not appropriate, you will not be charged for this service.\"    Patient has given verbal consent for Telephone visit?  Yes 12:30 PM 08/26/20     How would you like to obtain your AVS? MyChart    Phone call duration: 9 minutes    Derek Enriquez complains of  Neurogenic bladder secondary to subdural hematoma at T10 s/p T9-12 laminectomy and evacuation of subdural hematoma eventually leading to paraplegia.  Tried Trospium but significant side effect of constipation. He cannot tolerate anticholinergics.  Has not tried Botox. Had insurance issues  Performs CIC.  Underwent UDS today - not the first time - which shows significant NDO with very high " pressures starting around 125mL  He has urinary urge incontinence due to his neurogenic DO  Somewhat happy with myrbetriq. Will continue but want to make sure storage pressures are safe.  If they're not, then we'll have to escalate to Botox.   Will redo urodynamics.    Cody Stroud MD

## 2020-08-26 NOTE — NURSING NOTE
Derek Enriquez has given verbal permission for a phone visit 08/26/20 12:30 PM and would like to be reached at # [phone]     Called the pt 08/26/20, and 12:30 PM and reviewed their medications, history, and allergies. I then asked that they be in a quiet location with minimal distractions so they can hear the provider well.    Chief Complaint   Patient presents with     Telemedicine consult     Med Check        There were no vitals taken for this visit. There is no height or weight on file to calculate BMI.    Patient Active Problem List   Diagnosis     Cardiac arrest (H)     Coronary atherosclerosis     Stented coronary artery     Spinal cord compression (H)     Subdural hematoma (H)     Post-operative state     Decubitus ulcer of sacral region, stage 3 (H)     Chronic skin ulcer, limited to breakdown of skin (H)     Pressure ulcer of other site, stage 3 (H)     Heterotopic ossification     Other calcification of muscle, left thigh     Status post hip surgery     Ingrown nail     Open wound, left lateral foot     Abnormal CT scan, heart     Dyslipidemia     Family history of premature CAD     Impotence of organic origin     Injury of thoracic spinal cord (H)     Neurogenic bladder     Neurogenic bowel     Orthostatic hypotension     Paraplegia (H)     Preop examination     Coronary artery disease involving native coronary artery of native heart without angina pectoris       No Known Allergies    Current Outpatient Medications   Medication Sig Dispense Refill     ascorbic acid (VITAMIN C) 500 MG tablet Take 500 mg by mouth every morning        aspirin 81 MG EC tablet Take 81 mg by mouth daily       atorvastatin (LIPITOR) 20 MG tablet Take 1 tablet (20 mg) by mouth At Bedtime 90 tablet 3     mirabegron (MYRBETRIQ) 25 MG 24 hr tablet Take 1 tablet (25 mg) by mouth daily 30 tablet 4     Multiple Vitamins-Minerals (MULTIVITAMIN MEN PO) Take 1 tablet by mouth daily        order for DME Equipment being ordered: [facility] Phone [phone] Fax [phone]    Left Lateral Foot Primary Dressing Polymem Cloth Strips (not dots) Qty 30  Coccyx Secondary Dressing  tegaderm adhesive foam dressing 3x3 Qty 30  Perineum Secondary Dressing tegaderm + pad ( ref 3582) qty 30  Length of Need: 1 month  Frequency of dressing change: daily 30 days 0     vitamin B complex with vitamin C (VITAMIN  B COMPLEX) TABS tablet Take 1 tablet by mouth daily       tadalafil (CIALIS) 20 MG tablet Take 1 tablet (20 mg) PO PRN prior to sexual activity. Max: 1 tablet per 36 hours. (Patient not taking: Reported on 8/26/2020) 5 tablet 3       Social History     Tobacco Use     Smoking status: Former Smoker     Smokeless tobacco: Never Used     Tobacco comment: Quit at 31   Substance Use Topics     Alcohol use: Yes     Comment: socially     Drug use: No       Luc Samayoa, EMT,  8/26/2020  12:30 PM

## 2020-08-26 NOTE — PROGRESS NOTES
"Derek Enriquez is a 59 year old male who is being evaluated via a billable telephone visit.      Please Call # [phone]      The patient has been notified of following:     \"This telephone visit will be conducted via a call between you and your physician/provider. We have found that certain health care needs can be provided without the need for a physical exam.  This service lets us provide the care you need with a short phone conversation.  If a prescription is necessary we can send it directly to your pharmacy.  If lab work is needed we can place an order for that and you can then stop by our lab to have the test done at a later time.    Telephone visits are billed at different rates depending on your insurance coverage. During this emergency period, for some insurers they may be billed the same as an in-person visit.  Please reach out to your insurance provider with any questions.    If during the course of the call the physician/provider feels a telephone visit is not appropriate, you will not be charged for this service.\"    Patient has given verbal consent for Telephone visit?  Yes 12:30 PM 08/26/20     How would you like to obtain your AVS? Loretta    Phone call duration: 9 minutes    Derek Enriquez complains of  Neurogenic bladder secondary to subdural hematoma at T10 s/p T9-12 laminectomy and evacuation of subdural hematoma eventually leading to paraplegia.  Tried Trospium but significant side effect of constipation. He cannot tolerate anticholinergics.  Has not tried Botox. Had insurance issues  Performs CIC.  Underwent UDS today - not the first time - which shows significant NDO with very high pressures starting around 125mL  He has urinary urge incontinence due to his neurogenic DO  Somewhat happy with myrbetriq. Will continue but want to make sure storage pressures are safe.  If they're not, then we'll have to escalate to Botox.   Will redo urodynamics.    Cody Stroud MD         "

## 2020-09-28 ENCOUNTER — PRE VISIT (OUTPATIENT)
Dept: UROLOGY | Facility: CLINIC | Age: 60
End: 2020-09-28

## 2020-09-28 NOTE — TELEPHONE ENCOUNTER
Visit Type : UDS-    Hx/Sx: BAYRON w/ CIC on new meds     Records/Orders: Yes    Pt Contacted: Letter    At Rooming: Libia Chaudhari

## 2020-10-06 ENCOUNTER — PRE VISIT (OUTPATIENT)
Dept: UROLOGY | Facility: CLINIC | Age: 60
End: 2020-10-06

## 2020-10-06 NOTE — TELEPHONE ENCOUNTER
Visit Type : Virtual-Return     Hx/Sx: Urge incontinence    Records/Orders: UDS Scheduled     Pt Contacted: n/a    At [phone]

## 2020-10-11 NOTE — PROGRESS NOTES
PREPROCEDURE DIAGNOSES:    1. Neurogenic bladder 2/2 subdural hematoma at T10 s/p T9-12 laminectomy and evacuation of subdural hematoma eventually leading to paraplegia  2. Urinary retention, managed with CIC 4 times daily  3. Urinary incontinence   4. Prior UDS studies, most recently on 7/15/20, showing small bladder capacity and significant NDO-- currently treated with mirabegron.     POSTPROCEDURE DIAGNOSES:  -Small bladder capacity (~238 mL) with impaired filling sensations.  -Multiple strong UDCs to pressures >110 cm H20, which begin at bladder volumes >160 mL and are associated with incontinene.  -Gradual increase in baseline pressure prior to sudden spike in pressure at fill volume of 163 mL. Pressures slowly return near baseline between each UDC.  -Did not assess for stress incontinence due to frequent DOI.  -No voiding phase as patient does not void volitionally.  -EMG mostly quiet during episodes of incontinence.     PROCEDURE:    1. Sterile urethral catheterization for measurement of postvoid residual urine volume.  2. Complex filling cystometrogram with measurement of bladder and rectal pressures.  3. Electromyography of the pelvic floor during urodynamics.   4. Interpretation of urodynamics.    INDICATIONS FOR PROCEDURE:  Mr. Derek Enriquez is a pleasant 59 year old male with neurogenic bladder 2/2 subdural hematoma at T10 s/p T9-12 laminectomy and evacuation of subdural hematoma eventually leading to paraplegia, urinary retention, managed with CIC 4 times daily, urinary incontinence, and prior UDS studies, most recently on 7/15/20, showing small bladder capacity and significant NDO-- currently treated with mirabegron.  Repeat urodynamics is requested today by Dr. Stroud to reassess Mr. Enriquez's bladder storage pressures and compliance..      VOIDING DIARY:  Not completed.    DESCRIPTION OF PROCEDURE:  Risks, benefits, and alternatives to urodynamics were discussed with the patient and he wished to  proceed.  Urodynamics are planned to better assess the primary etiology for Mr. Enriquez's urologic dysfunction.  The patient last took anticholinergic medication 16 hours ago.  After informed consent was obtained, the patient was taken to the procedure room where the study was initiated. Findings below.     PRE-STUDY UROFLOWMETRY:  Uroflow not performed as patient does not void volitionally.   Pretest urine dipstick was negative for leukocytes and nitrites.    Next a 7F double-lumen urodynamics catheter was inserted into the bladder under sterile technique via the urethra.  A 7F abdominal manometry catheter was placed in the rectum.  EMG pads were placed on both sides of the anal verge.  The bladder was filled with 200 mL of normal saline at 30 mL/minute and serial pressures were recorded.  With coughing there was an appropriate rise in vesical and abdominal pressures with no change in detrusor pressure, confirming good study catheter placement.    DURING THE FILLING PHASE:  Sensations absent.     Uninhibited detrusor contractions: Multiple strong UDCs to pressures >110 cm H20, which begin at bladder volumes >160 mL and are associated with incontinene.  Compliance: Gradual increase in baseline pressure prior to sudden spike in pressure at fill volume of 163 mL. Pressures slowly return near baseline between each UDC.  Continence: DLPP ~80 cm H20. Unable to test for ZARINA due to frequent DOI.  EMG: Mildly increased activity, specifically prior to second episode of DOI.     DURING THE VOIDING PHASE:  No voiding phase as the patient does not void volitionally.   Voided volume: 0 mL  Leaked volume: 137 mL  Residual volume: 238 mL    ASSESSMENT/PLAN:  Mr. Derek Enriquez is a pleasant 59 year old male with neurogenic bladder 2/2 subdural hematoma at T10 s/p T9-12 laminectomy and evacuation of subdural hematoma eventually leading to paraplegia, urinary retention, managed with CIC 4 times daily, urinary incontinence, and prior UDS  studies, most recently on 7/15/20, showing small bladder capacity and significant NDO-- currently treated with mirabegron who demonstrated the following findings today on urodynamic evaluation:    -Small bladder capacity (~238 mL) with impaired filling sensations.  -Multiple strong UDCs to pressures >110 cm H20, which begin at bladder volumes >160 mL and are associated with incontinene.  -Gradual increase in baseline pressure prior to sudden spike in pressure at fill volume of 163 mL. Pressures slowly return near baseline between each UDC.  -Did not assess for stress incontinence due to frequent DOI.  -No voiding phase as patient does not void volitionally.  -EMG mostly quiet during episodes of incontinence.     The patient will follow up as scheduled with Dr. Stroud to further discuss today's study results and make plans for how best to proceed.      - A single Bactrim DS was provided for UTI prophylaxis following completion of today's study per department protocol.  The risk of UTI with VUDS is low at ~2.5-3%.      Thank you for allowing me to participate in the care of Mr. Derek Enriquez and please don't hesitate to contact me with any questions or concerns.      This procedure was performed under a collaborative agreement with Dr. Andrei Pelaez, Professor and  of Urology, [facility].    GREGORIO Garcia, CNP  Department of Urology

## 2020-10-12 ENCOUNTER — OFFICE VISIT (OUTPATIENT)
Dept: UROLOGY | Facility: CLINIC | Age: 60
End: 2020-10-12
Payer: COMMERCIAL

## 2020-10-12 VITALS — BODY MASS INDEX: 22.4 KG/M2 | HEIGHT: 71 IN | WEIGHT: 160 LBS

## 2020-10-12 DIAGNOSIS — N31.9 NEUROGENIC BLADDER: ICD-10-CM

## 2020-10-12 DIAGNOSIS — N39.41 URGE INCONTINENCE: Primary | ICD-10-CM

## 2020-10-12 PROCEDURE — 51784 ANAL/URINARY MUSCLE STUDY: CPT | Mod: 51 | Performed by: NURSE PRACTITIONER

## 2020-10-12 PROCEDURE — 81003 URINALYSIS AUTO W/O SCOPE: CPT | Performed by: NURSE PRACTITIONER

## 2020-10-12 PROCEDURE — 51726 COMPLEX CYSTOMETROGRAM: CPT | Performed by: NURSE PRACTITIONER

## 2020-10-12 RX ORDER — SULFAMETHOXAZOLE/TRIMETHOPRIM 800-160 MG
1 TABLET ORAL ONCE
Status: COMPLETED | OUTPATIENT
Start: 2020-10-12 | End: 2020-10-12

## 2020-10-12 RX ORDER — MIRABEGRON 25 MG/1
TABLET, FILM COATED, EXTENDED RELEASE ORAL
COMMUNITY
Start: 2020-09-29 | End: 2024-03-25

## 2020-10-12 RX ADMIN — SULFAMETHOXAZOLE AND TRIMETHOPRIM 1 TABLET: 800; 160 TABLET ORAL at 07:33

## 2020-10-12 ASSESSMENT — PAIN SCALES - GENERAL: PAINLEVEL: NO PAIN (0)

## 2020-10-12 ASSESSMENT — MIFFLIN-ST. JEOR: SCORE: 1572.89

## 2020-10-12 NOTE — NURSING NOTE
"UDS-Dr. Stroud     He does CIC 5-6 times per day with volumes of 200-300 with occasional leaking.    He denies any UTI like sx today.      Chief Complaint   Patient presents with     Urodynamics Study     NGB w/ CIC       Height 1.803 m (5' 11\"), weight 72.6 kg (160 lb). Body mass index is 22.32 kg/m .    Patient Active Problem List   Diagnosis     Cardiac arrest (H)     Coronary atherosclerosis     Stented coronary artery     Spinal cord compression (H)     Subdural hematoma (H)     Post-operative state     Decubitus ulcer of sacral region, stage 3 (H)     Chronic skin ulcer, limited to breakdown of skin (H)     Pressure ulcer of other site, stage 3 (H)     Heterotopic ossification     Other calcification of muscle, left thigh     Status post hip surgery     Ingrown nail     Open wound, left lateral foot     Abnormal CT scan, heart     Dyslipidemia     Family history of premature CAD     Impotence of organic origin     Injury of thoracic spinal cord (H)     Neurogenic bladder     Neurogenic bowel     Orthostatic hypotension     Paraplegia (H)     Preop examination     Coronary artery disease involving native coronary artery of native heart without angina pectoris       No Known Allergies    Current Outpatient Medications   Medication Sig Dispense Refill     ascorbic acid (VITAMIN C) 500 MG tablet Take 500 mg by mouth every morning        aspirin 81 MG EC tablet Take 81 mg by mouth daily       atorvastatin (LIPITOR) 20 MG tablet Take 1 tablet (20 mg) by mouth At Bedtime 90 tablet 3     Multiple Vitamins-Minerals (MULTIVITAMIN MEN PO) Take 1 tablet by mouth daily        MYRBETRIQ 25 MG 24 hr tablet TK ONE T PO QD       order for DME Equipment being ordered: [employer] Phone [phone] Fax [phone]    Left Lateral Foot Primary Dressing Polymem Cloth Strips (not dots) Qty 30  Coccyx Secondary Dressing  tegaderm adhesive foam dressing 3x3 Qty 30  Perineum Secondary Dressing tegaderm + pad ( ref 3582) qty " 30  Length of Need: 1 month  Frequency of dressing change: daily 30 days 0     tadalafil (CIALIS) 20 MG tablet Take 1 tablet (20 mg) PO PRN prior to sexual activity. Max: 1 tablet per 36 hours. 5 tablet 3     vitamin B complex with vitamin C (VITAMIN  B COMPLEX) TABS tablet Take 1 tablet by mouth daily         Social History     Tobacco Use     Smoking status: Former Smoker     Smokeless tobacco: Never Used     Tobacco comment: Quit at 31   Substance Use Topics     Alcohol use: Yes     Comment: socially     Drug use: No       Invasive Procedure Safety Checklist:    Procedure: Urodynamics    Action: Complete sections and checkboxes as appropriate.  Pre-procedure:  1. Patient ID Verified with 2 identifiers (Mariluz and  or MRN) : YES    2. Procedure and site verified with patient/designee (when able) : YES    3. Accurate consent documentation in medical record : YES    4. H&P (or appropriate assessment) documented in medical record : N/A  H&P must be up to 30 days prior to procedure an updated within 24 hours of Procedure as applicable.     5. Relevant diagnostic and radiology test results appropriately labeled and displayed as applicable : YES    6. Blood products, implants, devices, and/or special equipment available for the procedure as applicable : YES    7. Procedure site(s) marked with provider initials [Exclusions: none] : NO    8. Marking not required. Reason : Yes  Procedure does not require site marking    Time Out:     Time-Out performed immediately prior to starting procedure, including verbal and active participation of all team members addressing: YES    1. Correct patient identity.  2. Confirmed that the correct side and site are marked.  3. An accurate procedure to be done.  4. Agreement on the procedure to be done.  5. Correct patient position.  6. Relevant images and results are properly labeled and appropriately displayed.  7. The need to administer antibiotics or fluids for irrigation purposes  during the procedure as applicable.  8. Safety precautions based on patient history or medication use.    During Procedure: Verification of correct person, site, and procedure occurs any time the responsibility for care of the patient is transferred to another member of the care team.    The following medication was given: Sulfamethoxazole / Trimethoprim    MEDICATION:  Bactrim  ROUTE: PO  SITE: Orally  DOSE: 800/160mg  LOT #: [id number]  : [employer]  EXPIRATION DATE: 11/2020  NDC#: [address]-95   Was there drug waste? No    Prior to administration, verified patient identity using patient's name and date of birth.  Due to administration, patient instructed to remain in clinic for 15 minutes  afterwards, and to report any adverse reaction to me immediately.    Drug Amount Wasted:  None.  Vial/Syringe: Single dose vial    Derek Enriquez comes into clinic today at the request of Dr. Stroud for Urodynamics.    Order has been verified: Yes.      The following medication was given: See Above    Insertion: Pt CIC prior to the procedure, for ~100mL  Received 100 ml jovanni urine output.     Patient did tolerate procedure well.    Patient instructed as to where to call or go for pain, fever, leakage, or decreased urine flow.     Patient instructed as to where to call or go for pain, fever, leakage, or decreased urine flow.     This service provided today was under the direct supervision of Silke Oquendo, who was available if needed.    Luc Samayoa, EMT  10/12/2020  7:05 AM

## 2020-10-12 NOTE — LETTER
10/12/2020       RE: Derek Enriquez  [address]  [facility] [address]     Dear Colleague,    Thank you for referring your patient, Derek Enriquez, to the [facility] [place] at [facility]. Please see a copy of my visit note below.    PREPROCEDURE DIAGNOSES:    1. Neurogenic bladder 2/2 subdural hematoma at T10 s/p T9-12 laminectomy and evacuation of subdural hematoma eventually leading to paraplegia  2. Urinary retention, managed with CIC 4 times daily  3. Urinary incontinence   4. Prior UDS studies, most recently on 7/15/20, showing small bladder capacity and significant NDO-- currently treated with mirabegron.     POSTPROCEDURE DIAGNOSES:  -Small bladder capacity (~238 mL) with impaired filling sensations.  -Multiple strong UDCs to pressures >110 cm H20, which begin at bladder volumes >160 mL and are associated with incontinene.  -Gradual increase in baseline pressure prior to sudden spike in pressure at fill volume of 163 mL. Pressures slowly return near baseline between each UDC.  -Did not assess for stress incontinence due to frequent DOI.  -No voiding phase as patient does not void volitionally.  -EMG mostly quiet during episodes of incontinence.     PROCEDURE:    1. Sterile urethral catheterization for measurement of postvoid residual urine volume.  2. Complex filling cystometrogram with measurement of bladder and rectal pressures.  3. Electromyography of the pelvic floor during urodynamics.   4. Interpretation of urodynamics.    INDICATIONS FOR PROCEDURE:  Mr. Derek Enriquez is a pleasant 59 year old male with neurogenic bladder 2/2 subdural hematoma at T10 s/p T9-12 laminectomy and evacuation of subdural hematoma eventually leading to paraplegia, urinary retention, managed with CIC 4 times daily, urinary incontinence, and prior UDS studies, most recently on 7/15/20, showing small bladder capacity and significant NDO-- currently treated with  marva.  Repeat urodynamics is requested today by Dr. Stroud to reassess Mr. Enriquez's bladder storage pressures and compliance..      VOIDING DIARY:  Not completed.    DESCRIPTION OF PROCEDURE:  Risks, benefits, and alternatives to urodynamics were discussed with the patient and he wished to proceed.  Urodynamics are planned to better assess the primary etiology for Mr. Enriquez's urologic dysfunction.  The patient last took anticholinergic medication 16 hours ago.  After informed consent was obtained, the patient was taken to the procedure room where the study was initiated. Findings below.     PRE-STUDY UROFLOWMETRY:  Uroflow not performed as patient does not void volitionally.   Pretest urine dipstick was negative for leukocytes and nitrites.    Next a 7F double-lumen urodynamics catheter was inserted into the bladder under sterile technique via the urethra.  A 7F abdominal manometry catheter was placed in the rectum.  EMG pads were placed on both sides of the anal verge.  The bladder was filled with 200 mL of normal saline at 30 mL/minute and serial pressures were recorded.  With coughing there was an appropriate rise in vesical and abdominal pressures with no change in detrusor pressure, confirming good study catheter placement.    DURING THE FILLING PHASE:  Sensations absent.     Uninhibited detrusor contractions: Multiple strong UDCs to pressures >110 cm H20, which begin at bladder volumes >160 mL and are associated with incontinene.  Compliance: Gradual increase in baseline pressure prior to sudden spike in pressure at fill volume of 163 mL. Pressures slowly return near baseline between each UDC.  Continence: DLPP ~80 cm H20. Unable to test for ZARINA due to frequent DOI.  EMG: Mildly increased activity, specifically prior to second episode of DOI.     DURING THE VOIDING PHASE:  No voiding phase as the patient does not void volitionally.   Voided volume: 0 mL  Leaked volume: 137 mL  Residual volume: 238  mL    ASSESSMENT/PLAN:  Mr. Derek Enriquez is a pleasant 59 year old male with neurogenic bladder 2/2 subdural hematoma at T10 s/p T9-12 laminectomy and evacuation of subdural hematoma eventually leading to paraplegia, urinary retention, managed with CIC 4 times daily, urinary incontinence, and prior UDS studies, most recently on 7/15/20, showing small bladder capacity and significant NDO-- currently treated with mirabegron who demonstrated the following findings today on urodynamic evaluation:    -Small bladder capacity (~238 mL) with impaired filling sensations.  -Multiple strong UDCs to pressures >110 cm H20, which begin at bladder volumes >160 mL and are associated with incontinene.  -Gradual increase in baseline pressure prior to sudden spike in pressure at fill volume of 163 mL. Pressures slowly return near baseline between each UDC.  -Did not assess for stress incontinence due to frequent DOI.  -No voiding phase as patient does not void volitionally.  -EMG mostly quiet during episodes of incontinence.     The patient will follow up as scheduled with Dr. Stroud to further discuss today's study results and make plans for how best to proceed.      - A single Bactrim DS was provided for UTI prophylaxis following completion of today's study per department protocol.  The risk of UTI with VUDS is low at ~2.5-3%.      Thank you for allowing me to participate in the care of Mr. Derek Enriquez and please don't hesitate to contact me with any questions or concerns.      This procedure was performed under a collaborative agreement with Dr. Andrei Pelaez, Professor and  of Urology, [facility].    GREGORIO Garcia, CNP  Department of Urology

## 2020-10-14 ENCOUNTER — VIRTUAL VISIT (OUTPATIENT)
Dept: UROLOGY | Facility: CLINIC | Age: 60
End: 2020-10-14
Payer: COMMERCIAL

## 2020-10-14 DIAGNOSIS — N31.9 NEUROGENIC BLADDER: Primary | ICD-10-CM

## 2020-10-14 PROCEDURE — 99213 OFFICE O/P EST LOW 20 MIN: CPT | Mod: 95 | Performed by: UROLOGY

## 2020-10-14 RX ORDER — ONABOTULINUMTOXINA 200 [USP'U]/1
200 INJECTION, POWDER, LYOPHILIZED, FOR SOLUTION INTRADERMAL; INTRAMUSCULAR ONCE
Qty: 200 UNITS | Refills: 0
Start: 2020-10-14 | End: 2020-10-14

## 2020-10-14 ASSESSMENT — PAIN SCALES - GENERAL: PAINLEVEL: NO PAIN (0)

## 2020-10-14 NOTE — NURSING NOTE
Chief Complaint   Patient presents with     RECHECK     Review UDS        There were no vitals taken for this visit. There is no height or weight on file to calculate BMI.    Patient Active Problem List   Diagnosis     Cardiac arrest (H)     Coronary atherosclerosis     Stented coronary artery     Spinal cord compression (H)     Subdural hematoma (H)     Post-operative state     Decubitus ulcer of sacral region, stage 3 (H)     Chronic skin ulcer, limited to breakdown of skin (H)     Pressure ulcer of other site, stage 3 (H)     Heterotopic ossification     Other calcification of muscle, left thigh     Status post hip surgery     Ingrown nail     Open wound, left lateral foot     Abnormal CT scan, heart     Dyslipidemia     Family history of premature CAD     Impotence of organic origin     Injury of thoracic spinal cord (H)     Neurogenic bladder     Neurogenic bowel     Orthostatic hypotension     Paraplegia (H)     Preop examination     Coronary artery disease involving native coronary artery of native heart without angina pectoris       No Known Allergies    Current Outpatient Medications   Medication Sig Dispense Refill     ascorbic acid (VITAMIN C) 500 MG tablet Take 500 mg by mouth every morning        aspirin 81 MG EC tablet Take 81 mg by mouth daily       atorvastatin (LIPITOR) 20 MG tablet Take 1 tablet (20 mg) by mouth At Bedtime 90 tablet 3     Multiple Vitamins-Minerals (MULTIVITAMIN MEN PO) Take 1 tablet by mouth daily        MYRBETRIQ 25 MG 24 hr tablet TK ONE T PO QD       order for DME Equipment being ordered: [employer] Phone [phone] Fax [phone]    Left Lateral Foot Primary Dressing Polymem Cloth Strips (not dots) Qty 30  Coccyx Secondary Dressing  tegaderm adhesive foam dressing 3x3 Qty 30  Perineum Secondary Dressing tegaderm + pad ( ref [id number]) qty 30  Length of Need: 1 month  Frequency of dressing change: daily 30 days 0     tadalafil (CIALIS) 20 MG tablet Take 1 tablet (20 mg)  PO PRN prior to sexual activity. Max: 1 tablet per 36 hours. 5 tablet 3     vitamin B complex with vitamin C (VITAMIN  B COMPLEX) TABS tablet Take 1 tablet by mouth daily         Social History     Tobacco Use     Smoking status: Former Smoker     Smokeless tobacco: Never Used     Tobacco comment: Quit at 31   Substance Use Topics     Alcohol use: Yes     Comment: socially     Drug use: No       Padmini Watson CMA  10/14/2020  12:00 PM

## 2020-10-14 NOTE — PROGRESS NOTES
"Derek Enriquez is a 59 year old male who is being evaluated via a billable telephone visit.      The patient has been notified of following:     \"This telephone visit will be conducted via a call between you and your physician/provider. We have found that certain health care needs can be provided without the need for a physical exam.  This service lets us provide the care you need with a short phone conversation.  If a prescription is necessary we can send it directly to your pharmacy.  If lab work is needed we can place an order for that and you can then stop by our lab to have the test done at a later time.    Telephone visits are billed at different rates depending on your insurance coverage. During this emergency period, for some insurers they may be billed the same as an in-person visit.  Please reach out to your insurance provider with any questions.    If during the course of the call the physician/provider feels a telephone visit is not appropriate, you will not be charged for this service.\"    Patient has given verbal consent for Telephone visit?  Yes    What phone number would you like to be contacted at? [phone]    How would you like to obtain your AVS? Loretta    Phone call duration: 8 minutes  Derek Enriquez complains of  Neurogenic bladder secondary to subdural hematoma at T10 s/p T9-12 laminectomy and evacuation of subdural hematoma eventually leading to paraplegia.  Tried Trospium but significant side effect of constipation. He cannot tolerate anticholinergics.  Has not tried Botox. Had insurance issues due to lack of trying multiple medical therapies.  Performs CIC.  Underwent UDS previously which showed significant NDO with very high pressures starting around 125mL  He has urinary urge incontinence due to his neurogenic DO  Thus, we tried Myrbetriq. He can tolerate the medication without significant side effects. He has a mild improvement of symptoms. However, we repeated urodynamics to ensure " "safe storage pressures, and they are clearly not safe.   Per UDS note:  \"-Multiple strong UDCs to pressures >110 cm H20, which begin at bladder volumes >160 mL and are associated with incontinene.  -Gradual increase in baseline pressure prior to sudden spike in pressure at fill volume of 163 mL. Pressures slowly return near baseline between each UDC.\"    Thus, he does not have safe storage pressures. Safe pressures at <40 cm H2O.  I discussed the next step would be Botox injection to bladder. I discussed risks and benefits. I discussed that the goal would be to improve his symptoms further and to decrease storage pressures. He would like to pursue this in clinic. Will arrange for 2-3 weeks from now and check insurance again given that he has tried and failed all classes of oral medications to improve storage pressures in neurogenic bladder.    Cody Stroud MD    "

## 2020-10-14 NOTE — LETTER
"10/14/2020       RE: Derek Enriquez  [address]  [facility] [address]     Dear Colleague,    Thank you for referring your patient, Derek Enriquez, to the [facility]OLOG[facility] [place] at [facility]. Please see a copy of my visit note below.    Derek Enriquez is a 59 year old male who is being evaluated via a billable telephone visit.      The patient has been notified of following:     \"This telephone visit will be conducted via a call between you and your physician/provider. We have found that certain health care needs can be provided without the need for a physical exam.  This service lets us provide the care you need with a short phone conversation.  If a prescription is necessary we can send it directly to your pharmacy.  If lab work is needed we can place an order for that and you can then stop by our lab to have the test done at a later time.    Telephone visits are billed at different rates depending on your insurance coverage. During this emergency period, for some insurers they may be billed the same as an in-person visit.  Please reach out to your insurance provider with any questions.    If during the course of the call the physician/provider feels a telephone visit is not appropriate, you will not be charged for this service.\"    Patient has given verbal consent for Telephone visit?  Yes    What phone number would you like to be contacted at? [phone]    How would you like to [facility] your [facility]    Phone call duration: 8 minutes  Derek Enriquez complains of  Neurogenic bladder secondary to subdural hematoma at T10 s/p T9-12 laminectomy and evacuation of subdural hematoma eventually leading to paraplegia.  Tried Trospium but significant side effect of constipation. He cannot tolerate anticholinergics.  Has not tried Botox. Had insurance issues due to lack of trying multiple medical therapies.  Performs CIC.  Underwent UDS previously which " "showed significant NDO with very high pressures starting around 125mL  He has urinary urge incontinence due to his neurogenic DO  Thus, we tried Myrbetriq. He can tolerate the medication without significant side effects. He has a mild improvement of symptoms. However, we repeated urodynamics to ensure safe storage pressures, and they are clearly not safe.   Per UDS note:  \"-Multiple strong UDCs to pressures >110 cm H20, which begin at bladder volumes >160 mL and are associated with incontinene.  -Gradual increase in baseline pressure prior to sudden spike in pressure at fill volume of 163 mL. Pressures slowly return near baseline between each UDC.\"    Thus, he does not have safe storage pressures. Safe pressures at <40 cm H2O.  I discussed the next step would be Botox injection to bladder. I discussed risks and benefits. I discussed that the goal would be to improve his symptoms further and to decrease storage pressures. He would like to pursue this in clinic. Will arrange for 2-3 weeks from now and check insurance again given that he has tried and failed all classes of oral medications to improve storage pressures in neurogenic bladder.    Cody Stroud MD      "

## 2020-11-17 ENCOUNTER — PRE VISIT (OUTPATIENT)
Dept: UROLOGY | Facility: CLINIC | Age: 60
End: 2020-11-17

## 2020-11-17 DIAGNOSIS — N31.9 NEUROGENIC BLADDER: Primary | ICD-10-CM

## 2020-11-17 RX ORDER — CIPROFLOXACIN 500 MG/1
500 TABLET, FILM COATED ORAL DAILY
Qty: 3 TABLET | Refills: 0 | Status: SHIPPED | OUTPATIENT
Start: 2020-11-17 | End: 2020-11-20

## 2020-11-17 NOTE — TELEPHONE ENCOUNTER
Reason for visit: cystoscopy with Botox    Relevant information: neurogenic bladder    Records/imaging/labs/orders: authorization approved, orders placed for prophylactic antibiotic    Pt called: yes    At Rooming: message sent regarding scope type

## 2020-11-25 ENCOUNTER — OFFICE VISIT (OUTPATIENT)
Dept: UROLOGY | Facility: CLINIC | Age: 60
End: 2020-11-25
Payer: COMMERCIAL

## 2020-11-25 VITALS — HEART RATE: 60 BPM | SYSTOLIC BLOOD PRESSURE: 112 MMHG | DIASTOLIC BLOOD PRESSURE: 61 MMHG

## 2020-11-25 DIAGNOSIS — S24.109S INJURY OF THORACIC SPINAL CORD, SEQUELA (H): ICD-10-CM

## 2020-11-25 DIAGNOSIS — N39.41 URGE INCONTINENCE: ICD-10-CM

## 2020-11-25 DIAGNOSIS — N31.9 NEUROGENIC BLADDER: Primary | ICD-10-CM

## 2020-11-25 PROCEDURE — 52287 CYSTOSCOPY CHEMODENERVATION: CPT | Performed by: UROLOGY

## 2020-11-25 ASSESSMENT — PAIN SCALES - GENERAL: PAINLEVEL: NO PAIN (0)

## 2020-11-25 NOTE — LETTER
"11/25/2020       RE: Derek Enriquez  [address]  [facility] [address]     Dear Colleague,    Thank you for referring your patient, Derek Enriquez, to the [facility]OLOGY CL[facility]IC [place] at [facility]. Please see a copy of my visit note below.    Cystoscopy with Botox Note    PRE-PROCEDURE DIAGNOSIS:  Neurogenic Bladder    POST-PROCEDURE DIAGNOSIS:  Neurogenic Bladder    PROCEDURE:   Cystoscopy with chemodenervation of the bladder    HISTORY: Derek Enriquez is a 59 year old male with Neurogenic bladder secondary to subdural hematoma at T10 s/p T9-12 laminectomy and evacuation of subdural hematoma eventually leading to paraplegia.  Tried Trospium but significant side effect of constipation. He cannot tolerate anticholinergics.  Has not tried Botox. Had insurance issues due to lack of trying multiple medical therapies.  Performs CIC.  Underwent UDS previously which showed significant NDO with very high pressures starting around 125mL  He has urinary urge incontinence due to his neurogenic DO  Thus, we tried Myrbetriq. He can tolerate the medication without significant side effects. He has a mild improvement of symptoms. However, we repeated urodynamics to ensure safe storage pressures, and they are clearly not safe.   Per UDS note:  \"-Multiple strong UDCs to pressures >110 cm H20, which begin at bladder volumes >160 mL and are associated with incontinene.  -Gradual increase in baseline pressure prior to sudden spike in pressure at fill volume of 163 mL. Pressures slowly return near baseline between each UDC.\"    We scheduled him for Botox today and got approval.    DESCRIPTION OF PROCEDURE:  After informed consent was obtained, the patient was brought to the procedure room where they were placed in supine n with all pressure points well padded.  The patient was prepped and draped in a sterile fashion. A flexible cystoscope was introduced through a well-lubricated " urethra. There were no urethral strictures. The bladder was entered. The urine was clear. Systematic cystoscopy was performed. There were no tumors, stones, or other abnormalities in the bladder. Flexible scope and flexible needle were inserted. 200u of Botox was mixed in 20cc normal saline. Systematic injection was performed with 15-20 locations in a broad template. Minimal bleeding was noted, and scope was removed along with needle.    ASSESSMENT AND PLAN:  Successful Botox injection today.  Will monitor symptoms - has significant incontinence without Botox.  Followup in 5-6 months for repeat injection.    Cody Stroud MD  Reconstructive Urology

## 2020-11-25 NOTE — PROGRESS NOTES
"Cystoscopy with Botox Note    PRE-PROCEDURE DIAGNOSIS:  Neurogenic Bladder    POST-PROCEDURE DIAGNOSIS:  Neurogenic Bladder    PROCEDURE:   Cystoscopy with chemodenervation of the bladder    HISTORY: Derek Enriquez is a 59 year old male with Neurogenic bladder secondary to subdural hematoma at T10 s/p T9-12 laminectomy and evacuation of subdural hematoma eventually leading to paraplegia.  Tried Trospium but significant side effect of constipation. He cannot tolerate anticholinergics.  Has not tried Botox. Had insurance issues due to lack of trying multiple medical therapies.  Performs CIC.  Underwent UDS previously which showed significant NDO with very high pressures starting around 125mL  He has urinary urge incontinence due to his neurogenic DO  Thus, we tried Myrbetriq. He can tolerate the medication without significant side effects. He has a mild improvement of symptoms. However, we repeated urodynamics to ensure safe storage pressures, and they are clearly not safe.   Per UDS note:  \"-Multiple strong UDCs to pressures >110 cm H20, which begin at bladder volumes >160 mL and are associated with incontinene.  -Gradual increase in baseline pressure prior to sudden spike in pressure at fill volume of 163 mL. Pressures slowly return near baseline between each UDC.\"    We scheduled him for Botox today and got approval.    DESCRIPTION OF PROCEDURE:  After informed consent was obtained, the patient was brought to the procedure room where they were placed in supine n with all pressure points well padded.  The patient was prepped and draped in a sterile fashion. A flexible cystoscope was introduced through a well-lubricated urethra. There were no urethral strictures. The bladder was entered. The urine was clear. Systematic cystoscopy was performed. There were no tumors, stones, or other abnormalities in the bladder. Flexible scope and flexible needle were inserted. 200u of Botox was mixed in 20cc normal saline. Systematic " injection was performed with 15-20 locations in a broad template. Minimal bleeding was noted, and scope was removed along with needle.    ASSESSMENT AND PLAN:  Successful Botox injection today.  Will monitor symptoms - has significant incontinence without Botox.  Followup in 5-6 months for repeat injection.    Cody Stroud MD  Reconstructive Urology

## 2020-11-25 NOTE — PATIENT INSTRUCTIONS
"Schedule a cystoscopy with Botox in five months.      AFTER YOUR CYSTOSCOPY        You have just completed a cystoscopy, or \"cysto\", which allowed your physician to learn more about your bladder (or to remove a stent placed after surgery). We suggest that you continue to avoid caffeine, fruit juice, and alcohol for the next 24 hours, however, you are encouraged to return to your normal activities.         A few things that are considered normal after your cystoscopy:     * Small amount of bleeding (or spotting) that clears within the next 24 hours     * Slight burning sensation with urination     * Sensation to of needing to avoid more frequently     * The feeling of \"air\" in your urine     * Mild discomfort that is relieved with Tylenol        Please contact our office promptly if you:     * Develop a fever above 101 degrees     * Are unable to urinate     * Develop bright red blood that does not stop     * Severe pain or swelling         Please contact our office with any concerns or questions @[email or web address].  "

## 2020-11-25 NOTE — NURSING NOTE
Chief Complaint   Patient presents with     Cystoscopy     Botox injections for neurogenic bladder       Blood pressure 112/61, pulse 60. There is no height or weight on file to calculate BMI.    Patient Active Problem List   Diagnosis     Cardiac arrest (H)     Coronary atherosclerosis     Stented coronary artery     Spinal cord compression (H)     Subdural hematoma (H)     Post-operative state     Decubitus ulcer of sacral region, stage 3 (H)     Chronic skin ulcer, limited to breakdown of skin (H)     Pressure ulcer of other site, stage 3 (H)     Heterotopic ossification     Other calcification of muscle, left thigh     Status post hip surgery     Ingrown nail     Open wound, left lateral foot     Abnormal CT scan, heart     Dyslipidemia     Family history of premature CAD     Impotence of organic origin     Injury of thoracic spinal cord (H)     Neurogenic bladder     Neurogenic bowel     Orthostatic hypotension     Paraplegia (H)     Preop examination     Coronary artery disease involving native coronary artery of native heart without angina pectoris       No Known Allergies    Current Outpatient Medications   Medication Sig Dispense Refill     ascorbic acid (VITAMIN C) 500 MG tablet Take 500 mg by mouth every morning        aspirin 81 MG EC tablet Take 81 mg by mouth daily       atorvastatin (LIPITOR) 20 MG tablet Take 1 tablet (20 mg) by mouth At Bedtime 90 tablet 3     Multiple Vitamins-Minerals (MULTIVITAMIN MEN PO) Take 1 tablet by mouth daily        MYRBETRIQ 25 MG 24 hr tablet TK ONE T PO QD       tadalafil (CIALIS) 20 MG tablet Take 1 tablet (20 mg) PO PRN prior to sexual activity. Max: 1 tablet per 36 hours. 5 tablet 3     vitamin B complex with vitamin C (VITAMIN  B COMPLEX) TABS tablet Take 1 tablet by mouth daily       order for DME Equipment being ordered: [employer] Phone [phone] Fax [phone]    Left Lateral Foot Primary Dressing Polymem Cloth Strips (not dots) Qty 30  Coccyx  Secondary Dressing  tegaderm adhesive foam dressing 3x3 Qty 30  Perineum Secondary Dressing tegaderm + pad ( ref 3582) qty 30  Length of Need: 1 month  Frequency of dressing change: daily 30 days 0       Social History     Tobacco Use     Smoking status: Former Smoker     Smokeless tobacco: Never Used     Tobacco comment: Quit at 31   Substance Use Topics     Alcohol use: Yes     Comment: socially     Drug use: No       Padmini Watson CMA  2020  7:56 AM     Invasive Procedure Safety Checklist:    Procedure: Cystoscopy with Botox    Action: Complete sections and checkboxes as appropriate.    Pre-procedure:  1. Patient ID Verified with 2 identifiers (Mariluz and  or MRN) : YES    2. Procedure and site verified with patient/designee (when able) : YES    3. Accurate consent documentation in medical record : YES    4. H&P (or appropriate assessment) documented in medical record : YES  H&P must be up to 30 days prior to procedure an updated within 24 hours of                 Procedure as applicable.     5. Relevant diagnostic and radiology test results appropriately labeled and displayed as applicable : YES    6. Blood products, implants, devices, and/or special equipment available for the procedure as applicable : YES    7. Procedure site(s) marked with provider initials [Exclusions: None] : NO    8. Marking not required. Reason : Yes  Procedure does not require site marking    Time Out:     Time-Out performed immediately prior to starting procedure, including verbal and active participation of all team members addressing: YES    1. Correct patient identity.  2. Confirmed that the correct side and site are marked.  3. An accurate procedure to be done.  4. Agreement on the procedure to be done.  5. Correct patient position.  6. Relevant images and results are properly labeled and appropriately displayed.  7. The need to administer antibiotics or fluids for irrigation purposes during the procedure as  applicable.  8. Safety precautions based on patient history or medication use.    During Procedure: Verification of correct person, site, and procedure occurs any time the responsibility for care of the patient is transferred to another member of the care team.      The following medication was given:     The following medication was given:     MEDICATION:  Botox  ROUTE: Intravesical - administered by provider  SITE: Bladder wall - administered by provider  DOSE: 200 units  LOT #: [id number]  : [employer]  EXPIRATION DATE: 08/23  NDC#: 6813-7330-62  Was there drug waste? No    Prior to injection, verified patient identity using patient's name and date of birth.  Due to injection administration, patient instructed to remain in clinic for 15 minutes  afterwards, and to report any adverse reaction to me immediately.    Drug Amount Wasted:  None.  Vial/Syringe: Single dose vial    Padmini Watson CMA  November 25, 2020  9:45 AM        Prior to administration, verified patient identity using patient's name and date of birth.  Due to administration, patient instructed to remain in clinic for 15 minutes  afterwards, and to report any adverse reaction to me immediately.      Drug Amount Wasted:  None.  Vial/Syringe: Single dose vial    Padmini Watson CMA  November 25, 2020

## 2021-01-15 ENCOUNTER — HEALTH MAINTENANCE LETTER (OUTPATIENT)
Age: 61
End: 2021-01-15

## 2021-03-04 ENCOUNTER — HOSPITAL ENCOUNTER (OUTPATIENT)
Dept: RESEARCH | Facility: CLINIC | Age: 61
Discharge: HOME | End: 2021-03-04
Admitting: INTERNAL MEDICINE

## 2021-03-04 PROCEDURE — 300N000003 HC RESEARCH SPECIMEN PROCESSING, SIMPLE

## 2021-03-04 PROCEDURE — 510N000017 HC CRU PATIENT CARE, PER 15 MIN

## 2021-03-04 PROCEDURE — 510N000009 HC RESEARCH FACILITY, PER 15 MIN

## 2021-03-04 PROCEDURE — 300N000005 HC RESEARCH SPECIMEN PROCESSING, COMPLEX

## 2021-03-04 NOTE — ADDENDUM NOTE
Encounter addended by: Janene Polanco on: 3/4/2021 8:29 AM   Actions taken: Charge Capture section accepted

## 2021-03-04 NOTE — ADDENDUM NOTE
Encounter addended by: Tereso Hoyt RN on: 3/4/2021 9:36 AM   Actions taken: Charge Capture section accepted

## 2021-03-12 ENCOUNTER — IMMUNIZATION (OUTPATIENT)
Dept: NURSING | Facility: CLINIC | Age: 61
End: 2021-03-12
Payer: COMMERCIAL

## 2021-03-12 PROCEDURE — 91300 PR COVID VAC PFIZER DIL RECON 30 MCG/0.3 ML IM: CPT

## 2021-03-12 PROCEDURE — 0001A PR COVID VAC PFIZER DIL RECON 30 MCG/0.3 ML IM: CPT

## 2021-03-22 ENCOUNTER — OFFICE VISIT (OUTPATIENT)
Dept: CARDIOLOGY | Facility: CLINIC | Age: 61
End: 2021-03-22
Attending: INTERNAL MEDICINE
Payer: COMMERCIAL

## 2021-03-22 VITALS
HEIGHT: 71 IN | OXYGEN SATURATION: 99 % | WEIGHT: 165 LBS | HEART RATE: 74 BPM | BODY MASS INDEX: 23.1 KG/M2 | DIASTOLIC BLOOD PRESSURE: 83 MMHG | SYSTOLIC BLOOD PRESSURE: 133 MMHG

## 2021-03-22 DIAGNOSIS — I25.10 ATHEROSCLEROSIS OF NATIVE CORONARY ARTERY OF NATIVE HEART WITHOUT ANGINA PECTORIS: ICD-10-CM

## 2021-03-22 DIAGNOSIS — I46.9 CARDIAC ARREST (H): ICD-10-CM

## 2021-03-22 DIAGNOSIS — Z95.5 STENTED CORONARY ARTERY: ICD-10-CM

## 2021-03-22 DIAGNOSIS — E78.5 DYSLIPIDEMIA: ICD-10-CM

## 2021-03-22 DIAGNOSIS — Z95.5 H/O HEART ARTERY STENT: ICD-10-CM

## 2021-03-22 LAB
ALBUMIN SERPL-MCNC: 3.5 G/DL (ref 3.4–5)
ALP SERPL-CCNC: 94 U/L (ref 40–150)
ALT SERPL W P-5'-P-CCNC: 33 U/L (ref 0–70)
ANION GAP SERPL CALCULATED.3IONS-SCNC: 4 MMOL/L (ref 3–14)
AST SERPL W P-5'-P-CCNC: 22 U/L (ref 0–45)
BILIRUB SERPL-MCNC: 0.7 MG/DL (ref 0.2–1.3)
BUN SERPL-MCNC: 15 MG/DL (ref 7–30)
CALCIUM SERPL-MCNC: 8.6 MG/DL (ref 8.5–10.1)
CHLORIDE SERPL-SCNC: 107 MMOL/L (ref 94–109)
CHOLEST SERPL-MCNC: 141 MG/DL
CO2 SERPL-SCNC: 30 MMOL/L (ref 20–32)
CREAT SERPL-MCNC: 0.63 MG/DL (ref 0.66–1.25)
GFR SERPL CREATININE-BSD FRML MDRD: >90 ML/MIN/{1.73_M2}
GLUCOSE SERPL-MCNC: 93 MG/DL (ref 70–99)
HDLC SERPL-MCNC: 43 MG/DL
LDLC SERPL CALC-MCNC: 52 MG/DL
NONHDLC SERPL-MCNC: 98 MG/DL
POTASSIUM SERPL-SCNC: 3.9 MMOL/L (ref 3.4–5.3)
PROT SERPL-MCNC: 6.7 G/DL (ref 6.8–8.8)
SODIUM SERPL-SCNC: 141 MMOL/L (ref 133–144)
TRIGL SERPL-MCNC: 233 MG/DL

## 2021-03-22 PROCEDURE — 99214 OFFICE O/P EST MOD 30 MIN: CPT | Performed by: INTERNAL MEDICINE

## 2021-03-22 PROCEDURE — 93306 TTE W/DOPPLER COMPLETE: CPT | Performed by: INTERNAL MEDICINE

## 2021-03-22 PROCEDURE — G0463 HOSPITAL OUTPT CLINIC VISIT: HCPCS

## 2021-03-22 PROCEDURE — 80053 COMPREHEN METABOLIC PANEL: CPT | Performed by: PATHOLOGY

## 2021-03-22 PROCEDURE — 80061 LIPID PANEL: CPT | Performed by: PATHOLOGY

## 2021-03-22 PROCEDURE — 36415 COLL VENOUS BLD VENIPUNCTURE: CPT | Performed by: PATHOLOGY

## 2021-03-22 RX ORDER — ATORVASTATIN CALCIUM 20 MG/1
20 TABLET, FILM COATED ORAL AT BEDTIME
Qty: 90 TABLET | Refills: 3 | Status: SHIPPED | OUTPATIENT
Start: 2021-03-22 | End: 2022-05-16

## 2021-03-22 ASSESSMENT — PAIN SCALES - GENERAL: PAINLEVEL: NO PAIN (0)

## 2021-03-22 ASSESSMENT — MIFFLIN-ST. JEOR: SCORE: 1590.57

## 2021-03-22 NOTE — PATIENT INSTRUCTIONS
Patient Instructions:  It was a pleasure to see you in the cardiology clinic today.      If you have any questions, you can reach my nurse, Marilin RIDDLE LPN, at [phone].  Press Option #1 for the [facility], and then press Option #4 for nursing.    We are encouraging the use of Typesafet to communicate with your HealthCare Provider    Medication Changes: None.    Recommendations: Repeat fasting labs in one year prior to seeing Dr Jane.    Studies Ordered: Echocardiogram in one year prior to seeing Dr Jane.    The results from today include: Labs and echocardiogram.    Please follow up: With Dr. Jane in one year.    Sincerely,    Anoop Jane MD     If you have an urgent need after hours (8:00 am to 4:30 pm) please call [phone] and ask for the cardiology fellow on call.

## 2021-03-22 NOTE — NURSING NOTE
Chief Complaint   Patient presents with     Follow Up     1 year follow up     Vitals were taken and medications where reconciled.    Adam Lorenz, EMT  11:57 AM

## 2021-03-22 NOTE — LETTER
3/22/2021      RE: Derek Enriquez  [address]  [place] [facility] [id number]       Dear Colleague,    Thank you for the opportunity to participate in the care of your patient, Derek Enriquez, at the [facility] [place] [facility]. Please see a copy of my visit note below.    HPI:     I had the privilege to evaluate and examine Mr. Derek Enriquez, who  is a 59 y/o M, who comes for follow-up:     In summary, Mr Derek Enriquez, who had a cardiac arrest in [facility] during exercise (03-)  Patient was resuscitated and underwent a coronary angiogram , which showed   Single vessel CAD  LAD   - 95% stenotic ruptured plaque in the proximal LAD  Successful deployment of a drug eluting stent    3.0 x 20 mm Promus Premier drug eluting stent across the proximal LAD  and post-dilated with a 3.5 x15 mm non-compliant balloon  -Successful placement of balloon pump for LV support.  He also was put at [facility].                     Postop day 1 developed back pain then loss of sensation and movement in his lower extremities.  Identified spinal subdural hematoma and transferred emergently to [facility].  He underwent T9-T12 decompressive laminectomy and evacuation of subdural hematoma.  He unfortunately persists with complete paraplegia.  After medical stabilization he transferred to [facility] acute rehabilitation center 3/14 for comprehensive cares.     Patient is now convinced that he needs to take the classically CV preventive therapy.     Patient underwent a Right Hip Heterotopic Bone Resection on 11/2/2018.     Today, patient feels well; denies chest pain, shortness of breath, palpitations and intermittent claudication.  Patient works full time.    PAST MEDICAL HISTORY:  Past Medical History:   Diagnosis Date     CAD (coronary artery disease)     stent     Cardiac arrest (H) 03/2018     Femur fracture (H)      Neurogenic bladder       Neurogenic bowel      Orthostatic hypotension      Paraplegia (H)      Thoracic spinal cord injury (H)        CURRENT MEDICATIONS:  Current Outpatient Medications   Medication Sig Dispense Refill     ascorbic acid (VITAMIN C) 500 MG tablet Take 500 mg by mouth every morning        aspirin 81 MG EC tablet Take 81 mg by mouth daily       atorvastatin (LIPITOR) 20 MG tablet Take 1 tablet (20 mg) by mouth At Bedtime 90 tablet 3     Multiple Vitamins-Minerals (MULTIVITAMIN MEN PO) Take 1 tablet by mouth daily        vitamin B complex with vitamin C (VITAMIN  B COMPLEX) TABS tablet Take 1 tablet by mouth daily       MYRBETRIQ 25 MG 24 hr tablet TK ONE T PO QD       order for DME Equipment being ordered: [employer] Phone [phone] Fax [phone]    Left Lateral Foot Primary Dressing Polymem Cloth Strips (not dots) Qty 30  Coccyx Secondary Dressing  tegaderm adhesive foam dressing 3x3 Qty 30  Perineum Secondary Dressing tegaderm + pad ( ref 3582) qty 30  Length of Need: 1 month  Frequency of dressing change: daily 30 days 0     tadalafil (CIALIS) 20 MG tablet Take 1 tablet (20 mg) PO PRN prior to sexual activity. Max: 1 tablet per 36 hours. 5 tablet 3       PAST SURGICAL HISTORY:  Past Surgical History:   Procedure Laterality Date     ARTHROTOMY HIP Left 9/19/2018    Procedure: ARTHROTOMY HIP;  Left Hip Heterotopic Bone Resection;  Surgeon: Toñito Kennedy MD;  Location: [facility] OR     ARTHROTOMY HIP Right 11/02/2018     ARTHROTOMY HIP Right 11/2/2018    Procedure: Right Hip Heterotopic Bone Resection;  Surgeon: Toñito Kennedy MD;  Location: [facility] OR     CARDIAC SURGERY       LAMINECTOMY THORACIC THREE LEVELS N/A 3/9/2018    Procedure: LAMINECTOMY THORACIC THREE LEVELS;  Thoracic 9-12 Laminectomy Evacuation of Subdural Hematoma, C-Arm, Prone, Gel Rolls.;  Surgeon: Abhijeet Joe MD;  Location: [facility] OR     OPEN REDUCTION INTERNAL FIXATION RODDING INTRAMEDULLARY FEMUR Right 6/25/2018     Procedure: OPEN REDUCTION INTERNAL FIXATION RODDING INTRAMEDULLARY FEMUR;  Right Open Reduction Internal Fixation Subtrochanteric Femur Fracture;  Surgeon: Toñito Kennedy MD;  Location: [facility] OR     wisdom teeth extraction         ALLERGIES   No Known Allergies    FAMILY HISTORY:  Family History   Problem Relation Age of Onset     Cardiac Sudden Death Father      Arrhythmia Father         v fib arrest      Myocardial Infarction Brother      Other - See Comments Brother         myeloid dysplasia       SOCIAL HISTORY:  Social History     Socioeconomic History     Marital status: Single     Spouse name: None     Number of children: None     Years of education: None     Highest education level: None   Occupational History     None   Social Needs     Financial resource strain: None     Food insecurity     Worry: None     Inability: None     Transportation needs     Medical: None     Non-medical: None   Tobacco Use     Smoking status: Former Smoker     Smokeless tobacco: Never Used     Tobacco comment: Quit at 31   Substance and Sexual Activity     Alcohol use: Yes     Comment: socially     Drug use: No     Sexual activity: None   Lifestyle     Physical activity     Days per week: None     Minutes per session: None     Stress: None   Relationships     Social connections     Talks on phone: None     Gets together: None     Attends Druze service: None     Active member of club or organization: None     Attends meetings of clubs or organizations: None     Relationship status: None     Intimate partner violence     Fear of current or ex partner: None     Emotionally abused: None     Physically abused: None     Forced sexual activity: None   Other Topics Concern     Parent/sibling w/ CABG, MI or angioplasty before 65F 55M? Not Asked   Social History Narrative     None       ROS:   Constitutional: No fever, chills, or sweats. No weight gain/loss   ENT: No visual disturbance, ear ache, epistaxis, sore  "throat  Allergies/Immunologic: Negative.   Respiratory: No cough, hemoptysia  Cardiovascular: As per HPI  GI: No nausea, vomiting, hematemesis, melena, or hematochezia  : No urinary frequency, dysuria, or hematuria  Integument: Negative  Psychiatric: Negative  Neuro: Negative  Endocrinology: Negative   Musculoskeletal: Negative    EXAM:  /83 (BP Location: Right arm, Patient Position: Chair, Cuff Size: Adult Regular)   Pulse 74   Ht 1.803 m (5' 11\")   Wt 74.8 kg (165 lb)   SpO2 99%   BMI 23.01 kg/m    In general, the patient is a pleasant male in no apparent distress.    HEENT: NC/AT.  PERRLA.  EOMI.  Sclerae white, not injected.  Nares clear.  Pharynx without erythema or exudate.  Dentition intact.    Neck: No adenopathy.  No thyromegaly. Carotids +4/4 bilaterally without bruits.  No jugular venous distension.   Heart: RRR. Normal S1, S2 splits physiologically. No murmur, rub, click, or gallop. The PMI is in the 5th ICS in the midclavicular line. There is no heave.    Lungs: CTA.  No ronchi, wheezes, rales.  No dullness to percussion.   Abdomen: Soft, nontender, nondistended. No organomegaly.  No bruits.   Extremities: No clubbing, cyanosis, or edema.  The pulses are +4/4 at the radial, brachial, femoral, popliteal, DP, and PT sites bilaterally.  No bruits are noted.  Neurologic: Alert and oriented to person/place/time, normal speech - bilateral paralysis of both lower extremities.  Skin: No petechiae, purpura or rash.    Labs:    We discussed the lab results directly with patient.    LIPID RESULTS:  Lab Results   Component Value Date    CHOL 141 03/22/2021    HDL 43 03/22/2021    LDL 52 03/22/2021    TRIG 233 (H) 03/22/2021    NHDL 98 03/22/2021       LIVER ENZYME RESULTS:  Lab Results   Component Value Date    AST 22 03/22/2021    ALT 33 03/22/2021           BMP RESULTS:  Lab Results   Component Value Date     03/22/2021    POTASSIUM 3.9 03/22/2021    CHLORIDE 107 03/22/2021    CO2 30 03/22/2021 "    ANIONGAP 4 03/22/2021    GLC 93 03/22/2021    BUN 15 03/22/2021    CR 0.63 (L) 03/22/2021    GFRESTIMATED >90 03/22/2021    GFRESTBLACK >90 03/22/2021    PAT 8.6 03/22/2021          Procedures:    We discussed the results of echocardiogram directly with patient.    Echocardiogram 03-22-21      Echocardiogram with two-dimensional, color and spectral Doppler performed.  ______________________________________________________________________________  Interpretation Summary  Left ventricular function, chamber size, wall motion, and wall thickness are  normal.The EF is 55-60%.     Right ventricular function, chamber size, wall motion, and thickness are  normal.     No significant valvular abnormalities present.     No pericardial effusion is present.     This study was compared with the study from 3/12/2020  There has been no change.  ______________________________________________________________________________  Left Ventricle  Left ventricular function, chamber size, wall motion, and wall thickness are  normal.The EF is 55-60%. Thickening of the anterobasal septum is present. Left  ventricular diastolic function is normal.     Right Ventricle  Right ventricular function, chamber size, wall motion, and thickness are  normal.     Atria  Both atria appear normal.     Mitral Valve  The mitral valve is normal. Trace mitral insufficiency is present.     Aortic Valve  Aortic valve is normal in structure and function. The aortic valve is  tricuspid. No aortic regurgitation is present.     Tricuspid Valve  The tricuspid valve is normal. Trace tricuspid insufficiency is present. The  peak velocity of the tricuspid regurgitant jet is not obtainable.     Pulmonic Valve  The pulmonic valve is normal.     Vessels  The aorta root is normal. Sinuses of Valsalva 3.1 cm. Ascending aorta 3.5 cm.  Small inferior vena cava size consistent with hypovolemia.     Pericardium  No pericardial effusion is present.     Miscellaneous  No  significant valvular abnormalities present.     Compared to Previous Study  There has been no change. This study was compared with the study from  3/12/2020 .  ______________________________________________________________________________  MMode/2D Measurements & Calculations  IVSd: 0.72 cm  LVIDd: 4.1 cm  LVIDs: 2.7 cm  LVPWd: 0.69 cm  FS: 32.7 %  LV mass(C)d: 82.0 grams  LV mass(C)dI: 42.8 grams/m2  Ao root diam: 3.1 cm  asc Aorta Diam: 3.5 cm  LVOT diam: 2.2 cm  LVOT area: 3.7 cm2  LA Volume (BP): 38.3 ml     LA Volume Index (BP): 19.9 ml/m2  RWT: 0.34     Doppler Measurements & Calculations  MV E max will: 61.4 cm/sec  MV A max will: 67.9 cm/sec  MV E/A: 0.90  MV dec slope: 200.4 cm/sec2  MV dec time: 0.31 sec  Ao V2 max: 141.2 cm/sec  Ao max P.0 mmHg  STEPH(V,D): 3.2 cm2  LV V1 max P.9 mmHg  LV V1 max: 121.3 cm/sec  LV V1 VTI: 21.8 cm  SV(LVOT): 81.1 ml  SI(LVOT): 42.3 ml/m2  PA V2 max: 114.6 cm/sec  PA max P.3 mmHg  PA acc time: 0.09 sec  AV Will Ratio (DI): 0.86  E/E' av.9  Lateral E/e': 4.3  Medial E/e': 5.5         Assessment and Plan:     We discussed the results with patient.  We discussed the importance of a heart  healthy diet and lifestyle.  We discussed following items:    Medication Changes: None.     Recommendations: Repeat fasting labs in one year prior to seeing Dr Jane.     Studies Ordered: Echocardiogram in one year prior to seeing Dr Jane.      Please follow up: With Dr. Jane in one year.     Anoop Jane MD, PhD  Professor of Medicine  Division of Cardiology      CC  Patient Care Team:  Eugene Burrell MD as PCP - General (Family Practice)  Anoop Jane MD as MD (Cardiology)  Andrei Pelaez MD as MD (Urology)  Candice Bridges, RN as Registered Nurse (Urology)  Marilin Ramirez LPN as Nurse Coordinator (Cardiology)  [facility], [facility] ([facility] ([facility] ([place]  Cody Stroud MD as Assigned Surgical Provider

## 2021-03-22 NOTE — PROGRESS NOTES
HPI:     I had the privilege to evaluate and examine Mr. Derek Enriquez, who  is a 59 y/o M, who comes for follow-up:     In summary, Mr Derek Enriquez, who had a cardiac arrest in [facility] during exercise (03-)  Patient was resuscitated and underwent a coronary angiogram , which showed   Single vessel CAD  LAD   - 95% stenotic ruptured plaque in the proximal LAD  Successful deployment of a drug eluting stent    3.0 x 20 mm Promus Premier drug eluting stent across the proximal LAD  and post-dilated with a 3.5 x15 mm non-compliant balloon  -Successful placement of balloon pump for LV support.  He also was put at [facility].                     Postop day 1 developed back pain then loss of sensation and movement in his lower extremities.  Identified spinal subdural hematoma and transferred emergently to [facility].  He underwent T9-T12 decompressive laminectomy and evacuation of subdural hematoma.  He unfortunately persists with complete paraplegia.  After medical stabilization he transferred to [facility] acute rehabilitation [place] 3/14 for comprehensive cares.     Patient is now convinced that he needs to take the classically CV preventive therapy.     Patient underwent a Right Hip Heterotopic Bone Resection on 11/2/2018.     Today, patient feels well; denies chest pain, shortness of breath, palpitations and intermittent claudication.  Patient works full time.    PAST MEDICAL HISTORY:  Past Medical History:   Diagnosis Date     CAD (coronary artery disease)     stent     Cardiac arrest (H) 03/2018     Femur fracture (H)      Neurogenic bladder      Neurogenic bowel      Orthostatic hypotension      Paraplegia (H)      Thoracic spinal cord injury (H)        CURRENT MEDICATIONS:  Current Outpatient Medications   Medication Sig Dispense Refill     ascorbic acid (VITAMIN C) 500 MG tablet Take 500 mg by mouth every morning        aspirin 81 MG EC tablet Take 81 mg by mouth daily        atorvastatin (LIPITOR) 20 MG tablet Take 1 tablet (20 mg) by mouth At Bedtime 90 tablet 3     Multiple Vitamins-Minerals (MULTIVITAMIN MEN PO) Take 1 tablet by mouth daily        vitamin B complex with vitamin C (VITAMIN  B COMPLEX) TABS tablet Take 1 tablet by mouth daily       MYRBETRIQ 25 MG 24 hr tablet TK ONE T PO QD       order for DME Equipment being ordered: [facility] [facility] Phone [phone] Fax [phone]    Left Lateral Foot Primary Dressing Polymem Cloth Strips (not dots) Qty 30  Coccyx Secondary Dressing  tegaderm adhesive foam dressing 3x3 Qty 30  Perineum Secondary Dressing tegaderm + pad ( ref 3582) qty 30  Length of Need: 1 month  Frequency of dressing change: daily 30 days 0     tadalafil (CIALIS) 20 MG tablet Take 1 tablet (20 mg) PO PRN prior to sexual activity. Max: 1 tablet per 36 hours. 5 tablet 3       PAST SURGICAL HISTORY:  Past Surgical History:   Procedure Laterality Date     ARTHROTOMY HIP Left 9/19/2018    Procedure: ARTHROTOMY HIP;  Left Hip Heterotopic Bone Resection;  Surgeon: Toñito Kennedy MD;  Location: [facility] OR     ARTHROTOMY HIP Right 11/02/2018     ARTHROTOMY HIP Right 11/2/2018    Procedure: Right Hip Heterotopic Bone Resection;  Surgeon: Toñito Kennedy MD;  Location: [facility] OR     CARDIAC SURGERY       LAMINECTOMY THORACIC THREE LEVELS N/A 3/9/2018    Procedure: LAMINECTOMY THORACIC THREE LEVELS;  Thoracic 9-12 Laminectomy Evacuation of Subdural Hematoma, C-Arm, Prone, Gel Rolls.;  Surgeon: Abhijeet Joe MD;  Location: [facility] OR     OPEN REDUCTION INTERNAL FIXATION RODDING INTRAMEDULLARY FEMUR Right 6/25/2018    Procedure: OPEN REDUCTION INTERNAL FIXATION RODDING INTRAMEDULLARY FEMUR;  Right Open Reduction Internal Fixation Subtrochanteric Femur Fracture;  Surgeon: Toñito Kennedy MD;  Location: [facility] OR     wisdom teeth extraction         ALLERGIES   No Known Allergies    FAMILY HISTORY:  Family History   Problem Relation Age of Onset      Cardiac Sudden Death Father      Arrhythmia Father         v fib arrest      Myocardial Infarction Brother      Other - See Comments Brother         myeloid dysplasia       SOCIAL HISTORY:  Social History     Socioeconomic History     Marital status: Single     Spouse name: None     Number of children: None     Years of education: None     Highest education level: None   Occupational History     None   Social Needs     Financial resource strain: None     Food insecurity     Worry: None     Inability: None     Transportation needs     Medical: None     Non-medical: None   Tobacco Use     Smoking status: Former Smoker     Smokeless tobacco: Never Used     Tobacco comment: Quit at 31   Substance and Sexual Activity     Alcohol use: Yes     Comment: socially     Drug use: No     Sexual activity: None   Lifestyle     Physical activity     Days per week: None     Minutes per session: None     Stress: None   Relationships     Social connections     Talks on phone: None     Gets together: None     Attends Worship service: None     Active member of club or organization: None     Attends meetings of clubs or organizations: None     Relationship status: None     Intimate partner violence     Fear of current or ex partner: None     Emotionally abused: None     Physically abused: None     Forced sexual activity: None   Other Topics Concern     Parent/sibling w/ CABG, MI or angioplasty before 65F 55M? Not Asked   Social History Narrative     None       ROS:   Constitutional: No fever, chills, or sweats. No weight gain/loss   ENT: No visual disturbance, ear ache, epistaxis, sore throat  Allergies/Immunologic: Negative.   Respiratory: No cough, hemoptysia  Cardiovascular: As per HPI  GI: No nausea, vomiting, hematemesis, melena, or hematochezia  : No urinary frequency, dysuria, or hematuria  Integument: Negative  Psychiatric: Negative  Neuro: Negative  Endocrinology: Negative   Musculoskeletal: Negative    EXAM:  /83 (BP  "Location: Right arm, Patient Position: Chair, Cuff Size: Adult Regular)   Pulse 74   Ht 1.803 m (5' 11\")   Wt 74.8 kg (165 lb)   SpO2 99%   BMI 23.01 kg/m    In general, the patient is a pleasant male in no apparent distress.    HEENT: NC/AT.  PERRLA.  EOMI.  Sclerae white, not injected.  Nares clear.  Pharynx without erythema or exudate.  Dentition intact.    Neck: No adenopathy.  No thyromegaly. Carotids +4/4 bilaterally without bruits.  No jugular venous distension.   Heart: RRR. Normal S1, S2 splits physiologically. No murmur, rub, click, or gallop. The PMI is in the 5th ICS in the midclavicular line. There is no heave.    Lungs: CTA.  No ronchi, wheezes, rales.  No dullness to percussion.   Abdomen: Soft, nontender, nondistended. No organomegaly.  No bruits.   Extremities: No clubbing, cyanosis, or edema.  The pulses are +4/4 at the radial, brachial, femoral, popliteal, DP, and PT sites bilaterally.  No bruits are noted.  Neurologic: Alert and oriented to person/place/time, normal speech - bilateral paralysis of both lower extremities.  Skin: No petechiae, purpura or rash.    Labs:    We discussed the lab results directly with patient.    LIPID RESULTS:  Lab Results   Component Value Date    CHOL 141 03/22/2021    HDL 43 03/22/2021    LDL 52 03/22/2021    TRIG 233 (H) 03/22/2021    NHDL 98 03/22/2021       LIVER ENZYME RESULTS:  Lab Results   Component Value Date    AST 22 03/22/2021    ALT 33 03/22/2021           BMP RESULTS:  Lab Results   Component Value Date     03/22/2021    POTASSIUM 3.9 03/22/2021    CHLORIDE 107 03/22/2021    CO2 30 03/22/2021    ANIONGAP 4 03/22/2021    GLC 93 03/22/2021    BUN 15 03/22/2021    CR 0.63 (L) 03/22/2021    GFRESTIMATED >90 03/22/2021    GFRESTBLACK >90 03/22/2021    PAT 8.6 03/22/2021          Procedures:    We discussed the results of echocardiogram directly with patient.    Echocardiogram 03-22-21      Echocardiogram with two-dimensional, color and spectral " Doppler performed.  ______________________________________________________________________________  Interpretation Summary  Left ventricular function, chamber size, wall motion, and wall thickness are  normal.The EF is 55-60%.     Right ventricular function, chamber size, wall motion, and thickness are  normal.     No significant valvular abnormalities present.     No pericardial effusion is present.     This study was compared with the study from 3/12/2020  There has been no change.  ______________________________________________________________________________  Left Ventricle  Left ventricular function, chamber size, wall motion, and wall thickness are  normal.The EF is 55-60%. Thickening of the anterobasal septum is present. Left  ventricular diastolic function is normal.     Right Ventricle  Right ventricular function, chamber size, wall motion, and thickness are  normal.     Atria  Both atria appear normal.     Mitral Valve  The mitral valve is normal. Trace mitral insufficiency is present.     Aortic Valve  Aortic valve is normal in structure and function. The aortic valve is  tricuspid. No aortic regurgitation is present.     Tricuspid Valve  The tricuspid valve is normal. Trace tricuspid insufficiency is present. The  peak velocity of the tricuspid regurgitant jet is not obtainable.     Pulmonic Valve  The pulmonic valve is normal.     Vessels  The aorta root is normal. Sinuses of Valsalva 3.1 cm. Ascending aorta 3.5 cm.  Small inferior vena cava size consistent with hypovolemia.     Pericardium  No pericardial effusion is present.     Miscellaneous  No significant valvular abnormalities present.     Compared to Previous Study  There has been no change. This study was compared with the study from  3/12/2020 .  ______________________________________________________________________________  MMode/2D Measurements & Calculations  IVSd: 0.72 cm  LVIDd: 4.1 cm  LVIDs: 2.7 cm  LVPWd: 0.69 cm  FS: 32.7 %  LV  mass(C)d: 82.0 grams  LV mass(C)dI: 42.8 grams/m2  Ao root diam: 3.1 cm  asc Aorta Diam: 3.5 cm  LVOT diam: 2.2 cm  LVOT area: 3.7 cm2  LA Volume (BP): 38.3 ml     LA Volume Index (BP): 19.9 ml/m2  RWT: 0.34     Doppler Measurements & Calculations  MV E max will: 61.4 cm/sec  MV A max will: 67.9 cm/sec  MV E/A: 0.90  MV dec slope: 200.4 cm/sec2  MV dec time: 0.31 sec  Ao V2 max: 141.2 cm/sec  Ao max P.0 mmHg  STEPH(V,D): 3.2 cm2  LV V1 max P.9 mmHg  LV V1 max: 121.3 cm/sec  LV V1 VTI: 21.8 cm  SV(LVOT): 81.1 ml  SI(LVOT): 42.3 ml/m2  PA V2 max: 114.6 cm/sec  PA max P.3 mmHg  PA acc time: 0.09 sec  AV Will Ratio (DI): 0.86  E/E' av.9  Lateral E/e': 4.3  Medial E/e': 5.5         Assessment and Plan:     We discussed the results with patient.  We discussed the importance of a heart  healthy diet and lifestyle.  We discussed following items:    Medication Changes: None.     Recommendations: Repeat fasting labs in one year prior to seeing Dr Jane.     Studies Ordered: Echocardiogram in one year prior to seeing Dr Jane.      Please follow up: With Dr. Jane in one year.     Anoop Jane MD, PhD  Professor of Medicine  Division of Cardiology           CC  Patient Care Team:  Eugene Burrell MD as PCP - General (Family Practice)  Anoop Jane MD as MD (Cardiology)  Andrei Pelaez MD as MD (Urology)  Candice Bridges, RN as Registered Nurse (Urology)  Marilin Ramirez LPN as Nurse Coordinator (Cardiology)  [facility]  Cody Stroud MD as Assigned Surgical Provider  Anoop Jane MD as Assigned Heart and Vascular Provider  ANOOP JANE

## 2021-04-02 ENCOUNTER — IMMUNIZATION (OUTPATIENT)
Dept: NURSING | Facility: CLINIC | Age: 61
End: 2021-04-02
Attending: INTERNAL MEDICINE
Payer: COMMERCIAL

## 2021-04-02 PROCEDURE — 91300 PR COVID VAC PFIZER DIL RECON 30 MCG/0.3 ML IM: CPT

## 2021-04-02 PROCEDURE — 0002A PR COVID VAC PFIZER DIL RECON 30 MCG/0.3 ML IM: CPT

## 2021-04-20 ENCOUNTER — PRE VISIT (OUTPATIENT)
Dept: UROLOGY | Facility: CLINIC | Age: 61
End: 2021-04-20

## 2021-04-20 ENCOUNTER — TELEPHONE (OUTPATIENT)
Dept: UROLOGY | Facility: CLINIC | Age: 61
End: 2021-04-20

## 2021-04-20 ENCOUNTER — HOSPITAL ENCOUNTER (OUTPATIENT)
Dept: RESEARCH | Facility: CLINIC | Age: 61
End: 2021-04-20
Payer: COMMERCIAL

## 2021-04-20 DIAGNOSIS — N39.41 URGE INCONTINENCE: ICD-10-CM

## 2021-04-20 DIAGNOSIS — N39.498 OTHER URINARY INCONTINENCE: ICD-10-CM

## 2021-04-20 DIAGNOSIS — N31.9 NEUROGENIC BLADDER: Primary | ICD-10-CM

## 2021-04-20 PROCEDURE — 300N000003 HC RESEARCH SPECIMEN PROCESSING, SIMPLE

## 2021-04-20 PROCEDURE — 510N000017 HC CRU PATIENT CARE, PER 15 MIN

## 2021-04-20 PROCEDURE — 300N000005 HC RESEARCH SPECIMEN PROCESSING, COMPLEX

## 2021-04-20 PROCEDURE — 510N000009 HC RESEARCH FACILITY, PER 15 MIN

## 2021-04-20 RX ORDER — CIPROFLOXACIN 500 MG/1
500 TABLET, FILM COATED ORAL DAILY
Qty: 3 TABLET | Refills: 0 | Status: CANCELLED | OUTPATIENT
Start: 2021-04-20 | End: 2021-04-23

## 2021-04-20 RX ORDER — CIPROFLOXACIN 500 MG/1
500 TABLET, FILM COATED ORAL DAILY
Qty: 3 TABLET | Refills: 0 | Status: SHIPPED | OUTPATIENT
Start: 2021-04-20 | End: 2023-04-07

## 2021-04-20 NOTE — TELEPHONE ENCOUNTER
Called patient to discuss upcoming cystoscopy with Botox. Told patient to take 1 tablet of Cipro 500 mg day before, day of, and day after procedure. Also told to discontinue blood thinning medications 3 days prior. Patient voiced understanding. Cipro prescription sent to [facility] in [place].

## 2021-04-20 NOTE — TELEPHONE ENCOUNTER
Reason for visit: Cystoscopy     Relevant information: with Botox    Records/imaging/labs/orders: in Epic; Botox approved in specialty comments. Cipro sent to pharmacy    Pt called: yes    At Rooming: Rigid scope, flex needle, 200 units in 20 mL

## 2021-04-20 NOTE — ADDENDUM NOTE
Encounter addended by: Janene Polanco on: 4/20/2021 3:40 PM   Actions taken: Charge Capture section accepted

## 2021-04-28 ENCOUNTER — ANCILLARY PROCEDURE (OUTPATIENT)
Dept: CT IMAGING | Facility: CLINIC | Age: 61
End: 2021-04-28
Attending: INTERNAL MEDICINE
Payer: COMMERCIAL

## 2021-04-28 ENCOUNTER — OFFICE VISIT (OUTPATIENT)
Dept: UROLOGY | Facility: CLINIC | Age: 61
End: 2021-04-28
Payer: COMMERCIAL

## 2021-04-28 VITALS
BODY MASS INDEX: 23.1 KG/M2 | HEART RATE: 80 BPM | DIASTOLIC BLOOD PRESSURE: 68 MMHG | HEIGHT: 71 IN | SYSTOLIC BLOOD PRESSURE: 109 MMHG | WEIGHT: 165 LBS

## 2021-04-28 DIAGNOSIS — N39.498 OTHER URINARY INCONTINENCE: ICD-10-CM

## 2021-04-28 DIAGNOSIS — R91.8 PULMONARY NODULES: ICD-10-CM

## 2021-04-28 DIAGNOSIS — N31.9 NEUROGENIC BLADDER: Primary | ICD-10-CM

## 2021-04-28 DIAGNOSIS — N39.41 URGE INCONTINENCE: ICD-10-CM

## 2021-04-28 LAB
ALBUMIN UR-MCNC: NEGATIVE MG/DL
APPEARANCE UR: CLEAR
BILIRUB UR QL STRIP: NEGATIVE
COLOR UR AUTO: YELLOW
GLUCOSE UR STRIP-MCNC: NEGATIVE MG/DL
HGB UR QL STRIP: NEGATIVE
KETONES UR STRIP-MCNC: NEGATIVE MG/DL
LEUKOCYTE ESTERASE UR QL STRIP: ABNORMAL
NITRATE UR QL: POSITIVE
PH UR STRIP: 6 PH (ref 5–7)
SP GR UR STRIP: >1.03 (ref 1–1.03)
UROBILINOGEN UR STRIP-ACNC: 1 EU/DL (ref 0.2–1)

## 2021-04-28 PROCEDURE — 81003 URINALYSIS AUTO W/O SCOPE: CPT | Performed by: PATHOLOGY

## 2021-04-28 PROCEDURE — 71250 CT THORAX DX C-: CPT | Performed by: RADIOLOGY

## 2021-04-28 PROCEDURE — 52287 CYSTOSCOPY CHEMODENERVATION: CPT | Performed by: UROLOGY

## 2021-04-28 RX ORDER — GENTAMICIN 40 MG/ML
80 INJECTION, SOLUTION INTRAMUSCULAR; INTRAVENOUS ONCE
Status: COMPLETED | OUTPATIENT
Start: 2021-04-28 | End: 2021-04-28

## 2021-04-28 RX ADMIN — GENTAMICIN 80 MG: 40 INJECTION, SOLUTION INTRAMUSCULAR; INTRAVENOUS at 09:00

## 2021-04-28 ASSESSMENT — MIFFLIN-ST. JEOR: SCORE: 1590.57

## 2021-04-28 ASSESSMENT — PAIN SCALES - GENERAL: PAINLEVEL: NO PAIN (0)

## 2021-04-28 NOTE — PATIENT INSTRUCTIONS
"Please follow up with Dr. Stroud for a cystoscopy w/botox in 4 months     It was a pleasure meeting with you today.  Thank you for allowing me and my team the privilege of caring for you today.  YOU are the reason we are here, and I truly hope we provided you with the excellent service you deserve.  Please let us know if there is anything else we can do for you so that we can be sure you are leaving completely satisfied with your care experience.      Kenneth Russell EMT  AFTER YOUR CYSTOSCOPY        You have just completed a cystoscopy, or \"cysto\", which allowed your physician to learn more about your bladder (or to remove a stent placed after surgery). We suggest that you continue to avoid caffeine, fruit juice, and alcohol for the next 24 hours, however, you are encouraged to return to your normal activities.         A few things that are considered normal after your cystoscopy:     * Small amount of bleeding (or spotting) that clears within the next 24 hours     * Slight burning sensation with urination     * Sensation to of needing to avoid more frequently     * The feeling of \"air\" in your urine     * Mild discomfort that is relieved with Tylenol        Please contact our office promptly if you:     * Develop a fever above 101 degrees     * Are unable to urinate     * Develop bright red blood that does not stop     * Severe pain or swelling         Please contact our office with any concerns or questions @[email or web address].  "

## 2021-04-28 NOTE — LETTER
"4/28/2021       RE: Derek Enriquez  [address]  [facility] [address]     Dear Colleague,    Thank you for referring your patient, Derek Enriquez, to the [facility] [place] [facility]. Please see a copy of my visit note below.    Cystoscopy with Botox Note    PRE-PROCEDURE DIAGNOSIS:  Neurogenic Bladder    POST-PROCEDURE DIAGNOSIS:  Neurogenic Bladder      PROCEDURE:   Cystoscopy with chemodenervation of the bladder    HISTORY: Derek Enriquez is a 60 year old male with Neurogenic bladder secondary to subdural hematoma at T10 s/p T9-12 laminectomy and evacuation of subdural hematoma eventually leading to paraplegia.  Tried Trospium but significant side effect of constipation. He cannot tolerate anticholinergics.  Has not tried Botox. Had insurance issues due to lack of trying multiple medical therapies.  Performs CIC.  Underwent UDS previously which showed significant NDO with very high pressures starting around 125mL  He has urinary urge incontinence due to his neurogenic DO  Thus, we tried Myrbetriq. He can tolerate the medication without significant side effects. He has a mild improvement of symptoms. However, we repeated urodynamics to ensure safe storage pressures, and they are clearly not safe.   Per UDS note:  \"-Multiple strong UDCs to pressures >110 cm H20, which begin at bladder volumes >160 mL and are associated with incontinene.  -Gradual increase in baseline pressure prior to sudden spike in pressure at fill volume of 163 mL. Pressures slowly return near baseline between each UDC.\"     He previously underwent Botox to bladder 11/25/2020 and he noted significant improvement.  He notes it wears off around 4 months post procedure. Thus, he is here today for repeat injection.    He took Cipro, and he's never had prior positive cipro resistant culture.  Though he was nitrite positive today  Thus, for extra coverage, we gave him IM " Gent 80mg.     DESCRIPTION OF PROCEDURE:  After informed consent was obtained, the patient was brought to the procedure room where they were placed in supine with all pressure points well padded.  The patient was prepped and draped in a sterile fashion. A flexible cystoscope was introduced through a well-lubricated urethra. There were no urethral strictures. The bladder was entered. The urine was clear. Systematic cystoscopy was performed. There were no tumors, stones, or other abnormalities in the bladder. Flexible scope and flexible needle were inserted. 200u of Botox was mixed in 20cc normal saline. Systematic injection was performed with 15-20 locations in a broad template. Minimal bleeding was noted, and scope was removed along with needle.       ASSESSMENT AND PLAN:  Botox injection today successful  Followup in 4 months for repeat botox injection in clinic    Cody Stroud MD  Reconstructive Urology

## 2021-04-28 NOTE — NURSING NOTE
The following medication was given:     MEDICATION:  Gentamicin  ROUTE: IM  SITE: Ventrogluteal - Right  DOSE: 80 mg/ 2 mL  LOT #: [id number]  : OM Latam  EXPIRATION DATE: 12/21  NDC#: 12369-567-68   Was there drug waste? No    Prior to injection, verified patient identity using patient's name and date of birth.  Due to injection administration, patient instructed to remain in clinic for 15 minutes  afterwards, and to report any adverse reaction to me immediately.      Drug Amount Wasted:  None.  Vial/Syringe: Single dose vial    Padmini [facility] CMA  April 28, 2021  11:43 AM

## 2021-04-28 NOTE — NURSING NOTE
"Chief Complaint   Patient presents with     Cystoscopy     with Botox       Blood pressure 109/68, pulse 80, height 1.803 m (5' 11\"), weight 74.8 kg (165 lb). Body mass index is 23.01 kg/m .    Patient Active Problem List   Diagnosis     Cardiac arrest (H)     Coronary atherosclerosis     Stented coronary artery     Spinal cord compression (H)     Subdural hematoma (H)     Post-operative state     Decubitus ulcer of sacral region, stage 3 (H)     Chronic skin ulcer, limited to breakdown of skin (H)     Pressure ulcer of other site, stage 3 (H)     Heterotopic ossification     Other calcification of muscle, left thigh     Status post hip surgery     Ingrown nail     Open wound, left lateral foot     Abnormal CT scan, heart     Dyslipidemia     Family history of premature CAD     Impotence of organic origin     Injury of thoracic spinal cord (H)     Neurogenic bladder     Neurogenic bowel     Orthostatic hypotension     Paraplegia (H)     Preop examination     Coronary artery disease involving native coronary artery of native heart without angina pectoris       No Known Allergies    Current Outpatient Medications   Medication Sig Dispense Refill     ascorbic acid (VITAMIN C) 500 MG tablet Take 500 mg by mouth every morning        aspirin 81 MG EC tablet Take 81 mg by mouth daily       atorvastatin (LIPITOR) 20 MG tablet Take 1 tablet (20 mg) by mouth At Bedtime 90 tablet 3     ciprofloxacin (CIPRO) 500 MG tablet Take 1 tablet (500 mg) by mouth daily 3 tablet 0     Multiple Vitamins-Minerals (MULTIVITAMIN MEN PO) Take 1 tablet by mouth daily        tadalafil (CIALIS) 20 MG tablet Take 1 tablet (20 mg) PO PRN prior to sexual activity. Max: 1 tablet per 36 hours. 5 tablet 3     vitamin B complex with vitamin C (VITAMIN  B COMPLEX) TABS tablet Take 1 tablet by mouth daily       MYRBETRIQ 25 MG 24 hr tablet TK ONE T PO QD       order for DME Equipment being ordered: [facility] Phone [phone] Fax " [phone]    Left Lateral Foot Primary Dressing Polymem Cloth Strips (not dots) Qty 30  Coccyx Secondary Dressing  tegaderm adhesive foam dressing 3x3 Qty 30  Perineum Secondary Dressing tegaderm + pad ( ref 3582) qty 30  Length of Need: 1 month  Frequency of dressing change: daily 30 days 0       Social History     Tobacco Use     Smoking status: Former Smoker     Smokeless tobacco: Never Used     Tobacco comment: Quit at 31   Substance Use Topics     Alcohol use: Yes     Comment: socially     Drug use: No       Invasive Procedure Safety Checklist:    Procedure: Cystoscopy with Botox injection    Action: Complete sections and checkboxes as appropriate.    Pre-procedure:  1. Patient ID Verified with 2 identifiers (Mariluz and  or MRN) : YES    2. Procedure and site verified with patient/designee (when able) : YES    3. Accurate consent documentation in medical record : YES    4. H&P (or appropriate assessment) documented in medical record : N/A  H&P must be up to 30 days prior to procedure an updated within 24 hours of                 Procedure as applicable.     5. Relevant diagnostic and radiology test results appropriately labeled and displayed as applicable : YES    6. Blood products, implants, devices, and/or special equipment available for the procedure as applicable : YES    7. Procedure site(s) marked with provider initials [Exclusions: none] : NO    8. Marking not required. Reason : Yes  Procedure does not require site marking    Time Out:     Time-Out performed immediately prior to starting procedure, including verbal and active participation of all team members addressing: YES    1. Correct patient identity.  2. Confirmed that the correct side and site are marked.  3. An accurate procedure to be done.  4. Agreement on the procedure to be done.  5. Correct patient position.  6. Relevant images and results are properly labeled and appropriately displayed.  7. The need to administer antibiotics or fluids  for irrigation purposes during the procedure as applicable.  8. Safety precautions based on patient history or medication use.    During Procedure: Verification of correct person, site, and procedure occurs any time the responsibility for care of the patient is transferred to another member of the care team.    Patient verified with three identifiers, allergies reviewed. Verified patient stopped blood thinning medications and started Cipro.     Urine obtained and, using Clinitek machine, UA showed no nitrites or leuk.     Patient prepped in normal sterile fashion. Urojet injected into patient's urethra.  Dr. Stroud proceeded with 200 units Botox injection (in 20 mL sterile saline). Patient tolerated procedure well with no noted complications.    The following medication was given:     MEDICATION:  Botox  ROUTE: administered by physician - bladder wall/urinary sphincter   SITE: administered by physician - bladder wall/urinary sphincter    DOSE: 200 units  LOT #: [id number]  : DNsolution  EXPIRATION DATE: 07/23  NDC#: 6994-1819-78   Was there drug waste? No    Prior to injection, verified patient identity using patient's name and date of birth.  Due to injection administration, patient instructed to remain in clinic for 15 minutes  afterwards, and to report any adverse reaction to me immediately.    Drug Amount Wasted:  None.  Vial/Syringe: Single dose vial    Emiliano Russell EMT  4/28/2021  8:33 AM

## 2021-04-28 NOTE — PROGRESS NOTES
"Cystoscopy with Botox Note    PRE-PROCEDURE DIAGNOSIS:  Neurogenic Bladder    POST-PROCEDURE DIAGNOSIS:  Neurogenic Bladder      PROCEDURE:   Cystoscopy with chemodenervation of the bladder    HISTORY: Derek Enriquez is a 60 year old male with Neurogenic bladder secondary to subdural hematoma at T10 s/p T9-12 laminectomy and evacuation of subdural hematoma eventually leading to paraplegia.  Tried Trospium but significant side effect of constipation. He cannot tolerate anticholinergics.  Has not tried Botox. Had insurance issues due to lack of trying multiple medical therapies.  Performs CIC.  Underwent UDS previously which showed significant NDO with very high pressures starting around 125mL  He has urinary urge incontinence due to his neurogenic DO  Thus, we tried Myrbetriq. He can tolerate the medication without significant side effects. He has a mild improvement of symptoms. However, we repeated urodynamics to ensure safe storage pressures, and they are clearly not safe.   Per UDS note:  \"-Multiple strong UDCs to pressures >110 cm H20, which begin at bladder volumes >160 mL and are associated with incontinene.  -Gradual increase in baseline pressure prior to sudden spike in pressure at fill volume of 163 mL. Pressures slowly return near baseline between each UDC.\"     He previously underwent Botox to bladder 11/25/2020 and he noted significant improvement.  He notes it wears off around 4 months post procedure. Thus, he is here today for repeat injection.    He took Cipro, and he's never had prior positive cipro resistant culture.  Though he was nitrite positive today  Thus, for extra coverage, we gave him IM Gent 80mg.     DESCRIPTION OF PROCEDURE:  After informed consent was obtained, the patient was brought to the procedure room where they were placed in supine with all pressure points well padded.  The patient was prepped and draped in a sterile fashion. A flexible cystoscope was introduced through a " well-lubricated urethra. There were no urethral strictures. The bladder was entered. The urine was clear. Systematic cystoscopy was performed. There were no tumors, stones, or other abnormalities in the bladder. Flexible scope and flexible needle were inserted. 200u of Botox was mixed in 20cc normal saline. Systematic injection was performed with 15-20 locations in a broad template. Minimal bleeding was noted, and scope was removed along with needle.       ASSESSMENT AND PLAN:  Botox injection today successful  Followup in 4 months for repeat botox injection in clinic    Cody Stroud MD  Reconstructive Urology

## 2021-06-07 ENCOUNTER — HOSPITAL ENCOUNTER (OUTPATIENT)
Dept: RESEARCH | Facility: CLINIC | Age: 61
End: 2021-06-07
Attending: PHYSICAL MEDICINE & REHABILITATION
Payer: COMMERCIAL

## 2021-06-07 PROCEDURE — 300N000004 HC RESEARCH SPECIMEN PROCESSING, MODERATE

## 2021-06-07 PROCEDURE — 510N000017 HC CRU PATIENT CARE, PER 15 MIN

## 2021-06-07 PROCEDURE — 510N000009 HC RESEARCH FACILITY, PER 15 MIN

## 2021-06-07 NOTE — ADDENDUM NOTE
Encounter addended by: Janene Polanco on: 6/7/2021 9:40 AM   Actions taken: Charge Capture section accepted

## 2021-08-09 ENCOUNTER — PRE VISIT (OUTPATIENT)
Dept: UROLOGY | Facility: CLINIC | Age: 61
End: 2021-08-09

## 2021-08-09 DIAGNOSIS — N31.9 NEUROGENIC BLADDER: Primary | ICD-10-CM

## 2021-08-09 NOTE — TELEPHONE ENCOUNTER
Reason for visit: Cystoscopy     Relevant information: with botox; hx of neurogenic bladder    Records/imaging/labs/orders: in EPIC    Pt called: will call 1 week prior    At [place] of Botox in 20cc saline; flexible scope flexible needle

## 2021-08-18 NOTE — DISCHARGE INSTRUCTIONS
[facility]  [address] [facility] [address], [place] [address]  Appointment Phone [phone] Nurse Advisors [phone]     Derek Enriquez      1960     Wound Dressing Change:Right and Left Foot and Coccyx  Cleanse wound with wound cleanser  Cover wound with wound gel on guaze  Cover wound on Coccyx with mepilex border foot with gauze  Change dressing daily  Compression:   You have a compression wrap Spandigrip F or wraps is supposed to be removed at night and put back on first thing in the morning.   Please remove compression dressing if toes turn blue and/or tingle and can not be relieved by raising the leg for one hour.     Repositioning:    Bed:  Reposition pt MINIMALLY every 1-2 hours in bed to relieve pressure and promote perfusion to tissue.     Chair:  When up to chair pt should not sit for longer than 30 minutes total before either tilting or returning to bed for at least 10 minutes, again to relieve pressure and promote perfusion to tissue.       Pt should also sit on a chair cushion when up to the chair.          Rosario Sánchez PA-C. September 12, 2018     Call us at [phone] if you have any questions about your wounds, have redness or swelling around your wound, have a fever of 101 or greater or if you have any other problems or concerns. We answer the phone Monday through Friday 8 am to 4 pm, please leave a message as we check the voicemail frequently throughout the day.      Follow up with Provider - 2 weeks         
No

## 2021-08-19 ENCOUNTER — TELEPHONE (OUTPATIENT)
Dept: UROLOGY | Facility: CLINIC | Age: 61
End: 2021-08-19

## 2021-08-19 DIAGNOSIS — N31.9 NEUROGENIC BLADDER: Primary | ICD-10-CM

## 2021-08-19 RX ORDER — CIPROFLOXACIN 500 MG/1
500 TABLET, FILM COATED ORAL DAILY
Qty: 3 TABLET | Refills: 0 | Status: SHIPPED | OUTPATIENT
Start: 2021-08-19 | End: 2021-08-22

## 2021-08-19 NOTE — TELEPHONE ENCOUNTER
Called patient to discuss upcoming cystoscopy with Botox. Told patient to take 1 tablet of Cipro 500 mg day before, day of, and day after procedure. Also told to discontinue blood thinning medications 3 days prior. Patient voiced understanding. Cipro prescription sent to [facility] in [place].

## 2021-08-25 ENCOUNTER — OFFICE VISIT (OUTPATIENT)
Dept: UROLOGY | Facility: CLINIC | Age: 61
End: 2021-08-25
Payer: COMMERCIAL

## 2021-08-25 VITALS
HEART RATE: 86 BPM | SYSTOLIC BLOOD PRESSURE: 145 MMHG | WEIGHT: 165 LBS | HEIGHT: 71 IN | DIASTOLIC BLOOD PRESSURE: 80 MMHG | BODY MASS INDEX: 23.1 KG/M2

## 2021-08-25 DIAGNOSIS — N31.9 NEUROGENIC BLADDER: Primary | ICD-10-CM

## 2021-08-25 PROCEDURE — 52287 CYSTOSCOPY CHEMODENERVATION: CPT | Performed by: UROLOGY

## 2021-08-25 ASSESSMENT — MIFFLIN-ST. JEOR: SCORE: 1590.57

## 2021-08-25 ASSESSMENT — PAIN SCALES - GENERAL: PAINLEVEL: NO PAIN (0)

## 2021-08-25 NOTE — PATIENT INSTRUCTIONS
"Please follow up in 4 months for repeat botox injection.    It was a pleasure meeting with you today.  Thank you for allowing me and my team the privilege of caring for you today.  YOU are the reason we are here, and I truly hope we provided you with the excellent service you deserve.  Please let us know if there is anything else we can do for you so that we can be sure you are leaving completely satisfied with your care experience.          AFTER YOUR CYSTOSCOPY        You have just completed a cystoscopy, or \"cysto\", which allowed your physician to learn more about your bladder (or to remove a stent placed after surgery). We suggest that you continue to avoid caffeine, fruit juice, and alcohol for the next 24 hours, however, you are encouraged to return to your normal activities.         A few things that are considered normal after your cystoscopy:     * Small amount of bleeding (or spotting) that clears within the next 24 hours     * Slight burning sensation with urination     * Sensation to of needing to avoid more frequently     * The feeling of \"air\" in your urine     * Mild discomfort that is relieved with Tylenol        Please contact our office promptly if you:     * Develop a fever above 101 degrees     * Are unable to urinate     * Develop bright red blood that does not stop     * Severe pain or swelling         Please contact our office with any concerns or questions @[email or web address].  "

## 2021-08-25 NOTE — LETTER
"8/25/2021       RE: Derek Enriquez  [address]  [facility] [address]     Dear Colleague,    Thank you for referring your patient, Derek Enriquez, to the [facility] [place] at [facility]. Please see a copy of my visit note below.    Cystoscopy with Botox Note     PRE-PROCEDURE DIAGNOSIS:  Neurogenic Bladder     POST-PROCEDURE DIAGNOSIS:  Neurogenic Bladder     PROCEDURE:   Cystoscopy with chemodenervation of the bladder     HISTORY: Derek Enriquez is a 60 year old male with Neurogenic bladder secondary to subdural hematoma at T10 s/p T9-12 laminectomy and evacuation of subdural hematoma eventually leading to paraplegia.  Tried Trospium but significant side effect of constipation. He cannot tolerate anticholinergics.  Has not tried Botox. Had insurance issues due to lack of trying multiple medical therapies.  Performs CIC.  Underwent UDS previously which showed significant NDO with very high pressures starting around 125mL  He has urinary urge incontinence due to his neurogenic DO  Thus, we tried Myrbetriq. He can tolerate the medication without significant side effects. He has a mild improvement of symptoms. However, we repeated urodynamics to ensure safe storage pressures, and they are clearly not safe.   Per UDS note:  \"-Multiple strong UDCs to pressures >110 cm H20, which begin at bladder volumes >160 mL and are associated with incontinene.  -Gradual increase in baseline pressure prior to sudden spike in pressure at fill volume of 163 mL. Pressures slowly return near baseline between each UDC.\"     He previously underwent Botox to bladder 4/28/2021  He did well with this procedure and he noted improvement of symptom.  He is here for repeat injection.      DESCRIPTION OF PROCEDURE:  After informed consent was obtained, the patient was brought to the procedure room where they were placed in supine with all pressure points well padded.  The " patient was prepped and draped in a sterile fashion. A flexible cystoscope was introduced through a well-lubricated urethra. There were no urethral strictures. The bladder was entered. The urine was clear. Systematic cystoscopy was performed. There were no tumors, stones, or other abnormalities in the bladder. Flexible scope and flexible needle were inserted. 200u of Botox was mixed in 20cc normal saline. Systematic injection was performed with 15-20 locations in a broad template. Minimal bleeding was noted, and scope was removed along with needle.     ASSESSMENT AND PLAN:  Botox injection today successful  Followup in 4 months for repeat botox injection in clinic     Cody Stroud MD  Reconstructive Urology

## 2021-08-25 NOTE — NURSING NOTE
"Chief Complaint   Patient presents with     Cystoscopy     with botox       Blood pressure (!) 145/80, pulse 86, height 1.803 m (5' 11\"), weight 74.8 kg (165 lb). Body mass index is 23.01 kg/m .    Patient Active Problem List   Diagnosis     Cardiac arrest (H)     Coronary atherosclerosis     Stented coronary artery     Spinal cord compression (H)     Subdural hematoma (H)     Post-operative state     Decubitus ulcer of sacral region, stage 3 (H)     Chronic skin ulcer, limited to breakdown of skin (H)     Pressure ulcer of other site, stage 3 (H)     Heterotopic ossification     Other calcification of muscle, left thigh     Status post hip surgery     Ingrown nail     Open wound, left lateral foot     Abnormal CT scan, heart     Dyslipidemia     Family history of premature CAD     Impotence of organic origin     Injury of thoracic spinal cord (H)     Neurogenic bladder     Neurogenic bowel     Orthostatic hypotension     Paraplegia (H)     Preop examination     Coronary artery disease involving native coronary artery of native heart without angina pectoris       No Known Allergies    Current Outpatient Medications   Medication Sig Dispense Refill     ascorbic acid (VITAMIN C) 500 MG tablet Take 500 mg by mouth every morning        aspirin 81 MG EC tablet Take 81 mg by mouth daily       atorvastatin (LIPITOR) 20 MG tablet Take 1 tablet (20 mg) by mouth At Bedtime 90 tablet 3     ciprofloxacin (CIPRO) 500 MG tablet Take 1 tablet (500 mg) by mouth daily 3 tablet 0     Multiple Vitamins-Minerals (MULTIVITAMIN MEN PO) Take 1 tablet by mouth daily        order for DME Equipment being ordered: [facility] [employer] [facility] Phone [phone] Fax [phone]    Left Lateral Foot Primary Dressing Polymem Cloth Strips (not dots) Qty 30  Coccyx Secondary Dressing  tegaderm adhesive foam dressing 3x3 Qty 30  Perineum Secondary Dressing tegaderm + pad ( ref [id number]) qty 30  Length of Need: 1 month  Frequency of dressing change: daily 30 " days 0     vitamin B complex with vitamin C (VITAMIN  B COMPLEX) TABS tablet Take 1 tablet by mouth daily       MYRBETRIQ 25 MG 24 hr tablet TK ONE T PO QD (Patient not taking: Reported on 2021)       tadalafil (CIALIS) 20 MG tablet Take 1 tablet (20 mg) PO PRN prior to sexual activity. Max: 1 tablet per 36 hours. (Patient not taking: Reported on 2021) 5 tablet 3       Social History     Tobacco Use     Smoking status: Former Smoker     Smokeless tobacco: Never Used     Tobacco comment: Quit at 31   Substance Use Topics     Alcohol use: Yes     Comment: socially     Drug use: No       Invasive Procedure Safety Checklist:    Procedure: Cystoscopy with Botox injection    Action: Complete sections and checkboxes as appropriate.    Pre-procedure:  1. Patient ID Verified with 2 identifiers (Mariluz and  or MRN) : YES    2. Procedure and site verified with patient/designee (when able) : YES    3. Accurate consent documentation in medical record : YES    4. H&P (or appropriate assessment) documented in medical record : N/A  H&P must be up to 30 days prior to procedure an updated within 24 hours of                 Procedure as applicable.     5. Relevant diagnostic and radiology test results appropriately labeled and displayed as applicable : YES    6. Blood products, implants, devices, and/or special equipment available for the procedure as applicable : YES    7. Procedure site(s) marked with provider initials [Exclusions: none] : NO    8. Marking not required. Reason : Yes  Procedure does not require site marking    Time Out:     Time-Out performed immediately prior to starting procedure, including verbal and active participation of all team members addressing: YES    1. Correct patient identity.  2. Confirmed that the correct side and site are marked.  3. An accurate procedure to be done.  4. Agreement on the procedure to be done.  5. Correct patient position.  6. Relevant images and results are properly  labeled and appropriately displayed.  7. The need to administer antibiotics or fluids for irrigation purposes during the procedure as applicable.  8. Safety precautions based on patient history or medication use.    During Procedure: Verification of correct person, site, and procedure occurs any time the responsibility for care of the patient is transferred to another member of the care team.    Patient verified with three identifiers, allergies reviewed. Verified patient stopped blood thinning medications and started Cipro.     Urine obtained and, using Clinitek machine, UA showed no nitrites or leuk.     The following medication was given:     MEDICATION:  Botox  ROUTE: administered by physician - bladder wall/urinary sphincter   SITE: administered by physician - bladder wall/urinary sphincter    DOSE: 200 units  LOT #: [id number]  : [employer]  EXPIRATION DATE: 03/24  NDC#: 3696-3003-00   Was there drug waste? No    Prior to injection, verified patient identity using patient's name and date of birth.  Due to injection administration, patient instructed to remain in clinic for 15 minutes  afterwards, and to report any adverse reaction to me immediately.    Drug Amount Wasted:  None.  Vial/Syringe: Single dose vial    Emiliano Russell EMT  8/25/2021  8:20 AM

## 2021-08-25 NOTE — PROGRESS NOTES
"Cystoscopy with Botox Note     PRE-PROCEDURE DIAGNOSIS:  Neurogenic Bladder     POST-PROCEDURE DIAGNOSIS:  Neurogenic Bladder     PROCEDURE:   Cystoscopy with chemodenervation of the bladder     HISTORY: Derek Enriquez is a 60 year old male with Neurogenic bladder secondary to subdural hematoma at T10 s/p T9-12 laminectomy and evacuation of subdural hematoma eventually leading to paraplegia.  Tried Trospium but significant side effect of constipation. He cannot tolerate anticholinergics.  Has not tried Botox. Had insurance issues due to lack of trying multiple medical therapies.  Performs CIC.  Underwent UDS previously which showed significant NDO with very high pressures starting around 125mL  He has urinary urge incontinence due to his neurogenic DO  Thus, we tried Myrbetriq. He can tolerate the medication without significant side effects. He has a mild improvement of symptoms. However, we repeated urodynamics to ensure safe storage pressures, and they are clearly not safe.   Per UDS note:  \"-Multiple strong UDCs to pressures >110 cm H20, which begin at bladder volumes >160 mL and are associated with incontinene.  -Gradual increase in baseline pressure prior to sudden spike in pressure at fill volume of 163 mL. Pressures slowly return near baseline between each UDC.\"     He previously underwent Botox to bladder 4/28/2021  He did well with this procedure and he noted improvement of symptom.  He is here for repeat injection.      DESCRIPTION OF PROCEDURE:  After informed consent was obtained, the patient was brought to the procedure room where they were placed in supine with all pressure points well padded.  The patient was prepped and draped in a sterile fashion. A flexible cystoscope was introduced through a well-lubricated urethra. There were no urethral strictures. The bladder was entered. The urine was clear. Systematic cystoscopy was performed. There were no tumors, stones, or other abnormalities in the bladder. " Flexible scope and flexible needle were inserted. 200u of Botox was mixed in 20cc normal saline. Systematic injection was performed with 15-20 locations in a broad template. Minimal bleeding was noted, and scope was removed along with needle.     ASSESSMENT AND PLAN:  Botox injection today successful  Followup in 4 months for repeat botox injection in clinic     Cody Stroud MD  Reconstructive Urology

## 2021-09-04 ENCOUNTER — HEALTH MAINTENANCE LETTER (OUTPATIENT)
Age: 61
End: 2021-09-04

## 2021-11-23 ENCOUNTER — PRE VISIT (OUTPATIENT)
Dept: UROLOGY | Facility: CLINIC | Age: 61
End: 2021-11-23
Payer: COMMERCIAL

## 2021-11-23 NOTE — TELEPHONE ENCOUNTER
Reason for visit: Cystoscopy     Relevant information: with botox    Records/imaging/labs/orders: in Epic; PA authorized    Pt called: no; will call 1 week prior    At Rooming: flex scope flex needle; 200 unit(s) botox in 20 ccs

## 2021-12-13 ENCOUNTER — TELEPHONE (OUTPATIENT)
Dept: UROLOGY | Facility: CLINIC | Age: 61
End: 2021-12-13
Payer: COMMERCIAL

## 2021-12-13 DIAGNOSIS — N31.9 NEUROGENIC BLADDER: Primary | ICD-10-CM

## 2021-12-13 RX ORDER — CIPROFLOXACIN 500 MG/1
500 TABLET, FILM COATED ORAL DAILY
Qty: 3 TABLET | Refills: 0 | Status: SHIPPED | OUTPATIENT
Start: 2021-12-14 | End: 2021-12-17

## 2021-12-13 NOTE — TELEPHONE ENCOUNTER
Called patient to discuss upcoming cystoscopy with Botox. Told patient to take 1 tablet of Cipro 500 mg day before, day of, and day after procedure. Also told to discontinue blood thinning medications 3 days prior. Patient voiced understanding. Cipro prescription sent to [facility] in [place].

## 2021-12-15 ENCOUNTER — TELEPHONE (OUTPATIENT)
Dept: UROLOGY | Facility: CLINIC | Age: 61
End: 2021-12-15

## 2021-12-15 ENCOUNTER — OFFICE VISIT (OUTPATIENT)
Dept: UROLOGY | Facility: CLINIC | Age: 61
End: 2021-12-15
Payer: COMMERCIAL

## 2021-12-15 VITALS
SYSTOLIC BLOOD PRESSURE: 132 MMHG | HEART RATE: 69 BPM | WEIGHT: 165 LBS | BODY MASS INDEX: 23.1 KG/M2 | HEIGHT: 71 IN | DIASTOLIC BLOOD PRESSURE: 82 MMHG

## 2021-12-15 DIAGNOSIS — N31.9 NEUROGENIC BLADDER: Primary | ICD-10-CM

## 2021-12-15 PROCEDURE — 52287 CYSTOSCOPY CHEMODENERVATION: CPT | Performed by: UROLOGY

## 2021-12-15 ASSESSMENT — PAIN SCALES - GENERAL: PAINLEVEL: NO PAIN (0)

## 2021-12-15 ASSESSMENT — MIFFLIN-ST. JEOR: SCORE: 1585.57

## 2021-12-15 NOTE — PROGRESS NOTES
"Cystoscopy with Botox Note     PRE-PROCEDURE DIAGNOSIS:  Neurogenic Bladder     POST-PROCEDURE DIAGNOSIS:  Neurogenic Bladder     PROCEDURE:   Cystoscopy with chemodenervation of the bladder     HISTORY: Derek Enriquez is a 61 year old male with Neurogenic bladder secondary to subdural hematoma at T10 s/p T9-12 laminectomy and evacuation of subdural hematoma eventually leading to paraplegia.  Tried Trospium but significant side effect of constipation. He cannot tolerate anticholinergics.  Has not tried Botox. Had insurance issues due to lack of trying multiple medical therapies.  Performs CIC.  Underwent UDS previously which showed significant NDO with very high pressures starting around 125mL  He has urinary urge incontinence due to his neurogenic DO  Thus, we tried Myrbetriq. He can tolerate the medication without significant side effects. He has a mild improvement of symptoms. However, we repeated urodynamics to ensure safe storage pressures, and they are clearly not safe.   Per UDS note:  \"-Multiple strong UDCs to pressures >110 cm H20, which begin at bladder volumes >160 mL and are associated with incontinene.  -Gradual increase in baseline pressure prior to sudden spike in pressure at fill volume of 163 mL. Pressures slowly return near baseline between each UDC.\"     He underwent Botox injection 8/25/2021.  He notes much improvement.  He continues to do CIC with new sterile self contained catheter each time.     DESCRIPTION OF PROCEDURE:  After informed consent was obtained, the patient was brought to the procedure room where they were placed in supine with all pressure points well padded.  The patient was prepped and draped in a sterile fashion. A flexible cystoscope was introduced through a well-lubricated urethra. There were no urethral strictures. The bladder was entered. The urine was clear. Systematic cystoscopy was performed. There were no tumors, stones, or other abnormalities in the bladder. Flexible " scope and flexible needle were inserted. 200u of Botox was mixed in 20cc normal saline. Systematic injection was performed with 15-20 locations in a broad template. Minimal bleeding was noted, and scope was removed along with needle.     ASSESSMENT AND PLAN:  Botox injection today successful  Followup in 4 months for repeat botox injection in clinic     Cody Stroud MD  Reconstructive Urology

## 2021-12-15 NOTE — LETTER
"12/15/2021       RE: Derek Enriquez  [address]  [facility] [address]     Dear Colleague,    Thank you for referring your patient, Derek Enriquez, to the [facility] [place] at [facility]. Please see a copy of my visit note below.    Cystoscopy with Botox Note     PRE-PROCEDURE DIAGNOSIS:  Neurogenic Bladder     POST-PROCEDURE DIAGNOSIS:  Neurogenic Bladder     PROCEDURE:   Cystoscopy with chemodenervation of the bladder     HISTORY: Derek Enriquez is a 61 year old male with Neurogenic bladder secondary to subdural hematoma at T10 s/p T9-12 laminectomy and evacuation of subdural hematoma eventually leading to paraplegia.  Tried Trospium but significant side effect of constipation. He cannot tolerate anticholinergics.  Has not tried Botox. Had insurance issues due to lack of trying multiple medical therapies.  Performs CIC.  Underwent UDS previously which showed significant NDO with very high pressures starting around 125mL  He has urinary urge incontinence due to his neurogenic DO  Thus, we tried Myrbetriq. He can tolerate the medication without significant side effects. He has a mild improvement of symptoms. However, we repeated urodynamics to ensure safe storage pressures, and they are clearly not safe.   Per UDS note:  \"-Multiple strong UDCs to pressures >110 cm H20, which begin at bladder volumes >160 mL and are associated with incontinene.  -Gradual increase in baseline pressure prior to sudden spike in pressure at fill volume of 163 mL. Pressures slowly return near baseline between each UDC.\"     He underwent Botox injection 8/25/2021.  He notes much improvement.  He continues to do CIC with new sterile self contained catheter each time.     DESCRIPTION OF PROCEDURE:  After informed consent was obtained, the patient was brought to the procedure room where they were placed in supine with all pressure points well padded.  The patient was " prepped and draped in a sterile fashion. A flexible cystoscope was introduced through a well-lubricated urethra. There were no urethral strictures. The bladder was entered. The urine was clear. Systematic cystoscopy was performed. There were no tumors, stones, or other abnormalities in the bladder. Flexible scope and flexible needle were inserted. 200u of Botox was mixed in 20cc normal saline. Systematic injection was performed with 15-20 locations in a broad template. Minimal bleeding was noted, and scope was removed along with needle.     ASSESSMENT AND PLAN:  Botox injection today successful  Followup in 4 months for repeat botox injection in clinic     Cody Stroud MD  Reconstructive Urology

## 2021-12-15 NOTE — TELEPHONE ENCOUNTER
Contacted patient to discuss concerns.  Lora Ledezma LPN  Urology Clinic Service   [facility]

## 2021-12-15 NOTE — PATIENT INSTRUCTIONS
"Please follow-up with Dr. Stroud in 4 months for a repeat cystoscopy.    It was a pleasure meeting with you today.  Thank you for allowing me and my team the privilege of caring for you today.  YOU are the reason we are here, and I truly hope we provided you with the excellent service you deserve.  Please let us know if there is anything else we can do for you so that we can be sure you are leaving completely satisfied with your care experience.        AFTER YOUR CYSTOSCOPY        You have just completed a cystoscopy, or \"cysto\", which allowed your physician to learn more about your bladder (or to remove a stent placed after surgery). We suggest that you continue to avoid caffeine, fruit juice, and alcohol for the next 24 hours, however, you are encouraged to return to your normal activities.         A few things that are considered normal after your cystoscopy:     * Small amount of bleeding (or spotting) that clears within the next 24 hours     * Slight burning sensation with urination     * Sensation to of needing to avoid more frequently     * The feeling of \"air\" in your urine     * Mild discomfort that is relieved with Tylenol        Please contact our office promptly if you:     * Develop a fever above 101 degrees     * Are unable to urinate     * Develop bright red blood that does not stop     * Severe pain or swelling         Please contact our office with any concerns or questions @[email or web address].  "

## 2022-01-08 ENCOUNTER — TELEPHONE (OUTPATIENT)
Dept: CARDIOLOGY | Facility: CLINIC | Age: 62
End: 2022-01-08
Payer: COMMERCIAL

## 2022-01-08 NOTE — TELEPHONE ENCOUNTER
Called patient to get him scheduled for his one year follow-up with Dr. Jane. Patient states he is in a wheelchair and cannot easily get to appointments. He is requesting an appointment around noon with Dr. Jane with his labs and echo earlier in the morning that day. Unable to find an appointment with Dr. Jane at the patients requested time.     Please call patient back to coordinate these appointments per his request as this writer was not able to accommodate the times he is requesting.

## 2022-01-13 ENCOUNTER — TELEPHONE (OUTPATIENT)
Dept: CARDIOLOGY | Facility: CLINIC | Age: 62
End: 2022-01-13
Payer: COMMERCIAL

## 2022-01-13 NOTE — TELEPHONE ENCOUNTER
[facility] Call Center    Phone Message    May a detailed message be left on voicemail: yes     Reason for Call: Other: Pt called in to schedule, however appt date 3/31/22 is past the expiration date of the lab/echo orders. Please extend orders     Action Taken: Message routed to:  [facility] cardio    Travel Screening: Not Applicable

## 2022-01-13 NOTE — TELEPHONE ENCOUNTER
Pt needs to schedule a follow up with Dr. Jane with FASTING labs, echocardiogram prior. Schedule for March 2022.

## 2022-02-19 ENCOUNTER — HEALTH MAINTENANCE LETTER (OUTPATIENT)
Age: 62
End: 2022-02-19

## 2022-03-14 ENCOUNTER — PRE VISIT (OUTPATIENT)
Dept: UROLOGY | Facility: CLINIC | Age: 62
End: 2022-03-14
Payer: COMMERCIAL

## 2022-03-14 DIAGNOSIS — N31.9 NEUROGENIC BLADDER: Primary | ICD-10-CM

## 2022-03-14 NOTE — TELEPHONE ENCOUNTER
Reason for visit: Cystoscopy     Relevant information: with botox; 200u in 20ccs    Records/imaging/labs/orders: in EPIC    Pt called: no    At Rooming: flex needle; 200u botox in 20ccs

## 2022-03-29 ENCOUNTER — TELEPHONE (OUTPATIENT)
Dept: UROLOGY | Facility: CLINIC | Age: 62
End: 2022-03-29
Payer: COMMERCIAL

## 2022-03-29 DIAGNOSIS — N31.9 NEUROGENIC BLADDER: Primary | ICD-10-CM

## 2022-03-29 RX ORDER — CIPROFLOXACIN 500 MG/1
500 TABLET, FILM COATED ORAL DAILY
Qty: 3 TABLET | Refills: 0 | Status: SHIPPED | OUTPATIENT
Start: 2022-04-05 | End: 2022-04-08

## 2022-03-29 NOTE — TELEPHONE ENCOUNTER
Called patient to discuss upcoming cystoscopy with Botox. Told patient to take 1 tablet of Cipro 500 mg day before, day of, and day after procedure. Also told to discontinue blood thinning medications 3 days prior. Patient voiced understanding. Cipro prescription sent to [facility] in [place].

## 2022-03-31 ENCOUNTER — OFFICE VISIT (OUTPATIENT)
Dept: CARDIOLOGY | Facility: CLINIC | Age: 62
End: 2022-03-31
Attending: INTERNAL MEDICINE
Payer: COMMERCIAL

## 2022-03-31 ENCOUNTER — LAB (OUTPATIENT)
Dept: LAB | Facility: CLINIC | Age: 62
End: 2022-03-31
Attending: INTERNAL MEDICINE
Payer: COMMERCIAL

## 2022-03-31 ENCOUNTER — ANCILLARY PROCEDURE (OUTPATIENT)
Dept: CARDIOLOGY | Facility: CLINIC | Age: 62
End: 2022-03-31
Payer: COMMERCIAL

## 2022-03-31 VITALS — OXYGEN SATURATION: 95 % | SYSTOLIC BLOOD PRESSURE: 122 MMHG | DIASTOLIC BLOOD PRESSURE: 77 MMHG | HEART RATE: 79 BPM

## 2022-03-31 DIAGNOSIS — Z95.5 STENTED CORONARY ARTERY: ICD-10-CM

## 2022-03-31 DIAGNOSIS — I25.10 ATHEROSCLEROSIS OF NATIVE CORONARY ARTERY OF NATIVE HEART WITHOUT ANGINA PECTORIS: ICD-10-CM

## 2022-03-31 DIAGNOSIS — I46.9 CARDIAC ARREST (H): ICD-10-CM

## 2022-03-31 DIAGNOSIS — I25.10 ATHEROSCLEROSIS OF NATIVE CORONARY ARTERY OF NATIVE HEART WITHOUT ANGINA PECTORIS: Primary | ICD-10-CM

## 2022-03-31 DIAGNOSIS — E78.5 DYSLIPIDEMIA: ICD-10-CM

## 2022-03-31 LAB
ALBUMIN SERPL-MCNC: 3.9 G/DL (ref 3.4–5)
ALP SERPL-CCNC: 115 U/L (ref 40–150)
ALT SERPL W P-5'-P-CCNC: 31 U/L (ref 0–70)
ANION GAP SERPL CALCULATED.3IONS-SCNC: 7 MMOL/L (ref 3–14)
AST SERPL W P-5'-P-CCNC: 21 U/L (ref 0–45)
BILIRUB SERPL-MCNC: 0.9 MG/DL (ref 0.2–1.3)
BUN SERPL-MCNC: 11 MG/DL (ref 7–30)
CALCIUM SERPL-MCNC: 9.1 MG/DL (ref 8.5–10.1)
CHLORIDE BLD-SCNC: 104 MMOL/L (ref 94–109)
CHOLEST SERPL-MCNC: 142 MG/DL
CO2 SERPL-SCNC: 28 MMOL/L (ref 20–32)
CREAT SERPL-MCNC: 0.65 MG/DL (ref 0.66–1.25)
FASTING STATUS PATIENT QL REPORTED: YES
GFR SERPL CREATININE-BSD FRML MDRD: >90 ML/MIN/1.73M2
GLUCOSE BLD-MCNC: 89 MG/DL (ref 70–99)
HDLC SERPL-MCNC: 38 MG/DL
LDLC SERPL CALC-MCNC: 63 MG/DL
LVEF ECHO: NORMAL
NONHDLC SERPL-MCNC: 104 MG/DL
POTASSIUM BLD-SCNC: 3.8 MMOL/L (ref 3.4–5.3)
PROT SERPL-MCNC: 7.5 G/DL (ref 6.8–8.8)
SODIUM SERPL-SCNC: 139 MMOL/L (ref 133–144)
TRIGL SERPL-MCNC: 205 MG/DL

## 2022-03-31 PROCEDURE — 80061 LIPID PANEL: CPT | Performed by: PATHOLOGY

## 2022-03-31 PROCEDURE — 80053 COMPREHEN METABOLIC PANEL: CPT | Performed by: PATHOLOGY

## 2022-03-31 PROCEDURE — G0463 HOSPITAL OUTPT CLINIC VISIT: HCPCS | Mod: 25

## 2022-03-31 PROCEDURE — 99214 OFFICE O/P EST MOD 30 MIN: CPT | Mod: 25 | Performed by: INTERNAL MEDICINE

## 2022-03-31 PROCEDURE — 93306 TTE W/DOPPLER COMPLETE: CPT | Mod: GC | Performed by: INTERNAL MEDICINE

## 2022-03-31 PROCEDURE — 36415 COLL VENOUS BLD VENIPUNCTURE: CPT | Performed by: PATHOLOGY

## 2022-03-31 ASSESSMENT — PAIN SCALES - GENERAL: PAINLEVEL: NO PAIN (0)

## 2022-03-31 NOTE — NURSING NOTE
Chief Complaint   Patient presents with     Follow Up     Buck F/U     Vitals were taken and medications reconciled.    Jorge Greco, EMT  2:22 PM

## 2022-03-31 NOTE — LETTER
3/31/2022      RE: Derek Enriquez  [address]  [place] [place] [address]       Dear Colleague,    Thank you for the opportunity to participate in the care of your patient, Derek Enriquez, at the [facility] [place] [facility]. Please see a copy of my visit note below.    HPI:     I had the privilege to evaluate and examine Mr. Derek Enriquez, who  is a 59 y/o M, who comes for follow-up:     In summary, Mr Derek Enriquez, who had a cardiac arrest in [facility] during [facility] (03-)  Patient was resuscitated and underwent a coronary angiogram , which showed   Single vessel CAD  LAD   - 95% stenotic ruptured plaque in the proximal LAD  Successful deployment of a drug eluting stent    3.0 x 20 mm Promus Premier drug eluting stent across the proximal LAD  and post-dilated with a 3.5 x15 mm non-compliant balloon  -Successful placement of balloon pump for LV support.  He also was put at [facility].                     Postop day 1 developed back pain then loss of sensation and movement in his lower extremities.  Identified spinal subdural hematoma and transferred emergently to [facility].  He underwent T9-T12 decompressive laminectomy and evacuation of subdural hematoma.  He unfortunately persists with complete paraplegia.  After medical stabilization he transferred to [facility] 3/14 for comprehensive cares.     Patient is now convinced that he needs to take the classically CV preventive therapy.     Patient underwent a Right Hip Heterotopic Bone Resection on 11/2/2018.     Today, patient feels well; denies chest pain, shortness of breath, palpitations and intermittent claudication.  Patient works full time.  No changes made at last visit (3/22/21), 1 year follow up planned.     PAST MEDICAL HISTORY:  Past Medical History:   Diagnosis Date     CAD (coronary artery disease)     stent     Cardiac  arrest (H) 03/2018     Femur fracture (H)      Neurogenic bladder      Neurogenic bowel      Orthostatic hypotension      Paraplegia (H)      Thoracic spinal cord injury (H)        CURRENT MEDICATIONS:  Current Outpatient Medications   Medication Sig Dispense Refill     ascorbic acid (VITAMIN C) 500 MG tablet Take 500 mg by mouth every morning        aspirin 81 MG EC tablet Take 81 mg by mouth daily       atorvastatin (LIPITOR) 20 MG tablet Take 1 tablet (20 mg) by mouth At Bedtime 90 tablet 3     [START ON 4/5/2022] ciprofloxacin (CIPRO) 500 MG tablet Take 1 tablet (500 mg) by mouth daily for 3 days 3 tablet 0     ciprofloxacin (CIPRO) 500 MG tablet Take 1 tablet (500 mg) by mouth daily 3 tablet 0     Multiple Vitamins-Minerals (MULTIVITAMIN MEN PO) Take 1 tablet by mouth daily        MYRBETRIQ 25 MG 24 hr tablet TK ONE T PO QD (Patient not taking: Reported on 8/25/2021)       order for DME Equipment being ordered: [facility] Phone [phone] Fax [phone]    Left Lateral Foot Primary Dressing Polymem Cloth Strips (not dots) Qty 30  Coccyx Secondary Dressing  tegaderm adhesive foam dressing 3x3 Qty 30  Perineum Secondary Dressing tegaderm + pad ( ref [id number]) qty 30  Length of Need: 1 month  Frequency of dressing change: daily 30 days 0     tadalafil (CIALIS) 20 MG tablet Take 1 tablet (20 mg) PO PRN prior to sexual activity. Max: 1 tablet per 36 hours. (Patient not taking: Reported on 8/25/2021) 5 tablet 3     vitamin B complex with vitamin C (VITAMIN  B COMPLEX) TABS tablet Take 1 tablet by mouth daily         PAST SURGICAL HISTORY:  Past Surgical History:   Procedure Laterality Date     ARTHROTOMY HIP Left 9/19/2018    Procedure: ARTHROTOMY HIP;  Left Hip Heterotopic Bone Resection;  Surgeon: Toñito Kennedy MD;  Location: [facility] OR     ARTHROTOMY HIP Right 11/02/2018     ARTHROTOMY HIP Right 11/2/2018    Procedure: Right Hip Heterotopic Bone Resection;  Surgeon: Toñito Kennedy,  MD;  Location: [facility] OR     CARDIAC SURGERY       LAMINECTOMY THORACIC THREE LEVELS N/A 3/9/2018    Procedure: LAMINECTOMY THORACIC THREE LEVELS;  Thoracic 9-12 Laminectomy Evacuation of Subdural Hematoma, C-Arm, Prone, Gel Rolls.;  Surgeon: Abhijeet Joe MD;  Location: [facility] OR     OPEN REDUCTION INTERNAL FIXATION RODDING INTRAMEDULLARY FEMUR Right 6/25/2018    Procedure: OPEN REDUCTION INTERNAL FIXATION RODDING INTRAMEDULLARY FEMUR;  Right Open Reduction Internal Fixation Subtrochanteric Femur Fracture;  Surgeon: Toñito Kennedy MD;  Location: [facility] OR     wisdom teeth extraction         ALLERGIES   No Known Allergies    FAMILY HISTORY:  Family History   Problem Relation Age of Onset     Cardiac Sudden Death Father      Arrhythmia Father         v fib arrest      Myocardial Infarction Brother      Other - See Comments Brother         myeloid dysplasia       SOCIAL HISTORY:  Social History     Socioeconomic History     Marital status: Single     Spouse name: Not on file     Number of children: Not on file     Years of education: Not on file     Highest education level: Not on file   Occupational History     Not on file   Tobacco Use     Smoking status: Former Smoker     Smokeless tobacco: Never Used     Tobacco comment: Quit at 31   Substance and Sexual Activity     Alcohol use: Yes     Comment: socially     Drug use: No     Sexual activity: Not on file   Other Topics Concern     Parent/sibling w/ CABG, MI or angioplasty before 65F 55M? Not Asked   Social History Narrative     Not on file     Social Determinants of Health     Financial Resource Strain: Not on file   Food Insecurity: Not on file   Transportation Needs: Not on file   Physical Activity: Not on file   Stress: Not on file   Social Connections: Not on file   Intimate Partner Violence: Not on file   Housing Stability: Not on file       ROS:   Constitutional: No fever, chills, or sweats. No weight gain/loss   ENT: No visual disturbance, ear ache,  epistaxis, sore throat  Allergies/Immunologic: Negative.   Respiratory: No cough, hemoptysia  Cardiovascular: As per HPI  GI: No nausea, vomiting, hematemesis, melena, or hematochezia  : No urinary frequency, dysuria, or hematuria  Integument: Negative  Psychiatric: Negative  Neuro: Negative  Endocrinology: Negative   Musculoskeletal: Negative    EXAM:  /77 (BP Location: Left arm, Patient Position: Chair, Cuff Size: Adult Large)   Pulse 79   SpO2 95%   In general, the patient is a pleasant male in no apparent distress.    HEENT: NC/AT.  PERRLA.  EOMI.  Sclerae white, not injected.  Nares clear.  Pharynx without erythema or exudate.  Dentition intact.    Neck: No adenopathy.  No thyromegaly. Carotids +4/4 bilaterally without bruits.  No jugular venous distension.   Heart: RRR. Normal S1, S2 splits physiologically. No murmur, rub, click, or gallop. The PMI is in the 5th ICS in the midclavicular line. There is no heave.    Lungs: CTA.  No ronchi, wheezes, rales.  No dullness to percussion.   Abdomen: Soft, nontender, nondistended. No organomegaly.  No bruits.   Extremities: No clubbing, cyanosis, or edema.  The pulses are +4/4 at the radial, brachial, femoral, popliteal, DP, and PT sites bilaterally.  No bruits are noted.  Neurologic: Alert and oriented to person/place/time, normal speech,  bilateral paralysis of both lower extremities.  Skin: No petechiae, purpura or rash.    Labs:  LIPID RESULTS:  Lab Results   Component Value Date    CHOL 142 03/31/2022    CHOL 141 03/22/2021    HDL 38 (L) 03/31/2022    HDL 43 03/22/2021    LDL 63 03/31/2022    LDL 52 03/22/2021    TRIG 205 (H) 03/31/2022    TRIG 233 (H) 03/22/2021    NHDL 104 03/31/2022    NHDL 98 03/22/2021       LIVER ENZYME RESULTS:  Lab Results   Component Value Date    AST 21 03/31/2022    AST 22 03/22/2021    ALT 31 03/31/2022    ALT 33 03/22/2021       CBC RESULTS:  Lab Results   Component Value Date    WBC 4.8 11/05/2018    RBC 3.26 (L) 11/05/2018     HGB 8.5 (L) 11/05/2018    HGB 8.5 (L) 11/05/2018    HCT 28.9 (L) 11/05/2018    MCV 89 11/05/2018    MCH 26.1 (L) 11/05/2018    MCHC 29.4 (L) 11/05/2018    RDW 16.3 (H) 11/05/2018     11/05/2018       BMP RESULTS:  Lab Results   Component Value Date     03/31/2022     03/22/2021    POTASSIUM 3.8 03/31/2022    POTASSIUM 3.9 03/22/2021    CHLORIDE 104 03/31/2022    CHLORIDE 107 03/22/2021    CO2 28 03/31/2022    CO2 30 03/22/2021    ANIONGAP 7 03/31/2022    ANIONGAP 4 03/22/2021    GLC 89 03/31/2022    GLC 93 03/22/2021    BUN 11 03/31/2022    BUN 15 03/22/2021    CR 0.65 (L) 03/31/2022    CR 0.63 (L) 03/22/2021    GFRESTIMATED >90 03/31/2022    GFRESTIMATED >90 03/22/2021    GFRESTBLACK >90 03/22/2021    PAT 9.1 03/31/2022    PAT 8.6 03/22/2021        INR RESULTS:  Lab Results   Component Value Date    INR 1.20 (H) 05/23/2018    INR 1.20 (H) 03/09/2018       Procedures:    Echocardiogram 03-21-22    Technically difficult study due to inability to reposition the patient.  Left ventricular size, wall motion and function are normal. The ejection  fraction is 55-60%.  The right ventricle is normal size. Global right ventricular function is  normal.  No hemodynamically significant valvular abnormalities present.  The inferior vena cava was normal in size with preserved respiratory  variability.     This study was compared with the study from 3/22/2021. No significant changes  noted.  ______________________________________________________________________________  Left Ventricle  Left ventricular size, wall motion and function are normal. The ejection  fraction is 55-60%. Left ventricular diastolic function is not assessable.     Right Ventricle  The right ventricle is normal size. Global right ventricular function is  normal.     Atria  The atria cannot be assessed.     Mitral Valve  The mitral valve is normal. Trace mitral insufficiency is present.     Aortic Valve  The valve leaflets are not well  visualized. On Doppler interrogation, there is  no significant stenosis or regurgitation.     Tricuspid Valve  The tricuspid valve is normal. Trace tricuspid insufficiency is present. The  peak velocity of the tricuspid regurgitant jet is not obtainable. Pulmonary  artery systolic pressure cannot be assessed.     Pulmonic Valve  The valve leaflets are not well visualized. On Doppler interrogation, there is  no significant stenosis or regurgitation.     Vessels  The aorta root is normal. The thoracic aorta is normal. The inferior vena cava  was normal in size with preserved respiratory variability. IVC diameter <2.1  cm collapsing >50% with sniff suggests a normal RA pressure of 3 mmHg.     Pericardium  No pericardial effusion is present.     Miscellaneous  No significant valvular abnormalities present.     Compared to Previous Study  This study was compared with the study from 3/22/2021 . No significant changes  noted.     Attestation  I have personally viewed the imaging and agree with the interpretation and  report as documented by the fellow, Markos Cosme, and/or edited by me.  ______________________________________________________________________________  MMode/2D Measurements & Calculations  IVSd: 1.0 cm  LVIDd: 4.0 cm  LVIDs: 2.3 cm  LVPWd: 0.86 cm  FS: 42.8 %  LV mass(C)d: 116.7 grams  LV mass(C)dI: 60.0 grams/m2  Ao root diam: 3.2 cm  asc Aorta Diam: 3.1 cm  LVOT diam: 2.1 cm  LVOT area: 3.6 cm2  RWT: 0.43     Doppler Measurements & Calculations  MV E max ramila: 80.0 cm/sec  MV A max ramila: 68.9 cm/sec  MV E/A: 1.2     MV dec slope: 500.1 cm/sec2  MV dec time: 0.16 sec  E/E' av.6  Lateral E/e': 6.1  Medial E/e': 17.1    Assessment and Plan:     We discussed the results with patient.  We discussed the importance of a heart healthy diet an lifestyle.  We continue with same medical therapy.  Follow-up within 1 year.    Anoop Jane MD, PhD  Professor of Medicine  Division of Cardiology.      CC  Patient  Care Team:  Eugene Burrell MD as PCP - General (Family Practice)  Andrei Pelaez MD as MD (Urology)  Candice Bridges, RN as Registered Nurse (Urology)  [facility] [facility]  Cody Stroud MD as Assigned Surgical Provider  Lupe Trejo RN as Specialty Care Coordinator (Cardiology)

## 2022-03-31 NOTE — PATIENT INSTRUCTIONS
March 31, 2022    Cardiology Provider You Saw During Your Visit: Dr. Jane      Medication Changes: None      Follow Up:   -Follow up with Dr. Jane in 1 year with labs prior      Labs:   Results for LINDA MARTÍNEZ (MRN [id number]) as of 3/31/2022 15:20   Ref. Range 3/31/2022 07:41   Sodium Latest Ref Range: 133 - 144 mmol/L 139   Potassium Latest Ref Range: 3.4 - 5.3 mmol/L 3.8   Chloride Latest Ref Range: 94 - 109 mmol/L 104   Carbon Dioxide Latest Ref Range: 20 - 32 mmol/L 28   Urea Nitrogen Latest Ref Range: 7 - 30 mg/dL 11   Creatinine Latest Ref Range: 0.66 - 1.25 mg/dL 0.65 (L)   GFR Estimate Latest Ref Range: >60 mL/min/1.73m2 >90   Calcium Latest Ref Range: 8.5 - 10.1 mg/dL 9.1   Anion Gap Latest Ref Range: 3 - 14 mmol/L 7   Albumin Latest Ref Range: 3.4 - 5.0 g/dL 3.9   Protein Total Latest Ref Range: 6.8 - 8.8 g/dL 7.5   Bilirubin Total Latest Ref Range: 0.2 - 1.3 mg/dL 0.9   Alkaline Phosphatase Latest Ref Range: 40 - 150 U/L 115   ALT Latest Ref Range: 0 - 70 U/L 31   AST Latest Ref Range: 0 - 45 U/L 21   Cholesterol Latest Ref Range: <200 mg/dL 142   Patient Fasting > 8hrs? Unknown Yes   HDL Cholesterol Latest Ref Range: >=40 mg/dL 38 (L)   LDL Cholesterol Calculated Latest Ref Range: <=100 mg/dL 63   Non HDL Cholesterol Latest Ref Range: <130 mg/dL 104   Triglycerides Latest Ref Range: <150 mg/dL 205 (H)   Glucose Latest Ref Range: 70 - 99 mg/dL 89           Follow the American Heart Association Diet and Lifestyle Recommendations:  -Limit saturated fat, trans fat, sodium, red meat, sweets and sugar-sweetened beverages. If you choose to eat red meat, compare labels and select the leanest cuts available.  -Aim for at least 150 minutes of moderate physical activity or 75 minutes of vigorous physical activity - or an equal combination of both - each week.      To Reach Us:  -During business hours: [phone], press option # 1 to schedule an appointment or to leave a message for your care team.      -After hours, weekends or holidays: [phone], press option #4 and ask to speak to the on-call cardiologist. Inform them you are a patient at the [facility].      Lupe Trejo RN  Cardiology Care Coordinator - General Cardiology  [facility]

## 2022-03-31 NOTE — PROGRESS NOTES
HPI:     I had the privilege to evaluate and examine Mr. Derek Enriquez, who  is a 57 y/o M, who comes for follow-up:     In summary, Mr Derek Enriquez, who had a cardiac arrest in [facility] during exercise (03-)  Patient was resuscitated and underwent a coronary angiogram , which showed   Single vessel CAD  LAD   - 95% stenotic ruptured plaque in the proximal LAD  Successful deployment of a drug eluting stent    3.0 x 20 mm Promus Premier drug eluting stent across the proximal LAD  and post-dilated with a 3.5 x15 mm non-compliant balloon  -Successful placement of balloon pump for LV support.  He also was put at [facility].                     Postop day 1 developed back pain then loss of sensation and movement in his lower extremities.  Identified spinal subdural hematoma and transferred emergently to [facility].  He underwent T9-T12 decompressive laminectomy and evacuation of subdural hematoma.  He unfortunately persists with complete paraplegia.  After medical stabilization he transferred to [facility] acute rehabilitation [place] 3/14 for comprehensive cares.     Patient is now convinced that he needs to take the classically CV preventive therapy.     Patient underwent a Right Hip Heterotopic Bone Resection on 11/2/2018.     Today, patient feels well; denies chest pain, shortness of breath, palpitations and intermittent claudication.  Patient works full time.  No changes made at last visit (3/22/21), 1 year follow up planned.     PAST MEDICAL HISTORY:  Past Medical History:   Diagnosis Date     CAD (coronary artery disease)     stent     Cardiac arrest (H) 03/2018     Femur fracture (H)      Neurogenic bladder      Neurogenic bowel      Orthostatic hypotension      Paraplegia (H)      Thoracic spinal cord injury (H)        CURRENT MEDICATIONS:  Current Outpatient Medications   Medication Sig Dispense Refill     ascorbic acid (VITAMIN C) 500 MG tablet Take 500 mg by mouth every  morning        aspirin 81 MG EC tablet Take 81 mg by mouth daily       atorvastatin (LIPITOR) 20 MG tablet Take 1 tablet (20 mg) by mouth At Bedtime 90 tablet 3     [START ON 4/5/2022] ciprofloxacin (CIPRO) 500 MG tablet Take 1 tablet (500 mg) by mouth daily for 3 days 3 tablet 0     ciprofloxacin (CIPRO) 500 MG tablet Take 1 tablet (500 mg) by mouth daily 3 tablet 0     Multiple Vitamins-Minerals (MULTIVITAMIN MEN PO) Take 1 tablet by mouth daily        MYRBETRIQ 25 MG 24 hr tablet TK ONE T PO QD (Patient not taking: Reported on 8/25/2021)       order for DME Equipment being ordered: [employer] [facility] Phone [phone] Fax [phone]    Left Lateral Foot Primary Dressing Polymem Cloth Strips (not dots) Qty 30  Coccyx Secondary Dressing  tegaderm adhesive foam dressing 3x3 Qty 30  Perineum Secondary Dressing tegaderm + pad ( ref 3582) qty 30  Length of Need: 1 month  Frequency of dressing change: daily 30 days 0     tadalafil (CIALIS) 20 MG tablet Take 1 tablet (20 mg) PO PRN prior to sexual activity. Max: 1 tablet per 36 hours. (Patient not taking: Reported on 8/25/2021) 5 tablet 3     vitamin B complex with vitamin C (VITAMIN  B COMPLEX) TABS tablet Take 1 tablet by mouth daily         PAST SURGICAL HISTORY:  Past Surgical History:   Procedure Laterality Date     ARTHROTOMY HIP Left 9/19/2018    Procedure: ARTHROTOMY HIP;  Left Hip Heterotopic Bone Resection;  Surgeon: Toñito Kennedy MD;  [facility]     ARTHROTOMY HIP Right 11/02/2018     ARTHROTOMY HIP Right 11/2/2018    Procedure: Right Hip Heterotopic Bone Resection;  Surgeon: Toñito Kennedy MD;  [facility]     CARDIAC SURGERY       LAMINECTOMY THORACIC THREE LEVELS N/A 3/9/2018    Procedure: LAMINECTOMY THORACIC THREE LEVELS;  Thoracic 9-12 Laminectomy Evacuation of Subdural Hematoma, C-Arm, Prone, Gel Rolls.;  Surgeon: Abhijeet Joe MD;  [facility]     OPEN REDUCTION INTERNAL FIXATION RODDING INTRAMEDULLARY  FEMUR Right 6/25/2018    Procedure: OPEN REDUCTION INTERNAL FIXATION RODDING INTRAMEDULLARY FEMUR;  Right Open Reduction Internal Fixation Subtrochanteric Femur Fracture;  Surgeon: Toñito Kennedy MD;  [facility] OR     wisdom teeth extraction         ALLERGIES   No Known Allergies    FAMILY HISTORY:  Family History   Problem Relation Age of Onset     Cardiac Sudden Death Father      Arrhythmia Father         v fib arrest      Myocardial Infarction Brother      Other - See Comments Brother         myeloid dysplasia       SOCIAL HISTORY:  Social History     Socioeconomic History     Marital status: Single     Spouse name: Not on file     Number of children: Not on file     Years of education: Not on file     Highest education level: Not on file   Occupational History     Not on file   Tobacco Use     Smoking status: Former Smoker     Smokeless tobacco: Never Used     Tobacco comment: Quit at 31   Substance and Sexual Activity     Alcohol use: Yes     Comment: socially     Drug use: No     Sexual activity: Not on file   Other Topics Concern     Parent/sibling w/ CABG, MI or angioplasty before 65F 55M? Not Asked   Social History Narrative     Not on file     Social Determinants of Health     Financial Resource Strain: Not on file   Food Insecurity: Not on file   Transportation Needs: Not on file   Physical Activity: Not on file   Stress: Not on file   Social Connections: Not on file   Intimate Partner Violence: Not on file   Housing Stability: Not on file       ROS:   Constitutional: No fever, chills, or sweats. No weight gain/loss   ENT: No visual disturbance, ear ache, epistaxis, sore throat  Allergies/Immunologic: Negative.   Respiratory: No cough, hemoptysia  Cardiovascular: As per HPI  GI: No nausea, vomiting, hematemesis, melena, or hematochezia  : No urinary frequency, dysuria, or hematuria  Integument: Negative  Psychiatric: Negative  Neuro: Negative  Endocrinology: Negative   Musculoskeletal:  Negative    EXAM:  /77 (BP Location: Left arm, Patient Position: Chair, Cuff Size: Adult Large)   Pulse 79   SpO2 95%   In general, the patient is a pleasant male in no apparent distress.    HEENT: NC/AT.  PERRLA.  EOMI.  Sclerae white, not injected.  Nares clear.  Pharynx without erythema or exudate.  Dentition intact.    Neck: No adenopathy.  No thyromegaly. Carotids +4/4 bilaterally without bruits.  No jugular venous distension.   Heart: RRR. Normal S1, S2 splits physiologically. No murmur, rub, click, or gallop. The PMI is in the 5th ICS in the midclavicular line. There is no heave.    Lungs: CTA.  No ronchi, wheezes, rales.  No dullness to percussion.   Abdomen: Soft, nontender, nondistended. No organomegaly.  No bruits.   Extremities: No clubbing, cyanosis, or edema.  The pulses are +4/4 at the radial, brachial, femoral, popliteal, DP, and PT sites bilaterally.  No bruits are noted.  Neurologic: Alert and oriented to person/place/time, normal speech,  bilateral paralysis of both lower extremities.  Skin: No petechiae, purpura or rash.    Labs:  LIPID RESULTS:  Lab Results   Component Value Date    CHOL 142 03/31/2022    CHOL 141 03/22/2021    HDL 38 (L) 03/31/2022    HDL 43 03/22/2021    LDL 63 03/31/2022    LDL 52 03/22/2021    TRIG 205 (H) 03/31/2022    TRIG 233 (H) 03/22/2021    NHDL 104 03/31/2022    NHDL 98 03/22/2021       LIVER ENZYME RESULTS:  Lab Results   Component Value Date    AST 21 03/31/2022    AST 22 03/22/2021    ALT 31 03/31/2022    ALT 33 03/22/2021       CBC RESULTS:  Lab Results   Component Value Date    WBC 4.8 11/05/2018    RBC 3.26 (L) 11/05/2018    HGB 8.5 (L) 11/05/2018    HGB 8.5 (L) 11/05/2018    HCT 28.9 (L) 11/05/2018    MCV 89 11/05/2018    MCH 26.1 (L) 11/05/2018    MCHC 29.4 (L) 11/05/2018    RDW 16.3 (H) 11/05/2018     11/05/2018       BMP RESULTS:  Lab Results   Component Value Date     03/31/2022     03/22/2021    POTASSIUM 3.8 03/31/2022     POTASSIUM 3.9 03/22/2021    CHLORIDE 104 03/31/2022    CHLORIDE 107 03/22/2021    CO2 28 03/31/2022    CO2 30 03/22/2021    ANIONGAP 7 03/31/2022    ANIONGAP 4 03/22/2021    GLC 89 03/31/2022    GLC 93 03/22/2021    BUN 11 03/31/2022    BUN 15 03/22/2021    CR 0.65 (L) 03/31/2022    CR 0.63 (L) 03/22/2021    GFRESTIMATED >90 03/31/2022    GFRESTIMATED >90 03/22/2021    GFRESTBLACK >90 03/22/2021    PAT 9.1 03/31/2022    PAT 8.6 03/22/2021        INR RESULTS:  Lab Results   Component Value Date    INR 1.20 (H) 05/23/2018    INR 1.20 (H) 03/09/2018       Procedures:    Echocardiogram 03-21-22    Technically difficult study due to inability to reposition the patient.  Left ventricular size, wall motion and function are normal. The ejection  fraction is 55-60%.  The right ventricle is normal size. Global right ventricular function is  normal.  No hemodynamically significant valvular abnormalities present.  The inferior vena cava was normal in size with preserved respiratory  variability.     This study was compared with the study from 3/22/2021. No significant changes  noted.  ______________________________________________________________________________  Left Ventricle  Left ventricular size, wall motion and function are normal. The ejection  fraction is 55-60%. Left ventricular diastolic function is not assessable.     Right Ventricle  The right ventricle is normal size. Global right ventricular function is  normal.     Atria  The atria cannot be assessed.     Mitral Valve  The mitral valve is normal. Trace mitral insufficiency is present.     Aortic Valve  The valve leaflets are not well visualized. On Doppler interrogation, there is  no significant stenosis or regurgitation.     Tricuspid Valve  The tricuspid valve is normal. Trace tricuspid insufficiency is present. The  peak velocity of the tricuspid regurgitant jet is not obtainable. Pulmonary  artery systolic pressure cannot be assessed.     Pulmonic Valve  The  valve leaflets are not well visualized. On Doppler interrogation, there is  no significant stenosis or regurgitation.     Vessels  The aorta root is normal. The thoracic aorta is normal. The inferior vena cava  was normal in size with preserved respiratory variability. IVC diameter <2.1  cm collapsing >50% with sniff suggests a normal RA pressure of 3 mmHg.     Pericardium  No pericardial effusion is present.     Miscellaneous  No significant valvular abnormalities present.     Compared to Previous Study  This study was compared with the study from 3/22/2021 . No significant changes  noted.     Attestation  I have personally viewed the imaging and agree with the interpretation and  report as documented by the fellow, Markos Cosme, and/or edited by me.  ______________________________________________________________________________  MMode/2D Measurements & Calculations  IVSd: 1.0 cm  LVIDd: 4.0 cm  LVIDs: 2.3 cm  LVPWd: 0.86 cm  FS: 42.8 %  LV mass(C)d: 116.7 grams  LV mass(C)dI: 60.0 grams/m2  Ao root diam: 3.2 cm  asc Aorta Diam: 3.1 cm  LVOT diam: 2.1 cm  LVOT area: 3.6 cm2  RWT: 0.43     Doppler Measurements & Calculations  MV E max ramila: 80.0 cm/sec  MV A max ramila: 68.9 cm/sec  MV E/A: 1.2     MV dec slope: 500.1 cm/sec2  MV dec time: 0.16 sec  E/E' av.6  Lateral E/e': 6.1  Medial E/e': 17.1    Assessment and Plan:     We discussed the results with patient.  We discussed the importance of a heart healthy diet an lifestyle.  We continue with same medical therapy.  Follow-up within 1 year.    Anoop Jane MD, PhD  Professor of Medicine  Division of Cardiology.          CC  Patient Care Team:  Eugene Burrell MD as PCP - General (Family Practice)  Anoop Jane MD as MD (Cardiology)  Andrei Pelaez MD as MD (Urology)  Candice Bridges RN as Registered Nurse (Urology)  [facility] [facility]  Cody Stroud MD as Assigned Surgical Provider  Anoop Jane  MD ANASTACIO as Assigned Heart and Vascular Provider  Lupe Trejo, RN as Specialty Care Coordinator (Cardiology)  SELF, REFERRED

## 2022-04-06 ENCOUNTER — OFFICE VISIT (OUTPATIENT)
Dept: UROLOGY | Facility: CLINIC | Age: 62
End: 2022-04-06
Payer: COMMERCIAL

## 2022-04-06 ENCOUNTER — VIRTUAL VISIT (OUTPATIENT)
Dept: UROLOGY | Facility: CLINIC | Age: 62
End: 2022-04-06
Payer: COMMERCIAL

## 2022-04-06 VITALS
HEART RATE: 80 BPM | DIASTOLIC BLOOD PRESSURE: 76 MMHG | HEIGHT: 71 IN | SYSTOLIC BLOOD PRESSURE: 115 MMHG | BODY MASS INDEX: 23.1 KG/M2 | WEIGHT: 165 LBS

## 2022-04-06 DIAGNOSIS — N31.9 NEUROGENIC BLADDER: ICD-10-CM

## 2022-04-06 DIAGNOSIS — N31.9 NEUROGENIC BLADDER: Primary | ICD-10-CM

## 2022-04-06 DIAGNOSIS — F64.9 GENDER DYSPHORIA: Primary | ICD-10-CM

## 2022-04-06 PROCEDURE — 99213 OFFICE O/P EST LOW 20 MIN: CPT | Mod: 25 | Performed by: UROLOGY

## 2022-04-06 PROCEDURE — 52287 CYSTOSCOPY CHEMODENERVATION: CPT | Performed by: UROLOGY

## 2022-04-06 ASSESSMENT — PAIN SCALES - GENERAL: PAINLEVEL: NO PAIN (0)

## 2022-04-06 NOTE — PROGRESS NOTES
"Derek is a 61 year old who is being evaluated via a billable telephone visit.      What phone number would you like to be contacted at? [phone]  How would you like to obtain your AVS? Loretta  Phone call duration: 15 minutes    [facility] Consult H&P    Name: Derek Enriquez    MRN: [id number]   YOB: 1960                 Chief Complaint:   Gender Dysphoria          History of Present Illness:   Derek Enriquez is a 61 year old transgender patient  Neurogenic bladder secondary to subdural hematoma at T10 s/p T9-12 laminectomy and evacuation of subdural hematoma eventually leading to paraplegia.  Tried Trospium but significant side effect of constipation. He cannot tolerate anticholinergics.  Has not tried Botox. Had insurance issues due to lack of trying multiple medical therapies.  Performs CIC.  Underwent UDS previously which showed significant NDO with very high pressures starting around 125mL  He has urinary urge incontinence due to his neurogenic DO  Thus, we tried Myrbetriq. He can tolerate the medication without significant side effects. He has a mild improvement of symptoms. However, we repeated urodynamics to ensure safe storage pressures, and they are clearly not safe.   Per UDS note:  \"-Multiple strong UDCs to pressures >110 cm H20, which begin at bladder volumes >160 mL and are associated with incontinene.  -Gradual increase in baseline pressure prior to sudden spike in pressure at fill volume of 163 mL. Pressures slowly return near baseline between each UDC.\"    I do Botox to bladder.  Started estrogen.  Patient is in the process of transitioning.  The patient has been on exogenous hormones since:   In terms of an relationship, mostly interested in women.  Has not socially transitioned.  The patient has not yet obtained letters.  The patient has previously undergone no prior gender surgeries.  Long-term surgical goals for the patient include: orchiectomy, maybe " scrotectomy, maybe vaginoplasty.  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         Past Medical History:     Past Medical History:   Diagnosis Date     CAD (coronary artery disease)     stent     Cardiac arrest (H) 03/2018     Femur fracture (H)      Neurogenic bladder      Neurogenic bowel      Orthostatic hypotension      Paraplegia (H)      Thoracic spinal cord injury (H)             Past Surgical History:     Past Surgical History:   Procedure Laterality Date     ARTHROTOMY HIP Left 9/19/2018    Procedure: ARTHROTOMY HIP;  Left Hip Heterotopic Bone Resection;  Surgeon: Toñito Kennedy MD;  [facility]     ARTHROTOMY HIP Right 11/02/2018     ARTHROTOMY HIP Right 11/2/2018    Procedure: Right Hip Heterotopic Bone Resection;  Surgeon: Toñito Kennedy MD;  [facility]     CARDIAC SURGERY       LAMINECTOMY THORACIC THREE LEVELS N/A 3/9/2018    Procedure: LAMINECTOMY THORACIC THREE LEVELS;  Thoracic 9-12 Laminectomy Evacuation of Subdural Hematoma, C-Arm, Prone, Gel Rolls.;  Surgeon: Abhijeet Joe MD;  [facility]     OPEN REDUCTION INTERNAL FIXATION RODDING INTRAMEDULLARY FEMUR Right 6/25/2018    Procedure: OPEN REDUCTION INTERNAL FIXATION RODDING INTRAMEDULLARY FEMUR;  Right Open Reduction Internal Fixation Subtrochanteric Femur Fracture;  Surgeon: Toñito Kennedy MD;  [facility]     wisdom teeth extraction              Social History:     Social History     Tobacco Use     Smoking status: Former Smoker     Smokeless tobacco: Never Used     Tobacco comment: Quit at 31   Substance Use Topics     Alcohol use: Yes     Comment: socially            Family History:     Family History   Problem Relation Age of Onset     Cardiac Sudden Death Father      Arrhythmia Father         v fib arrest      Myocardial Infarction Brother      Other - See Comments Brother         myeloid dysplasia              Allergies:   No Known Allergies         Medications:      Current Outpatient Medications   Medication Sig     ascorbic acid (VITAMIN C) 500 MG tablet Take 500 mg by mouth every morning      aspirin 81 MG EC tablet Take 81 mg by mouth daily     atorvastatin (LIPITOR) 20 MG tablet Take 1 tablet (20 mg) by mouth At Bedtime     ciprofloxacin (CIPRO) 500 MG tablet Take 1 tablet (500 mg) by mouth daily for 3 days (Patient not taking: Reported on 3/31/2022)     ciprofloxacin (CIPRO) 500 MG tablet Take 1 tablet (500 mg) by mouth daily     Multiple Vitamins-Minerals (MULTIVITAMIN MEN PO) Take 1 tablet by mouth daily      MYRBETRIQ 25 MG 24 hr tablet TK ONE T PO QD (Patient not taking: Reported on 8/25/2021)     order for DME Equipment being ordered: [employer] [facility] Phone [phone] Fax [phone]    Left Lateral Foot Primary Dressing Polymem Cloth Strips (not dots) Qty 30  Coccyx Secondary Dressing  tegaderm adhesive foam dressing 3x3 Qty 30  Perineum Secondary Dressing tegaderm + pad ( ref 3582) qty 30  Length of Need: 1 month  Frequency of dressing change: daily (Patient not taking: Reported on 3/31/2022)     tadalafil (CIALIS) 20 MG tablet Take 1 tablet (20 mg) PO PRN prior to sexual activity. Max: 1 tablet per 36 hours.     vitamin B complex with vitamin C (VITAMIN  B COMPLEX) TABS tablet Take 1 tablet by mouth daily     Current Facility-Administered Medications   Medication     botulinum toxin type A (BOTOX) 100 units injection 200 Units     Botulinum Toxin Type A (BOTOX) 200 units injection 200 Units     Botulinum Toxin Type A (BOTOX) 200 units injection 200 Units                   Assessment and Plan:   61 year old transgender individual with gender dysphoria    The criteria for genital surgery are specific to the type of surgery being requested.  Criteria for bottom surgery:    1. Persistent, well documented gender dysphoria;  2. Capacity to make a fully informed decision and to consent for treatment;  3. Age of consent (>18 years old)  4. If significant medical  or mental health concerns are present, they must be well controlled.  5. 12 continuous months of hormone therapy as appropriate to the patient s gender goals (unless  the patient has a medical contraindication or is otherwise unable or unwilling to take  Hormones).  6. Two letters of support    The aim of hormone therapy prior to gonadectomy is primarily to introduce a period of reversible  estrogen or testosterone suppression, before the patient undergoes irreversible surgical intervention.    I reviewed the steps of orchiectomy. I reviewed the surgical procedure. I reviewed the risks and benefits including bleeding, infection and irreversible nature of the procedure.     Needs to obtain letters.  Considering orchiectomy and/or scrotectomy.  Maybe a vaginoplasty someday. Probably minimal depth.  Will reach out once letters obtained.    Cody Stroud MD   Reconstructive Urology  [facility]

## 2022-04-06 NOTE — LETTER
"4/6/2022       RE: Derek Enriquez  [address]  [place] [facility] [place] [address]     Dear Colleague,    Thank you for referring your patient, Derek Enriquez, to the [facility] [place] [facility]. Please see a copy of my visit note below.    Cystoscopy with Botox Note     PRE-PROCEDURE DIAGNOSIS:  Neurogenic Bladder     POST-PROCEDURE DIAGNOSIS:  Neurogenic Bladder     PROCEDURE:   Cystoscopy with chemodenervation of the bladder     HISTORY: Derek Enriquez is a 61 year old male with Neurogenic bladder secondary to subdural hematoma at T10 s/p T9-12 laminectomy and evacuation of subdural hematoma eventually leading to paraplegia.  Tried Trospium but significant side effect of constipation. He cannot tolerate anticholinergics.  Has not tried Botox. Had insurance issues due to lack of trying multiple medical therapies.  Performs CIC.  Underwent UDS previously which showed significant NDO with very high pressures starting around 125mL  He has urinary urge incontinence due to his neurogenic DO  Thus, we tried Myrbetriq. He can tolerate the medication without significant side effects. He has a mild improvement of symptoms. However, we repeated urodynamics to ensure safe storage pressures, and they are clearly not safe.   Per UDS note:  \"-Multiple strong UDCs to pressures >110 cm H20, which begin at bladder volumes >160 mL and are associated with incontinene.  -Gradual increase in baseline pressure prior to sudden spike in pressure at fill volume of 163 mL. Pressures slowly return near baseline between each UDC.\"     He underwent Botox injection 8/25/2021.  He notes much improvement.  He continues to do CIC with new sterile self contained catheter each time.     DESCRIPTION OF PROCEDURE:  After informed consent was obtained, the patient was brought to the procedure room where they were placed in supine with all pressure points well padded.  The patient was " prepped and draped in a sterile fashion. A flexible cystoscope was introduced through a well-lubricated urethra. There were no urethral strictures. The bladder was entered. The urine was clear. Systematic cystoscopy was performed. There were no tumors, stones, or other abnormalities in the bladder. Flexible scope and flexible needle were inserted. 200u of Botox was mixed in 20cc normal saline. Systematic injection was performed with 15-20 locations in a broad template. Minimal bleeding was noted, and scope was removed along with needle.     ASSESSMENT AND PLAN:  Botox injection today successful  Followup in 3-4 months for repeat botox injection in clinic     Cody Stroud MD  Reconstructive Urology

## 2022-04-06 NOTE — NURSING NOTE
Chief Complaint   Patient presents with     Follow Up     discuss orchiectomy       There were no vitals taken for this visit. There is no height or weight on file to calculate BMI.    Patient Active Problem List   Diagnosis     Cardiac arrest (H)     Coronary atherosclerosis     Stented coronary artery     Spinal cord compression (H)     Subdural hematoma (H)     Post-operative state     Decubitus ulcer of sacral region, stage 3 (H)     Chronic skin ulcer, limited to breakdown of skin (H)     Pressure ulcer of other site, stage 3 (H)     Heterotopic ossification     Other calcification of muscle, left thigh     Status post hip surgery     Ingrown nail     Open wound, left lateral foot     Abnormal CT scan, heart     Dyslipidemia     Family history of premature CAD     Impotence of organic origin     Injury of thoracic spinal cord (H)     Neurogenic bladder     Neurogenic bowel     Orthostatic hypotension     Paraplegia (H)     Preop examination     Coronary artery disease involving native coronary artery of native heart without angina pectoris       No Known Allergies    Current Outpatient Medications   Medication Sig Dispense Refill     ascorbic acid (VITAMIN C) 500 MG tablet Take 500 mg by mouth every morning        aspirin 81 MG EC tablet Take 81 mg by mouth daily       atorvastatin (LIPITOR) 20 MG tablet Take 1 tablet (20 mg) by mouth At Bedtime 90 tablet 3     ciprofloxacin (CIPRO) 500 MG tablet Take 1 tablet (500 mg) by mouth daily for 3 days (Patient not taking: Reported on 3/31/2022) 3 tablet 0     ciprofloxacin (CIPRO) 500 MG tablet Take 1 tablet (500 mg) by mouth daily 3 tablet 0     Multiple Vitamins-Minerals (MULTIVITAMIN MEN PO) Take 1 tablet by mouth daily        MYRBETRIQ 25 MG 24 hr tablet TK ONE T PO QD (Patient not taking: Reported on 8/25/2021)       order for DME Equipment being ordered: [employer] [facility] Phone [phone] Fax [phone]    Left Lateral Foot Primary Dressing Polymem Cloth  Strips (not dots) Qty 30  Coccyx Secondary Dressing  tegaderm adhesive foam dressing 3x3 Qty 30  Perineum Secondary Dressing tegaderm + pad ( ref 3582) qty 30  Length of Need: 1 month  Frequency of dressing change: daily (Patient not taking: Reported on 3/31/2022) 30 days 0     tadalafil (CIALIS) 20 MG tablet Take 1 tablet (20 mg) PO PRN prior to sexual activity. Max: 1 tablet per 36 hours. 5 tablet 3     vitamin B complex with vitamin C (VITAMIN  B COMPLEX) TABS tablet Take 1 tablet by mouth daily         Social History     Tobacco Use     Smoking status: Former Smoker     Smokeless tobacco: Never Used     Tobacco comment: Quit at 31   Substance Use Topics     Alcohol use: Yes     Comment: socially     Drug use: No       Emiliano Russell EMT  4/6/2022  1:35 PM

## 2022-04-06 NOTE — LETTER
"4/6/2022       RE: Derek Enriquez  [address]  [facility] [place] [facility] [place] [address]     Dear Colleague,    Thank you for referring your patient, Derek Enriquez, to the [facility] UROLOGY CLIN[facility] [place] at [facility]. Please see a copy of my visit note below.    Derek is a 61 year old who is being evaluated via a billable telephone visit.      What phone number would you like to be contacted at? [phone]  How would you like to obtain your AVS? [email or web address]  Phone call duration: 15 minutes    [facility] Consult H&P    Name: Derek Enriquez    MRN: [id number]   YOB: 1960                 Chief Complaint:   Gender Dysphoria          History of Present Illness:   Derek Enriquez is a 61 year old transgender patient  Neurogenic bladder secondary to subdural hematoma at T10 s/p T9-12 laminectomy and evacuation of subdural hematoma eventually leading to paraplegia.  Tried Trospium but significant side effect of constipation. He cannot tolerate anticholinergics.  Has not tried Botox. Had insurance issues due to lack of trying multiple medical therapies.  Performs CIC.  Underwent UDS previously which showed significant NDO with very high pressures starting around 125mL  He has urinary urge incontinence due to his neurogenic DO  Thus, we tried Myrbetriq. He can tolerate the medication without significant side effects. He has a mild improvement of symptoms. However, we repeated urodynamics to ensure safe storage pressures, and they are clearly not safe.   Per UDS note:  \"-Multiple strong UDCs to pressures >110 cm H20, which begin at bladder volumes >160 mL and are associated with incontinene.  -Gradual increase in baseline pressure prior to sudden spike in pressure at fill volume of 163 mL. Pressures slowly return near baseline between each UDC.\"    I do Botox to bladder.  Started estrogen.  Patient is in the process of transitioning.  The patient " has been on exogenous hormones since:   In terms of an relationship, mostly interested in women.  Has not socially transitioned.  The patient has not yet obtained letters.  The patient has previously undergone no prior gender surgeries.  Long-term surgical goals for the patient include: orchiectomy, maybe scrotectomy, maybe vaginoplasty.  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         Past Medical History:     Past Medical History:   Diagnosis Date     CAD (coronary artery disease)     stent     Cardiac arrest (H) 03/2018     Femur fracture (H)      Neurogenic bladder      Neurogenic bowel      Orthostatic hypotension      Paraplegia (H)      Thoracic spinal cord injury (H)             Past Surgical History:     Past Surgical History:   Procedure Laterality Date     ARTHROTOMY HIP Left 9/19/2018    Procedure: ARTHROTOMY HIP;  Left Hip Heterotopic Bone Resection;  Surgeon: Toñito Kennedy MD;  Location: [facility] OR     ARTHROTOMY HIP Right 11/02/2018     ARTHROTOMY HIP Right 11/2/2018    Procedure: Right Hip Heterotopic Bone Resection;  Surgeon: Toñito Kennedy MD;  Location: [facility] OR     CARDIAC SURGERY       LAMINECTOMY THORACIC THREE LEVELS N/A 3/9/2018    Procedure: LAMINECTOMY THORACIC THREE LEVELS;  Thoracic 9-12 Laminectomy Evacuation of Subdural Hematoma, C-Arm, Prone, Gel Rolls.;  Surgeon: Abhijeet Joe MD;  Location: [facility] OR     OPEN REDUCTION INTERNAL FIXATION RODDING INTRAMEDULLARY FEMUR Right 6/25/2018    Procedure: OPEN REDUCTION INTERNAL FIXATION RODDING INTRAMEDULLARY FEMUR;  Right Open Reduction Internal Fixation Subtrochanteric Femur Fracture;  Surgeon: Toñito Kennedy MD;  Location: [facility] OR     wisdom teeth extraction              Social History:     Social History     Tobacco Use     Smoking status: Former Smoker     Smokeless tobacco: Never Used     Tobacco comment: Quit at 31   Substance Use Topics     Alcohol use: Yes     Comment: socially             Family History:     Family History   Problem Relation Age of Onset     Cardiac Sudden Death Father      Arrhythmia Father         v fib arrest      Myocardial Infarction Brother      Other - See Comments Brother         myeloid dysplasia              Allergies:   No Known Allergies         Medications:     Current Outpatient Medications   Medication Sig     ascorbic acid (VITAMIN C) 500 MG tablet Take 500 mg by mouth every morning      aspirin 81 MG EC tablet Take 81 mg by mouth daily     atorvastatin (LIPITOR) 20 MG tablet Take 1 tablet (20 mg) by mouth At Bedtime     ciprofloxacin (CIPRO) 500 MG tablet Take 1 tablet (500 mg) by mouth daily for 3 days (Patient not taking: Reported on 3/31/2022)     ciprofloxacin (CIPRO) 500 MG tablet Take 1 tablet (500 mg) by mouth daily     Multiple Vitamins-Minerals (MULTIVITAMIN MEN PO) Take 1 tablet by mouth daily      MYRBETRIQ 25 MG 24 hr tablet TK ONE T PO QD (Patient not taking: Reported on 8/25/2021)     order for DME Equipment being ordered: [employer] Phone [phone] Fax [phone]    Left Lateral Foot Primary Dressing Polymem Cloth Strips (not dots) Qty 30  Coccyx Secondary Dressing  tegaderm adhesive foam dressing 3x3 Qty 30  Perineum Secondary Dressing tegaderm + pad ( ref 3582) qty 30  Length of Need: 1 month  Frequency of dressing change: daily (Patient not taking: Reported on 3/31/2022)     tadalafil (CIALIS) 20 MG tablet Take 1 tablet (20 mg) PO PRN prior to sexual activity. Max: 1 tablet per 36 hours.     vitamin B complex with vitamin C (VITAMIN  B COMPLEX) TABS tablet Take 1 tablet by mouth daily     Current Facility-Administered Medications   Medication     botulinum toxin type A (BOTOX) 100 units injection 200 Units     Botulinum Toxin Type A (BOTOX) 200 units injection 200 Units     Botulinum Toxin Type A (BOTOX) 200 units injection 200 Units                   Assessment and Plan:   61 year old transgender individual with gender  dysphoria    The criteria for genital surgery are specific to the type of surgery being requested.  Criteria for bottom surgery:    1. Persistent, well documented gender dysphoria;  2. Capacity to make a fully informed decision and to consent for treatment;  3. Age of consent (>18 years old)  4. If significant medical or mental health concerns are present, they must be well controlled.  5. 12 continuous months of hormone therapy as appropriate to the patient s gender goals (unless  the patient has a medical contraindication or is otherwise unable or unwilling to take  Hormones).  6. Two letters of support    The aim of hormone therapy prior to gonadectomy is primarily to introduce a period of reversible  estrogen or testosterone suppression, before the patient undergoes irreversible surgical intervention.    I reviewed the steps of orchiectomy. I reviewed the surgical procedure. I reviewed the risks and benefits including bleeding, infection and irreversible nature of the procedure.     Needs to obtain letters.  Considering orchiectomy and/or scrotectomy.  Maybe a vaginoplasty someday. Probably minimal depth.  Will reach out once letters obtained.    Cody Stroud MD   Reconstructive Urology  [facility]

## 2022-04-06 NOTE — NURSING NOTE
Chief Complaint   Patient presents with     Cystoscopy     with botox       There were no vitals taken for this visit. There is no height or weight on file to calculate BMI.    Patient Active Problem List   Diagnosis     Cardiac arrest (H)     Coronary atherosclerosis     Stented coronary artery     Spinal cord compression (H)     Subdural hematoma (H)     Post-operative state     Decubitus ulcer of sacral region, stage 3 (H)     Chronic skin ulcer, limited to breakdown of skin (H)     Pressure ulcer of other site, stage 3 (H)     Heterotopic ossification     Other calcification of muscle, left thigh     Status post hip surgery     Ingrown nail     Open wound, left lateral foot     Abnormal CT scan, heart     Dyslipidemia     Family history of premature CAD     Impotence of organic origin     Injury of thoracic spinal cord (H)     Neurogenic bladder     Neurogenic bowel     Orthostatic hypotension     Paraplegia (H)     Preop examination     Coronary artery disease involving native coronary artery of native heart without angina pectoris       No Known Allergies    Current Outpatient Medications   Medication Sig Dispense Refill     ascorbic acid (VITAMIN C) 500 MG tablet Take 500 mg by mouth every morning        aspirin 81 MG EC tablet Take 81 mg by mouth daily       atorvastatin (LIPITOR) 20 MG tablet Take 1 tablet (20 mg) by mouth At Bedtime 90 tablet 3     ciprofloxacin (CIPRO) 500 MG tablet Take 1 tablet (500 mg) by mouth daily for 3 days (Patient not taking: Reported on 3/31/2022) 3 tablet 0     ciprofloxacin (CIPRO) 500 MG tablet Take 1 tablet (500 mg) by mouth daily 3 tablet 0     Multiple Vitamins-Minerals (MULTIVITAMIN MEN PO) Take 1 tablet by mouth daily        MYRBETRIQ 25 MG 24 hr tablet TK ONE T PO QD (Patient not taking: Reported on 8/25/2021)       order for DME Equipment being ordered: [employer] [facility] Phone [phone] Fax [phone]    Left Lateral Foot Primary Dressing Polymem Cloth Strips  (not dots) Qty 30  Coccyx Secondary Dressing  tegaderm adhesive foam dressing 3x3 Qty 30  Perineum Secondary Dressing tegaderm + pad ( ref 3582) qty 30  Length of Need: 1 month  Frequency of dressing change: daily (Patient not taking: Reported on 3/31/2022) 30 days 0     tadalafil (CIALIS) 20 MG tablet Take 1 tablet (20 mg) PO PRN prior to sexual activity. Max: 1 tablet per 36 hours. 5 tablet 3     vitamin B complex with vitamin C (VITAMIN  B COMPLEX) TABS tablet Take 1 tablet by mouth daily         Social History     Tobacco Use     Smoking status: Former Smoker     Smokeless tobacco: Never Used     Tobacco comment: Quit at 31   Substance Use Topics     Alcohol use: Yes     Comment: socially     Drug use: No       Invasive Procedure Safety Checklist:    Procedure: Cystoscopy with Botox injection    Action: Complete sections and checkboxes as appropriate.    Pre-procedure:  1. Patient ID Verified with 2 identifiers (Mariluz and  or MRN) : YES    2. Procedure and site verified with patient/designee (when able) : YES    3. Accurate consent documentation in medical record : YES    4. H&P (or appropriate assessment) documented in medical record : N/A  H&P must be up to 30 days prior to procedure an updated within 24 hours of                 Procedure as applicable.     5. Relevant diagnostic and radiology test results appropriately labeled and displayed as applicable : YES    6. Blood products, implants, devices, and/or special equipment available for the procedure as applicable : YES    7. Procedure site(s) marked with provider initials [Exclusions: none] : NO    8. Marking not required. Reason : Yes  Procedure does not require site marking    Time Out:     Time-Out performed immediately prior to starting procedure, including verbal and active participation of all team members addressing: YES    1. Correct patient identity.  2. Confirmed that the correct side and site are marked.  3. An accurate procedure to be  done.  4. Agreement on the procedure to be done.  5. Correct patient position.  6. Relevant images and results are properly labeled and appropriately displayed.  7. The need to administer antibiotics or fluids for irrigation purposes during the procedure as applicable.  8. Safety precautions based on patient history or medication use.    During Procedure: Verification of correct person, site, and procedure occurs any time the responsibility for care of the patient is transferred to another member of the care team.    Patient verified with three identifiers, allergies reviewed. Verified patient stopped blood thinning medications and started Cipro.     The following medication was given:     MEDICATION:  Botox  ROUTE: administered by physician - bladder wall/urinary sphincter   SITE: administered by physician - bladder wall/urinary sphincter    DOSE: 200 units  LOT #: [id number]  : Clerts!  EXPIRATION DATE: 10/24  NDC#: 4843-2753-97   Was there drug waste? No    Prior to injection, verified patient identity using patient's name and date of birth.  Due to injection administration, patient instructed to remain in clinic for 15 minutes  afterwards, and to report any adverse reaction to me immediately.    Drug Amount Wasted:  None.  Vial/Syringe: Single dose vial    Emiliano Russell EMT  4/6/2022  7:56 AM

## 2022-04-06 NOTE — PROGRESS NOTES
"Cystoscopy with Botox Note     PRE-PROCEDURE DIAGNOSIS:  Neurogenic Bladder     POST-PROCEDURE DIAGNOSIS:  Neurogenic Bladder     PROCEDURE:   Cystoscopy with chemodenervation of the bladder     HISTORY: Derek Enriquez is a 61 year old male with Neurogenic bladder secondary to subdural hematoma at T10 s/p T9-12 laminectomy and evacuation of subdural hematoma eventually leading to paraplegia.  Tried Trospium but significant side effect of constipation. He cannot tolerate anticholinergics.  Has not tried Botox. Had insurance issues due to lack of trying multiple medical therapies.  Performs CIC.  Underwent UDS previously which showed significant NDO with very high pressures starting around 125mL  He has urinary urge incontinence due to his neurogenic DO  Thus, we tried Myrbetriq. He can tolerate the medication without significant side effects. He has a mild improvement of symptoms. However, we repeated urodynamics to ensure safe storage pressures, and they are clearly not safe.   Per UDS note:  \"-Multiple strong UDCs to pressures >110 cm H20, which begin at bladder volumes >160 mL and are associated with incontinene.  -Gradual increase in baseline pressure prior to sudden spike in pressure at fill volume of 163 mL. Pressures slowly return near baseline between each UDC.\"     He underwent Botox injection 8/25/2021.  He notes much improvement.  He continues to do CIC with new sterile self contained catheter each time.     DESCRIPTION OF PROCEDURE:  After informed consent was obtained, the patient was brought to the procedure room where they were placed in supine with all pressure points well padded.  The patient was prepped and draped in a sterile fashion. A flexible cystoscope was introduced through a well-lubricated urethra. There were no urethral strictures. The bladder was entered. The urine was clear. Systematic cystoscopy was performed. There were no tumors, stones, or other abnormalities in the bladder. Flexible " scope and flexible needle were inserted. 200u of Botox was mixed in 20cc normal saline. Systematic injection was performed with 15-20 locations in a broad template. Minimal bleeding was noted, and scope was removed along with needle.     ASSESSMENT AND PLAN:  Botox injection today successful  Followup in 3-4 months for repeat botox injection in clinic     Cody Stroud MD  Reconstructive Urology

## 2022-04-06 NOTE — PATIENT INSTRUCTIONS
"Please follow up in clinic for office cystoscopy with botox on August 10th at 8:30am.    It was a pleasure meeting with you today.  Thank you for allowing me and my team the privilege of caring for you today.  YOU are the reason we are here, and I truly hope we provided you with the excellent service you deserve.  Please let us know if there is anything else we can do for you so that we can be sure you are leaving completely satisfied with your care experience.          AFTER YOUR CYSTOSCOPY        You have just completed a cystoscopy, or \"cysto\", which allowed your physician to learn more about your bladder (or to remove a stent placed after surgery). We suggest that you continue to avoid caffeine, fruit juice, and alcohol for the next 24 hours, however, you are encouraged to return to your normal activities.         A few things that are considered normal after your cystoscopy:     * Small amount of bleeding (or spotting) that clears within the next 24 hours     * Slight burning sensation with urination     * Sensation to of needing to avoid more frequently     * The feeling of \"air\" in your urine     * Mild discomfort that is relieved with Tylenol        Please contact our office promptly if you:     * Develop a fever above 101 degrees     * Are unable to urinate     * Develop bright red blood that does not stop     * Severe pain or swelling         Please contact our office with any concerns or questions @[email or web address].  "

## 2022-04-29 ENCOUNTER — TELEPHONE (OUTPATIENT)
Dept: UROLOGY | Facility: CLINIC | Age: 62
End: 2022-04-29
Payer: COMMERCIAL

## 2022-04-29 NOTE — TELEPHONE ENCOUNTER
[facility] Call Center    Phone Message    May a detailed message be left on voicemail: yes     Reason for Call: The patient called asking if Dr. Stroud could fit him on 8/10/22 just before his cystoscopy to discuss orchectomy procedure? Writer offered appointment 8/10/22 at 10:00 am but, patient declined due to work. He is hoping to come in about 10-15 minutes early to discuss a few questions he has regarding the orchectomy procedure they've discussed previously? Please advise. Thank you.     Action Taken: Message routed to:  [facility] ([facility] Urology    Travel Screening: Not Applicable

## 2022-05-09 NOTE — TELEPHONE ENCOUNTER
Attempted to call pt. Left detailed message to call clinic back at [phone].   To inform pt he can discuss with Dr. Stroud at time of cysto.     Palak Palumbo RN MSN

## 2022-05-14 DIAGNOSIS — Z95.5 H/O HEART ARTERY STENT: ICD-10-CM

## 2022-05-16 RX ORDER — ATORVASTATIN CALCIUM 20 MG/1
20 TABLET, FILM COATED ORAL AT BEDTIME
Qty: 90 TABLET | Refills: 3 | Status: SHIPPED | OUTPATIENT
Start: 2022-05-16 | End: 2023-04-13

## 2022-05-16 NOTE — TELEPHONE ENCOUNTER
atorvastatin (LIPITOR) 20 MG tablet    3/31/2022  [facility] Anoop Mayorga MD    Cardiovascular Disease

## 2022-07-15 ENCOUNTER — TELEPHONE (OUTPATIENT)
Dept: UROLOGY | Facility: CLINIC | Age: 62
End: 2022-07-15

## 2022-07-15 NOTE — TELEPHONE ENCOUNTER
Call center called with pt on the line. Pt switching medical suppliers for caths from [facility] to [facility]. They need forms completed from [employer], [facility] faxing to us today. The same Rx from [address] [place], [place] [address]cal should be sent to [facility]. Writer stated we will work on this ASAP but Dr Stroud is not in clinic until 7/20 if we need a signature or provider task completed. Pt understood, seeking caths ASAP but was told by [employer] it may take 5-10 days. Pt needs 180 caths per month.

## 2022-07-18 ENCOUNTER — PRE VISIT (OUTPATIENT)
Dept: UROLOGY | Facility: CLINIC | Age: 62
End: 2022-07-18

## 2022-07-18 DIAGNOSIS — N31.9 NEUROGENIC BLADDER: Primary | ICD-10-CM

## 2022-07-18 NOTE — TELEPHONE ENCOUNTER
Action 07/18/22 MMT   Action Taken  [facility] received incoming order from [employer]. Form was placed in Dr. Stroud's folder and will be signed and faxed when he is in clinic on Wednesday 7/20/22

## 2022-07-20 ENCOUNTER — MEDICAL CORRESPONDENCE (OUTPATIENT)
Dept: UROLOGY | Facility: CLINIC | Age: 62
End: 2022-07-20

## 2022-08-05 RX ORDER — CIPROFLOXACIN 500 MG/1
500 TABLET, FILM COATED ORAL DAILY
Qty: 3 TABLET | Refills: 0 | Status: SHIPPED | OUTPATIENT
Start: 2022-08-09 | End: 2022-08-12

## 2022-08-10 ENCOUNTER — OFFICE VISIT (OUTPATIENT)
Dept: UROLOGY | Facility: CLINIC | Age: 62
End: 2022-08-10
Payer: COMMERCIAL

## 2022-08-10 VITALS
DIASTOLIC BLOOD PRESSURE: 82 MMHG | BODY MASS INDEX: 23.1 KG/M2 | WEIGHT: 165 LBS | HEIGHT: 71 IN | SYSTOLIC BLOOD PRESSURE: 125 MMHG | HEART RATE: 62 BPM

## 2022-08-10 DIAGNOSIS — N31.9 NEUROGENIC BLADDER: Primary | ICD-10-CM

## 2022-08-10 PROCEDURE — 52287 CYSTOSCOPY CHEMODENERVATION: CPT | Performed by: UROLOGY

## 2022-08-10 ASSESSMENT — PAIN SCALES - GENERAL: PAINLEVEL: NO PAIN (0)

## 2022-08-10 NOTE — LETTER
"8/10/2022       RE: Derek Enriquez  [address]  [facility] [address]     Dear Colleague,    Thank you for referring your patient, Derek Enriquez, to the [facility] UROLOGY CLINIC [place] at [facility]. Please see a copy of my visit note below.    Cystoscopy with Botox Note     PRE-PROCEDURE DIAGNOSIS:  Neurogenic Bladder     POST-PROCEDURE DIAGNOSIS:  Neurogenic Bladder     PROCEDURE:   Cystoscopy with chemodenervation of the bladder     HISTORY: Derek Enriquez is a 61 year old adult with Neurogenic bladder secondary to subdural hematoma at T10 s/p T9-12 laminectomy and evacuation of subdural hematoma eventually leading to paraplegia.  Tried Trospium but significant side effect of constipation. He cannot tolerate anticholinergics.  Has not tried Botox. Had insurance issues due to lack of trying multiple medical therapies.  Performs CIC.  Underwent UDS previously which showed significant NDO with very high pressures starting around 125mL  He has urinary urge incontinence due to his neurogenic DO  Thus, we tried Myrbetriq. He can tolerate the medication without significant side effects. He has a mild improvement of symptoms. However, we repeated urodynamics to ensure safe storage pressures, and they are clearly not safe.   Per UDS note:  \"-Multiple strong UDCs to pressures >110 cm H20, which begin at bladder volumes >160 mL and are associated with incontinene.  -Gradual increase in baseline pressure prior to sudden spike in pressure at fill volume of 163 mL. Pressures slowly return near baseline between each UDC.\"     He underwent Botox injection April 2022 and around 3-4 months - he notices it wearing off.  Here for repeat injection    DESCRIPTION OF PROCEDURE:  After informed consent was obtained, the patient was brought to the procedure room where they were placed in supine with all pressure points well padded.  The patient was prepped and draped in a " sterile fashion. A flexible cystoscope was introduced through a well-lubricated urethra. There were no urethral strictures. The bladder was entered. The urine was clear. Systematic cystoscopy was performed. There were no tumors, stones, or other abnormalities in the bladder. Flexible scope and flexible needle were inserted. 200u of Botox was mixed in 20cc normal saline. Systematic injection was performed with 15-20 locations in a broad template. Minimal bleeding was noted, and scope was removed along with needle.     ASSESSMENT AND PLAN:  Botox injection today successful  Followup in 3-4 months for repeat botox injection in clinic  Also is considering getting [employer] letters for orchiectomy.     Cody Stroud MD  Reconstructive Urology

## 2022-08-10 NOTE — NURSING NOTE
Chief Complaint   Patient presents with     Cystoscopy     Botox       There were no vitals taken for this visit. There is no height or weight on file to calculate BMI.    Patient Active Problem List   Diagnosis     Cardiac arrest (H)     Coronary atherosclerosis     Stented coronary artery     Spinal cord compression (H)     Subdural hematoma (H)     Post-operative state     Decubitus ulcer of sacral region, stage 3 (H)     Chronic skin ulcer, limited to breakdown of skin (H)     Pressure ulcer of other site, stage 3 (H)     Heterotopic ossification     Other calcification of muscle, left thigh     Status post hip surgery     Ingrown nail     Open wound, left lateral foot     Abnormal CT scan, heart     Dyslipidemia     Family history of premature CAD     Impotence of organic origin     Injury of thoracic spinal cord (H)     Neurogenic bladder     Neurogenic bowel     Orthostatic hypotension     Paraplegia (H)     Preop examination     Coronary artery disease involving native coronary artery of native heart without angina pectoris       No Known Allergies    Current Outpatient Medications   Medication Sig Dispense Refill     ascorbic acid (VITAMIN C) 500 MG tablet Take 500 mg by mouth every morning        aspirin 81 MG EC tablet Take 81 mg by mouth daily       atorvastatin (LIPITOR) 20 MG tablet Take 1 tablet (20 mg) by mouth At Bedtime 90 tablet 3     ciprofloxacin (CIPRO) 500 MG tablet Take 1 tablet (500 mg) by mouth daily for 3 days 3 tablet 0     ciprofloxacin (CIPRO) 500 MG tablet Take 1 tablet (500 mg) by mouth daily 3 tablet 0     Multiple Vitamins-Minerals (MULTIVITAMIN MEN PO) Take 1 tablet by mouth daily        MYRBETRIQ 25 MG 24 hr tablet TK ONE T PO QD (Patient not taking: Reported on 8/25/2021)       order for DME Equipment being ordered: [facility] Phone [phone] Fax [phone]    Left Lateral Foot Primary Dressing Polymem Cloth Strips (not dots) Qty 30  Coccyx Secondary Dressing   tegaderm adhesive foam dressing 3x3 Qty 30  Perineum Secondary Dressing tegaderm + pad ( ref 3582) qty 30  Length of Need: 1 month  Frequency of dressing change: daily (Patient not taking: Reported on 3/31/2022) 30 days 0     tadalafil (CIALIS) 20 MG tablet Take 1 tablet (20 mg) PO PRN prior to sexual activity. Max: 1 tablet per 36 hours. 5 tablet 3     vitamin B complex with vitamin C (VITAMIN  B COMPLEX) TABS tablet Take 1 tablet by mouth daily         Social History     Tobacco Use     Smoking status: Former Smoker     Smokeless tobacco: Never Used     Tobacco comment: Quit at 31   Substance Use Topics     Alcohol use: Yes     Comment: socially     Drug use: No       Invasive Procedure Safety Checklist:    Procedure: Cystoscopy with Botox injection    Action: Complete sections and checkboxes as appropriate.    Pre-procedure:  1. Patient ID Verified with 2 identifiers (Mariluz and  or MRN) : YES    2. Procedure and site verified with patient/designee (when able) : YES    3. Accurate consent documentation in medical record : YES    4. H&P (or appropriate assessment) documented in medical record : N/A  H&P must be up to 30 days prior to procedure an updated within 24 hours of                 Procedure as applicable.     5. Relevant diagnostic and radiology test results appropriately labeled and displayed as applicable : YES    6. Blood products, implants, devices, and/or special equipment available for the procedure as applicable : YES    7. Procedure site(s) marked with provider initials [Exclusions: none] : NO    8. Marking not required. Reason : Yes  Procedure does not require site marking    Time Out:     Time-Out performed immediately prior to starting procedure, including verbal and active participation of all team members addressing: YES    1. Correct patient identity.  2. Confirmed that the correct side and site are marked.  3. An accurate procedure to be done.  4. Agreement on the procedure to be done.  5.  Correct patient position.  6. Relevant images and results are properly labeled and appropriately displayed.  7. The need to administer antibiotics or fluids for irrigation purposes during the procedure as applicable.  8. Safety precautions based on patient history or medication use.    During Procedure: Verification of correct person, site, and procedure occurs any time the responsibility for care of the patient is transferred to another member of the care team.    Patient verified with three identifiers, allergies reviewed. Verified patient stopped blood thinning medications and started Cipro.    The following medication was given:     MEDICATION:  Botox  ROUTE: administered by physician - bladder wall/urinary sphincter   SITE: administered by physician - bladder wall/urinary sphincter    DOSE: 200 units  LOT #: [id number]  : Plannify  EXPIRATION DATE: 01/25  NDC#: 2962-5090-70   Was there drug waste? No    Prior to injection, verified patient identity using patient's name and date of birth.  Due to injection administration, patient instructed to remain in clinic for 15 minutes  afterwards, and to report any adverse reaction to me immediately.    Drug Amount Wasted:  None.  Vial/Syringe: Single dose vial    Emiliano Russell EMT  8/10/2022  7:39 AM

## 2022-08-10 NOTE — PROGRESS NOTES
"Cystoscopy with Botox Note     PRE-PROCEDURE DIAGNOSIS:  Neurogenic Bladder     POST-PROCEDURE DIAGNOSIS:  Neurogenic Bladder     PROCEDURE:   Cystoscopy with chemodenervation of the bladder     HISTORY: Derek Enriquez is a 61 year old adult with Neurogenic bladder secondary to subdural hematoma at T10 s/p T9-12 laminectomy and evacuation of subdural hematoma eventually leading to paraplegia.  Tried Trospium but significant side effect of constipation. He cannot tolerate anticholinergics.  Has not tried Botox. Had insurance issues due to lack of trying multiple medical therapies.  Performs CIC.  Underwent UDS previously which showed significant NDO with very high pressures starting around 125mL  He has urinary urge incontinence due to his neurogenic DO  Thus, we tried Myrbetriq. He can tolerate the medication without significant side effects. He has a mild improvement of symptoms. However, we repeated urodynamics to ensure safe storage pressures, and they are clearly not safe.   Per UDS note:  \"-Multiple strong UDCs to pressures >110 cm H20, which begin at bladder volumes >160 mL and are associated with incontinene.  -Gradual increase in baseline pressure prior to sudden spike in pressure at fill volume of 163 mL. Pressures slowly return near baseline between each UDC.\"     He underwent Botox injection April 2022 and around 3-4 months - he notices it wearing off.  Here for repeat injection    DESCRIPTION OF PROCEDURE:  After informed consent was obtained, the patient was brought to the procedure room where they were placed in supine with all pressure points well padded.  The patient was prepped and draped in a sterile fashion. A flexible cystoscope was introduced through a well-lubricated urethra. There were no urethral strictures. The bladder was entered. The urine was clear. Systematic cystoscopy was performed. There were no tumors, stones, or other abnormalities in the bladder. Flexible scope and flexible " needle were inserted. 200u of Botox was mixed in 20cc normal saline. Systematic injection was performed with 15-20 locations in a broad template. Minimal bleeding was noted, and scope was removed along with needle.     ASSESSMENT AND PLAN:  Botox injection today successful  Followup in 3-4 months for repeat botox injection in clinic  Also is considering getting WPATH letters for orchiectomy.     Cody Stroud MD  Reconstructive Urology

## 2022-08-10 NOTE — PATIENT INSTRUCTIONS
"Repeat botox in 4 months  AFTER YOUR CYSTOSCOPY        You have just completed a cystoscopy, or \"cysto\", which allowed your physician to learn more about your bladder (or to remove a stent placed after surgery). We suggest that you continue to avoid caffeine, fruit juice, and alcohol for the next 24 hours, however, you are encouraged to return to your normal activities.         A few things that are considered normal after your cystoscopy:     * Small amount of bleeding (or spotting) that clears within the next 24 hours     * Slight burning sensation with urination     * Sensation to of needing to avoid more frequently     * The feeling of \"air\" in your urine     * Mild discomfort that is relieved with Tylenol        Please contact our office promptly if you:     * Develop a fever above 101 degrees     * Are unable to urinate     * Develop bright red blood that does not stop     * Severe pain or swelling         Please contact our office with any concerns or questions @[email or web address].  "

## 2022-10-16 ENCOUNTER — HEALTH MAINTENANCE LETTER (OUTPATIENT)
Age: 62
End: 2022-10-16

## 2022-11-21 ENCOUNTER — PRE VISIT (OUTPATIENT)
Dept: UROLOGY | Facility: CLINIC | Age: 62
End: 2022-11-21

## 2022-11-21 DIAGNOSIS — N31.9 NEUROGENIC BLADDER: Primary | ICD-10-CM

## 2022-11-29 NOTE — MR AVS SNAPSHOT
After Visit Summary   8/6/2018    Derek Enriquez    MRN: [id number]           Patient Information     Date Of Birth          1960        Visit Information        Provider Department      8/6/2018 10:15 AM Abhijeet Joe MD [facility] Neurosurgery        Today's Diagnoses     Spinal cord compression (H)        Subdural hematoma (H)           Follow-ups after your visit        Your next 10 appointments already scheduled     Nov 07, 2018  2:00 PM CST   Ech Complete with [id number]   [facility] Echo ([facility])    [address] [place] [id number] [phone]           1.  Please bring or wear a comfortable two-piece outfit. 2.  You may eat, drink and take your normal medicines. 3.  For any questions that cannot be answered, please contact the ordering physician 4.  Please do not wear perfumes or scented lotions on the day of your exam.            Nov 07, 2018  3:00 PM CST   Lab with  LAB   [facility] Lab ([facility])    [address] [place] [id number] [phone]            Nov 07, 2018  3:30 PM CST   (Arrive by 3:15 PM)   RETURN LIPID VISIT with Anoop Jane MD   [facility] ([facility])    [address] [place] [id number] [phone]              Who to contact     Please call your clinic at [phone] to:    Ask questions about your health    Make or cancel appointments    Discuss your medicines    Learn about your test results    Speak to your doctor            Additional Information About Your Visit        MyChart Information     [employer] gives you secure access to your electronic health record. If you see a primary care provider, you can also send messages to your care team and make appointments. If you have questions, please call your primary care clinic.  If you do not have a primary care provider, please call    [phone] and they will assist you.      [employer] is an electronic gateway that provides easy, online access to your medical records. With [employer], you can request a clinic appointment, read your test results, renew a prescription or communicate with your care team.     To access your existing account, please contact your [facility] or call [phone] for assistance.        Care EveryWhere ID     This is your Care EveryWhere ID. This could be used by other organizations to access your [facility] medical records  [id number]        Your Vitals Were     Pulse Height BMI (Body Mass Index)             75 1.803 m (5' 10.98\") 22.61 kg/m2          Blood Pressure from Last 3 Encounters:   08/06/18 124/80   06/26/18 101/49   06/18/18 114/70    Weight from Last 3 Encounters:   08/06/18 73.5 kg (162 lb)   06/25/18 73.1 kg (161 lb 2.5 oz)   06/18/18 73.5 kg (162 lb)              Today, you had the following     No orders found for display         Today's Medication Changes          These changes are accurate as of 8/6/18 11:05 AM.  If you have any questions, ask your nurse or doctor.               These medicines have changed or have updated prescriptions.        Dose/Directions    midodrine 2.5 MG tablet   Commonly known as:  PROAMATINE   This may have changed:    - when to take this  - reasons to take this  - additional instructions   Used for:  Neurogenic orthostatic hypotension (H)        Dose:  2.5 mg   Take 1 tablet (2.5 mg) by mouth 2 times daily Give before morning and before afternoon therapies start for orthostatic hypotension.   Refills:  0                Primary Care Provider Office Phone # Fax #    Eugene Burrell [phone]       [facility] [address]  [facility] [address]        Equal Access to Services     AUSTIN WILSON AH: Bri Francisco, kesha bob, qaminal emery. " So [facility] [phone].    ATENCIÓN: Si estella bishop, tiene a humphrey disposición servicios gratuitos de asistencia lingüística. George mcclendon [phone].    We comply with applicable federal civil rights laws and [place] laws. We do not discriminate on the basis of race, color, national origin, age, disability, sex, sexual orientation, or gender identity.            Thank you!     Thank you for choosing [facility]  for your care. Our goal is always to provide you with excellent care. Hearing back from our patients is one way we can continue to improve our services. Please take a few minutes to complete the written survey that you may receive in the mail after your visit with us. Thank you!             Your Updated Medication List - Protect others around you: Learn how to safely use, store and throw away your medicines at www.disposemymeds.org.          This list is accurate as of 8/6/18 11:05 AM.  Always use your most recent med list.                   Brand Name Dispense Instructions for use Diagnosis    ascorbic acid 500 MG tablet    VITAMIN C     Take 500 mg by mouth 2 times daily        ASPIRIN ADULT LOW STRENGTH PO      Take 81 mg by mouth daily        atorvastatin 20 MG tablet    LIPITOR    90 tablet    Take 1 tablet (20 mg) by mouth At Bedtime    H/O heart artery stent       clopidogrel 75 MG tablet    PLAVIX    90 tablet    Take 1 tablet (75 mg) by mouth daily    H/O heart artery stent       docusate sodium 100 MG capsule    COLACE     Take 100 mg by mouth daily as needed        etidronate 200 MG tablet    DIDRONEL     2 times daily        midodrine 2.5 MG tablet    PROAMATINE     Take 1 tablet (2.5 mg) by mouth 2 times daily Give before morning and before afternoon therapies start for orthostatic hypotension.    Neurogenic orthostatic hypotension (H)       MULTIVITAMIN MEN PO      Take 1 tablet by mouth daily        phenylephrine-shark liver oil-mineral oil-petrolatum 0.25-14-74.9 % rectal ointment     PREPARATION H     Place rectally daily as needed        vitamin B complex with vitamin C Tabs tablet      Take 1 tablet by mouth daily

## 2022-12-03 ENCOUNTER — TELEPHONE (OUTPATIENT)
Dept: CARDIOLOGY | Facility: CLINIC | Age: 62
End: 2022-12-03

## 2022-12-03 NOTE — TELEPHONE ENCOUNTER
Patient Contacted for the patient to call back and schedule the following:    Appointment type: Cardiology- Return General  Provider: Buck  Return date: 03/30/23  Specialty phone number: [phone]  Additional appointment(s) needed: None  Additonal Notes: 12/3: Spoke wt pt, pt requesting earlier appt time, msg sent to nurse. -SS    Sydney Caballero, Visit Facilitator/MA.

## 2022-12-08 RX ORDER — CIPROFLOXACIN 500 MG/1
500 TABLET, FILM COATED ORAL DAILY
Qty: 3 TABLET | Refills: 0 | Status: SHIPPED | OUTPATIENT
Start: 2022-12-13 | End: 2022-12-16

## 2022-12-14 ENCOUNTER — OFFICE VISIT (OUTPATIENT)
Dept: UROLOGY | Facility: CLINIC | Age: 62
End: 2022-12-14
Payer: COMMERCIAL

## 2022-12-14 VITALS
HEART RATE: 76 BPM | HEIGHT: 71 IN | SYSTOLIC BLOOD PRESSURE: 121 MMHG | DIASTOLIC BLOOD PRESSURE: 73 MMHG | WEIGHT: 165 LBS | BODY MASS INDEX: 23.1 KG/M2

## 2022-12-14 DIAGNOSIS — N31.9 NEUROGENIC BLADDER: Primary | ICD-10-CM

## 2022-12-14 PROCEDURE — 52287 CYSTOSCOPY CHEMODENERVATION: CPT | Performed by: UROLOGY

## 2022-12-14 RX ORDER — PROGESTERONE 200 MG/1
CAPSULE ORAL
Status: ON HOLD | COMMUNITY
Start: 2022-12-08 | End: 2024-04-06

## 2022-12-14 RX ORDER — ESTRADIOL 0.1 MG/D
FILM, EXTENDED RELEASE TRANSDERMAL
Status: ON HOLD | COMMUNITY
Start: 2022-12-08 | End: 2024-04-06

## 2022-12-14 ASSESSMENT — PAIN SCALES - GENERAL: PAINLEVEL: NO PAIN (0)

## 2022-12-14 NOTE — PROGRESS NOTES
"Cystoscopy with Botox Note     PRE-PROCEDURE DIAGNOSIS:  Neurogenic Bladder     POST-PROCEDURE DIAGNOSIS:  Neurogenic Bladder     PROCEDURE:   Cystoscopy with chemodenervation of the bladder     HISTORY: Derek Enriquez is a 62 year old adult with Neurogenic bladder secondary to subdural hematoma at T10 s/p T9-12 laminectomy and evacuation of subdural hematoma eventually leading to paraplegia.  Tried Trospium but significant side effect of constipation. He cannot tolerate anticholinergics.  Has not tried Botox. Had insurance issues due to lack of trying multiple medical therapies.  Performs CIC.  Underwent UDS previously which showed significant NDO with very high pressures starting around 125mL  He has urinary urge incontinence due to his neurogenic DO  Thus, we tried Myrbetriq. He can tolerate the medication without significant side effects. He has a mild improvement of symptoms. However, we repeated urodynamics to ensure safe storage pressures, and they are clearly not safe.   Per UDS note:  \"-Multiple strong UDCs to pressures >110 cm H20, which begin at bladder volumes >160 mL and are associated with incontinene.  -Gradual increase in baseline pressure prior to sudden spike in pressure at fill volume of 163 mL. Pressures slowly return near baseline between each UDC.\"     He underwent Botox injection Aug 2022 and around 3-4 months - he notices it wearing off.  Here for repeat injection    DESCRIPTION OF PROCEDURE:  After informed consent was obtained, the patient was brought to the procedure room where they were placed in supine with all pressure points well padded.  The patient was prepped and draped in a sterile fashion. A flexible cystoscope was introduced through a well-lubricated urethra. There were no urethral strictures. The bladder was entered. The urine was clear. Systematic cystoscopy was performed. There were no tumors, stones, or other abnormalities in the bladder. Flexible scope and flexible needle were " inserted. 200u of Botox was mixed in 20cc normal saline. Systematic injection was performed with 15-20 locations in a broad template. Minimal bleeding was noted, and scope was removed along with needle.     ASSESSMENT AND PLAN:  Botox injection today successful  Followup in 3-4 months for repeat botox injection in clinic  Also is considering getting WPATH letters for orchiectomy.     Cody Stroud MD  Reconstructive Urology

## 2022-12-14 NOTE — NURSING NOTE
"Chief Complaint   Patient presents with     Cystoscopy       Height 1.803 m (5' 11\"), weight 74.8 kg (165 lb). Body mass index is 23.01 kg/m .    Patient Active Problem List   Diagnosis     Cardiac arrest (H)     Coronary atherosclerosis     Stented coronary artery     Spinal cord compression (H)     Subdural hematoma     Post-operative state     Decubitus ulcer of sacral region, stage 3 (H)     Chronic skin ulcer, limited to breakdown of skin (H)     Pressure ulcer of other site, stage 3 (H)     Heterotopic ossification     Other calcification of muscle, left thigh     Status post hip surgery     Ingrown nail     Open wound, left lateral foot     Abnormal CT scan, heart     Dyslipidemia     Family history of premature CAD     Impotence of organic origin     Injury of thoracic spinal cord (H)     Neurogenic bladder     Neurogenic bowel     Orthostatic hypotension     Paraplegia (H)     Preop examination     Coronary artery disease involving native coronary artery of native heart without angina pectoris       No Known Allergies    Current Outpatient Medications   Medication Sig Dispense Refill     ascorbic acid (VITAMIN C) 500 MG tablet Take 500 mg by mouth every morning        aspirin 81 MG EC tablet Take 81 mg by mouth daily       atorvastatin (LIPITOR) 20 MG tablet Take 1 tablet (20 mg) by mouth At Bedtime 90 tablet 3     ciprofloxacin (CIPRO) 500 MG tablet Take 1 tablet (500 mg) by mouth daily for 3 days 3 tablet 0     ciprofloxacin (CIPRO) 500 MG tablet Take 1 tablet (500 mg) by mouth daily 3 tablet 0     estradiol (VIVELLE-DOT) 0.1 MG/24HR bi-weekly patch APPLY 1 PATCH TOPICALLY TO THE SKIN 2 TIMES A WEEK       Multiple Vitamins-Minerals (MULTIVITAMIN MEN PO) Take 1 tablet by mouth daily        progesterone (PROMETRIUM) 200 MG capsule        tadalafil (CIALIS) 20 MG tablet Take 1 tablet (20 mg) PO PRN prior to sexual activity. Max: 1 tablet per 36 hours. 5 tablet 3     vitamin B complex with vitamin C (STRESS " TAB) tablet Take 1 tablet by mouth daily       MYRBETRIQ 25 MG 24 hr tablet TK ONE T PO QD (Patient not taking: No sig reported)       order for DME Equipment being ordered: [facility] [facility] Phone [phone] Fax [phone]    Left Lateral Foot Primary Dressing Polymem Cloth Strips (not dots) Qty 30  Coccyx Secondary Dressing  tegaderm adhesive foam dressing 3x3 Qty 30  Perineum Secondary Dressing tegaderm + pad ( ref 3582) qty 30  Length of Need: 1 month  Frequency of dressing change: daily (Patient not taking: Reported on 3/31/2022) 30 days 0       Social History     Tobacco Use     Smoking status: Former     Smokeless tobacco: Never     Tobacco comments:     Quit at 31   Substance Use Topics     Alcohol use: Yes     Comment: socially     Drug use: No       Invasive Procedure Safety Checklist:    Procedure: Cystoscopy with Botox injection    Action: Complete sections and checkboxes as appropriate.    Pre-procedure:  1. Patient ID Verified with 2 identifiers (Mariluz and  or MRN) : YES    2. Procedure and site verified with patient/designee (when able) : YES    3. Accurate consent documentation in medical record : YES    4. H&P (or appropriate assessment) documented in medical record : N/A  H&P must be up to 30 days prior to procedure an updated within 24 hours of                 Procedure as applicable.     5. Relevant diagnostic and radiology test results appropriately labeled and displayed as applicable : YES    6. Blood products, implants, devices, and/or special equipment available for the procedure as applicable : YES    7. Procedure site(s) marked with provider initials [Exclusions: none] : NO    8. Marking not required. Reason : Yes  Procedure does not require site marking    Time Out:     Time-Out performed immediately prior to starting procedure, including verbal and active participation of all team members addressing: YES    1. Correct patient identity.  2. Confirmed that the correct side and site  are marked.  3. An accurate procedure to be done.  4. Agreement on the procedure to be done.  5. Correct patient position.  6. Relevant images and results are properly labeled and appropriately displayed.  7. The need to administer antibiotics or fluids for irrigation purposes during the procedure as applicable.  8. Safety precautions based on patient history or medication use.    During Procedure: Verification of correct person, site, and procedure occurs any time the responsibility for care of the patient is transferred to another member of the care team.    Patient verified with three identifiers, allergies reviewed. Verified patient stopped blood thinning medications and started Cipro.     The following medication was given:     MEDICATION:  Botox  ROUTE: administered by physician - bladder wall/urinary sphincter   SITE: administered by physician - bladder wall/urinary sphincter    DOSE: 200 units  LOT #: [id number]  : [employer]  EXPIRATION DATE: 05/25  NDC#: 1400-6578-00   Was there drug waste? No    Prior to injection, verified patient identity using patient's name and date of birth.  Due to injection administration, patient instructed to remain in clinic for 15 minutes  afterwards, and to report any adverse reaction to me immediately.    Drug Amount Wasted:  None.  Vial/Syringe: Single dose vial    Emiliano Russell EMT  12/14/2022  8:16 AM

## 2022-12-14 NOTE — LETTER
"12/14/2022       RE: Derek Enriquez  [address]  [facility] [address]     Dear Colleague,    Thank you for referring your patient, Derek Enriquez, to the [facility] UROLOGY CLIN[facility] [place] at [facility]. Please see a copy of my visit note below.    Cystoscopy with Botox Note     PRE-PROCEDURE DIAGNOSIS:  Neurogenic Bladder     POST-PROCEDURE DIAGNOSIS:  Neurogenic Bladder     PROCEDURE:   Cystoscopy with chemodenervation of the bladder     HISTORY: Derek Enriquez is a 62 year old adult with Neurogenic bladder secondary to subdural hematoma at T10 s/p T9-12 laminectomy and evacuation of subdural hematoma eventually leading to paraplegia.  Tried Trospium but significant side effect of constipation. He cannot tolerate anticholinergics.  Has not tried Botox. Had insurance issues due to lack of trying multiple medical therapies.  Performs CIC.  Underwent UDS previously which showed significant NDO with very high pressures starting around 125mL  He has urinary urge incontinence due to his neurogenic DO  Thus, we tried Myrbetriq. He can tolerate the medication without significant side effects. He has a mild improvement of symptoms. However, we repeated urodynamics to ensure safe storage pressures, and they are clearly not safe.   Per UDS note:  \"-Multiple strong UDCs to pressures >110 cm H20, which begin at bladder volumes >160 mL and are associated with incontinene.  -Gradual increase in baseline pressure prior to sudden spike in pressure at fill volume of 163 mL. Pressures slowly return near baseline between each UDC.\"     He underwent Botox injection Aug 2022 and around 3-4 months - he notices it wearing off.  Here for repeat injection    DESCRIPTION OF PROCEDURE:  After informed consent was obtained, the patient was brought to the procedure room where they were placed in supine with all pressure points well padded.  The patient was prepped and draped in a " sterile fashion. A flexible cystoscope was introduced through a well-lubricated urethra. There were no urethral strictures. The bladder was entered. The urine was clear. Systematic cystoscopy was performed. There were no tumors, stones, or other abnormalities in the bladder. Flexible scope and flexible needle were inserted. 200u of Botox was mixed in 20cc normal saline. Systematic injection was performed with 15-20 locations in a broad template. Minimal bleeding was noted, and scope was removed along with needle.     ASSESSMENT AND PLAN:  Botox injection today successful  Followup in 3-4 months for repeat botox injection in clinic  Also is considering getting WPATH letters for orchiectomy.     Cody Stroud MD  Reconstructive Urology

## 2023-03-06 ENCOUNTER — PRE VISIT (OUTPATIENT)
Dept: UROLOGY | Facility: CLINIC | Age: 63
End: 2023-03-06
Payer: COMMERCIAL

## 2023-03-06 DIAGNOSIS — N31.9 NEUROGENIC BLADDER: Primary | ICD-10-CM

## 2023-03-06 NOTE — TELEPHONE ENCOUNTER
Reason for visit: Cystoscopy     Relevant information: botox    Records/imaging/labs/orders: in Epic, botox ordered    Pt called: no    At Rooming: normal

## 2023-03-08 DIAGNOSIS — E78.5 DYSLIPIDEMIA: Primary | ICD-10-CM

## 2023-03-16 ENCOUNTER — OFFICE VISIT (OUTPATIENT)
Dept: CARDIOLOGY | Facility: CLINIC | Age: 63
End: 2023-03-16
Attending: INTERNAL MEDICINE
Payer: COMMERCIAL

## 2023-03-16 ENCOUNTER — LAB (OUTPATIENT)
Dept: LAB | Facility: CLINIC | Age: 63
End: 2023-03-16
Attending: INTERNAL MEDICINE
Payer: COMMERCIAL

## 2023-03-16 VITALS — SYSTOLIC BLOOD PRESSURE: 130 MMHG | DIASTOLIC BLOOD PRESSURE: 88 MMHG | HEART RATE: 67 BPM | OXYGEN SATURATION: 97 %

## 2023-03-16 DIAGNOSIS — E78.2 MIXED DYSLIPIDEMIA: ICD-10-CM

## 2023-03-16 DIAGNOSIS — E78.5 DYSLIPIDEMIA: ICD-10-CM

## 2023-03-16 DIAGNOSIS — Z98.61 S/P PTCA (PERCUTANEOUS TRANSLUMINAL CORONARY ANGIOPLASTY): ICD-10-CM

## 2023-03-16 DIAGNOSIS — I25.10 ATHEROSCLEROSIS OF NATIVE CORONARY ARTERY OF NATIVE HEART WITHOUT ANGINA PECTORIS: Primary | ICD-10-CM

## 2023-03-16 DIAGNOSIS — G82.20 PARAPLEGIA (H): ICD-10-CM

## 2023-03-16 LAB
ALBUMIN SERPL BCG-MCNC: 4.2 G/DL (ref 3.5–5.2)
ALP SERPL-CCNC: 111 U/L (ref 40–129)
ALT SERPL W P-5'-P-CCNC: 20 U/L (ref 10–50)
ANION GAP SERPL CALCULATED.3IONS-SCNC: 8 MMOL/L (ref 7–15)
AST SERPL W P-5'-P-CCNC: 23 U/L (ref 10–50)
BILIRUB SERPL-MCNC: 0.6 MG/DL
BUN SERPL-MCNC: 13.8 MG/DL (ref 8–23)
CALCIUM SERPL-MCNC: 9.5 MG/DL (ref 8.8–10.2)
CHLORIDE SERPL-SCNC: 102 MMOL/L (ref 98–107)
CHOLEST SERPL-MCNC: 145 MG/DL
CREAT SERPL-MCNC: 0.8 MG/DL (ref 0.67–1.17)
DEPRECATED HCO3 PLAS-SCNC: 30 MMOL/L (ref 22–29)
GFR SERPL CREATININE-BSD FRML MDRD: >90 ML/MIN/1.73M2
GLUCOSE SERPL-MCNC: 93 MG/DL (ref 70–99)
HDLC SERPL-MCNC: 39 MG/DL
LDLC SERPL CALC-MCNC: 68 MG/DL
LDLC SERPL DIRECT ASSAY-MCNC: 79 MG/DL
NONHDLC SERPL-MCNC: 106 MG/DL
POTASSIUM SERPL-SCNC: 4.2 MMOL/L (ref 3.4–5.3)
PROT SERPL-MCNC: 7.1 G/DL (ref 6.4–8.3)
SODIUM SERPL-SCNC: 140 MMOL/L (ref 136–145)
TRIGL SERPL-MCNC: 191 MG/DL

## 2023-03-16 PROCEDURE — 99214 OFFICE O/P EST MOD 30 MIN: CPT | Performed by: INTERNAL MEDICINE

## 2023-03-16 PROCEDURE — G0463 HOSPITAL OUTPT CLINIC VISIT: HCPCS | Mod: 25 | Performed by: INTERNAL MEDICINE

## 2023-03-16 PROCEDURE — 93005 ELECTROCARDIOGRAM TRACING: CPT

## 2023-03-16 PROCEDURE — 36415 COLL VENOUS BLD VENIPUNCTURE: CPT | Performed by: PATHOLOGY

## 2023-03-16 PROCEDURE — 80061 LIPID PANEL: CPT | Performed by: PATHOLOGY

## 2023-03-16 PROCEDURE — 83721 ASSAY OF BLOOD LIPOPROTEIN: CPT | Performed by: INTERNAL MEDICINE

## 2023-03-16 PROCEDURE — 80053 COMPREHEN METABOLIC PANEL: CPT | Performed by: PATHOLOGY

## 2023-03-16 ASSESSMENT — PAIN SCALES - GENERAL: PAINLEVEL: NO PAIN (0)

## 2023-03-16 NOTE — LETTER
3/16/2023      RE: Derek Enriquez  [address]  [facility] [address]       Dear Colleague,    Thank you for the opportunity to participate in the care of your patient, Derek Enriquez, at the [facility]ART [facility]IN[facility] [place] at [facility]. Please see a copy of my visit note below.    HPI:     I had the privilege to evaluate and examine Mr. Derek Enriquez, who  is a 63 y/o M, who comes for follow-up:     In summary, Mr Derek Enriquez, who had a cardiac arrest in [facility] during exercise (03-)  Patient was resuscitated and underwent a coronary angiogram , which showed   Single vessel CAD  LAD   - 95% stenotic ruptured plaque in the proximal LAD  Successful deployment of a drug eluting stent    3.0 x 20 mm Promus Premier drug eluting stent across the proximal LAD  and post-dilated with a 3.5 x15 mm non-compliant balloon  -Successful placement of balloon pump for LV support.  He also was put at [facility].                     Postop day 1 developed back pain then loss of sensation and movement in his lower extremities.  Identified spinal subdural hematoma and transferred emergently to [facility].  He underwent T9-T12 decompressive laminectomy and evacuation of subdural hematoma.  He unfortunately persists with complete paraplegia.  After medical stabilization he transferred to [facility] acute [facility] center 3/14 for comprehensive cares.       Today, patient feels well; denies chest pain, shortness of breath, palpitations.    Patient works full time.        PAST MEDICAL HISTORY:  Past Medical History:   Diagnosis Date     CAD (coronary artery disease)     stent     Cardiac arrest (H) 03/2018     Femur fracture (H)      Neurogenic bladder      Neurogenic bowel      Orthostatic hypotension      Paraplegia (H)      Thoracic spinal cord injury (H)        CURRENT MEDICATIONS:  Current Outpatient Medications   Medication Sig  Dispense Refill     ascorbic acid (VITAMIN C) 500 MG tablet Take 500 mg by mouth every morning        aspirin 81 MG EC tablet Take 81 mg by mouth daily       atorvastatin (LIPITOR) 20 MG tablet Take 1 tablet (20 mg) by mouth At Bedtime 90 tablet 3     ciprofloxacin (CIPRO) 500 MG tablet Take 1 tablet (500 mg) by mouth daily 3 tablet 0     Multiple Vitamins-Minerals (MULTIVITAMIN MEN PO) Take 1 tablet by mouth daily        vitamin B complex with vitamin C (STRESS TAB) tablet Take 1 tablet by mouth daily       estradiol (VIVELLE-DOT) 0.1 MG/24HR bi-weekly patch APPLY 1 PATCH TOPICALLY TO THE SKIN 2 TIMES A WEEK (Patient not taking: Reported on 3/16/2023)       MYRBETRIQ 25 MG 24 hr tablet TK ONE T PO QD (Patient not taking: No sig reported)       order for DME Equipment being ordered: [employer] [facility] Phone [phone] Fax [phone]    Left Lateral Foot Primary Dressing Polymem Cloth Strips (not dots) Qty 30  Coccyx Secondary Dressing  tegaderm adhesive foam dressing 3x3 Qty 30  Perineum Secondary Dressing tegaderm + pad ( ref [id number]) qty 30  Length of Need: 1 month  Frequency of dressing change: daily (Patient not taking: Reported on 3/31/2022) 30 days 0     progesterone (PROMETRIUM) 200 MG capsule  (Patient not taking: Reported on 3/16/2023)       tadalafil (CIALIS) 20 MG tablet Take 1 tablet (20 mg) PO PRN prior to sexual activity. Max: 1 tablet per 36 hours. (Patient not taking: Reported on 3/16/2023) 5 tablet 3       PAST SURGICAL HISTORY:  Past Surgical History:   Procedure Laterality Date     ARTHROTOMY HIP Left 9/19/2018    Procedure: ARTHROTOMY HIP;  Left Hip Heterotopic Bone Resection;  Surgeon: Toñito Kennedy MD;  Location: [facility] OR     ARTHROTOMY HIP Right 11/02/2018     ARTHROTOMY HIP Right 11/2/2018    Procedure: Right Hip Heterotopic Bone Resection;  Surgeon: Toñito Kennedy MD;  Location: [facility] OR     CARDIAC SURGERY       LAMINECTOMY THORACIC THREE LEVELS N/A 3/9/2018     Procedure: LAMINECTOMY THORACIC THREE LEVELS;  Thoracic 9-12 Laminectomy Evacuation of Subdural Hematoma, C-Arm, Prone, Gel Rolls.;  Surgeon: Abhijeet Joe MD;  Location: [facility] OR     OPEN REDUCTION INTERNAL FIXATION RODDING INTRAMEDULLARY FEMUR Right 6/25/2018    Procedure: OPEN REDUCTION INTERNAL FIXATION RODDING INTRAMEDULLARY FEMUR;  Right Open Reduction Internal Fixation Subtrochanteric Femur Fracture;  Surgeon: Toñito Kennedy MD;  Location: [facility] OR     wisdom teeth extraction         ALLERGIES   No Known Allergies    FAMILY HISTORY:  Family History   Problem Relation Age of Onset     Cardiac Sudden Death Father      Arrhythmia Father         v fib arrest      Myocardial Infarction Brother      Other - See Comments Brother         myeloid dysplasia       SOCIAL HISTORY:  Social History     Socioeconomic History     Marital status: Single     Spouse name: None     Number of children: None     Years of education: None     Highest education level: None   Tobacco Use     Smoking status: Former     Smokeless tobacco: Never     Tobacco comments:     Quit at 31   Substance and Sexual Activity     Alcohol use: Yes     Comment: socially     Drug use: No       ROS:   Constitutional: No fever, chills, or sweats. No weight gain/loss   ENT: No visual disturbance, ear ache, epistaxis, sore throat  Allergies/Immunologic: Negative.   Respiratory: No cough, hemoptysia  Cardiovascular: As per HPI  GI: No nausea, vomiting, hematemesis, melena, or hematochezia  : No urinary frequency, dysuria, or hematuria  Integument: Negative  Psychiatric: Negative  Neuro: Negative  Endocrinology: Negative   Musculoskeletal: Negative    EXAM:  /88 (BP Location: Right arm, Patient Position: Sitting, Cuff Size: Adult Regular)   Pulse 67   SpO2 97%   In general, the patient is a pleasant male in no apparent distress.    HEENT: NC/AT.  PERRLA.  EOMI.  Sclerae white, not injected.  Nares clear.  Pharynx without erythema or  exudate.  Dentition intact.    Neck: No adenopathy.  No thyromegaly. Carotids +4/4 bilaterally without bruits.  No jugular venous distension.   Heart: RRR. Normal S1, S2 splits physiologically. No murmur, rub, click, or gallop. The PMI is in the 5th ICS in the midclavicular line. There is no heave.    Lungs: CTA.  No ronchi, wheezes, rales.  No dullness to percussion.   Abdomen: Soft, nontender, nondistended. No organomegaly.  No bruits.   Extremities: No clubbing, cyanosis, or edema.  The pulses are +4/4 at the radial, brachial, femoral, popliteal, DP, and PT sites bilaterally.  No bruits are noted.  Neurologic: Alert and oriented to person/place/time, normal speech, gait and affect  Skin: No petechiae, purpura or rash.    BP at the end of 122/84    Labs:     Latest Reference Range & Units 03/16/23 08:23   Sodium 136 - 145 mmol/L 140   Potassium 3.4 - 5.3 mmol/L 4.2   Chloride 98 - 107 mmol/L 102   Carbon Dioxide (CO2) 22 - 29 mmol/L 30 (H)   Urea Nitrogen 8.0 - 23.0 mg/dL 13.8   Creatinine 0.67 - 1.17 mg/dL 0.80   GFR Estimate >60 mL/min/1.73m2 >90   Calcium 8.8 - 10.2 mg/dL 9.5   Anion Gap 7 - 15 mmol/L 8   Albumin 3.5 - 5.2 g/dL 4.2   Protein Total 6.4 - 8.3 g/dL 7.1   Alkaline Phosphatase 40 - 129 U/L 111   ALT 10 - 50 U/L 20   AST 10 - 50 U/L 23   Bilirubin Total <=1.2 mg/dL 0.6   Cholesterol <200 mg/dL 145   Glucose 70 - 99 mg/dL 93   HDL Cholesterol >=40 mg/dL 39 (L)   LDL Cholesterol Calculated <=100 mg/dL 68   LDL Cholesterol Direct <100 mg/dL 79   Non HDL Cholesterol <130 mg/dL 106   Triglycerides <150 mg/dL 191 (H)   (H): Data is abnormally high  (L): Data is abnormally low      LIPID RESULTS:  Lab Results   Component Value Date    CHOL 145 03/16/2023    CHOL 141 03/22/2021    HDL 39 (L) 03/16/2023    HDL 43 03/22/2021    LDL 79 03/16/2023    LDL 68 03/16/2023    LDL 52 03/22/2021    TRIG 191 (H) 03/16/2023    TRIG 233 (H) 03/22/2021    NHDL 106 03/16/2023    NHDL 98 03/22/2021       LIVER ENZYME  RESULTS:  Lab Results   Component Value Date    AST 23 03/16/2023    AST 22 03/22/2021    ALT 20 03/16/2023    ALT 33 03/22/2021       CBC RESULTS:  Lab Results   Component Value Date    WBC 4.8 11/05/2018    RBC 3.26 (L) 11/05/2018    HGB 8.5 (L) 11/05/2018    HGB 8.5 (L) 11/05/2018    HCT 28.9 (L) 11/05/2018    MCV 89 11/05/2018    MCH 26.1 (L) 11/05/2018    MCHC 29.4 (L) 11/05/2018    RDW 16.3 (H) 11/05/2018     11/05/2018       BMP RESULTS:  Lab Results   Component Value Date     03/16/2023     03/22/2021    POTASSIUM 4.2 03/16/2023    POTASSIUM 3.8 03/31/2022    POTASSIUM 3.9 03/22/2021    CHLORIDE 102 03/16/2023    CHLORIDE 104 03/31/2022    CHLORIDE 107 03/22/2021    CO2 30 (H) 03/16/2023    CO2 28 03/31/2022    CO2 30 03/22/2021    ANIONGAP 8 03/16/2023    ANIONGAP 7 03/31/2022    ANIONGAP 4 03/22/2021    GLC 93 03/16/2023    GLC 89 03/31/2022    GLC 93 03/22/2021    BUN 13.8 03/16/2023    BUN 11 03/31/2022    BUN 15 03/22/2021    CR 0.80 03/16/2023    CR 0.63 (L) 03/22/2021    GFRESTIMATED >90 03/16/2023    GFRESTIMATED >90 03/22/2021    GFRESTBLACK >90 03/22/2021    PAT 9.5 03/16/2023    PAT 8.6 03/22/2021        A1C RESULTS:  No results found for: A1C    INR RESULTS:  Lab Results   Component Value Date    INR 1.20 (H) 05/23/2018    INR 1.20 (H) 03/09/2018       Procedures:    EKG: sin rhythm    Assessment and Plan:     We discussed the results with patient.  We discussed the importance of a heart healthy diet and lifestyle.  We discussed following items:    Medication Changes: None     Follow Up: With Dr. Jane in 1 year with fasting labs prior to visit     Follow the American Heart Association Diet and Lifestyle Recommendations:  -Limit saturated fat, trans fat, sodium, red meat, sweets and sugar-sweetened beverages. If you choose to eat red meat, compare labels and select the leanest cuts available    Anoop Jane MD, PhD  Professor of Medicine  Division of  Cardiology      CC  Patient Care Team:  Eugene Burrell MD as PCP - General (Family Practice)  Anoop Jane MD as MD (Cardiology)  Andrei Pelaez MD as MD (Urology)  [facility] ([place]  Cody Stroud MD as Assigned Surgical Provider  Anoop Jane MD as Assigned Heart and Vascular Provider  Lupe Trejo, RN as Specialty Care Coordinator (Cardiology)  ANOOP JANE      Please do not hesitate to contact me if you have any questions/concerns.     Sincerely,     Anoop Jane MD

## 2023-03-16 NOTE — PATIENT INSTRUCTIONS
March 16, 2023    Cardiology Provider You Saw During Your Visit: Dr. Jane      Medication Changes: None      Follow Up: With Dr. Jane in 1 year with fasting labs prior to visit        Follow the American Heart Association Diet and Lifestyle Recommendations:  -Limit saturated fat, trans fat, sodium, red meat, sweets and sugar-sweetened beverages. If you choose to eat red meat, compare labels and select the leanest cuts available.  -Aim for at least 150 minutes of moderate physical activity or 75 minutes of vigorous physical activity - or an equal combination of both - each week.      To Reach Us:  -During business hours: [phone], press option # 1 to schedule an appointment or to leave a message for your care team.     -After hours, weekends or holidays: [phone], press option #4 and ask to speak to the on-call cardiologist. Inform them you are a patient at the [facility].      Lupe Trejo RN  Cardiology Care Coordinator - General Cardiology  [facility]

## 2023-03-16 NOTE — PROGRESS NOTES
HPI:     I had the privilege to evaluate and examine Mr. Derek Enriquez, who  is a 61 y/o M, who comes for follow-up:     In summary, Mr Derek Enriquez, who had a cardiac arrest in [facility] during exercise (03-)  Patient was resuscitated and underwent a coronary angiogram , which showed   Single vessel CAD  LAD   - 95% stenotic ruptured plaque in the proximal LAD  Successful deployment of a drug eluting stent    3.0 x 20 mm Promus Premier drug eluting stent across the proximal LAD  and post-dilated with a 3.5 x15 mm non-compliant balloon  -Successful placement of balloon pump for LV support.  He also was put at [facility].                     Postop day 1 developed back pain then loss of sensation and movement in his lower extremities.  Identified spinal subdural hematoma and transferred emergently to [facility].  He underwent T9-T12 decompressive laminectomy and evacuation of subdural hematoma.  He unfortunately persists with complete paraplegia.  After medical stabilization he transferred to [facility] acute rehabilitation [place] 3/14 for comprehensive cares.       Today, patient feels well; denies chest pain, shortness of breath, palpitations.    Patient works full time.        PAST MEDICAL HISTORY:  Past Medical History:   Diagnosis Date     CAD (coronary artery disease)     stent     Cardiac arrest (H) 03/2018     Femur fracture (H)      Neurogenic bladder      Neurogenic bowel      Orthostatic hypotension      Paraplegia (H)      Thoracic spinal cord injury (H)        CURRENT MEDICATIONS:  Current Outpatient Medications   Medication Sig Dispense Refill     ascorbic acid (VITAMIN C) 500 MG tablet Take 500 mg by mouth every morning        aspirin 81 MG EC tablet Take 81 mg by mouth daily       atorvastatin (LIPITOR) 20 MG tablet Take 1 tablet (20 mg) by mouth At Bedtime 90 tablet 3     ciprofloxacin (CIPRO) 500 MG tablet Take 1 tablet (500 mg) by mouth daily 3 tablet 0      Multiple Vitamins-Minerals (MULTIVITAMIN MEN PO) Take 1 tablet by mouth daily        vitamin B complex with vitamin C (STRESS TAB) tablet Take 1 tablet by mouth daily       estradiol (VIVELLE-DOT) 0.1 MG/24HR bi-weekly patch APPLY 1 PATCH TOPICALLY TO THE SKIN 2 TIMES A WEEK (Patient not taking: Reported on 3/16/2023)       MYRBETRIQ 25 MG 24 hr tablet TK ONE T PO QD (Patient not taking: No sig reported)       order for DME Equipment being ordered: [facility] [facility] Phone [phone] Fax [phone]    Left Lateral Foot Primary Dressing Polymem Cloth Strips (not dots) Qty 30  Coccyx Secondary Dressing  tegaderm adhesive foam dressing 3x3 Qty 30  Perineum Secondary Dressing tegaderm + pad ( ref 3582) qty 30  Length of Need: 1 month  Frequency of dressing change: daily (Patient not taking: Reported on 3/31/2022) 30 days 0     progesterone (PROMETRIUM) 200 MG capsule  (Patient not taking: Reported on 3/16/2023)       tadalafil (CIALIS) 20 MG tablet Take 1 tablet (20 mg) PO PRN prior to sexual activity. Max: 1 tablet per 36 hours. (Patient not taking: Reported on 3/16/2023) 5 tablet 3       PAST SURGICAL HISTORY:  Past Surgical History:   Procedure Laterality Date     ARTHROTOMY HIP Left 9/19/2018    Procedure: ARTHROTOMY HIP;  Left Hip Heterotopic Bone Resection;  Surgeon: Toñito Kennedy MD;  Location: [facility] OR     ARTHROTOMY HIP Right 11/02/2018     ARTHROTOMY HIP Right 11/2/2018    Procedure: Right Hip Heterotopic Bone Resection;  Surgeon: Toñito Kennedy MD;  Location: [facility] OR     CARDIAC SURGERY       LAMINECTOMY THORACIC THREE LEVELS N/A 3/9/2018    Procedure: LAMINECTOMY THORACIC THREE LEVELS;  Thoracic 9-12 Laminectomy Evacuation of Subdural Hematoma, C-Arm, Prone, Gel Rolls.;  Surgeon: Abhijeet Joe MD;  Location: [facility] OR     OPEN REDUCTION INTERNAL FIXATION RODDING INTRAMEDULLARY FEMUR Right 6/25/2018    Procedure: OPEN REDUCTION INTERNAL FIXATION RODDING INTRAMEDULLARY FEMUR;   Right Open Reduction Internal Fixation Subtrochanteric Femur Fracture;  Surgeon: Toñito Kennedy MD;  Location: [facility] OR     wisdom teeth extraction         ALLERGIES   No Known Allergies    FAMILY HISTORY:  Family History   Problem Relation Age of Onset     Cardiac Sudden Death Father      Arrhythmia Father         v fib arrest      Myocardial Infarction Brother      Other - See Comments Brother         myeloid dysplasia       SOCIAL HISTORY:  Social History     Socioeconomic History     Marital status: Single     Spouse name: None     Number of children: None     Years of education: None     Highest education level: None   Tobacco Use     Smoking status: Former     Smokeless tobacco: Never     Tobacco comments:     Quit at 31   Substance and Sexual Activity     Alcohol use: Yes     Comment: socially     Drug use: No       ROS:   Constitutional: No fever, chills, or sweats. No weight gain/loss   ENT: No visual disturbance, ear ache, epistaxis, sore throat  Allergies/Immunologic: Negative.   Respiratory: No cough, hemoptysia  Cardiovascular: As per HPI  GI: No nausea, vomiting, hematemesis, melena, or hematochezia  : No urinary frequency, dysuria, or hematuria  Integument: Negative  Psychiatric: Negative  Neuro: Negative  Endocrinology: Negative   Musculoskeletal: Negative    EXAM:  /88 (BP Location: Right arm, Patient Position: Sitting, Cuff Size: Adult Regular)   Pulse 67   SpO2 97%   In general, the patient is a pleasant male in no apparent distress.    HEENT: NC/AT.  PERRLA.  EOMI.  Sclerae white, not injected.  Nares clear.  Pharynx without erythema or exudate.  Dentition intact.    Neck: No adenopathy.  No thyromegaly. Carotids +4/4 bilaterally without bruits.  No jugular venous distension.   Heart: RRR. Normal S1, S2 splits physiologically. No murmur, rub, click, or gallop. The PMI is in the 5th ICS in the midclavicular line. There is no heave.    Lungs: CTA.  No ronchi, wheezes, rales.  No  dullness to percussion.   Abdomen: Soft, nontender, nondistended. No organomegaly.  No bruits.   Extremities: No clubbing, cyanosis, or edema.  The pulses are +4/4 at the radial, brachial, femoral, popliteal, DP, and PT sites bilaterally.  No bruits are noted.  Neurologic: Alert and oriented to person/place/time, normal speech, gait and affect  Skin: No petechiae, purpura or rash.    BP at the end of 122/84    Labs:     Latest Reference Range & Units 03/16/23 08:23   Sodium 136 - 145 mmol/L 140   Potassium 3.4 - 5.3 mmol/L 4.2   Chloride 98 - 107 mmol/L 102   Carbon Dioxide (CO2) 22 - 29 mmol/L 30 (H)   Urea Nitrogen 8.0 - 23.0 mg/dL 13.8   Creatinine 0.67 - 1.17 mg/dL 0.80   GFR Estimate >60 mL/min/1.73m2 >90   Calcium 8.8 - 10.2 mg/dL 9.5   Anion Gap 7 - 15 mmol/L 8   Albumin 3.5 - 5.2 g/dL 4.2   Protein Total 6.4 - 8.3 g/dL 7.1   Alkaline Phosphatase 40 - 129 U/L 111   ALT 10 - 50 U/L 20   AST 10 - 50 U/L 23   Bilirubin Total <=1.2 mg/dL 0.6   Cholesterol <200 mg/dL 145   Glucose 70 - 99 mg/dL 93   HDL Cholesterol >=40 mg/dL 39 (L)   LDL Cholesterol Calculated <=100 mg/dL 68   LDL Cholesterol Direct <100 mg/dL 79   Non HDL Cholesterol <130 mg/dL 106   Triglycerides <150 mg/dL 191 (H)   (H): Data is abnormally high  (L): Data is abnormally low      LIPID RESULTS:  Lab Results   Component Value Date    CHOL 145 03/16/2023    CHOL 141 03/22/2021    HDL 39 (L) 03/16/2023    HDL 43 03/22/2021    LDL 79 03/16/2023    LDL 68 03/16/2023    LDL 52 03/22/2021    TRIG 191 (H) 03/16/2023    TRIG 233 (H) 03/22/2021    NHDL 106 03/16/2023    NHDL 98 03/22/2021       LIVER ENZYME RESULTS:  Lab Results   Component Value Date    AST 23 03/16/2023    AST 22 03/22/2021    ALT 20 03/16/2023    ALT 33 03/22/2021       CBC RESULTS:  Lab Results   Component Value Date    WBC 4.8 11/05/2018    RBC 3.26 (L) 11/05/2018    HGB 8.5 (L) 11/05/2018    HGB 8.5 (L) 11/05/2018    HCT 28.9 (L) 11/05/2018    MCV 89 11/05/2018    MCH 26.1 (L)  11/05/2018    MCHC 29.4 (L) 11/05/2018    RDW 16.3 (H) 11/05/2018     11/05/2018       BMP RESULTS:  Lab Results   Component Value Date     03/16/2023     03/22/2021    POTASSIUM 4.2 03/16/2023    POTASSIUM 3.8 03/31/2022    POTASSIUM 3.9 03/22/2021    CHLORIDE 102 03/16/2023    CHLORIDE 104 03/31/2022    CHLORIDE 107 03/22/2021    CO2 30 (H) 03/16/2023    CO2 28 03/31/2022    CO2 30 03/22/2021    ANIONGAP 8 03/16/2023    ANIONGAP 7 03/31/2022    ANIONGAP 4 03/22/2021    GLC 93 03/16/2023    GLC 89 03/31/2022    GLC 93 03/22/2021    BUN 13.8 03/16/2023    BUN 11 03/31/2022    BUN 15 03/22/2021    CR 0.80 03/16/2023    CR 0.63 (L) 03/22/2021    GFRESTIMATED >90 03/16/2023    GFRESTIMATED >90 03/22/2021    GFRESTBLACK >90 03/22/2021    PAT 9.5 03/16/2023    PAT 8.6 03/22/2021        A1C RESULTS:  No results found for: A1C    INR RESULTS:  Lab Results   Component Value Date    INR 1.20 (H) 05/23/2018    INR 1.20 (H) 03/09/2018       Procedures:    EKG: sin rhythm    Assessment and Plan:     We discussed the results with patient.  We discussed the importance of a heart healthy diet and lifestyle.  We discussed following items:    Medication Changes: None     Follow Up: With Dr. Jane in 1 year with fasting labs prior to visit     Follow the American Heart Association Diet and Lifestyle Recommendations:  -Limit saturated fat, trans fat, sodium, red meat, sweets and sugar-sweetened beverages. If you choose to eat red meat, compare labels and select the leanest cuts available    Anoop Jane MD, PhD  Professor of Medicine  Division of Cardiology      CC  Patient Care Team:  Eugene Burrell MD as PCP - General (Family Practice)  Anoop Jane MD as MD (Cardiology)  Andrei Pelaez MD as MD (Urology)  Care, [facility] ([facility] ([facility] ([place]))  Cody Stroud MD as Assigned Surgical Provider  Anoop Jane MD as Assigned Heart and Vascular Provider  Neil,  Lupe BETH RN as Specialty Care Coordinator (Cardiology)  HAILE MCCALL   present

## 2023-03-16 NOTE — NURSING NOTE
March 16, 2023    Medication Changes: None      Follow Up: With Dr. Jane in 1 year with fasting labs prior to visit      Patient verbalized understanding of all health information given and agreed to call with further questions or concerns.      Lupe Trejo RN

## 2023-03-16 NOTE — NURSING NOTE
Chief Complaint   Patient presents with     Follow Up     Buck F/U          Vitals were taken, medications reconciled and EKG performed.     Parvez Malagon, EMT   11:08 AM

## 2023-03-19 LAB
ATRIAL RATE - MUSE: 68 BPM
DIASTOLIC BLOOD PRESSURE - MUSE: NORMAL MMHG
INTERPRETATION ECG - MUSE: NORMAL
P AXIS - MUSE: 57 DEGREES
PR INTERVAL - MUSE: 166 MS
QRS DURATION - MUSE: 88 MS
QT - MUSE: 382 MS
QTC - MUSE: 406 MS
R AXIS - MUSE: 67 DEGREES
SYSTOLIC BLOOD PRESSURE - MUSE: NORMAL MMHG
T AXIS - MUSE: 52 DEGREES
VENTRICULAR RATE- MUSE: 68 BPM

## 2023-04-07 ENCOUNTER — MYC REFILL (OUTPATIENT)
Dept: UROLOGY | Facility: CLINIC | Age: 63
End: 2023-04-07
Payer: COMMERCIAL

## 2023-04-07 DIAGNOSIS — N31.9 NEUROGENIC BLADDER: ICD-10-CM

## 2023-04-07 DIAGNOSIS — N39.41 URGE INCONTINENCE: ICD-10-CM

## 2023-04-07 DIAGNOSIS — N39.498 OTHER URINARY INCONTINENCE: ICD-10-CM

## 2023-04-07 RX ORDER — CIPROFLOXACIN 500 MG/1
500 TABLET, FILM COATED ORAL DAILY
Qty: 3 TABLET | Refills: 0 | Status: ON HOLD | OUTPATIENT
Start: 2023-04-07 | End: 2024-04-06

## 2023-04-12 ENCOUNTER — OFFICE VISIT (OUTPATIENT)
Dept: UROLOGY | Facility: CLINIC | Age: 63
End: 2023-04-12
Payer: COMMERCIAL

## 2023-04-12 VITALS
SYSTOLIC BLOOD PRESSURE: 131 MMHG | DIASTOLIC BLOOD PRESSURE: 82 MMHG | BODY MASS INDEX: 23.8 KG/M2 | HEIGHT: 71 IN | OXYGEN SATURATION: 100 % | WEIGHT: 170 LBS | HEART RATE: 59 BPM

## 2023-04-12 DIAGNOSIS — N31.9 NEUROGENIC BLADDER: Primary | ICD-10-CM

## 2023-04-12 PROCEDURE — 52287 CYSTOSCOPY CHEMODENERVATION: CPT | Performed by: UROLOGY

## 2023-04-12 NOTE — LETTER
"4/12/2023       RE: Derek Enriquez  [address]  [place] [address]     Dear Colleague,    Thank you for referring your patient, Derek Enriquez, to the [facility] [place] [facility]. Please see a copy of my visit note below.    Cystoscopy with Botox Note     PRE-PROCEDURE DIAGNOSIS:  Neurogenic Bladder     POST-PROCEDURE DIAGNOSIS:  Neurogenic Bladder     PROCEDURE:   Cystoscopy with chemodenervation of the bladder     HISTORY: Derek Enriquez is a 62 year old adult with Neurogenic bladder secondary to subdural hematoma at T10 s/p T9-12 laminectomy and evacuation of subdural hematoma eventually leading to paraplegia.  Tried Trospium but significant side effect of constipation. He cannot tolerate anticholinergics.  Performs CIC.  Underwent UDS previously which showed significant NDO with very high pressures starting around 125mL  He has urinary urge incontinence due to his neurogenic DO  Thus, we tried Myrbetriq. He can tolerate the medication without significant side effects. He has a mild improvement of symptoms. However, we repeated urodynamics to ensure safe storage pressures, and they are clearly not safe.   Per UDS note:  \"-Multiple strong UDCs to pressures >110 cm H20, which begin at bladder volumes >160 mL and are associated with incontinene.  -Gradual increase in baseline pressure prior to sudden spike in pressure at fill volume of 163 mL. Pressures slowly return near baseline between each UDC.\"    He now uses Botox which is a big improvement.  He notices a big improvement for about 3-4 months.  Last botox was Dec 2022. He notes it wearing offthis week.    DESCRIPTION OF PROCEDURE:  After informed consent was obtained, the patient was brought to the procedure room where they were placed in supine with all pressure points well padded.  The patient was prepped and draped in a sterile fashion. A flexible cystoscope was introduced " through a well-lubricated urethra. There were no urethral strictures. The bladder was entered. The urine was clear. Systematic cystoscopy was performed. There were no tumors, stones, or other abnormalities in the bladder. Flexible scope and flexible needle were inserted. 200u of Botox was mixed in 20cc normal saline. Systematic injection was performed with 10 locations in a broad template. Minimal bleeding was noted, and scope was removed along with needle.     ASSESSMENT AND PLAN:  Botox injection today successful  Followup in 3-4 months for repeat botox injection in clinic  Also is considering getting WPATH letters for orchiectomy.     Cody Storud MD  Reconstructive Urology

## 2023-04-12 NOTE — PROGRESS NOTES
"Cystoscopy with Botox Note     PRE-PROCEDURE DIAGNOSIS:  Neurogenic Bladder     POST-PROCEDURE DIAGNOSIS:  Neurogenic Bladder     PROCEDURE:   Cystoscopy with chemodenervation of the bladder     HISTORY: Derek Enriquez is a 62 year old adult with Neurogenic bladder secondary to subdural hematoma at T10 s/p T9-12 laminectomy and evacuation of subdural hematoma eventually leading to paraplegia.  Tried Trospium but significant side effect of constipation. He cannot tolerate anticholinergics.  Performs CIC.  Underwent UDS previously which showed significant NDO with very high pressures starting around 125mL  He has urinary urge incontinence due to his neurogenic DO  Thus, we tried Myrbetriq. He can tolerate the medication without significant side effects. He has a mild improvement of symptoms. However, we repeated urodynamics to ensure safe storage pressures, and they are clearly not safe.   Per UDS note:  \"-Multiple strong UDCs to pressures >110 cm H20, which begin at bladder volumes >160 mL and are associated with incontinene.  -Gradual increase in baseline pressure prior to sudden spike in pressure at fill volume of 163 mL. Pressures slowly return near baseline between each UDC.\"    He now uses Botox which is a big improvement.  He notices a big improvement for about 3-4 months.  Last botox was Dec 2022. He notes it wearing offthis week.    DESCRIPTION OF PROCEDURE:  After informed consent was obtained, the patient was brought to the procedure room where they were placed in supine with all pressure points well padded.  The patient was prepped and draped in a sterile fashion. A flexible cystoscope was introduced through a well-lubricated urethra. There were no urethral strictures. The bladder was entered. The urine was clear. Systematic cystoscopy was performed. There were no tumors, stones, or other abnormalities in the bladder. Flexible scope and flexible needle were inserted. 200u of Botox was mixed in 20cc normal " saline. Systematic injection was performed with 10 locations in a broad template. Minimal bleeding was noted, and scope was removed along with needle.     ASSESSMENT AND PLAN:  Botox injection today successful  Followup in 3-4 months for repeat botox injection in clinic  Also is considering getting WPATH letters for orchiectomy.     Cody Stroud MD  Reconstructive Urology

## 2023-04-12 NOTE — NURSING NOTE
"Chief Complaint   Patient presents with     Cystoscopy     Bladder botox injection        Blood pressure 131/82, pulse 59, height 1.803 m (5' 11\"), weight 77.1 kg (170 lb), SpO2 100 %. Body mass index is 23.71 kg/m .    Patient Active Problem List   Diagnosis     Cardiac arrest (H)     Coronary atherosclerosis     Stented coronary artery     Spinal cord compression (H)     Subdural hematoma (H)     Post-operative state     Decubitus ulcer of sacral region, stage 3 (H)     Chronic skin ulcer, limited to breakdown of skin (H)     Pressure ulcer of other site, stage 3 (H)     Heterotopic ossification     Other calcification of muscle, left thigh     Status post hip surgery     Ingrown nail     Open wound, left lateral foot     Abnormal CT scan, heart     Dyslipidemia     Family history of premature CAD     Impotence of organic origin     Injury of thoracic spinal cord (H)     Neurogenic bladder     Neurogenic bowel     Orthostatic hypotension     Paraplegia (H)     Preop examination     Coronary artery disease involving native coronary artery of native heart without angina pectoris       No Known Allergies    Current Outpatient Medications   Medication Sig Dispense Refill     ascorbic acid (VITAMIN C) 500 MG tablet Take 500 mg by mouth every morning        aspirin 81 MG EC tablet Take 81 mg by mouth daily       atorvastatin (LIPITOR) 20 MG tablet Take 1 tablet (20 mg) by mouth At Bedtime 90 tablet 3     ciprofloxacin (CIPRO) 500 MG tablet Take 1 tablet (500 mg) by mouth daily 3 tablet 0     Multiple Vitamins-Minerals (MULTIVITAMIN MEN PO) Take 1 tablet by mouth daily        order for DME Equipment being ordered: [employer] [facility] Phone [phone] Fax [phone]    Left Lateral Foot Primary Dressing Polymem Cloth Strips (not dots) Qty 30  Coccyx Secondary Dressing  tegaderm adhesive foam dressing 3x3 Qty 30  Perineum Secondary Dressing tegaderm + pad ( ref 3582) qty 30  Length of Need: 1 month  Frequency of " dressing change: daily 30 days 0     vitamin B complex with vitamin C (STRESS TAB) tablet Take 1 tablet by mouth daily       estradiol (VIVELLE-DOT) 0.1 MG/24HR bi-weekly patch APPLY 1 PATCH TOPICALLY TO THE SKIN 2 TIMES A WEEK (Patient not taking: Reported on 3/16/2023)       MYRBETRIQ 25 MG 24 hr tablet TK ONE T PO QD (Patient not taking: No sig reported)       progesterone (PROMETRIUM) 200 MG capsule  (Patient not taking: Reported on 3/16/2023)       tadalafil (CIALIS) 20 MG tablet Take 1 tablet (20 mg) PO PRN prior to sexual activity. Max: 1 tablet per 36 hours. (Patient not taking: Reported on 3/16/2023) 5 tablet 3       Social History     Tobacco Use     Smoking status: Former     Smokeless tobacco: Never     Tobacco comments:     Quit at 31   Substance Use Topics     Alcohol use: Yes     Comment: socially     Drug use: No       Padmini Watson CMA  2023  9:09 AM     Invasive Procedure Safety Checklist:    Procedure: Cystoscopy with Botox    Action: Complete sections and checkboxes as appropriate.    Pre-procedure:  1. Patient ID Verified with 2 identifiers (Mariluz and  or MRN) : YES    2. Procedure and site verified with patient/designee (when able) : YES    3. Accurate consent documentation in medical record : YES    4. H&P (or appropriate assessment) documented in medical record : YES  H&P must be up to 30 days prior to procedure an updated within 24 hours of                 Procedure as applicable.     5. Relevant diagnostic and radiology test results appropriately labeled and displayed as applicable : YES    6. Blood products, implants, devices, and/or special equipment available for the procedure as applicable : YES    7. Procedure site(s) marked with provider initials [Exclusions: None] : NO    8. Marking not required. Reason : Yes  Procedure does not require site marking    Time Out:     Time-Out performed immediately prior to starting procedure, including verbal and active participation of all  team members addressing: YES    1. Correct patient identity.  2. Confirmed that the correct side and site are marked.  3. An accurate procedure to be done.  4. Agreement on the procedure to be done.  5. Correct patient position.  6. Relevant images and results are properly labeled and appropriately displayed.  7. The need to administer antibiotics or fluids for irrigation purposes during the procedure as applicable.  8. Safety precautions based on patient history or medication use.    During Procedure: Verification of correct person, site, and procedure occurs any time the responsibility for care of the patient is transferred to another member of the care team.      The following medication was given:     MEDICATION:  Botox  ROUTE: Intravesical   SITE: Urethra  DOSE: 200 units in 20 mL sterile saline  LOT #: [id number]  : Subtextualmanpreet  EXPIRATION DATE: 09/2025  NDC#: 3894-9464-01  Was there drug waste? No    Prior to administration, verified patient identity using patient's name and date of birth.  Due to administration, patient instructed to remain in clinic for 15 minutes  afterwards, and to report any adverse reaction to me immediately.      Drug Amount Wasted:  None.  Vial/Syringe: Single dose vial    Padmini Watson CMA  April 12, 2023

## 2023-04-12 NOTE — PATIENT INSTRUCTIONS
"Schedule a cystoscopy with bladder botox in 4 months with Dr. Stroud.    It was a pleasure meeting with you today.  Thank you for allowing me and my team the privilege of caring for you today.  YOU are the reason we are here, and I truly hope we provided you with the excellent service you deserve.  Please let us know if there is anything else we can do for you so that we can be sure you are leaving completely satisfied with your care experience.        AFTER YOUR CYSTOSCOPY        You have just completed a cystoscopy, or \"cysto\", which allowed your physician to learn more about your bladder (or to remove a stent placed after surgery). We suggest that you continue to avoid caffeine, fruit juice, and alcohol for the next 24 hours, however, you are encouraged to return to your normal activities.         A few things that are considered normal after your cystoscopy:     * Small amount of bleeding (or spotting) that clears within the next 24 hours     * Slight burning sensation with urination     * Sensation to of needing to avoid more frequently     * The feeling of \"air\" in your urine     * Mild discomfort that is relieved with Tylenol        Please contact our office promptly if you:     * Develop a fever above 101 degrees     * Are unable to urinate     * Develop bright red blood that does not stop     * Severe pain or swelling         Please contact our office with any concerns or questions [email or web address].  "

## 2023-04-13 DIAGNOSIS — Z95.5 H/O HEART ARTERY STENT: ICD-10-CM

## 2023-04-13 RX ORDER — ATORVASTATIN CALCIUM 20 MG/1
20 TABLET, FILM COATED ORAL AT BEDTIME
Qty: 90 TABLET | Refills: 3 | Status: SHIPPED | OUTPATIENT
Start: 2023-04-13 | End: 2024-04-30

## 2023-04-16 NOTE — TELEPHONE ENCOUNTER
Chief Complaint : Return- 1yr    Hx/Sx: NGB/ Botox consult     Records/Orders: Botox denied previously     Pt Contacted: n/a    At Rooming: Normal    
Monthly or less

## 2023-07-06 ENCOUNTER — DOCUMENTATION ONLY (OUTPATIENT)
Dept: UROLOGY | Facility: CLINIC | Age: 63
End: 2023-07-06
Payer: COMMERCIAL

## 2023-07-06 NOTE — PROGRESS NOTES
Type of Form Received: Order (intermittent catheter)    Form Received (Date) 7/6/23   Form Filled out Yes, date 7/6/23   Placed in provider folder Yes       Received Completed forms Yes   Faxed Forms Faxed To: [facility]  Fax Number: [phone]   Sent to HIM (Date) 7/11/23

## 2023-07-11 ENCOUNTER — MEDICAL CORRESPONDENCE (OUTPATIENT)
Dept: HEALTH INFORMATION MANAGEMENT | Facility: CLINIC | Age: 63
End: 2023-07-11
Payer: COMMERCIAL

## 2023-08-02 ENCOUNTER — PRE VISIT (OUTPATIENT)
Dept: UROLOGY | Facility: CLINIC | Age: 63
End: 2023-08-02
Payer: COMMERCIAL

## 2023-08-02 DIAGNOSIS — N31.9 NEUROGENIC BLADDER: Primary | ICD-10-CM

## 2023-08-02 NOTE — TELEPHONE ENCOUNTER
Reason for visit: cystoscopy with Botox 200u    Dx/Hx/Sx: neurogenic bladder     Records/imaging/labs/orders: In epic    At Rooming: have botox -- botox needle

## 2023-08-16 ENCOUNTER — OFFICE VISIT (OUTPATIENT)
Dept: UROLOGY | Facility: CLINIC | Age: 63
End: 2023-08-16
Payer: COMMERCIAL

## 2023-08-16 VITALS
OXYGEN SATURATION: 99 % | SYSTOLIC BLOOD PRESSURE: 142 MMHG | HEIGHT: 71 IN | WEIGHT: 175 LBS | DIASTOLIC BLOOD PRESSURE: 86 MMHG | HEART RATE: 57 BPM | BODY MASS INDEX: 24.5 KG/M2

## 2023-08-16 DIAGNOSIS — N31.9 NEUROGENIC BLADDER: Primary | ICD-10-CM

## 2023-08-16 PROCEDURE — 52287 CYSTOSCOPY CHEMODENERVATION: CPT | Performed by: UROLOGY

## 2023-08-16 RX ORDER — CIPROFLOXACIN 500 MG/1
500 TABLET, FILM COATED ORAL ONCE
Status: DISCONTINUED | OUTPATIENT
Start: 2023-08-16 | End: 2024-04-06

## 2023-08-16 RX ORDER — CIPROFLOXACIN 500 MG/1
500 TABLET, FILM COATED ORAL DAILY
Qty: 1 TABLET | Refills: 0 | Status: SHIPPED | OUTPATIENT
Start: 2023-08-16 | End: 2023-08-17

## 2023-08-16 NOTE — NURSING NOTE
"Chief Complaint   Patient presents with    Cystoscopy     Pt here for cystoscopy       Blood pressure (!) 142/86, pulse 57, height 1.803 m (5' 11\"), weight 79.4 kg (175 lb), SpO2 99 %. Body mass index is 24.41 kg/m .    Patient Active Problem List   Diagnosis    Cardiac arrest (H)    Coronary atherosclerosis    Stented coronary artery    Spinal cord compression (H)    Subdural hematoma (H)    Post-operative state    Decubitus ulcer of sacral region, stage 3 (H)    Chronic skin ulcer, limited to breakdown of skin (H)    Pressure ulcer of other site, stage 3 (H)    Heterotopic ossification    Other calcification of muscle, left thigh    Status post hip surgery    Ingrown nail    Open wound, left lateral foot    Abnormal CT scan, heart    Dyslipidemia    Family history of premature CAD    Impotence of organic origin    Injury of thoracic spinal cord (H)    Neurogenic bladder    Neurogenic bowel    Orthostatic hypotension    Paraplegia (H)    Preop examination    Coronary artery disease involving native coronary artery of native heart without angina pectoris       No Known Allergies    Current Outpatient Medications   Medication Sig Dispense Refill    aspirin 81 MG EC tablet Take 81 mg by mouth daily      atorvastatin (LIPITOR) 20 MG tablet Take 1 tablet (20 mg) by mouth At Bedtime 90 tablet 3    ascorbic acid (VITAMIN C) 500 MG tablet Take 500 mg by mouth every morning       ciprofloxacin (CIPRO) 500 MG tablet Take 1 tablet (500 mg) by mouth daily (Patient not taking: Reported on 8/16/2023) 3 tablet 0    estradiol (VIVELLE-DOT) 0.1 MG/24HR bi-weekly patch APPLY 1 PATCH TOPICALLY TO THE SKIN 2 TIMES A WEEK (Patient not taking: Reported on 3/16/2023)      Multiple Vitamins-Minerals (MULTIVITAMIN MEN PO) Take 1 tablet by mouth daily       MYRBETRIQ 25 MG 24 hr tablet TK ONE T PO QD (Patient not taking: No sig reported)      order for DME Equipment being ordered: [employer] [facility] Medical Order Phone [phone] Fax " [phone]    Left Lateral Foot Primary Dressing Polymem Cloth Strips (not dots) Qty 30  Coccyx Secondary Dressing  tegaderm adhesive foam dressing 3x3 Qty 30  Perineum Secondary Dressing tegaderm + pad ( ref [id number]) qty 30  Length of Need: 1 month  Frequency of dressing change: daily 30 days 0    progesterone (PROMETRIUM) 200 MG capsule  (Patient not taking: Reported on 3/16/2023)      tadalafil (CIALIS) 20 MG tablet Take 1 tablet (20 mg) PO PRN prior to sexual activity. Max: 1 tablet per 36 hours. (Patient not taking: Reported on 3/16/2023) 5 tablet 3    vitamin B complex with vitamin C (STRESS TAB) tablet Take 1 tablet by mouth daily         Social History     Tobacco Use    Smoking status: Former    Smokeless tobacco: Never    Tobacco comments:     Quit at 31   Substance Use Topics    Alcohol use: Yes     Comment: socially    Drug use: No       Invasive Procedure Safety Checklist:    Procedure: Cystoscopy    Action: Complete sections and checkboxes as appropriate.    Pre-procedure:  1. Patient ID Verified with 2 identifiers (Mariluz and  or MRN) : YES    2. Procedure and site verified with patient/designee (when able) : YES    3. Accurate consent documentation in medical record : YES    4. H&P (or appropriate assessment) documented in medical record : N/A  H&P must be up to 30 days prior to procedure an updated within 24 hours of                 Procedure as applicable.     5. Relevant diagnostic and radiology test results appropriately labeled and displayed as applicable : YES    6. Blood products, implants, devices, and/or special equipment available for the procedure as applicable : YES    7. Procedure site(s) marked with provider initials [Exclusions: none] : NO    8. Marking not required. Reason : Yes  Procedure does not require site marking    Time Out:     Time-Out performed immediately prior to starting procedure, including verbal and active participation of all team members addressing: YES    1. Correct  patient identity.  2. Confirmed that the correct side and site are marked.  3. An accurate procedure to be done.  4. Agreement on the procedure to be done.  5. Correct patient position.  6. Relevant images and results are properly labeled and appropriately displayed.  7. The need to administer antibiotics or fluids for irrigation purposes during the procedure as applicable.  8. Safety precautions based on patient history or medication use.    During Procedure: Verification of correct person, site, and procedure occurs any time the responsibility for care of the patient is transferred to another member of the care team.        The following medication was given:     MEDICATION: Ciprofloxacin  ROUTE: PO  SITE: Medication was given orally  DOSE: 500mg  LOT #: [id number]  : [employer]  EXPIRATION DATE: 11/2024  NDC#: 9852-3382-28   Was there drug waste? No    Prior to medication administration, verified patient identity using patient's name and date of birth.  Due to medication administration, patient instructed to remain in clinic for 15 minutes  afterwards, and to report any adverse reaction to me immediately.      The following medication was given:     MEDICATION:  Botox  ROUTE: PO  SITE: Medication was given orally  DOSE:  200units  LOT #: [id number]  :  [employer]er[employer]  EXPIRATION DATE: 2026/02  NDC#: 7653-7371-04   Was there drug waste? No    Prior to medication administration, verified patient identity using patient's name and date of birth.  Due to medication administration, patient instructed to remain in clinic for 15 minutes  afterwards, and to report any adverse reaction to me immediately.        Lynda Díaz  8/16/2023  8:23 AM

## 2023-08-16 NOTE — LETTER
"8/16/2023       RE: Derek Enriquez  [address]  [facility] [address]     Dear Colleague,    Thank you for referring your patient, Derek Enriquez, to the [facility]OLOGY [facility] [place] at [facility]. Please see a copy of my visit note below.    Cystoscopy with Botox Note     PRE-PROCEDURE DIAGNOSIS:  Neurogenic Bladder     POST-PROCEDURE DIAGNOSIS:  Neurogenic Bladder     PROCEDURE:   Cystoscopy with chemodenervation of the bladder     HISTORY: Derek Enriquez is a 62 year old adult with Neurogenic bladder secondary to subdural hematoma at T10 s/p T9-12 laminectomy and evacuation of subdural hematoma eventually leading to paraplegia.  Tried Trospium but significant side effect of constipation. He cannot tolerate anticholinergics.  Performs CIC.  Underwent UDS previously which showed significant NDO with very high pressures starting around 125mL  He has urinary urge incontinence due to his neurogenic DO  Thus, we tried Myrbetriq. He can tolerate the medication without significant side effects. He has a mild improvement of symptoms. However, we repeated urodynamics to ensure safe storage pressures, and they are clearly not safe.   Per UDS note:  \"-Multiple strong UDCs to pressures >110 cm H20, which begin at bladder volumes >160 mL and are associated with incontinene.  -Gradual increase in baseline pressure prior to sudden spike in pressure at fill volume of 163 mL. Pressures slowly return near baseline between each UDC.\"    He now uses Botox which is a big improvement.  He notices a big improvement for about 3-4 months.  Last botox was Apr 2023. He notes it wearing offthis week.    DESCRIPTION OF PROCEDURE:  After informed consent was obtained, the patient was brought to the procedure room where they were placed in supine with all pressure points well padded.  The patient was prepped and draped in a sterile fashion. A flexible cystoscope was introduced " through a well-lubricated urethra. There were no urethral strictures. The bladder was entered. The urine was clear. Systematic cystoscopy was performed. There were no tumors, stones, or other abnormalities in the bladder. Flexible scope and flexible needle were inserted. 200u of Botox was mixed in 20cc normal saline. Systematic injection was performed with 10 locations in a broad template. Minimal bleeding was noted, and scope was removed along with needle.     ASSESSMENT AND PLAN:  Botox injection today successful  Followup in 3-4 months for repeat botox injection in clinic  Also is considering getting WPATH letters for orchiectomy.     Cody Stroud MD  Reconstructive Urology

## 2023-08-16 NOTE — PROGRESS NOTES
"Cystoscopy with Botox Note     PRE-PROCEDURE DIAGNOSIS:  Neurogenic Bladder     POST-PROCEDURE DIAGNOSIS:  Neurogenic Bladder     PROCEDURE:   Cystoscopy with chemodenervation of the bladder     HISTORY: Derek Enriquez is a 62 year old adult with Neurogenic bladder secondary to subdural hematoma at T10 s/p T9-12 laminectomy and evacuation of subdural hematoma eventually leading to paraplegia.  Tried Trospium but significant side effect of constipation. He cannot tolerate anticholinergics.  Performs CIC.  Underwent UDS previously which showed significant NDO with very high pressures starting around 125mL  He has urinary urge incontinence due to his neurogenic DO  Thus, we tried Myrbetriq. He can tolerate the medication without significant side effects. He has a mild improvement of symptoms. However, we repeated urodynamics to ensure safe storage pressures, and they are clearly not safe.   Per UDS note:  \"-Multiple strong UDCs to pressures >110 cm H20, which begin at bladder volumes >160 mL and are associated with incontinene.  -Gradual increase in baseline pressure prior to sudden spike in pressure at fill volume of 163 mL. Pressures slowly return near baseline between each UDC.\"    He now uses Botox which is a big improvement.  He notices a big improvement for about 3-4 months.  Last botox was Apr 2023. He notes it wearing offthis week.    DESCRIPTION OF PROCEDURE:  After informed consent was obtained, the patient was brought to the procedure room where they were placed in supine with all pressure points well padded.  The patient was prepped and draped in a sterile fashion. A flexible cystoscope was introduced through a well-lubricated urethra. There were no urethral strictures. The bladder was entered. The urine was clear. Systematic cystoscopy was performed. There were no tumors, stones, or other abnormalities in the bladder. Flexible scope and flexible needle were inserted. 200u of Botox was mixed in 20cc normal " saline. Systematic injection was performed with 10 locations in a broad template. Minimal bleeding was noted, and scope was removed along with needle.     ASSESSMENT AND PLAN:  Botox injection today successful  Followup in 3-4 months for repeat botox injection in clinic  Also is considering getting WPATH letters for orchiectomy.     Cody Stroud MD  Reconstructive Urology

## 2023-08-16 NOTE — NURSING NOTE
Derek Enriquez is a 62 year old male that presents in clinic today for the following:     Chief Complaint   Patient presents with    Cystoscopy     Pt here for cystoscopy       The patient's allergies and medications were reviewed. The patient's vitals were obtained without incident. The patient does not have any other questions for the provider.     Nayely Lee, EMT at 7:59 AM on 8/16/2023.  Primary Care Clinic: [phone]

## 2023-09-15 NOTE — TELEPHONE ENCOUNTER
09/15/23 1000   [facility] Group Therapy   Group Name Therapeutic Recreation   Participation Level None     Patient was with the doctor and did not attend group.   Patient very argumentative. Not wanting bone removed just wanting it drained. Spent over 45 minutes discussing with patient the surgery. Does not want any bone taken. Discussed that we can try a bone biopsy. Did go over MRI results extensviely with patient. Talked about biopy first but possible subsequent surgery days later if pathology comes back positive.  He wants to know about reconstructive surgery so there is no deformity of his foot. Explained that I normally don't do that and that implants are not a normal part of foot surgery. Also discussed that he can get a second opinion and was given information for jc and [facility] wound clinics on hyperbaric.     Patient states he has pre op tomorrow and will decide then. If he chooses to proceed, would like graphic representation of what is going to be done in surgery.     Discussed that he also has the right to cancel surgery if he does not feel comfortable with it but that I do not have other surgical options for him.     Irma Lara, JEN

## 2023-09-18 ENCOUNTER — MEDICAL CORRESPONDENCE (OUTPATIENT)
Dept: HEALTH INFORMATION MANAGEMENT | Facility: CLINIC | Age: 63
End: 2023-09-18
Payer: COMMERCIAL

## 2023-10-03 ENCOUNTER — TRANSFERRED RECORDS (OUTPATIENT)
Dept: HEALTH INFORMATION MANAGEMENT | Facility: CLINIC | Age: 63
End: 2023-10-03
Payer: COMMERCIAL

## 2023-10-27 ENCOUNTER — TELEPHONE (OUTPATIENT)
Dept: PLASTIC SURGERY | Facility: CLINIC | Age: 63
End: 2023-10-27
Payer: COMMERCIAL

## 2023-10-27 ENCOUNTER — DOCUMENTATION ONLY (OUTPATIENT)
Dept: PLASTIC SURGERY | Facility: CLINIC | Age: 63
End: 2023-10-27
Payer: COMMERCIAL

## 2023-10-27 NOTE — PROGRESS NOTES
[facility] :  Care Coordination Note     SITUATION   Derek Enriquez is a 62 year old male who is receiving support for:  Care Team  .    BACKGROUND     LOS received. LOS appears to meet criteria. However,  is consulting with team re: LOS criteria. Sent message to RNCC and team to possibly PA for Orchiectomy.     12/14/23  Additional LOS uploaded and meets criteria (located in media tab dated 12/8/23). Please use this LOS for PA if possible. This is more likely to result in approval.    ASSESSMENT     Surgery              [facility] Assessment  [facility] Enrollment: Enrolled  Patient has a therapist: Yes  Name of therapist: Suma Soares  Letter of support #1: Received  Letter #1 Date: 09/18/23  Letter of support #2: Received  Letter #2 Date: 12/08/23  Surgery being considered: Yes  Orchiectomy: Yes      PLAN          Nursing Interventions:       Follow-up plan:  RNCC and surgeon will request PA for Orchiectomy.        TWILA Arguelles

## 2023-10-30 ENCOUNTER — PRE VISIT (OUTPATIENT)
Dept: UROLOGY | Facility: CLINIC | Age: 63
End: 2023-10-30
Payer: COMMERCIAL

## 2023-10-30 DIAGNOSIS — N31.9 NEUROGENIC BLADDER: Primary | ICD-10-CM

## 2023-10-30 DIAGNOSIS — N39.41 URGE INCONTINENCE: ICD-10-CM

## 2023-11-04 ENCOUNTER — HEALTH MAINTENANCE LETTER (OUTPATIENT)
Age: 63
End: 2023-11-04

## 2023-11-08 ENCOUNTER — PRE VISIT (OUTPATIENT)
Dept: UROLOGY | Facility: CLINIC | Age: 63
End: 2023-11-08
Payer: COMMERCIAL

## 2023-11-08 NOTE — TELEPHONE ENCOUNTER
Reason for visit: consult for      Dx/Hx/Sx: orchiectomy     Records/imaging/labs/orders: in epic    At Rooming: virtual visit

## 2023-11-15 ENCOUNTER — MYC MEDICAL ADVICE (OUTPATIENT)
Dept: UROLOGY | Facility: CLINIC | Age: 63
End: 2023-11-15

## 2023-11-15 ENCOUNTER — VIRTUAL VISIT (OUTPATIENT)
Dept: UROLOGY | Facility: CLINIC | Age: 63
End: 2023-11-15
Payer: COMMERCIAL

## 2023-11-15 VITALS — BODY MASS INDEX: 24.08 KG/M2 | WEIGHT: 172 LBS | HEIGHT: 71 IN

## 2023-11-15 DIAGNOSIS — Z91.199 NO-SHOW FOR APPOINTMENT: Primary | ICD-10-CM

## 2023-11-15 PROCEDURE — 99207 PR NO CHARGE LOS: CPT | Mod: VID | Performed by: UROLOGY

## 2023-11-15 ASSESSMENT — PAIN SCALES - GENERAL: PAINLEVEL: NO PAIN (0)

## 2023-11-15 NOTE — NURSING NOTE
Is the patient currently in the state of [place]? YES    Visit mode:VIDEO    If the visit is dropped, the patient can be reconnected by: VIDEO VISIT: Text to cell phone:   Telephone Information:   Mobile [phone]       Will anyone else be joining the visit? NO  (If patient encounters technical issues they should call [phone])    How would you like to obtain your AVS? MyChart    Are changes needed to the allergy or medication list? No, Pt stated no changes to allergies, and Pt stated no med changes    Reason for visit: VU KILLIAN

## 2023-11-15 NOTE — LETTER
11/15/2023       RE: Derek Enriquez  [address]  [place] [facility] [place] [facility] [place] [facility] [address]     Dear Colleague,    Thank you for referring your patient, Derek Enriquez, to the [facility] UROLOGY [facility]. Please see a copy of my visit note below.    I did not connect with patient on this date.      Again, thank you for allowing me to participate in the care of your patient.      Sincerely,    Cody Stroud MD

## 2023-11-16 NOTE — TELEPHONE ENCOUNTER
[facility] Call Center    Phone Message    May a detailed message be left on voicemail: yes     Reason for Call: Other: pt calling and Dr Stroud told him to call and get squeezed in for today, please advise     Action Taken: Other: urology    Travel Screening: Not Applicable

## 2023-11-16 NOTE — TELEPHONE ENCOUNTER
[facility] Call Center    Phone Message    May a detailed message be left on voicemail: yes     Reason for Call: Other: Patient calling back. He was suppose to be on a virtual call with provider yesterday and provider ran late, told patient to call today to get squeezed in and patient states he doesn't just want to sit at home and wait would like for someone to contact him with a time if possible. Writer informed patient that clinic has to find a time to get him on providers schedule . Please contact patient in regards to this message. Thank you     Action Taken: Message routed to:  [facility] Urology    Travel Screening: Not Applicable

## 2023-11-17 NOTE — TELEPHONE ENCOUNTER
Called pt to discuss appointment scheduling with Dr Stroud. Pt currently has an appointment with Dr Stroud for cysto on 12/6 at 8:30 am. Pt will plan to keep this appointment and does not need to schedule an additional appointment at this time.

## 2023-12-06 ENCOUNTER — OFFICE VISIT (OUTPATIENT)
Dept: UROLOGY | Facility: CLINIC | Age: 63
End: 2023-12-06
Payer: COMMERCIAL

## 2023-12-06 VITALS
SYSTOLIC BLOOD PRESSURE: 127 MMHG | WEIGHT: 173 LBS | DIASTOLIC BLOOD PRESSURE: 78 MMHG | HEART RATE: 73 BPM | BODY MASS INDEX: 24.22 KG/M2 | HEIGHT: 71 IN

## 2023-12-06 DIAGNOSIS — N31.9 NEUROGENIC BLADDER: Primary | ICD-10-CM

## 2023-12-06 PROCEDURE — 52287 CYSTOSCOPY CHEMODENERVATION: CPT | Performed by: UROLOGY

## 2023-12-06 RX ORDER — CIPROFLOXACIN 500 MG/1
500 TABLET, FILM COATED ORAL ONCE
Status: COMPLETED | OUTPATIENT
Start: 2023-12-06 | End: 2023-12-06

## 2023-12-06 RX ADMIN — CIPROFLOXACIN 500 MG: 500 TABLET, FILM COATED ORAL at 09:13

## 2023-12-06 ASSESSMENT — PAIN SCALES - GENERAL: PAINLEVEL: NO PAIN (0)

## 2023-12-06 NOTE — PROGRESS NOTES
"Cystoscopy with Botox Note     PRE-PROCEDURE DIAGNOSIS:  Neurogenic Bladder  Gender Dysphoria     POST-PROCEDURE DIAGNOSIS:  Neurogenic Bladder  Gender Dysphoria       PROCEDURE:   Cystoscopy with chemodenervation of the bladder     HISTORY: Derek Enriquez is a 62 year old adult with Neurogenic bladder secondary to subdural hematoma at T10 s/p T9-12 laminectomy and evacuation of subdural hematoma eventually leading to paraplegia.  Tried Trospium but significant side effect of constipation. He cannot tolerate anticholinergics.  Performs CIC.  Underwent UDS previously which showed significant NDO with very high pressures starting around 125mL  He has urinary urge incontinence due to his neurogenic DO  Thus, we tried Myrbetriq. He can tolerate the medication without significant side effects. He has a mild improvement of symptoms. However, we repeated urodynamics to ensure safe storage pressures, and they are clearly not safe.   Per UDS note:  \"-Multiple strong UDCs to pressures >110 cm H20, which begin at bladder volumes >160 mL and are associated with incontinene.  -Gradual increase in baseline pressure prior to sudden spike in pressure at fill volume of 163 mL. Pressures slowly return near baseline between each UDC.\"    He now uses Botox which is a big improvement.  He notices a big improvement for about 3-4 months.  He notes his botox wearing off.    He continues to have gender dysphoria and desires orchiectomy and scrotectomy.  We discussed previously.  The patient has sent in letters which have been reviewed.  Has been on hormones > 1 year.  Stable medical and mental health.  Does not desire to completely socially transition.    DESCRIPTION OF PROCEDURE:  After informed consent was obtained, the patient was brought to the procedure room where they were placed in supine with all pressure points well padded.  The patient was prepped and draped in a sterile fashion. A flexible cystoscope was introduced through a " well-lubricated urethra. There were no urethral strictures. The bladder was entered. The urine was clear. Systematic cystoscopy was performed. There were no tumors, stones, or other abnormalities in the bladder. Flexible scope and flexible needle were inserted. 200u of Botox was mixed in 10cc normal saline. Systematic injection was performed with 10 locations in a broad template. Minimal bleeding was noted, and scope was removed along with needle.     ASSESSMENT AND PLAN:  Botox injection today successful  Followup in 4 months for repeat botox injection in clinic  Has gotten letters. Desires orchiectomy and scrotectomy for gender dysphoria.  Getting new insurance.  Will reach out after new year to PA for orchiectomy and scrotectomy.    Cody Stroud MD  Reconstructive Urology

## 2023-12-06 NOTE — NURSING NOTE
"Chief Complaint   Patient presents with    Cystoscopy     + botox       Blood pressure 127/78, pulse 73, height 1.803 m (5' 11\"), weight 78.5 kg (173 lb). Body mass index is 24.13 kg/m .    Patient Active Problem List   Diagnosis    Cardiac arrest (H)    Coronary atherosclerosis    Stented coronary artery    Spinal cord compression (H)    Subdural hematoma (H)    Post-operative state    Decubitus ulcer of sacral region, stage 3 (H)    Chronic skin ulcer, limited to breakdown of skin (H)    Pressure ulcer of other site, stage 3 (H)    Heterotopic ossification    Other calcification of muscle, left thigh    Status post hip surgery    Ingrown nail    Open wound, left lateral foot    Abnormal CT scan, heart    Dyslipidemia    Family history of premature CAD    Impotence of organic origin    Injury of thoracic spinal cord (H)    Neurogenic bladder    Neurogenic bowel    Orthostatic hypotension    Paraplegia (H)    Preop examination    Coronary artery disease involving native coronary artery of native heart without angina pectoris       No Known Allergies    Current Outpatient Medications   Medication Sig Dispense Refill    ascorbic acid (VITAMIN C) 500 MG tablet Take 500 mg by mouth every morning       aspirin 81 MG EC tablet Take 81 mg by mouth daily      atorvastatin (LIPITOR) 20 MG tablet Take 1 tablet (20 mg) by mouth At Bedtime 90 tablet 3    estradiol (VIVELLE-DOT) 0.1 MG/24HR bi-weekly patch       Multiple Vitamins-Minerals (MULTIVITAMIN MEN PO) Take 1 tablet by mouth daily       MYRBETRIQ 25 MG 24 hr tablet TK ONE T PO QD      progesterone (PROMETRIUM) 200 MG capsule       vitamin B complex with vitamin C (STRESS TAB) tablet Take 1 tablet by mouth daily      ciprofloxacin (CIPRO) 500 MG tablet Take 1 tablet (500 mg) by mouth daily (Patient not taking: Reported on 8/16/2023) 3 tablet 0    order for DME Equipment being ordered: [employer] [facility] Phone [phone] Fax [phone]    Left Lateral Foot Primary " Dressing Polymem Cloth Strips (not dots) Qty 30  Coccyx Secondary Dressing  tegaderm adhesive foam dressing 3x3 Qty 30  Perineum Secondary Dressing tegaderm + pad ( ref [id number]) qty 30  Length of Need: 1 month  Frequency of dressing change: daily (Patient not taking: Reported on 2023) 30 days 0    tadalafil (CIALIS) 20 MG tablet Take 1 tablet (20 mg) PO PRN prior to sexual activity. Max: 1 tablet per 36 hours. (Patient not taking: Reported on 3/16/2023) 5 tablet 3       Social History     Tobacco Use    Smoking status: Former    Smokeless tobacco: Never    Tobacco comments:     Quit at 31   Substance Use Topics    Alcohol use: Yes     Comment: socially    Drug use: No       Invasive Procedure Safety Checklist:    Procedure: Cystoscopy    Action: Complete sections and checkboxes as appropriate.    Pre-procedure:  1. Patient ID Verified with 2 identifiers (Mariluz and  or MRN) : YES    2. Procedure and site verified with patient/designee (when able) : YES    3. Accurate consent documentation in medical record : YES    4. H&P (or appropriate assessment) documented in medical record : N/A  H&P must be up to 30 days prior to procedure an updated within 24 hours of                 Procedure as applicable.     5. Relevant diagnostic and radiology test results appropriately labeled and displayed as applicable : YES    6. Blood products, implants, devices, and/or special equipment available for the procedure as applicable : YES    7. Procedure site(s) marked with provider initials [Exclusions: none] : NO    8. Marking not required. Reason : Yes  Procedure does not require site marking    Time Out:     Time-Out performed immediately prior to starting procedure, including verbal and active participation of all team members addressing: YES    1. Correct patient identity.  2. Confirmed that the correct side and site are marked.  3. An accurate procedure to be done.  4. Agreement on the procedure to be done.  5. Correct patient  position.  6. Relevant images and results are properly labeled and appropriately displayed.  7. The need to administer antibiotics or fluids for irrigation purposes during the procedure as applicable.  8. Safety precautions based on patient history or medication use.    During Procedure: Verification of correct person, site, and procedure occurs any time the responsibility for care of the patient is transferred to another member of the care team.      The following medication was given:     MEDICATION: Ciprofloxacin  ROUTE: PO  SITE: Medication was given orally  DOSE: 500mg  LOT #: [id number]  : [employer]  EXPIRATION DATE: 11/2024  NDC#: 3990-9643-65   Was there drug waste? No    Prior to medication administration, verified patient identity using patient's name and date of birth.  Due to medication administration, patient instructed to remain in clinic for 15 minutes  afterwards, and to report any adverse reaction to me immediately.    The following medication was given:     MEDICATION:  botox 100 u  ROUTE: bladder wall  SITE: Medication was injected via bladder wall   DOSE:  100 units  LOT #: [id number]  :  [employer]  EXPIRATION DATE: 2026/04  NDC#: 0764-3825-79   Was there drug waste? No    Prior to medication administration, verified patient identity using patient's name and date of birth.  Due to medication administration, patient instructed to remain in clinic for 15 minutes  afterwards, and to report any adverse reaction to me immediately.    The following medication was given:     MEDICATION: botox 100 u  ROUTE: bladder wall  SITE: Medication was injected via bladder wall   DOSE: 100 units  LOT #: [id number]  : [employer]ergan  EXPIRATION DATE: 2026/04  NDC#: 0005-6167-67   Was there drug waste? No    Prior to medication administration, verified patient identity using patient's name and date of birth.  Due to medication administration, patient instructed to remain in clinic for 15 minutes   afterwards, and to report any adverse reaction to me immediately.    Lynda Díaz  12/6/2023  7:54 AM

## 2023-12-06 NOTE — PATIENT INSTRUCTIONS
"Please schedule a repeat cystoscopy with botox with dr Stroud in 4 months.    It was a pleasure meeting with you today.  Thank you for allowing me and my team the privilege of caring for you today.  YOU are the reason we are here, and I truly hope we provided you with the excellent service you deserve.  Please let us know if there is anything else we can do for you so that we can be sure you are leaving completely satisfied with your care experience.        AFTER YOUR CYSTOSCOPY        You have just completed a cystoscopy, or \"cysto\", which allowed your physician to learn more about your bladder (or to remove a stent placed after surgery). We suggest that you continue to avoid caffeine, fruit juice, and alcohol for the next 24 hours, however, you are encouraged to return to your normal activities.         A few things that are considered normal after your cystoscopy:     * Small amount of bleeding (or spotting) that clears within the next 24 hours     * Slight burning sensation with urination     * Sensation to of needing to avoid more frequently     * The feeling of \"air\" in your urine     * Mild discomfort that is relieved with Tylenol        Please contact our office promptly if you:     * Develop a fever above 101 degrees     * Are unable to urinate     * Develop bright red blood that does not stop     * Severe pain or swelling         Please contact our office with any concerns or questions @[email or web address].  "

## 2023-12-06 NOTE — LETTER
"12/6/2023       RE: Derek Enriquez  [address]  [facility] [address]     Dear Colleague,    Thank you for referring your patient, Derek Enriquez, to the [facility] [place] [facility]. Please see a copy of my visit note below.    Cystoscopy with Botox Note     PRE-PROCEDURE DIAGNOSIS:  Neurogenic Bladder  Gender Dysphoria     POST-PROCEDURE DIAGNOSIS:  Neurogenic Bladder  Gender Dysphoria       PROCEDURE:   Cystoscopy with chemodenervation of the bladder     HISTORY: Derek Enriquez is a 62 year old adult with Neurogenic bladder secondary to subdural hematoma at T10 s/p T9-12 laminectomy and evacuation of subdural hematoma eventually leading to paraplegia.  Tried Trospium but significant side effect of constipation. He cannot tolerate anticholinergics.  Performs CIC.  Underwent UDS previously which showed significant NDO with very high pressures starting around 125mL  He has urinary urge incontinence due to his neurogenic DO  Thus, we tried Myrbetriq. He can tolerate the medication without significant side effects. He has a mild improvement of symptoms. However, we repeated urodynamics to ensure safe storage pressures, and they are clearly not safe.   Per UDS note:  \"-Multiple strong UDCs to pressures >110 cm H20, which begin at bladder volumes >160 mL and are associated with incontinene.  -Gradual increase in baseline pressure prior to sudden spike in pressure at fill volume of 163 mL. Pressures slowly return near baseline between each UDC.\"    He now uses Botox which is a big improvement.  He notices a big improvement for about 3-4 months.  He notes his botox wearing off.    He continues to have gender dysphoria and desires orchiectomy and scrotectomy.  We discussed previously.  The patient has sent in letters which have been reviewed.  Has been on hormones > 1 year.  Stable medical and mental health.  Does not desire to completely " socially transition.    DESCRIPTION OF PROCEDURE:  After informed consent was obtained, the patient was brought to the procedure room where they were placed in supine with all pressure points well padded.  The patient was prepped and draped in a sterile fashion. A flexible cystoscope was introduced through a well-lubricated urethra. There were no urethral strictures. The bladder was entered. The urine was clear. Systematic cystoscopy was performed. There were no tumors, stones, or other abnormalities in the bladder. Flexible scope and flexible needle were inserted. 200u of Botox was mixed in 10cc normal saline. Systematic injection was performed with 10 locations in a broad template. Minimal bleeding was noted, and scope was removed along with needle.     ASSESSMENT AND PLAN:  Botox injection today successful  Followup in 4 months for repeat botox injection in clinic  Has gotten letters. Desires orchiectomy and scrotectomy for gender dysphoria.  Getting new insurance.  Will reach out after new year to [place] for orchiectomy and scrotectomy.    Cody Stroud MD  Reconstructive Urology

## 2024-01-27 DIAGNOSIS — F64.9 GENDER DYSPHORIA: Primary | ICD-10-CM

## 2024-01-27 RX ORDER — CEFAZOLIN SODIUM 2 G/50ML
2 SOLUTION INTRAVENOUS
Status: CANCELLED | OUTPATIENT
Start: 2024-01-27

## 2024-01-27 RX ORDER — CEFAZOLIN SODIUM 2 G/50ML
2 SOLUTION INTRAVENOUS SEE ADMIN INSTRUCTIONS
Status: CANCELLED | OUTPATIENT
Start: 2024-01-27

## 2024-01-29 ENCOUNTER — TELEPHONE (OUTPATIENT)
Dept: UROLOGY | Facility: CLINIC | Age: 64
End: 2024-01-29
Payer: COMMERCIAL

## 2024-01-29 PROBLEM — F64.9 GENDER DYSPHORIA: Status: ACTIVE | Noted: 2024-01-27

## 2024-01-29 NOTE — TELEPHONE ENCOUNTER
Patient called back to change DOS to an earlier date. Encounter below has been updated with change and re-routed to MARISAS Patel on 1/29/2024 at 1:14 PM

## 2024-01-29 NOTE — TELEPHONE ENCOUNTER
Patient is schedule for surgery with: Dr. Stroud    Surgery Date: 3/25    Location: [facility]    H&P: to be completed by Primary Care team - patient instructed to schedule. Patient stated this will be schedule with [facility]     Post-op: Pt would like to have post op on 4/10 when they are in clinic if possible. Please help scheduled. Thank you.     Patient will receive a phone call from pre-admission nurses 3-5 days prior to surgery with arrival time and NPO instructions.    Patient aware times are subject to change up until day before surgery.     Patient questions/concerns: N/A     Surgery packet was sent via US mail on 1/29 with ton Patel on 1/29/2024 at 11:46 AM

## 2024-02-15 DIAGNOSIS — E78.5 DYSLIPIDEMIA: Primary | ICD-10-CM

## 2024-02-16 ENCOUNTER — LAB (OUTPATIENT)
Dept: LAB | Facility: CLINIC | Age: 64
End: 2024-02-16
Payer: COMMERCIAL

## 2024-02-16 DIAGNOSIS — E78.5 DYSLIPIDEMIA: ICD-10-CM

## 2024-02-16 LAB
ALBUMIN SERPL BCG-MCNC: 4.3 G/DL (ref 3.5–5.2)
ALP SERPL-CCNC: 123 U/L (ref 40–150)
ALT SERPL W P-5'-P-CCNC: 18 U/L (ref 0–70)
ANION GAP SERPL CALCULATED.3IONS-SCNC: 12 MMOL/L (ref 7–15)
AST SERPL W P-5'-P-CCNC: 22 U/L (ref 0–45)
BILIRUB SERPL-MCNC: 0.9 MG/DL
BUN SERPL-MCNC: 16.9 MG/DL (ref 8–23)
CALCIUM SERPL-MCNC: 9.2 MG/DL (ref 8.8–10.2)
CHLORIDE SERPL-SCNC: 100 MMOL/L (ref 98–107)
CHOLEST SERPL-MCNC: 154 MG/DL
CREAT SERPL-MCNC: 0.73 MG/DL (ref 0.67–1.17)
DEPRECATED HCO3 PLAS-SCNC: 26 MMOL/L (ref 22–29)
EGFRCR SERPLBLD CKD-EPI 2021: >90 ML/MIN/1.73M2
FASTING STATUS PATIENT QL REPORTED: YES
GLUCOSE SERPL-MCNC: 83 MG/DL (ref 70–99)
HDLC SERPL-MCNC: 38 MG/DL
LDLC SERPL CALC-MCNC: 78 MG/DL
LDLC SERPL DIRECT ASSAY-MCNC: 86 MG/DL
NONHDLC SERPL-MCNC: 116 MG/DL
POTASSIUM SERPL-SCNC: 3.8 MMOL/L (ref 3.4–5.3)
PROT SERPL-MCNC: 7.5 G/DL (ref 6.4–8.3)
SODIUM SERPL-SCNC: 138 MMOL/L (ref 135–145)
TRIGL SERPL-MCNC: 191 MG/DL

## 2024-02-16 PROCEDURE — 80053 COMPREHEN METABOLIC PANEL: CPT

## 2024-02-16 PROCEDURE — 83721 ASSAY OF BLOOD LIPOPROTEIN: CPT | Mod: 59

## 2024-02-16 PROCEDURE — 80061 LIPID PANEL: CPT

## 2024-02-16 PROCEDURE — 36415 COLL VENOUS BLD VENIPUNCTURE: CPT

## 2024-02-19 ENCOUNTER — OFFICE VISIT (OUTPATIENT)
Dept: CARDIOLOGY | Facility: CLINIC | Age: 64
End: 2024-02-19
Attending: INTERNAL MEDICINE
Payer: COMMERCIAL

## 2024-02-19 VITALS — OXYGEN SATURATION: 97 % | HEART RATE: 71 BPM | DIASTOLIC BLOOD PRESSURE: 91 MMHG | SYSTOLIC BLOOD PRESSURE: 145 MMHG

## 2024-02-19 DIAGNOSIS — I25.10 ATHEROSCLEROSIS OF NATIVE CORONARY ARTERY OF NATIVE HEART WITHOUT ANGINA PECTORIS: ICD-10-CM

## 2024-02-19 DIAGNOSIS — Z95.5 H/O HEART ARTERY STENT: ICD-10-CM

## 2024-02-19 PROCEDURE — 99214 OFFICE O/P EST MOD 30 MIN: CPT | Mod: GC | Performed by: INTERNAL MEDICINE

## 2024-02-19 PROCEDURE — 99213 OFFICE O/P EST LOW 20 MIN: CPT | Performed by: INTERNAL MEDICINE

## 2024-02-19 ASSESSMENT — PAIN SCALES - GENERAL: PAINLEVEL: NO PAIN (0)

## 2024-02-19 NOTE — PROGRESS NOTES
Cardiology Clinic Return Patient Visit    HPI:  Last seen in clinic on March 2023.     I had the privilege to evaluate and examine Mr Derek Enriquez is a pleasant 63 year old male with a history of cardiac arrest in 2018 related to coronary artery disease with an acute plaque rupture event s/p PCI with MALI (3.0 x 20mm) to the pLAD. He was not seen to have otherwise obstructive CAD. Unfortunately his hospital course at that time involved a spinal subdural hematoma for which he underwent T9-T12 decompressive laminectomy and evacuation with persisted complete paraplegia. He is being seen in clinic for follow up of his cardiac co morbidities.    Since last visit, doing well overall. No immediate concerns or questions today. Has been tolerating his ASA and atorvastatin without adverse drug reactions. Also working on trying to eat a healthier diet with more fruits and vegetables. Exercising as much as he is able to. No chest pain with this.     The 10-year ASCVD risk score (Jose DALY, et al., 2019) is: 11.1%    Values used to calculate the score:      Age: 63 years      Sex: Male      Is Non- : No      Diabetic: No      Tobacco smoker: No      Systolic Blood Pressure: 145 mmHg      Is BP treated: No      HDL Cholesterol: 38 mg/dL      Total Cholesterol: 154 mg/dL     ASSESSMENT AND PLAN  Derek Enriquez is a 63 year old male with a history of cardiac arrest in 2018 related to coronary artery disease with an acute plaque rupture event s/p PCI with MALI (3.0 x 20mm) to the pLAD. He was not seen to have otherwise obstructive CAD. Unfortunately his hospital course at that time involved a spinal subdural hematoma for which he underwent T9-T12 decompressive laminectomy and evacuation with persisted complete paraplegia..  Our visit today addressed the following concerns:     # Chronic Coronary Artery Disease  # Mixed Dyslipidemia  # Hx of cardiac arrest related to NSTEMI of pLAD in 2018  ## s/p PCI to  Marzena  Remains doing well in terms of management of underlying chronic coronary artery disease. He reports no anginal symptoms. Currently managing this as secondary prevention with a goal LDL of < 55. His most recent LDL was 86 which is above goal despite ongoing therapy with atorvastatin 20mg daily. He also has residual hypertriglyceridemia. Discussed increasing the dosage of this to help meet this goal though he would prefer to work on ongoing lifestyle modifications first. Otherwise will plan on repeat assessment in one year to monitor progress.   - ASA 81mg daily  - continue Atorvastatin 20mg daily  - echocardiogram  - continue working on lifestyle modification    Thank you for the opportunity to participate in the care of Derek Enriquez. Please feel free to contact me with any additional questions or concerns.    Isaiah Haro MD  Cardiology Fellow    This note was dictated with voice recognition software and then edited. Please excuse any unintentional errors.    PAST MEDICAL HISTORY:  Patient Active Problem List   Diagnosis    Cardiac arrest (H)    Coronary atherosclerosis    Stented coronary artery    Spinal cord compression (H)    Subdural hematoma (H)    Post-operative state    Decubitus ulcer of sacral region, stage 3 (H)    Chronic skin ulcer, limited to breakdown of skin (H)    Pressure ulcer of other site, stage 3 (H)    Heterotopic ossification    Other calcification of muscle, left thigh    Status post hip surgery    Ingrown nail    Open wound, left lateral foot    Abnormal CT scan, heart    Dyslipidemia    Family history of premature CAD    Impotence of organic origin    Injury of thoracic spinal cord (H)    Neurogenic bladder    Neurogenic bowel    Orthostatic hypotension    Paraplegia (H)    Preop examination    Coronary artery disease involving native coronary artery of native heart without angina pectoris    Gender dysphoria       CURRENT MEDICATIONS:  Current Outpatient Medications   Medication Sig  Dispense Refill    ascorbic acid (VITAMIN C) 500 MG tablet Take 500 mg by mouth every morning       aspirin 81 MG EC tablet Take 81 mg by mouth daily      atorvastatin (LIPITOR) 20 MG tablet Take 1 tablet (20 mg) by mouth At Bedtime 90 tablet 3    Multiple Vitamins-Minerals (MULTIVITAMIN MEN PO) Take 1 tablet by mouth daily       MYRBETRIQ 25 MG 24 hr tablet TK ONE T PO QD      progesterone (PROMETRIUM) 200 MG capsule       vitamin B complex with vitamin C (STRESS TAB) tablet Take 1 tablet by mouth daily      ciprofloxacin (CIPRO) 500 MG tablet Take 1 tablet (500 mg) by mouth daily (Patient not taking: Reported on 8/16/2023) 3 tablet 0    estradiol (VIVELLE-DOT) 0.1 MG/24HR bi-weekly patch  (Patient not taking: Reported on 2/19/2024)      order for DME Equipment being ordered: [employer] [facility] Phone [phone] Fax [phone]    Left Lateral Foot Primary Dressing Polymem Cloth Strips (not dots) Qty 30  Coccyx Secondary Dressing  tegaderm adhesive foam dressing 3x3 Qty 30  Perineum Secondary Dressing tegaderm + pad ( ref 3582) qty 30  Length of Need: 1 month  Frequency of dressing change: daily (Patient not taking: Reported on 12/6/2023) 30 days 0    tadalafil (CIALIS) 20 MG tablet Take 1 tablet (20 mg) PO PRN prior to sexual activity. Max: 1 tablet per 36 hours. (Patient not taking: Reported on 3/16/2023) 5 tablet 3       PAST SURGICAL HISTORY:  Past Surgical History:   Procedure Laterality Date    ARTHROTOMY HIP Left 9/19/2018    Procedure: ARTHROTOMY HIP;  Left Hip Heterotopic Bone Resection;  Surgeon: Toñito Kennedy MD;  Location: [facility]    ARTHROTOMY HIP Right 11/02/2018    ARTHROTOMY HIP Right 11/2/2018    Procedure: Right Hip Heterotopic Bone Resection;  Surgeon: Toñito Kennedy MD;  Location: [facility] OR    CARDIAC SURGERY      LAMINECTOMY THORACIC THREE LEVELS N/A 3/9/2018    Procedure: LAMINECTOMY THORACIC THREE LEVELS;  Thoracic 9-12 Laminectomy Evacuation of Subdural Hematoma,  C-Arm, Prone, Gel Rolls.;  Surgeon: Abhijeet Joe MD;  Location: [facility] OR    OPEN REDUCTION INTERNAL FIXATION RODDING INTRAMEDULLARY FEMUR Right 6/25/2018    Procedure: OPEN REDUCTION INTERNAL FIXATION RODDING INTRAMEDULLARY FEMUR;  Right Open Reduction Internal Fixation Subtrochanteric Femur Fracture;  Surgeon: Toñito Kennedy MD;  Location: [facility] OR    wisdom teeth extraction         ALLERGIES  Patient has no known allergies.    FAMILY HX:  Family History   Problem Relation Age of Onset    Cardiac Sudden Death Father     Arrhythmia Father         v fib arrest     Myocardial Infarction Brother     Other - See Comments Brother         myeloid dysplasia       SOCIAL HX:  Social History     Tobacco Use    Smoking status: Former    Smokeless tobacco: Never    Tobacco comments:     Quit at 31   Substance Use Topics    Alcohol use: Yes     Comment: socially    Drug use: No        VITAL SIGNS:  BP (!) 145/91 (BP Location: Right arm, Patient Position: Chair, Cuff Size: Adult Regular)   Pulse 71   SpO2 97%   There is no height or weight on file to calculate BMI.  Wt Readings from Last 2 Encounters:   12/06/23 78.5 kg (173 lb)   11/15/23 78 kg (172 lb)     BP at the end of the visit: 136/84 mmHg    PHYSICAL EXAM  General: NAD  HEENT: no scleral icterus or injection  CARDIAC: RRR, no murmurs. No JVD  RESP:  CTAB  GI: nondistended  EXTREMITIES: no LE edema  SKIN: No acute lesions appreciated  NEURO: No focal deficits  paraplegia      LABS: personally reviewed  CMP  Recent Labs   Lab Test 02/16/24  0708 03/16/23  0823 03/31/22  0741 03/22/21  0704 03/12/20  0911 03/11/19  1525 11/05/18  0616 03/14/18  0708 03/12/18  0427 03/09/18  2100 03/09/18  0435    140 139 141 139 138 139   < > 139   < > 138   POTASSIUM 3.8 4.2 3.8 3.9 3.9 3.6 4.4   < > 3.6   < > 4.2   CHLORIDE 100 102 104 107 105 106 107   < > 105   < > 109   CO2 26 30* 28 30 30 27 28   < > 25   < > 23   ANIONGAP 12 8 7 4 4 6 4   < > 9   < > 6   GLC  83 93 89 93 87 94 89   < > 107*   < > 118*   BUN 16.9 13.8 11 15 13 17 14   < > 15   < > 21   CR 0.73 0.80 0.65* 0.63* 0.62* 0.58* 0.56*   < > 0.70   < > 0.83   GFRESTIMATED >90 >90 >90 >90 >90 >90 >90   < > >90   < > >90   GFRESTBLACK  --   --   --  >90 >90 >90 >90   < > >90   < > >90   PAT 9.2 9.5 9.1 8.6 8.8 8.6 7.9*   < > 8.2*   < > 8.0*   MAG  --   --   --   --   --   --   --   --  1.9  --  2.1   PHOS  --   --   --   --   --   --   --   --  2.7  --  3.0   PROTTOTAL 7.5 7.1 7.5 6.7* 6.8 7.6 5.6*   < >  --   --  6.1*   ALBUMIN 4.3 4.2 3.9 3.5 3.8 3.7 2.6*   < >  --   --  3.5   BILITOTAL 0.9 0.6 0.9 0.7 0.7 0.3 0.4   < >  --   --  0.7   ALKPHOS 123 111 115 94 97 149 221*   < >  --   --  64   AST 22 23 21 22 18 29 25   < >  --   --  81*   ALT 18 20 31 33 24 27 18   < >  --   --  60    < > = values in this interval not displayed.     CBC  Recent Labs   Lab Test 11/05/18  0616 11/04/18  0809 11/03/18  2020 11/03/18  0651 11/02/18 2123 06/25/18  0648 06/18/18  1357   WBC 4.8  --   --  6.6 7.9  --  5.9   RBC 3.26*  --   --  2.87* 3.33*  --  3.74*   HGB 8.5*  8.5* 8.3* 8.3* 7.5* 8.6*   < > 9.1*   HCT 28.9*  --   --  24.5* 28.4*  --  30.2*   MCV 89  --   --  85 85  --  81   MCH 26.1*  --   --  26.1* 25.8*  --  24.3*   MCHC 29.4*  --   --  30.6* 30.3*  --  30.1*   RDW 16.3*  --   --  16.5* 16.5*  --  16.8*     --   --  237 286  --  565*    < > = values in this interval not displayed.     INR  Recent Labs   Lab Test 05/23/18  0815 03/09/18  2100 03/08/18  2125 03/08/18  1750   INR 1.20* 1.20* 3.66* 1.12     Recent Labs   Lab Test 02/16/24  0708 03/16/23  0823   CHOL 154 145   HDL 38* 39*   LDL 78  86 68  79   TRIG 191* 191*         EKG:  3/2023  NSR w/o Q waves    ECHO:  3/2022  Interpretation Summary  Technically difficult study due to inability to reposition the patient.  Left ventricular size, wall motion and function are normal. The ejection  fraction is 55-60%.  The right ventricle is normal size. Global  right ventricular function is  normal.  No hemodynamically significant valvular abnormalities present.  The inferior vena cava was normal in size with preserved respiratory  variability.    STRESS TEST:    NA    CARDIAC CATH:    CORONARY ANGIOGRAM:   1. Both coronary arteries arise from their respective cusps.  2. Dominance: Right  3. The LM is without angiographic evidence of disease.   4. LAD: Type 3 [LAD supplies the entire apex].. The LAD gives rise to  septal perforators, small caliber D1 and medium caliber D2.  There is  95% stenotic ruptured plaque in the proximal LAD. There is also clot  noted in the distal segment. Myocardial bridge noted in the distal  LAD.    5. LCX gives rise to medium size OM1. There is no angiographic  evidence of disease noted in the circumflex and its branches.  6. RCA gives rise to PL branches and supplies PDA. The RCA and its  branches are without angiographic evidence of disease..    I saw and evaluated patient with CV fellow. I examined patient with CV fellow. I discussed the results with patient and CV fellow. I discussed our plan with patient and CV fellow.  I agree with CV fellow's note and I edited the CV fellow's note to make it a more comprehensive document.    Anoop Jane MD, PhD  Professor of Medicine  Division of Cardiology

## 2024-02-19 NOTE — CONFIDENTIAL NOTE
Cardiology Clinic Return Patient Visit     HPI:  Last seen in clinic on March 2023.      I had the privilege to evaluate and examine Mr Derek Enriquez is a pleasant 63 year old male with a history of cardiac arrest in 2018 related to coronary artery disease with an acute plaque rupture event s/p PCI with MALI (3.0 x 20mm) to the pLAD. He was not seen to have otherwise obstructive CAD. Unfortunately his hospital course at that time involved a spinal subdural hematoma for which he underwent T9-T12 decompressive laminectomy and evacuation with persisted complete paraplegia. He is being seen in clinic for follow up of his cardiac co morbidities.     Since last visit, doing well overall. No immediate concerns or questions today. Has been tolerating his ASA and atorvastatin without adverse drug reactions. Also working on trying to eat a healthier diet with more fruits and vegetables. Exercising as much as he is able to. No chest pain with this.     Patient will undergo  scrotum-ectomy and bilateral orchiectomy March 25 2024.     The 10-year ASCVD risk score (Jose DK, et al., 2019) is: 11.1%    Values used to calculate the score:      Age: 63 years      Sex: Male      Is Non- : No      Diabetic: No      Tobacco smoker: No      Systolic Blood Pressure: 145 mmHg      Is BP treated: No      HDL Cholesterol: 38 mg/dL      Total Cholesterol: 154 mg/dL      ASSESSMENT AND PLAN  Derek Enriquez is a 63 year old male with a history of cardiac arrest in 2018 related to coronary artery disease with an acute plaque rupture event s/p PCI with MALI (3.0 x 20mm) to the pLAD. He was not seen to have otherwise obstructive CAD. Unfortunately his hospital course at that time involved a spinal subdural hematoma for which he underwent T9-T12 decompressive laminectomy and evacuation with persisted complete paraplegia..  Our visit today addressed the following concerns:      # Chronic Coronary Artery Disease  # Mixed  Dyslipidemia  # Hx of cardiac arrest related to NSTEMI of pLAD in 2018  ## s/p PCI to pLAD  Remains doing well in terms of management of underlying chronic coronary artery disease. He reports no anginal symptoms. Currently managing this as secondary prevention with a goal LDL of < 55. His most recent LDL was 86 which is above goal despite ongoing therapy with atorvastatin 20mg daily. He also has residual hypertriglyceridemia. Discussed increasing the dosage of this to help meet this goal though he would prefer to work on ongoing lifestyle modifications first. Otherwise will plan on repeat assessment in one year to monitor progress.   - ASA 81mg daily  - continue Atorvastatin 20mg daily  - echocardiogram  - continue working on lifestyle modification     Thank you for the opportunity to participate in the care of Derek Enriquez. Please feel free to contact me with any additional questions or concerns.     Isaiah Haro MD  Cardiology Fellow     This note was dictated with voice recognition software and then edited. Please excuse any unintentional errors.     PAST MEDICAL HISTORY:      Patient Active Problem List   Diagnosis    Cardiac arrest (H)    Coronary atherosclerosis    Stented coronary artery    Spinal cord compression (H)    Subdural hematoma (H)    Post-operative state    Decubitus ulcer of sacral region, stage 3 (H)    Chronic skin ulcer, limited to breakdown of skin (H)    Pressure ulcer of other site, stage 3 (H)    Heterotopic ossification    Other calcification of muscle, left thigh    Status post hip surgery    Ingrown nail    Open wound, left lateral foot    Abnormal CT scan, heart    Dyslipidemia    Family history of premature CAD    Impotence of organic origin    Injury of thoracic spinal cord (H)    Neurogenic bladder    Neurogenic bowel    Orthostatic hypotension    Paraplegia (H)    Preop examination    Coronary artery disease involving native coronary artery of native heart without angina pectoris     Gender dysphoria         CURRENT MEDICATIONS:  Current Outpatient Prescriptions          Current Outpatient Medications   Medication Sig Dispense Refill    ascorbic acid (VITAMIN C) 500 MG tablet Take 500 mg by mouth every morning         aspirin 81 MG EC tablet Take 81 mg by mouth daily        atorvastatin (LIPITOR) 20 MG tablet Take 1 tablet (20 mg) by mouth At Bedtime 90 tablet 3    Multiple Vitamins-Minerals (MULTIVITAMIN MEN PO) Take 1 tablet by mouth daily         MYRBETRIQ 25 MG 24 hr tablet TK ONE T PO QD        progesterone (PROMETRIUM) 200 MG capsule          vitamin B complex with vitamin C (STRESS TAB) tablet Take 1 tablet by mouth daily        ciprofloxacin (CIPRO) 500 MG tablet Take 1 tablet (500 mg) by mouth daily (Patient not taking: Reported on 8/16/2023) 3 tablet 0    estradiol (VIVELLE-DOT) 0.1 MG/24HR bi-weekly patch  (Patient not taking: Reported on 2/19/2024)        order for DME Equipment being ordered: [facility] Phone [phone] Fax [phone]     Left Lateral Foot Primary Dressing Polymem Cloth Strips (not dots) Qty 30  Coccyx Secondary Dressing  tegaderm adhesive foam dressing 3x3 Qty 30  Perineum Secondary Dressing tegaderm + pad ( ref [id number]) qty 30  Length of Need: 1 month  Frequency of dressing change: daily (Patient not taking: Reported on 12/6/2023) 30 days 0    tadalafil (CIALIS) 20 MG tablet Take 1 tablet (20 mg) PO PRN prior to sexual activity. Max: 1 tablet per 36 hours. (Patient not taking: Reported on 3/16/2023) 5 tablet 3            PAST SURGICAL HISTORY:  Past Surgical History         Past Surgical History:   Procedure Laterality Date    ARTHROTOMY HIP Left 9/19/2018     Procedure: ARTHROTOMY HIP;  Left Hip Heterotopic Bone Resection;  Surgeon: Toñito Kennedy MD;  Location: [facility] OR    ARTHROTOMY HIP Right 11/02/2018    ARTHROTOMY HIP Right 11/2/2018     Procedure: Right Hip Heterotopic Bone Resection;  Surgeon: Toñito eKnnedy MD;  Location:  [facility] OR    CARDIAC SURGERY        LAMINECTOMY THORACIC THREE LEVELS N/A 3/9/2018     Procedure: LAMINECTOMY THORACIC THREE LEVELS;  Thoracic 9-12 Laminectomy Evacuation of Subdural Hematoma, C-Arm, Prone, Gel Rolls.;  Surgeon: Abhijeet Joe MD;  Location: [facility] OR    OPEN REDUCTION INTERNAL FIXATION RODDING INTRAMEDULLARY FEMUR Right 6/25/2018     Procedure: OPEN REDUCTION INTERNAL FIXATION RODDING INTRAMEDULLARY FEMUR;  Right Open Reduction Internal Fixation Subtrochanteric Femur Fracture;  Surgeon: Toñito Kennedy MD;  Location: [facility] OR    wisdom teeth extraction                ALLERGIES  Patient has no known allergies.     FAMILY HX:  Family History         Family History   Problem Relation Age of Onset    Cardiac Sudden Death Father      Arrhythmia Father           v fib arrest     Myocardial Infarction Brother      Other - See Comments Brother           myeloid dysplasia            SOCIAL HX:  Social History            Tobacco Use    Smoking status: Former    Smokeless tobacco: Never    Tobacco comments:       Quit at 31   Substance Use Topics    Alcohol use: Yes       Comment: socially    Drug use: No         VITAL SIGNS:  BP (!) 145/91 (BP Location: Right arm, Patient Position: Chair, Cuff Size: Adult Regular)   Pulse 71   SpO2 97%   There is no height or weight on file to calculate BMI.      Wt Readings from Last 2 Encounters:   12/06/23 78.5 kg (173 lb)   11/15/23 78 kg (172 lb)      BP at the end of the visit: 136/84 mmHg     PHYSICAL EXAM  General: NAD  HEENT: no scleral icterus or injection  CARDIAC: RRR, no murmurs. No JVD  RESP:  CTAB  GI: nondistended  EXTREMITIES: no LE edema  SKIN: No acute lesions appreciated  NEURO: No focal deficits  paraplegia        LABS: personally reviewed  CMP                Recent Labs   Lab Test 02/16/24  0708 03/16/23  0823 03/31/22  0741 03/22/21  0704 03/12/20  0911 03/11/19  1525 11/05/18  0616 03/14/18  0708 03/12/18  0427 03/09/18  2100 03/09/18  0435     140 139 141 139 138 139   < > 139   < > 138   POTASSIUM 3.8 4.2 3.8 3.9 3.9 3.6 4.4   < > 3.6   < > 4.2   CHLORIDE 100 102 104 107 105 106 107   < > 105   < > 109   CO2 26 30* 28 30 30 27 28   < > 25   < > 23   ANIONGAP 12 8 7 4 4 6 4   < > 9   < > 6   GLC 83 93 89 93 87 94 89   < > 107*   < > 118*   BUN 16.9 13.8 11 15 13 17 14   < > 15   < > 21   CR 0.73 0.80 0.65* 0.63* 0.62* 0.58* 0.56*   < > 0.70   < > 0.83   GFRESTIMATED >90 >90 >90 >90 >90 >90 >90   < > >90   < > >90   GFRESTBLACK  --   --   --  >90 >90 >90 >90   < > >90   < > >90   PAT 9.2 9.5 9.1 8.6 8.8 8.6 7.9*   < > 8.2*   < > 8.0*   MAG  --   --   --   --   --   --   --   --  1.9  --  2.1   PHOS  --   --   --   --   --   --   --   --  2.7  --  3.0   PROTTOTAL 7.5 7.1 7.5 6.7* 6.8 7.6 5.6*   < >  --   --  6.1*   ALBUMIN 4.3 4.2 3.9 3.5 3.8 3.7 2.6*   < >  --   --  3.5   BILITOTAL 0.9 0.6 0.9 0.7 0.7 0.3 0.4   < >  --   --  0.7   ALKPHOS 123 111 115 94 97 149 221*   < >  --   --  64   AST 22 23 21 22 18 29 25   < >  --   --  81*   ALT 18 20 31 33 24 27 18   < >  --   --  60    < > = values in this interval not displayed.      CBC            Recent Labs   Lab Test 11/05/18  0616 11/04/18  0809 11/03/18 2020 11/03/18 0651 11/02/18 2123 06/25/18  0648 06/18/18  1357   WBC 4.8  --   --  6.6 7.9  --  5.9   RBC 3.26*  --   --  2.87* 3.33*  --  3.74*   HGB 8.5*  8.5* 8.3* 8.3* 7.5* 8.6*   < > 9.1*   HCT 28.9*  --   --  24.5* 28.4*  --  30.2*   MCV 89  --   --  85 85  --  81   MCH 26.1*  --   --  26.1* 25.8*  --  24.3*   MCHC 29.4*  --   --  30.6* 30.3*  --  30.1*   RDW 16.3*  --   --  16.5* 16.5*  --  16.8*     --   --  237 286  --  565*    < > = values in this interval not displayed.      INR         Recent Labs   Lab Test 05/23/18  0815 03/09/18  2100 03/08/18  2125 03/08/18  1750   INR 1.20* 1.20* 3.66* 1.12           Recent Labs   Lab Test 02/16/24  0708 03/16/23  0823   CHOL 154 145   HDL 38* 39*   LDL 78  86 68  79   TRIG 191* 191*             EKG:  3/2023  NSR w/o Q waves     ECHO:  3/2022  Interpretation Summary  Technically difficult study due to inability to reposition the patient.  Left ventricular size, wall motion and function are normal. The ejection  fraction is 55-60%.  The right ventricle is normal size. Global right ventricular function is  normal.  No hemodynamically significant valvular abnormalities present.  The inferior vena cava was normal in size with preserved respiratory  variability.     STRESS TEST:    NA     CARDIAC CATH:    CORONARY ANGIOGRAM:   1. Both coronary arteries arise from their respective cusps.  2. Dominance: Right  3. The LM is without angiographic evidence of disease.   4. LAD: Type 3 [LAD supplies the entire apex].. The LAD gives rise to  septal perforators, small caliber D1 and medium caliber D2.  There is  95% stenotic ruptured plaque in the proximal LAD. There is also clot  noted in the distal segment. Myocardial bridge noted in the distal  LAD.    5. LCX gives rise to medium size OM1. There is no angiographic  evidence of disease noted in the circumflex and its branches.  6. RCA gives rise to PL branches and supplies PDA. The RCA and its  branches are without angiographic evidence of disease..    Echocardiogram 03-    Left ventricular systolic function is normal.  The visual ejection fraction is 55-60%.  The right ventricular systolic function is normal.  No significant valve dysfunction.  The inferior vena cava was normal in size with preserved respiratory  variability.  There is no pericardial effusion.     No major change from study dated 3/31/2022.  ______________________________________________________________________________  Left Ventricle  The left ventricle is normal in size. There is normal left ventricular wall  thickness. Left ventricular systolic function is normal. The visual ejection  fraction is 55-60%. Left ventricular diastolic function is normal. Normal left  ventricular wall  motion.     Right Ventricle  The right ventricle is normal size. The right ventricular systolic function is  normal.     Atria  Normal left atrial size. Right atrial size is normal.     Mitral Valve  The mitral valve is normal in structure and function.     Tricuspid Valve  The tricuspid valve is normal in structure and function. Right ventricular  systolic pressure could not be approximated due to inadequate tricuspid  regurgitation.     Aortic Valve  The aortic valve is trileaflet with aortic valve sclerosis.     Pulmonic Valve  The pulmonic valve is normal in structure and function.     Vessels  The aortic root is normal size. Normal size ascending aorta. The inferior vena  cava was normal in size with preserved respiratory variability.     Pericardium  There is no pericardial effusion.    Patient can be cleared form cardiovascular point of view bilateral orchidectomy  and scrotum-ectomy      I saw and evaluated patient with CV fellow. I examined patient with CV fellow. I discussed the results with patient and CV fellow. I discussed our plan with patient and CV fellow.  I agree with CV fellow's note and I edited the CV fellow's note to make it a more comprehensive document.       Anoop Jane MD, PhD  Professor of Medicine  Division of Cardiology

## 2024-02-19 NOTE — NURSING NOTE
Chief Complaint   Patient presents with    Follow Up     Follow up     Vitals were taken and medications reconciled.    Elias Choi, EMT  3:20 PM

## 2024-02-19 NOTE — PATIENT INSTRUCTIONS
February 19, 2024    Cardiology Provider You Saw During Your Visit: Dr. Jane      Medication Changes: None      Follow Up:   -Echocardiogram when able  -Follow up with Dr. Jane in 1 year with fasting labs prior to visit      Follow the American Heart Association Diet and Lifestyle Recommendations:  -Limit saturated fat, trans fat, sodium, red meat, sweets and sugar-sweetened beverages. If you choose to eat red meat, compare labels and select the leanest cuts available.  -Aim for at least 150 minutes of moderate physical activity or 75 minutes of vigorous physical activity - or an equal combination of both - each week.      To Reach Us:  -During business hours: [phone], press option # 1 to schedule an appointment or to leave a message for your care team.     -After hours, weekends or holidays: [phone], press option #4 and ask to speak to the on-call cardiologist. Inform them you are a patient at the [facility].        **If you have a cardiac device, please make sure you schedule an in-person device check just prior to your cardiology provider appointments**        Lupe Trejo RN  Cardiology Care Coordinator - General Cardiology  [facility]

## 2024-02-19 NOTE — LETTER
2/19/2024      RE: Derek Enriquez  [address]  [facility] [address]       Dear Colleague,    Thank you for the opportunity to participate in the care of your patient, Derek Enriquez, at the [facility] [place] [facility]. Please see a copy of my visit note below.    Cardiology Clinic Return Patient Visit    HPI:  Last seen in clinic on March 2023.     I had the privilege to evaluate and examine Mr Derek Enriquez is a pleasant 63 year old male with a history of cardiac arrest in 2018 related to coronary artery disease with an acute plaque rupture event s/p PCI with MALI (3.0 x 20mm) to the pLAD. He was not seen to have otherwise obstructive CAD. Unfortunately his hospital course at that time involved a spinal subdural hematoma for which he underwent T9-T12 decompressive laminectomy and evacuation with persisted complete paraplegia. He is being seen in clinic for follow up of his cardiac co morbidities.    Since last visit, doing well overall. No immediate concerns or questions today. Has been tolerating his ASA and atorvastatin without adverse drug reactions. Also working on trying to eat a healthier diet with more fruits and vegetables. Exercising as much as he is able to. No chest pain with this.     The 10-year ASCVD risk score (Jose DK, et al., 2019) is: 11.1%    Values used to calculate the score:      Age: 63 years      Sex: Male      Is Non- : No      Diabetic: No      Tobacco smoker: No      Systolic Blood Pressure: 145 mmHg      Is BP treated: No      HDL Cholesterol: 38 mg/dL      Total Cholesterol: 154 mg/dL     ASSESSMENT AND PLAN  Derek Enriquez is a 63 year old male with a history of cardiac arrest in 2018 related to coronary artery disease with an acute plaque rupture event s/p PCI with MALI (3.0 x 20mm) to the pLAD. He was not seen to have otherwise obstructive CAD. Unfortunately his hospital course at that  time involved a spinal subdural hematoma for which he underwent T9-T12 decompressive laminectomy and evacuation with persisted complete paraplegia..  Our visit today addressed the following concerns:     # Chronic Coronary Artery Disease  # Mixed Dyslipidemia  # Hx of cardiac arrest related to NSTEMI of pLAD in 2018  ## s/p PCI to pLAD  Remains doing well in terms of management of underlying chronic coronary artery disease. He reports no anginal symptoms. Currently managing this as secondary prevention with a goal LDL of < 55. His most recent LDL was 86 which is above goal despite ongoing therapy with atorvastatin 20mg daily. He also has residual hypertriglyceridemia. Discussed increasing the dosage of this to help meet this goal though he would prefer to work on ongoing lifestyle modifications first. Otherwise will plan on repeat assessment in one year to monitor progress.   - ASA 81mg daily  - continue Atorvastatin 20mg daily  - echocardiogram  - continue working on lifestyle modification    Thank you for the opportunity to participate in the care of Derek Enriquez. Please feel free to contact me with any additional questions or concerns.    Isaiah Haro MD  Cardiology Fellow    This note was dictated with voice recognition software and then edited. Please excuse any unintentional errors.    PAST MEDICAL HISTORY:  Patient Active Problem List   Diagnosis     Cardiac arrest (H)     Coronary atherosclerosis     Stented coronary artery     Spinal cord compression (H)     Subdural hematoma (H)     Post-operative state     Decubitus ulcer of sacral region, stage 3 (H)     Chronic skin ulcer, limited to breakdown of skin (H)     Pressure ulcer of other site, stage 3 (H)     Heterotopic ossification     Other calcification of muscle, left thigh     Status post hip surgery     Ingrown nail     Open wound, left lateral foot     Abnormal CT scan, heart     Dyslipidemia     Family history of premature CAD     Impotence of organic  origin     Injury of thoracic spinal cord (H)     Neurogenic bladder     Neurogenic bowel     Orthostatic hypotension     Paraplegia (H)     Preop examination     Coronary artery disease involving native coronary artery of native heart without angina pectoris     Gender dysphoria       CURRENT MEDICATIONS:  Current Outpatient Medications   Medication Sig Dispense Refill     ascorbic acid (VITAMIN C) 500 MG tablet Take 500 mg by mouth every morning        aspirin 81 MG EC tablet Take 81 mg by mouth daily       atorvastatin (LIPITOR) 20 MG tablet Take 1 tablet (20 mg) by mouth At Bedtime 90 tablet 3     Multiple Vitamins-Minerals (MULTIVITAMIN MEN PO) Take 1 tablet by mouth daily        MYRBETRIQ 25 MG 24 hr tablet TK ONE T PO QD       progesterone (PROMETRIUM) 200 MG capsule        vitamin B complex with vitamin C (STRESS TAB) tablet Take 1 tablet by mouth daily       ciprofloxacin (CIPRO) 500 MG tablet Take 1 tablet (500 mg) by mouth daily (Patient not taking: Reported on 8/16/2023) 3 tablet 0     estradiol (VIVELLE-DOT) 0.1 MG/24HR bi-weekly patch  (Patient not taking: Reported on 2/19/2024)       order for DME Equipment being ordered: [employer] Phone [phone] Fax [phone]    Left Lateral Foot Primary Dressing Polymem Cloth Strips (not dots) Qty 30  Coccyx Secondary Dressing  tegaderm adhesive foam dressing 3x3 Qty 30  Perineum Secondary Dressing tegaderm + pad ( ref [id number]) qty 30  Length of Need: 1 month  Frequency of dressing change: daily (Patient not taking: Reported on 12/6/2023) 30 days 0     tadalafil (CIALIS) 20 MG tablet Take 1 tablet (20 mg) PO PRN prior to sexual activity. Max: 1 tablet per 36 hours. (Patient not taking: Reported on 3/16/2023) 5 tablet 3       PAST SURGICAL HISTORY:  Past Surgical History:   Procedure Laterality Date     ARTHROTOMY HIP Left 9/19/2018    Procedure: ARTHROTOMY HIP;  Left Hip Heterotopic Bone Resection;  Surgeon: Toñito Kennedy MD;  Location:  [facility] OR     ARTHROTOMY HIP Right 11/02/2018     ARTHROTOMY HIP Right 11/2/2018    Procedure: Right Hip Heterotopic Bone Resection;  Surgeon: Toñito Kennedy MD;  Location: [facility] OR     CARDIAC SURGERY       LAMINECTOMY THORACIC THREE LEVELS N/A 3/9/2018    Procedure: LAMINECTOMY THORACIC THREE LEVELS;  Thoracic 9-12 Laminectomy Evacuation of Subdural Hematoma, C-Arm, Prone, Gel Rolls.;  Surgeon: Abhijeet oJe MD;  Location: [facility] OR     OPEN REDUCTION INTERNAL FIXATION RODDING INTRAMEDULLARY FEMUR Right 6/25/2018    Procedure: OPEN REDUCTION INTERNAL FIXATION RODDING INTRAMEDULLARY FEMUR;  Right Open Reduction Internal Fixation Subtrochanteric Femur Fracture;  Surgeon: Toñito Kennedy MD;  Location: [facility] OR     wisdom teeth extraction         ALLERGIES  Patient has no known allergies.    FAMILY HX:  Family History   Problem Relation Age of Onset     Cardiac Sudden Death Father      Arrhythmia Father         v fib arrest      Myocardial Infarction Brother      Other - See Comments Brother         myeloid dysplasia       SOCIAL HX:  Social History     Tobacco Use     Smoking status: Former     Smokeless tobacco: Never     Tobacco comments:     Quit at 31   Substance Use Topics     Alcohol use: Yes     Comment: socially     Drug use: No        VITAL SIGNS:  BP (!) 145/91 (BP Location: Right arm, Patient Position: Chair, Cuff Size: Adult Regular)   Pulse 71   SpO2 97%   There is no height or weight on file to calculate BMI.  Wt Readings from Last 2 Encounters:   12/06/23 78.5 kg (173 lb)   11/15/23 78 kg (172 lb)     BP at the end of the visit: 136/84 mmHg    PHYSICAL EXAM  General: NAD  HEENT: no scleral icterus or injection  CARDIAC: RRR, no murmurs. No JVD  RESP:  CTAB  GI: nondistended  EXTREMITIES: no LE edema  SKIN: No acute lesions appreciated  NEURO: No focal deficits  paraplegia      LABS: personally reviewed  CMP  Recent Labs   Lab Test 02/16/24  0708 03/16/23  0823 03/31/22  0741  03/22/21  0704 03/12/20  0911 03/11/19  1525 11/05/18  0616 03/14/18  0708 03/12/18  0427 03/09/18  2100 03/09/18  0435    140 139 141 139 138 139   < > 139   < > 138   POTASSIUM 3.8 4.2 3.8 3.9 3.9 3.6 4.4   < > 3.6   < > 4.2   CHLORIDE 100 102 104 107 105 106 107   < > 105   < > 109   CO2 26 30* 28 30 30 27 28   < > 25   < > 23   ANIONGAP 12 8 7 4 4 6 4   < > 9   < > 6   GLC 83 93 89 93 87 94 89   < > 107*   < > 118*   BUN 16.9 13.8 11 15 13 17 14   < > 15   < > 21   CR 0.73 0.80 0.65* 0.63* 0.62* 0.58* 0.56*   < > 0.70   < > 0.83   GFRESTIMATED >90 >90 >90 >90 >90 >90 >90   < > >90   < > >90   GFRESTBLACK  --   --   --  >90 >90 >90 >90   < > >90   < > >90   PAT 9.2 9.5 9.1 8.6 8.8 8.6 7.9*   < > 8.2*   < > 8.0*   MAG  --   --   --   --   --   --   --   --  1.9  --  2.1   PHOS  --   --   --   --   --   --   --   --  2.7  --  3.0   PROTTOTAL 7.5 7.1 7.5 6.7* 6.8 7.6 5.6*   < >  --   --  6.1*   ALBUMIN 4.3 4.2 3.9 3.5 3.8 3.7 2.6*   < >  --   --  3.5   BILITOTAL 0.9 0.6 0.9 0.7 0.7 0.3 0.4   < >  --   --  0.7   ALKPHOS 123 111 115 94 97 149 221*   < >  --   --  64   AST 22 23 21 22 18 29 25   < >  --   --  81*   ALT 18 20 31 33 24 27 18   < >  --   --  60    < > = values in this interval not displayed.     CBC  Recent Labs   Lab Test 11/05/18  0616 11/04/18  0809 11/03/18  2020 11/03/18  0651 11/02/18 2123 06/25/18  0648 06/18/18  1357   WBC 4.8  --   --  6.6 7.9  --  5.9   RBC 3.26*  --   --  2.87* 3.33*  --  3.74*   HGB 8.5*  8.5* 8.3* 8.3* 7.5* 8.6*   < > 9.1*   HCT 28.9*  --   --  24.5* 28.4*  --  30.2*   MCV 89  --   --  85 85  --  81   MCH 26.1*  --   --  26.1* 25.8*  --  24.3*   MCHC 29.4*  --   --  30.6* 30.3*  --  30.1*   RDW 16.3*  --   --  16.5* 16.5*  --  16.8*     --   --  237 286  --  565*    < > = values in this interval not displayed.     INR  Recent Labs   Lab Test 05/23/18  0815 03/09/18  2100 03/08/18 2125 03/08/18  1750   INR 1.20* 1.20* 3.66* 1.12     Recent Labs   Lab Test  02/16/24  0708 03/16/23  0823   CHOL 154 145   HDL 38* 39*   LDL 78  86 68  79   TRIG 191* 191*         EKG:  3/2023  NSR w/o Q waves    ECHO:  3/2022  Interpretation Summary  Technically difficult study due to inability to reposition the patient.  Left ventricular size, wall motion and function are normal. The ejection  fraction is 55-60%.  The right ventricle is normal size. Global right ventricular function is  normal.  No hemodynamically significant valvular abnormalities present.  The inferior vena cava was normal in size with preserved respiratory  variability.    STRESS TEST:    NA    CARDIAC CATH:    CORONARY ANGIOGRAM:   1. Both coronary arteries arise from their respective cusps.  2. Dominance: Right  3. The LM is without angiographic evidence of disease.   4. LAD: Type 3 [LAD supplies the entire apex].. The LAD gives rise to  septal perforators, small caliber D1 and medium caliber D2.  There is  95% stenotic ruptured plaque in the proximal LAD. There is also clot  noted in the distal segment. Myocardial bridge noted in the distal  LAD.    5. LCX gives rise to medium size OM1. There is no angiographic  evidence of disease noted in the circumflex and its branches.  6. RCA gives rise to PL branches and supplies PDA. The RCA and its  branches are without angiographic evidence of disease..    I saw and evaluated patient with CV fellow. I examined patient with CV fellow. I discussed the results with patient and CV fellow. I discussed our plan with patient and CV fellow.  I agree with CV fellow's note and I edited the CV fellow's note to make it a more comprehensive document.    Anoop Jane MD, PhD  Professor of Medicine  Division of Cardiology

## 2024-02-20 ENCOUNTER — TELEPHONE (OUTPATIENT)
Dept: CARDIOLOGY | Facility: CLINIC | Age: 64
End: 2024-02-20
Payer: COMMERCIAL

## 2024-03-01 ENCOUNTER — PRE VISIT (OUTPATIENT)
Dept: UROLOGY | Facility: CLINIC | Age: 64
End: 2024-03-01
Payer: COMMERCIAL

## 2024-03-01 NOTE — TELEPHONE ENCOUNTER
Reason for visit: follow up -pt has possible questions regarding srug      Dx/Hx/Sx: gender dysphoria     Records/imaging/labs/orders: in epic     At Rooming: virtual visit

## 2024-03-06 ENCOUNTER — VIRTUAL VISIT (OUTPATIENT)
Dept: UROLOGY | Facility: CLINIC | Age: 64
End: 2024-03-06
Payer: COMMERCIAL

## 2024-03-06 VITALS — HEIGHT: 71 IN | WEIGHT: 175 LBS | BODY MASS INDEX: 24.5 KG/M2

## 2024-03-06 DIAGNOSIS — F64.9 GENDER DYSPHORIA: Primary | ICD-10-CM

## 2024-03-06 PROCEDURE — 99213 OFFICE O/P EST LOW 20 MIN: CPT | Mod: 95 | Performed by: UROLOGY

## 2024-03-06 ASSESSMENT — PAIN SCALES - GENERAL: PAINLEVEL: NO PAIN (0)

## 2024-03-06 NOTE — PATIENT INSTRUCTIONS
Subjective   Shyla Mitchell is a 75 y.o. female.     Chief Complaint   Patient presents with    Medicare Wellness-subsequent    Hyperlipidemia         History of Present Illness  In for recheck of chronic arthritis and low back pain.  Mainly right shoulder.  Takes meloxicam 7.5 mg daily.  Blood pressure has been good and she has never required treatment.  That was meloxicam induced and we cut the dose down with success.  Hyperlipidemia  This is a chronic problem. Associated symptoms include shortness of breath. Pertinent negatives include no chest pain.   Back Pain  This is a chronic problem. The current episode started more than 1 year ago. Pertinent negatives include no abdominal pain, chest pain or headaches.        The following portions of the patient's history were reviewed and updated as appropriate: allergies, current medications, past social history and problem list.    Outpatient Medications Marked as Taking for the 3/6/24 encounter (Office Visit) with Perfecto Fine MD   Medication Sig Dispense Refill    Calcium Carb-Cholecalciferol (CALCIUM + D3) 600-200 MG-UNIT tablet Take 1 tablet by mouth Daily.      esomeprazole (nexIUM) 20 MG capsule Take 1 capsule by mouth Every Morning Before Breakfast.      meloxicam (MOBIC) 7.5 MG tablet Take 1 tablet by mouth Daily. 90 tablet 3    vitamin C (ASCORBIC ACID) 250 MG tablet Take 1 tablet by mouth Daily.      [DISCONTINUED] Prenatal Vit-Fe Fumarate-FA (PNV Prenatal Plus Multivitamin) 27-1 MG tablet Take 1 tablet by mouth Daily. 90 tablet 3       Review of Systems   Respiratory:  Positive for shortness of breath. Negative for cough and wheezing.    Cardiovascular:  Positive for palpitations. Negative for chest pain and leg swelling.   Gastrointestinal:  Negative for abdominal pain, constipation and diarrhea.   Musculoskeletal:  Positive for arthralgias ( right shoulder) and back pain.   Neurological:  Negative for headaches.       Objective   Vitals:     "AFTER YOUR CYSTOSCOPY        You have just completed a cystoscopy, or \"cysto\", which allowed your physician to learn more about your bladder (or to remove a stent placed after surgery). We suggest that you continue to avoid caffeine, fruit juice, and alcohol for the next 24 hours, however, you are encouraged to return to your normal activities.         A few things that are considered normal after your cystoscopy:     * Small amount of bleeding (or spotting) that clears within the next 24 hours     * Slight burning sensation with urination     * Sensation to of needing to avoid more frequently     * The feeling of \"air\" in your urine     * Mild discomfort that is relieved with Tylenol        Please contact our office promptly if you:     * Develop a fever above 101 degrees     * Are unable to urinate     * Develop bright red blood that does not stop     * Severe pain or swelling         Please contact our office with any concerns or questions @[email or web address].  " 03/06/24 0750   BP: 130/80   Pulse: 72   Resp: 16   Temp: 98 °F (36.7 °C)   SpO2: 99%      Wt Readings from Last 3 Encounters:   03/06/24 74.8 kg (165 lb)   03/02/23 77.1 kg (170 lb)   02/27/23 77.1 kg (170 lb)    Body mass index is 26.63 kg/m².      Physical Exam   Constitutional: She appears well-developed.   Cardiovascular: Normal rate, regular rhythm and normal heart sounds. Exam reveals no gallop.   No murmur heard.  Pulmonary/Chest: Effort normal and breath sounds normal. No respiratory distress. She has no wheezes. She has no rales.   Abdominal: Normal appearance.   Neurological: She is alert.     Lipid Panel With / Chol / HDL Ratio (02/21/2022 08:20)  Comprehensive Metabolic Panel (02/21/2022 08:20)  CBC & Differential (02/21/2022 08:20)      ECG 12 Lead    Date/Time: 3/6/2024 9:33 AM  Performed by: Perfecto Fine MD    Authorized by: Perfecto Fine MD  Comparison: compared with previous ECG from 3/7/2017  Similar to previous ECG  Rhythm: sinus rhythm  Rate: normal  Conduction: conduction normal  ST Segments: ST segments normal  T Waves: T waves normal  QRS axis: normal  Other: no other findings    Clinical impression: normal ECG  Comments: Normal sinus rhythm.  Heart rate is 73.  This is a normal EKG.  There is a change in the axis with downward deflection of aVF now they could be lead placement.  There is a change in the P wave axis noted in leads V1 and V2.  Otherwise no change from the prior EKG dated 3/7/2017.              Problems Addressed this Visit    None  Visit Diagnoses       Encounter for annual wellness exam in Medicare patient    -  Primary          Diagnoses         Codes Comments    Encounter for annual wellness exam in Medicare patient    -  Primary ICD-10-CM: Z00.00  ICD-9-CM: V70.0           Assessment & Plan   In for 12-month recheck of hyperlipidemia, osteoarthritis, DDD lumbar spine and annual wellness visit. Osteoarthritis is fairly diffuse.  Mainly hands.  Also knees and lumbar  spine.  She takes chondroitin sulfate glucosamine along with meloxicam daily.  Advised that this is fine.  She is now on 7.5 mg of meloxicam for arthritis and that is reviewed today.  However blood pressure is now normalized.  Annual lab work done today March 2024 including CBC, CMP, lipids, UA.  She has some mild dyspnea on exertion.  Just a stuffy feeling in her chest.  It seems to only occur with exertion.  However she takes an hour walk every day and the only occurs when she is done with her walking and when she gets home.  That is quite a bit of exertion to cause the symptoms.  She might also get this if she walks up 2 flights of stairs.  No changes made.  Will plan to check a CT coronary calcium score and make a decision as to whether to proceed with a TMET.    The above information was reviewed again today 03/06/24.  It continues to be accurate as reflected above and is unchanged.  History, physical and review of systems all reviewed and are unchanged.  Medications were reviewed today and continue the current dosing.           Edison disclaimer:   Much of this encounter note is an electronic transcription/translation of spoken language to printed text. The electronic translation of spoken language may permit erroneous, or at times, nonsensical words or phrases to be inadvertently transcribed; Although I have reviewed the note for such errors, some may still exist.

## 2024-03-06 NOTE — PROGRESS NOTES
"Virtual Visit Details    Type of service:  Video Visit     Originating Location (pt. Location): Home    Distant Location (provider location):  Off-site  Platform used for Video Visit: Trina   1:45p-1:55p    HPI:  Derek Enriquez is a 63 year old male with gender dysphoria  Patient also has neurogenic bladder and gets bladder botox injections in clinic.  Patient is scheduled for orchiectomy and scrotectomy later this month.  He has hx of MI with MALI placement. Continues to take asa 81 daily.    Exam:  Ht 1.803 m (5' 11\")   Wt 79.4 kg (175 lb)   BMI 24.41 kg/m    Exam:           Constitutional - No apparent distress. Patient of stated age.               Eyes - no redness or discharge.              Respiratory - no cough. no labored breathing              Musculoskeletal - full range of motion in all extremities              Skin - no visible skin discoloration or lesions              Neurological - no tremors              Psychiatric - no anxiety, alert & oriented                Assessment & Plan   Gender Dysphoria  We again reviewed orchiectomy and scrotectomy.  He is approved for surgery.  We discussed operative procedure, closure techniques.  Discussed short period (3 days) of asa81 cessation perioperatively.  Discussed ok to stop estradiol periprocedure.    Cody Stroud MD  Reconstructive Urology  [facility]      "

## 2024-03-06 NOTE — NURSING NOTE
"  Is the patient currently in the state of [place]? YES    Visit mode:VIDEO    If the visit is dropped, the patient can be reconnected by: VIDEO VISIT: Text to cell phone:   Telephone Information:   Mobile [phone]       Will anyone else be joining the visit? NO  (If patient encounters technical issues they should call [phone])    How would you like to obtain your AVS? MyChart    Are changes needed to the allergy or medication list? No    Reason for visit: RECHECK (Patient said, \" Pre-op paper work faxed over, seen Cardiologist 3 weeks ago and the blood pressure was high but at the pre-op it was down and fine, March 19th has an Echocardiogram, in December had a Colonoscopy twice in 1 day and had no problem with anesthesia.\")    Alma Griffin VVF        "

## 2024-03-11 ENCOUNTER — TELEPHONE (OUTPATIENT)
Dept: PLASTIC SURGERY | Facility: CLINIC | Age: 64
End: 2024-03-11
Payer: COMMERCIAL

## 2024-03-11 NOTE — TELEPHONE ENCOUNTER
Called pt to cancel today's pre-op teaching as nurse Slater is out sick. Pt would like a call back to re-schedule.

## 2024-03-13 ENCOUNTER — TELEPHONE (OUTPATIENT)
Dept: PLASTIC SURGERY | Facility: CLINIC | Age: 64
End: 2024-03-13
Payer: COMMERCIAL

## 2024-03-13 NOTE — TELEPHONE ENCOUNTER
Pre and Post Op Patient Education                                       Diagnosis: gender dysphoria  Teaching pre and post op for scrotectomy & orchiectomy  Person involved in teaching: patient    Patient demonstrates an understanding of the following:  - Date of surgery:  3/25/24 - Monday  - Surgery time: 8:55 am (pt understands this time may change)  - [facility] [place] - [address] [place], [place] [id number]     - Pre-operative history physical: [facility] 3/4/34 - approval for surgery pending cardiology approval. Pt has echo on 3/19/24 and will have cardiologist provide clearance to have surgery if indicated.      - Post-op follow-up:   4/11/23 at 9:30 am (video visit) with Brooke Car NP      Patient verbalizes an understanding of the following:  - The need for a responsible adult  and someone to stay with them for the first 24 hours post-operatively: Yes, explained the need to have someone drive pt home and stay with them 24 hours after surgery. Explained that this is a strict requirement and that the nurses on the day of surgery will confirm that an adult will be traveling home with them and staying with them Pt says they have had surgeries before and have had [employer] lined up to take them home afterward. Pt will discuss with pre-op nurse next week.     - NPO per anesthesia guidelines: Yes  - Pre-op showering x2 with Hibiclens/chlorhexadine soap: Yes      Discussed   - Pain management after surgery  - Infection prevention and hand hygiene  - Surgical procedure site care taught  - Signs and symptoms of infection  - Wound care and will be taught at the time of discharge  - Information about how to contact the hospital, nurse, and clinic if needed     Surgical instructions given to patient via phone.    Total time with patient: 15 minutes    Bryon Hull RN

## 2024-03-19 ENCOUNTER — HOSPITAL ENCOUNTER (OUTPATIENT)
Dept: CARDIOLOGY | Facility: CLINIC | Age: 64
Discharge: HOME | End: 2024-03-19
Attending: INTERNAL MEDICINE | Admitting: INTERNAL MEDICINE
Payer: COMMERCIAL

## 2024-03-19 DIAGNOSIS — I25.10 ATHEROSCLEROSIS OF NATIVE CORONARY ARTERY OF NATIVE HEART WITHOUT ANGINA PECTORIS: ICD-10-CM

## 2024-03-19 LAB — LVEF ECHO: NORMAL

## 2024-03-19 PROCEDURE — 999N000208 ECHOCARDIOGRAM COMPLETE

## 2024-03-19 PROCEDURE — C8929 TTE W OR WO FOL WCON,DOPPLER: HCPCS

## 2024-03-19 PROCEDURE — 93306 TTE W/DOPPLER COMPLETE: CPT | Mod: 26 | Performed by: INTERNAL MEDICINE

## 2024-03-19 PROCEDURE — 255N000002 HC RX 255 OP 636: Performed by: INTERNAL MEDICINE

## 2024-03-19 RX ADMIN — HUMAN ALBUMIN MICROSPHERES AND PERFLUTREN 9 ML: 10; .22 INJECTION, SOLUTION INTRAVENOUS at 15:55

## 2024-03-20 ENCOUNTER — TELEPHONE (OUTPATIENT)
Dept: CARDIOLOGY | Facility: CLINIC | Age: 64
End: 2024-03-20
Payer: COMMERCIAL

## 2024-03-20 ENCOUNTER — MYC MEDICAL ADVICE (OUTPATIENT)
Dept: PLASTIC SURGERY | Facility: CLINIC | Age: 64
End: 2024-03-20
Payer: COMMERCIAL

## 2024-03-20 NOTE — TELEPHONE ENCOUNTER
Health Call Center    Phone Message    May a detailed message be left on voicemail: yes     Reason for Call: Other: patient is calling stating that he needs dr gant to read the echo that the patient completed on 3/19/2024, please advise, thank you.     Action Taken: Other: cardiology    Travel Screening: Not Applicable  Thank you!  [facility]

## 2024-03-21 ENCOUNTER — ANESTHESIA EVENT (OUTPATIENT)
Dept: SURGERY | Facility: AMBULATORY SURGERY CENTER | Age: 64
End: 2024-03-21
Payer: COMMERCIAL

## 2024-03-21 NOTE — TELEPHONE ENCOUNTER
Spoke to Derek regarding if we received a fax from his PCP with an update on the echocardiogram. Derek is frustrated as the Echo wasn't required by cardiologist but was ordered by his PCP and thus his cardiologist wasn't going to look at it. His PCP has and has faxed his note to Urology fax number today.   I'm the third person he's talked to about this. RN will follow up to see if Urology did get Fax today.  Fabiola Zarate RN on 3/21/2024 at 2:53 PM

## 2024-03-22 NOTE — TELEPHONE ENCOUNTER
Called pt per request. Pt stated that the nurse who made the pre-op call on 3/19 said she didn't think pt could take [employer] ride after surgery. Called [facility] OR control desk. They confirmed pt can take [employer] home after surgery but that pt needs responsible adult to be there to stay with pt for 24 hours after surgery. Relayed this to pt, who said that this is already set up. Explained that the nurses on morning of surgery will call this person to confirm that they will be with pt at home and for 24 hours after surgery. Pt verbalized an understanding. Pt will set up [employer] ride for 12:00 pm on Monday.

## 2024-03-25 ENCOUNTER — HOSPITAL ENCOUNTER (OUTPATIENT)
Facility: AMBULATORY SURGERY CENTER | Age: 64
Discharge: HOME | End: 2024-03-25
Attending: UROLOGY
Payer: COMMERCIAL

## 2024-03-25 ENCOUNTER — PRE VISIT (OUTPATIENT)
Dept: UROLOGY | Facility: CLINIC | Age: 64
End: 2024-03-25
Payer: COMMERCIAL

## 2024-03-25 ENCOUNTER — ANESTHESIA (OUTPATIENT)
Dept: SURGERY | Facility: AMBULATORY SURGERY CENTER | Age: 64
End: 2024-03-25
Payer: COMMERCIAL

## 2024-03-25 VITALS
DIASTOLIC BLOOD PRESSURE: 81 MMHG | HEIGHT: 71 IN | WEIGHT: 175 LBS | RESPIRATION RATE: 14 BRPM | BODY MASS INDEX: 24.5 KG/M2 | HEART RATE: 67 BPM | SYSTOLIC BLOOD PRESSURE: 132 MMHG | OXYGEN SATURATION: 98 % | TEMPERATURE: 97.1 F

## 2024-03-25 DIAGNOSIS — N31.9 NEUROGENIC BLADDER: Primary | ICD-10-CM

## 2024-03-25 PROCEDURE — 55150 REMOVAL OF SCROTUM: CPT | Mod: KX | Performed by: UROLOGY

## 2024-03-25 PROCEDURE — 88302 TISSUE EXAM BY PATHOLOGIST: CPT | Mod: TC | Performed by: UROLOGY

## 2024-03-25 PROCEDURE — 55150 REMOVAL OF SCROTUM: CPT

## 2024-03-25 PROCEDURE — 88302 TISSUE EXAM BY PATHOLOGIST: CPT | Mod: 26 | Performed by: PATHOLOGY

## 2024-03-25 PROCEDURE — 54520 REMOVAL OF TESTIS: CPT | Mod: 50 | Performed by: UROLOGY

## 2024-03-25 PROCEDURE — 55150 REMOVAL OF SCROTUM: CPT | Performed by: ANESTHESIOLOGY

## 2024-03-25 PROCEDURE — 54520 REMOVAL OF TESTIS: CPT | Mod: 50

## 2024-03-25 RX ORDER — CEFAZOLIN SODIUM 2 G/50ML
2 SOLUTION INTRAVENOUS SEE ADMIN INSTRUCTIONS
Status: DISCONTINUED | OUTPATIENT
Start: 2024-03-25 | End: 2024-03-26 | Stop reason: HOSPADM

## 2024-03-25 RX ORDER — HYDROMORPHONE HYDROCHLORIDE 1 MG/ML
0.2 INJECTION, SOLUTION INTRAMUSCULAR; INTRAVENOUS; SUBCUTANEOUS EVERY 5 MIN PRN
Status: DISCONTINUED | OUTPATIENT
Start: 2024-03-25 | End: 2024-03-26 | Stop reason: HOSPADM

## 2024-03-25 RX ORDER — EPHEDRINE SULFATE 50 MG/ML
INJECTION, SOLUTION INTRAMUSCULAR; INTRAVENOUS; SUBCUTANEOUS PRN
Status: DISCONTINUED | OUTPATIENT
Start: 2024-03-25 | End: 2024-03-25

## 2024-03-25 RX ORDER — LIDOCAINE 40 MG/G
CREAM TOPICAL
Status: DISCONTINUED | OUTPATIENT
Start: 2024-03-25 | End: 2024-03-26 | Stop reason: HOSPADM

## 2024-03-25 RX ORDER — ONDANSETRON 4 MG/1
4 TABLET, ORALLY DISINTEGRATING ORAL EVERY 30 MIN PRN
Status: DISCONTINUED | OUTPATIENT
Start: 2024-03-25 | End: 2024-03-26 | Stop reason: HOSPADM

## 2024-03-25 RX ORDER — MAGNESIUM HYDROXIDE 1200 MG/15ML
LIQUID ORAL PRN
Status: DISCONTINUED | OUTPATIENT
Start: 2024-03-25 | End: 2024-03-25 | Stop reason: HOSPADM

## 2024-03-25 RX ORDER — OXYCODONE HYDROCHLORIDE 5 MG/1
5 TABLET ORAL
Status: DISCONTINUED | OUTPATIENT
Start: 2024-03-25 | End: 2024-03-26 | Stop reason: HOSPADM

## 2024-03-25 RX ORDER — SODIUM CHLORIDE, SODIUM LACTATE, POTASSIUM CHLORIDE, CALCIUM CHLORIDE 600; 310; 30; 20 MG/100ML; MG/100ML; MG/100ML; MG/100ML
INJECTION, SOLUTION INTRAVENOUS CONTINUOUS
Status: DISCONTINUED | OUTPATIENT
Start: 2024-03-25 | End: 2024-03-26 | Stop reason: HOSPADM

## 2024-03-25 RX ORDER — FENTANYL CITRATE 50 UG/ML
50 INJECTION, SOLUTION INTRAMUSCULAR; INTRAVENOUS EVERY 5 MIN PRN
Status: DISCONTINUED | OUTPATIENT
Start: 2024-03-25 | End: 2024-03-26 | Stop reason: HOSPADM

## 2024-03-25 RX ORDER — FENTANYL CITRATE 50 UG/ML
INJECTION, SOLUTION INTRAMUSCULAR; INTRAVENOUS PRN
Status: DISCONTINUED | OUTPATIENT
Start: 2024-03-25 | End: 2024-03-25

## 2024-03-25 RX ORDER — PROPOFOL 10 MG/ML
INJECTION, EMULSION INTRAVENOUS CONTINUOUS PRN
Status: DISCONTINUED | OUTPATIENT
Start: 2024-03-25 | End: 2024-03-25

## 2024-03-25 RX ORDER — ONDANSETRON 2 MG/ML
INJECTION INTRAMUSCULAR; INTRAVENOUS PRN
Status: DISCONTINUED | OUTPATIENT
Start: 2024-03-25 | End: 2024-03-25

## 2024-03-25 RX ORDER — BUPIVACAINE HYDROCHLORIDE 2.5 MG/ML
INJECTION, SOLUTION INFILTRATION; PERINEURAL PRN
Status: DISCONTINUED | OUTPATIENT
Start: 2024-03-25 | End: 2024-03-25 | Stop reason: HOSPADM

## 2024-03-25 RX ORDER — HYDROMORPHONE HYDROCHLORIDE 1 MG/ML
0.4 INJECTION, SOLUTION INTRAMUSCULAR; INTRAVENOUS; SUBCUTANEOUS EVERY 5 MIN PRN
Status: DISCONTINUED | OUTPATIENT
Start: 2024-03-25 | End: 2024-03-26 | Stop reason: HOSPADM

## 2024-03-25 RX ORDER — OXYCODONE HYDROCHLORIDE 5 MG/1
10 TABLET ORAL
Status: DISCONTINUED | OUTPATIENT
Start: 2024-03-25 | End: 2024-03-26 | Stop reason: HOSPADM

## 2024-03-25 RX ORDER — PROPOFOL 10 MG/ML
INJECTION, EMULSION INTRAVENOUS PRN
Status: DISCONTINUED | OUTPATIENT
Start: 2024-03-25 | End: 2024-03-25

## 2024-03-25 RX ORDER — ACETAMINOPHEN 325 MG/1
975 TABLET ORAL ONCE
Status: COMPLETED | OUTPATIENT
Start: 2024-03-25 | End: 2024-03-25

## 2024-03-25 RX ORDER — NALOXONE HYDROCHLORIDE 0.4 MG/ML
0.1 INJECTION, SOLUTION INTRAMUSCULAR; INTRAVENOUS; SUBCUTANEOUS
Status: DISCONTINUED | OUTPATIENT
Start: 2024-03-25 | End: 2024-03-26 | Stop reason: HOSPADM

## 2024-03-25 RX ORDER — DEXAMETHASONE SODIUM PHOSPHATE 4 MG/ML
INJECTION, SOLUTION INTRA-ARTICULAR; INTRALESIONAL; INTRAMUSCULAR; INTRAVENOUS; SOFT TISSUE PRN
Status: DISCONTINUED | OUTPATIENT
Start: 2024-03-25 | End: 2024-03-25

## 2024-03-25 RX ORDER — ONDANSETRON 2 MG/ML
4 INJECTION INTRAMUSCULAR; INTRAVENOUS EVERY 30 MIN PRN
Status: DISCONTINUED | OUTPATIENT
Start: 2024-03-25 | End: 2024-03-26 | Stop reason: HOSPADM

## 2024-03-25 RX ORDER — CEFAZOLIN SODIUM 2 G/50ML
2 SOLUTION INTRAVENOUS
Status: COMPLETED | OUTPATIENT
Start: 2024-03-25 | End: 2024-03-25

## 2024-03-25 RX ORDER — LIDOCAINE HYDROCHLORIDE 20 MG/ML
INJECTION, SOLUTION INFILTRATION; PERINEURAL PRN
Status: DISCONTINUED | OUTPATIENT
Start: 2024-03-25 | End: 2024-03-25

## 2024-03-25 RX ORDER — FENTANYL CITRATE 50 UG/ML
25 INJECTION, SOLUTION INTRAMUSCULAR; INTRAVENOUS EVERY 5 MIN PRN
Status: DISCONTINUED | OUTPATIENT
Start: 2024-03-25 | End: 2024-03-26 | Stop reason: HOSPADM

## 2024-03-25 RX ORDER — OXYCODONE HYDROCHLORIDE 5 MG/1
5 TABLET ORAL EVERY 6 HOURS PRN
Qty: 5 TABLET | Refills: 0 | Status: SHIPPED | OUTPATIENT
Start: 2024-03-25 | End: 2024-03-28

## 2024-03-25 RX ADMIN — ONDANSETRON 4 MG: 2 INJECTION INTRAMUSCULAR; INTRAVENOUS at 09:27

## 2024-03-25 RX ADMIN — SODIUM CHLORIDE, SODIUM LACTATE, POTASSIUM CHLORIDE, CALCIUM CHLORIDE: 600; 310; 30; 20 INJECTION, SOLUTION INTRAVENOUS at 07:40

## 2024-03-25 RX ADMIN — EPHEDRINE SULFATE 5 MG: 50 INJECTION, SOLUTION INTRAMUSCULAR; INTRAVENOUS; SUBCUTANEOUS at 09:41

## 2024-03-25 RX ADMIN — EPHEDRINE SULFATE 5 MG: 50 INJECTION, SOLUTION INTRAMUSCULAR; INTRAVENOUS; SUBCUTANEOUS at 09:36

## 2024-03-25 RX ADMIN — EPHEDRINE SULFATE 5 MG: 50 INJECTION, SOLUTION INTRAMUSCULAR; INTRAVENOUS; SUBCUTANEOUS at 09:15

## 2024-03-25 RX ADMIN — FENTANYL CITRATE 50 MCG: 50 INJECTION, SOLUTION INTRAMUSCULAR; INTRAVENOUS at 09:09

## 2024-03-25 RX ADMIN — PROPOFOL 80 MCG/KG/MIN: 10 INJECTION, EMULSION INTRAVENOUS at 10:00

## 2024-03-25 RX ADMIN — EPHEDRINE SULFATE 5 MG: 50 INJECTION, SOLUTION INTRAMUSCULAR; INTRAVENOUS; SUBCUTANEOUS at 09:18

## 2024-03-25 RX ADMIN — EPHEDRINE SULFATE 5 MG: 50 INJECTION, SOLUTION INTRAMUSCULAR; INTRAVENOUS; SUBCUTANEOUS at 09:37

## 2024-03-25 RX ADMIN — LIDOCAINE HYDROCHLORIDE 100 MG: 20 INJECTION, SOLUTION INFILTRATION; PERINEURAL at 09:09

## 2024-03-25 RX ADMIN — DEXAMETHASONE SODIUM PHOSPHATE 4 MG: 4 INJECTION, SOLUTION INTRA-ARTICULAR; INTRALESIONAL; INTRAMUSCULAR; INTRAVENOUS; SOFT TISSUE at 09:26

## 2024-03-25 RX ADMIN — PROPOFOL 50 MG: 10 INJECTION, EMULSION INTRAVENOUS at 09:10

## 2024-03-25 RX ADMIN — CEFAZOLIN SODIUM 2 G: 2 SOLUTION INTRAVENOUS at 09:00

## 2024-03-25 RX ADMIN — PROPOFOL 150 MCG/KG/MIN: 10 INJECTION, EMULSION INTRAVENOUS at 09:09

## 2024-03-25 RX ADMIN — PROPOFOL 150 MG: 10 INJECTION, EMULSION INTRAVENOUS at 09:09

## 2024-03-25 NOTE — ANESTHESIA PROCEDURE NOTES
Airway       Patient location during procedure: OR  Staff -        CRNA: Joselin Petty APRN CRNA       Performed By: CRNAIndications and Patient Condition       Indications for airway management: sugar-procedural       Induction type:intravenous       Mask difficulty assessment: 1 - vent by mask    Final Airway Details       Final airway type: supraglottic airway    Supraglottic Airway Details        Type: LMA       Brand: I-Gel       LMA size: 5    Post intubation assessment        Placement verified by: capnometry, equal breath sounds and chest rise        Number of attempts at approach: 1       Secured with: tape       Ease of procedure: easy       Dentition: Intact and Unchanged

## 2024-03-25 NOTE — ANESTHESIA POSTPROCEDURE EVALUATION
Patient: Derek Enriquez    Procedure: Procedure(s):  Scrotectomy with Bilateral Orchiectomy       Anesthesia Type:  General    Note:  Disposition: Outpatient   Postop Pain Control: Uneventful            Sign Out: Well controlled pain   PONV: No   Neuro/Psych: Uneventful            Sign Out: Acceptable/Baseline neuro status   Airway/Respiratory: Uneventful            Sign Out: Acceptable/Baseline resp. status   CV/Hemodynamics: Uneventful            Sign Out: Acceptable CV status; No obvious hypovolemia; No obvious fluid overload   Other NRE: NONE   DID A NON-ROUTINE EVENT OCCUR? No           Last vitals:  Vitals Value Taken Time   /80 03/25/24 1049   Temp 36.2  C (97.1  F) 03/25/24 1049   Pulse 63 03/25/24 1049   Resp 16 03/25/24 1049   SpO2 97 % 03/25/24 1049   Vitals shown include unfiled device data.    Electronically Signed By: Prabhakar Landa MD  March 25, 2024  1:16 PM

## 2024-03-25 NOTE — DISCHARGE INSTRUCTIONS
Activity  - No strenuous exercise for 2 weeks.  - No lifting, pushing, pulling more than 10 pounds for 2 weeks.   - Do not strain with bowel movements.  - Do not drive until you can press the brake pedal quickly and fully without pain.   - Do not operate a motor vehicle while taking narcotic pain medications.     Diet  You may resume your regular post-procedure diet  Many patients do not regain their full appetite for several weeks after surgery  Take it slow, eating small meals frequently  If you notice you are passing less gas or feeling bloated, stop eating solid foods and stick to liquids for the next several hours until you begin to pass gas again.  You are not required to have a bowel movement prior to leaving the hospital. Some patients take several days for bowel movements to return due to anesthesia and pain medications.     Incisions  - You may shower and get incisions wet starting 48 hrs after surgery.  - Do not scrub incisions or submerge wounds for 2 weeks or until seen in follow-up.   - The wound dressing will come off over the next few weeks  - The stitches do not need to be removed, they will dissolve on their own.    Medications  - Transition from narcotic pain medications to tylenol (acetaminophen) as you are able.  Wean yourself off all pain medications as you are able.  - Some pain medications contain both tylenol (acetaminophen) and a narcotic (Norco, vicodin, percocet), do not take more than 4,000mg of Tylenol (acetaminophen) from all sources in any 24 hour period.  - Narcotics can make you constipated.  Take over the counter fiber (metamucil or benefiber) and stool softeners (miralax, docusate or senna) while taking narcotic pain medications, but stop if you develop diarrhea.  - No driving or operating machinery while taking narcotic pain medications     Follow-Up:  - Follow up with Dr. Stroud as scheduled for your Botox and post-op check on 04/10  - Call or return sooner than your regularly  "scheduled visit if you develop any of the following: fever (greater than 101.5), uncontrolled pain, uncontrolled nausea or vomiting, or inability to urinate.    Phone numbers:   - Monday through Friday 8am to 4:30pm: Call [phone] with questions, requests for medication refills, or to schedule or confirm an appointment.  - Nights or weekends: call the after hours emergency pager - [phone] and tell the  \"I would like to page the Urology Resident on call.\" Please note, due to prescribing laws, resident physicians are unable to prescribe narcotics after-hours. If you feel as though you will need a refill of a narcotic pain medication, you will need to call the clinic during business hours OR seek emergency care.  - For emergencies, call 911      [facility]  Home Care Following Anesthesia  For 24 hours after surgery:  Get plenty of rest.  A responsible adult must stay with you for at least 24 hours after you leave the surgery center.  Do not drive or use heavy equipment.  If you have weakness or tingling, don't drive or use heavy equipment until this feeling goes away.   Do not drink alcohol.   Avoid strenuous or risky activities.  Ask for help when climbing stairs.  You may feel lightheaded.  IF so, sit for a few minutes before standing.  Have someone help you get up.   If you have nausea (feel sick to your stomach): Drink only clear liquids such as apple juice, ginger ale, broth or 7-Up.  Rest may also help.  Be sure to drink enough fluids.  Move to a regular diet as you feel able.   You may have a slight fever.  Call the doctor if your fever is over 100 F (37.7 C) (taken under the tongue) or lasts longer than 24 hours.  You may have a dry mouth, a sore throat, muscle aches or trouble sleeping. These should go away after 24 hours.  Do not make important or legal decisions.   It is recommended to avoid smoking.               Tips for taking pain medications  To get " the best pain relief possible, remember these points:  Take pain medications as directed, before pain becomes severe.  Pain medication can upset your stomach: taking it with food may help.  Constipation is a common side effect of pain medication. Drink plenty of  fluids.  Eat foods high in fiber. Take a stool softener if recommended by your doctor or pharmacist.  Do not drink alcohol, drive or operate machinery while taking pain medications.  Ask about other ways to control pain, such as with heat, ice or relaxation.    Tylenol/Acetaminophen Consumption    If you feel your pain relief is insufficient, you may take Tylenol/Acetaminophen in addition to your narcotic pain medication.   Be careful not to exceed 4,000 mg of Tylenol/Acetaminophen in a 24 hour period from all sources.  If you are taking extra strength Tylenol/acetaminophen (500 mg), the maximum dose is 8 tablets in 24 hours.  If you are taking regular strength acetaminophen (325 mg), the maximum dose is 12 tablets in 24 hours.    Call a doctor for any of the following:  Signs of infection (fever, growing tenderness at the surgery site, a large amount of drainage or bleeding, severe pain, foul-smelling drainage, redness, swelling).  It has been over 8 to 10 hours since surgery and you are still not able to urinate (pass water).  Headache for over 24 hours.  Numbness, tingling or weakness the day after surgery (if you had spinal anesthesia).  Signs of Covid-19 infection (temperature over 100 degrees, shortness of breath, cough, loss of taste/smell, generalized body aches, persistent headache, chills, sore throat, nausea/vomiting/diarrhea)  Your doctor is:       Dr. Cody Stroud, Prostate and Urology: [phone]               Or dial [phone] and ask for the resident on call for:  Prostate Urology  For emergency care, call the:  [place] [facility]:  [phone] (TTY for hearing impaired: [phone])

## 2024-03-25 NOTE — TELEPHONE ENCOUNTER
Reason for visit: cystoscopy + botox      Dx/Hx/Sx: neurogenic bladder     Records/imaging/labs/orders: in epic     At Rooming: consent- verify botox prep with provider

## 2024-03-25 NOTE — ANESTHESIA PREPROCEDURE EVALUATION
Anesthesia Pre-Procedure Evaluation    Patient: Derek Enriquez   MRN: [id number] : 1960        Procedure : Procedure(s):  Scrotectomy with Bilateral Orchiectomy          Past Medical History:   Diagnosis Date     CAD (coronary artery disease)     stent     Cardiac arrest (H) 2018     Femur fracture (H)      Neurogenic bladder      Neurogenic bowel      Orthostatic hypotension      Paraplegia (H)      Thoracic spinal cord injury (H)       Past Surgical History:   Procedure Laterality Date     ARTHROTOMY HIP Left 2018    Procedure: ARTHROTOMY HIP;  Left Hip Heterotopic Bone Resection;  Surgeon: Toñito Kennedy MD;  Location: [facility] OR     ARTHROTOMY HIP Right 2018     ARTHROTOMY HIP Right 2018    Procedure: Right Hip Heterotopic Bone Resection;  Surgeon: Toñito Kennedy MD;  Location: [facility] OR     CARDIAC SURGERY       LAMINECTOMY THORACIC THREE LEVELS N/A 3/9/2018    Procedure: LAMINECTOMY THORACIC THREE LEVELS;  Thoracic 9-12 Laminectomy Evacuation of Subdural Hematoma, C-Arm, Prone, Gel Rolls.;  Surgeon: Abhijeet Joe MD;  Location: [facility] OR     OPEN REDUCTION INTERNAL FIXATION RODDING INTRAMEDULLARY FEMUR Right 2018    Procedure: OPEN REDUCTION INTERNAL FIXATION RODDING INTRAMEDULLARY FEMUR;  Right Open Reduction Internal Fixation Subtrochanteric Femur Fracture;  Surgeon: Toñito Kennedy MD;  Location: [facility] OR     wisdom teeth extraction        No Known Allergies   Social History     Tobacco Use     Smoking status: Former     Types: Cigarettes     Passive exposure: Past     Smokeless tobacco: Never     Tobacco comments:     Quit at 31   Substance Use Topics     Alcohol use: Yes     Comment: socially      Wt Readings from Last 1 Encounters:   24 79.4 kg (175 lb)        Anesthesia Evaluation   Pt has had prior anesthetic.     No history of anesthetic complications       ROS/MED HX  ENT/Pulmonary:    (-) recent URI   Neurologic: Comment: Subdural  hematoma of the thoracic spine s/p decompressive laminectomy, now paraplegic with bowel and bladder    (+)                     Spinal cord injury, year sustained: 2018, level of injury: t10 complete, without autonomic hyperflexia symptoms,        Cardiovascular:     (+)  - -  CAD - past MI - stent-  Drug Eluting Stent.                               Previous cardiac testing     METS/Exercise Tolerance:     Hematologic:    (-) anemia   Musculoskeletal:       GI/Hepatic:    (-) esophageal disease   Renal/Genitourinary:    (-) renal disease   Endo:    (-) Type II DM   Psychiatric/Substance Use:       Infectious Disease:    (-) Recent Fever   Malignancy:       Other:      (+)  , no H/O Chronic Pain,         Physical Exam    Airway        Mallampati: II   TM distance: > 3 FB   Neck ROM: full   Mouth opening: > 3 cm    Respiratory Devices and Support         Dental       (+) Minor Abnormalities - some fillings, tiny chips      Cardiovascular   cardiovascular exam normal          Pulmonary   pulmonary exam normal            OUTSIDE LABS:  CBC:   Lab Results   Component Value Date    WBC 4.8 11/05/2018    WBC 6.6 11/03/2018    HGB 8.5 (L) 11/05/2018    HGB 8.5 (L) 11/05/2018    HCT 28.9 (L) 11/05/2018    HCT 24.5 (L) 11/03/2018     11/05/2018     11/03/2018     BMP:   Lab Results   Component Value Date     02/16/2024     03/16/2023    POTASSIUM 3.8 02/16/2024    POTASSIUM 4.2 03/16/2023    CHLORIDE 100 02/16/2024    CHLORIDE 102 03/16/2023    CO2 26 02/16/2024    CO2 30 (H) 03/16/2023    BUN 16.9 02/16/2024    BUN 13.8 03/16/2023    CR 0.73 02/16/2024    CR 0.80 03/16/2023    GLC 83 02/16/2024    GLC 93 03/16/2023     COAGS:   Lab Results   Component Value Date    PTT 33 05/23/2018    INR 1.20 (H) 05/23/2018    FIBR 407 03/09/2018     POC:   Lab Results   Component Value Date    BGM 89 11/02/2018     HEPATIC:   Lab Results   Component Value Date    ALBUMIN 4.3 02/16/2024    PROTTOTAL 7.5 02/16/2024     ALT 18 02/16/2024    AST 22 02/16/2024    ALKPHOS 123 02/16/2024    BILITOTAL 0.9 02/16/2024     OTHER:   Lab Results   Component Value Date    PH 7.41 03/09/2018    PAT 9.2 02/16/2024    PHOS 2.7 03/12/2018    MAG 1.9 03/12/2018       Anesthesia Plan    ASA Status:  3    NPO Status:  Will be NPO Appropriate at ...    Anesthesia Type: General.              Consents    Anesthesia Plan(s) and associated risks, benefits, and realistic alternatives discussed. Questions answered and patient/representative(s) expressed understanding.     - Discussed: Risks, Benefits and Alternatives for BOTH SEDATION and the PROCEDURE were discussed     - Discussed with:  Patient            Postoperative Care       PONV prophylaxis: Ondansetron (or other 5HT-3), Dexamethasone or Solumedrol, Background Propofol Infusion     Comments:    Other Comments: No feeling from the belly button down, complete T10 paraplegia           Prabhakar Landa MD    I have reviewed the pertinent notes and labs in the chart from the past 30 days and (re)examined the patient.  Any updates or changes from those notes are reflected in this note.

## 2024-03-25 NOTE — OP NOTE
PREOPERATIVE DIAGNOSIS:            Gender dysphoria  POSTOPERATIVE DIAGNOSIS:                      Same    PROCEDURES PERFORMED:   bilateral simple scrotal orchiectomy  scrotectomy    SURGEON:                 Cody Stroud MD  FELLOW:  Good Valero MD  RESIDENT:             Adria Samayoa MD  ANESTHESIA:            GETA    ESTIMATED BLOOD LOSS: 5 mL.   IV FLUIDS: see dictated anesthesia record  COMPLICATIONS: None.   SPECIMEN:    bilateral testicle and spermatic cord up to the level of the external ring  scrotum     BRIEF OPERATIVE INDICATIONS: Derek Enriquez is a 63 year old patient with gender dysphoria wishing to undergo bilateral orchiectomy for gender affirmation surgery.      DESCRIPTION OF PROCEDURE: After full informed voluntary consent was obtained, the patient was transported to the operating room, placed supine on the table. After adequate anesthesia was induced, they were prepped and draped in the usual sterile fashion. A timeout was taken to confirm correct patient, procedure and laterality      We began the procedure by marking a 3 cm scrotal incision beginning at the penoscrotal junction at the midline raphe.  Incision was made using a scalpel and electrocautery was used to carry dissection down through the dartos muscle . The left testicle was delivered into the wound. Tunica vaginalis was intact. Blunt and electrocautery dissection was continued proximally to mobilize the cord.  The cord was dissected superiorly up to the level of the external ring. It was clamped and divided into two segments which were then separately clamped. The cord was divided. Each segment was then stick tied with 0 silk suture. The testicle and spermatic cord were removed.  The procedure as stated above was then repeated on the right side.   Irrigation was performed and a cord block was done with 0.5% Marcaine on remnant of spermatic cord bilaterally. Hemostasis was excellent.     We then marked out and cut and elliptical  incision to excise the scrotum, which was removed en-bloc. We excised enough to make perineum flat but to limit tension on the wound. Hemostasis was obtained and the proximal and distal edges tailored to reduce the dog-ear appearances.     The dartos was closed with a running 3-0 vicryl suture followed by the skin with running 4-0 monocryl suture in horizontal mattress fashion. Dermabond Prineo, fluffs and mesh underwear were applied.      Patient tolerated the procedure well. No apparent complications. The patient was transported to the postanesthesia care unit in stable condition     Adria Samayoa MD  Urology PGY-4    As attending surgeon, I, Cody Stroud MD, was scrubbed and present for the entire procedure.

## 2024-03-26 ENCOUNTER — OFFICE VISIT (OUTPATIENT)
Dept: UROLOGY | Facility: CLINIC | Age: 64
End: 2024-03-26
Payer: COMMERCIAL

## 2024-03-26 DIAGNOSIS — Z51.89 VISIT FOR WOUND CHECK: Primary | ICD-10-CM

## 2024-03-26 PROCEDURE — 99024 POSTOP FOLLOW-UP VISIT: CPT

## 2024-03-26 NOTE — PROGRESS NOTES
"Derek presented to the Urology department today with concerns for post-op drainage, infection, and swelling. Derek had a bilateral simple scrotal orchiectomy scrotectomy with Dr. Stroud on 3/25/24.     Dressing removed, site assessed, and cleansed with saline. No concerns of infection present. New 4x4 gauze and abdominal pad applied with mesh underwear to hold dressing in place. Slight swelling noted on his penis that was WNL post-op. No foul drainage. Minimal serosanguineous drainage as appropriate for post-op noted at this time. Pt expressed dressing being \"soaked with clear liquid\" and noted having incontinence at times.This writer reminded the pt to change the pad after incontinence to keep the incision clean and dry.      Pt instructed to go to the Emergency room if bleeding continues or increases. All pt questions answered at this time.     MAXIMUS GREEN RN    "

## 2024-03-29 LAB
PATH REPORT.COMMENTS IMP SPEC: NORMAL
PATH REPORT.COMMENTS IMP SPEC: NORMAL
PATH REPORT.FINAL DX SPEC: NORMAL
PATH REPORT.GROSS SPEC: NORMAL
PATH REPORT.RELEVANT HX SPEC: NORMAL
PHOTO IMAGE: NORMAL

## 2024-04-02 ENCOUNTER — MYC MEDICAL ADVICE (OUTPATIENT)
Dept: UROLOGY | Facility: CLINIC | Age: 64
End: 2024-04-02
Payer: COMMERCIAL

## 2024-04-02 NOTE — TELEPHONE ENCOUNTER
Called pt to follow up and assess surgical site bleeding after orchiectomy with Dr. Stroud on 3/25.     Pt states that he continues to have bleeding from surgical area. He changes the pad twice daily, though it does not saturate the pad fully.  Pt has also been applying gauze to the the incision area, which is held in place by underwear. Instructed pt to apply pressure to the area and continue covering with gauze. Explained that the area is vascular and if there is an open area/wound, the bleeding may continue. Pt is not able to fully visualize the area. He will let us know if bleeding worsens, but will cover with gauze and apply pressure.

## 2024-04-03 ENCOUNTER — TELEPHONE (OUTPATIENT)
Dept: PLASTIC SURGERY | Facility: CLINIC | Age: 64
End: 2024-04-03
Payer: COMMERCIAL

## 2024-04-03 NOTE — TELEPHONE ENCOUNTER
Called Derek to evaluate healing given MyChart messages explaining that he has continued to have bleeding and sent in a picture of a clot in underwear pad. Explained that writer spoke with both Dr. Valero and Brooke Car NP about pt's bleeding and clot in pad. They expressed that if pt is unable to make it to an emergency department today, the best course is to continue putting pressure on surgical area until we can see pt in clinic on Friday. Relayed to pt that it is a good thing the clot came out of his body, and that there is no immediate concern given the lack of symptoms of infection. Pt said he had no plans to go to an emergency department today anyway, and that is will come to clinic to see Brooke this Friday, April 5th for assessment of surgical area and wound packing/dressing. Scheduled pt accordingly.  Pt expressed frustration about his experience with this surgery, aftercare, and healing at this point. He believes that he will need a revision due to the apparent wound he has on his surgical area, which he said was not properly addressed when he presented to urology clinic for care the morning after his surgery. Explained that we will do our best to meet his needs now after assessment in clinic on Friday, and that he may discuss his questions/concerns with Dr. Stroud during the postop appointment next week. Offered patient relations phone number, which pt declined. Pt will let us know if he has worsening bleeding or new concerns before his appointment on Friday.

## 2024-04-04 ENCOUNTER — TELEPHONE (OUTPATIENT)
Dept: PLASTIC SURGERY | Facility: CLINIC | Age: 64
End: 2024-04-04
Payer: COMMERCIAL

## 2024-04-04 ENCOUNTER — TELEPHONE (OUTPATIENT)
Dept: UROLOGY | Facility: CLINIC | Age: 64
End: 2024-04-04
Payer: COMMERCIAL

## 2024-04-04 NOTE — TELEPHONE ENCOUNTER
Writer called at request of RNCC. Derek expressed frustration and dissatisfaction with his experience this past week trying to access care. He expressed dissatisfaction with his surgery and the resulting bleeding that's been going on for the past week. He described going into clinic the next day and reportedly was not seen by a doctor and that he was not interested in the option of going to the ED to see a resident urologist. He is hopeful his appt with Brooke tomorrow will result in some action, but writer reiterated that there's no guarantee he'll have any type of care provided and that Brooke will need to assess what's going on before determining a plan of action.

## 2024-04-04 NOTE — TELEPHONE ENCOUNTER
"Spoke with pt today to answer his questions about his appointment with Brooke tomorrow. Pt expressed concerns that \"nothing will be done at this appt.\" Pt wants Brooke to be notified that, \"this is not a simple post-surgical opening and he will needs sutures.\" I encouraged pt to keep his appointment with Brooke tomorrow because the assessment will be helpful. I also encouraged him to go to the emergency room if the bleeding continues or increases before his appointment tomorrow. Pt would like a call from the manager of Plastic Surgery and a call back before 2pm.     Germaine Gross RN  Patient Care Supervisor- [facility]  [phone]    "

## 2024-04-05 ENCOUNTER — TELEPHONE (OUTPATIENT)
Dept: UROLOGY | Facility: CLINIC | Age: 64
End: 2024-04-05

## 2024-04-05 ENCOUNTER — OFFICE VISIT (OUTPATIENT)
Dept: PLASTIC SURGERY | Facility: CLINIC | Age: 64
End: 2024-04-05
Payer: COMMERCIAL

## 2024-04-05 ENCOUNTER — TELEPHONE (OUTPATIENT)
Dept: PLASTIC SURGERY | Facility: CLINIC | Age: 64
End: 2024-04-05

## 2024-04-05 VITALS
TEMPERATURE: 98.5 F | DIASTOLIC BLOOD PRESSURE: 77 MMHG | SYSTOLIC BLOOD PRESSURE: 117 MMHG | HEART RATE: 75 BPM | OXYGEN SATURATION: 97 % | BODY MASS INDEX: 24.41 KG/M2 | HEIGHT: 71 IN

## 2024-04-05 DIAGNOSIS — S30.23XA: ICD-10-CM

## 2024-04-05 DIAGNOSIS — T81.31XA POSTOPERATIVE WOUND DEHISCENCE, INITIAL ENCOUNTER: ICD-10-CM

## 2024-04-05 DIAGNOSIS — G82.21 PARAPLEGIA, COMPLETE (H): ICD-10-CM

## 2024-04-05 DIAGNOSIS — S30.22XS: Primary | ICD-10-CM

## 2024-04-05 DIAGNOSIS — Z90.79 HISTORY OF ORCHIECTOMY, BILATERAL: Primary | ICD-10-CM

## 2024-04-05 DIAGNOSIS — N31.9 NEUROGENIC BLADDER: Primary | ICD-10-CM

## 2024-04-05 PROCEDURE — 99024 POSTOP FOLLOW-UP VISIT: CPT | Performed by: NURSE PRACTITIONER

## 2024-04-05 RX ORDER — CIPROFLOXACIN 500 MG/1
500 TABLET, FILM COATED ORAL DAILY
Qty: 3 TABLET | Refills: 0 | Status: ON HOLD | OUTPATIENT
Start: 2024-04-09 | End: 2024-04-06

## 2024-04-05 ASSESSMENT — PAIN SCALES - GENERAL: PAINLEVEL: NO PAIN (0)

## 2024-04-05 NOTE — TELEPHONE ENCOUNTER
"Pt call transferred to me from call center. Derek was unhappy with Dr. Stroud being out of town and not immediately available. Pt requested I contact Dr. Stroud \"immediately\" and communicate what is going on. I reminded Derek that Dr. Stroud will receive these messages when he is back in town and urgent messages are relayed to his RN who is able to contact an MD on-call. Derek stated. \"I will be very disappointed if I see Dr. Stroud on Wednesday and none of this has been relayed to him.\" I told Derek that he saw Brooke Car and Dr. Smith today and they are more than capable of taking care of him needs until Dr. Stroud returns.     Derek said he had been \"contacting me over the last 11 days.\" I reminded him that I only saw him on 3/26 and then spoke with him on 4/4/24 and did not have any communication with him between then. He then said, \"well, I was talking with someone and this should have been taken care of sooner.\"    I reminded Derek that he was seen in Plastics, not Urology and he should be in contact with the staff from Plastics if he has concerns about his appointment today or his procedure tomorrow. Derke stated, \"this issue started in Urology.\" I reminded him that I was the RN who saw him on 3/26/24 for a wound check. At that time there was minimal bleeding, minimal swelling, and his biggest concern was incontinence mixing with the incision. I educated him, at that time, to change his pad when it was soiled and to keep the wound clean and dry. I also told him multiple times to go to the emergency room if the bleeding increased. Derek said, \"I refuse to go to the emergency room. I am in a wheelchair and it is hard to get around. I am not waiting 8 hours in an emergency room for care.\" I informed Derek that, \"unfortunately we are not an urgent care or an emergency clinic and we are not able to manage emergent situations as he is requesting.This is why we refer patients to the emergency room if " "needed.\"    Derek stated he had not yet received his antibiotic for his cysto with botox on 4/10/24. Derek demanded, \"this better be called into the pharmacy in the next hour or we are going to have bigger problems.\"  "

## 2024-04-05 NOTE — CONFIDENTIAL NOTE
"Pt call transferred to me from call center. Derek was unhappy with Dr. Stroud being out of town and not immediately available. Derek requested I contact Dr. Stroud \"immediately\" and communicate what is going on. I reminded Derek that Dr. Stroud will receive these messages when he is back in town and urgent messages are relayed to his RN who is able to contact an MD on-call. Derek stated. \"I will be very disappointed if I see Dr. Stroud on Wednesday and none of this has been relayed to him.\" I told Derek that he saw Brooke Car and Dr. Smith today and they are more than capable of taking care of his needs until Dr. Stroud returns.    Derek said he had been \"contacting me over the last 11 days.\" I reminded him that I only saw him on 3/26 and then spoke with him on 4/4/24 and did not have any communication with him between then. He then said, \"well, I was talking with someone, maybe Bryon, and this should have been taken care of sooner.\"     I reminded Derek that he was seen in Plastics today, not Urology and he should be in contact with the staff from Plastics if he has concerns about his appointment today or his procedure tomorrow. Derek stated, \"this issue started in Urology.\" I reminded him that I was the RN who saw him on 3/26/24 for a wound check. At that time there was minimal bleeding, minimal swelling, and his biggest concern was incontinence mixing with the incision. I educated him, at that time, to change his pad when it was soiled and to keep the wound clean and dry. I also told him multiple times to go to the emergency room if the bleeding increased. Derek said, \"I refuse to go to the emergency room. I am in a wheelchair and it is hard to get around. I am not waiting 8 hours in an emergency room for care.\" I informed Derek that, \"unfortunately we are not an urgent care or an emergency clinic and we are not able to manage emergent situations as he is requesting.This is why we refer patients to the emergency " "room if needed.\"    Derek stated he had not yet received his antibiotic for his cysto with botox on 4/10/24. Derek demanded, \"this better be called into the pharmacy in the next hour or we are going to have bigger problems.\" I notified Dr. Stroud's RN, Yisel, who placed the prescription order immediately. I asked Derek if I could help him with anything else and he hung up.   "

## 2024-04-05 NOTE — NURSING NOTE
"Chief Complaint   Patient presents with    Surgical Followup     Evaluate bleeding, wound care, DOS 3/25/24.       Vitals:    04/05/24 1057   BP: 117/77   BP Location: Left arm   Patient Position: Sitting   Cuff Size: Adult Large   Pulse: 75   Temp: 98.5  F (36.9  C)   TempSrc: Oral   SpO2: 97%   Height: 1.803 m (5' 11\")       Body mass index is 24.41 kg/m .    Yosih Molina, EMT    "

## 2024-04-05 NOTE — TELEPHONE ENCOUNTER
Called pt to give an update about surgery plans. The following information was given by RN:  Dr. Smith will do surgery Saturday, 4/6/24 at the [facility], [address]. Surgery is scheduled for 9:10 am. He should plan to arrive at 8 am. He should have nothing to eat or drink after midnight tonight.   Derek wanted to know if there was a plan for anesthesia as he is planning on taking the city bus home after surgery. RN explained the order did not have anesthesia included but will check with surgeon and call him back.    RN called pt back and left VM that the plan is to do the procedure without anesthesia. RN also said that inter-hospital patient shuttle is not running on the weekends but Dr. Smith will help get the pt a ride home after surgery if needed.    Fabiola

## 2024-04-05 NOTE — LETTER
"4/5/2024       RE: Derek Enriquez  [address]  [place] [address]     Dear Colleague,    Thank you for referring your patient, Derek Enriquez, to the [facility] [place] at [facility]. Please see a copy of my visit note below.    SUBJECTIVE:  Derek is a 63 year old who underwent bilateral orchiectomy and scrotoplasty with Dr Stroud on 3/25/24. He reports bleeding from an opening along perineal incision which began the day after surgery (3/26). He was seen by RN I urology who assessed area and reassured him that bleeding and drainage are normal. He has continued to experience ongoing bleeding with clots throughout the past week. He has been in contact with Dr Stroud's team several times and assessed through phone, Missing[employer] messages, photos sent via [employer]. He was encouraged to seek care in ED for further assessment several times, but refused to go to ED. He is anxious about his wound and upset that things have gone wrong after his surgery. He states \"I had two long discussions with Dr Stroud about needing good reinforcement of this incision since I transfer from my wheelchair throughout the day.\"   He is insisting that someone suture his dehisced wound to prevent it from getting worse. He states \"this can't stay like this\" He states he won't leave until someone puts sutures in wound to hold it together. He states \"If you won't do it I will sew it myself. I have done it before.\" He refuses packing of wound, application of steri-strips, and assessment by wound RN since it would not fix the problem.  He denies fever, chills, infectious drainage, or malaise.    OBJECTIVE:  /77 (BP Location: Left arm, Patient Position: Sitting, Cuff Size: Adult Large)   Pulse 75   Temp 98.5  F (36.9  C) (Oral)   Ht 1.803 m (5' 11\")   SpO2 97%   BMI 24.41 kg/m     General: Patient agitated and verbally demanding  : " "Perineal incision with wound dehiscence of approximately 3 cm. Large, clots visible in wound opening. No active bleeding visualized. Perineum, hips, pubis, and inner thighs bruised bilaterally. Perineal skin taut and firm from swelling. No pustular drainage, erythema of surrounding tissue.      ASSESSMENT/PLAN:  S/P orchiectomy and scrotectomy  Wound dehiscence  Post-op hematoma of perineum  Thorough discussion and explanation of what could be done in clinic including assessment of wound, cleaning of wound, packing of wound if needed, dressing of wound, and plan for wound care until he sees Dr yu as scheduled on 4/10. Clear explanation of risks of suturing or closing a wound provided including high risk of infection/abscess, reopening of wound from tension of hematoma. Emphasized importance of cleaning/washing out hematoma in OR by a surgeon.  Patient continued to demand someone suture his wound at least partway. He declined application of steri-strips and pressure dressing. Patient continued to state \"I'm not leaving until someone who can do stitches sees me. If you won't do it, I will sew it myself.\"   This writer consulted with Dr Valero via text, who agreed with risk of suturing wound and agreed with plan I offered patient.  Dr Boston came to assess patient and offered several options including direct admit to hospital for eventual wash out and closure with drain, washout with application of wound vac, washout with plan for wound packing. Patient continued to disagree with all offered plans. Final decision made for Dr Smith to try to get surgery scheduled for this weekend. Patient insisted on going home until plan made, as he states he has a dog to care for and \"won't just sit in the hospital. That won't work.\"    Plan: Patient to go home and await notification of when washout with Dr Smith could/would occur.   Procedure scheduled for [facility] on 4/6/24 at 0910 with Dr Smith. Patient notified of " time to arrive (0800) and to be NPO after midnight.      A total of 60 minutes was spent today with patient, reviewing records, completing charting, and other tasks as detailed above.         Again, thank you for allowing me to participate in the care of your patient.      Sincerely,    GREGORIO Cuba CNP

## 2024-04-06 ENCOUNTER — ANESTHESIA (OUTPATIENT)
Dept: SURGERY | Facility: CLINIC | Age: 64
End: 2024-04-06
Payer: COMMERCIAL

## 2024-04-06 ENCOUNTER — HOSPITAL ENCOUNTER (OUTPATIENT)
Facility: CLINIC | Age: 64
Discharge: HOME | End: 2024-04-06
Attending: SURGERY | Admitting: SURGERY
Payer: COMMERCIAL

## 2024-04-06 ENCOUNTER — ANESTHESIA EVENT (OUTPATIENT)
Dept: SURGERY | Facility: CLINIC | Age: 64
End: 2024-04-06
Payer: COMMERCIAL

## 2024-04-06 VITALS
DIASTOLIC BLOOD PRESSURE: 78 MMHG | WEIGHT: 175 LBS | BODY MASS INDEX: 24.41 KG/M2 | TEMPERATURE: 97.8 F | HEART RATE: 82 BPM | OXYGEN SATURATION: 97 % | RESPIRATION RATE: 16 BRPM | SYSTOLIC BLOOD PRESSURE: 113 MMHG

## 2024-04-06 DIAGNOSIS — N31.9 NEUROGENIC BLADDER: ICD-10-CM

## 2024-04-06 DIAGNOSIS — G82.20 PARAPLEGIA (H): ICD-10-CM

## 2024-04-06 DIAGNOSIS — T81.31XS DISRUPTION OR DEHISCENCE OF CLOSURE OF SKIN, SEQUELA: ICD-10-CM

## 2024-04-06 DIAGNOSIS — N39.41 URGE INCONTINENCE: ICD-10-CM

## 2024-04-06 DIAGNOSIS — N99.840 POSTOPERATIVE HEMATOMA INVOLVING GENITOURINARY SYSTEM FOLLOWING GENITOURINARY PROCEDURE: Primary | ICD-10-CM

## 2024-04-06 PROCEDURE — 258N000003 HC RX IP 258 OP 636: Performed by: NURSE ANESTHETIST, CERTIFIED REGISTERED

## 2024-04-06 PROCEDURE — 250N000011 HC RX IP 250 OP 636: Performed by: SURGERY

## 2024-04-06 PROCEDURE — 370N000017 HC ANESTHESIA TECHNICAL FEE, PER MIN: Performed by: SURGERY

## 2024-04-06 PROCEDURE — 272N000001 HC OR GENERAL SUPPLY STERILE: Performed by: SURGERY

## 2024-04-06 PROCEDURE — 13160 SEC CLSR SURG WND/DEHSN XTN: CPT | Performed by: ANESTHESIOLOGY

## 2024-04-06 PROCEDURE — 13160 SEC CLSR SURG WND/DEHSN XTN: CPT | Performed by: NURSE ANESTHETIST, CERTIFIED REGISTERED

## 2024-04-06 PROCEDURE — 999N000141 HC STATISTIC PRE-PROCEDURE NURSING ASSESSMENT: Performed by: SURGERY

## 2024-04-06 PROCEDURE — 13160 SEC CLSR SURG WND/DEHSN XTN: CPT | Mod: 78 | Performed by: SURGERY

## 2024-04-06 PROCEDURE — 360N000075 HC SURGERY LEVEL 2, PER MIN: Performed by: SURGERY

## 2024-04-06 PROCEDURE — 710N000012 HC RECOVERY PHASE 2, PER MINUTE: Performed by: SURGERY

## 2024-04-06 RX ORDER — AZITHROMYCIN 250 MG/1
250 TABLET, FILM COATED ORAL DAILY
Qty: 6 TABLET | Refills: 0 | Status: SHIPPED | OUTPATIENT
Start: 2024-04-06

## 2024-04-06 RX ORDER — ONDANSETRON 4 MG/1
4 TABLET, ORALLY DISINTEGRATING ORAL EVERY 30 MIN PRN
Status: DISCONTINUED | OUTPATIENT
Start: 2024-04-06 | End: 2024-04-06 | Stop reason: HOSPADM

## 2024-04-06 RX ORDER — CEFAZOLIN SODIUM/WATER 2 G/20 ML
2 SYRINGE (ML) INTRAVENOUS SEE ADMIN INSTRUCTIONS
Status: DISCONTINUED | OUTPATIENT
Start: 2024-04-06 | End: 2024-04-06 | Stop reason: HOSPADM

## 2024-04-06 RX ORDER — ONDANSETRON 2 MG/ML
4 INJECTION INTRAMUSCULAR; INTRAVENOUS EVERY 30 MIN PRN
Status: DISCONTINUED | OUTPATIENT
Start: 2024-04-06 | End: 2024-04-06 | Stop reason: HOSPADM

## 2024-04-06 RX ORDER — SODIUM CHLORIDE, SODIUM LACTATE, POTASSIUM CHLORIDE, CALCIUM CHLORIDE 600; 310; 30; 20 MG/100ML; MG/100ML; MG/100ML; MG/100ML
INJECTION, SOLUTION INTRAVENOUS CONTINUOUS PRN
Status: DISCONTINUED | OUTPATIENT
Start: 2024-04-06 | End: 2024-04-06

## 2024-04-06 RX ORDER — CEFAZOLIN SODIUM/WATER 2 G/20 ML
2 SYRINGE (ML) INTRAVENOUS
Status: COMPLETED | OUTPATIENT
Start: 2024-04-06 | End: 2024-04-06

## 2024-04-06 RX ADMIN — SODIUM CHLORIDE, POTASSIUM CHLORIDE, SODIUM LACTATE AND CALCIUM CHLORIDE: 600; 310; 30; 20 INJECTION, SOLUTION INTRAVENOUS at 09:31

## 2024-04-06 RX ADMIN — Medication 2 G: at 09:32

## 2024-04-06 ASSESSMENT — ACTIVITIES OF DAILY LIVING (ADL)
ADLS_ACUITY_SCORE: 33

## 2024-04-06 NOTE — ANESTHESIA CARE TRANSFER NOTE
Patient: Derek Enriquez    Procedure: Procedure(s):  DELAYED PRIMARY CLOSURE SCROTECTOMY SITE       Diagnosis: Traumatic hematoma of scrotum, sequela [S30.22XS]  Paraplegia, complete (H) [G82.21]  Diagnosis Additional Information: No value filed.    Anesthesia Type:   MAC     Note:      Level of Consciousness: awake  Oxygen Supplementation: room air    Independent Airway: airway patency satisfactory and stable  Dentition: dentition unchanged      Patient transferred to: Phase II    Handoff Report: Identifed the Patient, Identified the Reponsible Provider, Reviewed the pertinent medical history, Discussed the surgical course, Reviewed Intra-OP anesthesia mangement and issues during anesthesia, Set expectations for post-procedure period and Allowed opportunity for questions and acknowledgement of understanding      Vitals:  Vitals Value Taken Time   BP     Temp     Pulse     Resp     SpO2         Electronically Signed By: GREGORIO Back CRNA  April 6, 2024  11:05 AM

## 2024-04-06 NOTE — DISCHARGE INSTRUCTIONS
Ok to change outer dressing as needed (fluffs and ABD pads)   Drain will be in place 1-2 weeks and taken out by Dr. Smith at follow up appointment   Empty drain as needed     Same-Day Surgery   Adult Discharge Orders & Instructions     For 24 hours after surgery:  Get plenty of rest.  A responsible adult must stay with you for at least 24 hours after you leave the hospital.   Pain medication can slow your reflexes. Do not drive or use heavy equipment.  If you have weakness or tingling, don't drive or use heavy equipment until this feeling goes away.  Mixing alcohol and pain medication can cause dizziness and slow your breathing. It can even be fatal. Do not drink alcohol while taking pain medication.  Avoid strenuous or risky activities.  Ask for help when climbing stairs.   You may feel lightheaded.  If so, sit for a few minutes before standing.  Have someone help you get up.   If you have nausea (feel sick to your stomach), drink only clear liquids such as apple juice, ginger ale, broth or 7-Up.  Rest may also help.  Be sure to drink enough fluids.  Move to a regular diet as you feel able. Take pain medications with a small amount of solid food, such as toast or crackers, to avoid nausea.   A slight fever is normal. Call the doctor if your fever is over 100 F (37.7 C) (taken under the tongue) or lasts longer than 24 hours.  You may have a dry mouth, muscle aches, trouble sleeping or a sore throat.  These symptoms should go away after 24 hours.  Do not make important or legal decisions.   Pain Management:      1. Take pain medication (if prescribed) for pain as directed by your physician.        2. WARNING: If the pain medication you have been prescribed contains Tylenol  (acetaminophen), DO NOT take additional doses of Tylenol (acetaminophen).     Call your doctor for any of the followin.  Signs of infection (fever, growing tenderness at the surgery site, severe pain, a large amount of drainage or bleeding,  foul-smelling drainage, redness, swelling).    2.  It has been over 8 to 10 hours since surgery and you are still not able to urinate (pee).    3.  Headache for over 24 hours.    4.  Numbness, tingling or weakness the day after surgery (if you had spinal anesthesia).  To contact a doctor, call ____Forrest Montoya's office at [phone] at the [facility]astic [facility] from 8 am till 5 pm ________ or:  '   [phone] and ask for the Resident On Call for:          ___Plastic Surgery___________ (answered 24 hours a day)  '   Emergency Department:  [place] [facility]: [phone]  [place] [facility]: [phone]               Rev. 10/2014      Surgical Drain Care: Care Instructions  Your Care Instructions     After a surgery, fluid may collect inside your body in the surgical area. This makes an infection or other problems more likely. A surgical drain allows the fluid to flow out.  The doctor puts a thin, flexible rubber tube into the area of your body where the fluid is likely to collect. The rubber tube carries the fluid outside your body.  The most common type of surgical drain carries the fluid into a collection bulb that you empty. This is called a Edinson-Morris (JC) drain. The drain uses suction created by the bulb to pull the fluid from your body into the bulb. The rubber tube will probably be held in place by one or two stitches in your skin. The bulb will probably be attached with a safety pin to your clothes or near the bandage so that it doesn't flip around or pull on the stitches.  Another type of drain is called a Penrose drain. This type of drain doesn't have a bulb. Instead, the end of the tube is open. That allows the fluid to drain onto a dressing taped to your skin. The drain may be kept in place next to your skin with a stitch or a safety pin in the tube.  When you first get the drain, the fluid will be bloody. It will change color from red to pink  to a light yellow or clear as the wound heals and the fluid starts to go away.  Your doctor may give you information on when you no longer need the drain and when it will be removed.  Follow-up care is a key part of your treatment and safety. Be sure to make and go to all appointments, and call your doctor if you are having problems. It's also a good idea to know your test results and keep a list of the medicines you take.  How do you empty the bulb of a Edinson-Morris drain?  Follow any instructions your doctor gives you. How often you empty the bulb depends on how much fluid is draining. Empty the bulb when it is half full.  Wash your hands with soap and water.  Take the plug out of the bulb.  Empty the bulb.   If your doctor asks you to measure the fluid, empty the fluid into a measuring cup, and write down the color and how much you collected. Your doctor will want to know this information.  Clean the plug with alcohol.  Squeeze the bulb until it is flat.   This removes all the air from the bulb. You may need to put the bulb on a table or a counter to flatten it.  Keep the bulb flat, and put the plug in.   The bulb should stay flat after you put the plug back in. This creates the suction that pulls the fluid into the bulb.  Empty the fluid into the toilet.  Wash your hands.  How do you change the dressing around your surgical drain?  You may have a dressing (bandage). The dressing is often made of gauze pads held on with tape. Your doctor will tell you how often to change it.  Wash your hands with soap and water.  Take off the dressing from around the drain.  Clean the drain site and the skin around it with soap and water.   Use gauze or a cotton swab.  When the site is dry, put on a new dressing.   The way your dressing is put on depends on what kind of drain you have. You will get instructions for your type of drain.  Wash your hands again with soap and water.  Your doctor may ask you to keep track of your  "dressing changes. Write down the time of day and the amount and color of the fluid on the dressing.  How do you help prevent clogs in your surgical drain?  Squeezing or \"milking\" the tube of your surgical drain can help prevent clogs so that it drains correctly. Your doctor will tell you when you need to do this. In general, you do this when:  You see a clot in the tube that prevents fluid from draining. The clot may look like a dark, stringy lining.  You see fluid leaking around the tube where it goes into the skin.  Follow these steps for milking the tube.  Use one hand to hold and pinch the tube where it leaves the skin.  With the thumb and first finger of your other hand, pinch the tube just below where you're holding it.  Slowly and firmly push your thumb and first finger down the tubing toward the end of the tube.  Repeat this as many times as needed to move the clot.  If you have a Edinson-Morris (JC) drain, the clot should move down the tube and into the bulb. If you have a Penrose drain, the clot should move into the dressing.  When should you call for help?   Call your doctor now or seek immediate medical care if:    You have signs of infection, such as:  Increased pain, swelling, warmth, or redness around the area.  Red streaks leading from the area.  Pus draining from the area.  A fever.     You see a sudden change in the color or smell of the drainage.     The tube is coming loose where it leaves your skin.   Watch closely for changes in your health, and be sure to contact your doctor if:    You see a lot of fluid around the drain.     You cannot remove a clot from the tube by milking the tube.   Where can you learn more?  Go to https://www.[employer].net/patiented  Enter K117 in the search box to learn more about \"Surgical Drain Care: Care Instructions.\"  Current as of: July 26, 2023               Content Version: 14.0    9740-7948 [employer].   Care instructions adapted under license by " your healthcare professional. If you have questions about a medical condition or this instruction, always ask your healthcare professional. [employer] disclaims any warranty or liability for your use of this information.

## 2024-04-06 NOTE — BRIEF OP NOTE
[facility]    Brief Operative Note    Pre-operative diagnosis: Traumatic hematoma of scrotum, sequela [S30.22XS]  Paraplegia, complete (H) [G82.21]  Post-operative diagnosis Same as pre-operative diagnosis    Procedure: DELAYED PRIMARY CLOSURE SCROTECTOMY SITE, N/A - Sacrum    Surgeon: Surgeon(s) and Role:     * Leonora Smith MD - Primary     * Zoya Bergman MD - Resident - Assisting  Anesthesia: MAC   Estimated Blood Loss: 10 mL from 4/6/2024  9:28 AM to 4/6/2024 11:06 AM      Drains: Edinson-Morris mons pubis   Specimens: * No specimens in log *  Findings:   Clotted blood approximately 100mL.  Complications: None.  Implants: * No implants in log *        Zoya Bergman   Plastic & Reconstructive Surgery

## 2024-04-06 NOTE — PROGRESS NOTES
Patient arrived in preop for surgery. Patient requests no anesthesia for procedure. MDA and surgeon aware patient does not have anyone at home for post op. Patient arranged medical transport with wheelchair for discharge home. Patient tolerated the procedure well and discharged with dressing supplies and instructions. Patient received no sedation or anesthesia. IV used for one dose of antibiotics and removed at discharge.

## 2024-04-06 NOTE — OP NOTE
OPERATIVE NOTE    DATE OF PROCEDURE: 4/6/24  ATTENDING SURGEON: Forrest Smith MD  RESIDENT SURGEON: Zoya Bergman, PGY3  ASSISTANT(S):none  PREOP DIAGNOSIS:hematoma and partial dehiscence s/p scrotectomy  POSTOP DIAGNOSIS:same  PROCEDURE:evacuation scrotectomy hematoma and delayed primary closure  ANESTHESIA:NA  EBL:10 mls, + 100 mls old clot  IVFS: see anesthesia records  UO:NA  COUNTS:correct  COMPLICATIONS:none  DRAINS:JC #15 channel x 1  SPECIMENS:none    INDICATIONS:  This is a 63 year old complete T10 paraplegic male with a diagnosis of post-scrotectomy hematoma and partial proximal dehiscence. Underwent gender-related scrotectomy 3/25/24 with Dr Stroud as same day surgery and discharged home in wheelchair, no drain. Sounds like developed significant bleeding and hematoma of surgical site within 1-2 days, resulting in ecchymosis from umbilicus to mid-thigh, and ultimately experienced partial dehiscence of perineal closure.     Asked to see patient in clinic by [facility] NP yesterday afternoon. Patient would not leave, demanding to be sutured up or else he would do it himself at home. Had long discussion with patient regarding surgical options. Explained that could not be added on to [facility] schedule that day. Refused offer to be admitted prior to any procedure since he lives alone and had a service dog at home. Needed hematoma evacuation at the very least, but was not interested in VAC for open wound. Also refused any strict bedrest, even at home, if attempted closure. Explained that he was at very high risk for recurrent dehiscence due to not being able to strictly offload pressure on surgical site. He understood this but would like to try delayed primary closure, and agreed to doing self-wound cares with packing should he dehisce again. Will be seeing Dr Stroud on Wednesday for Botox. Can follow with me at [facility] clinic for drain removal and incision check 4/16. Takes the bus and train from his home in  [place] and prefers to come to [facility] wound clinic even though closer.     PROCEDURE:  The patient was seen in the preop waiting area. The operative site was marked either on the patient or on the anatomical diagram. Informed consent was obtained after reviewing the possible risks and complications, including but not limited to the following: infection, bleeding, hematoma/seroma formation,  poor healing, dehiscence, spitting sutures, hypertrophic or keloid scars, injury to surrounding musculoskeletal and neurovascular structures, including recurrent problems ,  residual deformities or asymmetries, need for further surgery, altered sensation of the surgical area, anesthetic risks such as DVT, PE, cardiopulmonary arrest.     The patient was then brought to the operating room. Anesthesia was kind enough to monitor him even though we did not need anesthetics or sedatives due to his complete SCI. We did give Ancef IV. The patient was on the table in a lithotomy position with legs in yellow fin stirrups. The surgical site was prepped and draped in the usual sterile fashion using Betadine. Preop photos were taken. A time out was taken and the proper patient and procedure were identified. No local was used given the patient was completely insensate below T10 level.    There was what appeared to be visible clot in the base of the dehisced wound. The remainder of the perineal incision was easily opened with scissors and blunt dissection. Approximately 100 mls of old clot was evacuated from the perineal space and bilateral inguinal tunnels. The wound dimensions were as follows: main opening = 9 x 5.5 cms, L inguinal tunnel = 5 cms, R inguinal tunnel = 4.5 cms, posterior undermined pocket = 3 cms. Once the clot was manually removed with additional lap sponges, there was some mild bloody oozing but no obvious initial source for the hematoma. The entire wound pocket was then irrigated with 1 liter of NS, 450 mls VASHE,  and some Prontosan. Minimal cautery was used. Surgicel sheets were applied to the wound bed to help with hemostasis.  The wound bed appeared very edematous. The skin edges were sharply excised and freshened for delayed primary closure. A #15 channel drain was introduced through the L inguinal tunnel and secured with 3-0 nylon.  Once we were happy with the hemostasis, the incision was closed with 2-0 PDS for deepest layer. The tissues were somewhat friable and also edematous.  We then used 2-0 and 3-0 PDS for deep dermal closure. The skin was re-approximated with 3-0 nylon vertical mattress sutures. The JC was put to bulb suction and showed a good seal.  Photos of the closure were taken to show patient to help with postop cares at home.     An island dressing was applied with Mastisol. Additional kerlix fluffs and an ABD pad were then used to pad the area and were secured with the patient's underwear. He was taken out of [facility] and transferred to [facility], transported to PACU in stable condition. PACU staff to educate patient regarding drain cares and provided with some extra dressing supplies. Patient was able to secure a [employer] ride home rather than taking the train/bus.    Discussed with patient the importance of minimizing pressure in this area as soon as possible and for as long as possible. He will maintain dressings for next 24-48 hours until his next shower day. We did discuss how he can minimize friction/shear during bathroom transfers. He knows to contact us if there are any signs of recurrent hematoma or dehiscence. He will strip and record JC output BID.

## 2024-04-06 NOTE — ANESTHESIA PREPROCEDURE EVALUATION
Anesthesia Pre-Procedure Evaluation    Patient: Derek Enriquez   MRN: [id number] : 1960        Procedure : Procedure(s):  INCISION AND DRAINAGE, scrotectomy site          Past Medical History:   Diagnosis Date    CAD (coronary artery disease)     stent    Cardiac arrest (H) 2018    Femur fracture (H)     Gender dysphoria     Neurogenic bladder     Neurogenic bowel     Orthostatic hypotension     Paraplegia (H)     Thoracic spinal cord injury (H)       Past Surgical History:   Procedure Laterality Date    ARTHROTOMY HIP Left 2018    Procedure: ARTHROTOMY HIP;  Left Hip Heterotopic Bone Resection;  Surgeon: Toñito Kennedy MD;  Location: [facility]    ARTHROTOMY HIP Right 2018    ARTHROTOMY HIP Right 2018    Procedure: Right Hip Heterotopic Bone Resection;  Surgeon: Toñito Kennedy MD;  Location: [facility] OR    CARDIAC SURGERY      LAMINECTOMY THORACIC THREE LEVELS N/A 3/9/2018    Procedure: LAMINECTOMY THORACIC THREE LEVELS;  Thoracic 9-12 Laminectomy Evacuation of Subdural Hematoma, C-Arm, Prone, Gel Rolls.;  Surgeon: Abhijeet Joe MD;  Location: [facility] OR    OPEN REDUCTION INTERNAL FIXATION RODDING INTRAMEDULLARY FEMUR Right 2018    Procedure: OPEN REDUCTION INTERNAL FIXATION RODDING INTRAMEDULLARY FEMUR;  Right Open Reduction Internal Fixation Subtrochanteric Femur Fracture;  Surgeon: Toñito Kennedy MD;  Location: [facility]    ORCHIECTOMY SCROTAL Bilateral 3/25/2024    Procedure: Scrotectomy with Bilateral Orchiectomy;  Surgeon: Cody Stroud MD;  Location: [facility] OR    wisdom teeth extraction        No Known Allergies   Social History     Tobacco Use    Smoking status: Former     Types: Cigarettes     Passive exposure: Past    Smokeless tobacco: Never    Tobacco comments:     Quit at 31   Substance Use Topics    Alcohol use: Yes     Comment: socially      Wt Readings from Last 1 Encounters:   24 79.4 kg (175 lb)        Anesthesia Evaluation   Pt has  had prior anesthetic.     No history of anesthetic complications       ROS/MED HX  ENT/Pulmonary:    (-) recent URI   Neurologic: Comment: Subdural hematoma of the thoracic spine s/p decompressive laminectomy, now paraplegic with bowel and bladder    (+)                     Spinal cord injury, year sustained: 2018, level of injury: t10 complete, without autonomic hyperflexia symptoms,        Cardiovascular:     (+)  - -  CAD - past MI - stent-  Drug Eluting Stent.                               Previous cardiac testing   Echo: Date: 3/2024 Results:  Left ventricular systolic function is normal.  The visual ejection fraction is 55-60%.  The right ventricular systolic function is normal.  No significant valve dysfunction.    Stress Test:  Date: Results:    ECG Reviewed:  Date: Results:    Cath:  Date: Results:      METS/Exercise Tolerance:     Hematologic:    (-) anemia   Musculoskeletal:       GI/Hepatic:    (-) esophageal disease   Renal/Genitourinary:    (-) renal disease   Endo:    (-) Type II DM   Psychiatric/Substance Use:       Infectious Disease:    (-) Recent Fever   Malignancy:       Other:      (+)  , no H/O Chronic Pain,         Physical Exam    Airway        Mallampati: II       Respiratory Devices and Support         Dental       (+) Minor Abnormalities - some fillings, tiny chips      Cardiovascular             Pulmonary                   OUTSIDE LABS:  CBC:   Lab Results   Component Value Date    WBC 4.8 11/05/2018    WBC 6.6 11/03/2018    HGB 8.5 (L) 11/05/2018    HGB 8.5 (L) 11/05/2018    HCT 28.9 (L) 11/05/2018    HCT 24.5 (L) 11/03/2018     11/05/2018     11/03/2018     BMP:   Lab Results   Component Value Date     02/16/2024     03/16/2023    POTASSIUM 3.8 02/16/2024    POTASSIUM 4.2 03/16/2023    CHLORIDE 100 02/16/2024    CHLORIDE 102 03/16/2023    CO2 26 02/16/2024    CO2 30 (H) 03/16/2023    BUN 16.9 02/16/2024    BUN 13.8 03/16/2023    CR 0.73 02/16/2024    CR 0.80  03/16/2023    GLC 83 02/16/2024    GLC 93 03/16/2023     COAGS:   Lab Results   Component Value Date    PTT 33 05/23/2018    INR 1.20 (H) 05/23/2018    FIBR 407 03/09/2018     POC:   Lab Results   Component Value Date    BGM 89 11/02/2018     HEPATIC:   Lab Results   Component Value Date    ALBUMIN 4.3 02/16/2024    PROTTOTAL 7.5 02/16/2024    ALT 18 02/16/2024    AST 22 02/16/2024    ALKPHOS 123 02/16/2024    BILITOTAL 0.9 02/16/2024     OTHER:   Lab Results   Component Value Date    PH 7.41 03/09/2018    PAT 9.2 02/16/2024    PHOS 2.7 03/12/2018    MAG 1.9 03/12/2018       Anesthesia Plan    ASA Status:  3    NPO Status:  NPO Appropriate    Anesthesia Type: MAC.              Consents    Anesthesia Plan(s) and associated risks, benefits, and realistic alternatives discussed. Questions answered and patient/representative(s) expressed understanding.     - Discussed:     - Discussed with:  Patient            Postoperative Care            Comments:               James Conrad MD    I have reviewed the pertinent notes and labs in the chart from the past 30 days and (re)examined the patient.  Any updates or changes from those notes are reflected in this note.

## 2024-04-07 NOTE — ANESTHESIA POSTPROCEDURE EVALUATION
Patient: Derek Enriquez    Procedure: Procedure(s):  DELAYED PRIMARY CLOSURE SCROTECTOMY SITE       Anesthesia Type:  MAC    Note:  Disposition: Outpatient   Postop Pain Control: Uneventful            Sign Out: Well controlled pain   PONV: No   Neuro/Psych: Uneventful            Sign Out: Acceptable/Baseline neuro status   Airway/Respiratory: Uneventful            Sign Out: Acceptable/Baseline resp. status   CV/Hemodynamics: Uneventful            Sign Out: Acceptable CV status; No obvious hypovolemia; No obvious fluid overload   Other NRE: NONE   DID A NON-ROUTINE EVENT OCCUR?            Last vitals:  Vitals Value Taken Time   /78 04/06/24 1115   Temp 36.6  C (97.8  F) 04/06/24 1115   Pulse 82 04/06/24 1115   Resp 16 04/06/24 1115   SpO2 100 % 04/06/24 1135   Vitals shown include unfiled device data.    Electronically Signed By: James Conrad MD  April 6, 2024  7:22 PM

## 2024-04-08 ENCOUNTER — TELEPHONE (OUTPATIENT)
Dept: PLASTIC SURGERY | Facility: CLINIC | Age: 64
End: 2024-04-08
Payer: COMMERCIAL

## 2024-04-08 NOTE — TELEPHONE ENCOUNTER
Called pt and scheduled for 4/16 at 4:15 pm with Dr. Smith. Will assess drains and determine return date for stitch removal.

## 2024-04-08 NOTE — TELEPHONE ENCOUNTER
[facility] Call Center    Phone Message    May a detailed message be left on voicemail: yes     Reason for Call: Other: Patient calling to schedule post op with Dr Smith. Please contact patient to schedule/      Action Taken: Message routed to:  [facility] Plastic Surg    Travel Screening: Not Applicable

## 2024-04-10 ENCOUNTER — OFFICE VISIT (OUTPATIENT)
Dept: UROLOGY | Facility: CLINIC | Age: 64
End: 2024-04-10
Payer: COMMERCIAL

## 2024-04-10 VITALS
SYSTOLIC BLOOD PRESSURE: 137 MMHG | BODY MASS INDEX: 24.5 KG/M2 | HEART RATE: 80 BPM | DIASTOLIC BLOOD PRESSURE: 84 MMHG | WEIGHT: 175 LBS | HEIGHT: 71 IN

## 2024-04-10 DIAGNOSIS — N31.9 NEUROGENIC BLADDER: Primary | ICD-10-CM

## 2024-04-10 PROCEDURE — 52287 CYSTOSCOPY CHEMODENERVATION: CPT | Mod: 79 | Performed by: UROLOGY

## 2024-04-10 ASSESSMENT — PAIN SCALES - GENERAL: PAINLEVEL: NO PAIN (0)

## 2024-04-10 NOTE — PROGRESS NOTES
"Cystoscopy with Botox Note     PRE-PROCEDURE DIAGNOSIS:  Neurogenic Bladder  POST-PROCEDURE DIAGNOSIS:  Neurogenic Bladder    PROCEDURE:   Cystoscopy with chemodenervation of the bladder     HISTORY: Derek Enriquez is a 63 year old adult with Neurogenic bladder secondary to subdural hematoma at T10 s/p T9-12 laminectomy and evacuation of subdural hematoma eventually leading to paraplegia.  Tried Trospium but significant side effect of constipation. He cannot tolerate anticholinergics.  Performs CIC.  Underwent UDS previously which showed significant NDO with very high pressures starting around 125mL  He has urinary urge incontinence due to his neurogenic DO  Thus, we tried Myrbetriq. He can tolerate the medication without significant side effects. He has a mild improvement of symptoms. However, we repeated urodynamics to ensure safe storage pressures, and they are clearly not safe.   Per UDS note:  \"-Multiple strong UDCs to pressures >110 cm H20, which begin at bladder volumes >160 mL and are associated with incontinene.  -Gradual increase in baseline pressure prior to sudden spike in pressure at fill volume of 163 mL. Pressures slowly return near baseline between each UDC.\"    He now uses Botox which is a big improvement.  He notices a big improvement for about 3-4 months.  He notes his botox wearing off.    He also underwent orchiectomy and scrotectomy 3/25/2024.  Unfortunately, he had a postoperative hematoma which lead to a wound dehiscence.   Dr. Smith managed this in my absence by taking him back to OR for washout and wound closure on 4/6/2024.  She left a drain. He is emptying drain which has about 30-50cc of output daily per our estimation today.  The incision remains intact and there is no ongoing bleeding. Ecchymosis is much improved.      DESCRIPTION OF PROCEDURE:  After informed consent was obtained, the patient was brought to the procedure room where they were placed in supine with all pressure points " well padded.  The patient was prepped and draped in a sterile fashion. A flexible cystoscope was introduced through a well-lubricated urethra. There were no urethral strictures. The bladder was entered. The urine was clear. Systematic cystoscopy was performed. There were no tumors, stones, or other abnormalities in the bladder. Flexible scope and flexible needle were inserted. 200u of Botox was mixed in 10cc normal saline. Systematic injection was performed with 10 locations in a broad template. Minimal bleeding was noted, and scope was removed along with needle.     ASSESSMENT AND PLAN:  Botox injection today successful  Will followup on Tuesday with Dr. Smith and me for drain/wound management.    Cody Stroud MD  Reconstructive Urology

## 2024-04-10 NOTE — NURSING NOTE
The following medication was given:     MEDICATION:  [id number] units   ROUTE: bladder wall   SITE: Medication was via bladder wall  DOSE:  200 units   LOT #: [id number]  :  monty   EXPIRATION DATE: 2026/02  NDC#: 8584-7919-68   Was there drug waste? No    Prior to medication administration, verified patient identity using patient's name and date of birth.  Due to medication administration, patient instructed to remain in clinic for 15 minutes  afterwards, and to report any adverse reaction to me immediately.

## 2024-04-10 NOTE — PATIENT INSTRUCTIONS
"Please follow up with dr yu for a cystoscopy +botox in 4 months..    It was a pleasure meeting with you today.  Thank you for allowing me and my team the privilege of caring for you today.  YOU are the reason we are here, and I truly hope we provided you with the excellent service you deserve.  Please let us know if there is anything else we can do for you so that we can be sure you are leaving completely satisfied with your care experience.        AFTER YOUR CYSTOSCOPY        You have just completed a cystoscopy, or \"cysto\", which allowed your physician to learn more about your bladder (or to remove a stent placed after surgery). We suggest that you continue to avoid caffeine, fruit juice, and alcohol for the next 24 hours, however, you are encouraged to return to your normal activities.         A few things that are considered normal after your cystoscopy:     * Small amount of bleeding (or spotting) that clears within the next 24 hours     * Slight burning sensation with urination     * Sensation to of needing to avoid more frequently     * The feeling of \"air\" in your urine     * Mild discomfort that is relieved with Tylenol        Please contact our office promptly if you:     * Develop a fever above 101 degrees     * Are unable to urinate     * Develop bright red blood that does not stop     * Severe pain or swelling         Please contact our office with any concerns or questions @[email or web address].  "

## 2024-04-10 NOTE — NURSING NOTE
"Chief Complaint   Patient presents with    Cystoscopy     + botox         Blood pressure 137/84, pulse 80, height 1.803 m (5' 11\"), weight 79.4 kg (175 lb). Body mass index is 24.41 kg/m .    Patient Active Problem List   Diagnosis    Cardiac arrest (H)    Coronary atherosclerosis    Stented coronary artery    Spinal cord compression (H)    Subdural hematoma (H)    Post-operative state    Decubitus ulcer of sacral region, stage 3 (H)    Chronic skin ulcer, limited to breakdown of skin (H)    Pressure ulcer of other site, stage 3 (H)    Heterotopic ossification    Other calcification of muscle, left thigh    Status post hip surgery    Ingrown nail    Open wound, left lateral foot    Abnormal CT scan, heart    Dyslipidemia    Family history of premature CAD    Impotence of organic origin    Injury of thoracic spinal cord (H)    Neurogenic bladder    Neurogenic bowel    Orthostatic hypotension    Paraplegia (H)    Preop examination    Coronary artery disease involving native coronary artery of native heart without angina pectoris    Gender dysphoria       No Known Allergies    Current Outpatient Medications   Medication Sig Dispense Refill    ascorbic acid (VITAMIN C) 500 MG tablet Take 500 mg by mouth every morning       atorvastatin (LIPITOR) 20 MG tablet Take 1 tablet (20 mg) by mouth At Bedtime 90 tablet 3    azithromycin (ZITHROMAX Z-EVY) 250 MG tablet Take 1 tablet (250 mg) by mouth daily Take 2 tabs 1st day 6 tablet 0    Multiple Vitamins-Minerals (MULTIVITAMIN MEN PO) Take 1 tablet by mouth daily       tadalafil (CIALIS) 20 MG tablet Take 1 tablet (20 mg) PO PRN prior to sexual activity. Max: 1 tablet per 36 hours. 5 tablet 3    vitamin B complex with vitamin C (STRESS TAB) tablet Take 1 tablet by mouth daily         Social History     Tobacco Use    Smoking status: Former     Types: Cigarettes     Passive exposure: Past    Smokeless tobacco: Never    Tobacco comments:     Quit at 31   Substance Use Topics    " Alcohol use: Yes     Comment: socially    Drug use: No       Invasive Procedure Safety Checklist:    Procedure: Cystoscopy    Action: Complete sections and checkboxes as appropriate.    Pre-procedure:  1. Patient ID Verified with 2 identifiers (Mariluz and  or MRN) : YES    2. Procedure and site verified with patient/designee (when able) : YES    3. Accurate consent documentation in medical record : YES    4. H&P (or appropriate assessment) documented in medical record : N/A  H&P must be up to 30 days prior to procedure an updated within 24 hours of                 Procedure as applicable.     5. Relevant diagnostic and radiology test results appropriately labeled and displayed as applicable : YES    6. Blood products, implants, devices, and/or special equipment available for the procedure as applicable : YES    7. Procedure site(s) marked with provider initials [Exclusions: none] : NO    8. Marking not required. Reason : Yes  Procedure does not require site marking    Time Out:     Time-Out performed immediately prior to starting procedure, including verbal and active participation of all team members addressing: YES    1. Correct patient identity.  2. Confirmed that the correct side and site are marked.  3. An accurate procedure to be done.  4. Agreement on the procedure to be done.  5. Correct patient position.  6. Relevant images and results are properly labeled and appropriately displayed.  7. The need to administer antibiotics or fluids for irrigation purposes during the procedure as applicable.  8. Safety precautions based on patient history or medication use.    During Procedure: Verification of correct person, site, and procedure occurs any time the responsibility for care of the patient is transferred to another member of the care team.          Lynda Díaz  4/10/2024  7:58 AM

## 2024-04-10 NOTE — LETTER
"4/10/2024       RE: Derek Enriquez  [address]  [place] [address]     Dear Colleague,    Thank you for referring your patient, Derek Enriquez, to the [facility] [place] at [facility]. Please see a copy of my visit note below.    Cystoscopy with Botox Note     PRE-PROCEDURE DIAGNOSIS:  Neurogenic Bladder  POST-PROCEDURE DIAGNOSIS:  Neurogenic Bladder    PROCEDURE:   Cystoscopy with chemodenervation of the bladder     HISTORY: Derek Enriquez is a 63 year old adult with Neurogenic bladder secondary to subdural hematoma at T10 s/p T9-12 laminectomy and evacuation of subdural hematoma eventually leading to paraplegia.  Tried Trospium but significant side effect of constipation. He cannot tolerate anticholinergics.  Performs CIC.  Underwent UDS previously which showed significant NDO with very high pressures starting around 125mL  He has urinary urge incontinence due to his neurogenic DO  Thus, we tried Myrbetriq. He can tolerate the medication without significant side effects. He has a mild improvement of symptoms. However, we repeated urodynamics to ensure safe storage pressures, and they are clearly not safe.   Per UDS note:  \"-Multiple strong UDCs to pressures >110 cm H20, which begin at bladder volumes >160 mL and are associated with incontinene.  -Gradual increase in baseline pressure prior to sudden spike in pressure at fill volume of 163 mL. Pressures slowly return near baseline between each UDC.\"    He now uses Botox which is a big improvement.  He notices a big improvement for about 3-4 months.  He notes his botox wearing off.    He also underwent orchiectomy and scrotectomy 3/25/2024.  Unfortunately, he had a postoperative hematoma which lead to a wound dehiscence.   Dr. Smith managed this in my absence by taking him back to OR for washout and wound closure on 4/6/2024.  She left a drain. He is emptying drain which has about " 30-50cc of output daily per our estimation today.  The incision remains intact and there is no ongoing bleeding. Ecchymosis is much improved.      DESCRIPTION OF PROCEDURE:  After informed consent was obtained, the patient was brought to the procedure room where they were placed in supine with all pressure points well padded.  The patient was prepped and draped in a sterile fashion. A flexible cystoscope was introduced through a well-lubricated urethra. There were no urethral strictures. The bladder was entered. The urine was clear. Systematic cystoscopy was performed. There were no tumors, stones, or other abnormalities in the bladder. Flexible scope and flexible needle were inserted. 200u of Botox was mixed in 10cc normal saline. Systematic injection was performed with 10 locations in a broad template. Minimal bleeding was noted, and scope was removed along with needle.     ASSESSMENT AND PLAN:  Botox injection today successful  Will followup on Tuesday with Dr. Smith and me for drain/wound management.    Cody Stroud MD  Reconstructive Urology

## 2024-04-16 ENCOUNTER — OFFICE VISIT (OUTPATIENT)
Dept: PLASTIC SURGERY | Facility: CLINIC | Age: 64
End: 2024-04-16
Payer: COMMERCIAL

## 2024-04-16 VITALS
TEMPERATURE: 98.2 F | SYSTOLIC BLOOD PRESSURE: 117 MMHG | OXYGEN SATURATION: 99 % | HEART RATE: 68 BPM | DIASTOLIC BLOOD PRESSURE: 73 MMHG

## 2024-04-16 DIAGNOSIS — Z98.890 POST-OPERATIVE STATE: Primary | ICD-10-CM

## 2024-04-16 DIAGNOSIS — T81.30XA DEHISCENCE OF PERINEAL WOUND: Primary | ICD-10-CM

## 2024-04-16 PROCEDURE — 99024 POSTOP FOLLOW-UP VISIT: CPT | Performed by: UROLOGY

## 2024-04-16 PROCEDURE — 99024 POSTOP FOLLOW-UP VISIT: CPT | Performed by: SURGERY

## 2024-04-16 ASSESSMENT — PAIN SCALES - GENERAL: PAINLEVEL: NO PAIN (0)

## 2024-04-16 NOTE — LETTER
4/16/2024       RE: Derek Enriquez  [address]  [place] [facility] [place] [facility] [address]     Dear Colleague,    Thank you for referring your patient, Derek Enriquez, to the [facility] PLASTIC AND RECONSTRUCTIVE SURGERY CLINIC [place] at [facility]. Please see a copy of my visit note below.    Plastics Surgery Note  Derek Pendleton is a 63-year-old. Patient of Dr Stroud's we took to OR for hematoma and dehiscence s/p orchiectomy and scrotectomy. Evac and delayed primary closure on 4/6.   Patient presents today with his dog.    The drain is appropriate to be removed today.     At home he has been using a fluff gauze, no other wound dressing.    Total time = 10 minutes, spent on the date of encounter doing chart review, history and physical, dressing changes, documentation, patient education, and any further activity as noted above.     This note was prepared on behalf of Leonora Smith MD by Gabriela Diaz (they/them), a trained medical scribe, based on          Again, thank you for allowing me to participate in the care of your patient.      Sincerely,    Leonora Smith MD

## 2024-04-16 NOTE — PROGRESS NOTES
Followup Note    Derek Enriquez is 63 years old.  He underwent orchiectomy and scrotectomy 3/25/2024.  Unfortunately, he had a postoperative hematoma which lead to a wound dehiscence.   Dr. Smith managed this in my absence by taking him back to OR for washout and wound closure on 4/6/2024.  She left a drain. Drain was still reasonable output so I left it in place during last visit.  The incision remains intact and there is no ongoing bleeding. Ecchymosis is much improved.  Today he returns. Drain is now 20cc/day. It is serous.    /73 (BP Location: Left arm, Patient Position: Sitting, Cuff Size: Adult Regular)   Pulse 68   Temp 98.2  F (36.8  C) (Oral)   SpO2 99%   NAD  Ecchymosis resolved.  Mild swelling of perineum  Drain is serous  Incision remains intact. The nylon sutures remain and they are somewhat ingrowing into the healing incision.  Phallus incision well healed.    ASSESSMENT AND PLAN:  Hematoma after scrotectomy/orchiectomy with postoperative wound now closed.  We are now 10 days postop and seems to be healing well.  Drain removed today  I re-dressed the wound.  Dr. Smith and I examined the wound together and we agreed to leave the permanent sutures in place for another week.  Plan for RTC next week and we'll likely remove the permanent sutures depending on how it's healing.    Cody Stroud MD  Reconstructive Urology

## 2024-04-16 NOTE — NURSING NOTE
Chief Complaint   Patient presents with    Surgical Followup     Follow-up to discuss drain removal, DOS        Vitals:    04/16/24 1449   BP: 117/73   BP Location: Left arm   Patient Position: Sitting   Cuff Size: Adult Regular   Pulse: 68   Temp: 98.2  F (36.8  C)   TempSrc: Oral   SpO2: 99%       There is no height or weight on file to calculate BMI.      Yoshi Molina, EMT

## 2024-04-16 NOTE — PROGRESS NOTES
Plastics Surgery Note  Derek Pendleton is a 63-year-old. Patient who has undergone cystoscopy with chemodenervation of the bladder and neurogenic bladder.     Patient presents today with his dog.    The drain is appropriate to be removed today.     At home he has been using a fluff and bladder pack, no other wound dressing.    Total time = *** minutes, spent on the date of encounter doing chart review, history and physical, dressing changes, documentation, patient education, and any further activity as noted above.     This note was prepared on behalf of Leonora Smith MD by Gabriela Diaz (they/them), a trained medical scribe, based on

## 2024-04-16 NOTE — LETTER
4/16/2024       RE: Derek Enriquez  [address]  [place] [place] [facility] [address]     Dear Colleague,    Thank you for referring your patient, Derek Enriquez, to the [facility] PLASTIC AND RECONSTRUCTIVE SURGERY CLINIC [place] at [facility]. Please see a copy of my visit note below.    Followup Note    Derek Enriquez is 63 years old.  He underwent orchiectomy and scrotectomy 3/25/2024.  Unfortunately, he had a postoperative hematoma which lead to a wound dehiscence.   Dr. Smith managed this in my absence by taking him back to OR for washout and wound closure on 4/6/2024.  She left a drain. Drain was still reasonable output so I left it in place during last visit.  The incision remains intact and there is no ongoing bleeding. Ecchymosis is much improved.  Today he returns. Drain is now 20cc/day. It is serous.    /73 (BP Location: Left arm, Patient Position: Sitting, Cuff Size: Adult Regular)   Pulse 68   Temp 98.2  F (36.8  C) (Oral)   SpO2 99%   NAD  Ecchymosis resolved.  Mild swelling of perineum  Drain is serous  Incision remains intact. The nylon sutures remain and they are somewhat ingrowing into the healing incision.  Phallus incision well healed.    ASSESSMENT AND PLAN:  Hematoma after scrotectomy/orchiectomy with postoperative wound now closed.  We are now 10 days postop and seems to be healing well.  Drain removed today  I re-dressed the wound.  Dr. Smith and I examined the wound together and we agreed to leave the permanent sutures in place for another week.  Plan for RTC next week and we'll likely remove the permanent sutures depending on how it's healing.        Again, thank you for allowing me to participate in the care of your patient.      Sincerely,    Cody Stroud MD

## 2024-04-23 ENCOUNTER — OFFICE VISIT (OUTPATIENT)
Dept: PLASTIC SURGERY | Facility: CLINIC | Age: 64
End: 2024-04-23
Payer: COMMERCIAL

## 2024-04-23 VITALS — SYSTOLIC BLOOD PRESSURE: 133 MMHG | DIASTOLIC BLOOD PRESSURE: 82 MMHG | OXYGEN SATURATION: 97 % | HEART RATE: 73 BPM

## 2024-04-23 DIAGNOSIS — T81.30XA DEHISCENCE OF PERINEAL WOUND: Primary | ICD-10-CM

## 2024-04-23 PROCEDURE — 99024 POSTOP FOLLOW-UP VISIT: CPT | Performed by: UROLOGY

## 2024-04-23 ASSESSMENT — PAIN SCALES - GENERAL: PAINLEVEL: NO PAIN (0)

## 2024-04-23 NOTE — LETTER
4/23/2024       RE: Derek Enriquez  [address]  [facility] [address]       Dear Colleague,    Thank you for referring your patient, Derek Enriquez, to the [facility] PLASTIC AND RECONSTRUCTIVE SURGERY CLINIC [place] at [facility]. Please see a copy of my visit note below.    Followup Note    Derek Enriquez is 63 years old.  He underwent orchiectomy and scrotectomy 3/25/2024.  Unfortunately, he had a postoperative hematoma which lead to a wound dehiscence.   Dr. Smith managed this in my absence by taking him back to OR for washout and wound closure on 4/6/2024.  She left a drain. Drain was still reasonable output so I left it in place during last visit.  The incision remains intact and there is no ongoing bleeding. Ecchymosis is much improved.  Drain  was removed during last visit.  Today he returns for ongoing wound check and suture removal.  Perineum is generally stable/improved.    /82 (BP Location: Right arm, Patient Position: Sitting, Cuff Size: Adult Regular)   Pulse 73   SpO2 97%   NAD  Ecchymosis resolved.  Mild swelling of perineum  Drain is serous  Incision remains intact. The nylon sutures remain and they are somewhat ingrowing into the healing incision.  Phallus incision well healed. There is a spot on proximal phallus with minor inversion of incision.    ASSESSMENT AND PLAN:  Hematoma after scrotectomy/orchiectomy with postoperative wound now closed.  We removed all permanent sutures during today's visit.  Incision appears to continue to heal and there is no sign of hematoma or wound dehiscence.  He is still concerned about proximal pendulous skin inversion. I discussed ongoing skin remodeling and incision healing occurs for > 6 months thus I plan no additional surgeries at this time.  Continue local wound cares. He already has followup for botox to bladder in August with me.          Again, thank you for allowing me to participate in  the care of your patient.      Sincerely,    Cody Stroud MD

## 2024-04-23 NOTE — NURSING NOTE
Chief Complaint   Patient presents with    Surgical Followup     Follow-up to evaluate suture removal.       Vitals:    04/23/24 1427   BP: 133/82   BP Location: Right arm   Patient Position: Sitting   Cuff Size: Adult Regular   Pulse: 73   SpO2: 97%       There is no height or weight on file to calculate BMI.      Yoshi Molina, EMT

## 2024-04-26 NOTE — PROGRESS NOTES
Followup Note    Derek Enriquez is 63 years old.  He underwent orchiectomy and scrotectomy 3/25/2024.  Unfortunately, he had a postoperative hematoma which lead to a wound dehiscence.   Dr. Smith managed this in my absence by taking him back to OR for washout and wound closure on 4/6/2024.  She left a drain. Drain was still reasonable output so I left it in place during last visit.  The incision remains intact and there is no ongoing bleeding. Ecchymosis is much improved.  Drain  was removed during last visit.  Today he returns for ongoing wound check and suture removal.  Perineum is generally stable/improved.    /82 (BP Location: Right arm, Patient Position: Sitting, Cuff Size: Adult Regular)   Pulse 73   SpO2 97%   NAD  Ecchymosis resolved.  Mild swelling of perineum  Drain is serous  Incision remains intact. The nylon sutures remain and they are somewhat ingrowing into the healing incision.  Phallus incision well healed. There is a spot on proximal phallus with minor inversion of incision.    ASSESSMENT AND PLAN:  Hematoma after scrotectomy/orchiectomy with postoperative wound now closed.  We removed all permanent sutures during today's visit.  Incision appears to continue to heal and there is no sign of hematoma or wound dehiscence.  He is still concerned about proximal pendulous skin inversion. I discussed ongoing skin remodeling and incision healing occurs for > 6 months thus I plan no additional surgeries at this time.  Continue local wound cares. He already has followup for botox to bladder in August with me.    Cody Stroud MD  Reconstructive Urology

## 2024-04-29 DIAGNOSIS — Z95.5 H/O HEART ARTERY STENT: ICD-10-CM

## 2024-04-30 RX ORDER — ATORVASTATIN CALCIUM 20 MG/1
20 TABLET, FILM COATED ORAL AT BEDTIME
Qty: 90 TABLET | Refills: 3 | Status: SHIPPED | OUTPATIENT
Start: 2024-04-30

## 2024-04-30 NOTE — TELEPHONE ENCOUNTER
LVD:  2/19/2024  [facility] Anoop Mayorga MD  Cardiology Atherosclerosis of native coronary artery     LDL Cholesterol Calculated   Date Value Ref Range Status   02/16/2024 78 <=100 mg/dL Final   03/22/2021 52 <100 mg/dL Final     Comment:     Desirable:       <100 mg/dl     LDL Cholesterol Direct   Date Value Ref Range Status   02/16/2024 86 <100 mg/dL Final     Comment:     Age 2-19 years:  Desirable: 0-110 mg/dL   Borderline high: 110-129 mg/dL   High: >= 130 mg/dL    Age 20 years and older:  Desirable: <100mg/dL  Above desirable: 100-129 mg/dL   Borderline high: 130-159 mg/dL   High: 160-189 mg/dL   Very high: >= 190 mg/dL       Refilled per protocol.

## 2024-06-05 ENCOUNTER — DOCUMENTATION ONLY (OUTPATIENT)
Dept: UROLOGY | Facility: CLINIC | Age: 64
End: 2024-06-05
Payer: COMMERCIAL

## 2024-06-05 ENCOUNTER — MEDICAL CORRESPONDENCE (OUTPATIENT)
Dept: HEALTH INFORMATION MANAGEMENT | Facility: CLINIC | Age: 64
End: 2024-06-05
Payer: COMMERCIAL

## 2024-06-05 NOTE — PROGRESS NOTES
Type of Form Received: Order (catheter)    Form Received (Date) 6/5/24   Form Filled out Yes, date 6/5/24   Placed in provider folder Yes       Received Completed forms Yes   Faxed Forms Faxed To: [facility]  Fax Number: [phone]   Sent to HIM (Date) 6/5/24

## 2024-07-31 ENCOUNTER — PRE VISIT (OUTPATIENT)
Dept: UROLOGY | Facility: CLINIC | Age: 64
End: 2024-07-31
Payer: COMMERCIAL

## 2024-07-31 DIAGNOSIS — N31.9 NEUROGENIC BLADDER: Primary | ICD-10-CM

## 2024-07-31 NOTE — TELEPHONE ENCOUNTER
Reason for visit: cystoscopy + botox (pt usually does this in his chair)   Dx/Hx/Sx: NGB    Records/imaging/labs/orders: in epic     At Rooming: consent- ask pt if pt would like lido- verify botox mix with provider

## 2024-08-14 ENCOUNTER — OFFICE VISIT (OUTPATIENT)
Dept: UROLOGY | Facility: CLINIC | Age: 64
End: 2024-08-14
Payer: COMMERCIAL

## 2024-08-14 VITALS
WEIGHT: 170 LBS | HEIGHT: 71 IN | DIASTOLIC BLOOD PRESSURE: 83 MMHG | HEART RATE: 75 BPM | BODY MASS INDEX: 23.8 KG/M2 | SYSTOLIC BLOOD PRESSURE: 125 MMHG

## 2024-08-14 DIAGNOSIS — N31.9 NEUROGENIC BLADDER: Primary | ICD-10-CM

## 2024-08-14 PROCEDURE — 52287 CYSTOSCOPY CHEMODENERVATION: CPT | Performed by: UROLOGY

## 2024-08-14 RX ORDER — CIPROFLOXACIN 500 MG/1
500 TABLET, FILM COATED ORAL ONCE
Status: COMPLETED | OUTPATIENT
Start: 2024-08-14 | End: 2024-08-14

## 2024-08-14 RX ADMIN — CIPROFLOXACIN 500 MG: 500 TABLET, FILM COATED ORAL at 08:56

## 2024-08-14 ASSESSMENT — PAIN SCALES - GENERAL: PAINLEVEL: NO PAIN (0)

## 2024-08-14 NOTE — NURSING NOTE
"Chief Complaint   Patient presents with    Cystoscopy   +botox    Blood pressure 125/83, pulse 75, height 1.803 m (5' 11\"), weight 77.1 kg (170 lb). Body mass index is 23.71 kg/m .    Patient Active Problem List   Diagnosis    Cardiac arrest (H)    Coronary atherosclerosis    Stented coronary artery    Spinal cord compression (H)    Subdural hematoma (H)    Post-operative state    Decubitus ulcer of sacral region, stage 3 (H)    Chronic skin ulcer, limited to breakdown of skin (H)    Pressure ulcer of other site, stage 3 (H)    Heterotopic ossification    Other calcification of muscle, left thigh    Status post hip surgery    Ingrown nail    Open wound, left lateral foot    Abnormal CT scan, heart    Dyslipidemia    Family history of premature CAD    Impotence of organic origin    Injury of thoracic spinal cord (H)    Neurogenic bladder    Neurogenic bowel    Orthostatic hypotension    Paraplegia (H)    Preop examination    Coronary artery disease involving native coronary artery of native heart without angina pectoris    Gender dysphoria       No Known Allergies    Current Outpatient Medications   Medication Sig Dispense Refill    ascorbic acid (VITAMIN C) 500 MG tablet Take 500 mg by mouth every morning       atorvastatin (LIPITOR) 20 MG tablet Take 1 tablet (20 mg) by mouth at bedtime 90 tablet 3    Multiple Vitamins-Minerals (MULTIVITAMIN MEN PO) Take 1 tablet by mouth daily       vitamin B complex with vitamin C (STRESS TAB) tablet Take 1 tablet by mouth daily      azithromycin (ZITHROMAX Z-EVY) 250 MG tablet Take 1 tablet (250 mg) by mouth daily Take 2 tabs 1st day (Patient not taking: Reported on 8/14/2024) 6 tablet 0    tadalafil (CIALIS) 20 MG tablet Take 1 tablet (20 mg) PO PRN prior to sexual activity. Max: 1 tablet per 36 hours. (Patient not taking: Reported on 8/14/2024) 5 tablet 3       Social History     Tobacco Use    Smoking status: Former     Types: Cigarettes     Passive exposure: Past    " Smokeless tobacco: Never    Tobacco comments:     Quit at 31   Substance Use Topics    Alcohol use: Yes     Comment: socially    Drug use: No       Invasive Procedure Safety Checklist:    Procedure: Cystoscopy    Action: Complete sections and checkboxes as appropriate.    Pre-procedure:  1. Patient ID Verified with 2 identifiers (Mariluz and  or MRN) : YES    2. Procedure and site verified with patient/designee (when able) : YES    3. Accurate consent documentation in medical record : YES    4. H&P (or appropriate assessment) documented in medical record : N/A  H&P must be up to 30 days prior to procedure an updated within 24 hours of                 Procedure as applicable.     5. Relevant diagnostic and radiology test results appropriately labeled and displayed as applicable : YES    6. Blood products, implants, devices, and/or special equipment available for the procedure as applicable : YES    7. Procedure site(s) marked with provider initials [Exclusions: none] : NO    8. Marking not required. Reason : Yes  Procedure does not require site marking    Time Out:     Time-Out performed immediately prior to starting procedure, including verbal and active participation of all team members addressing: YES    1. Correct patient identity.  2. Confirmed that the correct side and site are marked.  3. An accurate procedure to be done.  4. Agreement on the procedure to be done.  5. Correct patient position.  6. Relevant images and results are properly labeled and appropriately displayed.  7. The need to administer antibiotics or fluids for irrigation purposes during the procedure as applicable.  8. Safety precautions based on patient history or medication use.    During Procedure: Verification of correct person, site, and procedure occurs any time the responsibility for care of the patient is transferred to another member of the care team.        Lynda Díaz  2024  8:07 AM

## 2024-08-14 NOTE — LETTER
"8/14/2024       RE: Derek Enriquez  [address]  [facility] [address]     Dear Colleague,    Thank you for referring your patient, Derek Enriquez, to the [facility] UROLOGY CLINIC [place] at [place]. Please see a copy of my visit note below.    Cystoscopy with Botox Note     PRE-PROCEDURE DIAGNOSIS:  Neurogenic Bladder  POST-PROCEDURE DIAGNOSIS:  Neurogenic Bladder    PROCEDURE:   Cystoscopy with chemodenervation of the bladder     HISTORY: Derek Enriquez is a 63 year old adult with Neurogenic bladder secondary to subdural hematoma at T10 s/p T9-12 laminectomy and evacuation of subdural hematoma eventually leading to paraplegia.  Tried Trospium but significant side effect of constipation. He cannot tolerate anticholinergics.  Performs CIC.  Underwent UDS previously which showed significant NDO with very high pressures starting around 125mL  He has urinary urge incontinence due to his neurogenic DO  Thus, we tried Myrbetriq. He can tolerate the medication without significant side effects. He has a mild improvement of symptoms. However, we repeated urodynamics to ensure safe storage pressures, and they are clearly not safe.   Per UDS note:  \"-Multiple strong UDCs to pressures >110 cm H20, which begin at bladder volumes >160 mL and are associated with incontinene.  -Gradual increase in baseline pressure prior to sudden spike in pressure at fill volume of 163 mL. Pressures slowly return near baseline between each UDC.\"    He now uses Botox which is a big improvement.  He notices a big improvement for about 3-4 months.  He notes his botox wearing off.    He also underwent orchiectomy and scrotectomy 3/25/2024.  He did have a postop hematoma which was washed out and now resolved.  Overall it has healed well and happy with the care.  Here for repeat Botox injection to bladder.      DESCRIPTION OF PROCEDURE:  After informed consent was obtained, the patient was " brought to the procedure room where they were placed in supine with all pressure points well padded.  The patient was prepped and draped in a sterile fashion. A flexible cystoscope was introduced through a well-lubricated urethra. There were no urethral strictures. The bladder was entered. The urine was clear. Systematic cystoscopy was performed. There were no tumors, stones, or other abnormalities in the bladder. Flexible scope and flexible needle were inserted. 200u of Botox was mixed in 10cc normal saline. Systematic injection was performed with 10 locations in a broad template. Minimal bleeding was noted, and scope was removed along with needle.     ASSESSMENT AND PLAN:  Botox injection today successful  Followup in 4 months for repeat injection.,    Cody Stroud MD  Reconstructive Urology      Again, thank you for allowing me to participate in the care of your patient.      Sincerely,    Cody Stroud MD

## 2024-08-14 NOTE — PROGRESS NOTES
"Cystoscopy with Botox Note     PRE-PROCEDURE DIAGNOSIS:  Neurogenic Bladder  POST-PROCEDURE DIAGNOSIS:  Neurogenic Bladder    PROCEDURE:   Cystoscopy with chemodenervation of the bladder     HISTORY: Derek Enriquez is a 63 year old adult with Neurogenic bladder secondary to subdural hematoma at T10 s/p T9-12 laminectomy and evacuation of subdural hematoma eventually leading to paraplegia.  Tried Trospium but significant side effect of constipation. He cannot tolerate anticholinergics.  Performs CIC.  Underwent UDS previously which showed significant NDO with very high pressures starting around 125mL  He has urinary urge incontinence due to his neurogenic DO  Thus, we tried Myrbetriq. He can tolerate the medication without significant side effects. He has a mild improvement of symptoms. However, we repeated urodynamics to ensure safe storage pressures, and they are clearly not safe.   Per UDS note:  \"-Multiple strong UDCs to pressures >110 cm H20, which begin at bladder volumes >160 mL and are associated with incontinene.  -Gradual increase in baseline pressure prior to sudden spike in pressure at fill volume of 163 mL. Pressures slowly return near baseline between each UDC.\"    He now uses Botox which is a big improvement.  He notices a big improvement for about 3-4 months.  He notes his botox wearing off.    He also underwent orchiectomy and scrotectomy 3/25/2024.  He did have a postop hematoma which was washed out and now resolved.  Overall it has healed well and happy with the care.  Here for repeat Botox injection to bladder.      DESCRIPTION OF PROCEDURE:  After informed consent was obtained, the patient was brought to the procedure room where they were placed in supine with all pressure points well padded.  The patient was prepped and draped in a sterile fashion. A flexible cystoscope was introduced through a well-lubricated urethra. There were no urethral strictures. The bladder was entered. The urine was " clear. Systematic cystoscopy was performed. There were no tumors, stones, or other abnormalities in the bladder. Flexible scope and flexible needle were inserted. 200u of Botox was mixed in 10cc normal saline. Systematic injection was performed with 10 locations in a broad template. Minimal bleeding was noted, and scope was removed along with needle.     ASSESSMENT AND PLAN:  Botox injection today successful  Followup in 4 months for repeat injection.,    Cody Stroud MD  Reconstructive Urology

## 2024-08-14 NOTE — NURSING NOTE
The following medication was given:     MEDICATION: Ciprofloxacin  ROUTE: PO  SITE: Medication was given orally  DOSE: 500mg  LOT #: [id number]  : [employer]  EXPIRATION DATE: 11/2024  NDC#: 0400-0664-61   Was there drug waste? No    Prior to medication administration, verified patient identity using patient's name and date of birth.  Due to medication administration, patient instructed to remain in clinic for 15 minutes  afterwards, and to report any adverse reaction to me immediately.     The following medication was given:     MEDICATION:  Botox   ROUTE: IM  SITE: Medication was injected via bladder wall  DOSE:  200 units  LOT #: [id number]  :  [employer]  EXPIRATION DATE: 2026/02  NDC#: 4215-8985-74   Was there drug waste? No    Prior to medication administration, verified patient identity using patient's name and date of birth.  Due to medication administration, patient instructed to remain in clinic for 15 minutes  afterwards, and to report any adverse reaction to me immediately.

## 2024-08-14 NOTE — PATIENT INSTRUCTIONS
"Please follow up in 4 months for a repeat cystoscopy + botox.    It was a pleasure meeting with you today.  Thank you for allowing me and my team the privilege of caring for you today.  YOU are the reason we are here, and I truly hope we provided you with the excellent service you deserve.  Please let us know if there is anything else we can do for you so that we can be sure you are leaving completely satisfied with your care experience.        AFTER YOUR CYSTOSCOPY        You have just completed a cystoscopy, or \"cysto\", which allowed your physician to learn more about your bladder (or to remove a stent placed after surgery). We suggest that you continue to avoid caffeine, fruit juice, and alcohol for the next 24 hours, however, you are encouraged to return to your normal activities.         A few things that are considered normal after your cystoscopy:     * Small amount of bleeding (or spotting) that clears within the next 24 hours     * Slight burning sensation with urination     * Sensation to of needing to avoid more frequently     * The feeling of \"air\" in your urine     * Mild discomfort that is relieved with Tylenol        Please contact our office promptly if you:     * Develop a fever above 101 degrees     * Are unable to urinate     * Develop bright red blood that does not stop     * Severe pain or swelling         Please contact our office with any concerns or questions @[email or web address].  "

## 2024-08-23 ENCOUNTER — TELEPHONE (OUTPATIENT)
Dept: CARDIOLOGY | Facility: CLINIC | Age: 64
End: 2024-08-23
Payer: COMMERCIAL

## 2024-08-23 NOTE — TELEPHONE ENCOUNTER
"Writer talked with pt and relayed Dr. Jane' message. Pt verbalized understanding.    \"Patient got an echocardiogram in March with a good results.    He does not need a new echocardiogram except if he would have shortness of breath\"      "

## 2024-08-23 NOTE — TELEPHONE ENCOUNTER
[facility] Call Center    Phone Message    May a detailed message be left on voicemail: yes    Reason for Call: Other: Patient called requesting to speak with . Patient would like to know if  would like him to have an echo. Currently there is no active request for one. Please call back to further discuss.     Action Taken: Message routed to:  Other: Cardiology    Travel Screening: Not Applicable     Thank you!  [facility]

## 2024-11-27 ENCOUNTER — PRE VISIT (OUTPATIENT)
Dept: UROLOGY | Facility: CLINIC | Age: 64
End: 2024-11-27
Payer: COMMERCIAL

## 2024-12-10 DIAGNOSIS — N31.9 NEUROGENIC BLADDER: Primary | ICD-10-CM

## 2024-12-12 DIAGNOSIS — N31.9 NEUROGENIC BLADDER: Primary | ICD-10-CM

## 2024-12-12 RX ORDER — CIPROFLOXACIN 500 MG/1
500 TABLET, FILM COATED ORAL DAILY
Qty: 3 TABLET | Refills: 0 | Status: SHIPPED | OUTPATIENT
Start: 2024-12-17 | End: 2024-12-20

## 2024-12-18 ENCOUNTER — OFFICE VISIT (OUTPATIENT)
Dept: UROLOGY | Facility: CLINIC | Age: 64
End: 2024-12-18
Payer: COMMERCIAL

## 2024-12-18 VITALS
HEART RATE: 64 BPM | DIASTOLIC BLOOD PRESSURE: 84 MMHG | WEIGHT: 170 LBS | OXYGEN SATURATION: 96 % | BODY MASS INDEX: 23.8 KG/M2 | SYSTOLIC BLOOD PRESSURE: 120 MMHG | HEIGHT: 71 IN

## 2024-12-18 DIAGNOSIS — N31.9 NEUROGENIC BLADDER: Primary | ICD-10-CM

## 2024-12-18 PROCEDURE — 52287 CYSTOSCOPY CHEMODENERVATION: CPT | Performed by: UROLOGY

## 2024-12-18 RX ORDER — ASPIRIN 81 MG/1
81 TABLET ORAL DAILY
COMMUNITY

## 2024-12-18 RX ORDER — TESTOSTERONE 20.25 MG/1.25G
GEL TOPICAL
COMMUNITY

## 2024-12-18 ASSESSMENT — PAIN SCALES - GENERAL: PAINLEVEL_OUTOF10: MODERATE PAIN (4)

## 2024-12-18 NOTE — PROGRESS NOTES
BOTOX INJECTION PROCEDURE NOTE     Date: 12/18/2024    Dosage: 200 units     Route of Administration(s): via bladder wall  Frequency of Injection(s) every 4 months    Botox Lot No: [id number]  Number of units injected: 200u  Expiration date : 2027/02  NDC: [id number]

## 2024-12-18 NOTE — PATIENT INSTRUCTIONS
"Please follow up in 4 months for a repeat botox   AFTER YOUR CYSTOSCOPY        You have just completed a cystoscopy, or \"cysto\", which allowed your physician to learn more about your bladder (or to remove a stent placed after surgery). We suggest that you continue to avoid caffeine, fruit juice, and alcohol for the next 24 hours, however, you are encouraged to return to your normal activities.         A few things that are considered normal after your cystoscopy:     * Small amount of bleeding (or spotting) that clears within the next 24 hours     * Slight burning sensation with urination     * Sensation to of needing to avoid more frequently     * The feeling of \"air\" in your urine     * Mild discomfort that is relieved with Tylenol        Please contact our office promptly if you:     * Develop a fever above 101 degrees     * Are unable to urinate     * Develop bright red blood that does not stop     * Severe pain or swelling         Please contact our office with any concerns or questions @[email or web address].  "

## 2024-12-18 NOTE — LETTER
"12/18/2024       RE: Derek Enriquez  [address]  [facility] [address]     Dear Colleague,    Thank you for referring your patient, Derek Enriquez, to the [facility] UROLOGY CLINIC [place] at [facility]. Please see a copy of my visit note below.    Cystoscopy with Botox Note     PRE-PROCEDURE DIAGNOSIS:  Neurogenic Bladder  POST-PROCEDURE DIAGNOSIS:  Neurogenic Bladder    PROCEDURE:   Cystoscopy with chemodenervation of the bladder     HISTORY: Derek Enriquez is a 64 year old adult with Neurogenic bladder secondary to subdural hematoma at T10 s/p T9-12 laminectomy and evacuation of subdural hematoma eventually leading to paraplegia.  Tried Trospium but significant side effect of constipation. He cannot tolerate anticholinergics.  Performs CIC.  Underwent UDS previously which showed significant NDO with very high pressures starting around 125mL  He has urinary urge incontinence due to his neurogenic DO  Thus, we tried Myrbetriq. He can tolerate the medication without significant side effects. He has a mild improvement of symptoms. However, we repeated urodynamics to ensure safe storage pressures, and they are clearly not safe.   Per UDS note:  \"-Multiple strong UDCs to pressures >110 cm H20, which begin at bladder volumes >160 mL and are associated with incontinene.  -Gradual increase in baseline pressure prior to sudden spike in pressure at fill volume of 163 mL. Pressures slowly return near baseline between each UDC.\"    He now uses Botox which is a big improvement.  He notices a big improvement for about 3-4 months.  He notes his botox wearing off.    He also underwent orchiectomy and scrotectomy 3/25/2024.    Last Botox injection was August 2024. Not as good as response as usual.  Now takes     DESCRIPTION OF PROCEDURE:  After informed consent was obtained, the patient was brought to the procedure room where they were placed in supine with all pressure " points well padded.  The patient was prepped and draped in a sterile fashion. A flexible cystoscope was introduced through a well-lubricated urethra. There were no urethral strictures. The bladder was entered. The urine was clear. Systematic cystoscopy was performed. There were no tumors, stones, or other abnormalities in the bladder. Flexible scope and flexible needle were inserted. 200u of Botox was mixed in 10cc normal saline. Systematic injection was performed with 10 locations in a broad template. Minimal bleeding was noted, and scope was removed along with needle.     ASSESSMENT AND PLAN:  Botox injection today successful  Followup in 4 months for repeat injection.    Cody Stroud MD  Reconstructive Urology      Again, thank you for allowing me to participate in the care of your patient.      Sincerely,    Cody Stroud MD

## 2024-12-18 NOTE — NURSING NOTE
"Chief Complaint   Patient presents with    Cystoscopy     Botox  Pt has a question about botox not working as well        Blood pressure 120/84, pulse 64, height 1.803 m (5' 11\"), weight 77.1 kg (170 lb), SpO2 96%. Body mass index is 23.71 kg/m .    Patient Active Problem List   Diagnosis    Cardiac arrest (H)    Coronary atherosclerosis    Stented coronary artery    Spinal cord compression (H)    Subdural hematoma (H)    Post-operative state    Decubitus ulcer of sacral region, stage 3 (H)    Chronic skin ulcer, limited to breakdown of skin (H)    Pressure ulcer of other site, stage 3 (H)    Heterotopic ossification    Other calcification of muscle, left thigh    Status post hip surgery    Ingrown nail    Open wound, left lateral foot    Abnormal CT scan, heart    Dyslipidemia    Family history of premature CAD    Impotence of organic origin    Injury of thoracic spinal cord (H)    Neurogenic bladder    Neurogenic bowel    Orthostatic hypotension    Paraplegia (H)    Preop examination    Coronary artery disease involving native coronary artery of native heart without angina pectoris    Gender dysphoria       No Known Allergies    Current Outpatient Medications   Medication Sig Dispense Refill    ascorbic acid (VITAMIN C) 500 MG tablet Take 500 mg by mouth every morning       aspirin 81 MG EC tablet Take 81 mg by mouth daily.      atorvastatin (LIPITOR) 20 MG tablet Take 1 tablet (20 mg) by mouth at bedtime 90 tablet 3    ciprofloxacin (CIPRO) 500 MG tablet Take 1 tablet (500 mg) by mouth daily for 3 days. 3 tablet 0    Multiple Vitamins-Minerals (MULTIVITAMIN MEN PO) Take 1 tablet by mouth daily       Testosterone 1.62 % GEL APPLY EXTERNALLY 1 PUMP TO SPECIFIC AREA OF SKIN EVERY MORNING      vitamin B complex with vitamin C (STRESS TAB) tablet Take 1 tablet by mouth daily      azithromycin (ZITHROMAX Z-EVY) 250 MG tablet Take 1 tablet (250 mg) by mouth daily Take 2 tabs 1st day (Patient not taking: Reported on " 2024) 6 tablet 0    tadalafil (CIALIS) 20 MG tablet Take 1 tablet (20 mg) PO PRN prior to sexual activity. Max: 1 tablet per 36 hours. (Patient not taking: Reported on 2024) 5 tablet 3       Social History     Tobacco Use    Smoking status: Former     Types: Cigarettes     Passive exposure: Past    Smokeless tobacco: Never    Tobacco comments:     Quit at 31   Substance Use Topics    Alcohol use: Yes     Comment: socially    Drug use: No       Invasive Procedure Safety Checklist:    Procedure: Cystoscopy    Action: Complete sections and checkboxes as appropriate.    Pre-procedure:  1. Patient ID Verified with 2 identifiers (Mariluz and  or MRN) : YES    2. Procedure and site verified with patient/designee (when able) : YES    3. Accurate consent documentation in medical record : YES    4. H&P (or appropriate assessment) documented in medical record : N/A  H&P must be up to 30 days prior to procedure an updated within 24 hours of                 Procedure as applicable.     5. Relevant diagnostic and radiology test results appropriately labeled and displayed as applicable : YES    6. Blood products, implants, devices, and/or special equipment available for the procedure as applicable : YES    7. Procedure site(s) marked with provider initials [Exclusions: none] : NO    8. Marking not required. Reason : Yes  Procedure does not require site marking    Time Out:     Time-Out performed immediately prior to starting procedure, including verbal and active participation of all team members addressing: YES    1. Correct patient identity.  2. Confirmed that the correct side and site are marked.  3. An accurate procedure to be done.  4. Agreement on the procedure to be done.  5. Correct patient position.  6. Relevant images and results are properly labeled and appropriately displayed.  7. The need to administer antibiotics or fluids for irrigation purposes during the procedure as applicable.  8. Safety precautions  based on patient history or medication use.    During Procedure: Verification of correct person, site, and procedure occurs any time the responsibility for care of the patient is transferred to another member of the care team.        Lynda Díaz  12/18/2024  8:19 AM

## 2024-12-18 NOTE — PROGRESS NOTES
"Cystoscopy with Botox Note     PRE-PROCEDURE DIAGNOSIS:  Neurogenic Bladder  POST-PROCEDURE DIAGNOSIS:  Neurogenic Bladder    PROCEDURE:   Cystoscopy with chemodenervation of the bladder     HISTORY: Derek Enriquez is a 64 year old adult with Neurogenic bladder secondary to subdural hematoma at T10 s/p T9-12 laminectomy and evacuation of subdural hematoma eventually leading to paraplegia.  Tried Trospium but significant side effect of constipation. He cannot tolerate anticholinergics.  Performs CIC.  Underwent UDS previously which showed significant NDO with very high pressures starting around 125mL  He has urinary urge incontinence due to his neurogenic DO  Thus, we tried Myrbetriq. He can tolerate the medication without significant side effects. He has a mild improvement of symptoms. However, we repeated urodynamics to ensure safe storage pressures, and they are clearly not safe.   Per UDS note:  \"-Multiple strong UDCs to pressures >110 cm H20, which begin at bladder volumes >160 mL and are associated with incontinene.  -Gradual increase in baseline pressure prior to sudden spike in pressure at fill volume of 163 mL. Pressures slowly return near baseline between each UDC.\"    He now uses Botox which is a big improvement.  He notices a big improvement for about 3-4 months.  He notes his botox wearing off.    He also underwent orchiectomy and scrotectomy 3/25/2024.    Last Botox injection was August 2024. Not as good as response as usual.  Now takes     DESCRIPTION OF PROCEDURE:  After informed consent was obtained, the patient was brought to the procedure room where they were placed in supine with all pressure points well padded.  The patient was prepped and draped in a sterile fashion. A flexible cystoscope was introduced through a well-lubricated urethra. There were no urethral strictures. The bladder was entered. The urine was clear. Systematic cystoscopy was performed. There were no tumors, stones, or other " abnormalities in the bladder. Flexible scope and flexible needle were inserted. 200u of Botox was mixed in 10cc normal saline. Systematic injection was performed with 10 locations in a broad template. Minimal bleeding was noted, and scope was removed along with needle.     ASSESSMENT AND PLAN:  Botox injection today successful  Followup in 4 months for repeat injection.    Cody Stroud MD  Reconstructive Urology

## 2024-12-22 ENCOUNTER — HEALTH MAINTENANCE LETTER (OUTPATIENT)
Age: 64
End: 2024-12-22

## 2025-02-10 NOTE — TELEPHONE ENCOUNTER
Reason for visit: cystoscopy + botox 200 units      Dx/Hx/Sx: botox orders in epic - active - botox was put in on 8/14/24    Records/imaging/labs/orders: in [facility]     At Rooming: consent - ask pt if he would like lido (pt would most likely not but ask just incase)  -have y adapter / botox needle / scissors / biohazard bin readily available    - verify botox units with provider  
4 (moderate pain)

## 2025-03-24 ENCOUNTER — OFFICE VISIT (OUTPATIENT)
Dept: CARDIOLOGY | Facility: CLINIC | Age: 65
End: 2025-03-24
Attending: INTERNAL MEDICINE
Payer: COMMERCIAL

## 2025-03-24 ENCOUNTER — LAB (OUTPATIENT)
Dept: LAB | Facility: CLINIC | Age: 65
End: 2025-03-24
Payer: COMMERCIAL

## 2025-03-24 VITALS — OXYGEN SATURATION: 97 % | HEART RATE: 71 BPM | DIASTOLIC BLOOD PRESSURE: 86 MMHG | SYSTOLIC BLOOD PRESSURE: 134 MMHG

## 2025-03-24 DIAGNOSIS — I25.10 ATHEROSCLEROSIS OF NATIVE CORONARY ARTERY OF NATIVE HEART WITHOUT ANGINA PECTORIS: ICD-10-CM

## 2025-03-24 LAB
ALBUMIN SERPL BCG-MCNC: 4.3 G/DL (ref 3.5–5.2)
ALP SERPL-CCNC: 118 U/L (ref 40–150)
ALT SERPL W P-5'-P-CCNC: 16 U/L (ref 0–70)
ANION GAP SERPL CALCULATED.3IONS-SCNC: 12 MMOL/L (ref 7–15)
APO A-I SERPL-MCNC: 14 MG/DL
AST SERPL W P-5'-P-CCNC: 26 U/L (ref 0–45)
BILIRUB SERPL-MCNC: 0.7 MG/DL
BUN SERPL-MCNC: 12.8 MG/DL (ref 8–23)
CALCIUM SERPL-MCNC: 9.6 MG/DL (ref 8.8–10.4)
CHLORIDE SERPL-SCNC: 102 MMOL/L (ref 98–107)
CHOLEST SERPL-MCNC: 211 MG/DL
CREAT SERPL-MCNC: 0.62 MG/DL (ref 0.67–1.17)
EGFRCR SERPLBLD CKD-EPI 2021: >90 ML/MIN/1.73M2
FASTING STATUS PATIENT QL REPORTED: ABNORMAL
FASTING STATUS PATIENT QL REPORTED: ABNORMAL
GLUCOSE SERPL-MCNC: 94 MG/DL (ref 70–99)
HCO3 SERPL-SCNC: 27 MMOL/L (ref 22–29)
HDLC SERPL-MCNC: 40 MG/DL
LDLC SERPL CALC-MCNC: 110 MG/DL
LDLC SERPL DIRECT ASSAY-MCNC: 123 MG/DL
NONHDLC SERPL-MCNC: 171 MG/DL
POTASSIUM SERPL-SCNC: 4.2 MMOL/L (ref 3.4–5.3)
PROT SERPL-MCNC: 7.4 G/DL (ref 6.4–8.3)
SODIUM SERPL-SCNC: 141 MMOL/L (ref 135–145)
TRIGL SERPL-MCNC: 306 MG/DL

## 2025-03-24 PROCEDURE — 83721 ASSAY OF BLOOD LIPOPROTEIN: CPT | Performed by: INTERNAL MEDICINE

## 2025-03-24 PROCEDURE — 99214 OFFICE O/P EST MOD 30 MIN: CPT | Performed by: INTERNAL MEDICINE

## 2025-03-24 PROCEDURE — 80053 COMPREHEN METABOLIC PANEL: CPT | Performed by: PATHOLOGY

## 2025-03-24 PROCEDURE — 99000 SPECIMEN HANDLING OFFICE-LAB: CPT | Performed by: PATHOLOGY

## 2025-03-24 PROCEDURE — 3075F SYST BP GE 130 - 139MM HG: CPT | Performed by: INTERNAL MEDICINE

## 2025-03-24 PROCEDURE — 3079F DIAST BP 80-89 MM HG: CPT | Performed by: INTERNAL MEDICINE

## 2025-03-24 PROCEDURE — 83695 ASSAY OF LIPOPROTEIN(A): CPT | Performed by: INTERNAL MEDICINE

## 2025-03-24 PROCEDURE — 36415 COLL VENOUS BLD VENIPUNCTURE: CPT | Performed by: PATHOLOGY

## 2025-03-24 PROCEDURE — 99211 OFF/OP EST MAY X REQ PHY/QHP: CPT | Performed by: INTERNAL MEDICINE

## 2025-03-24 PROCEDURE — 80061 LIPID PANEL: CPT | Performed by: PATHOLOGY

## 2025-03-24 PROCEDURE — 1126F AMNT PAIN NOTED NONE PRSNT: CPT | Performed by: INTERNAL MEDICINE

## 2025-03-24 ASSESSMENT — PAIN SCALES - GENERAL: PAINLEVEL_OUTOF10: NO PAIN (0)

## 2025-03-24 NOTE — LETTER
3/24/2025      RE: Derek Enriquez  [address]  [facility] [address]       Dear Colleague,    Thank you for the opportunity to participate in the care of your patient, Derek Enriquez, at the [facility] [place] at [facility]. Please see a copy of my visit note below.    No notes on file    Please do not hesitate to contact me if you have any questions/concerns.     Sincerely,     Anoop Jane MD

## 2025-03-24 NOTE — NURSING NOTE
"Chief Complaint   Patient presents with    Follow Up     Buck F/U      BP Readings from Last 1 Encounters:   03/24/25 134/86      Pulse Readings from Last 1 Encounters:   03/24/25 71      Ht Readings from Last 2 Encounters:   12/18/24 1.803 m (5' 11\")   08/14/24 1.803 m (5' 11\")      Wt Readings from Last 2 Encounters:   12/18/24 77.1 kg (170 lb)   08/14/24 77.1 kg (170 lb)      There is no height or weight on file to calculate BMI.    Vitals were taken, medications reconciled.     Derian Samayoa, Clinic Assistant    12:04 PM    " Patient is calling to inquire when she will be contacted to meet with an Endocronologist.

## 2025-03-24 NOTE — PATIENT INSTRUCTIONS
March 24, 2025    Cardiology Provider You Saw During Your Visit: Dr. Jane      Medication Changes: None      Follow Up:   -Repeat fasting labs in 6 months  -Follow up with cardiology in 1 year with fasting labs prior      Labs:    Latest Reference Range & Units 03/24/25 11:36   Sodium 135 - 145 mmol/L 141   Potassium 3.4 - 5.3 mmol/L 4.2   Chloride 98 - 107 mmol/L 102   Carbon Dioxide (CO2) 22 - 29 mmol/L 27   Urea Nitrogen 8.0 - 23.0 mg/dL 12.8   Creatinine 0.67 - 1.17 mg/dL 0.62 (L)   GFR Estimate >60 mL/min/1.73m2 >90   Calcium 8.8 - 10.4 mg/dL 9.6   Anion Gap 7 - 15 mmol/L 12   Albumin 3.5 - 5.2 g/dL 4.3   Protein Total 6.4 - 8.3 g/dL 7.4   Alkaline Phosphatase 40 - 150 U/L 118   ALT 0 - 70 U/L 16   AST 0 - 45 U/L 26   Bilirubin Total <=1.2 mg/dL 0.7   Cholesterol <200 mg/dL 211 (H)   Patient Fasting?  Unknown  Unknown   Glucose 70 - 99 mg/dL 94   HDL Cholesterol >=40 mg/dL 40   LDL Cholesterol Calculated <100 mg/dL 110 (H)   Non HDL Cholesterol <130 mg/dL 171 (H)   Triglycerides <150 mg/dL 306 (H)   (L): Data is abnormally low  (H): Data is abnormally high      Follow the American Heart Association Diet and Lifestyle Recommendations:  -Limit saturated fat, trans fat, sodium, red meat, sweets and sugar-sweetened beverages. If you choose to eat red meat, compare labels and select the leanest cuts available.  -Aim for at least 150 minutes of moderate physical activity or 75 minutes of vigorous physical activity - or an equal combination of both - each week.      To Reach Us:  -During business hours: [phone], press option # 1 to schedule an appointment or to leave a message for your care team.     -After hours, weekends or holidays: [phone], press option #4 and ask to speak to the on-call cardiologist. Inform them you are a patient at the [facility].        **If you have a cardiac device, please make sure you schedule an in-person device check just prior to your cardiology provider  appointments**        Lupe Trejo RN  Cardiology Care Coordinator - General Cardiology  [facility] [place]

## 2025-03-25 ENCOUNTER — TELEPHONE (OUTPATIENT)
Dept: CARDIOLOGY | Facility: CLINIC | Age: 65
End: 2025-03-25
Payer: COMMERCIAL

## 2025-03-25 NOTE — TELEPHONE ENCOUNTER
Patient Contacted for the patient to call back and schedule the following:    Appointment type:  fasting labs   Provider: stalin   Return date: September   Specialty phone number: [phone] opt 1   Additional appointment(s) needed: n/a   Additonal Notes: pt will have these done when he sees his pcp in september

## 2025-04-08 NOTE — TELEPHONE ENCOUNTER
Reason for visit: cystoscopy +bototx     Dx/Hx/Sx: 100 units --neurogenic bladder     Records/imaging/labs/orders: in epic    At Rooming: standard   No

## 2025-04-16 ENCOUNTER — OFFICE VISIT (OUTPATIENT)
Dept: UROLOGY | Facility: CLINIC | Age: 65
End: 2025-04-16
Payer: COMMERCIAL

## 2025-04-16 VITALS
WEIGHT: 170 LBS | BODY MASS INDEX: 23.8 KG/M2 | OXYGEN SATURATION: 97 % | HEART RATE: 67 BPM | DIASTOLIC BLOOD PRESSURE: 83 MMHG | HEIGHT: 71 IN | SYSTOLIC BLOOD PRESSURE: 145 MMHG

## 2025-04-16 DIAGNOSIS — N31.9 NEUROGENIC BLADDER: Primary | ICD-10-CM

## 2025-04-16 NOTE — PROGRESS NOTES
"Cystoscopy with Botox Note     PRE-PROCEDURE DIAGNOSIS:  Neurogenic Bladder    POST-PROCEDURE DIAGNOSIS:  Neurogenic Bladder    PROCEDURE:   Cystoscopy with chemodenervation of the bladder     HISTORY: Derek Enriquez is a 64 year old adult with Neurogenic bladder secondary to subdural hematoma at T10 s/p T9-12 laminectomy and evacuation of subdural hematoma eventually leading to paraplegia.  Tried Trospium but significant side effect of constipation. He cannot tolerate anticholinergics.  Performs CIC.  Underwent UDS previously which showed significant NDO with very high pressures starting around 125mL  He has urinary urge incontinence due to his neurogenic DO  Thus, we tried Myrbetriq. He can tolerate the medication without significant side effects. He has a mild improvement of symptoms. However, we repeated urodynamics to ensure safe storage pressures, and they are clearly not safe.   Per UDS note:  \"-Multiple strong UDCs to pressures >110 cm H20, which begin at bladder volumes >160 mL and are associated with incontinene.  -Gradual increase in baseline pressure prior to sudden spike in pressure at fill volume of 163 mL. Pressures slowly return near baseline between each UDC.\"    He now uses Botox which is a big improvement.  He notices a big improvement for about 3-4 months.  He notes his botox wearing off.  He also underwent orchiectomy and scrotectomy 3/25/2024.  Last Botox injection was December 2024. Excellent response  Now takes     DESCRIPTION OF PROCEDURE:  After informed consent was obtained, the patient was brought to the procedure room where they were placed in supine with all pressure points well padded.  The patient was prepped and draped in a sterile fashion. A flexible cystoscope was introduced through a well-lubricated urethra. There were no urethral strictures. The bladder was entered. The urine was clear. Systematic cystoscopy was performed. There were no tumors, stones, or other abnormalities in the " bladder. Flexible scope and flexible needle were inserted. 200u of Botox was mixed in 10cc normal saline. Systematic injection was performed with 10 locations in a broad template. Minimal bleeding was noted, and scope was removed along with needle.     ASSESSMENT AND PLAN:  Botox injection today successful  Followup in 4 months for repeat injection.    Cody Stroud MD  Reconstructive Urology

## 2025-04-16 NOTE — LETTER
"4/16/2025       RE: Derek Enriquez  [address]  [place] [address]     Dear Colleague,    Thank you for referring your patient, Derek Enriquez, to the [facility] [place] at [facility]. Please see a copy of my visit note below.    Cystoscopy with Botox Note     PRE-PROCEDURE DIAGNOSIS:  Neurogenic Bladder    POST-PROCEDURE DIAGNOSIS:  Neurogenic Bladder    PROCEDURE:   Cystoscopy with chemodenervation of the bladder     HISTORY: Derek Enriquez is a 64 year old adult with Neurogenic bladder secondary to subdural hematoma at T10 s/p T9-12 laminectomy and evacuation of subdural hematoma eventually leading to paraplegia.  Tried Trospium but significant side effect of constipation. He cannot tolerate anticholinergics.  Performs CIC.  Underwent UDS previously which showed significant NDO with very high pressures starting around 125mL  He has urinary urge incontinence due to his neurogenic DO  Thus, we tried Myrbetriq. He can tolerate the medication without significant side effects. He has a mild improvement of symptoms. However, we repeated urodynamics to ensure safe storage pressures, and they are clearly not safe.   Per UDS note:  \"-Multiple strong UDCs to pressures >110 cm H20, which begin at bladder volumes >160 mL and are associated with incontinene.  -Gradual increase in baseline pressure prior to sudden spike in pressure at fill volume of 163 mL. Pressures slowly return near baseline between each UDC.\"    He now uses Botox which is a big improvement.  He notices a big improvement for about 3-4 months.  He notes his botox wearing off.  He also underwent orchiectomy and scrotectomy 3/25/2024.  Last Botox injection was December 2024. Excellent response  Now takes     DESCRIPTION OF PROCEDURE:  After informed consent was obtained, the patient was brought to the procedure room where they were placed in supine with all pressure points well " padded.  The patient was prepped and draped in a sterile fashion. A flexible cystoscope was introduced through a well-lubricated urethra. There were no urethral strictures. The bladder was entered. The urine was clear. Systematic cystoscopy was performed. There were no tumors, stones, or other abnormalities in the bladder. Flexible scope and flexible needle were inserted. 200u of Botox was mixed in 10cc normal saline. Systematic injection was performed with 10 locations in a broad template. Minimal bleeding was noted, and scope was removed along with needle.     ASSESSMENT AND PLAN:  Botox injection today successful  Followup in 4 months for repeat injection.    Cody Stroud MD  Reconstructive Urology      Again, thank you for allowing me to participate in the care of your patient.      Sincerely,    Cody Stroud MD

## 2025-04-16 NOTE — NURSING NOTE
"  Chief Complaint   Patient presents with    Cystoscopy     With botox injection       Blood pressure (!) 145/83, pulse 67, height 1.803 m (5' 11\"), weight 77.1 kg (170 lb), SpO2 97%. Body mass index is 23.71 kg/m .    Patient Active Problem List   Diagnosis    Cardiac arrest (H)    Coronary atherosclerosis    Stented coronary artery    Spinal cord compression (H)    Subdural hematoma (H)    Post-operative state    Decubitus ulcer of sacral region, stage 3 (H)    Chronic skin ulcer, limited to breakdown of skin (H)    Pressure ulcer of other site, stage 3 (H)    Heterotopic ossification    Other calcification of muscle, left thigh    Status post hip surgery    Ingrown nail    Open wound, left lateral foot    Abnormal CT scan, heart    Dyslipidemia    Family history of premature CAD    Impotence of organic origin    Injury of thoracic spinal cord (H)    Neurogenic bladder    Neurogenic bowel    Orthostatic hypotension    Paraplegia (H)    Preop examination    Coronary artery disease involving native coronary artery of native heart without angina pectoris    Gender dysphoria       No Known Allergies    Current Outpatient Medications   Medication Sig Dispense Refill    ascorbic acid (VITAMIN C) 500 MG tablet Take 500 mg by mouth every morning       aspirin 81 MG EC tablet Take 81 mg by mouth daily.      atorvastatin (LIPITOR) 20 MG tablet Take 1 tablet (20 mg) by mouth at bedtime 90 tablet 3    azithromycin (ZITHROMAX Z-EVY) 250 MG tablet Take 1 tablet (250 mg) by mouth daily Take 2 tabs 1st day 6 tablet 0    Multiple Vitamins-Minerals (MULTIVITAMIN MEN PO) Take 1 tablet by mouth daily       tadalafil (CIALIS) 20 MG tablet Take 1 tablet (20 mg) PO PRN prior to sexual activity. Max: 1 tablet per 36 hours. (Patient not taking: Reported on 8/14/2024) 5 tablet 3    Testosterone 1.62 % GEL APPLY EXTERNALLY 1 PUMP TO SPECIFIC AREA OF SKIN EVERY MORNING      vitamin B complex with vitamin C (STRESS TAB) tablet Take 1 tablet " by mouth daily         Social History     Tobacco Use    Smoking status: Former     Types: Cigarettes     Passive exposure: Past    Smokeless tobacco: Never    Tobacco comments:     Quit at 31   Substance Use Topics    Alcohol use: Yes     Comment: socially    Drug use: No       Invasive Procedure Safety Checklist:    Procedure: Cystoscopy with Botox injection    Action: Complete sections and checkboxes as appropriate.    Pre-procedure:  1. Patient ID Verified with 2 identifiers (Mariluz and  or MRN) : YES    2. Procedure and site verified with patient/designee (when able) : YES    3. Accurate consent documentation in medical record : YES    4. H&P (or appropriate assessment) documented in medical record : N/A  H&P must be up to 30 days prior to procedure an updated within 24 hours of                 Procedure as applicable.     5. Relevant diagnostic and radiology test results appropriately labeled and displayed as applicable : YES    6. Blood products, implants, devices, and/or special equipment available for the procedure as applicable : YES    7. Procedure site(s) marked with provider initials [Exclusions: none] : NO    8. Marking not required. Reason : Yes  Procedure does not require site marking    Time Out:     Time-Out performed immediately prior to starting procedure, including verbal and active participation of all team members addressing: YES    1. Correct patient identity.  2. Confirmed that the correct side and site are marked.  3. An accurate procedure to be done.  4. Agreement on the procedure to be done.  5. Correct patient position.  6. Relevant images and results are properly labeled and appropriately displayed.  7. The need to administer antibiotics or fluids for irrigation purposes during the procedure as applicable.  8. Safety precautions based on patient history or medication use.    During Procedure: Verification of correct person, site, and procedure occurs any time the responsibility for  care of the patient is transferred to another member of the care team.    Patient verified with three identifiers, allergies reviewed. Verified patient stopped blood thinning medications and started Cipro.       The following medication was given:     MEDICATION:  Botox  ROUTE: administered by physician - bladder wall  SITE: administered by physician - bladder wall   DOSE: 200 units  LOT #: [id number]  : [employer]  EXPIRATION DATE: 2027/04  NDC#: 8614-1631-69   Was there drug waste? No    Prior to injection, verified patient identity using patient's name and date of birth.  Due to injection administration, patient instructed to remain in clinic for 15 minutes  afterwards, and to report any adverse reaction to me immediately.    Drug Amount Wasted:  None.  Vial/Syringe: Single dose vial      Niranjan Li  4/16/2025  9:15 AM

## 2025-06-11 ENCOUNTER — MEDICAL CORRESPONDENCE (OUTPATIENT)
Dept: HEALTH INFORMATION MANAGEMENT | Facility: CLINIC | Age: 65
End: 2025-06-11
Payer: COMMERCIAL

## 2025-06-11 ENCOUNTER — DOCUMENTATION ONLY (OUTPATIENT)
Dept: UROLOGY | Facility: CLINIC | Age: 65
End: 2025-06-11
Payer: COMMERCIAL

## 2025-07-09 DIAGNOSIS — Z95.5 H/O HEART ARTERY STENT: ICD-10-CM

## 2025-07-09 RX ORDER — ATORVASTATIN CALCIUM 20 MG/1
20 TABLET, FILM COATED ORAL AT BEDTIME
Qty: 90 TABLET | Refills: 3 | Status: CANCELLED | OUTPATIENT
Start: 2025-07-09

## 2025-07-10 DIAGNOSIS — Z95.5 H/O HEART ARTERY STENT: ICD-10-CM

## 2025-07-10 RX ORDER — ATORVASTATIN CALCIUM 20 MG/1
20 TABLET, FILM COATED ORAL AT BEDTIME
Qty: 90 TABLET | Refills: 2 | Status: SHIPPED | OUTPATIENT
Start: 2025-07-10

## 2025-07-10 NOTE — TELEPHONE ENCOUNTER
Last Written Prescription:  4/30/24  90 : 3  ----------------------  Last Visit Date: 3/4/25  Future Visit Date: 0    Refill decision:   [x] Medication unable to be refilled by RN due to: Medication not included in refill protocol policy       Request from pharmacy:  Requested Prescriptions   Pending Prescriptions Disp Refills    atorvastatin (LIPITOR) 20 MG tablet 90 tablet 3     Sig: Take 1 tablet (20 mg) by mouth at bedtime.       Antihyperlipidemic agents Passed - 7/9/2025  7:36 PM        Passed - LDL on file in the past 12 months        Passed - Medication is active on med list and the sig matches. RN to manually verify dose and sig if red X/fail.     If the protocol passes (green check), you do not need to verify med dose and sig.    A prescription matches if they are the same clinical intention.    For Example: once daily and every morning are the same.    The protocol can not identify upper and lower case letters as matching and will fail.     For Example: Take 1 tablet (50 mg) by mouth daily     TAKE 1 TABLET (50 MG) BY MOUTH DAILY    For all fails (red x), verify dose and sig.    If the refill does match what is on file, the RN can still proceed to approve the refill request.       If they do not match, route to the appropriate provider.             Passed - Recent (12 month) or future (90 days) visit with authorizing provider's specialty (provided they have been seen in the past 15 months)     The patient must have completed an in-person or virtual visit within the past 12 months or has a future visit scheduled within the next 90 days with the authorizing provider s specialty.  Urgent care and e-visits do not qualify as an office visit for this protocol.          Passed - Patient is age 18 years or older

## 2025-08-13 ENCOUNTER — PRE VISIT (OUTPATIENT)
Dept: UROLOGY | Facility: CLINIC | Age: 65
End: 2025-08-13
Payer: COMMERCIAL

## 2025-08-13 DIAGNOSIS — N31.9 NEUROGENIC BLADDER: Primary | ICD-10-CM

## 2025-08-13 RX ORDER — CIPROFLOXACIN 500 MG/1
500 TABLET, FILM COATED ORAL ONCE
Status: ACTIVE | OUTPATIENT
Start: 2025-08-20

## 2025-08-20 ENCOUNTER — OFFICE VISIT (OUTPATIENT)
Dept: UROLOGY | Facility: CLINIC | Age: 65
End: 2025-08-20
Payer: COMMERCIAL

## 2025-08-20 VITALS
HEIGHT: 71 IN | DIASTOLIC BLOOD PRESSURE: 87 MMHG | OXYGEN SATURATION: 96 % | SYSTOLIC BLOOD PRESSURE: 143 MMHG | HEART RATE: 61 BPM | WEIGHT: 170 LBS | BODY MASS INDEX: 23.8 KG/M2

## 2025-08-20 DIAGNOSIS — N31.9 NEUROGENIC BLADDER: Primary | ICD-10-CM

## 2025-08-20 PROCEDURE — 52287 CYSTOSCOPY CHEMODENERVATION: CPT | Performed by: UROLOGY

## 2025-08-20 PROCEDURE — 3077F SYST BP >= 140 MM HG: CPT | Performed by: UROLOGY

## 2025-08-20 PROCEDURE — 3079F DIAST BP 80-89 MM HG: CPT | Performed by: UROLOGY

## 2025-08-20 RX ADMIN — CIPROFLOXACIN 500 MG: 500 TABLET, FILM COATED ORAL at 08:48

## (undated) DEVICE — DRSG AQUACEL AG 3.5X9.75" HYDROFIBER 412011

## (undated) DEVICE — DRSG GAUZE 2X2" 8042

## (undated) DEVICE — GLOVE PROTEXIS W/NEU-THERA 7.5  2D73TE75

## (undated) DEVICE — SPONGE SURGIFOAM 100 1974

## (undated) DEVICE — POSITIONER HEAD DONUT FOAM 9" LF FP-HEAD9

## (undated) DEVICE — DRAIN HEMOVAC 10FR 1/8" W/TROCAR SIL

## (undated) DEVICE — PACK MINOR CUSTOM ASC

## (undated) DEVICE — LINEN ORTHO PACK 5446

## (undated) DEVICE — SU PDS II 2-0 CT-2 27"  Z333H

## (undated) DEVICE — LINEN GOWN X4 5410

## (undated) DEVICE — PREP POVIDONE-IODINE 7.5% SCRUB 4OZ BOTTLE MDS093945

## (undated) DEVICE — PAD CHUX UNDERPAD 30X36" P3036C

## (undated) DEVICE — ESU PENCIL W/COATED BLADE E2450H

## (undated) DEVICE — LINEN TOWEL PACK X5 5464

## (undated) DEVICE — DRSG TEGADERM 4X4 3/4" 1626W

## (undated) DEVICE — SOL WATER IRRIG 1000ML BOTTLE 2F7114

## (undated) DEVICE — SU MONOCRYL 3-0 PS-2 18" UND Y497G

## (undated) DEVICE — ESU GROUND PAD ADULT W/CORD E7507

## (undated) DEVICE — BLADE KNIFE SURG 10 371110

## (undated) DEVICE — Device

## (undated) DEVICE — SPONGE COTTONOID 1X3" 20-10S

## (undated) DEVICE — SU DERMABOND PRINEO 22CM CLR222US

## (undated) DEVICE — SPONGE RAY-TEC 4X8" 7318

## (undated) DEVICE — SOL NACL 0.9% IRRIG 500ML BOTTLE 2F7123

## (undated) DEVICE — PANTIES MESH LG/XLG 2PK 706M2

## (undated) DEVICE — SPONGE LAP 18X18" X8435

## (undated) DEVICE — ESU GROUND PAD UNIVERSAL W/O CORD

## (undated) DEVICE — PEN MARKING SKIN W/LABELS 31145918

## (undated) DEVICE — SU MONOCRYL 3-0 PS-1 27" Y936H

## (undated) DEVICE — TRAY PREP DRY SKIN SCRUB 067

## (undated) DEVICE — PACK NEURO MINOR UMMC SNE32MNMU4

## (undated) DEVICE — DRSG PRIMAPORE 03 1/8X6" 66000318

## (undated) DEVICE — DRAPE STERI U 1015

## (undated) DEVICE — SOL NACL 0.9% IRRIG 1000ML BOTTLE 2F7124

## (undated) DEVICE — PREP DURAPREP 26ML APL 8630

## (undated) DEVICE — LINEN BACK PACK 5440

## (undated) DEVICE — SUCTION FRAZIER 12FR W/HANDLE K73

## (undated) DEVICE — DRSG KERLIX FLUFFS X5

## (undated) DEVICE — WIRE GUIDE 3.2X400MM  357.399

## (undated) DEVICE — SU VICRYL 0 CT-1 27" UND J260H

## (undated) DEVICE — LIGHT HANDLE X1 31140133

## (undated) DEVICE — LINEN TOWEL PACK X6 WHITE 5487

## (undated) DEVICE — DRSG DRAIN 4X4" 7086

## (undated) DEVICE — GLOVE BIOGEL PI MICRO INDICATOR UNDERGLOVE SZ 7.0 48970

## (undated) DEVICE — RX BACITRACIN OINTMENT 14G 0.5OZ 45802-060-01

## (undated) DEVICE — STRAP KNEE/BODY 31143004

## (undated) DEVICE — STPL SKIN PROXIMATE 35 WIDE PMW35

## (undated) DEVICE — GOWN XLG DISP 9545

## (undated) DEVICE — PREP DURAPREP REMOVER 4OZ 8611

## (undated) DEVICE — SYR BULB IRRIG DOVER 60 ML LATEX FREE 67000

## (undated) DEVICE — SU VICRYL 0 CTX 36" J370H

## (undated) DEVICE — ADH LIQUID MASTISOL TOPICAL VIAL 2-3ML 0523-48

## (undated) DEVICE — PREP POVIDONE-IODINE 10% SOLUTION 4OZ BOTTLE MDS093944

## (undated) DEVICE — BUR MATCHSTICK 3MM ANSPACH L-8NS-G1

## (undated) DEVICE — SOLUTION WOUND VASHE BOTTLE 16 FL OZ. (475 ML)  00317

## (undated) DEVICE — SU PROLENE 6-0 RB-2DA 30" 8711H

## (undated) DEVICE — PREP CHLORAPREP 26ML TINTED ORANGE  260815

## (undated) DEVICE — PACK TOTAL HIP W/U DRAPE RIVERSIDE LATEX FREE

## (undated) DEVICE — SU VICRYL 0 CT-1 3X27" J430T

## (undated) DEVICE — DRAIN JACKSON PRATT 10FR ROUND SU130-1321

## (undated) DEVICE — PREP CHLORAPREP CLEAR 3ML 260400

## (undated) DEVICE — GLOVE SURG PI ULTRA TOUCH M SZ 6-1/2 LF

## (undated) DEVICE — DRSG TELFA 3X8" 1238

## (undated) DEVICE — SU SILK 0 SH 30" K834H

## (undated) DEVICE — DRSG PREVENA NEGATIVE PRESSURE WND 13CM SGL LF PRE1101US

## (undated) DEVICE — DRAPE C-ARM W/STRAPS 42X72" 07-CA104

## (undated) DEVICE — SUCTION TIP YANKAUER W/O VENT K86

## (undated) DEVICE — ESU CORD BIPOLAR AND IRR TUBING AESCULAP US355

## (undated) DEVICE — SU VICRYL 2-0 CT 36" J957H

## (undated) DEVICE — SURGICEL HEMOSTAT 2X14" 1951

## (undated) DEVICE — SU ETHILON 3-0 PS-1 18" 1663H

## (undated) DEVICE — BLADE CLIPPER SGL USE 9680

## (undated) DEVICE — ROD SYN REAMER BALL TIP 2.5X950MM  351.706S

## (undated) DEVICE — DRAPE MAYO STAND 23X54 8337

## (undated) DEVICE — BLADE BONE MILL STRK 3.2MM FINE 5400-702-000

## (undated) DEVICE — SU VICRYL 2-0 CT-2 CR 8X18" J726D

## (undated) DEVICE — DRAIN JACKSON PRATT RESERVOIR 100ML SU130-1305

## (undated) DEVICE — DRSG STERI STRIP 1/2X4" R1547

## (undated) DEVICE — BLADE KNIFE SURG 15 371115

## (undated) DEVICE — DRAIN HEMOVAC RESERVOIR KIT 10FR 1/8" MED 00-2550-002-10

## (undated) DEVICE — DRSG AQUACEL AG 3.5X6.0" HYDROFIBER 412010

## (undated) DEVICE — WIPES FOLEY CARE SURESTEP PROVON DFC100

## (undated) DEVICE — SUCTION MANIFOLD NEPTUNE 2 SYS 1 PORT 702-025-000

## (undated) DEVICE — SU VICRYL 0 CT-1 CR 8X18" J740D

## (undated) DEVICE — GLOVE PROTEXIS W/NEU-THERA 8.0  2D73TE80

## (undated) DEVICE — LIGHT HANDLE X2

## (undated) DEVICE — SU MONOCRYL 3-0 SH 27" UND Y416H

## (undated) DEVICE — SU VICRYL 3-0 SH 27" UND J416H

## (undated) DEVICE — SPONGE COTTONOID 1/2X1 1/2" 20-06S

## (undated) DEVICE — DRAPE C-ARMOR 5 SIDED 5523

## (undated) DEVICE — SUCTION MANIFOLD DORNOCH ULTRA CART UL-CL500

## (undated) DEVICE — SUCTION MANIFOLD NEPTUNE 2 SYS 4 PORT 0702-020-000

## (undated) DEVICE — SOL WOUND IRRIG PRONTOSAN 350 ML BOTTLE 400441

## (undated) DEVICE — SPECIMEN CONTAINER 5OZ STERILE 2600SA

## (undated) DEVICE — NDL BLUNT 17GA 1.5" 8881202330

## (undated) DEVICE — DRAPE LAP W/ARMBOARD 29410

## (undated) DEVICE — DRAPE MICROSCOPE LEICA 54X150" AR8033650

## (undated) DEVICE — GLOVE BIOGEL PI MICRO SZ 6.5 48565

## (undated) DEVICE — LINEN TOWEL PACK X30 5481

## (undated) DEVICE — SPONGE COTTONOID 1/2X1/2" 20-04S

## (undated) DEVICE — COVER NEOPROBE SOFTFLEX 5X96" W/BANDS 20-PC596

## (undated) DEVICE — COVER CAMERA IN-LIGHT DISP LT-C02

## (undated) DEVICE — GLOVE BIOGEL PI MICRO SZ 7.5 48575

## (undated) DEVICE — SU VICRYL 2-0 CT-2 27" UND J269H

## (undated) DEVICE — DRAIN JACKSON PRATT CHANNEL 15FR ROUND HUBLESS SIL JP-2228

## (undated) DEVICE — SU MONOCRYL 4-0 PS-2 27" UND Y426H

## (undated) DEVICE — ESU PENCIL W/SMOKE EVAC NEPTUNE STRYKER 0703-046-000

## (undated) DEVICE — DRILL BIT QUICK COUPLING 3 FLUTE 4.2MMX145MM NDL  POINT

## (undated) DEVICE — DRAPE STERI TOWEL LG 1010

## (undated) DEVICE — SU PDS II 3-0 SH 27" Z316H

## (undated) DEVICE — DRAPE SHEET REV FOLD 3/4 9349

## (undated) DEVICE — SU PROLENE 6-0 BV-1DA 18" 8709H

## (undated) DEVICE — ESU ELEC BLADE HEX-LOCKING 2.5" E1450X

## (undated) DEVICE — ESU BIPOLAR SEALER AQUAMANTYS 6MM 23-112-1

## (undated) DEVICE — SURGICEL HEMOSTAT 4X8" 1952

## (undated) DEVICE — DRSG ADAPTIC 3X16"  6114

## (undated) DEVICE — SU VICRYL 0 CT-1 36" J946H

## (undated) DEVICE — RX EVICEL SEALANT 5ML 3905

## (undated) DEVICE — SYR 30ML LL W/O NDL 302832

## (undated) DEVICE — SU VICRYL 2-0 CT-1 27" UND J259H

## (undated) RX ORDER — CIPROFLOXACIN 500 MG/1
TABLET, FILM COATED ORAL
Status: DISPENSED
Start: 2024-08-14

## (undated) RX ORDER — PHENYLEPHRINE HCL IN 0.9% NACL 1 MG/10 ML
SYRINGE (ML) INTRAVENOUS
Status: DISPENSED
Start: 2018-09-19

## (undated) RX ORDER — SULFAMETHOXAZOLE/TRIMETHOPRIM 800-160 MG
TABLET ORAL
Status: DISPENSED
Start: 2020-07-15

## (undated) RX ORDER — FENTANYL CITRATE 50 UG/ML
INJECTION, SOLUTION INTRAMUSCULAR; INTRAVENOUS
Status: DISPENSED
Start: 2018-09-19

## (undated) RX ORDER — PROPOFOL 10 MG/ML
INJECTION, EMULSION INTRAVENOUS
Status: DISPENSED
Start: 2018-03-11

## (undated) RX ORDER — ONDANSETRON 2 MG/ML
INJECTION INTRAMUSCULAR; INTRAVENOUS
Status: DISPENSED
Start: 2018-03-11

## (undated) RX ORDER — FENTANYL CITRATE 50 UG/ML
INJECTION, SOLUTION INTRAMUSCULAR; INTRAVENOUS
Status: DISPENSED
Start: 2018-06-25

## (undated) RX ORDER — ROCURONIUM BROMIDE 50 MG/5 ML
SYRINGE (ML) INTRAVENOUS
Status: DISPENSED
Start: 2018-03-11

## (undated) RX ORDER — ACETAMINOPHEN 325 MG/1
TABLET ORAL
Status: DISPENSED
Start: 2018-06-25

## (undated) RX ORDER — PHENYLEPHRINE HCL IN 0.9% NACL 1 MG/10 ML
SYRINGE (ML) INTRAVENOUS
Status: DISPENSED
Start: 2018-06-25

## (undated) RX ORDER — ESMOLOL HYDROCHLORIDE 10 MG/ML
INJECTION INTRAVENOUS
Status: DISPENSED
Start: 2018-03-10

## (undated) RX ORDER — GLYCOPYRROLATE 0.2 MG/ML
INJECTION, SOLUTION INTRAMUSCULAR; INTRAVENOUS
Status: DISPENSED
Start: 2018-03-11

## (undated) RX ORDER — EPHEDRINE SULFATE 50 MG/ML
INJECTION, SOLUTION INTRAMUSCULAR; INTRAVENOUS; SUBCUTANEOUS
Status: DISPENSED
Start: 2018-06-25

## (undated) RX ORDER — ROCURONIUM BROMIDE 50 MG/5 ML
SYRINGE (ML) INTRAVENOUS
Status: DISPENSED
Start: 2018-03-10

## (undated) RX ORDER — PHENYLEPHRINE HCL IN 0.9% NACL 1 MG/10 ML
SYRINGE (ML) INTRAVENOUS
Status: DISPENSED
Start: 2018-03-10

## (undated) RX ORDER — FENTANYL CITRATE 50 UG/ML
INJECTION, SOLUTION INTRAMUSCULAR; INTRAVENOUS
Status: DISPENSED
Start: 2018-03-10

## (undated) RX ORDER — PROPOFOL 10 MG/ML
INJECTION, EMULSION INTRAVENOUS
Status: DISPENSED
Start: 2024-03-25

## (undated) RX ORDER — DEXAMETHASONE SODIUM PHOSPHATE 4 MG/ML
INJECTION, SOLUTION INTRA-ARTICULAR; INTRALESIONAL; INTRAMUSCULAR; INTRAVENOUS; SOFT TISSUE
Status: DISPENSED
Start: 2018-06-25

## (undated) RX ORDER — LIDOCAINE HYDROCHLORIDE 10 MG/ML
INJECTION, SOLUTION EPIDURAL; INFILTRATION; INTRACAUDAL; PERINEURAL
Status: DISPENSED
Start: 2018-03-11

## (undated) RX ORDER — LIDOCAINE HYDROCHLORIDE 20 MG/ML
INJECTION, SOLUTION EPIDURAL; INFILTRATION; INTRACAUDAL; PERINEURAL
Status: DISPENSED
Start: 2018-03-11

## (undated) RX ORDER — PROPOFOL 10 MG/ML
INJECTION, EMULSION INTRAVENOUS
Status: DISPENSED
Start: 2018-11-02

## (undated) RX ORDER — ONDANSETRON 2 MG/ML
INJECTION INTRAMUSCULAR; INTRAVENOUS
Status: DISPENSED
Start: 2018-05-23

## (undated) RX ORDER — CIPROFLOXACIN 500 MG/1
TABLET, FILM COATED ORAL
Status: DISPENSED
Start: 2023-12-06

## (undated) RX ORDER — CEFAZOLIN SODIUM 2 G/100ML
INJECTION, SOLUTION INTRAVENOUS
Status: DISPENSED
Start: 2018-09-19

## (undated) RX ORDER — CEFAZOLIN SODIUM 2 G/100ML
INJECTION, SOLUTION INTRAVENOUS
Status: DISPENSED
Start: 2018-11-02

## (undated) RX ORDER — ONDANSETRON 2 MG/ML
INJECTION INTRAMUSCULAR; INTRAVENOUS
Status: DISPENSED
Start: 2018-09-19

## (undated) RX ORDER — LIDOCAINE HYDROCHLORIDE 10 MG/ML
INJECTION, SOLUTION EPIDURAL; INFILTRATION; INTRACAUDAL; PERINEURAL
Status: DISPENSED
Start: 2018-03-08

## (undated) RX ORDER — FENTANYL CITRATE 50 UG/ML
INJECTION, SOLUTION INTRAMUSCULAR; INTRAVENOUS
Status: DISPENSED
Start: 2018-05-23

## (undated) RX ORDER — LIDOCAINE HYDROCHLORIDE 20 MG/ML
INJECTION, SOLUTION EPIDURAL; INFILTRATION; INTRACAUDAL; PERINEURAL
Status: DISPENSED
Start: 2018-03-10

## (undated) RX ORDER — EPHEDRINE SULFATE 50 MG/ML
INJECTION, SOLUTION INTRAMUSCULAR; INTRAVENOUS; SUBCUTANEOUS
Status: DISPENSED
Start: 2018-03-11

## (undated) RX ORDER — PROPOFOL 10 MG/ML
INJECTION, EMULSION INTRAVENOUS
Status: DISPENSED
Start: 2018-05-23

## (undated) RX ORDER — ONDANSETRON 2 MG/ML
INJECTION INTRAMUSCULAR; INTRAVENOUS
Status: DISPENSED
Start: 2018-11-02

## (undated) RX ORDER — ESMOLOL HYDROCHLORIDE 10 MG/ML
INJECTION INTRAVENOUS
Status: DISPENSED
Start: 2018-03-11

## (undated) RX ORDER — ASPIRIN 81 MG/1
TABLET, CHEWABLE ORAL
Status: DISPENSED
Start: 2018-03-08

## (undated) RX ORDER — CIPROFLOXACIN 500 MG/1
TABLET, FILM COATED ORAL
Status: DISPENSED
Start: 2025-08-20

## (undated) RX ORDER — FENTANYL CITRATE 50 UG/ML
INJECTION, SOLUTION INTRAMUSCULAR; INTRAVENOUS
Status: DISPENSED
Start: 2024-03-25

## (undated) RX ORDER — CEFAZOLIN SODIUM 2 G/100ML
INJECTION, SOLUTION INTRAVENOUS
Status: DISPENSED
Start: 2018-06-25

## (undated) RX ORDER — METOPROLOL TARTRATE 100 MG
TABLET ORAL
Status: DISPENSED
Start: 2017-03-20

## (undated) RX ORDER — SODIUM CHLORIDE 9 MG/ML
INJECTION, SOLUTION INTRAVENOUS
Status: DISPENSED
Start: 2018-03-10

## (undated) RX ORDER — GENTAMICIN 40 MG/ML
INJECTION, SOLUTION INTRAMUSCULAR; INTRAVENOUS
Status: DISPENSED
Start: 2021-04-28

## (undated) RX ORDER — ONDANSETRON 2 MG/ML
INJECTION INTRAMUSCULAR; INTRAVENOUS
Status: DISPENSED
Start: 2018-06-25

## (undated) RX ORDER — LIDOCAINE HYDROCHLORIDE 20 MG/ML
INJECTION, SOLUTION EPIDURAL; INFILTRATION; INTRACAUDAL; PERINEURAL
Status: DISPENSED
Start: 2018-06-25

## (undated) RX ORDER — ONDANSETRON 2 MG/ML
INJECTION INTRAMUSCULAR; INTRAVENOUS
Status: DISPENSED
Start: 2024-03-25

## (undated) RX ORDER — ETOMIDATE 2 MG/ML
INJECTION INTRAVENOUS
Status: DISPENSED
Start: 2018-03-10

## (undated) RX ORDER — FENTANYL CITRATE 50 UG/ML
INJECTION, SOLUTION INTRAMUSCULAR; INTRAVENOUS
Status: DISPENSED
Start: 2018-03-11

## (undated) RX ORDER — PHENYLEPHRINE HCL IN 0.9% NACL 1 MG/10 ML
SYRINGE (ML) INTRAVENOUS
Status: DISPENSED
Start: 2018-03-11

## (undated) RX ORDER — EPHEDRINE SULFATE 50 MG/ML
INJECTION, SOLUTION INTRAMUSCULAR; INTRAVENOUS; SUBCUTANEOUS
Status: DISPENSED
Start: 2018-03-10

## (undated) RX ORDER — GLYCOPYRROLATE 0.2 MG/ML
INJECTION, SOLUTION INTRAMUSCULAR; INTRAVENOUS
Status: DISPENSED
Start: 2018-06-25

## (undated) RX ORDER — NITROGLYCERIN 5 MG/ML
VIAL (ML) INTRAVENOUS
Status: DISPENSED
Start: 2018-03-08

## (undated) RX ORDER — EPHEDRINE SULFATE 50 MG/ML
INJECTION, SOLUTION INTRAMUSCULAR; INTRAVENOUS; SUBCUTANEOUS
Status: DISPENSED
Start: 2024-03-25

## (undated) RX ORDER — SULFAMETHOXAZOLE/TRIMETHOPRIM 800-160 MG
TABLET ORAL
Status: DISPENSED
Start: 2019-05-08

## (undated) RX ORDER — GABAPENTIN 300 MG/1
CAPSULE ORAL
Status: DISPENSED
Start: 2018-06-25

## (undated) RX ORDER — LIDOCAINE HYDROCHLORIDE 10 MG/ML
INJECTION, SOLUTION EPIDURAL; INFILTRATION; INTRACAUDAL; PERINEURAL
Status: DISPENSED
Start: 2018-05-23

## (undated) RX ORDER — HEPARIN SODIUM 1000 [USP'U]/ML
INJECTION, SOLUTION INTRAVENOUS; SUBCUTANEOUS
Status: DISPENSED
Start: 2018-03-08

## (undated) RX ORDER — ONDANSETRON 2 MG/ML
INJECTION INTRAMUSCULAR; INTRAVENOUS
Status: DISPENSED
Start: 2018-03-10

## (undated) RX ORDER — CALCIUM CHLORIDE 100 MG/ML
INJECTION INTRAVENOUS; INTRAVENTRICULAR
Status: DISPENSED
Start: 2018-06-25

## (undated) RX ORDER — EPHEDRINE SULFATE 50 MG/ML
INJECTION, SOLUTION INTRAMUSCULAR; INTRAVENOUS; SUBCUTANEOUS
Status: DISPENSED
Start: 2018-09-19

## (undated) RX ORDER — PHENYLEPHRINE HCL IN 0.9% NACL 1 MG/10 ML
SYRINGE (ML) INTRAVENOUS
Status: DISPENSED
Start: 2018-05-23

## (undated) RX ORDER — EPHEDRINE SULFATE 50 MG/ML
INJECTION, SOLUTION INTRAMUSCULAR; INTRAVENOUS; SUBCUTANEOUS
Status: DISPENSED
Start: 2018-05-23

## (undated) RX ORDER — PROPOFOL 10 MG/ML
INJECTION, EMULSION INTRAVENOUS
Status: DISPENSED
Start: 2018-09-19

## (undated) RX ORDER — CIPROFLOXACIN 500 MG/1
TABLET, FILM COATED ORAL
Status: DISPENSED
Start: 2019-03-27

## (undated) RX ORDER — PHENYLEPHRINE HCL IN 0.9% NACL 1 MG/10 ML
SYRINGE (ML) INTRAVENOUS
Status: DISPENSED
Start: 2018-11-02

## (undated) RX ORDER — ALBUMIN, HUMAN INJ 5% 5 %
SOLUTION INTRAVENOUS
Status: DISPENSED
Start: 2018-11-02

## (undated) RX ORDER — CEFAZOLIN SODIUM 1 G/3ML
INJECTION, POWDER, FOR SOLUTION INTRAMUSCULAR; INTRAVENOUS
Status: DISPENSED
Start: 2018-03-10

## (undated) RX ORDER — FENTANYL CITRATE 50 UG/ML
INJECTION, SOLUTION INTRAMUSCULAR; INTRAVENOUS
Status: DISPENSED
Start: 2018-03-08

## (undated) RX ORDER — CIPROFLOXACIN 500 MG/1
TABLET, FILM COATED ORAL
Status: DISPENSED
Start: 2018-11-01

## (undated) RX ORDER — CEFAZOLIN SODIUM 2 G/50ML
SOLUTION INTRAVENOUS
Status: DISPENSED
Start: 2024-03-25

## (undated) RX ORDER — FENTANYL CITRATE 50 UG/ML
INJECTION, SOLUTION INTRAMUSCULAR; INTRAVENOUS
Status: DISPENSED
Start: 2018-03-09

## (undated) RX ORDER — FENTANYL CITRATE 50 UG/ML
INJECTION, SOLUTION INTRAMUSCULAR; INTRAVENOUS
Status: DISPENSED
Start: 2018-11-02

## (undated) RX ORDER — SULFAMETHOXAZOLE/TRIMETHOPRIM 800-160 MG
TABLET ORAL
Status: DISPENSED
Start: 2020-10-12

## (undated) RX ORDER — DEXAMETHASONE SODIUM PHOSPHATE 4 MG/ML
INJECTION, SOLUTION INTRA-ARTICULAR; INTRALESIONAL; INTRAMUSCULAR; INTRAVENOUS; SOFT TISSUE
Status: DISPENSED
Start: 2024-03-25

## (undated) RX ORDER — CLOPIDOGREL 300 MG/1
TABLET, FILM COATED ORAL
Status: DISPENSED
Start: 2018-03-08

## (undated) RX ORDER — ACETAMINOPHEN 325 MG/1
TABLET ORAL
Status: DISPENSED
Start: 2024-03-25

## (undated) RX ORDER — PROPOFOL 10 MG/ML
INJECTION, EMULSION INTRAVENOUS
Status: DISPENSED
Start: 2018-06-25